# Patient Record
Sex: FEMALE | Race: ASIAN | NOT HISPANIC OR LATINO | Employment: OTHER | URBAN - METROPOLITAN AREA
[De-identification: names, ages, dates, MRNs, and addresses within clinical notes are randomized per-mention and may not be internally consistent; named-entity substitution may affect disease eponyms.]

---

## 2017-01-31 ENCOUNTER — OFFICE VISIT (OUTPATIENT)
Dept: INTERNAL MEDICINE | Facility: CLINIC | Age: 67
End: 2017-01-31
Payer: COMMERCIAL

## 2017-01-31 VITALS
DIASTOLIC BLOOD PRESSURE: 82 MMHG | SYSTOLIC BLOOD PRESSURE: 128 MMHG | OXYGEN SATURATION: 100 % | BODY MASS INDEX: 29.78 KG/M2 | RESPIRATION RATE: 16 BRPM | WEIGHT: 168.7 LBS | HEART RATE: 74 BPM | TEMPERATURE: 97.6 F

## 2017-01-31 DIAGNOSIS — L03.011 PARONYCHIA OF RIGHT MIDDLE FINGER: ICD-10-CM

## 2017-01-31 DIAGNOSIS — E11.9 TYPE 2 DIABETES MELLITUS WITHOUT COMPLICATION, WITHOUT LONG-TERM CURRENT USE OF INSULIN (H): Primary | ICD-10-CM

## 2017-01-31 LAB
ANION GAP SERPL CALCULATED.3IONS-SCNC: 5 MMOL/L (ref 3–14)
BUN SERPL-MCNC: 11 MG/DL (ref 7–30)
CALCIUM SERPL-MCNC: 9.2 MG/DL (ref 8.5–10.1)
CHLORIDE SERPL-SCNC: 106 MMOL/L (ref 94–109)
CHOLEST SERPL-MCNC: 143 MG/DL
CO2 SERPL-SCNC: 28 MMOL/L (ref 20–32)
CREAT SERPL-MCNC: 0.61 MG/DL (ref 0.52–1.04)
GFR SERPL CREATININE-BSD FRML MDRD: ABNORMAL ML/MIN/1.7M2
GLUCOSE SERPL-MCNC: 160 MG/DL (ref 70–99)
HBA1C MFR BLD: 8.6 % (ref 4.3–6)
HDLC SERPL-MCNC: 35 MG/DL
LDLC SERPL CALC-MCNC: 81 MG/DL
NONHDLC SERPL-MCNC: 108 MG/DL
POTASSIUM SERPL-SCNC: 4.2 MMOL/L (ref 3.4–5.3)
SODIUM SERPL-SCNC: 139 MMOL/L (ref 133–144)
TRIGL SERPL-MCNC: 137 MG/DL

## 2017-01-31 PROCEDURE — 36415 COLL VENOUS BLD VENIPUNCTURE: CPT | Performed by: INTERNAL MEDICINE

## 2017-01-31 PROCEDURE — 80048 BASIC METABOLIC PNL TOTAL CA: CPT | Performed by: INTERNAL MEDICINE

## 2017-01-31 PROCEDURE — 99214 OFFICE O/P EST MOD 30 MIN: CPT | Performed by: INTERNAL MEDICINE

## 2017-01-31 PROCEDURE — 80061 LIPID PANEL: CPT | Performed by: INTERNAL MEDICINE

## 2017-01-31 PROCEDURE — 83036 HEMOGLOBIN GLYCOSYLATED A1C: CPT | Performed by: INTERNAL MEDICINE

## 2017-01-31 NOTE — MR AVS SNAPSHOT
After Visit Summary   1/31/2017    Randall Boland    MRN: [id number]           Patient Information     Date Of Birth          1950        Visit Information        Provider Department      1/31/2017 8:30 AM Adrianna Singh MD [facility]        Today's Diagnoses     Type 2 diabetes mellitus without complication, without long-term current use of insulin (H)    -  1       Care Instructions    Labs - please proceed to our first floor laboratory to have these drawn (show them your orange ticket).     Results:    If normal: we will release results in MyChart or send them in the mail. You will not be called for these results.    If abnormal, but non-urgent: we will release results in MyChart or send them in the mail. You will not be called for these results.    If abnormal and urgent: we will call you.    ---    If diabetes is not well enough controlled, we should probably start medication and start checking sugars.    ---    For third right finger, please wrap in warm damp washcloth and gently massage 3x/day for 10-15 minutes when inflamed.     If symptoms worsen (like an abscess) please let me know.             Follow-ups after your visit        Who to contact     If you have questions or need follow up information about today's clinic visit or your schedule please contact [facility] directly at [phone].  Normal or non-critical lab and imaging results will be communicated to you by MyChart, letter or phone within 4 business days after the clinic has received the results. If you do not hear from us within 7 days, please contact the clinic through MyChart or phone. If you have a critical or abnormal lab result, we will notify you by phone as soon as possible.  Submit refill requests through [employer] or call your pharmacy and they will forward the refill request to us. Please allow 3 business days for your refill to be completed.          Additional  Information About Your Visit        [employer] Information     [employer] gives you secure access to your electronic health record. If you see a primary care provider, you can also send messages to your care team and make appointments. If you have questions, please call your primary care clinic.  If you do not have a primary care provider, please call [phone] and they will assist you.        Care EveryWhere ID     This is your Care EveryWhere ID. This could be used by other organizations to access your [facility] medical records  [id number]        Your Vitals Were     Pulse Temperature Respirations Pulse Oximetry          74 97.6  F (36.4  C) (Oral) 16 100%         Blood Pressure from Last 3 Encounters:   01/31/17 128/82   10/14/16 120/70   10/10/16 120/70    Weight from Last 3 Encounters:   01/31/17 168 lb 11.2 oz (76.522 kg)   10/14/16 172 lb 6.4 oz (78.2 kg)   10/10/16 171 lb (77.565 kg)              We Performed the Following     Basic metabolic panel     Hemoglobin A1c     Lipid Profile        Primary Care Provider    None Specified       No primary provider on file.        Thank you!     Thank you for choosing [facility]  for your care. Our goal is always to provide you with excellent care. Hearing back from our patients is one way we can continue to improve our services. Please take a few minutes to complete the written survey that you may receive in the mail after your visit with us. Thank you!             Your Updated Medication List - Protect others around you: Learn how to safely use, store and throw away your medicines at www.disposemymeds.org.          This list is accurate as of: 1/31/17  9:02 AM.  Always use your most recent med list.                   Brand Name Dispense Instructions for use    atorvastatin 20 MG tablet    LIPITOR    90 tablet    Take 1 tablet (20 mg) by mouth daily       blood glucose monitoring lancets     1 Box    Use to test blood sugar 1-2 times daily or  as directed.       blood glucose monitoring test strip    ALEX CONTOUR NEXT    50 each    Use to test blood sugar 1-2 times daily or as directed.       FISH OIL + D3 PO          Multi-vitamin Tabs tablet      Take 1 tablet by mouth daily       nystatin 385741 UNIT/GM Powd    MYCOSTATIN    30 g    Apply topically 3 times daily as needed

## 2017-01-31 NOTE — PATIENT INSTRUCTIONS
Labs - please proceed to our first floor laboratory to have these drawn (show them your orange ticket).     Results:    If normal: we will release results in MyChart or send them in the mail. You will not be called for these results.    If abnormal, but non-urgent: we will release results in MyChart or send them in the mail. You will not be called for these results.    If abnormal and urgent: we will call you.    ---    If diabetes is not well enough controlled, we should probably start medication and start checking sugars.    ---    For third right finger, please wrap in warm damp washcloth and gently massage 3x/day for 10-15 minutes when inflamed.     If symptoms worsen (like an abscess) please let me know.

## 2017-01-31 NOTE — PROGRESS NOTES
SUBJECTIVE:                                                      HPI: Randall Boland is a pleasant 66 year old female who presents for diabetes follow-up:    Accompanied by daughter who translates.     - was last seen in November   - HgbA1c was 9.2% at that time - patient advised to start oral hypoglycemics  - medication was declined - patient (and daughter) wanted to try lifestyle modifications first    Update since then:  - patient has seen DM educator   - has changed diet significantly:   - cut out all sweets   - switched carbs to whole grains (from white flour and white rice to whole wheat and brown rice)   - has reduced portion size   - stopped soft drinks   - eating more vegetables  - patient has started exercising   - more regularly when weather was better   - still going to gym with daughter a couple times/week    Feel well today - no specific complains.    Daughter asks about patient's right 3rd finger:  - history of injury last year  - since then, gets recurrent redness, swelling, and pain of skin surrounding proximal nail  - recurrences usually coincide with increased chores around house   - wants to know what do if symptoms recur    DIABETES MANAGEMENT                                                      Current DM regimen: diet alone  Adherent to ADA diet: yes  BGs: not medically indicated at this time   Episodes of hypoglycemia: n/a     Opthomology: November, 2016 - no DR  Retinopathy: no  Neuropathy: denies  Nephropathy: no  Checking feet/seeing podiatry: yes   Influenza vaccine: up to date   Pneumococcal vaccine: up to date     Aspirin Rx: not on   ACE-I/ARB: not one  Statin: not on     The medication, allergy, and problem lists have been reviewed and updated as appropriate.     OBJECTIVE:                                                      /82 mmHg  Pulse 74  Temp(Src) 97.6  F (36.4  C) (Oral)  Resp 16  Wt 168 lb 11.2 oz (76.522 kg)  SpO2 100%  Constitutional:  well-appearing  Respiratory: normal respiratory effort; clear to auscultation bilaterally  Cardiovascular: regular rate and rhythm; no edema  Gastrointestinal: soft, non-tender, non-distended, and bowel sounds present; no organomegaly or masses   Musculoskeletal: normal gait and station; feet intact - no lesions and normal sensation  Psych: normal judgment and insight; normal mood and affect; recent and remote memory intact  Right 3rd finger: nail thickened, yellowed, and cracked along lateral edges; no cuticle; proximal zimmerman fold heaped up, but non-erythematous    ASSESSMENT/PLAN:                                                      (E11.9) Type 2 diabetes mellitus without complication, without long-term current use of insulin (H)  (primary encounter diagnosis)  Comment: patient has completed a 3 month trial of lifestyle changes to improve diabetic control.   Plan:    - HgbA1c, BMP, and lipid profile today.   - if HgbA1c >7.0, will initiate Metformin and fasting BG checks.    - follow-up pending results - likely 6 months.     (L03.011) Paronychia of right middle finger  Comment:    - not present currently, recurrent per daughter.    - recent trauma to finger --> mild deformity, likely placing her at increased risk for infection/inflammation.   Plan:    - for recurrent symptoms, recommend warm compresses and gentle massage 3x/day.   - if symptoms moderate-severe or refractory to above, come in for further evaluation.    The instructions on the AVS were discussed and explained to the patient. Patient expressed understanding of instructions.  A total of 25 minutes were spent face-to-face with this patient during this encounter and over half of that time was spent on counseling and coordination of care re: above diagnoses and plans of care.     Adrianna Singh MD   [address] [address]  T: [phone], F: [phone]

## 2017-04-27 ENCOUNTER — TELEPHONE (OUTPATIENT)
Dept: INTERNAL MEDICINE | Facility: CLINIC | Age: 67
End: 2017-04-27

## 2017-04-27 NOTE — TELEPHONE ENCOUNTER
Reason for Call:  Form, our goal is to have forms completed with 72 hours, however, some forms may require a visit or additional information.    Type of letter, form or note:  disability    Who is the form from?: Patient    Where did the form come from: Patient or family brought in       What clinic location was the form placed at?: Internal Medicine    Where the form was placed: Dr. Singh's folder    What number is listed as a contact on the form?: [phone]       Additional comments: None    Call taken on 4/27/2017 at 4:41 PM by Windy Conde

## 2017-04-30 DIAGNOSIS — R41.3 MEMORY CHANGES: Primary | ICD-10-CM

## 2017-05-05 ENCOUNTER — OFFICE VISIT (OUTPATIENT)
Dept: NEUROLOGY | Facility: CLINIC | Age: 67
End: 2017-05-05
Payer: COMMERCIAL

## 2017-05-05 VITALS
SYSTOLIC BLOOD PRESSURE: 134 MMHG | DIASTOLIC BLOOD PRESSURE: 72 MMHG | BODY MASS INDEX: 29.84 KG/M2 | WEIGHT: 168.4 LBS | HEART RATE: 100 BPM | RESPIRATION RATE: 18 BRPM | HEIGHT: 63 IN

## 2017-05-05 DIAGNOSIS — R41.89 COGNITIVE IMPAIRMENT: Primary | ICD-10-CM

## 2017-05-05 DIAGNOSIS — E11.42 DIABETIC POLYNEUROPATHY ASSOCIATED WITH TYPE 2 DIABETES MELLITUS (H): ICD-10-CM

## 2017-05-05 LAB — VIT B12 SERPL-MCNC: 468 PG/ML (ref 193–986)

## 2017-05-05 PROCEDURE — 99000 SPECIMEN HANDLING OFFICE-LAB: CPT | Performed by: PSYCHIATRY & NEUROLOGY

## 2017-05-05 PROCEDURE — 84443 ASSAY THYROID STIM HORMONE: CPT | Performed by: PSYCHIATRY & NEUROLOGY

## 2017-05-05 PROCEDURE — 82607 VITAMIN B-12: CPT | Performed by: PSYCHIATRY & NEUROLOGY

## 2017-05-05 PROCEDURE — 84207 ASSAY OF VITAMIN B-6: CPT | Mod: 90 | Performed by: PSYCHIATRY & NEUROLOGY

## 2017-05-05 PROCEDURE — 99205 OFFICE O/P NEW HI 60 MIN: CPT | Performed by: PSYCHIATRY & NEUROLOGY

## 2017-05-05 PROCEDURE — 84425 ASSAY OF VITAMIN B-1: CPT | Mod: 90 | Performed by: PSYCHIATRY & NEUROLOGY

## 2017-05-05 PROCEDURE — 83921 ORGANIC ACID SINGLE QUANT: CPT | Mod: 90 | Performed by: PSYCHIATRY & NEUROLOGY

## 2017-05-05 PROCEDURE — 36415 COLL VENOUS BLD VENIPUNCTURE: CPT | Performed by: PSYCHIATRY & NEUROLOGY

## 2017-05-05 PROCEDURE — 84446 ASSAY OF VITAMIN E: CPT | Mod: 90 | Performed by: PSYCHIATRY & NEUROLOGY

## 2017-05-05 PROCEDURE — 86780 TREPONEMA PALLIDUM: CPT | Performed by: PSYCHIATRY & NEUROLOGY

## 2017-05-05 NOTE — NURSING NOTE
"COGNITIVE SCREEN  1) Repeat 3 items (Banana, Sunrise, Chair)    2) Clock draw: Unable to understand due to language barrier  3) 3 item recall: Recalls 1 object   Results: ABNORMAL clock, 1-2 items recalled: PROBABLE COGNITIVE IMPAIRMENT, **INFORM PROVIDER**    Mini-CogTM Copyright S [employer]. Licensed by the author for use in [facility]; reprinted with permission (eric@[facility]). All rights reserved.        Chief Complaint   Patient presents with     Consult     memory issues       Initial /72 (BP Location: Right arm, Patient Position: Chair, Cuff Size: Adult Regular)  Pulse 100  Resp 18  Ht 1.603 m (5' 3.1\")  Wt 76.4 kg (168 lb 6.4 oz)  BMI 29.74 kg/m2 Estimated body mass index is 29.74 kg/(m^2) as calculated from the following:    Height as of this encounter: 1.603 m (5' 3.1\").    Weight as of this encounter: 76.4 kg (168 lb 6.4 oz).  Medication Reconciliation: complete     Marya Tracey, LISANDRO      "

## 2017-05-05 NOTE — PATIENT INSTRUCTIONS
AFTER VISIT SUMMARY (AVS):    At today's visit we discussed various diagnostic possibilities for your symptoms and the reasons for work-up, which includes:  Orders Placed This Encounter   Procedures     MR Brain w/o & w Contrast     Methylmalonic acid     TSH with free T4 reflex     Vitamin B12     Vitamin E     Vitamin B1 whole blood     Vitamin B6     Anti Treponema     OCCUPATIONAL THERAPY REFERRAL     NEUROPSYCHOLOGY REFERRAL     No new medications were ordered.    Preventive Neurology: Encouraged to stay physically and mentally active with particular emphasis on daily mentally stimulating activities of your choice (such as crosswords, puzzles, sudoku, etc.), stretching exercises, walking, and healthy eating.    Additional recommendations after the work-up.    Next follow-up appointment is in next 3 months or earlier if needed.    Please do not hesitate to call me with any questions or concerns.    Thanks.

## 2017-05-05 NOTE — PROGRESS NOTES
INITIAL NEUROLOGY CONSULTATION    DATE OF VISIT: 5/5/2017  CLINIC LOCATION: [facility]  MRN: [id number]  PATIENT NAME: Randall Boland  YOB: 1950    PRIMARY CARE PROVIDER: Dr. Singh.     REASON FOR VISIT:   Chief Complaint   Patient presents with     Consult     memory issues     HISTORY OF PRESENT ILLNESS:                                                    Ms. Randall oBland is 66 year old right handed female patient with past medical history of diabetes mellitis type 2, who was seen in consultation today requested by Dr. Singh for memory concerns. The patient is accompanied by her daughter, who serves as  and participates in the interview as a collateral source of information.    Per patient's report, she does not think, that her memory is affected. However, after her family pointed out to her, that she has memory issues, she agrees. She does not think, that her mild forgetfulness causing a problem. She also reports diabetes and chronic back pain along with left ankle strain, causing pain with walking.    Per her daughter's opinion, the patient has gradual onset of cognitive decline for the last 5 years. It became much more noticeable in the last year. She has difficulty remembering new information. The patient repeats the same stories and asks the same questions over again. She is not able to remember important dates, doctor's appointments, and [facility] holidays. The daughter needs to constantly remind her about it. In addition, the patient has difficulty with taking her medications correctly. She forgets to take them on time and, sometimes, double doses.    On several occasions, she forgot to turn the stove burners and water facets off. She also leaves the doors open, when she exits the house. She frequently misplaces her things and is not able to find them. Due to the patient's forgetfulness, her daughter cannot leave grandchildren in patient's care anymore.    In  addition, her daughter always accompanies the patient, when they leave the house due to a concern, that the patient may get lost, if she goes alone. The patient does not drive. She is not getting lost in her daughter's home.    The daughter did not notice, that the patient has any word finding difficulties or problems with language.    Regarding patient's mood, her daughter comments, that the patient is anxious and depressed, at times. She became more irritable. The daughter did not notice any other personality changes. She also denies any focal neurological symptoms.    She denies a history of recent head injury. Prior neurological history: negative for migraine headaches, stroke, brain neoplasms, seizure disorders, multiple sclerosis, major head injuries, and CNS infections.    Neurologic Review of Systems - no amaurosis, diplopia, abnormal speech, unilateral numbness or weakness. She endorses insomnia, heartburn, depression, intermittent left elbow rash (not present currently), arthritis, muscle tenderness, headaches (mild), bilateral feet numbness, and history of tuberculosis. Otherwise, she denies any other complaints on 14-point comprehensive review of systems.    PAST MEDICAL/SURGICAL HISTORY:                                                    I personally reviewed patient's past medical and surgical history with the patient at today's visit.  Past Medical History:   Diagnosis Date     Dermatographia      Tuberculosis of intestines     treated in [place]     Past Surgical History:   Procedure Laterality Date     CHOLECYSTECTOMY, OPEN  1990     MEDICATIONS:                                                    I personally reviewed patient's medications and allergies with the patient at today's visit.  Current Outpatient Prescriptions on File Prior to Visit:  Fish Oil-Cholecalciferol (FISH OIL + D3 PO)    metFORMIN (GLUCOPHAGE) 1000 MG tablet Take 1 tablet (1,000 mg) by mouth 2 times daily (with meals)   blood  glucose monitoring (NO BRAND SPECIFIED) test strip Use to test blood sugars once daily   blood glucose monitoring (ALEX CONTOUR NEXT) test strip Use to test blood sugar 1-2 times daily or as directed.   blood glucose monitoring (ALEX MICROLET) lancets Use to test blood sugar 1-2 times daily or as directed.   multivitamin, therapeutic with minerals (MULTI-VITAMIN) TABS Take 1 tablet by mouth daily   nystatin (MYCOSTATIN) 662591 UNIT/GM POWD Apply topically 3 times daily as needed   atorvastatin (LIPITOR) 40 MG tablet Take 1 tablet (40 mg) by mouth daily (Patient not taking: Reported on 5/5/2017)   atorvastatin (LIPITOR) 20 MG tablet Take 1 tablet (20 mg) by mouth daily (Patient not taking: Reported on 5/5/2017)     ALLERGIES:                                                      Allergies   Allergen Reactions     Atorvastatin Muscle Pain (Myalgia)     FAMILY/SOCIAL HISTORY:                                                    Family and social history was reviewed with the patient at today's visit. Father and brother had history of stroke. Both of the patient's parents had dementia. Her father also had headaches.   Problem (# of Occurrences) Relation (Name,Age of Onset)    CEREBROVASCULAR DISEASE (2) Father, Brother    Dementia (1) Mother    Hypertension (1) Brother       Negative family history of: DIABETES, Myocardial Infarction, Coronary Artery Disease Early Onset, CANCER        , lives with  and daughter's family, denies current tobacco, alcohol, and recreational drug use. Retired.  Social History   Substance Use Topics     Smoking status: Never Smoker     Smokeless tobacco: Never Used     Alcohol use 0.0 oz/week     0 Standard drinks or equivalent per week      Comment: couple drinks/week     REVIEW OF SYSTEMS:                                                    Patient has completed a [facility] Patient Health History, including a 14-system review which was personally reviewed, and  "pertinent positives are listed in HPI. She denies any additional problems on the further questioning.    EXAM:                                                    VITAL SIGNS:   /72 (BP Location: Right arm, Patient Position: Chair, Cuff Size: Adult Regular)  Pulse 100  Resp 18  Ht 1.603 m (5' 3.1\")  Wt 76.4 kg (168 lb 6.4 oz)  BMI 29.74 kg/m2    General: pt is in NAD, cooperative.  Skin: normal turgor, moist mucous membranes, no lesions/rashes noticed.  HEENT: ATNC, EOMI, PERRL, white sclera, normal conjunctiva, no nystagmus or ptosis. No carotid bruits bilaterally.  Respiratory: lung sounds clear to auscultation bilaterally, no crackles, wheezes, rhonchi. Symmetric lung excursion, no accessory respiratory muscle use.  Cardiovascular: normal S1/S2, no murmurs/rubs/gallops.  Abdomen: Not distended.  : deferred.    Neurological:  Mental: alert, follows commands, mini-cog is 1/5 with 1/3 on memory recall, no aphasia or dysarthria. Fund of knowledge is diminished for age.  Cranial Nerves:  CN II: visual acuity - able to accurately count fingers with each eye. Visual fields intact, fundi: discs sharp, no papilledema and normal vessels bilaterally.  CN III, IV, VI: EOM intact, pupils equal and reactive  CN V: facial sensation nl  CN VII: face symmetric, no facial droop  CN VIII: hearing normal  CN IX: palate elevation symmetric, uvula at midline  CN XI SCM normal, shoulder shrug nl  CN XII: tongue midline  Motor: Strength: 5/5 in all major groups of all extremities. Normal tone. No abnormal movements. No pronator drift b/l.  Reflexes: Triceps, biceps, brachioradialis, and patellar reflexes normal and symmetric, achilles reflexes are symmetrically reduced. No clonus noted. Toes are down-going b/l.   Sensory: temperature, light touch, pinprick, and vibration reduced in the distal legs bilaterally. Romberg: positive.  Coordination: FNF and heel-shin tests intact b/l. No dysdiadochokinesia with rapid alternating " movements.  Gait:  Normal stride length and arm swings, able to stand on her tiptoes and heels, has difficulty with tandem gait.    DATA:     LABS: I personally reviewed the following labs:  Office Visit on 01/31/2017   Component Date Value Ref Range Status     Hemoglobin A1C 01/31/2017 8.6* 4.3 - 6.0 % Final    Reviewed: OK with previous     Cholesterol 01/31/2017 143  <200 mg/dL Final     Triglycerides 01/31/2017 137  <150 mg/dL Final     HDL Cholesterol 01/31/2017 35* >49 mg/dL Final     LDL Cholesterol Calculated 01/31/2017 81  <100 mg/dL Final    Desirable:       <100 mg/dl     Non HDL Cholesterol 01/31/2017 108  <130 mg/dL Final     Sodium 01/31/2017 139  133 - 144 mmol/L Final     Potassium 01/31/2017 4.2  3.4 - 5.3 mmol/L Final     Chloride 01/31/2017 106  94 - 109 mmol/L Final     Carbon Dioxide 01/31/2017 28  20 - 32 mmol/L Final     Anion Gap 01/31/2017 5  3 - 14 mmol/L Final     Glucose 01/31/2017 160* 70 - 99 mg/dL Final     Urea Nitrogen 01/31/2017 11  7 - 30 mg/dL Final     Creatinine 01/31/2017 0.61  0.52 - 1.04 mg/dL Final     GFR Estimate 01/31/2017   >60 mL/min/1.7m2 Final                    Value:>90  Non  GFR Calc       GFR Estimate If Black 01/31/2017   >60 mL/min/1.7m2 Final                    Value:>90   GFR Calc       Calcium 01/31/2017 9.2  8.5 - 10.1 mg/dL Final     IMAGING:  No prior brain imaging was performed.    ASSESSMENT and PLAN:      ASSESSMENT: Randall Boland is a 66 year old female patient with past medical history of diabetes mellitis type 2, who presents with gradual onset of cognitive decline in the last 5 years. Both of the patient's parents had history of dementia. Her neurological exam is notable for cognitive dysfunction, symmetrically reduced achilles reflexes and symmetrically diminished sensation in her feet.    Her clinical presentation is consistent with major new neurocognitive disorder. The differential includes vitamin deficiencies,  CNS infections, thyroid dysfunction, structural/vascular brain lesions, and neurodegenerative disorders, with Alzheimer's disease being most common. I ordered screening labs for reversible causes of dementia. I also ordered MRI with and without contrast to evaluate for structural/vascular brain lesions. Additionally, I would like to obtain neuropsychological evaluation and CPT to further evaluate her memory impairment.    Her other neurological findings are likely related to diabetic polyneuropathy. I recommended tightening of her glycemic control with the long-term goal of hemoglobin A1C less than 7.0. I also advised the patient to inspect her feet daily for the presence of cuts and infection. I recommended to test her water with her hands or elbows prior to stepping into the bathtub to prevent burns.    DIAGNOSES:    ICD-10-CM    1. Cognitive impairment R41.89 Methylmalonic acid     TSH with free T4 reflex     Vitamin B12     Vitamin E     Vitamin B1 whole blood     Vitamin B6     MR Brain w/o & w Contrast     Anti Treponema     OCCUPATIONAL THERAPY REFERRAL     NEUROPSYCHOLOGY REFERRAL   2. Diabetic polyneuropathy associated with type 2 diabetes mellitus (H) E11.42        PLAN: At today's visit we discussed various diagnostic possibilities for her symptoms and the reasons for work-up, which includes:  Orders Placed This Encounter   Procedures     MR Brain w/o & w Contrast     Methylmalonic acid     TSH with free T4 reflex     Vitamin B12     Vitamin E     Vitamin B1 whole blood     Vitamin B6     Anti Treponema     OCCUPATIONAL THERAPY REFERRAL     NEUROPSYCHOLOGY REFERRAL     No new medications were ordered.    Preventive Neurology: Encouraged to stay physically and mentally active with particular emphasis on daily mentally stimulating activities of her choice (such as crosswords, puzzles, sudoku, etc.), stretching exercises, walking, and healthy eating.    Additional recommendations after the work-up.    Next  follow-up appointment is in next 3 months or earlier if needed.    I encouraged the patient and/or her daughter to call me with any questions or concerns.    Total Time:  60 minutes with > 50% spent counseling the patient and her daughter on stated above assessment and recommendations, including nature of the diagnosis, needed w/u, proposed plan of treatment, and prognosis.    Jovani Ordaz MD  / Neurology  [place]  (Chart documentation was completed in part with Dragon voice-recognition software. Even though reviewed, some grammatical, spelling, and word errors may remain.)

## 2017-05-06 LAB
T PALLIDUM IGG+IGM SER QL: NEGATIVE
TSH SERPL DL<=0.005 MIU/L-ACNC: 1.02 MU/L (ref 0.4–4)

## 2017-05-07 LAB
A-TOCOPHEROL VIT E SERPL-MCNC: 13.2
BETA+GAMMA TOCOPHEROL SERPL-MCNC: 1.3 MG/DL
METHYLMALONATE SERPL-SCNC: 0.23

## 2017-05-08 LAB
VIT B1 BLD-MCNC: 160 UG/DL
VIT B6 SERPL-MCNC: 101 NG/ML

## 2017-05-09 ENCOUNTER — TELEPHONE (OUTPATIENT)
Dept: NEUROLOGY | Facility: CLINIC | Age: 67
End: 2017-05-09

## 2017-05-15 ENCOUNTER — HOSPITAL ENCOUNTER (OUTPATIENT)
Dept: OCCUPATIONAL THERAPY | Facility: CLINIC | Age: 67
Setting detail: THERAPIES SERIES
End: 2017-05-15
Attending: PSYCHIATRY & NEUROLOGY
Payer: COMMERCIAL

## 2017-05-15 ENCOUNTER — HOSPITAL ENCOUNTER (OUTPATIENT)
Dept: MRI IMAGING | Facility: CLINIC | Age: 67
Discharge: HOME | End: 2017-05-15
Attending: PSYCHIATRY & NEUROLOGY | Admitting: PSYCHIATRY & NEUROLOGY
Payer: COMMERCIAL

## 2017-05-15 DIAGNOSIS — R41.89 COGNITIVE IMPAIRMENT: ICD-10-CM

## 2017-05-15 PROCEDURE — 97535 SELF CARE MNGMENT TRAINING: CPT | Mod: GO

## 2017-05-15 PROCEDURE — 25000128 H RX IP 250 OP 636: Performed by: PSYCHIATRY & NEUROLOGY

## 2017-05-15 PROCEDURE — 97165 OT EVAL LOW COMPLEX 30 MIN: CPT | Mod: GO

## 2017-05-15 PROCEDURE — 40000125 ZZHC STATISTIC OT OUTPT VISIT

## 2017-05-15 PROCEDURE — A9585 GADOBUTROL INJECTION: HCPCS | Performed by: PSYCHIATRY & NEUROLOGY

## 2017-05-15 PROCEDURE — 70553 MRI BRAIN STEM W/O & W/DYE: CPT

## 2017-05-15 RX ORDER — GADOBUTROL 604.72 MG/ML
7 INJECTION INTRAVENOUS ONCE
Status: COMPLETED | OUTPATIENT
Start: 2017-05-15 | End: 2017-05-15

## 2017-05-15 RX ADMIN — GADOBUTROL 7 ML: 604.72 INJECTION INTRAVENOUS at 17:08

## 2017-05-18 ENCOUNTER — OFFICE VISIT (OUTPATIENT)
Dept: NEUROLOGY | Facility: CLINIC | Age: 67
End: 2017-05-18
Payer: COMMERCIAL

## 2017-05-18 ENCOUNTER — TELEPHONE (OUTPATIENT)
Dept: NEUROLOGY | Facility: CLINIC | Age: 67
End: 2017-05-18

## 2017-05-18 VITALS
SYSTOLIC BLOOD PRESSURE: 104 MMHG | DIASTOLIC BLOOD PRESSURE: 64 MMHG | WEIGHT: 168 LBS | BODY MASS INDEX: 29.67 KG/M2 | OXYGEN SATURATION: 97 % | RESPIRATION RATE: 18 BRPM | HEART RATE: 87 BPM

## 2017-05-18 DIAGNOSIS — G30.0 EARLY ONSET ALZHEIMER'S DEMENTIA WITHOUT BEHAVIORAL DISTURBANCE (H): Primary | ICD-10-CM

## 2017-05-18 DIAGNOSIS — F02.80 EARLY ONSET ALZHEIMER'S DEMENTIA WITHOUT BEHAVIORAL DISTURBANCE (H): Primary | ICD-10-CM

## 2017-05-18 PROCEDURE — 99215 OFFICE O/P EST HI 40 MIN: CPT | Performed by: PSYCHIATRY & NEUROLOGY

## 2017-05-18 NOTE — PROGRESS NOTES
ESTABLISHED PATIENT NEUROLOGY NOTE    DATE OF VISIT: 5/18/2017  CLINIC LOCATION: [facility]  MRN: [id number]  PATIENT NAME: Randall Boland  YOB: 1950    PCP: No primary care provider on file.    REASON FOR VISIT:   Chief Complaint   Patient presents with     RECHECK     cognitive issues     SUBJECTIVE:                                                      HISTORY OF PRESENT ILLNESS: Patient is here for follow up regarding gradual progressive cognitive impairment. Please refer to my initial note from 05/05/2017 for further information. Patient is accompanied by her daughter and , who participate in the interview. Visit was conducted with the help of the professional Tibetan phone , Jovanni Sethi, from [employer].    The following issues were reviewed today: test results, current symptoms, and plan.    Since the last visit, the patient and her daughter report no changes in cognitive abilities. The patient denies the interval development of new focal neurological symptoms. They've brought paperwork for medical examination from citizenship/naturalization test.    On review of systems, patient endorses no new active complaints. Medications, allergies, family and social history were also reviewed. There are no changes reported by patient.    CURRENT MEDICATIONS:   Current Outpatient Prescriptions on File Prior to Visit:  Fish Oil-Cholecalciferol (FISH OIL + D3 PO)    atorvastatin (LIPITOR) 40 MG tablet Take 1 tablet (40 mg) by mouth daily (Patient not taking: Reported on 5/5/2017)   metFORMIN (GLUCOPHAGE) 1000 MG tablet Take 1 tablet (1,000 mg) by mouth 2 times daily (with meals)   blood glucose monitoring (NO BRAND SPECIFIED) test strip Use to test blood sugars once daily   blood glucose monitoring (ALEX CONTOUR NEXT) test strip Use to test blood sugar 1-2 times daily or as directed.   blood glucose monitoring (ALEX MICROLET) lancets Use to test blood  sugar 1-2 times daily or as directed.   atorvastatin (LIPITOR) 20 MG tablet Take 1 tablet (20 mg) by mouth daily (Patient not taking: Reported on 5/5/2017)   multivitamin, therapeutic with minerals (MULTI-VITAMIN) TABS Take 1 tablet by mouth daily   nystatin (MYCOSTATIN) 148647 UNIT/GM POWD Apply topically 3 times daily as needed     REVIEW OF SYSTEMS:                                                    10-system review was completed. Pertinent positives are included in HPI. The remainder of ROS is negative.  EXAM:                                                    Physical Exam:   Vitals: /64 (BP Location: Left arm, Patient Position: Chair, Cuff Size: Adult Large)  Pulse 87  Resp 18  Wt 76.2 kg (168 lb)  SpO2 97%  BMI 29.67 kg/m2    General: pt is in NAD, cooperative.  Skin: normal turgor, moist mucous membranes, no lesions/rashes noticed.  HEENT: ATNC, white sclera, normal conjunctiva.  Respiratory:Symmetric lung excursion, no accessory respiratory muscle use.  Abdomen: Non distended.  Neurological: awake, cooperative, follows commands, no aphasia or dysarthria noted, cranial nerves II-XII: no ptosis, extraocular motility is full, face is symmetric, tongue is midline, equally moves all extremities, her achilles reflexes and pinprick sensation were symmetrically reduced last visit (not checked today), no dysmetria bilaterally, gait is normal.    DATA:     Labs: I personally reviewed the following labs:  Office Visit on 05/05/2017   Component Date Value Ref Range Status     Methylmalonic Acid 05/05/2017 0.23   Final    Comment: Reference range: 0.00 to 0.40  Unit: umol/L  (Note)  INTERPRETIVE INFORMATION: MMA Serum/Plasma,                           Vitamin B12 Status  Test developed and characteristics determined by [facility]. See Compliance Statement B: [employer] [facility]  Performed by [facility],  [address] [place] [id number] [phone]  [email or web address], Marty Wright MD, Lab. Director        TSH 05/05/2017 1.02  0.40 - 4.00 mU/L Final     Vitamin B12 05/05/2017 468  193 - 986 pg/mL Final     Vitamin E 05/05/2017 13.2   Final    Comment: Reference range: 5.5 to 18.0  Unit: mg/L  (Note)  Test developed and characteristics determined by [facility]. See Compliance Statement B: [employer]/CS       Vitamin E Gamma 05/05/2017 1.3   Final    Comment: Reference range: 0.0 to 6.0  Unit: mg/L  (Note)  Performed by [facility],  [address] [place] [address] [phone]  [email or web address], Marty Wright MD, Lab. Director       Vitamin B1 Whole Blood Level 05/05/2017 160   Final    Comment: Reference range: 70 to 180  Unit: nmol/L  (Note)  INTERPRETIVE INFORMATION: Vitamin B1, Whole Blood  This assay measures the concentration of thiamine  diphosphate (TDP), the primary active form of vitamin B1.  Approximately 90 percent of vitamin B1 present in whole  blood is TDP. Thiamine and thiamine monophosphate, which  comprise the remaining 10 percent, are not measured.  Test developed and characteristics determined by [facility]. See Compliance Statement B: [facility]  Performed by [facility],  [address] [place] [address] [phone]  [email or web address], Marty Wright MD, Lab. Director       Vitamin B6 05/05/2017 101.0   Final    Comment: Reference range: 20.0 to 125.0  Unit: nmol/L  (Note)  INTERPRETIVE INFORMATION: Vitamin B6 (Pyridoxal 5-Phosphate)  Pyridoxal 5'-phosphate measured in a specimen collected  following an 8-hour or overnight fast accurately indicates  vitamin B6 nutritional status. Non-fasting specimen  concentration reflects recent vitamin intake.  Test developed and characteristics determined by [facility]. See Compliance Statement B: [facility]  Performed by [facility],  [address] [place] [address] [phone]  [email or web address], Marty Wright MD, Lab. Director       Treponema pallidum Antibody 05/05/2017 Negative  NEG Final   Office Visit on  01/31/2017   Component Date Value Ref Range Status     Hemoglobin A1C 01/31/2017 8.6* 4.3 - 6.0 % Final    Reviewed: OK with previous     Cholesterol 01/31/2017 143  <200 mg/dL Final     Triglycerides 01/31/2017 137  <150 mg/dL Final     HDL Cholesterol 01/31/2017 35* >49 mg/dL Final     LDL Cholesterol Calculated 01/31/2017 81  <100 mg/dL Final    Desirable:       <100 mg/dl     Non HDL Cholesterol 01/31/2017 108  <130 mg/dL Final     Sodium 01/31/2017 139  133 - 144 mmol/L Final     Potassium 01/31/2017 4.2  3.4 - 5.3 mmol/L Final     Chloride 01/31/2017 106  94 - 109 mmol/L Final     Carbon Dioxide 01/31/2017 28  20 - 32 mmol/L Final     Anion Gap 01/31/2017 5  3 - 14 mmol/L Final     Glucose 01/31/2017 160* 70 - 99 mg/dL Final     Urea Nitrogen 01/31/2017 11  7 - 30 mg/dL Final     Creatinine 01/31/2017 0.61  0.52 - 1.04 mg/dL Final     GFR Estimate 01/31/2017   >60 mL/min/1.7m2 Final                    Value:>90  Non  GFR Calc       GFR Estimate If Black 01/31/2017   >60 mL/min/1.7m2 Final                    Value:>90   GFR Calc       Calcium 01/31/2017 9.2  8.5 - 10.1 mg/dL Final     Imaging: I also personally reviewed following brain imaging and agree with the formal radiology report as follows:   MR BRAIN W/O & W CONTRAST 5/15/2017: Negative for age. No bleed, mass, or acute infarcts are seen.  CPT 05/15/2017: Average CPT score 4.8/5.6.  Mild to moderate cognitive-functional disability. Significant deficits in working memory and executive thought processes. Judgment, reasoning and planning show obvious impairment. Distractible with inability to shift attention/actions given competing stimuli. Difficulty with problem solving and managing details. Complex daily tasks performed with inconsistency, difficulty, or error.    Safety: Medications should be monitored, stove use may require supervision, and driving ability may be affected. Impaired safety awareness with inability to  anticipate potential problems. May not recognize or respond to emergent situations. Requires frequent check-in support.   ADL: Mild difficulty with simple everyday self-care tasks. Benefits from structured, routine activity. Will likely need reminders to complete tasks outside of the routine. Requires assistance with planning and IADL tasks like shopping and finances. Learns concrete tasks through repetition, but performance may not generalize. Tends to be impulsive with poor insight. Self centered behavior or inability to consider the needs of others is common.  ASSESSMENT and PLAN:                                                    Assessment: 66-year-old female patient with past medical history of diabetes mellitus type 2 and strong family history of dementia presents for follow-up of gradual cognitive decline over the last 5 years to discuss the results of workup (brain MRI, labs, and CPT), which was negative for reversible causes of cognitive impairment. Neuropsychologic assessment was declined due to language/cultural barriers.    Her clinical presentation is consistent with major neurocognitive disorder likely related to Alzheimer's disease or other neurodegenerative disorders.    We thoroughly discussed the suspected diagnosis, available treatment options (including Aricept), and prognosis. The patient wants to try Tibetan folk medicines first. She'll contact my office, if she decides to start Aricept later.    Her citizenship paperwork will be filled in a timely manner. I will notify her daughter, when it is completed.    Diagnoses:    ICD-10-CM    1. Early onset Alzheimer's dementia without behavioral disturbance G30.0     F02.80        Plan: At today's visit we discussed the results of her workup for reversible causes of cognitive impairment, which was negative. We also discussed results of occupational therapy assessment, that indicated mild to moderate functional cognitive impairment. At the current  moment, the most likely explanation of her memory dysfunction is Alzheimer's disease, which is characterized by progressive memory loss. Additional educational materials for home review were provided.    Next follow-up appointment is in the next 6 months or earlier if needed.     I advised the patient and/or her daughter to call me with any questions or concerns.    Total Time: 45 minutes with > 50% spent counseling the patient and family on stated above assessment and recommendations, including nature of the diagnosis, needed w/u, proposed plan of treatment, and prognosis.    Jovani Ordaz MD  HP/[facility] Neurology

## 2017-05-18 NOTE — NURSING NOTE
"Chief Complaint   Patient presents with     RECHECK     cognitive issues       Initial /64 (BP Location: Left arm, Patient Position: Chair, Cuff Size: Adult Large)  Pulse 87  Resp 18  Wt 76.2 kg (168 lb)  SpO2 97%  BMI 29.67 kg/m2 Estimated body mass index is 29.67 kg/(m^2) as calculated from the following:    Height as of 5/5/17: 1.603 m (5' 3.1\").    Weight as of this encounter: 76.2 kg (168 lb).  Medication Reconciliation: jackson Tracey CMA      "

## 2017-05-18 NOTE — PROGRESS NOTES
Cognitive Performance Test    SUMMARY OF TEST:    The Cognitive Performance Test (CPT) is a standardized performance-based assessment to measure working memory/executive function processing capacities that underlie functional performance. Subtasks include common basic and instrumental activities of daily living (ADL/IADL) which are rated based on the manner in which patients respond to task demands of varying complexity. The total CPT score describes a level of functioning that indicates how information is processed, implications for functional activities, potential safety risks and a recommended level of supervision or assist based on cognitive function. The highest total score on this test is in the range of 5.6 to 5.8.    DATE OF TESTIN/15/17    RESULTS OF TESTING:                                                                                         CPT Subtest Results    MEDBOX:  SHOP/GLOVES:  PHONE:    WASH:   TRAVEL: / TOAST:    DRESS:    TOTAL CPT SCORE:       Average CPT Score  4.8/5.6    INTERPRETATION OF TEST RESULTS:    Based on the Cognitive Performance Test, this patient scored at CPT Level 4.5 (rounding down from 4.8 per CPT protocol).  See CPT Levels reference below.    Summary of functional cognitive status:   Ms. Randall Boland is 66 year old female presenting to OP OT for Cognitive Performance Testing. The patient is accompanied by her daughter, who serves as  and participates in evaluation as additional source of information. Medical hx of diabetes mellitis type 2, chronic back pain along with left ankle strain, and family hx of dementia. Pt and daughter repots short term memory concerns state patient forgets to turn off stove burners and close doors, and has forgotten or overdosed on her daily medications. Pt's daughter reports changes have been made in last month to improve pt's safety including; pt is no longer left home alone, pt does not drive and  receives 100% of transportation from daughters, receives daily assistance with medications, daughter assists with shopping, and daughter manages pt's finances.    Factors affecting performance:  Possible cultural/language barrier    Recommendations:    Assist for ADL/IADL:  Finances, Driving, Medication management and Care of family member, shopping  Supervision for ADL/IADL:  ADL, Meal preparation, Cleaning and Laundry  Supervision in living setting:  Daily checks    Completed 6/7 CPT subtests. Unable to complete travel subtest d/t language barrier. Pt unable to follow pictorial map switch to written directions in English. Daughter verbalized directions with noted continued difficulty. Educated Randall Boland and her daughter on recommendation of daily check in's for IADL's, ADL s, medication management, long term financial planning, meal planning and preparation, and automatic shut off appliances for improved safety. Also educated pt and daughter on importance of assistance with medication set up based on her score of a 4.5. In addition, pt educated on strategies to improve memory, balanced diet, quality sleep, exercising mind, and implementation of monthly calendars in addition to lunar calendars for improved ability to keep track of appointment/birthdays/et cetera. Randall Boland is no longer driving and daughter assists with finances, so driving safety and financial management were not specifically addressed. Randall Boland  and her daughter reported agreement and understanding of all recommendations.                                                                                                 TIME ADMINISTERING TEST: 50    TIME FOR INTERPRETATION AND PREPARATION OF REPORT: 10    TOTAL TIME: 60      CPT Levels Reference:    Patient's Average CPT Score:  4.5/5.6                                                                                                                                                  Individual  "scores range along a continuum as outlined below.  In addition to cognitive status, other factors may affect safety in a home environment.  Please refer to specific recommendations for this patient.    ___5.6-5.8  Normal functioning (absence of cognitive-functional disability).  Independent in managing personal affairs, monitors and directs own behavior.  Uses complex information to carry out daily activities with safety and accuracy.    Proficient with instrumental activities of daily living (IADL) and learning new activity.  Problems are anticipated, errors are avoided, and consequences of actions are considered.      ___5.0   Mild cognitive-functional disability; deficits in working memory and executive thought processes. Difficulty using complex information. Problems may be observed with recent memory, judgment, reasoning and planning ahead. May be impulsive or have difficulty anticipating consequences.  Safety:  May require assistance to plan ahead; or to manage complex medication schedules, appointments or finances.  Hazardous activities may need to be monitored or limited.  ADL:  Mild functional decline.  Able to complete basic self-care and routine household tasks.  May have difficulty with complex daily tasks such as reading, writing, meal preparation, shopping or driving.   Learns through hands on teaching. Self-centered behavior or difficulty considering the needs of others may be seen related to trouble seeing the  whole picture\". Can appear disorganized or uninhibited.    __X_4.5  Mild to moderate cognitive-functional disability. Significant deficits in working memory and executive thought processes. Judgment, reasoning and planning show obvious impairment.  Distractible with inability to shift attention/actions given competing stimuli.  Difficulty with problem solving and managing details. Complex daily tasks performed with inconsistency, difficulty, or error.     Safety:  Medications should be " monitored, stove use may require supervision, and driving ability may be affected.  Impaired safety awareness with inability to anticipate potential problems.  May not recognize or respond to emergent situations. Requires frequent check-in support.   ADL:  Mild difficulty with simple everyday self-care tasks. Benefits from structured, routine activity.  Will likely need reminders to complete tasks outside of the routine. Requires assistance with planning and IADL tasks like shopping and finances. Learns concrete tasks through repetition, but performance may not generalize. Tends to be impulsive with poor insight. Self centered behavior or inability to consider the needs of others is common.    ___4.0  Moderate cognitive-functional disability; abstract to concrete thought processes. Working memory and executive function impairments are obvious. Difficulty with planning and problem solving.  Behavior is goal-directed, but unable to follow multi-step directions, is easily distracted, and may not recognize mistakes.  Inability to anticipate hazards or understand precautions.  Safety:  Recommend 24-hour supervision for safety. Supervision needed for medication management and for hazardous activities. May not be able to follow a restricted diet. Can get lost in unfamiliar surroundings. Generally, persons functioning at level 4 should not be driving.   ADL:  Some decline in quality or frequency of ADL.  Schuyler enhanced by use of a routine, simple concrete directions, and caregiver set-up of needed items. Complex tasks such as money or home management typically requires assistance.  Relies heavily on vision to guide behavior; will ignore objects/hazards not in plain sight and can be distracted by irrelevant objects. Often has poor insight.  Able to carry out social conversation and may verbally  cover  for deficits leading caregivers to believe they are capable of functioning independently.       ___3.5  Moderate  cognitive-functional disability; increased cues needed for task completion. Aware of concrete task steps but needs prompting or cues to initiate and complete simple tasks. Attention span is limited, simple directions may need to be repeated, and re-focus to a topic or task may be required.  Safety:  24-hour supervision required for safety and for assistance with daily tasks. Assistance required with medications, and access to medication should be limited. Meals, nutrition and dietary restrictions need to be monitored.  All hazardous activities should be restricted or supervised. Should not drive. Prone to wandering and can become lost.  ADL:  Moderate functional decline. Familiar tasks usually requires set-up of supplies and directions to complete steps. May need objects handed to them for task initiation. Function best with a set schedule in familiar surroundings with familiar people. All complex tasks must be done by others. Vocabulary is diminished and speech often unfocused.     Kym Mena, OTR/L

## 2017-05-18 NOTE — PATIENT INSTRUCTIONS
AFTER VISIT SUMMARY (AVS):    At today's visit we discussed the results of your workup for reversible causes of cognitive impairment, which was negative. We also discussed results of occupational therapy assessment that indicated mild to moderate functional impairment of your memory. At the current moment, the most likely explanation of your memory dysfunction is Alzheimer's disease, which is characterized by progressive memory loss. Additional information is provided below.    We discussed use of Aricept, the medication that could slow down and memory difficulty. You decided to try Tibetan medicines first. Please let me know, if you decide to try Aricept later.     Next follow-up appointment is in next 6 months or earlier if needed.    Please do not hesitate to call me with any questions or concerns.    Thanks.    Alzheimer's Dementia and Caregiver Support   Alzheimer's dementia (AD) is a chronic condition that affects the brain. It causes a gradual loss of memory. A person with AD may have trouble recognizing familiar people and places, or knowing what day it is. The person s memory, judgment, and decision-making may also be affected. In severe cases the person may not respond when someone talks to him or her.  AD is the most common form of dementia. Doctors don t fully understand what causes AD. It has no cure. But medicines can slow down the progress of the disease and treat some of the symptoms.  Home care  Follow these tips when caring for a person with AD at home:    A responsible person must be with the person who has advanced AD at all times.  He or she should not be left alone or unsupervised.    In the case of advanced AD, keep all medicines in a secure place. They should be under the caregiver s control. A person with advanced AD should not be allowed to take his or her own medicines. This needs to be supervised by the caregiver.  Here are ways to help a person with dementia:  Activities  Keep to a daily  routine. Changes in routine can cause stress for someone with dementia. Make a schedule for common daily tasks. These include bathing, dressing, taking medicines, eating meals, going for walks, and going to bed.  Communication  When talking to a person with dementia, talk slowly and clearly. Use a gentle tone of voice. Choose short, simple words and sentences. Ask one question at a time. Don t interrupt, criticize, or argue. Be calm and supportive. Use friendly facial expressions. Use pointing and touching to help communicate. If the person has a loss of long-term memory, don t ask questions about past events. Instead, talk about what is happening now.  Behavioral tips  Use lists, signs, family photos, clocks, and calendars as memory aids. Label cabinets and drawers. Try to distract, not confront, the person. When he or she becomes frustrated or upset, direct the person s attention to eating or some other interesting activity.  Medical-legal tips  Talk with your doctor or  about getting a power of  for health care and for financial decisions. It is best to do this while the person can still sign legal documents and make his or her own legal decisions. Otherwise a court order will be needed.  Support for the caregiver  As the caregiver, you will need a lot of support for yourself. Caring for a person with dementia is a full-time job. It can drain your emotions and lead to frustration and anger toward the one you love. It is common to have feelings of grief over losing the relationship that you once had. As a caregiver to someone with dementia, you are at higher risk for depression, anxiety and stress.  Here are some tips to help you cope with being a caregiver:    Learn about dementia and Alzheimer s disease so you know what to expect.    Find out about the resources in your community, including adult day-care programs. Ask your health care provider for a referral to a , if needed.    Take  care of yourself with a healthy diet, exercise, and plenty of rest.    Ask for help. Share some of the caretaking duties with family and friends.    Make personal time for yourself. This is essential! Consider hiring an in-home sitter.    Seek counseling or join a caregiver s support group. Don't isolate yourself or try to cope with this alone. In a support group, you can learn from others in a similar situation.    Visit the Alzheimer s Association website (www.alz.org) for more information.  Follow-up care  Follow up with the person s health care provider.  When to seek medical care  Get prompt medical care if any of these occur:    Frequent falls    The person refuses to eat or drink    Violent behavior or behavior becomes too difficult to manage at home    Increased drowsiness, or failure to respond normally    Headache or nausea that gets worse, or repeated vomiting    Numbness or weakness of the face, an arm, or a leg    Slurred speech, trouble speaking, walking, or seeing    Fainting spell, dizziness, or seizure    Unexplained fever of 100.4  F (38.0  C) or higher    8611-3713 [employer]. [address] [place] [address]. All rights reserved. This information is not intended as a substitute for professional medical care. Always follow your healthcare professional's instructions.        For Caregivers: Daily Care for Dementia Patients  Over time, people with dementia will need more and more help with daily tasks. These include eating meals, taking medicines, and getting enough exercise. They also include personal care needs, such as bathing and dressing. To reduce stress, make these activities part of a routine. Ask family and friends to lend a hand. And be aware that your loved one s abilities can change from day to day. If you have problems meeting your loved one s needs, it s time to get help. Talk to a  or local support agency--such as a local Alzheimer s Association  chapter.        Gardening can be a pleasant way to keep your loved one active.   Activity and exercise  Regular activity is good for your loved one s body and mind. It may even help slow the progression of the disease. Keep to your loved one s old routines when possible. It also helps to:    Do things together. Go for a walk, garden, or bake a cake. Basic, repetitive activities are good choices.    Be active as often as possible. This releases pent-up energy, which can reduce restlessness and improve sleep.    Include social activities. Take your loved one to see friends and family. But try to keep things simple. Loud noises, crowds, or too many people talking at once can be upsetting.  Taking medicines  Be sure all prescribed medicines are taken as directed. These tips can help:    Provide supervision if your loved one cannot safely take medicines alone.    Set a routine so medicines are taken at the same time each day.    Ensure that ALL medicines are taken. A pillbox can help you keep track.    Plan ahead. Be sure to refill prescriptions before they run out.  Eating meals  At mealtime, serve healthy foods with plenty of fluids. These tips can also help:    Keep meals simple. Too many choices can be overwhelming. Try to maintain a calm, quiet atmosphere while you eat.    Place healthy snacks, such as fresh fruit, out where they can be seen.    Watch eating habits. People with dementia may eat too little or too much. Talk to the healthcare provider if you have concerns.    Try finger foods if regular meals become too difficult for your loved one to eat.  Dressing  People with dementia may have trouble choosing what to wear. It s OK if clothes don t always match. But if help is needed:    Choose clothing that is easy to put on and take off. Use Velcro shoes or slippers.    Lay out a fresh outfit each day. Place clothes in the order they should be put on.    If more help is needed, hand over clothing items one at a  time. Explain how each item should be put on.    Put dirty clothes away so they re not worn again.  Bathing and grooming  Getting your loved one to bathe can be a real challenge. Try these tips:    Treat bathing as a routine activity. But be flexible. A daily bath is probably unrealistic.    Prepare bath items ahead of time, and be sure to test the water temperature.    Avoid leaving your loved one alone in the bath or shower.               Try visiting a barbershop or [employer] salon for help with hair washing, hair styling, and shaving.  Using the toilet  In later stages, dementia patients may develop incontinence (trouble controlling the bladder or bowel). To ease problems:    Set a routine for using the toilet (for example, every 3 hours) and stick to it.    Limit beverages before bedtime to prevent accidents. A bedside commode may also help.    Be understanding if accidents happen. Your loved one may be as upset as you.    At one point it may be necessary to have them wear an adult diaper.      Talk to a healthcare provider if incontinence develops suddenly. It may signal other health issues that can be treated.  When to call the healthcare provider  Call the healthcare provider if you notice a sudden change in your loved one s behavior or emotions. These changes may be due to dementia. But they could also signal other health problems that can be treated.   NOTE:This sheet is a summary. It may not cover all possible information. If you have questions about this medicine, talk to your doctor, pharmacist, or health care provider. Copyright  2016 [employer]

## 2017-05-18 NOTE — TELEPHONE ENCOUNTER
This patient has an appointment today at 1:30 with Dr. Ordaz, Neurologist to review results of CPT.  Dr. Ordaz would like your dictation for this appointment, or if you have the score, that would suffice.  Please advise.  Thank you.    Marya Tracey, [facility]  Neurology Clinic

## 2017-05-18 NOTE — MR AVS SNAPSHOT
After Visit Summary   5/18/2017    Randall Boland    MRN: [id number]           Patient Information     Date Of Birth          1950        Visit Information        Provider Department      5/18/2017 1:30 PM Jovani Ordaz MD [facility] Instructions    AFTER VISIT SUMMARY (AVS):    At today's visit we discussed the results of your workup for reversible causes of cognitive impairment, which was negative. We also discussed results of occupational therapy assessment that indicated mild to moderate functional impairment of your memory. At the current moment, the most likely explanation of your memory dysfunction is Alzheimer's disease, which is characterized by progressive memory loss. Additional information is provided below.    We discussed use of Aricept, the medication that could slow down and memory difficulty. You decided to try Tibetan medicines first. Please let me know, if you decide to try Aricept later.     Next follow-up appointment is in next 6 months or earlier if needed.    Please do not hesitate to call me with any questions or concerns.    Thanks.    Alzheimer's Dementia and Caregiver Support   Alzheimer's dementia (AD) is a chronic condition that affects the brain. It causes a gradual loss of memory. A person with AD may have trouble recognizing familiar people and places, or knowing what day it is. The person s memory, judgment, and decision-making may also be affected. In severe cases the person may not respond when someone talks to him or her.  AD is the most common form of dementia. Doctors don t fully understand what causes AD. It has no cure. But medicines can slow down the progress of the disease and treat some of the symptoms.  Home care  Follow these tips when caring for a person with AD at home:    A responsible person must be with the person who has advanced AD at all times.  He or she should not be left alone or  unsupervised.    In the case of advanced AD, keep all medicines in a secure place. They should be under the caregiver s control. A person with advanced AD should not be allowed to take his or her own medicines. This needs to be supervised by the caregiver.  Here are ways to help a person with dementia:  Activities  Keep to a daily routine. Changes in routine can cause stress for someone with dementia. Make a schedule for common daily tasks. These include bathing, dressing, taking medicines, eating meals, going for walks, and going to bed.  Communication  When talking to a person with dementia, talk slowly and clearly. Use a gentle tone of voice. Choose short, simple words and sentences. Ask one question at a time. Don t interrupt, criticize, or argue. Be calm and supportive. Use friendly facial expressions. Use pointing and touching to help communicate. If the person has a loss of long-term memory, don t ask questions about past events. Instead, talk about what is happening now.  Behavioral tips  Use lists, signs, family photos, clocks, and calendars as memory aids. Label cabinets and drawers. Try to distract, not confront, the person. When he or she becomes frustrated or upset, direct the person s attention to eating or some other interesting activity.  Medical-legal tips  Talk with your doctor or  about getting a power of  for health care and for financial decisions. It is best to do this while the person can still sign legal documents and make his or her own legal decisions. Otherwise a court order will be needed.  Support for the caregiver  As the caregiver, you will need a lot of support for yourself. Caring for a person with dementia is a full-time job. It can drain your emotions and lead to frustration and anger toward the one you love. It is common to have feelings of grief over losing the relationship that you once had. As a caregiver to someone with dementia, you are at higher risk for  depression, anxiety and stress.  Here are some tips to help you cope with being a caregiver:    Learn about dementia and Alzheimer s disease so you know what to expect.    Find out about the resources in your community, including adult day-care programs. Ask your health care provider for a referral to a , if needed.    Take care of yourself with a healthy diet, exercise, and plenty of rest.    Ask for help. Share some of the caretaking duties with family and friends.    Make personal time for yourself. This is essential! Consider hiring an in-home sitter.    Seek counseling or join a caregiver s support group. Don't isolate yourself or try to cope with this alone. In a support group, you can learn from others in a similar situation.    Visit the Alzheimer s Association website (www.alz.org) for more information.  Follow-up care  Follow up with the person s health care provider.  When to seek medical care  Get prompt medical care if any of these occur:    Frequent falls    The person refuses to eat or drink    Violent behavior or behavior becomes too difficult to manage at home    Increased drowsiness, or failure to respond normally    Headache or nausea that gets worse, or repeated vomiting    Numbness or weakness of the face, an arm, or a leg    Slurred speech, trouble speaking, walking, or seeing    Fainting spell, dizziness, or seizure    Unexplained fever of 100.4  F (38.0  C) or higher    4491-7529 [employer]. [address] [place] [id number]. All rights reserved. This information is not intended as a substitute for professional medical care. Always follow your healthcare professional's instructions.        For Caregivers: Daily Care for Dementia Patients  Over time, people with dementia will need more and more help with daily tasks. These include eating meals, taking medicines, and getting enough exercise. They also include personal care needs, such as bathing and dressing.  To reduce stress, make these activities part of a routine. Ask family and friends to lend a hand. And be aware that your loved one s abilities can change from day to day. If you have problems meeting your loved one s needs, it s time to get help. Talk to a  or local support agency--such as a local Alzheimer s Association chapter.        Gardening can be a pleasant way to keep your loved one active.   Activity and exercise  Regular activity is good for your loved one s body and mind. It may even help slow the progression of the disease. Keep to your loved one s old routines when possible. It also helps to:    Do things together. Go for a walk, garden, or bake a cake. Basic, repetitive activities are good choices.    Be active as often as possible. This releases pent-up energy, which can reduce restlessness and improve sleep.    Include social activities. Take your loved one to see friends and family. But try to keep things simple. Loud noises, crowds, or too many people talking at once can be upsetting.  Taking medicines  Be sure all prescribed medicines are taken as directed. These tips can help:    Provide supervision if your loved one cannot safely take medicines alone.    Set a routine so medicines are taken at the same time each day.    Ensure that ALL medicines are taken. A pillbox can help you keep track.    Plan ahead. Be sure to refill prescriptions before they run out.  Eating meals  At mealtime, serve healthy foods with plenty of fluids. These tips can also help:    Keep meals simple. Too many choices can be overwhelming. Try to maintain a calm, quiet atmosphere while you eat.    Place healthy snacks, such as fresh fruit, out where they can be seen.    Watch eating habits. People with dementia may eat too little or too much. Talk to the healthcare provider if you have concerns.    Try finger foods if regular meals become too difficult for your loved one to eat.  Dressing  People with dementia  may have trouble choosing what to wear. It s OK if clothes don t always match. But if help is needed:    Choose clothing that is easy to put on and take off. Use Velcro shoes or slippers.    Lay out a fresh outfit each day. Place clothes in the order they should be put on.    If more help is needed, hand over clothing items one at a time. Explain how each item should be put on.    Put dirty clothes away so they re not worn again.  Bathing and grooming  Getting your loved one to bathe can be a real challenge. Try these tips:    Treat bathing as a routine activity. But be flexible. A daily bath is probably unrealistic.    Prepare bath items ahead of time, and be sure to test the water temperature.    Avoid leaving your loved one alone in the bath or shower.               Try visiting a barbershop or [employer] salon for help with hair washing, hair styling, and shaving.  Using the toilet  In later stages, dementia patients may develop incontinence (trouble controlling the bladder or bowel). To ease problems:    Set a routine for using the toilet (for example, every 3 hours) and stick to it.    Limit beverages before bedtime to prevent accidents. A bedside commode may also help.    Be understanding if accidents happen. Your loved one may be as upset as you.    At one point it may be necessary to have them wear an adult diaper.      Talk to a healthcare provider if incontinence develops suddenly. It may signal other health issues that can be treated.  When to call the healthcare provider  Call the healthcare provider if you notice a sudden change in your loved one s behavior or emotions. These changes may be due to dementia. But they could also signal other health problems that can be treated.   NOTE:This sheet is a summary. It may not cover all possible information. If you have questions about this medicine, talk to your doctor, pharmacist, or health care provider. Copyright  2016 [employer]              Follow-ups  after your visit        Follow-up notes from your care team     Return in about 6 months (around 11/18/2017).      Who to contact     If you have questions or need follow up information about today's clinic visit or your schedule please contact [facility] directly at [phone].  Normal or non-critical lab and imaging results will be communicated to you by MyChart, letter or phone within 4 business days after the clinic has received the results. If you do not hear from us within 7 days, please contact the clinic through [employer] or phone. If you have a critical or abnormal lab result, we will notify you by phone as soon as possible.  Submit refill requests through [employer] or call your pharmacy and they will forward the refill request to us. Please allow 3 business days for your refill to be completed.          Additional Information About Your Visit        MyChart Information     [employer] gives you secure access to your electronic health record. If you see a primary care provider, you can also send messages to your care team and make appointments. If you have questions, please call your primary care clinic.  If you do not have a primary care provider, please call [phone] and they will assist you.        Care EveryWhere ID     This is your Care EveryWhere ID. This could be used by other organizations to access your [facility] medical records  [id number]        Your Vitals Were     Pulse Respirations Pulse Oximetry BMI (Body Mass Index)          87 18 97% 29.67 kg/m2         Blood Pressure from Last 3 Encounters:   05/18/17 104/64   05/05/17 134/72   01/31/17 128/82    Weight from Last 3 Encounters:   05/18/17 76.2 kg (168 lb)   05/05/17 76.4 kg (168 lb 6.4 oz)   01/31/17 76.5 kg (168 lb 11.2 oz)              Today, you had the following     No orders found for display       Primary Care Provider    None Specified       No primary provider on file.        Thank you!     Thank you for choosing  [facility]  for your care. Our goal is always to provide you with excellent care. Hearing back from our patients is one way we can continue to improve our services. Please take a few minutes to complete the written survey that you may receive in the mail after your visit with us. Thank you!             Your Updated Medication List - Protect others around you: Learn how to safely use, store and throw away your medicines at www.disposemymeds.org.          This list is accurate as of: 5/18/17  2:40 PM.  Always use your most recent med list.                   Brand Name Dispense Instructions for use    * atorvastatin 20 MG tablet    LIPITOR    90 tablet    Take 1 tablet (20 mg) by mouth daily       * atorvastatin 40 MG tablet    LIPITOR    90 tablet    Take 1 tablet (40 mg) by mouth daily       blood glucose monitoring lancets     1 Box    Use to test blood sugar 1-2 times daily or as directed.       * blood glucose monitoring test strip    ALEX CONTOUR NEXT    50 each    Use to test blood sugar 1-2 times daily or as directed.       * blood glucose monitoring test strip    no brand specified    100 strip    Use to test blood sugars once daily       FISH OIL + D3 PO          metFORMIN 1000 MG tablet    GLUCOPHAGE    180 tablet    Take 1 tablet (1,000 mg) by mouth 2 times daily (with meals)       Multi-vitamin Tabs tablet      Take 1 tablet by mouth daily       nystatin 640238 UNIT/GM Powd    MYCOSTATIN    30 g    Apply topically 3 times daily as needed       * Notice:  This list has 4 medication(s) that are the same as other medications prescribed for you. Read the directions carefully, and ask your doctor or other care provider to review them with you.

## 2017-05-18 NOTE — PROGRESS NOTES
05/18/17 0900   Quick Adds   Type of Visit Initial Outpatient Occupational Therapy Evaluation   General Information   Start Of Care Date 05/15/17   Referring Physician Jovani Ordaz MD   Orders Evaluate and treat as indicated   Orders Date 05/05/17   Medical Diagnosis Cognitive Impairment   Onset of Illness/Injury or Date of Surgery 05/05/17   Special Instructions CPT and cognitive assessment   Surgical/Medical History Reviewed Yes   Additional Occupational Profile Info/Pertinent History of Current Problem Ms. Randall Boland is 66 year old female presenting to OP OT for Cognitive Performance Testing. The patient is accompanied by her daughter, who serves as  and participates in evaluation as additional source of information. Medical hx of diabetes mellitis type 2 and chronic back pain along with left ankle strain, causing pain with walking, and family hx of dementia. Pt and daughter repots short term memory concerns state patient forgets to turn off stove burners and close doors, and has forgotten or overdosed on her daily medications. Pt's daughter reports changes have been made in last month to improve pt's safety including; pt is no longer left home alone, pt does not drive and receives 100% of transportation from daughters, receives daily assistance with medications, daughter assists with shopping, and daughter manages pt's finances.   Role/Living Environment   Current Community Support Family/friend caregiver   Patient role/Employment history Retired   Community/Avocational Activities Gardening, walking   Current Living Environment House   Number of Stairs Within Home 15   Primary Bathroom Set Up/Equipment Tub;Shower/tub chair   Prior Level - Transfers Independent   Prior Level - Ambulation Independent   Prior Level - ADLS Independent   Prior Responsibilities - IADL Meal Preparation;Housekeeping;Laundry;Yardwork;Medication management   Role/Living Environment Comments Pt lives  with her , daughter, and granddaughter. Her daughter provides 100% transportation and assists with shopping, finances, and recently medication management. Pt is never alone at home.   Patient/family Goals Statement Pt and her daughter would like strategies to improve pt's memory   Pain   Patient currently in pain No   Fall Risk Screen   Fall screen completed by OT   Have you fallen 2 or more times in the last year? No   Have you fallen and had an injury in the past year? No   Is the patient a fall risk? No   Cognitive Status Examination   Orientation Orientation to person, place and time   Level of Consciousness Alert   Personal Safety and Judgment Impaired   Memory Impaired   Cognitive Comment Cognitive Performance Test Completed- See progress noted dated 5/15/17 for results   Visual Perception   Visual Perception Wears glasses  (reading glasses)   Sensation   Sensation Comments Beginning stages of diabetic neuropathy noted in feet.   Range of Motion (ROM)   ROM Quick Adds No deficits identified   Hand Strength   Hand Dominance Right   Activity Tolerance   Activity Tolerance Decreased activity tolerance noted by fatigue with stairs within home.   Planned Therapy Interventions   Planned Therapy Interventions ADL training   Adult OT Eval Goals   OT Eval Goals (Adult) 1;2   OT Goal 1   Goal Identifier CPT   Goal Description Patient and family to verbalize understanding of Cognitive Performance Test results and identify 3 strategies to increase patient's safety and independence in the home setting.   Target Date 05/15/17   Date Met 05/15/17   OT Goal 2   Goal Identifier Memory   Goal Description Patient will verbalize understanding of memory compensation and organizational strategies to increase cognitive function for improved safety and recall with ADL/IADL independence.   Target Date 05/15/17   Date Met 05/15/17   Clinical Impression   Criteria for Skilled Therapeutic Interventions Met Yes, treatment indicated    OT Diagnosis Impaired memory   Influenced by the following impairments Impaired memory and cognition   Assessment of Occupational Performance 5 or more Performance Deficits   Identified Performance Deficits Health and home managment, financial managment, safety, , Driving/community mobility   Clinical Decision Making (Complexity) Low complexity   Therapy Frequency 1x   Predicted Duration of Therapy Intervention (days/wks) 1x   Risks and Benefits of Treatment have been explained. Yes   Patient, Family & other staff in agreement with plan of care Yes   Total Evaluation Time   Total Evaluation Time 60     It was a pleasure meeting Radnall Boland. Please don't hesitate to contact me with questions regarding this evaluation report.    Kym Mena, OTR/L

## 2017-07-31 DIAGNOSIS — E11.9 TYPE 2 DIABETES MELLITUS WITHOUT COMPLICATION, WITHOUT LONG-TERM CURRENT USE OF INSULIN (H): ICD-10-CM

## 2017-07-31 LAB — HBA1C MFR BLD: 6.6 % (ref 4.3–6)

## 2017-07-31 PROCEDURE — 83036 HEMOGLOBIN GLYCOSYLATED A1C: CPT | Performed by: INTERNAL MEDICINE

## 2017-07-31 PROCEDURE — 36415 COLL VENOUS BLD VENIPUNCTURE: CPT | Performed by: INTERNAL MEDICINE

## 2017-08-02 ENCOUNTER — OFFICE VISIT (OUTPATIENT)
Dept: INTERNAL MEDICINE | Facility: CLINIC | Age: 67
End: 2017-08-02
Payer: COMMERCIAL

## 2017-08-02 VITALS
HEIGHT: 63 IN | OXYGEN SATURATION: 99 % | BODY MASS INDEX: 29.07 KG/M2 | HEART RATE: 75 BPM | WEIGHT: 164.1 LBS | SYSTOLIC BLOOD PRESSURE: 102 MMHG | DIASTOLIC BLOOD PRESSURE: 60 MMHG | TEMPERATURE: 98.1 F

## 2017-08-02 DIAGNOSIS — E11.9 TYPE 2 DIABETES MELLITUS WITHOUT COMPLICATION, WITHOUT LONG-TERM CURRENT USE OF INSULIN (H): Primary | ICD-10-CM

## 2017-08-02 DIAGNOSIS — R21 RASH: ICD-10-CM

## 2017-08-02 DIAGNOSIS — L03.011 PARONYCHIA OF FINGER, RIGHT: ICD-10-CM

## 2017-08-02 DIAGNOSIS — L60.8 NAIL DEFORMITY: ICD-10-CM

## 2017-08-02 PROCEDURE — 99207 C FOOT EXAM  NO CHARGE: CPT | Performed by: INTERNAL MEDICINE

## 2017-08-02 PROCEDURE — 99214 OFFICE O/P EST MOD 30 MIN: CPT | Performed by: INTERNAL MEDICINE

## 2017-08-02 RX ORDER — PRAVASTATIN SODIUM 40 MG
40 TABLET ORAL DAILY
Qty: 90 TABLET | Refills: 3 | Status: SHIPPED | OUTPATIENT
Start: 2017-08-02 | End: 2017-12-06

## 2017-08-02 RX ORDER — ASPIRIN 81 MG/1
81 TABLET, CHEWABLE ORAL DAILY
COMMUNITY
Start: 2017-08-02 | End: 2018-04-02

## 2017-08-02 RX ORDER — TRIAMCINOLONE ACETONIDE 1 MG/G
CREAM TOPICAL
Qty: 30 G | Refills: 0 | Status: SHIPPED | OUTPATIENT
Start: 2017-08-02 | End: 2018-04-02

## 2017-08-02 NOTE — PROGRESS NOTES
"  SUBJECTIVE:                                                      HPI: Randall Boland is a pleasant 67 year old female who presents for diabetes follow-up:    Accompanied by daughter who helps translate.     DIABETES MANAGEMENT                                                      Date of last DM check: 1/31/17    Current DM regimen: metformin 1000mg bid  Adherent to regimen: yes  Adherent to ADA diet: yes    Opthalmology: up to date (last check 10/16 - no DR)  Retinopathy: no  Neuropathy: denies  Nephropathy: no  Checking feet/seeing podiatry: yes  Influenza vaccine: n/a   Pneumococcal vaccine: up to date     Aspirin Rx: not on - should be  ACE-I/ARB: not on - not hypertensive and no proteinuria  Statin: not on - should be (was intolerant of atorvastatin)    Last HgbA1c: 6.6% (7/31/17)  Last LDL: 81 (1/31/17)    ---    Also complains of rash:  - involves several areas on back of right hand and fingers  - also involves, posterior, proximal aspects of forearms  - present for several weeks at least  - very itchy  - no improvement with OTC hydrocortisone cream    - no new soaps, detergents, or lotions    No prior history of eczema.    ---    Also complains of swelling around fingernail:  - has thickened and discolored right 2nd fingernail  - thickening and discoloration occurred a couple years ago after patient used finger to apply wax to floor (was supposed to wear gloves)  - since then has had off and on redness and swelling around nailbed  - wants to know if there's any intervention to avoid recurrent redness and swelling    The medication, allergy, and problem lists have been reviewed and updated as appropriate.       OBJECTIVE:                                                      /60 (BP Location: Left arm, Patient Position: Chair, Cuff Size: Adult Regular)  Pulse 75  Temp 98.1  F (36.7  C) (Oral)  Ht 5' 3.1\" (1.603 m)  Wt 164 lb 1.6 oz (74.4 kg)  SpO2 99%  BMI 28.98 kg/m2  Constitutional: " well-appearing  Respiratory: normal respiratory effort; clear to auscultation bilaterally  Cardiovascular: regular rate and rhythm; no edema  Gastrointestinal: soft, non-tender, non-distended, and bowel sounds present  Musculoskeletal: normal gait and station; feet intact - no lesions or calloses and sensation intact to light touch  Psych: normal judgment and insight; normal mood and affect; recent and remote memory intact  Integumentary:  - erythematous patches with thick, overlying scale over posterior forearms (proximally - near elbow) and right hand and fingers  - right 2nd fingernail discolored (yellowish brown) and thickened; mild surrounding erythema      ASSESSMENT/PLAN:                                                      (E11.9) Type 2 diabetes mellitus without complication, without long-term current use of insulin (H)  (primary encounter diagnosis)  Comment: well-controlled on current regimen.   Plan:    - CONTINUE Metformin 1000mg bid.    - START pravastatin 40mg daily.   - START daily baby aspirin.    - flu shot and ophthalmology exam this fall.    - follow-up in 6 months (earlier as needed).    (R21) Rash  Comment: suspect atopic dermatitis, but psoriasis on differential.   Plan:    - trial of triamcinolone 0.1% bid for no more than 2 weeks at a time.    - if rash changes, worsens, or does not improve, patient to contact MD.     (L60.8) Nail deformity  (L03.011) Paronychia of finger, right  Comment: right 2nd finger; mild erythema currently.  Plan:    - warm, damp wash cloth to area tid when erythema present.   - if no improvement with above or worsening, contact MD.   - referred to dermatology to discuss potential nail interventions (?removal).      The instructions on the AVS were discussed and explained to the patient. Patient expressed understanding of instructions.    A total of 25 minutes were spent face-to-face with this patient during this encounter and over half of that time was spent on  counseling and coordination of care re: above diagnoses and plans of care.     Adrianna Singh MD   [place] [address] [address] [address]  T: [phone], F: [phone]

## 2017-08-02 NOTE — PATIENT INSTRUCTIONS
START daily baby aspirin (81mg).    START Pravastatin 40mg daily. If any side effects, please let me know.    ---    For rash recommend triamcinolone cream 0.1% twice a day until rash resolves (but no more than 2 weeks at a time).    ---    For fingernail: please see dermatology - number below.    For redness and swelling (when it occurs) - warm, damp washcloth and massage 3x/day.    If abscess develops or swelling/redness don't improve, please let me know.

## 2017-08-02 NOTE — NURSING NOTE
"Chief Complaint   Patient presents with     Diabetes     Follow up on Hba1c     Derm Problem     x 1 month. Rash and itching on the both hands and elbows.       Initial /60 (BP Location: Left arm, Patient Position: Chair, Cuff Size: Adult Regular)  Pulse 75  Temp 98.1  F (36.7  C) (Oral)  Ht 5' 3.1\" (1.603 m)  Wt 164 lb 1.6 oz (74.4 kg)  SpO2 99%  BMI 28.98 kg/m2 Estimated body mass index is 28.98 kg/(m^2) as calculated from the following:    Height as of this encounter: 5' 3.1\" (1.603 m).    Weight as of this encounter: 164 lb 1.6 oz (74.4 kg).  Medication Reconciliation: complete     Kaminibose MA      "

## 2017-08-02 NOTE — MR AVS SNAPSHOT
After Visit Summary   8/2/2017    Randall Boland    MRN: [id number]           Patient Information     Date Of Birth          1950        Visit Information        Provider Department      8/2/2017 3:30 PM Adrianna Singh MD [facility]        Today's Diagnoses     Type 2 diabetes mellitus without complication, without long-term current use of insulin (H)    -  1    Nail deformity        Rash          Care Instructions    START daily baby aspirin (81mg).    START Pravastatin 40mg daily. If any side effects, please let me know.    ---    For rash recommend triamcinolone cream 0.1% twice a day until rash resolves (but no more than 2 weeks at a time).    ---    For fingernail: please see dermatology - number below.    For redness and swelling (when it occurs) - warm, damp washcloth and massage 3x/day.    If abscess develops or swelling/redness don't improve, please let me know.           Follow-ups after your visit        Additional Services     DERMATOLOGY REFERRAL       Your provider has referred you to: JUAN: [facility] Dermatology [facility] [phone]   [email or web address]    Please be aware that coverage of these services is subject to the terms and limitations of your health insurance plan.  Call member services at your health plan with any benefit or coverage questions.      Please bring the following with you to your appointment:    (1) Any X-Rays, CTs or MRIs which have been performed.  Contact the facility where they were done to arrange for  prior to your scheduled appointment.    (2) List of current medications  (3) This referral request   (4) Any documents/labs given to you for this referral                  Who to contact     If you have questions or need follow up information about today's clinic visit or your schedule please contact [facility] directly at [phone].  Normal or  "non-critical lab and imaging results will be communicated to you by MyChart, letter or phone within 4 business days after the clinic has received the results. If you do not hear from us within 7 days, please contact the clinic through [email or web address] or phone. If you have a critical or abnormal lab result, we will notify you by phone as soon as possible.  Submit refill requests through [email or web address] or call your pharmacy and they will forward the refill request to us. Please allow 3 business days for your refill to be completed.          Additional Information About Your Visit        [email or web address] Information     [email or web address] gives you secure access to your electronic health record. If you see a primary care provider, you can also send messages to your care team and make appointments. If you have questions, please call your primary care clinic.  If you do not have a primary care provider, please call [phone] and they will assist you.        Care EveryWhere ID     This is your Care EveryWhere ID. This could be used by other organizations to access your [facility] medical records  [id number]        Your Vitals Were     Pulse Temperature Height Pulse Oximetry BMI (Body Mass Index)       75 98.1  F (36.7  C) (Oral) 5' 3.1\" (1.603 m) 99% 28.98 kg/m2        Blood Pressure from Last 3 Encounters:   08/02/17 102/60   05/18/17 104/64   05/05/17 134/72    Weight from Last 3 Encounters:   08/02/17 164 lb 1.6 oz (74.4 kg)   05/18/17 168 lb (76.2 kg)   05/05/17 168 lb 6.4 oz (76.4 kg)              We Performed the Following     DERMATOLOGY REFERRAL          Today's Medication Changes          These changes are accurate as of: 8/2/17  3:58 PM.  If you have any questions, ask your nurse or doctor.               Start taking these medicines.        Dose/Directions    pravastatin 40 MG tablet   Commonly known as:  PRAVACHOL   Used for:  Type 2 diabetes mellitus without complication, without long-term current use of insulin (H)   Started by:  Samantha " Adrianna VALLE MD        Dose:  40 mg   Take 1 tablet (40 mg) by mouth daily   Quantity:  90 tablet   Refills:  3       triamcinolone 0.1 % cream   Commonly known as:  KENALOG   Used for:  Rash   Started by:  Adrianna Singh MD        Apply sparingly to affected area three times daily for 14 days.   Quantity:  30 g   Refills:  0         Stop taking these medicines if you haven't already. Please contact your care team if you have questions.     atorvastatin 20 MG tablet   Commonly known as:  LIPITOR   Stopped by:  Adrianna Singh MD           atorvastatin 40 MG tablet   Commonly known as:  LIPITOR   Stopped by:  Adrianna Singh MD                Where to get your medicines      These medications were sent to [facility] [address] [address]     Phone:  [phone]     pravastatin 40 MG tablet    triamcinolone 0.1 % cream                Primary Care Provider    None Specified       No primary provider on file.        Equal Access to Services     [facility]: Hadii isabell liz hadasho Sosachin, waaxda luqadaha, qaybta kaalmada adeegyada, waxay alan fuentes . So [facility] [phone].    ATENCIÓN: Si habla español, tiene a paez disposición servicios gratuitos de asistencia lingüística. Llame al [phone].    We comply with applicable federal civil rights laws and [place] laws. We do not discriminate on the basis of race, color, national origin, age, disability sex, sexual orientation or gender identity.            Thank you!     Thank you for choosing [facility]  for your care. Our goal is always to provide you with excellent care. Hearing back from our patients is one way we can continue to improve our services. Please take a few minutes to complete the written survey that you may receive in the mail after your visit with us. Thank you!             Your Updated Medication List - Protect others  around you: Learn how to safely use, store and throw away your medicines at www.disposemymeds.org.          This list is accurate as of: 8/2/17  3:58 PM.  Always use your most recent med list.                   Brand Name Dispense Instructions for use Diagnosis    aspirin 81 MG chewable tablet      Take 1 tablet (81 mg) by mouth daily    Type 2 diabetes mellitus without complication, without long-term current use of insulin (H)       blood glucose monitoring lancets     1 Box    Use to test blood sugar 1-2 times daily or as directed.    Type 2 diabetes mellitus without complication, without long-term current use of insulin (H)       * blood glucose monitoring test strip    ALEX CONTOUR NEXT    50 each    Use to test blood sugar 1-2 times daily or as directed.    Type 2 diabetes mellitus without complication, without long-term current use of insulin (H)       * blood glucose monitoring test strip    no brand specified    100 strip    Use to test blood sugars once daily    Type 2 diabetes mellitus without complication, without long-term current use of insulin (H)       FISH OIL + D3 PO           metFORMIN 1000 MG tablet    GLUCOPHAGE    180 tablet    Take 1 tablet (1,000 mg) by mouth 2 times daily (with meals)    Type 2 diabetes mellitus without complication, without long-term current use of insulin (H)       Multi-vitamin Tabs tablet      Take 1 tablet by mouth daily        nystatin [id number] UNIT/GM Powd    MYCOSTATIN    30 g    Apply topically 3 times daily as needed    Intertriginous candidiasis       pravastatin 40 MG tablet    PRAVACHOL    90 tablet    Take 1 tablet (40 mg) by mouth daily    Type 2 diabetes mellitus without complication, without long-term current use of insulin (H)       triamcinolone 0.1 % cream    KENALOG    30 g    Apply sparingly to affected area three times daily for 14 days.    Rash       * Notice:  This list has 2 medication(s) that are the same as other medications prescribed for you. Read  the directions carefully, and ask your doctor or other care provider to review them with you.

## 2017-08-21 ENCOUNTER — MYC MEDICAL ADVICE (OUTPATIENT)
Dept: INTERNAL MEDICINE | Facility: CLINIC | Age: 67
End: 2017-08-21

## 2017-08-21 DIAGNOSIS — E11.9 TYPE 2 DIABETES MELLITUS WITHOUT COMPLICATION, WITHOUT LONG-TERM CURRENT USE OF INSULIN (H): ICD-10-CM

## 2017-08-22 NOTE — TELEPHONE ENCOUNTER
Metformin          Last Written Prescription Date: 1/31/17  Last Fill Quantity: 180, # refills: 1  Last Office Visit with [facility], [facility] or [facility] prescribing provider:  8/2/17        BP Readings from Last 3 Encounters:   08/02/17 102/60   05/18/17 104/64   05/05/17 134/72     Lab Results   Component Value Date    MICROL <5 10/14/2016     Lab Results   Component Value Date    UMALCR Unable to calculate due to low value 10/14/2016     Creatinine   Date Value Ref Range Status   01/31/2017 0.61 0.52 - 1.04 mg/dL Final   ]  GFR Estimate   Date Value Ref Range Status   01/31/2017 >90  Non  GFR Calc   >60 mL/min/1.7m2 Final   10/10/2016 >90  Non  GFR Calc   >60 mL/min/1.7m2 Final     GFR Estimate If Black   Date Value Ref Range Status   01/31/2017 >90   GFR Calc   >60 mL/min/1.7m2 Final   10/10/2016 >90   GFR Calc   >60 mL/min/1.7m2 Final     Lab Results   Component Value Date    CHOL 143 01/31/2017     Lab Results   Component Value Date    HDL 35 01/31/2017     Lab Results   Component Value Date    LDL 81 01/31/2017     Lab Results   Component Value Date    TRIG 137 01/31/2017     No results found for: CHOLHDLRATIO  Lab Results   Component Value Date    AST 13 10/10/2016     Lab Results   Component Value Date    ALT 24 10/10/2016     Lab Results   Component Value Date    A1C 6.6 07/31/2017    A1C 8.6 01/31/2017    A1C 9.2 10/10/2016     Potassium   Date Value Ref Range Status   01/31/2017 4.2 3.4 - 5.3 mmol/L Final     Prescription approved per [facility] Refill Protocol.

## 2017-09-21 ENCOUNTER — MYC REFILL (OUTPATIENT)
Dept: INTERNAL MEDICINE | Facility: CLINIC | Age: 67
End: 2017-09-21

## 2017-09-21 DIAGNOSIS — E11.9 TYPE 2 DIABETES MELLITUS WITHOUT COMPLICATION, WITHOUT LONG-TERM CURRENT USE OF INSULIN (H): ICD-10-CM

## 2017-09-21 RX ORDER — PRAVASTATIN SODIUM 40 MG
40 TABLET ORAL DAILY
Qty: 90 TABLET | Refills: 3 | Status: CANCELLED | OUTPATIENT
Start: 2017-09-21

## 2017-09-21 NOTE — TELEPHONE ENCOUNTER
Message from MyChart:  Original authorizing provider: Adrianna Singh MD    Randall Boland would like a refill of the following medications:  pravastatin (PRAVACHOL) 40 MG tablet [Adrianna Singh MD]  metFORMIN (GLUCOPHAGE) 1000 MG tablet [Adrianna Singh MD]    Preferred pharmacy: [address]    Comment:

## 2017-10-20 ENCOUNTER — MYC REFILL (OUTPATIENT)
Dept: INTERNAL MEDICINE | Facility: CLINIC | Age: 67
End: 2017-10-20

## 2017-10-20 DIAGNOSIS — E11.9 TYPE 2 DIABETES MELLITUS WITHOUT COMPLICATION, WITHOUT LONG-TERM CURRENT USE OF INSULIN (H): ICD-10-CM

## 2017-10-20 NOTE — TELEPHONE ENCOUNTER
Message from Ant[employer]hart:  Original authorizing provider: Adrianna Singh MD    [facility] Krystian would like a refill of the following medications:  metFORMIN (GLUCOPHAGE) 1000 MG tablet [Adrianna Singh MD]    Preferred pharmacy: [address]    Comment:

## 2017-11-01 ENCOUNTER — MYC REFILL (OUTPATIENT)
Dept: INTERNAL MEDICINE | Facility: CLINIC | Age: 67
End: 2017-11-01

## 2017-11-01 DIAGNOSIS — E11.9 TYPE 2 DIABETES MELLITUS WITHOUT COMPLICATION, WITHOUT LONG-TERM CURRENT USE OF INSULIN (H): ICD-10-CM

## 2017-11-01 RX ORDER — PRAVASTATIN SODIUM 40 MG
40 TABLET ORAL DAILY
Qty: 90 TABLET | Refills: 3 | Status: CANCELLED | OUTPATIENT
Start: 2017-11-01

## 2017-11-01 NOTE — TELEPHONE ENCOUNTER
Message from Inkivehart:  Original authorizing provider: Adrianna Singh MD    [facility] Krystian would like a refill of the following medications:  pravastatin (PRAVACHOL) 40 MG tablet [Adrianna Singh MD]    Preferred pharmacy: [address]    Comment:

## 2017-11-20 ENCOUNTER — MYC REFILL (OUTPATIENT)
Dept: INTERNAL MEDICINE | Facility: CLINIC | Age: 67
End: 2017-11-20

## 2017-11-20 DIAGNOSIS — E11.9 TYPE 2 DIABETES MELLITUS WITHOUT COMPLICATION, WITHOUT LONG-TERM CURRENT USE OF INSULIN (H): ICD-10-CM

## 2017-11-20 NOTE — TELEPHONE ENCOUNTER
Message from Resonate Industrieshart:  Original authorizing provider: Adrianna Singh MD    [facility] Krystian would like a refill of the following medications:  metFORMIN (GLUCOPHAGE) 1000 MG tablet [Adrianna Singh MD]    Preferred pharmacy: [address]    Comment:

## 2017-11-29 ENCOUNTER — TRANSFERRED RECORDS (OUTPATIENT)
Dept: HEALTH INFORMATION MANAGEMENT | Facility: CLINIC | Age: 67
End: 2017-11-29

## 2017-12-06 ENCOUNTER — MYC REFILL (OUTPATIENT)
Dept: INTERNAL MEDICINE | Facility: CLINIC | Age: 67
End: 2017-12-06

## 2017-12-06 DIAGNOSIS — E11.9 TYPE 2 DIABETES MELLITUS WITHOUT COMPLICATION, WITHOUT LONG-TERM CURRENT USE OF INSULIN (H): ICD-10-CM

## 2017-12-06 RX ORDER — PRAVASTATIN SODIUM 40 MG
40 TABLET ORAL DAILY
Qty: 90 TABLET | Refills: 0 | Status: SHIPPED | OUTPATIENT
Start: 2017-12-06 | End: 2018-03-04

## 2017-12-06 NOTE — TELEPHONE ENCOUNTER
Message from Zenaminshart:  Original authorizing provider: Adrianna Singh MD    [facility] Krystian would like a refill of the following medications:  pravastatin (PRAVACHOL) 40 MG tablet [Adrianna Singh MD]    Preferred pharmacy: [address]    Comment:

## 2018-01-01 ENCOUNTER — MYC MEDICAL ADVICE (OUTPATIENT)
Dept: INTERNAL MEDICINE | Facility: CLINIC | Age: 68
End: 2018-01-01

## 2018-01-19 ENCOUNTER — MYC REFILL (OUTPATIENT)
Dept: INTERNAL MEDICINE | Facility: CLINIC | Age: 68
End: 2018-01-19

## 2018-01-19 DIAGNOSIS — E11.9 TYPE 2 DIABETES MELLITUS WITHOUT COMPLICATION, WITHOUT LONG-TERM CURRENT USE OF INSULIN (H): ICD-10-CM

## 2018-01-19 NOTE — LETTER
[facility]  [address] [id number] [phone]            [place]  [address]  [facility] [address]        January 22, 2018    Dear Randall,    While refilling your prescription today, we noticed that you are due to have labs drawn and see your provider.  We will refill your prescription for 30 days, but a follow-up appointment must be made before any additional refills can be approved.     Taking care of your health is important to us and we look forward to seeing you in the near future.  Please call us at [phone] or [phone] (or use [employer]) to schedule an appointment.     Please disregard this notice if you have already made an appointment.    Sincerely,        [facility]

## 2018-01-19 NOTE — TELEPHONE ENCOUNTER
Message from Pinnacle Biologics[employer]:  Original authorizing provider: Adrianna Singh MD    [facility] Krystian would like a refill of the following medications:  metFORMIN (GLUCOPHAGE) 1000 MG tablet [Adrianna Singh MD]    Preferred pharmacy: [address]    Comment:

## 2018-01-22 NOTE — TELEPHONE ENCOUNTER
Medication is being filled for 1 time refill only due to:  Patient needs labs and office visit, letter sent. .

## 2018-02-22 ENCOUNTER — MYC REFILL (OUTPATIENT)
Dept: INTERNAL MEDICINE | Facility: CLINIC | Age: 68
End: 2018-02-22

## 2018-02-22 DIAGNOSIS — E11.9 TYPE 2 DIABETES MELLITUS WITHOUT COMPLICATION, WITHOUT LONG-TERM CURRENT USE OF INSULIN (H): ICD-10-CM

## 2018-02-22 NOTE — TELEPHONE ENCOUNTER
Message from MyChart:  Original authorizing provider: Adrianna Singh MD    [facility] Krystian would like a refill of the following medications:  metFORMIN (GLUCOPHAGE) 1000 MG tablet [Adrianna Singh MD]    Preferred pharmacy: [address]    Comment:  Can you expedite for a  today evening? Thanks

## 2018-03-04 ENCOUNTER — MYC REFILL (OUTPATIENT)
Dept: INTERNAL MEDICINE | Facility: CLINIC | Age: 68
End: 2018-03-04

## 2018-03-04 DIAGNOSIS — E11.9 TYPE 2 DIABETES MELLITUS WITHOUT COMPLICATION, WITHOUT LONG-TERM CURRENT USE OF INSULIN (H): ICD-10-CM

## 2018-03-04 NOTE — LETTER
[facility]  [address] [id number] [phone]            [place]  [address] [address]  [facility] [address]        March 6, 2018    Dear Randall,    While refilling your prescription today, we noticed that you are due to have labs drawn.  We will refill your prescription for 30 days, but a follow-up appointment must be made before any additional refills can be approved.     Taking care of your health is important to us and we look forward to seeing you in the near future.  Please call us at [phone] or [phone] (or use [employer]r) to schedule an appointment.     Please disregard this notice if you have already made an appointment.    Sincerely,        [facility]

## 2018-03-05 NOTE — TELEPHONE ENCOUNTER
Message from Science[employer]:  Original authorizing provider: Adrianna Singh MD    [facility] Krystian would like a refill of the following medications:  pravastatin (PRAVACHOL) 40 MG tablet [Adrianna Singh MD]    Preferred pharmacy: [address]    Comment:

## 2018-03-06 RX ORDER — PRAVASTATIN SODIUM 40 MG
40 TABLET ORAL DAILY
Qty: 90 TABLET | Refills: 0 | Status: SHIPPED | OUTPATIENT
Start: 2018-03-06 | End: 2018-04-02

## 2018-03-22 ENCOUNTER — MYC MEDICAL ADVICE (OUTPATIENT)
Dept: INTERNAL MEDICINE | Facility: CLINIC | Age: 68
End: 2018-03-22

## 2018-03-22 DIAGNOSIS — E11.9 TYPE 2 DIABETES MELLITUS WITHOUT COMPLICATION, WITHOUT LONG-TERM CURRENT USE OF INSULIN (H): ICD-10-CM

## 2018-03-22 NOTE — TELEPHONE ENCOUNTER
Routing refill request to provider for review/approval because:  Yue given x1 and patient did not follow up, please advise.  Letter previously sent along with MyChart message   Labs not current:  A1C, BMP, Lipids, microalbumin  Patient needs to be seen because:  Due for diabetic follow up

## 2018-03-23 ENCOUNTER — MYC REFILL (OUTPATIENT)
Dept: INTERNAL MEDICINE | Facility: CLINIC | Age: 68
End: 2018-03-23

## 2018-03-23 DIAGNOSIS — E11.9 TYPE 2 DIABETES MELLITUS WITHOUT COMPLICATION, WITHOUT LONG-TERM CURRENT USE OF INSULIN (H): ICD-10-CM

## 2018-03-23 NOTE — TELEPHONE ENCOUNTER
Message from VANCLhart:  Original authorizing provider: Adrianna Singh MD    [facility] Krystian would like a refill of the following medications:  metFORMIN (GLUCOPHAGE) 1000 MG tablet [Adrianna Singh MD]    Preferred pharmacy: [address]    Comment:

## 2018-03-26 ASSESSMENT — ACTIVITIES OF DAILY LIVING (ADL)
CURRENT_FUNCTION: TRANSPORTATION REQUIRES ASSISTANCE
CURRENT_FUNCTION: SHOPPING REQUIRES ASSISTANCE
I_NEED_ASSISTANCE_FOR_THE_FOLLOWING_DAILY_ACTIVITIES:: SHOPPING
I_NEED_ASSISTANCE_FOR_THE_FOLLOWING_DAILY_ACTIVITIES:: TRANSPORTATION

## 2018-03-28 DIAGNOSIS — E11.9 TYPE 2 DIABETES MELLITUS WITHOUT COMPLICATION, WITHOUT LONG-TERM CURRENT USE OF INSULIN (H): ICD-10-CM

## 2018-03-31 DIAGNOSIS — E11.9 TYPE 2 DIABETES MELLITUS WITHOUT COMPLICATION, WITHOUT LONG-TERM CURRENT USE OF INSULIN (H): ICD-10-CM

## 2018-03-31 LAB
ALBUMIN SERPL-MCNC: 3.8 G/DL (ref 3.4–5)
ALP SERPL-CCNC: 78 U/L (ref 40–150)
ALT SERPL W P-5'-P-CCNC: 21 U/L (ref 0–50)
ANION GAP SERPL CALCULATED.3IONS-SCNC: 4 MMOL/L (ref 3–14)
AST SERPL W P-5'-P-CCNC: 21 U/L (ref 0–45)
BILIRUB SERPL-MCNC: 0.5 MG/DL (ref 0.2–1.3)
BUN SERPL-MCNC: 12 MG/DL (ref 7–30)
CALCIUM SERPL-MCNC: 9.6 MG/DL (ref 8.5–10.1)
CHLORIDE SERPL-SCNC: 103 MMOL/L (ref 94–109)
CHOLEST SERPL-MCNC: 133 MG/DL
CO2 SERPL-SCNC: 30 MMOL/L (ref 20–32)
CREAT SERPL-MCNC: 0.64 MG/DL (ref 0.52–1.04)
CREAT UR-MCNC: 38 MG/DL
ERYTHROCYTE [DISTWIDTH] IN BLOOD BY AUTOMATED COUNT: 12.7 % (ref 10–15)
GFR SERPL CREATININE-BSD FRML MDRD: >90 ML/MIN/1.7M2
GLUCOSE SERPL-MCNC: 117 MG/DL (ref 70–99)
HBA1C MFR BLD: 6.7 % (ref 0–6.4)
HCT VFR BLD AUTO: 37.4 % (ref 35–47)
HDLC SERPL-MCNC: 36 MG/DL
HGB BLD-MCNC: 12.3 G/DL (ref 11.7–15.7)
LDLC SERPL CALC-MCNC: 77 MG/DL
MCH RBC QN AUTO: 29.9 PG (ref 26.5–33)
MCHC RBC AUTO-ENTMCNC: 32.9 G/DL (ref 31.5–36.5)
MCV RBC AUTO: 91 FL (ref 78–100)
MICROALBUMIN UR-MCNC: 25 MG/L
MICROALBUMIN/CREAT UR: 65.62 MG/G CR (ref 0–25)
NONHDLC SERPL-MCNC: 97 MG/DL
PLATELET # BLD AUTO: 294 10E9/L (ref 150–450)
POTASSIUM SERPL-SCNC: 4.4 MMOL/L (ref 3.4–5.3)
PROT SERPL-MCNC: 8.4 G/DL (ref 6.8–8.8)
RBC # BLD AUTO: 4.11 10E12/L (ref 3.8–5.2)
SODIUM SERPL-SCNC: 137 MMOL/L (ref 133–144)
TRIGL SERPL-MCNC: 99 MG/DL
WBC # BLD AUTO: 8 10E9/L (ref 4–11)

## 2018-03-31 PROCEDURE — 82043 UR ALBUMIN QUANTITATIVE: CPT | Performed by: INTERNAL MEDICINE

## 2018-03-31 PROCEDURE — 80061 LIPID PANEL: CPT | Performed by: INTERNAL MEDICINE

## 2018-03-31 PROCEDURE — 83036 HEMOGLOBIN GLYCOSYLATED A1C: CPT | Performed by: INTERNAL MEDICINE

## 2018-03-31 PROCEDURE — 36415 COLL VENOUS BLD VENIPUNCTURE: CPT | Performed by: INTERNAL MEDICINE

## 2018-03-31 PROCEDURE — 80053 COMPREHEN METABOLIC PANEL: CPT | Performed by: INTERNAL MEDICINE

## 2018-03-31 PROCEDURE — 85027 COMPLETE CBC AUTOMATED: CPT | Performed by: INTERNAL MEDICINE

## 2018-04-02 ENCOUNTER — OFFICE VISIT (OUTPATIENT)
Dept: INTERNAL MEDICINE | Facility: CLINIC | Age: 68
End: 2018-04-02
Payer: COMMERCIAL

## 2018-04-02 ENCOUNTER — MYC REFILL (OUTPATIENT)
Dept: INTERNAL MEDICINE | Facility: CLINIC | Age: 68
End: 2018-04-02

## 2018-04-02 ENCOUNTER — RADIANT APPOINTMENT (OUTPATIENT)
Dept: GENERAL RADIOLOGY | Facility: CLINIC | Age: 68
End: 2018-04-02
Attending: INTERNAL MEDICINE
Payer: COMMERCIAL

## 2018-04-02 ENCOUNTER — TELEPHONE (OUTPATIENT)
Dept: INTERNAL MEDICINE | Facility: CLINIC | Age: 68
End: 2018-04-02

## 2018-04-02 VITALS
HEART RATE: 97 BPM | DIASTOLIC BLOOD PRESSURE: 60 MMHG | TEMPERATURE: 98.1 F | SYSTOLIC BLOOD PRESSURE: 110 MMHG | WEIGHT: 165 LBS | OXYGEN SATURATION: 97 % | BODY MASS INDEX: 30.36 KG/M2 | RESPIRATION RATE: 17 BRPM | HEIGHT: 62 IN

## 2018-04-02 DIAGNOSIS — K21.9 GASTROESOPHAGEAL REFLUX DISEASE, ESOPHAGITIS PRESENCE NOT SPECIFIED: ICD-10-CM

## 2018-04-02 DIAGNOSIS — E11.9 TYPE 2 DIABETES MELLITUS WITHOUT COMPLICATION, WITHOUT LONG-TERM CURRENT USE OF INSULIN (H): ICD-10-CM

## 2018-04-02 DIAGNOSIS — R05.9 COUGH: ICD-10-CM

## 2018-04-02 DIAGNOSIS — Z12.31 ENCOUNTER FOR SCREENING MAMMOGRAM FOR BREAST CANCER: ICD-10-CM

## 2018-04-02 DIAGNOSIS — R80.9 MICROALBUMINURIA: ICD-10-CM

## 2018-04-02 DIAGNOSIS — R93.89 ABNORMAL CHEST X-RAY: ICD-10-CM

## 2018-04-02 DIAGNOSIS — R21 RASH: ICD-10-CM

## 2018-04-02 DIAGNOSIS — R05.3 CHRONIC COUGH: Primary | ICD-10-CM

## 2018-04-02 DIAGNOSIS — Z00.01 ENCOUNTER FOR ROUTINE ADULT MEDICAL EXAM WITH ABNORMAL FINDINGS: Primary | ICD-10-CM

## 2018-04-02 DIAGNOSIS — R09.89 TICKLE IN THROAT: ICD-10-CM

## 2018-04-02 DIAGNOSIS — Z23 NEED FOR VACCINATION: ICD-10-CM

## 2018-04-02 PROCEDURE — 90471 IMMUNIZATION ADMIN: CPT | Performed by: INTERNAL MEDICINE

## 2018-04-02 PROCEDURE — 71046 X-RAY EXAM CHEST 2 VIEWS: CPT | Mod: FY

## 2018-04-02 PROCEDURE — 99213 OFFICE O/P EST LOW 20 MIN: CPT | Mod: 25 | Performed by: INTERNAL MEDICINE

## 2018-04-02 PROCEDURE — 90670 PCV13 VACCINE IM: CPT | Performed by: INTERNAL MEDICINE

## 2018-04-02 PROCEDURE — 99397 PER PM REEVAL EST PAT 65+ YR: CPT | Mod: 25 | Performed by: INTERNAL MEDICINE

## 2018-04-02 RX ORDER — ASPIRIN 81 MG/1
81 TABLET, CHEWABLE ORAL DAILY
Qty: 90 TABLET | Refills: 3 | Status: SHIPPED | OUTPATIENT
Start: 2018-04-02 | End: 2018-07-24

## 2018-04-02 RX ORDER — PRAVASTATIN SODIUM 40 MG
40 TABLET ORAL DAILY
Qty: 90 TABLET | Refills: 3 | Status: SHIPPED | OUTPATIENT
Start: 2018-04-02 | End: 2019-04-03

## 2018-04-02 RX ORDER — LOSARTAN POTASSIUM 25 MG/1
12.5 TABLET ORAL DAILY
Qty: 45 TABLET | Refills: 3 | Status: SHIPPED | OUTPATIENT
Start: 2018-04-02 | End: 2018-04-23

## 2018-04-02 RX ORDER — FLUTICASONE PROPIONATE 50 MCG
1 SPRAY, SUSPENSION (ML) NASAL DAILY
Qty: 1 BOTTLE | Refills: 11 | Status: SHIPPED | OUTPATIENT
Start: 2018-04-02 | End: 2018-08-17

## 2018-04-02 RX ORDER — TRIAMCINOLONE ACETONIDE 1 MG/G
CREAM TOPICAL
Qty: 30 G | Refills: 3 | Status: SHIPPED | OUTPATIENT
Start: 2018-04-02 | End: 2018-08-17

## 2018-04-02 ASSESSMENT — PAIN SCALES - GENERAL: PAINLEVEL: NO PAIN (0)

## 2018-04-02 NOTE — PROGRESS NOTES
SUBJECTIVE:                                                      HPI: Randall Boland is a pleasant 67 year old female who presents for a physical.    Patient speaks Tibetan. Daughter present and translates.    Patient complains of acid reflux:  - ongoing for several months  - has been using OTC omeprazole with good relief of symptoms  - symptoms recur when she stops the omeprazole    PMH significant for osteopenia.    Has not tried H2 blocker for symptoms.    Patient also complains of cough:  - ongoing for several months as well  - starts with a tickle in her throat  - dry coughing spells  - occurs throughout the day and night - no difference    - no shortness of breath or wheezing  - no chest pain  - no fevers or chills  - no sinus congestion or runny nose; unsure whether postnasal drip is present    ROS:  Constitutional: denies unintentional weight loss or gain; denies fevers, chills, or sweats     Cardiovascular: denies chest pain, palpitations, or edema  Respiratory: denies cough, wheezing, shortness of breath, or dyspnea on exertion  Gastrointestinal: denies nausea, vomiting, constipation, diarrhea, or abdominal pain  Genitourinary: denies urinary frequency, urgency, dysuria, or hematuria  Integumentary: denies rash or pruritus  Musculoskeletal: denies back pain, muscle pain, joint pain, or joint swelling  Neurologic: denies focal weakness, numbness, or tingling  Hematologic/Immunologic: denies history of anemia or blood transfusions  Endocrine: denies heat or cold intolerance; denies polyuria, polydipsia  Psychiatric: denies anxiety; see preventative health below    Past Medical History:   Diagnosis Date     Obesity (BMI 30-39.9)      Osteopenia      Type 2 diabetes mellitus (H)      Past Surgical History:   Procedure Laterality Date     CHOLECYSTECTOMY, OPEN  1990     Family History   Problem Relation Age of Onset     Dementia Mother      CEREBROVASCULAR DISEASE Father      CEREBROVASCULAR DISEASE Brother       Hypertension Brother      DIABETES No family hx of      Myocardial Infarction No family hx of      Coronary Artery Disease Early Onset No family hx of      Breast Cancer No family hx of      Ovarian Cancer No family hx of      Colon Cancer No family hx of      Occupational History     Retired - was  in restaurant      Social History Main Topics     Smoking status: Never Smoker     Smokeless tobacco: Never Used     Alcohol use 0.0 oz/week      Comment: rare     Drug use: No     Sexual activity: No     Social History Narrative    .     8 children.     13 grandchildren (as of 2018).    Originally from [facility]. [place] (all kids born in [facility]). Moved to  in 2012.     Retired - was  in restaurant.    Living with daughter and her family. Taking care of  (~25 years her senior).     Goes for walks occasionally.      Allergies   Allergen Reactions     Atorvastatin Muscle Pain (Myalgia)     Current Outpatient Prescriptions   Medication Sig     NONFORMULARY Flax seed powder - 2 teaspoon.day     losartan (COZAAR) 25 MG tablet Take 0.5 tablets (12.5 mg) by mouth daily     ranitidine (ZANTAC) 300 MG tablet Take 1 tablet (300 mg) by mouth At Bedtime     fluticasone (FLONASE) 50 MCG/ACT spray Spray 1 spray into both nostrils daily     triamcinolone (KENALOG) 0.1 % cream Apply sparingly to affected area three times daily for 14 days.     pravastatin (PRAVACHOL) 40 MG tablet Take 1 tablet (40 mg) by mouth daily     metFORMIN (GLUCOPHAGE) 1000 MG tablet Take 1 tablet (1,000 mg) by mouth 2 times daily (with meals)     nystatin (MYCOSTATIN) 606992 UNIT/GM POWD Apply topically 3 times daily as needed     aspirin 81 MG chewable tablet Take 1 tablet (81 mg) by mouth daily     Immunization History   Administered Date(s) Administered     Influenza Vaccine IM 3yrs+ 4 Valent IIV4 10/01/2016     Pneumococcal 23 valent 10/14/2016     TDAP Vaccine (Adacel) 07/04/2012     Zoster vaccine, live 07/04/2016  "    OBJECTIVE:                                                      /60 (BP Location: Left arm, Patient Position: Chair, Cuff Size: Adult Regular)  Pulse 97  Temp 98.1  F (36.7  C) (Oral)  Resp 17  Ht 5' 1.7\" (1.567 m)  Wt 165 lb (74.8 kg)  SpO2 97%  BMI 30.47 kg/m2  Constitutional: well-appearing  Eyes: normal conjunctivae and lids; pupils equal, round, and reactive to light  Ears, Nose, Mouth, and Throat: normal ears and nose; tympanic membranes visualized and normal; normal lips, teeth, and gums; no oropharyngeal lesions or ulcers  Neck: supple and symmetric; no lymphadenopathy; no thyromegaly or masses  Respiratory: normal respiratory effort; clear to auscultation bilaterally - no wheezes, rhonchi, or crackles  Cardiovascular: regular rate and rhythm; pedal pulses palpable; no edema  Breasts: normal appearance; no masses or skin retraction; no nipple discharge or bleeding; no axillary lymphadenopathy  Gastrointestinal: soft, non-tender, non-distended, and bowel sounds present; no organomegaly or masses  Musculoskeletal: normal gait and station  Psych: normal judgment and insight; normal mood and affect; recent and remote memory intact; oriented to time, place, and person    PREVENTATIVE HEALTH                                                      Blood pressure: within normal limits   Breast CA screening: DUE  Cervical CA screening: up to date   Colon CA screening: last performed 2016; report reviewed; repeat due in 2026  Lung CA screening: n/a   Dexa: up to date   Screening HCV: completed  STD testing: no risk factors present  Depression screening: PHQ-2 assessment completed and reviewed - no intervention indicated at this time  Alcohol misuse screening: alcohol use reviewed - no intervention indicated at this time  Immunizations: reviewed; Shingrix recommended    ASSESSMENT/PLAN:                                                       (Z00.01) Encounter for routine adult medical exam with abnormal " findings  (primary encounter diagnosis)  Comment: PMH, PSH, FH, SH, medications, allergies, immunizations, and preventative health measures reviewed.   Plan: Daughter will look into Shingrix coverage.    (Z12.31) Encounter for screening mammogram for breast cancer  Plan: mammogram ordered - patient to schedule.    (Z23) Need for vaccination  Plan: Prevnar given today.    (R80.9) Microalbuminuria  Plan: START losartan 12.5 mg daily.    (K21.9) Gastroesophageal reflux disease, esophagitis presence not specified  Plan:   - TRIAL of ranitidine 300 mg nightly.   - if inadequate response, patient to contact MD for prescription for PPI.    (R05) Cough  (R09.89) Tickle in throat  Comment:    - etiology unclear.   - differential includes postnasal drip, acid reflux, cough variant asthma, and allergies.  Plan:    - treating acid reflux as above.   - TRIAL of Flonase 2 sprays/nostril daily.    (R21) Rash  Comment:    - involves fingers, hands, and elbows bilaterally.   - suspect eczema.  Plan:    - TRIAL of triamcinolone 0.1% cream twice daily until rash resolves.   - if inadequate response, patient to contact MD.      The instructions on the AVS were discussed and explained to the patient. Patient expressed understanding of instructions.    (Chart documentation was completed, in part, with Vivakor voice-recognition software. Even though reviewed, some grammatical, spelling, and word errors may remain.)    Adrianna Singh MD   [address] [address]  T: [phone], F: [phone]

## 2018-04-02 NOTE — TELEPHONE ENCOUNTER
Called daughter. No answer. Left VM for her to call back.    --    Chest xray with a couple abnormalities. May be contributing to or causing cough.     Needs additional imaging for further evaluate.    CT Chest with contrast recommended.

## 2018-04-02 NOTE — MR AVS SNAPSHOT
"              After Visit Summary   4/2/2018    Randall Boland    MRN: [id number]           Patient Information     Date Of Birth          1950        Visit Information        Provider Department      4/2/2018 10:00 AM Adrianna Singh MD [facility]        Today's Diagnoses     Type 2 diabetes mellitus without complication, without long-term current use of insulin (H)    -  1    Encounter for screening mammogram for breast cancer        Gastroesophageal reflux disease, esophagitis presence not specified        Cough        Tickle in throat        Rash          Care Instructions    Pneumonia vaccine (\"Prevnar\") today.    ---    Chest xray today.    ---    Please RESTART daily baby aspirin.    ---    Please START losartan (1/2 tab) daily for kidney protection.    ---    TRIAL of Zantac/ranitdine 300mg at night.  (can always prescribe omeprazole if this doesn't help)    TRIAL of Flonase for possible post-nasal drip - 2 sprays/nostril daily.     ---    For rash: triamcinolone cream twice a day until rash resolves (no more than 2 weeks at a time).    Stronger creams available if needed.     ---    Look into coverage for Shingles vaccine (\"Shingrix\").    ---    Annual diabetic eye exam recommended.                  Follow-ups after your visit        Future tests that were ordered for you today     Open Future Orders        Priority Expected Expires Ordered    XR Chest 2 Views Routine 4/2/2018 4/2/2019 4/2/2018    MA Screening Digital Bilateral Routine  4/3/2019 4/2/2018            Who to contact     If you have questions or need follow up information about today's clinic visit or your schedule please contact [facility] directly at [phone].  Normal or non-critical lab and imaging results will be communicated to you by MyChart, letter or phone within 4 business days after the clinic has received the results. If you do not hear from us within 7 days, please contact " "the clinic through WillCall or phone. If you have a critical or abnormal lab result, we will notify you by phone as soon as possible.  Submit refill requests through WillCall or call your pharmacy and they will forward the refill request to us. Please allow 3 business days for your refill to be completed.          Additional Information About Your Visit        [employer] Information     WillCall gives you secure access to your electronic health record. If you see a primary care provider, you can also send messages to your care team and make appointments. If you have questions, please call your primary care clinic.  If you do not have a primary care provider, please call [phone] and they will assist you.        Care EveryWhere ID     This is your Care EveryWhere ID. This could be used by other organizations to access your [facility] medical records  [id number]        Your Vitals Were     Pulse Temperature Respirations Height Pulse Oximetry BMI (Body Mass Index)    97 98.1  F (36.7  C) (Oral) 17 5' 1.7\" (1.567 m) 97% 30.47 kg/m2       Blood Pressure from Last 3 Encounters:   04/02/18 110/60   08/02/17 102/60   05/18/17 104/64    Weight from Last 3 Encounters:   04/02/18 165 lb (74.8 kg)   08/02/17 164 lb 1.6 oz (74.4 kg)   05/18/17 168 lb (76.2 kg)                 Today's Medication Changes          These changes are accurate as of 4/2/18 10:49 AM.  If you have any questions, ask your nurse or doctor.               Start taking these medicines.        Dose/Directions    fluticasone 50 MCG/ACT spray   Commonly known as:  FLONASE   Used for:  Cough, Tickle in throat   Started by:  Adrianna Singh MD        Dose:  1 spray   Spray 1 spray into both nostrils daily   Quantity:  1 Bottle   Refills:  11       losartan 25 MG tablet   Commonly known as:  COZAAR   Used for:  Type 2 diabetes mellitus without complication, without long-term current use of insulin (H)   Started by:  Adrianna Singh MD        Dose:  12.5 mg "   Take 0.5 tablets (12.5 mg) by mouth daily   Quantity:  45 tablet   Refills:  3       ranitidine 300 MG tablet   Commonly known as:  ZANTAC   Used for:  Gastroesophageal reflux disease, esophagitis presence not specified   Started by:  Adrianna Singh MD        Dose:  300 mg   Take 1 tablet (300 mg) by mouth At Bedtime   Quantity:  90 tablet   Refills:  3       triamcinolone 0.1 % cream   Commonly known as:  KENALOG   Used for:  Rash   Started by:  Adrianna Singh MD        Apply sparingly to affected area three times daily for 14 days.   Quantity:  30 g   Refills:  3            Where to get your medicines      These medications were sent to [facility] [place] [address]     Phone:  [phone]     fluticasone 50 MCG/ACT spray    losartan 25 MG tablet    ranitidine 300 MG tablet    triamcinolone 0.1 % cream                Primary Care Provider Office Phone # Fax #    Adrianna Singh [phone]       [address] [place] [address]        Equal Access to Services     [facility] AH: Hadii isabell ku hadasho Soomaali, waaxda luqadaha, qaybta kaalmada adeegyada, irina phillips hayamna fuentes . So [facility] [phone].    ATENCIÓN: Si habla español, tiene a paez disposición servicios gratuitos de asistencia lingüística. LlSelect [facility], [place] [phone].    We comply with applicable federal civil rights laws and [place] laws. We do not discriminate on the basis of race, color, national origin, age, disability, sex, sexual orientation, or gender identity.            Thank you!     Thank you for choosing [facility]  for your care. Our goal is always to provide you with excellent care. Hearing back from our patients is one way we can continue to improve our services. Please take a few minutes to complete the written survey that you may receive in the mail after your visit with us. Thank you!              Your Updated Medication List - Protect others around you: Learn how to safely use, store and throw away your medicines at www.disposemymeds.org.          This list is accurate as of 4/2/18 10:49 AM.  Always use your most recent med list.                   Brand Name Dispense Instructions for use Diagnosis    aspirin 81 MG chewable tablet      Take 1 tablet (81 mg) by mouth daily    Type 2 diabetes mellitus without complication, without long-term current use of insulin (H)       blood glucose monitoring lancets     1 Box    Use to test blood sugar 1-2 times daily or as directed.    Type 2 diabetes mellitus without complication, without long-term current use of insulin (H)       * blood glucose monitoring test strip    ALEX CONTOUR NEXT    50 each    Use to test blood sugar 1-2 times daily or as directed.    Type 2 diabetes mellitus without complication, without long-term current use of insulin (H)       * blood glucose monitoring test strip    no brand specified    100 strip    Use to test blood sugars once daily    Type 2 diabetes mellitus without complication, without long-term current use of insulin (H)       FISH OIL + D3 PO           fluticasone 50 MCG/ACT spray    FLONASE    1 Bottle    Spray 1 spray into both nostrils daily    Cough, Tickle in throat       losartan 25 MG tablet    COZAAR    45 tablet    Take 0.5 tablets (12.5 mg) by mouth daily    Type 2 diabetes mellitus without complication, without long-term current use of insulin (H)       metFORMIN 1000 MG tablet    GLUCOPHAGE    60 tablet    Take 1 tablet (1,000 mg) by mouth 2 times daily (with meals)    Type 2 diabetes mellitus without complication, without long-term current use of insulin (H)       Multi-vitamin Tabs tablet      Take 1 tablet by mouth daily        NONFORMULARY      Flax seed powder - 2 teaspoon.day        nystatin 379005 UNIT/GM Powd    MYCOSTATIN    30 g    Apply topically 3 times daily as needed    Intertriginous candidiasis        pravastatin 40 MG tablet    PRAVACHOL    90 tablet    Take 1 tablet (40 mg) by mouth daily    Type 2 diabetes mellitus without complication, without long-term current use of insulin (H)       ranitidine 300 MG tablet    ZANTAC    90 tablet    Take 1 tablet (300 mg) by mouth At Bedtime    Gastroesophageal reflux disease, esophagitis presence not specified       triamcinolone 0.1 % cream    KENALOG    30 g    Apply sparingly to affected area three times daily for 14 days.    Rash       * Notice:  This list has 2 medication(s) that are the same as other medications prescribed for you. Read the directions carefully, and ask your doctor or other care provider to review them with you.

## 2018-04-02 NOTE — TELEPHONE ENCOUNTER
Spoke with patient's daughter and informed her of Dr. Singh's note below.  Daughter understood and will wait for imaging department to contact her.

## 2018-04-02 NOTE — TELEPHONE ENCOUNTER
Message from Yatrahart:  Original authorizing provider: Adrianna Singh MD    [facility] Krystian would like a refill of the following medications:  aspirin 81 MG chewable tablet [Adrianna Singh MD]    Preferred pharmacy: [address]    Comment:

## 2018-04-02 NOTE — NURSING NOTE
Screening Questionnaire for Adult Immunization    Are you sick today?   No   Do you have allergies to medications, food, a vaccine component or latex?   No   Have you ever had a serious reaction after receiving a vaccination?   No   Do you have a long-term health problem with heart disease, lung disease, asthma, kidney disease, metabolic disease (e.g. diabetes), anemia, or other blood disorder?   yes   Do you have cancer, leukemia, HIV/AIDS, or any other immune system problem?   No   In the past 3 months, have you taken medications that affect  your immune system, such as prednisone, other steroids, or anticancer drugs; drugs for the treatment of rheumatoid arthritis, Crohn s disease, or psoriasis; or have you had radiation treatments?   No   Have you had a seizure, or a brain or other nervous system problem?   No   During the past year, have you received a transfusion of blood or blood     products, or been given immune (gamma) globulin or antiviral drug?   No   For women: Are you pregnant or is there a chance you could become        pregnant during the next month?   No   Have you received any vaccinations in the past 4 weeks?   No     Immunization questionnaire was positive for at least one answer.  Notified Dr Singh.        Per orders of Dr. Singh, injection of Prevnar 13 given by Brittani Jimenez. Patient instructed to remain in clinic for 15 minutes afterwards, and to report any adverse reaction to me immediately.       Screening performed by Brittani Jimenez on 4/2/2018 at 11:08 AM.

## 2018-04-02 NOTE — PATIENT INSTRUCTIONS
"Pneumonia vaccine (\"Prevnar\") today.    ---    Chest xray today.    ---    Please RESTART daily baby aspirin.    ---    Please START losartan (1/2 tab) daily for kidney protection.    ---    TRIAL of Zantac/ranitdine 300mg at night.  (can always prescribe omeprazole if this doesn't help)    TRIAL of Flonase for possible post-nasal drip - 2 sprays/nostril daily.     ---    For rash: triamcinolone cream twice a day until rash resolves (no more than 2 weeks at a time).    Stronger creams available if needed.     ---    Look into coverage for Shingles vaccine (\"Shingrix\").    ---    Annual diabetic eye exam recommended.          "

## 2018-04-09 ENCOUNTER — HOSPITAL ENCOUNTER (OUTPATIENT)
Dept: CT IMAGING | Facility: CLINIC | Age: 68
Discharge: HOME | End: 2018-04-09
Attending: INTERNAL MEDICINE | Admitting: INTERNAL MEDICINE
Payer: COMMERCIAL

## 2018-04-09 DIAGNOSIS — R93.89 ABNORMAL CHEST X-RAY: ICD-10-CM

## 2018-04-09 DIAGNOSIS — R05.3 CHRONIC COUGH: ICD-10-CM

## 2018-04-09 PROCEDURE — 25000125 ZZHC RX 250: Performed by: INTERNAL MEDICINE

## 2018-04-09 PROCEDURE — 71260 CT THORAX DX C+: CPT

## 2018-04-09 PROCEDURE — 25000128 H RX IP 250 OP 636: Performed by: INTERNAL MEDICINE

## 2018-04-09 RX ORDER — IOPAMIDOL 755 MG/ML
75 INJECTION, SOLUTION INTRAVASCULAR ONCE
Status: COMPLETED | OUTPATIENT
Start: 2018-04-09 | End: 2018-04-09

## 2018-04-09 RX ADMIN — IOPAMIDOL 75 ML: 755 INJECTION, SOLUTION INTRAVENOUS at 17:02

## 2018-04-09 RX ADMIN — SODIUM CHLORIDE 70 ML: 9 INJECTION, SOLUTION INTRAVENOUS at 17:02

## 2018-04-18 DIAGNOSIS — J84.9 ILD (INTERSTITIAL LUNG DISEASE) (H): Primary | ICD-10-CM

## 2018-04-23 ENCOUNTER — MYC REFILL (OUTPATIENT)
Dept: INTERNAL MEDICINE | Facility: CLINIC | Age: 68
End: 2018-04-23

## 2018-04-23 DIAGNOSIS — E11.9 TYPE 2 DIABETES MELLITUS WITHOUT COMPLICATION, WITHOUT LONG-TERM CURRENT USE OF INSULIN (H): ICD-10-CM

## 2018-04-23 DIAGNOSIS — K21.9 GASTROESOPHAGEAL REFLUX DISEASE, ESOPHAGITIS PRESENCE NOT SPECIFIED: ICD-10-CM

## 2018-04-23 NOTE — TELEPHONE ENCOUNTER
Message from MyChart:  Original authorizing provider: Adrainna Singh MD    Randall Boland would like a refill of the following medications:  metFORMIN (GLUCOPHAGE) 1000 MG tablet [Adrianna Singh MD]  losartan (COZAAR) 25 MG tablet [Adrianna Singh MD]  ranitidine (ZANTAC) 300 MG tablet [Adrianna Singh MD]    Preferred pharmacy: [address]    Comment:

## 2018-04-25 RX ORDER — LOSARTAN POTASSIUM 25 MG/1
12.5 TABLET ORAL DAILY
Qty: 45 TABLET | Refills: 3 | Status: SHIPPED | OUTPATIENT
Start: 2018-04-25 | End: 2019-04-23

## 2018-04-25 NOTE — TELEPHONE ENCOUNTER
Metformin Routing refill request to provider for review/approval because:  Labs out of range:  Microalbumin    Pharmacy has refills on file for losartan and ranitidine.  Contacted pharmacy--they will refill these for her now.

## 2018-05-07 ENCOUNTER — RADIANT APPOINTMENT (OUTPATIENT)
Dept: MAMMOGRAPHY | Facility: CLINIC | Age: 68
End: 2018-05-07
Attending: INTERNAL MEDICINE
Payer: COMMERCIAL

## 2018-05-07 DIAGNOSIS — Z12.31 ENCOUNTER FOR SCREENING MAMMOGRAM FOR BREAST CANCER: ICD-10-CM

## 2018-05-07 DIAGNOSIS — Z12.31 VISIT FOR SCREENING MAMMOGRAM: ICD-10-CM

## 2018-05-07 PROCEDURE — 77067 SCR MAMMO BI INCL CAD: CPT | Mod: TC

## 2018-05-08 ENCOUNTER — MYC REFILL (OUTPATIENT)
Dept: INTERNAL MEDICINE | Facility: CLINIC | Age: 68
End: 2018-05-08

## 2018-05-08 RX ORDER — PRAVASTATIN SODIUM 40 MG
40 TABLET ORAL DAILY
Qty: 90 TABLET | Refills: 3 | Status: CANCELLED | OUTPATIENT
Start: 2018-05-08

## 2018-05-08 NOTE — TELEPHONE ENCOUNTER
Message from Arena Solutionshart:  Original authorizing provider: Adrianna Singh MD    [facility] Krystian would like a refill of the following medications:  pravastatin (PRAVACHOL) 40 MG tablet [Adrianna Singh MD]    Preferred pharmacy: [address]    Comment:

## 2018-05-21 ASSESSMENT — ENCOUNTER SYMPTOMS
SPUTUM PRODUCTION: 0
NAIL CHANGES: 0
SKIN CHANGES: 0
COUGH DISTURBING SLEEP: 0
POSTURAL DYSPNEA: 0
COUGH: 1
DYSPNEA ON EXERTION: 0
POOR WOUND HEALING: 0
WHEEZING: 0
SNORES LOUDLY: 0
HEMOPTYSIS: 0
SHORTNESS OF BREATH: 0

## 2018-05-23 ENCOUNTER — RADIANT APPOINTMENT (OUTPATIENT)
Dept: CT IMAGING | Facility: CLINIC | Age: 68
End: 2018-05-23
Attending: INTERNAL MEDICINE

## 2018-05-23 ENCOUNTER — OFFICE VISIT (OUTPATIENT)
Dept: PULMONOLOGY | Facility: CLINIC | Age: 68
End: 2018-05-23
Attending: INTERNAL MEDICINE
Payer: COMMERCIAL

## 2018-05-23 VITALS
HEIGHT: 62 IN | DIASTOLIC BLOOD PRESSURE: 76 MMHG | WEIGHT: 165 LBS | SYSTOLIC BLOOD PRESSURE: 121 MMHG | OXYGEN SATURATION: 98 % | HEART RATE: 90 BPM | BODY MASS INDEX: 30.36 KG/M2 | RESPIRATION RATE: 18 BRPM

## 2018-05-23 DIAGNOSIS — J84.9 ILD (INTERSTITIAL LUNG DISEASE) (H): ICD-10-CM

## 2018-05-23 DIAGNOSIS — J84.9 ILD (INTERSTITIAL LUNG DISEASE) (H): Primary | ICD-10-CM

## 2018-05-23 LAB
6 MIN WALK (FT): 1350 FT
6 MIN WALK (M): 411 M
ALBUMIN SERPL-MCNC: 3.8 G/DL (ref 3.4–5)
ALP SERPL-CCNC: 76 U/L (ref 40–150)
ALT SERPL W P-5'-P-CCNC: 20 U/L (ref 0–50)
ANION GAP SERPL CALCULATED.3IONS-SCNC: 6 MMOL/L (ref 3–14)
AST SERPL W P-5'-P-CCNC: 18 U/L (ref 0–45)
BASOPHILS # BLD AUTO: 0 10E9/L (ref 0–0.2)
BASOPHILS NFR BLD AUTO: 0.5 %
BILIRUB DIRECT SERPL-MCNC: 0.1 MG/DL (ref 0–0.2)
BILIRUB SERPL-MCNC: 0.4 MG/DL (ref 0.2–1.3)
BUN SERPL-MCNC: 6 MG/DL (ref 7–30)
CALCIUM SERPL-MCNC: 9.4 MG/DL (ref 8.5–10.1)
CHLORIDE SERPL-SCNC: 104 MMOL/L (ref 94–109)
CK SERPL-CCNC: 98 U/L (ref 30–225)
CO2 SERPL-SCNC: 26 MMOL/L (ref 20–32)
CREAT SERPL-MCNC: 0.62 MG/DL (ref 0.52–1.04)
CRP SERPL-MCNC: <2.9 MG/L (ref 0–8)
DIFFERENTIAL METHOD BLD: ABNORMAL
ENA JO1 IGG SER-ACNC: <0.2 AI (ref 0–0.9)
ENA SCL70 IGG SER IA-ACNC: <0.2 AI (ref 0–0.9)
ENA SS-A IGG SER IA-ACNC: <0.2 AI (ref 0–0.9)
ENA SS-B IGG SER IA-ACNC: <0.2 AI (ref 0–0.9)
EOSINOPHIL # BLD AUTO: 1 10E9/L (ref 0–0.7)
EOSINOPHIL NFR BLD AUTO: 10.7 %
ERYTHROCYTE [DISTWIDTH] IN BLOOD BY AUTOMATED COUNT: 12.7 % (ref 10–15)
ERYTHROCYTE [SEDIMENTATION RATE] IN BLOOD BY WESTERGREN METHOD: 54 MM/H (ref 0–30)
GFR SERPL CREATININE-BSD FRML MDRD: >90 ML/MIN/1.7M2
GLUCOSE SERPL-MCNC: 120 MG/DL (ref 70–99)
HCT VFR BLD AUTO: 36.3 % (ref 35–47)
HGB BLD-MCNC: 11.9 G/DL (ref 11.7–15.7)
IMM GRANULOCYTES # BLD: 0 10E9/L (ref 0–0.4)
IMM GRANULOCYTES NFR BLD: 0.3 %
LYMPHOCYTES # BLD AUTO: 2 10E9/L (ref 0.8–5.3)
LYMPHOCYTES NFR BLD AUTO: 22.7 %
MCH RBC QN AUTO: 29.7 PG (ref 26.5–33)
MCHC RBC AUTO-ENTMCNC: 32.8 G/DL (ref 31.5–36.5)
MCV RBC AUTO: 91 FL (ref 78–100)
MONOCYTES # BLD AUTO: 0.5 10E9/L (ref 0–1.3)
MONOCYTES NFR BLD AUTO: 5.9 %
NEUTROPHILS # BLD AUTO: 5.3 10E9/L (ref 1.6–8.3)
NEUTROPHILS NFR BLD AUTO: 59.9 %
NRBC # BLD AUTO: 0 10*3/UL
NRBC BLD AUTO-RTO: 0 /100
PLATELET # BLD AUTO: 281 10E9/L (ref 150–450)
POTASSIUM SERPL-SCNC: 3.9 MMOL/L (ref 3.4–5.3)
PROT SERPL-MCNC: 8.7 G/DL (ref 6.8–8.8)
RBC # BLD AUTO: 4.01 10E12/L (ref 3.8–5.2)
SODIUM SERPL-SCNC: 136 MMOL/L (ref 133–144)
WBC # BLD AUTO: 8.8 10E9/L (ref 4–11)

## 2018-05-23 PROCEDURE — G0463 HOSPITAL OUTPT CLINIC VISIT: HCPCS | Mod: ZF

## 2018-05-23 PROCEDURE — 86200 CCP ANTIBODY: CPT | Performed by: INTERNAL MEDICINE

## 2018-05-23 PROCEDURE — 86235 NUCLEAR ANTIGEN ANTIBODY: CPT | Performed by: INTERNAL MEDICINE

## 2018-05-23 PROCEDURE — 86431 RHEUMATOID FACTOR QUANT: CPT | Performed by: INTERNAL MEDICINE

## 2018-05-23 PROCEDURE — 80076 HEPATIC FUNCTION PANEL: CPT | Performed by: INTERNAL MEDICINE

## 2018-05-23 PROCEDURE — 85652 RBC SED RATE AUTOMATED: CPT | Performed by: INTERNAL MEDICINE

## 2018-05-23 PROCEDURE — 86606 ASPERGILLUS ANTIBODY: CPT | Mod: 91 | Performed by: INTERNAL MEDICINE

## 2018-05-23 PROCEDURE — 80048 BASIC METABOLIC PNL TOTAL CA: CPT | Performed by: INTERNAL MEDICINE

## 2018-05-23 PROCEDURE — 86038 ANTINUCLEAR ANTIBODIES: CPT | Performed by: INTERNAL MEDICINE

## 2018-05-23 PROCEDURE — 86140 C-REACTIVE PROTEIN: CPT | Performed by: INTERNAL MEDICINE

## 2018-05-23 PROCEDURE — 86331 IMMUNODIFFUSION OUCHTERLONY: CPT | Performed by: INTERNAL MEDICINE

## 2018-05-23 PROCEDURE — 82784 ASSAY IGA/IGD/IGG/IGM EACH: CPT | Performed by: INTERNAL MEDICINE

## 2018-05-23 PROCEDURE — 86255 FLUORESCENT ANTIBODY SCREEN: CPT | Performed by: INTERNAL MEDICINE

## 2018-05-23 PROCEDURE — 85025 COMPLETE CBC W/AUTO DIFF WBC: CPT | Performed by: INTERNAL MEDICINE

## 2018-05-23 PROCEDURE — 82085 ASSAY OF ALDOLASE: CPT | Performed by: INTERNAL MEDICINE

## 2018-05-23 PROCEDURE — 82550 ASSAY OF CK (CPK): CPT | Performed by: INTERNAL MEDICINE

## 2018-05-23 PROCEDURE — 36415 COLL VENOUS BLD VENIPUNCTURE: CPT | Performed by: INTERNAL MEDICINE

## 2018-05-23 PROCEDURE — 82787 IGG 1 2 3 OR 4 EACH: CPT | Performed by: INTERNAL MEDICINE

## 2018-05-23 RX ORDER — PREDNISONE 10 MG/1
TABLET ORAL
Qty: 30 TABLET | Refills: 1 | Status: SHIPPED | OUTPATIENT
Start: 2018-05-23 | End: 2018-08-17

## 2018-05-23 ASSESSMENT — PAIN SCALES - GENERAL: PAINLEVEL: NO PAIN (0)

## 2018-05-23 NOTE — MR AVS SNAPSHOT
After Visit Summary   5/23/2018    Randall Boland    MRN: [id number]           Patient Information     Date Of Birth          1950        Visit Information        Provider Department      5/23/2018 9:30 AM Tristan Cross MD [facility]        Today's Diagnoses     ILD (interstitial lung disease) (H)    -  1       Follow-ups after your visit        Follow-up notes from your care team     Return in about 1 month (around 6/23/2018).      Your next 10 appointments already scheduled     May 23, 2018 11:00 AM CDT   Lab with  LAB   [facility] ([facility])    [address]  1st Floor  [place] [address] [phone]            Jun 27, 2018  8:00 AM CDT   (Arrive by 7:45 AM)   Return Interstitial Lung with Tristan Cross MD   [facility] ([facility])    [address]  [address]  [place] [address] [phone]              Future tests that were ordered for you today     Open Future Orders        Priority Expected Expires Ordered    CBC with platelets differential Routine  6/22/2018 5/23/2018    Basic metabolic panel Routine  6/22/2018 5/23/2018    Hepatic panel Routine  6/22/2018 5/23/2018    Anti Nuclear Lori IgG by IFA with Reflex Routine  8/21/2018 5/23/2018    Erythrocyte sedimentation rate auto Routine  8/21/2018 5/23/2018    CRP inflammation Routine  8/21/2018 5/23/2018    CK total Routine  8/21/2018 5/23/2018    Patricia 1 Antibody IgG Routine  8/21/2018 5/23/2018    Scleroderma Antibody Scl70 CLAUDIO IgG Routine  8/21/2018 5/23/2018    Rheumatoid factor Routine  8/21/2018 5/23/2018    SSA Ro CLAUDIO Antibody IgG Routine  8/21/2018 5/23/2018    SSB La CLAUDIO Antibody IgG Routine  8/21/2018 5/23/2018    Aldolase Routine  8/21/2018 5/23/2018    Hypersensitivity pneumonitis Routine  8/21/2018 5/23/2018    Hypersens Pneumonitis - Farmer's Lung II Routine  8/21/2018  "5/23/2018    Antineutrophil cytoplasmic Lori IgG Routine  8/21/2018 5/23/2018    IgG Subclasses Routine  8/21/2018 5/23/2018    Cyclic Citrullinated Peptide Antibody IgG Routine  8/21/2018 5/23/2018            Who to contact     If you have questions or need follow up information about today's clinic visit or your schedule please contact [facility] directly at [phone].  Normal or non-critical lab and imaging results will be communicated to you by sonarDesignhart, letter or phone within 4 business days after the clinic has received the results. If you do not hear from us within 7 days, please contact the clinic through [employer] or phone. If you have a critical or abnormal lab result, we will notify you by phone as soon as possible.  Submit refill requests through [employer] or call your pharmacy and they will forward the refill request to us. Please allow 3 business days for your refill to be completed.          Additional Information About Your Visit        sonarDesignharCredorax Information     [employer] gives you secure access to your electronic health record. If you see a primary care provider, you can also send messages to your care team and make appointments. If you have questions, please call your primary care clinic.  If you do not have a primary care provider, please call [phone] and they will assist you.        Care EveryWhere ID     This is your Care EveryWhere ID. This could be used by other organizations to access your [place] medical records  [id number]        Your Vitals Were     Pulse Respirations Height Pulse Oximetry BMI (Body Mass Index)       90 18 1.567 m (5' 1.7\") 98% 30.47 kg/m2        Blood Pressure from Last 3 Encounters:   05/23/18 121/76   04/02/18 110/60   08/02/17 102/60    Weight from Last 3 Encounters:   05/23/18 74.8 kg (165 lb)   04/02/18 74.8 kg (165 lb)   08/02/17 74.4 kg (164 lb 1.6 oz)                 Today's Medication Changes          These changes are accurate " as of 5/23/18 10:21 AM.  If you have any questions, ask your nurse or doctor.               Start taking these medicines.        Dose/Directions    predniSONE 10 MG tablet   Commonly known as:  DELTASONE   Used for:  ILD (interstitial lung disease) (H)   Started by:  Tristan Cross MD        Take 4 tablets per day for 3 days, then 3 tablets per day for 3 days, then 2 tablets per day for 3 days, then 1 tablet per day for 3 days, then off   Quantity:  30 tablet   Refills:  1            Where to get your medicines      Some of these will need a paper prescription and others can be bought over the counter.  Ask your nurse if you have questions.     Bring a paper prescription for each of these medications     predniSONE 10 MG tablet                Primary Care Provider Office Phone # Fax #    Adrianna Singh [phone]       [address] [facility] [address]        Equal Access to Services     [facility]: Hadii isabell trano Sosachin, waaxda luqadaha, qaybta kaalmada adekatherineyaveronica, irina fuentes . So [facility] [phone].    ATENCIÓN: Si habla español, tiene a paez disposición servicios gratuitos de asistencia lingüística. Llame al [phone].    We comply with applicable federal civil rights laws and [place] laws. We do not discriminate on the basis of race, color, national origin, age, disability, sex, sexual orientation, or gender identity.            Thank you!     Thank you for choosing [facility]  for your care. Our goal is always to provide you with excellent care. Hearing back from our patients is one way we can continue to improve our services. Please take a few minutes to complete the written survey that you may receive in the mail after your visit with us. Thank you!             Your Updated Medication List - Protect others around you: Learn how to safely use, store and throw away your medicines at www.disposemymeds.org.           This list is accurate as of 5/23/18 10:21 AM.  Always use your most recent med list.                   Brand Name Dispense Instructions for use Diagnosis    aspirin 81 MG chewable tablet     90 tablet    Take 1 tablet (81 mg) by mouth daily    Type 2 diabetes mellitus without complication, without long-term current use of insulin (H)       fluticasone 50 MCG/ACT spray    FLONASE    1 Bottle    Spray 1 spray into both nostrils daily    Cough, Tickle in throat       losartan 25 MG tablet    COZAAR    45 tablet    Take 0.5 tablets (12.5 mg) by mouth daily    Type 2 diabetes mellitus without complication, without long-term current use of insulin (H)       metFORMIN 1000 MG tablet    GLUCOPHAGE    60 tablet    Take 1 tablet (1,000 mg) by mouth 2 times daily (with meals)    Type 2 diabetes mellitus without complication, without long-term current use of insulin (H)       NONFORMULARY      Flax seed powder - 2 teaspoon.day        nystatin 660751 UNIT/GM Powd    MYCOSTATIN    30 g    Apply topically 3 times daily as needed    Intertriginous candidiasis       pravastatin 40 MG tablet    PRAVACHOL    90 tablet    Take 1 tablet (40 mg) by mouth daily        predniSONE 10 MG tablet    DELTASONE    30 tablet    Take 4 tablets per day for 3 days, then 3 tablets per day for 3 days, then 2 tablets per day for 3 days, then 1 tablet per day for 3 days, then off    ILD (interstitial lung disease) (H)       ranitidine 300 MG tablet    ZANTAC    90 tablet    Take 1 tablet (300 mg) by mouth At Bedtime    Gastroesophageal reflux disease, esophagitis presence not specified       triamcinolone 0.1 % cream    KENALOG    30 g    Apply sparingly to affected area three times daily for 14 days.    Rash

## 2018-05-23 NOTE — NURSING NOTE
Chief Complaint   Patient presents with     Consult     New consult on [place] and her IPF     Abdias Lara CMA at 9:28 AM on 5/23/2018

## 2018-05-23 NOTE — PROGRESS NOTES
HISTORY OF PRESENT ILLNESS:  Ms. Boland is a 67-year-old female originally from [place] but later moved to [place] and has been in the [place] since .  About 2 months ago she developed a cough that is typically a little bit worse in the morning than in the evening hours.  The cough seems to have improved recently.  She denies shortness of breath.  She denies dyspnea on exertion.  She denies a past history of lung problems.  She is not on oxygen and she is a nonsmoker.  In regards to the cough 2 months ago, she was exposed to grandchildren that had colds and she may have developed a respiratory tract infection at that time.  She also has a history of reflux for which he is on Zantac and the reflux is under okay control.  Of note, there is no past history of asthma and no past history of lung problems.       PAST MEDICAL HISTORY:     1.  She states that there is a history of abdominal tuberculosis back in [place].  She said there was a difficult time making the diagnosis.  She underwent 6 months of treatment for this.  She denies a history of pulmonary tuberculosis.   2.  History of gallbladder surgery, status post cholecystectomy for stones.    3.  Type 2 diabetes.   4.  High cholesterol.      MEDICATIONS:  Medication list includes metformin, baby aspirin, Zantac, pravastatin, multivitamins and flaxseed.      FAMILY HISTORY:  Father  of stroke.  Mother  of old age and dementia.  There is no family history of lung problems and no family history of connective tissue disease that she is aware of.  There is no family history of pulmonary fibrosis.       SOCIAL HISTORY:  Again, the patient lived in [place] originally and then moved to [place].  While she was in [place] and [place], she was exposed to wood and charcoal smoke for cooking up into the mid .  In [place], the environment was particularly wet and there was mold in the home.  However, [place] in , she lives in a home.  There  is no mold that she is aware of now.  She has no pets, no birds.  She is a never smoker.  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       REVIEW OF SYSTEMS:   HEENT:  There is some maybe postnasal drip, but she denies a significant history of seasonal allergies.    CARDIAC:  She denies chest pain, syncope or dizziness.   PULMONARY:  Per the HPI.   GASTROINTESTINAL:  Positive for reflux.  She is on Zantac.  Apparently the reflux is under pretty good control, but she occasionally still has some heartburn symptoms.    GENITOURINARY:  Negative.    MUSCULOSKELETAL:  She denies significant history of arthritis.    DERMATOLOGY:  Negative.    The rest of the comprehensive review of systems is negative.       PHYSICAL EXAMINATION:   VITAL SIGNS:  Blood 121/76, pulse 90, respiratory rate 18, O2 sat 98% on room air, weight 165 pounds.     GENERAL:  She appeared well-developed, in no distress.    EYES:  Nonicteric.     ENT:  Mouth and throat without lesions.   NECK:  Supple without thyromegaly.    LYMPH:  No lymphadenopathy was appreciated.   CHEST:  She has crackles bilaterally about a third to intermediate up bilaterally, clear anterior.   HEART:  Regular rate and rhythm.  Normal S1, S2.  No murmur.   ABDOMEN:  She has a scar related to gallbladder surgery.  Abdomen is nontender.  No palpable hepatosplenomegaly is appreciated.   MUSCULOSKELETAL:  No clubbing, cyanosis or edema.    JOINTS:  There may be some changes of osteoarthritis in her fingertips.  Otherwise, no evidence of ongoing arthritis.    SKIN:  No rash.    NEUROLOGIC:  Nonfocal exam.       PULMONARY FUNCTION TESTS:  A 6-minute walk started at 98%.  The lowest she got was 93% during the walk.  She had a normal walk distance.  The walk was not done on oxygen.  Spirometry showed decreased FVC of 1.81 or 67% of predicted.  The FEV1 was 1.66 or 78% of predicted.  The spirometry would be  consistent with mild restriction; however, her total lung capacity was 85% of predicted which was normal and the diffusion capacity was also normal at 78% predicted.  Although the TLC was normal, the spirometry was suggestive of mild restriction with normal diffusion.      IMAGING:  The patient had a chest CT scan.  I reviewed the CT scan.  She has some mosaic attenuation seen and there appears to be some traction bronchiectasis with scarring along the airways suggestive to me of hypersensitivity pneumonitis, although she does have some peripheral-based scarring and the scarring tends to be worse in the bases than in the lungs.  To me, the CT scan, although it has some features of IPF, is not diagnostic of IPF, and with the mosaicism and some of the traction bronchiectasis along the bronchovascular bundles there is a concern that this could be possibly chronic hypersensitivity pneumonitis.        ASSESSMENT AND PLAN:  In summary then, Ms. Boland is a 67-year-old female with interstitial lung disease.     1.  On CT scan, there are some features of IPF, but also to me there are some features suggestive of NSIP or hypersensitivity pneumonitis which include the mosaic pattern as well as traction bronchiectasis and scarring along the airways.  In addition, her exposure history is suggestive of hypersensitivity pneumonitis.  I will send off an HP panel as well as a connective tissue disease panel on her.  I will review her CT scan in our ILD conference and with the radiologist.  I will see her back in a 1-month period of time to discuss the results of the tests as well as the CT scan with them.   2.  Cough.  The etiology of the cough is not clear.  It could be related to her underlying ILD, but the cough started 2 months ago, during which time she was exposed to children that had a cold. So this could be related to a recent respiratory tract infection.  I will give her a course of a prednisone taper to see whether or  not that helps with the cough.  Other possibilities for the cough included related to reflux or secondary to her ILD. A trial of a PPI might be indicated if the prednisone does not help.  Finally, the cough persists and is felt to be related to the ILD we could consider Tessalon Perles.        One other thing in relation to her interstitial lung disease, I do not have prior PFTs or CT scan and it is unclear whether or not the ILD will be progressive.  If the CT scan is felt not to be diagnostic, one option would be to follow her along to see whether it progresses.  Given that she currently is asymptomatic in terms of dyspnea, I think I would favor a more conservative approach as opposed to initiating treatment since I am not sure whether her ILD will be progressive.       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    Tristan Cross  Pulmonary/Critical Care  May 24, 2018 8:09 AM              Answers for HPI/ROS submitted by the patient on 5/21/2018   General Symptoms: No  Skin Symptoms: Yes  HENT Symptoms: No  EYE SYMPTOMS: No  HEART SYMPTOMS: No  LUNG SYMPTOMS: Yes  INTESTINAL SYMPTOMS: No  URINARY SYMPTOMS: No  GYNECOLOGIC SYMPTOMS: No  BREAST SYMPTOMS: No  SKELETAL SYMPTOMS: No  BLOOD SYMPTOMS: No  NERVOUS SYSTEM SYMPTOMS: No  MENTAL HEALTH SYMPTOMS: No  Changes in hair: Yes  Changes in moles/birth marks: No  Itching: Yes  Rashes: Yes  Changes in nails: No  Acne: No  Hair in places you don't want it: No  Change in facial hair: No  Warts: No  Non-healing sores: No  Scarring: Yes  Flaking of skin: Yes  Color changes of hands/feet in cold : No  Sun sensitivity: No  Skin thickening: Yes  Cough: Yes  Sputum or phlegm: No  Coughing up blood: No  Difficulty breating or shortness of breath: No  Snoring: No  Wheezing: No  Difficulty breathing on exertion: No  Nighttime Cough: No  Difficulty breathing when lying flat: No

## 2018-05-23 NOTE — LETTER
2018       RE: Randall Boland  [id number]  [facility] [address]     Dear Colleague,    Thank you for referring your patient, Randall Boland, to the [facility] at [facility]. Please see a copy of my visit note below.    HISTORY OF PRESENT ILLNESS:  Ms. Boland is a 67-year-old female originally from [place] but later moved to [facility] and has been in the [place] since .  About 2 months ago she developed a cough that is typically a little bit worse in the morning than in the evening hours.  The cough seems to have improved recently.  She denies shortness of breath.  She denies dyspnea on exertion.  She denies a past history of lung problems.  She is not on oxygen and she is a nonsmoker.  In regards to the cough 2 months ago, she was exposed to grandchildren that had colds and she may have developed a respiratory tract infection at that time.  She also has a history of reflux for which he is on Zantac and the reflux is under okay control.  Of note, there is no past history of asthma and no past history of lung problems.       PAST MEDICAL HISTORY:     1.  She states that there is a history of abdominal tuberculosis back in [facility].  She said there was a difficult time making the diagnosis.  She underwent 6 months of treatment for this.  She denies a history of pulmonary tuberculosis.   2.  History of gallbladder surgery, status post cholecystectomy for stones.    3.  Type 2 diabetes.   4.  High cholesterol.      MEDICATIONS:  Medication list includes metformin, baby aspirin, Zantac, pravastatin, multivitamins and flaxseed.      FAMILY HISTORY:  Father  of stroke.  Mother  of old age and dementia.  There is no family history of lung problems and no family history of connective tissue disease that she is aware of.  There is no family history of pulmonary fibrosis.       SOCIAL HISTORY:  Again, the patient lived in [place] [place] [facility] originally  and then moved to [place] [facility].  While she was in [place] and [place], she was exposed to wood and charcoal smoke for cooking up into the mid 1990s.  In [place], the environment was particularly wet and there was mold in the home.  However, since [place] the [place] in [place], she lives in a home.  There is no mold that she is aware of now.  She has no pets, no birds.  She is a never smoker.  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       REVIEW OF SYSTEMS:   HEENT:  There is some maybe postnasal drip, but she denies a significant history of seasonal allergies.    CARDIAC:  She denies chest pain, syncope or dizziness.   PULMONARY:  Per the HPI.   GASTROINTESTINAL:  Positive for reflux.  She is on Zantac.  Apparently the reflux is under pretty good control, but she occasionally still has some heartburn symptoms.    GENITOURINARY:  Negative.    MUSCULOSKELETAL:  She denies significant history of arthritis.    DERMATOLOGY:  Negative.    The rest of the comprehensive review of systems is negative.       PHYSICAL EXAMINATION:   VITAL SIGNS:  Blood 121/76, pulse 90, respiratory rate 18, O2 sat 98% on room air, weight 165 pounds.     GENERAL:  She appeared well-developed, in no distress.    EYES:  Nonicteric.     ENT:  Mouth and throat without lesions.   NECK:  Supple without thyromegaly.    LYMPH:  No lymphadenopathy was appreciated.   CHEST:  She has crackles bilaterally about a third to long-term up bilaterally, clear anterior.   HEART:  Regular rate and rhythm.  Normal S1, S2.  No murmur.   ABDOMEN:  She has a scar related to gallbladder surgery.  Abdomen is nontender.  No palpable hepatosplenomegaly is appreciated.   MUSCULOSKELETAL:  No clubbing, cyanosis or edema.    JOINTS:  There may be some changes of osteoarthritis in her fingertips.  Otherwise, no evidence of ongoing arthritis.    SKIN:  No rash.    NEUROLOGIC:  Nonfocal exam.        PULMONARY FUNCTION TESTS:  A 6-minute walk started at 98%.  The lowest she got was 93% during the walk.  She had a normal walk distance.  The walk was not done on oxygen.  Spirometry showed decreased FVC of 1.81 or 67% of predicted.  The FEV1 was 1.66 or 78% of predicted.  The spirometry would be consistent with mild restriction; however, her total lung capacity was 85% of predicted which was normal and the diffusion capacity was also normal at 78% predicted.  Although the TLC was normal, the spirometry was suggestive of mild restriction with normal diffusion.      IMAGING:  The patient had a chest CT scan.  I reviewed the CT scan.  She has some mosaic attenuation seen and there appears to be some traction bronchiectasis with scarring along the airways suggestive to me of hypersensitivity pneumonitis, although she does have some peripheral-based scarring and the scarring tends to be worse in the bases than in the lungs.  To me, the CT scan, although it has some features of IPF, is not diagnostic of IPF, and with the mosaicism and some of the traction bronchiectasis along the bronchovascular bundles there is a concern that this could be possibly chronic hypersensitivity pneumonitis.        ASSESSMENT AND PLAN:  In summary then, Ms. Boland is a 67-year-old female with interstitial lung disease.     1.  On CT scan, there are some features of IPF, but also to me there are some features suggestive of NSIP or hypersensitivity pneumonitis which include the mosaic pattern as well as traction bronchiectasis and scarring along the airways.  In addition, her exposure history is suggestive of hypersensitivity pneumonitis.  I will send off an HP panel as well as a connective tissue disease panel on her.  I will review her CT scan in our ILD conference and with the radiologist.  I will see her back in a 1-month period of time to discuss the results of the tests as well as the CT scan with them.   2.  Cough.  The etiology of  the cough is not clear.  It could be related to her underlying ILD, but the cough started 2 months ago, during which time she was exposed to children that had a cold. So this could be related to a recent respiratory tract infection.  I will give her a course of a prednisone taper to see whether or not that helps with the cough.  Other possibilities for the cough included related to reflux or secondary to her ILD. A trial of a PPI might be indicated if the prednisone does not help.  Finally, the cough persists and is felt to be related to the ILD we could consider Tessalon Perles.        One other thing in relation to her interstitial lung disease, I do not have prior PFTs or CT scan and it is unclear whether or not the ILD will be progressive.  If the CT scan is felt not to be diagnostic, one option would be to follow her along to see whether it progresses.  Given that she currently is asymptomatic in terms of dyspnea, I think I would favor a more conservative approach as opposed to initiating treatment since I am not sure whether her ILD will be progressive.       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    Tristan Cross  Pulmonary/Critical Care  May 24, 2018 8:09 AM

## 2018-05-24 ENCOUNTER — MYC REFILL (OUTPATIENT)
Dept: INTERNAL MEDICINE | Facility: CLINIC | Age: 68
End: 2018-05-24

## 2018-05-24 ENCOUNTER — MYC MEDICAL ADVICE (OUTPATIENT)
Dept: INTERNAL MEDICINE | Facility: CLINIC | Age: 68
End: 2018-05-24

## 2018-05-24 DIAGNOSIS — E11.9 TYPE 2 DIABETES MELLITUS WITHOUT COMPLICATION, WITHOUT LONG-TERM CURRENT USE OF INSULIN (H): ICD-10-CM

## 2018-05-24 LAB
ALDOLASE SERPL-CCNC: 3.7 U/L (ref 1.5–8.1)
ANA SER QL IF: NEGATIVE
RHEUMATOID FACT SER NEPH-ACNC: 700 IU/ML (ref 0–20)

## 2018-05-24 NOTE — TELEPHONE ENCOUNTER
"Requested Prescriptions   Pending Prescriptions Disp Refills     metFORMIN (GLUCOPHAGE) 1000 MG tablet 60 tablet 0     Sig: Take 1 tablet (1,000 mg) by mouth 2 times daily (with meals)    Biguanide Agents Passed    5/24/2018  8:29 AM       Passed - Blood pressure less than 140/90 in past 6 months    BP Readings from Last 3 Encounters:   05/23/18 121/76   04/02/18 110/60   08/02/17 102/60                Passed - Patient has documented LDL within the past 12 mos.    Recent Labs   Lab Test  03/31/18   0930   LDL  77            Passed - Patient has had a Microalbumin in the past 12 mos.    Recent Labs   Lab Test  03/31/18   0936   MICROL  25   UMALCR  65.62*            Passed - Patient is age 10 or older       Passed - Patient has documented A1c within the specified period of time.    If HgbA1C is 8 or greater, it needs to be on file within the past 3 months.  If less than 8, must be on file within the past 6 months.     Recent Labs   Lab Test  03/31/18   0930   A1C  6.7*            Passed - Patient's CR is NOT>1.4 OR Patient's EGFR is NOT<45 within past 12 mos.    Recent Labs   Lab Test  05/23/18   1048   GFRESTIMATED  >90   GFRESTBLACK  >90       Recent Labs   Lab Test  05/23/18   1048   CR  0.62            Passed - Patient does NOT have a diagnosis of CHF.       Passed - Patient is not pregnant       Passed - Patient has not had a positive pregnancy test within the past 12 mos.        Passed - Recent (6 mo) or future (30 days) visit within the authorizing provider's specialty    Patient had office visit in the last 6 months or has a visit in the next 30 days with authorizing provider or within the authorizing provider's specialty.  See \"Patient Info\" tab in inbasket, or \"Choose Columns\" in Meds & Orders section of the refill encounter.            Prescription approved per [facility] Refill Protocol.    "

## 2018-05-25 ENCOUNTER — CARE COORDINATION (OUTPATIENT)
Dept: PULMONOLOGY | Facility: CLINIC | Age: 68
End: 2018-05-25

## 2018-05-25 DIAGNOSIS — J84.9 ILD (INTERSTITIAL LUNG DISEASE) (H): Primary | ICD-10-CM

## 2018-05-25 LAB
ANCA IGG TITR SER IF: NORMAL {TITER}
IGG SERPL-MCNC: 1980 MG/DL (ref 695–1620)
IGG1 SER-MCNC: 1090 MG/DL (ref 300–856)
IGG2 SER-MCNC: 643 MG/DL (ref 158–761)
IGG3 SER-MCNC: 99 MG/DL (ref 24–192)
IGG4 SER-MCNC: 117 MG/DL (ref 11–86)

## 2018-05-25 NOTE — PROGRESS NOTES
Per Dr. Cross, patient's rheumatoid factor is extremely elevated (700).  Dr. Cross would like patient to be seen by rheumatology as soon as possible.  Rheumatology referral placed and message sent to rheumatology nurse, Henry.  Patient's daughter (per patient request) notified of the result and plan for rheumatology referral.  Patient's daughter verbalizes understanding and agrees with this plan.

## 2018-05-25 NOTE — TELEPHONE ENCOUNTER
[facility]  Original authorizing provider: Adrianna Singh MD    [facility] Krystian would like a refill of the following medications:  metFORMIN (GLUCOPHAGE) 1000 MG tablet [Adrianna Singh MD]    Preferred pharmacy: [address]    Comment:

## 2018-05-27 LAB
A FLAVUS AB SER QL ID: NORMAL
A FUMIGATUS2 AB SER QL: NORMAL
A FUMIGATUS3 AB SER QL ID: NORMAL
DLCOUNC-%PRED-PRE: 78 %
DLCOUNC-PRE: 16.19 ML/MIN/MMHG
DLCOUNC-PRED: 20.61 ML/MIN/MMHG
ERV-%PRED-PRE: 73 %
ERV-PRE: 0.39 L
ERV-PRED: 0.54 L
EXPTIME-PRE: 1.48 SEC
FEF2575-%PRED-PRE: 96 %
FEF2575-PRE: 1.8 L/SEC
FEF2575-PRED: 1.88 L/SEC
FEFMAX-%PRED-PRE: 108 %
FEFMAX-PRE: 6.28 L/SEC
FEFMAX-PRED: 5.8 L/SEC
FEV1-%PRED-PRE: 78 %
FEV1-PRE: 1.66 L
FEV1FEV6-PRE: 92 %
FEV1FEV6-PRED: 80 %
FEV1FVC-PRE: 92 %
FEV1FVC-PRED: 77 %
FEV1SVC-PRE: 93 %
FEV1SVC-PRED: 70 %
FIFMAX-PRE: 2.86 L/SEC
FRCPLETH-%PRED-PRE: 101 %
FRCPLETH-PRE: 2.7 L
FRCPLETH-PRED: 2.66 L
FVC-%PRED-PRE: 67 %
FVC-PRE: 1.81 L
FVC-PRED: 2.66 L
IC-%PRED-PRE: 55 %
IC-PRE: 1.39 L
IC-PRED: 2.49 L
LACEYELLA SACCHARI AB SER QL: NORMAL
RVPLETH-%PRED-PRE: 117 %
RVPLETH-PRE: 2.31 L
RVPLETH-PRED: 1.96 L
S VIRIDIS AB SER QL: NORMAL
T CANDIDUS AB SER QL: NORMAL
TLCPLETH-%PRED-PRE: 85 %
TLCPLETH-PRE: 4.09 L
TLCPLETH-PRED: 4.79 L
VA-%PRED-PRE: 61 %
VA-PRE: 3.02 L
VC-%PRED-PRE: 58 %
VC-PRE: 1.78 L
VC-PRED: 3.03 L

## 2018-05-29 ENCOUNTER — MYC MEDICAL ADVICE (OUTPATIENT)
Dept: INTERNAL MEDICINE | Facility: CLINIC | Age: 68
End: 2018-05-29

## 2018-05-29 LAB — CCP AB SER IA-ACNC: >340 U/ML

## 2018-05-30 ENCOUNTER — TELEPHONE (OUTPATIENT)
Dept: RHEUMATOLOGY | Facility: CLINIC | Age: 68
End: 2018-05-30

## 2018-05-30 NOTE — TELEPHONE ENCOUNTER
[facility] Call Center    Phone Message    May a detailed message be left on voicemail: yes    Reason for Call: Other: The pt's daughter returned Leesa's call. Call [phone] - she will be home all day.     Action Taken: Message routed to:  [facility] ([facility]]

## 2018-05-30 NOTE — TELEPHONE ENCOUNTER
Called both numbers in chart and left a message for either the patient or her daughters to call back and schedule an appointment this Friday. Currently awaiting a call back.   Leesa Gutierrez CMA  5/30/2018 9:41 AM

## 2018-05-30 NOTE — TELEPHONE ENCOUNTER
General Rheumatology Intake Form      What is the reason for referral? High RF      What is the name of the provider that referred you to us? Tristan Cross      Have you seen a Rheumatologist in the past?  no       If yes, is this a second opinion or transfer of care? no Are you wanting to establish care here yes, if appropriate       Have you been diagnosed with Fibromyalgia? no       Who manages your care for this issue now? None, this is a new issue       What is your most urgent concern at this time? High RF        Have you seen any specialist related to the reason you are coming here? no       Where are we expecting records (labs, imaging or pathology) from? In Epic    Offered appointment 6/1/2018 with Dr. Staley, patient accepted.       Leesa Gutierrez [facility]  5/30/2018 10:39 AM

## 2018-05-30 NOTE — TELEPHONE ENCOUNTER
----- Message from Julissa Denny RN sent at 5/25/2018  1:33 PM CDT -----  Regarding: RE: Rheumatology appt. needed asap.  Kiara,    In order to get this pt into be seen as soon as possible, Dr. Cross should call the on-call provider Dr. De Guzman.  We are booking out until September at this time.  Unless this can wait until the end of June, Dr. Staley is opening clinics at that time.    Thank you,  Julissa MELENDEZ  ----- Message -----     From: Kiara Barnes, RN     Sent: 5/25/2018  11:57 AM       To: Mehdi Esposito, Elaine Reynolds, RN, #  Subject: Rheumatology appt. needed asap.                  Benson Costa and Henry,    This is a new patient that Dr. Cross saw in the ILD clinic on Wednesday.  Her rheumatoid factor came back extremely high (700) which most likely is the cause of her ILD. Dr. Cross would like her seen by rheumatology as soon as possible.  You should contact her daughter to set up the appointment time.  The phone number listed in the contacts is actually the daughter's cell phone.    Let me know if you have further questions.  Thanks much!    Kiara Barnes R.N.  Interstitial Lung Disease Care Coordinator

## 2018-05-31 NOTE — PROGRESS NOTES
[facility]  Rheumatology Clinic  Harjit Staley MD  2018     Name: Randall Boland  MRN: [id number]  Age: 67 year old  : 1950  Referring provider: Tristan Cross     The patient's daughter acted as an interpretor.    Assessment and Plan:  Rheumatoid factor positive and cyclic citrullinated peptide (CCP) antibody-positivity in a woman with interstitial lung disease.  The patient describes minimal dyspnea on exertion, but there is no history of joint swelling, stiffness, or altered range of motion.  Examination reveals bibasilar crackles on lung exam, eczematous lesions over the dorsal aspects of both hands, the olecranon prostheses and the legs; and mild changes of osteoarthritis in the hands with painful range of motion in the left greater than right shoulders.   blood work shows normal electrolytes with creatinine of 0.62, normal liver function testing, AST/ALT of 18 and 20, normal aldolase and CK of 98; ANCA was negative, CBC WNL except for eosinophils were slightly elevated at 1000 cells/mL, LYNDA was negative, hypersensitivity panel for farmers lung was negative, antiJO1 was negative, extractible nuclear antigens was negative. Positive findings included slightly elevated glucose of 120, sed rate of 53, but a normal CRP, RF of 700 international units, and CCP antibodies >340 units.   MRI of brain was done in May 2017: impression was negative for age, no bleeding, mass, or infarcts. HRCT chest  showed diffuse bronchial wall thickening, honeycombing compatible with ILD, and likely UIP pattern.    There is a striking contrast between the chest CT findings of moderately advanced interstitial lung disease and the near- absence of classic or typical findings of rheumatoid arthritis in peripheral joints, despite high-titer serologies. Nevertheless, I think the patient likely does have systemic inflammatory disease associated with positive rheumatoid factor and cyclic citrullinated peptide antibodies,  "and given time she would likely manifest joint disease. The radiologist-interpreted pattern of interstitial lung disease is one of usual interstitial pneumonitis; this is the pattern most often associated with rheumatoid arthritis, and is the pattern that most closely approximates that found in idiopathic interstitial pneumonia. Approximately 10-15% of patients with rheumatoid arthritis-associated lung disease exhibit lung findings prior to the development of joint disease.  According to American College of rheumatology \"treat to target\" guidelines for rheumatoid arthritis, the patient is not a candidate for potent immunomodulatory therapy on the basis of her joint status. There is some evidence that abortive therapy with hydroxychloroquine can provide primary prevention of joint disease in patients with positive serologies but who are asymptomatic in the musculoskeletal domain.    With respect to rheumatoid associated lung disease, I agree with moderate dose prednisone (up to 0.5 mg/kg) for 2-3 months.  I recommend that if the patient shows deterioration in symptoms or in diffusing capacity after a several month course of 0.5 mg/kg per day prednisone, secondary therapy would be tried. There is case-series type evidence for mycophenolate, azathioprine and cyclophosphamide for management of steroid refractory RA-associated lung disease.  We discussed the following plan:      A) Request results of previous skin biopsy to evaluate possible psoriasis vs eczema   B) plain xray of hands  C) Start plaquenil 400 mg daily  D) Consider steroid-sparing agent at 3 months for progression of lung symptoms or deteriorating PFTs, under Dr. Cross's supervision. I recommend MMF 1000 mg bid as a first steroid sparing agent.    Orders:    - hydroxychloroquine (PLAQUENIL) 200 MG tablet  Dispense: 60 tablet; Refill: 3  - XR Hand Bilateral 2 Views     Follow-up: Return in about 3 months (around 9/1/2018).     HPI:   Randall Boland is a 67 " year old female with a history of interstitial lung disease who presents for evaluation of a high rheumatoid factor. She is originally from [facility] but moved to [place] and has been in the [place] since [place]. Around 2 months ago, she developed a cough that is typically worse in the mornings. She attributed it to being exposed to grandchildren that had colds and thinks she developed a respiratory tract infection at that time. The cough seemed to have improved recently. She denied shortness of breath, dyspnea on exertion, and having a history of lung problems. She was last seen by her pulmonologist Dr. Cross on 5/23/18 at which time she had been taking Zantac to control her reflux and it has been mildly effective. Dr. Cross diagnosed her with interstitial lung disease based on pulmonary function tests and a CT scan. Ideopathic pulmonary thrombosis and hypersensitivity pneumonitis are on the differential diagnosis. Due to a high RF test result, today's consultation is to evaluate rheumatoid arthritis associated ILD.    Today, the patient states that her primary concerns are of the scarring in her lungs and the positive blood test. Her lung issues first arose as an intermittent dry cough. She denies noticing any triggers and believes that it happens randomly. She reports coughing at night, relieving it by drinking water, but then having another bout in the morning. She sometimes coughs up a small amount of white mucus. She believes that her coughing started around 2 months ago by catching a cold from her grandchildren. She notes that it has been better lately. She mentions that today is the fifth day of using the medication Dr. Cross prescribed and notes that there has been no increase in her coughing, but no significant change otherwise. She is tolerating the prednisone well and notes that it has not increased her blood sugar.     Besides her cough, she feels that she was healthy other than some minor aches. She has  been on Metformin and Losartan to treat her diabetes for around two years and notes HA1c has improved from over 9 to 6-7 in recent months. She inquires about if she can stop taking her losartan and it was recommended that she ask the doctor who prescribed it to her. She takes Nystatin for high blood pressure and it was recommended that she continue taking that. She mentions getting imaging in [place] that showed nerve compression in her lower back that could be causing the pain in her left leg. She describes the pain as a burning sensation in the lower back that runs down the back of her left thigh and leg all the way down to the foot. She mentions a pulling sensation in her lower left leg that she massages to alleviate. She has developed pain in her left arm in the past month and notes massaging and exercising to mitigate it. She has gone to a chiropractor to align her back, neck, and shoulders. She has difficulty putting on clothes because of her left shoulder pain. She notes that it is painful to bring her left arm down from a raised position without support. She denies any swelling in her shoulder or pain and swelling in her fingers or toes. She denies any issues in her hands and feet. She notes some morning stiffness in her left knee.     She reports having a stabbing pain in the back of her head last week that lasted for 3 days, and at the end of the headache, she notes that her left eye became bloodshot but resolved after two days. She mentions having an itchy spot on her hand that spread around the back of both hands and fingers that started around 4-5 months ago. It is itchy but not painful. Another spot on her right foot has recently appeared. Her dermatologist prescribed a strong steroidal cream that had no effect. She notes that the itchiness has decreased recently but otherwise has not improved or worsened. She mentions that the pain in her left foot had previously deterred her from walking around often  but it has gotten better lately, and that stretching at home helps relieve leg pain.     Review of Systems:   Pertinent items are noted in HPI, remainder of complete ROS is negative.    No recent problems with hearing or vision. No swallowing problems.   No breathing difficulty, shortness of breath, or wheezing  No palpitations  No heart burn, indigestion, nausea, vomiting  No urination problems, no bloody, cloudy urine, no dysuria  No numbing, tingling, weakness  No headaches or confusion  No easy bleeding or bruising.   +chest ache secondary to severe coughing fits  +rare diarrhea with abdominal cramping (occured around a month ago)  +finger rash    Active Medications:     Current Outpatient Prescriptions:      aspirin 81 MG chewable tablet, Take 1 tablet (81 mg) by mouth daily, Disp: 90 tablet, Rfl: 3     hydroxychloroquine (PLAQUENIL) 200 MG tablet, Take 1 tablet (200 mg) by mouth 2 times daily, Disp: 60 tablet, Rfl: 3     losartan (COZAAR) 25 MG tablet, Take 0.5 tablets (12.5 mg) by mouth daily, Disp: 45 tablet, Rfl: 3     metFORMIN (GLUCOPHAGE) 1000 MG tablet, Take 1 tablet (1,000 mg) by mouth 2 times daily (with meals), Disp: 60 tablet, Rfl: 2     NONFORMULARY, Flax seed powder - 2 teaspoon.day, Disp: , Rfl:      nystatin (MYCOSTATIN) 399237 UNIT/GM POWD, Apply topically 3 times daily as needed, Disp: 30 g, Rfl: 1     pravastatin (PRAVACHOL) 40 MG tablet, Take 1 tablet (40 mg) by mouth daily, Disp: 90 tablet, Rfl: 3     predniSONE (DELTASONE) 10 MG tablet, Take 4 tablets per day for 3 days, then 3 tablets per day for 3 days, then 2 tablets per day for 3 days, then 1 tablet per day for 3 days, then off, Disp: 30 tablet, Rfl: 1     ranitidine (ZANTAC) 300 MG tablet, Take 1 tablet (300 mg) by mouth At Bedtime, Disp: 90 tablet, Rfl: 3     triamcinolone (KENALOG) 0.1 % cream, Apply sparingly to affected area three times daily for 14 days., Disp: 30 g, Rfl: 3     fluticasone (FLONASE) 50 MCG/ACT spray, Spray 1  "spray into both nostrils daily (Patient not taking: Reported on 5/23/2018), Disp: 1 Bottle, Rfl: 11      Allergies:   Atorvastatin      Past Medical History:  Obesity  Osteopenia  Type 2 diabetes mellitus without complication, without long-term current use of insulin  Interstitial lung disease  Abdominal tuberculosis (intestinal, 20 years ago)     Past Surgical History:  Open cholecystectomy    Family History:   Mother - dementia  Father - cerebrovascular disease  Brother - cerebrovascular disease, hypertension  No family history of - diabetes, myocardial infarction, early onset C.A.D., breast cancer, ovarian cancer, colon cancer, rheumatoid arthritis  Family history of - high blood pressure  (mentions that medical history is unlikely to be comprehensive because of poor/infrequent medical records)     Social History:   No smoking or tobacco use  Rare alcohol use (1-2 times a year)   with 8 children, 13 grandchildren, cooks for the family     Physical Exam:   /80  Pulse 91  Temp 97.7  F (36.5  C) (Oral)  Ht 1.575 m (5' 2\")  Wt 75.6 kg (166 lb 11.2 oz)  SpO2 98%  BMI 30.49 kg/m2   Wt Readings from Last 4 Encounters:   06/01/18 75.6 kg (166 lb 11.2 oz)   05/23/18 74.8 kg (165 lb)   04/02/18 74.8 kg (165 lb)   08/02/17 74.4 kg (164 lb 1.6 oz)   Constitutional: Well-developed, appearing stated age; cooperative  Eyes: Normal EOM, PERRLA, vision, conjunctiva, sclera  ENT: Upper and lower partial dentures are present. Normal external ears, nose, hearing, lips, gums, throat. No mucous membrane lesions, normal saliva pool  Neck: No mass or thyroid enlargement  Resp: Fine crackles in both bases. nl to palpation  CV: RRR, no murmurs, rubs or gallops, no edema  GI: Well healed right upper quadrant scar. No ABD mass or tenderness, no HSM  Lymph: No cervical, supraclavicular, inguinal or epitrochlear nodes  MS: Clubbing at 2nd and 3rd digital fingernails. Painful external rotation of the left shoulder. left " shoulder Passive abduction is painful beyond 60 degrees. Modest pain with resisted abduction of left shoulder. 15cm stretch of lumbar spine is indicated as painful. The TMJ, neck, elbow, wrist, MCP/PIP/DIP, hip, knee, ankle, and foot MTP/IP joints were examined and found normal. No active synovitis. Normal  strength. No dactylitis,  tenosynovitis, enthesopathy.  Skin: Violaceous pink shiny rash over the MCPs and proximal phalanges most prominently in 2nd and 3rd MCPs on the right and 2nd MCP on left. White adherent scale and hyperkeratosis. Lesions do not appear psoriasiform. Both elbows have some hyperkeratosis over olecranon processes with painful range of motion of the with linear excoriations that suggest previous scratching and trauma. Excoriated pink patch with overlying scale over right lateral malleolus. Additional excoriations on right lateral knee   No nail pitting, alopecia, and nodules.  Neuro: Normal cranial nerves, strength, sensation, DTRs.   Psych: Normal judgement, orientation, memory, affect.    Laboratory:   Component      Latest Ref Rng & Units 5/23/2018   WBC      4.0 - 11.0 10e9/L 8.8   RBC Count      3.8 - 5.2 10e12/L 4.01   Hemoglobin      11.7 - 15.7 g/dL 11.9   Hematocrit      35.0 - 47.0 % 36.3   MCV      78 - 100 fl 91   MCH      26.5 - 33.0 pg 29.7   MCHC      31.5 - 36.5 g/dL 32.8   RDW      10.0 - 15.0 % 12.7   Platelet Count      150 - 450 10e9/L 281   Diff Method       Automated Method   % Neutrophils      % 59.9   % Lymphocytes      % 22.7   % Monocytes      % 5.9   % Eosinophils      % 10.7   % Basophils      % 0.5   % Immature Granulocytes      % 0.3   Nucleated RBCs      0 /100 0   Absolute Neutrophil      1.6 - 8.3 10e9/L 5.3   Absolute Lymphocytes      0.8 - 5.3 10e9/L 2.0   Absolute Monocytes      0.0 - 1.3 10e9/L 0.5   Absolute Eosinophils      0.0 - 0.7 10e9/L 1.0 (H)   Absolute Basophils      0.0 - 0.2 10e9/L 0.0   Abs Immature Granulocytes      0 - 0.4 10e9/L 0.0    Absolute Nucleated RBC       0.0   Sodium      133 - 144 mmol/L 136   Potassium      3.4 - 5.3 mmol/L 3.9   Chloride      94 - 109 mmol/L 104   Carbon Dioxide      20 - 32 mmol/L 26   Anion Gap      3 - 14 mmol/L 6   Glucose      70 - 99 mg/dL 120 (H)   Urea Nitrogen      7 - 30 mg/dL 6 (L)   Creatinine      0.52 - 1.04 mg/dL 0.62   GFR Estimate      >60 mL/min/1.7m2 >90   GFR Estimate If Black      >60 mL/min/1.7m2 >90   Calcium      8.5 - 10.1 mg/dL 9.4   Bilirubin Direct      0.0 - 0.2 mg/dL 0.1   Bilirubin Total      0.2 - 1.3 mg/dL 0.4   Albumin      3.4 - 5.0 g/dL 3.8   Protein Total      6.8 - 8.8 g/dL 8.7   Alkaline Phosphatase      40 - 150 U/L 76   ALT      0 - 50 U/L 20   AST      0 - 45 U/L 18   Aspergillus flavus Ab      None Detected not reported None Detected   A fumigatus #2 Ab      None Detected not reported None Detected   A fumigatus #3 Ab      None Detected not reported None Detected   Saccharo viridis Ab      None Detected not reported None Detected   Thermo candidus Ab      None Detected not reported None Detected   Thermo sacchari Ab      None Detected not reported None Detected   IGG      695 - 1620 mg/dL 1980 (H)   IgG1      300 - 856 mg/dL 1090 (H)   IgG2      158 - 761 mg/dL 643   IgG3      24 - 192 mg/dL 99   IgG4      11 - 86 mg/dL 117 (H)   LYNDA interpretation      NEG:Negative Negative   Sed Rate      0 - 30 mm/h 54 (H)   CRP Inflammation      0.0 - 8.0 mg/L <2.9   CK Total      30 - 225 U/L 98   Patricia 1 Antibody IgG      0.0 - 0.9 AI <0.2   Scleroderma Antibody Scl-70 CLAUDIO IgG      0.0 - 0.9 AI <0.2   Rheumatoid Factor      <20 IU/mL 700 (H)   SSA (Ro) (CLAUDIO) Antibody, IgG      0.0 - 0.9 AI <0.2   SSB (La) (CLAUDIO) Antibody, IgG      0.0 - 0.9 AI <0.2   Aldolase      1.5 - 8.1 U/L 3.7   Neutrophil Cytoplasmic IgG Antibody      <1:20 <1:20   Cyclic Citrullinated Peptide Antibody, IgG      <7 U/mL >340 (H)       Scribe Disclosure:   Jose Miguel PEREZ, am serving as a scribe to document services  personally performed by Harjit Staley MD at this visit, based upon the provider's statements to me. All documentation has been reviewed by the aforementioned provider prior to being entered into the official medical record.     Portions of this medical record were completed by a scribe. UPON MY REVIEW AND AUTHENTICATION BY ELECTRONIC SIGNATURE, this confirms (a) I performed the applicable clinical services, and (b) the record is accurate.

## 2018-06-01 ENCOUNTER — RADIANT APPOINTMENT (OUTPATIENT)
Dept: GENERAL RADIOLOGY | Facility: CLINIC | Age: 68
End: 2018-06-01

## 2018-06-01 ENCOUNTER — OFFICE VISIT (OUTPATIENT)
Dept: RHEUMATOLOGY | Facility: CLINIC | Age: 68
End: 2018-06-01
Attending: INTERNAL MEDICINE
Payer: COMMERCIAL

## 2018-06-01 VITALS
OXYGEN SATURATION: 98 % | BODY MASS INDEX: 30.67 KG/M2 | HEART RATE: 91 BPM | DIASTOLIC BLOOD PRESSURE: 80 MMHG | SYSTOLIC BLOOD PRESSURE: 138 MMHG | TEMPERATURE: 97.7 F | WEIGHT: 166.7 LBS | HEIGHT: 62 IN

## 2018-06-01 DIAGNOSIS — R76.8 RHEUMATOID FACTOR POSITIVE WITH CYCLIC CITRULLINATED PEPTIDE (CCP) ANTIBODY NEGATIVE: Primary | ICD-10-CM

## 2018-06-01 DIAGNOSIS — R76.8 RHEUMATOID FACTOR POSITIVE WITH CYCLIC CITRULLINATED PEPTIDE (CCP) ANTIBODY NEGATIVE: ICD-10-CM

## 2018-06-01 PROCEDURE — G0463 HOSPITAL OUTPT CLINIC VISIT: HCPCS | Mod: ZF

## 2018-06-01 RX ORDER — HYDROXYCHLOROQUINE SULFATE 200 MG/1
200 TABLET, FILM COATED ORAL 2 TIMES DAILY
Qty: 60 TABLET | Refills: 3 | Status: SHIPPED | OUTPATIENT
Start: 2018-06-01 | End: 2018-08-17

## 2018-06-01 ASSESSMENT — PAIN SCALES - GENERAL: PAINLEVEL: NO PAIN (0)

## 2018-06-01 NOTE — LETTER
2018      RE: Randall Boland  [id number]  [facility] [id number]       [facility]  Rheumatology Clinic  Harjit Staley MD  2018     Name: Randall Boland  MRN: [id number]  Age: 67 year old  : 1950  Referring provider: Tristan Cross     The patient's daughter acted as an interpretor.    Assessment and Plan:  Rheumatoid factor positive and cyclic citrullinated peptide (CCP) antibody-positivity in a woman with interstitial lung disease.  The patient describes minimal dyspnea on exertion, but there is no history of joint swelling, stiffness, or altered range of motion.  Examination reveals bibasilar crackles on lung exam, eczematous lesions over the dorsal aspects of both hands, the olecranon prostheses and the legs; and mild changes of osteoarthritis in the hands with painful range of motion in the left greater than right shoulders.   blood work shows normal electrolytes with creatinine of 0.62, normal liver function testing, AST/ALT of 18 and 20, normal aldolase and CK of 98; ANCA was negative, CBC WNL except for eosinophils were slightly elevated at 1000 cells/mL, YLNDA was negative, hypersensitivity panel for farmers lung was negative, antiJO1 was negative, extractible nuclear antigens was negative. Positive findings included slightly elevated glucose of 120, sed rate of 53, but a normal CRP, RF of 700 international units, and CCP antibodies >340 units.   MRI of brain was done in May 2017: impression was negative for age, no bleeding, mass, or infarcts. HRCT chest  showed diffuse bronchial wall thickening, honeycombing compatible with ILD, and likely UIP pattern.    There is a striking contrast between the chest CT findings of moderately advanced interstitial lung disease and the near- absence of classic or typical findings of rheumatoid arthritis in peripheral joints, despite high-titer serologies. Nevertheless, I think the patient likely does have systemic inflammatory disease  "associated with positive rheumatoid factor and cyclic citrullinated peptide antibodies, and given time she would likely manifest joint disease. The radiologist-interpreted pattern of interstitial lung disease is one of usual interstitial pneumonitis; this is the pattern most often associated with rheumatoid arthritis, and is the pattern that most closely approximates that found in idiopathic interstitial pneumonia. Approximately 10-15% of patients with rheumatoid arthritis-associated lung disease exhibit lung findings prior to the development of joint disease.  According to American College of rheumatology \"treat to target\" guidelines for rheumatoid arthritis, the patient is not a candidate for potent immunomodulatory therapy on the basis of her joint status. There is some evidence that abortive therapy with hydroxychloroquine can provide primary prevention of joint disease in patients with positive serologies but who are asymptomatic in the musculoskeletal domain.    With respect to rheumatoid associated lung disease, I agree with moderate dose prednisone (up to 0.5 mg/kg) for 2-3 months.  I recommend that if the patient shows deterioration in symptoms or in diffusing capacity after a several month course of 0.5 mg/kg per day prednisone, secondary therapy would be tried. There is case-series type evidence for mycophenolate, azathioprine and cyclophosphamide for management of steroid refractory RA-associated lung disease.  We discussed the following plan:      A) Request results of previous skin biopsy to evaluate possible psoriasis vs eczema   B) plain xray of hands  C) Start plaquenil 400 mg daily  D) Consider steroid-sparing agent at 3 months for progression of lung symptoms or deteriorating PFTs, under Dr. Cross's supervision. I recommend MMF 1000 mg bid as a first steroid sparing agent.    Orders:    - hydroxychloroquine (PLAQUENIL) 200 MG tablet  Dispense: 60 tablet; Refill: 3  - XR Hand Bilateral 2 Views "     Follow-up: Return in about 3 months (around 9/1/2018).     HPI:   Randall Boland is a 67 year old female with a history of interstitial lung disease who presents for evaluation of a high rheumatoid factor. She is originally from [facility] but moved to [place] and has been in the [place] since 2012. Around 2 months ago, she developed a cough that is typically worse in the mornings. She attributed it to being exposed to grandchildren that had colds and thinks she developed a respiratory tract infection at that time. The cough seemed to have improved recently. She denied shortness of breath, dyspnea on exertion, and having a history of lung problems. She was last seen by her pulmonologist Dr. Cross on 5/23/18 at which time she had been taking Zantac to control her reflux and it has been mildly effective. Dr. Cross diagnosed her with interstitial lung disease based on pulmonary function tests and a CT scan. Ideopathic pulmonary thrombosis and hypersensitivity pneumonitis are on the differential diagnosis. Due to a high RF test result, today's consultation is to evaluate rheumatoid arthritis associated ILD.    Today, the patient states that her primary concerns are of the scarring in her lungs and the positive blood test. Her lung issues first arose as an intermittent dry cough. She denies noticing any triggers and believes that it happens randomly. She reports coughing at night, relieving it by drinking water, but then having another bout in the morning. She sometimes coughs up a small amount of white mucus. She believes that her coughing started around 2 months ago by catching a cold from her grandchildren. She notes that it has been better lately. She mentions that today is the fifth day of using the medication Dr. Cross prescribed and notes that there has been no increase in her coughing, but no significant change otherwise. She is tolerating the prednisone well and notes that it has not increased her blood sugar.      Besides her cough, she feels that she was healthy other than some minor aches. She has been on Metformin and Losartan to treat her diabetes for around two years and notes HA1c has improved from over 9 to 6-7 in recent months. She inquires about if she can stop taking her losartan and it was recommended that she ask the doctor who prescribed it to her. She takes Nystatin for high blood pressure and it was recommended that she continue taking that. She mentions getting imaging in [place] that showed nerve compression in her lower back that could be causing the pain in her left leg. She describes the pain as a burning sensation in the lower back that runs down the back of her left thigh and leg all the way down to the foot. She mentions a pulling sensation in her lower left leg that she massages to alleviate. She has developed pain in her left arm in the past month and notes massaging and exercising to mitigate it. She has gone to a chiropractor to align her back, neck, and shoulders. She has difficulty putting on clothes because of her left shoulder pain. She notes that it is painful to bring her left arm down from a raised position without support. She denies any swelling in her shoulder or pain and swelling in her fingers or toes. She denies any issues in her hands and feet. She notes some morning stiffness in her left knee.     She reports having a stabbing pain in the back of her head last week that lasted for 3 days, and at the end of the headache, she notes that her left eye became bloodshot but resolved after two days. She mentions having an itchy spot on her hand that spread around the back of both hands and fingers that started around 4-5 months ago. It is itchy but not painful. Another spot on her right foot has recently appeared. Her dermatologist prescribed a strong steroidal cream that had no effect. She notes that the itchiness has decreased recently but otherwise has not improved or worsened. She  mentions that the pain in her left foot had previously deterred her from walking around often but it has gotten better lately, and that stretching at home helps relieve leg pain.     Review of Systems:   Pertinent items are noted in HPI, remainder of complete ROS is negative.    No recent problems with hearing or vision. No swallowing problems.   No breathing difficulty, shortness of breath, or wheezing  No palpitations  No heart burn, indigestion, nausea, vomiting  No urination problems, no bloody, cloudy urine, no dysuria  No numbing, tingling, weakness  No headaches or confusion  No easy bleeding or bruising.   +chest ache secondary to severe coughing fits  +rare diarrhea with abdominal cramping (occured around a month ago)  +finger rash    Active Medications:     Current Outpatient Prescriptions:      aspirin 81 MG chewable tablet, Take 1 tablet (81 mg) by mouth daily, Disp: 90 tablet, Rfl: 3     hydroxychloroquine (PLAQUENIL) 200 MG tablet, Take 1 tablet (200 mg) by mouth 2 times daily, Disp: 60 tablet, Rfl: 3     losartan (COZAAR) 25 MG tablet, Take 0.5 tablets (12.5 mg) by mouth daily, Disp: 45 tablet, Rfl: 3     metFORMIN (GLUCOPHAGE) 1000 MG tablet, Take 1 tablet (1,000 mg) by mouth 2 times daily (with meals), Disp: 60 tablet, Rfl: 2     NONFORMULARY, Flax seed powder - 2 teaspoon.day, Disp: , Rfl:      nystatin (MYCOSTATIN) 459029 UNIT/GM POWD, Apply topically 3 times daily as needed, Disp: 30 g, Rfl: 1     pravastatin (PRAVACHOL) 40 MG tablet, Take 1 tablet (40 mg) by mouth daily, Disp: 90 tablet, Rfl: 3     predniSONE (DELTASONE) 10 MG tablet, Take 4 tablets per day for 3 days, then 3 tablets per day for 3 days, then 2 tablets per day for 3 days, then 1 tablet per day for 3 days, then off, Disp: 30 tablet, Rfl: 1     ranitidine (ZANTAC) 300 MG tablet, Take 1 tablet (300 mg) by mouth At Bedtime, Disp: 90 tablet, Rfl: 3     triamcinolone (KENALOG) 0.1 % cream, Apply sparingly to affected area three times  "daily for 14 days., Disp: 30 g, Rfl: 3     fluticasone (FLONASE) 50 MCG/ACT spray, Spray 1 spray into both nostrils daily (Patient not taking: Reported on 5/23/2018), Disp: 1 Bottle, Rfl: 11      Allergies:   Atorvastatin      Past Medical History:  Obesity  Osteopenia  Type 2 diabetes mellitus without complication, without long-term current use of insulin  Interstitial lung disease  Abdominal tuberculosis (intestinal, 20 years ago)     Past Surgical History:  Open cholecystectomy    Family History:   Mother - dementia  Father - cerebrovascular disease  Brother - cerebrovascular disease, hypertension  No family history of - diabetes, myocardial infarction, early onset C.A.D., breast cancer, ovarian cancer, colon cancer, rheumatoid arthritis  Family history of - high blood pressure  (mentions that medical history is unlikely to be comprehensive because of poor/infrequent medical records)     Social History:   No smoking or tobacco use  Rare alcohol use (1-2 times a year)   with 8 children, 13 grandchildren, cooks for the family     Physical Exam:   /80  Pulse 91  Temp 97.7  F (36.5  C) (Oral)  Ht 1.575 m (5' 2\")  Wt 75.6 kg (166 lb 11.2 oz)  SpO2 98%  BMI 30.49 kg/m2   Wt Readings from Last 4 Encounters:   06/01/18 75.6 kg (166 lb 11.2 oz)   05/23/18 74.8 kg (165 lb)   04/02/18 74.8 kg (165 lb)   08/02/17 74.4 kg (164 lb 1.6 oz)   Constitutional: Well-developed, appearing stated age; cooperative  Eyes: Normal EOM, PERRLA, vision, conjunctiva, sclera  ENT: Upper and lower partial dentures are present. Normal external ears, nose, hearing, lips, gums, throat. No mucous membrane lesions, normal saliva pool  Neck: No mass or thyroid enlargement  Resp: Fine crackles in both bases. nl to palpation  CV: RRR, no murmurs, rubs or gallops, no edema  GI: Well healed right upper quadrant scar. No ABD mass or tenderness, no HSM  Lymph: No cervical, supraclavicular, inguinal or epitrochlear nodes  MS: Clubbing at " 2nd and 3rd digital fingernails. Painful external rotation of the left shoulder. left shoulder Passive abduction is painful beyond 60 degrees. Modest pain with resisted abduction of left shoulder. 15cm stretch of lumbar spine is indicated as painful. The TMJ, neck, elbow, wrist, MCP/PIP/DIP, hip, knee, ankle, and foot MTP/IP joints were examined and found normal. No active synovitis. Normal  strength. No dactylitis,  tenosynovitis, enthesopathy.  Skin: Violaceous pink shiny rash over the MCPs and proximal phalanges most prominently in 2nd and 3rd MCPs on the right and 2nd MCP on left. White adherent scale and hyperkeratosis. Lesions do not appear psoriasiform. Both elbows have some hyperkeratosis over olecranon processes with painful range of motion of the with linear excoriations that suggest previous scratching and trauma. Excoriated pink patch with overlying scale over right lateral malleolus. Additional excoriations on right lateral knee   No nail pitting, alopecia, and nodules.  Neuro: Normal cranial nerves, strength, sensation, DTRs.   Psych: Normal judgement, orientation, memory, affect.    Laboratory:   Component      Latest Ref Rng & Units 5/23/2018   WBC      4.0 - 11.0 10e9/L 8.8   RBC Count      3.8 - 5.2 10e12/L 4.01   Hemoglobin      11.7 - 15.7 g/dL 11.9   Hematocrit      35.0 - 47.0 % 36.3   MCV      78 - 100 fl 91   MCH      26.5 - 33.0 pg 29.7   MCHC      31.5 - 36.5 g/dL 32.8   RDW      10.0 - 15.0 % 12.7   Platelet Count      150 - 450 10e9/L 281   Diff Method       Automated Method   % Neutrophils      % 59.9   % Lymphocytes      % 22.7   % Monocytes      % 5.9   % Eosinophils      % 10.7   % Basophils      % 0.5   % Immature Granulocytes      % 0.3   Nucleated RBCs      0 /100 0   Absolute Neutrophil      1.6 - 8.3 10e9/L 5.3   Absolute Lymphocytes      0.8 - 5.3 10e9/L 2.0   Absolute Monocytes      0.0 - 1.3 10e9/L 0.5   Absolute Eosinophils      0.0 - 0.7 10e9/L 1.0 (H)   Absolute  Basophils      0.0 - 0.2 10e9/L 0.0   Abs Immature Granulocytes      0 - 0.4 10e9/L 0.0   Absolute Nucleated RBC       0.0   Sodium      133 - 144 mmol/L 136   Potassium      3.4 - 5.3 mmol/L 3.9   Chloride      94 - 109 mmol/L 104   Carbon Dioxide      20 - 32 mmol/L 26   Anion Gap      3 - 14 mmol/L 6   Glucose      70 - 99 mg/dL 120 (H)   Urea Nitrogen      7 - 30 mg/dL 6 (L)   Creatinine      0.52 - 1.04 mg/dL 0.62   GFR Estimate      >60 mL/min/1.7m2 >90   GFR Estimate If Black      >60 mL/min/1.7m2 >90   Calcium      8.5 - 10.1 mg/dL 9.4   Bilirubin Direct      0.0 - 0.2 mg/dL 0.1   Bilirubin Total      0.2 - 1.3 mg/dL 0.4   Albumin      3.4 - 5.0 g/dL 3.8   Protein Total      6.8 - 8.8 g/dL 8.7   Alkaline Phosphatase      40 - 150 U/L 76   ALT      0 - 50 U/L 20   AST      0 - 45 U/L 18   Aspergillus flavus Ab      None Detected not reported None Detected   A fumigatus #2 Ab      None Detected not reported None Detected   A fumigatus #3 Ab      None Detected not reported None Detected   Saccharo viridis Ab      None Detected not reported None Detected   Thermo candidus Ab      None Detected not reported None Detected   Thermo sacchari Ab      None Detected not reported None Detected   IGG      695 - 1620 mg/dL 1980 (H)   IgG1      300 - 856 mg/dL 1090 (H)   IgG2      158 - 761 mg/dL 643   IgG3      24 - 192 mg/dL 99   IgG4      11 - 86 mg/dL 117 (H)   LYNDA interpretation      NEG:Negative Negative   Sed Rate      0 - 30 mm/h 54 (H)   CRP Inflammation      0.0 - 8.0 mg/L <2.9   CK Total      30 - 225 U/L 98   Patricia 1 Antibody IgG      0.0 - 0.9 AI <0.2   Scleroderma Antibody Scl-70 CLAUDIO IgG      0.0 - 0.9 AI <0.2   Rheumatoid Factor      <20 IU/mL 700 (H)   SSA (Ro) (CLAUDIO) Antibody, IgG      0.0 - 0.9 AI <0.2   SSB (La) (CLAUDIO) Antibody, IgG      0.0 - 0.9 AI <0.2   Aldolase      1.5 - 8.1 U/L 3.7   Neutrophil Cytoplasmic IgG Antibody      <1:20 <1:20   Cyclic Citrullinated Peptide Antibody, IgG      <7 U/mL >340 (H)        Scribe Disclosure:   I, Jose Miguel Viky, am serving as a scribe to document services personally performed by Harjit Staley MD at this visit, based upon the provider's statements to me. All documentation has been reviewed by the aforementioned provider prior to being entered into the official medical record.     Portions of this medical record were completed by a scribe. UPON MY REVIEW AND AUTHENTICATION BY ELECTRONIC SIGNATURE, this confirms (a) I performed the applicable clinical services, and (b) the record is accurate.      Harjit Staley MD

## 2018-06-01 NOTE — LETTER
2018       RE: Randall Boland  [id number]  [facility] [id number]     Dear Colleague,    Thank you for referring your patient, Randall Boland, to the [facility]HE[facility] at [facility]. Please see a copy of my visit note below.    [facility]  Rheumatology Clinic  Harjit Staley MD  2018     Name: Randall Boland  MRN: [id number]  Age: 67 year old  : 1950  Referring provider: Tristan Cross     The patient's daughter acted as an interpretor.    Assessment and Plan:  Rheumatoid factor positive and cyclic citrullinated peptide (CCP) antibody-positivity in a woman with interstitial lung disease.  The patient describes minimal dyspnea on exertion, but there is no history of joint swelling, stiffness, or altered range of motion.  Examination reveals bibasilar crackles on lung exam, eczematous lesions over the dorsal aspects of both hands, the olecranon prostheses and the legs; and mild changes of osteoarthritis in the hands with painful range of motion in the left greater than right shoulders.   blood work shows normal electrolytes with creatinine of 0.62, normal liver function testing, AST/ALT of 18 and 20, normal aldolase and CK of 98; ANCA was negative, CBC WNL except for eosinophils were slightly elevated at 1000 cells/mL, LYNDA was negative, hypersensitivity panel for farmers lung was negative, antiJO1 was negative, extractible nuclear antigens was negative. Positive findings included slightly elevated glucose of 120, sed rate of 53, but a normal CRP, RF of 700 international units, and CCP antibodies >340 units.   MRI of brain was done in May 2017: impression was negative for age, no bleeding, mass, or infarcts. HRCT chest  showed diffuse bronchial wall thickening, honeycombing compatible with ILD, and likely UIP pattern.    There is a striking contrast between the chest CT findings of moderately advanced interstitial lung disease and the near- absence  "of classic or typical findings of rheumatoid arthritis in peripheral joints, despite high-titer serologies. Nevertheless, I think the patient likely does have systemic inflammatory disease associated with positive rheumatoid factor and cyclic citrullinated peptide antibodies, and given time she would likely manifest joint disease. The radiologist-interpreted pattern of interstitial lung disease is one of usual interstitial pneumonitis; this is the pattern most often associated with rheumatoid arthritis, and is the pattern that most closely approximates that found in idiopathic interstitial pneumonia. Approximately 10-15% of patients with rheumatoid arthritis-associated lung disease exhibit lung findings prior to the development of joint disease.  According to American College of rheumatology \"treat to target\" guidelines for rheumatoid arthritis, the patient is not a candidate for potent immunomodulatory therapy on the basis of her joint status. There is some evidence that abortive therapy with hydroxychloroquine can provide primary prevention of joint disease in patients with positive serologies but who are asymptomatic in the musculoskeletal domain.    With respect to rheumatoid associated lung disease, I agree with moderate dose prednisone (up to 0.5 mg/kg) for 2-3 months.  I recommend that if the patient shows deterioration in symptoms or in diffusing capacity after a several month course of 0.5 mg/kg per day prednisone, secondary therapy would be tried. There is case-series type evidence for mycophenolate, azathioprine and cyclophosphamide for management of steroid refractory RA-associated lung disease.  We discussed the following plan:      A) Request results of previous skin biopsy to evaluate possible psoriasis vs eczema   B) plain xray of hands  C) Start plaquenil 400 mg daily  D) Consider steroid-sparing agent at 3 months for progression of lung symptoms or deteriorating PFTs, under Dr. Cross's supervision. " I recommend MMF 1000 mg bid as a first steroid sparing agent.    Orders:    - hydroxychloroquine (PLAQUENIL) 200 MG tablet  Dispense: 60 tablet; Refill: 3  - XR Hand Bilateral 2 Views     Follow-up: Return in about 3 months (around 9/1/2018).     HPI:   Randall Boland is a 67 year old female with a history of interstitial lung disease who presents for evaluation of a high rheumatoid factor. She is originally from [facility] but moved to [facility] and has been in the [place] since 2012. Around 2 months ago, she developed a cough that is typically worse in the mornings. She attributed it to being exposed to grandchildren that had colds and thinks she developed a respiratory tract infection at that time. The cough seemed to have improved recently. She denied shortness of breath, dyspnea on exertion, and having a history of lung problems. She was last seen by her pulmonologist Dr. Cross on 5/23/18 at which time she had been taking Zantac to control her reflux and it has been mildly effective. Dr. Cross diagnosed her with interstitial lung disease based on pulmonary function tests and a CT scan. Ideopathic pulmonary thrombosis and hypersensitivity pneumonitis are on the differential diagnosis. Due to a high RF test result, today's consultation is to evaluate rheumatoid arthritis associated ILD.    Today, the patient states that her primary concerns are of the scarring in her lungs and the positive blood test. Her lung issues first arose as an intermittent dry cough. She denies noticing any triggers and believes that it happens randomly. She reports coughing at night, relieving it by drinking water, but then having another bout in the morning. She sometimes coughs up a small amount of white mucus. She believes that her coughing started around 2 months ago by catching a cold from her grandchildren. She notes that it has been better lately. She mentions that today is the fifth day of using the medication Dr. Cross prescribed and  notes that there has been no increase in her coughing, but no significant change otherwise. She is tolerating the prednisone well and notes that it has not increased her blood sugar.     Besides her cough, she feels that she was healthy other than some minor aches. She has been on Metformin and Losartan to treat her diabetes for around two years and notes HA1c has improved from over 9 to 6-7 in recent months. She inquires about if she can stop taking her losartan and it was recommended that she ask the doctor who prescribed it to her. She takes Nystatin for high blood pressure and it was recommended that she continue taking that. She mentions getting imaging in [place] that showed nerve compression in her lower back that could be causing the pain in her left leg. She describes the pain as a burning sensation in the lower back that runs down the back of her left thigh and leg all the way down to the foot. She mentions a pulling sensation in her lower left leg that she massages to alleviate. She has developed pain in her left arm in the past month and notes massaging and exercising to mitigate it. She has gone to a chiropractor to align her back, neck, and shoulders. She has difficulty putting on clothes because of her left shoulder pain. She notes that it is painful to bring her left arm down from a raised position without support. She denies any swelling in her shoulder or pain and swelling in her fingers or toes. She denies any issues in her hands and feet. She notes some morning stiffness in her left knee.     She reports having a stabbing pain in the back of her head last week that lasted for 3 days, and at the end of the headache, she notes that her left eye became bloodshot but resolved after two days. She mentions having an itchy spot on her hand that spread around the back of both hands and fingers that started around 4-5 months ago. It is itchy but not painful. Another spot on her right foot has recently  appeared. Her dermatologist prescribed a strong steroidal cream that had no effect. She notes that the itchiness has decreased recently but otherwise has not improved or worsened. She mentions that the pain in her left foot had previously deterred her from walking around often but it has gotten better lately, and that stretching at home helps relieve leg pain.     Review of Systems:   Pertinent items are noted in HPI, remainder of complete ROS is negative.    No recent problems with hearing or vision. No swallowing problems.   No breathing difficulty, shortness of breath, or wheezing  No palpitations  No heart burn, indigestion, nausea, vomiting  No urination problems, no bloody, cloudy urine, no dysuria  No numbing, tingling, weakness  No headaches or confusion  No easy bleeding or bruising.   +chest ache secondary to severe coughing fits  +rare diarrhea with abdominal cramping (occured around a month ago)  +finger rash    Active Medications:     Current Outpatient Prescriptions:      aspirin 81 MG chewable tablet, Take 1 tablet (81 mg) by mouth daily, Disp: 90 tablet, Rfl: 3     hydroxychloroquine (PLAQUENIL) 200 MG tablet, Take 1 tablet (200 mg) by mouth 2 times daily, Disp: 60 tablet, Rfl: 3     losartan (COZAAR) 25 MG tablet, Take 0.5 tablets (12.5 mg) by mouth daily, Disp: 45 tablet, Rfl: 3     metFORMIN (GLUCOPHAGE) 1000 MG tablet, Take 1 tablet (1,000 mg) by mouth 2 times daily (with meals), Disp: 60 tablet, Rfl: 2     NONFORMULARY, Flax seed powder - 2 teaspoon.day, Disp: , Rfl:      nystatin (MYCOSTATIN) 575657 UNIT/GM POWD, Apply topically 3 times daily as needed, Disp: 30 g, Rfl: 1     pravastatin (PRAVACHOL) 40 MG tablet, Take 1 tablet (40 mg) by mouth daily, Disp: 90 tablet, Rfl: 3     predniSONE (DELTASONE) 10 MG tablet, Take 4 tablets per day for 3 days, then 3 tablets per day for 3 days, then 2 tablets per day for 3 days, then 1 tablet per day for 3 days, then off, Disp: 30 tablet, Rfl: 1      "ranitidine (ZANTAC) 300 MG tablet, Take 1 tablet (300 mg) by mouth At Bedtime, Disp: 90 tablet, Rfl: 3     triamcinolone (KENALOG) 0.1 % cream, Apply sparingly to affected area three times daily for 14 days., Disp: 30 g, Rfl: 3     fluticasone (FLONASE) 50 MCG/ACT spray, Spray 1 spray into both nostrils daily (Patient not taking: Reported on 5/23/2018), Disp: 1 Bottle, Rfl: 11      Allergies:   Atorvastatin      Past Medical History:  Obesity  Osteopenia  Type 2 diabetes mellitus without complication, without long-term current use of insulin  Interstitial lung disease  Abdominal tuberculosis (intestinal, 20 years ago)     Past Surgical History:  Open cholecystectomy    Family History:   Mother - dementia  Father - cerebrovascular disease  Brother - cerebrovascular disease, hypertension  No family history of - diabetes, myocardial infarction, early onset C.A.D., breast cancer, ovarian cancer, colon cancer, rheumatoid arthritis  Family history of - high blood pressure  (mentions that medical history is unlikely to be comprehensive because of poor/infrequent medical records)     Social History:   No smoking or tobacco use  Rare alcohol use (1-2 times a year)   with 8 children, 13 grandchildren, cooks for the family     Physical Exam:   /80  Pulse 91  Temp 97.7  F (36.5  C) (Oral)  Ht 1.575 m (5' 2\")  Wt 75.6 kg (166 lb 11.2 oz)  SpO2 98%  BMI 30.49 kg/m2   Wt Readings from Last 4 Encounters:   06/01/18 75.6 kg (166 lb 11.2 oz)   05/23/18 74.8 kg (165 lb)   04/02/18 74.8 kg (165 lb)   08/02/17 74.4 kg (164 lb 1.6 oz)   Constitutional: Well-developed, appearing stated age; cooperative  Eyes: Normal EOM, PERRLA, vision, conjunctiva, sclera  ENT: Upper and lower partial dentures are present. Normal external ears, nose, hearing, lips, gums, throat. No mucous membrane lesions, normal saliva pool  Neck: No mass or thyroid enlargement  Resp: Fine crackles in both bases. nl to palpation  CV: RRR, no murmurs, " rubs or gallops, no edema  GI: Well healed right upper quadrant scar. No ABD mass or tenderness, no HSM  Lymph: No cervical, supraclavicular, inguinal or epitrochlear nodes  MS: Clubbing at 2nd and 3rd digital fingernails. Painful external rotation of the left shoulder. left shoulder Passive abduction is painful beyond 60 degrees. Modest pain with resisted abduction of left shoulder. 15cm stretch of lumbar spine is indicated as painful. The TMJ, neck, elbow, wrist, MCP/PIP/DIP, hip, knee, ankle, and foot MTP/IP joints were examined and found normal. No active synovitis. Normal  strength. No dactylitis,  tenosynovitis, enthesopathy.  Skin: Violaceous pink shiny rash over the MCPs and proximal phalanges most prominently in 2nd and 3rd MCPs on the right and 2nd MCP on left. White adherent scale and hyperkeratosis. Lesions do not appear psoriasiform. Both elbows have some hyperkeratosis over olecranon processes with painful range of motion of the with linear excoriations that suggest previous scratching and trauma. Excoriated pink patch with overlying scale over right lateral malleolus. Additional excoriations on right lateral knee   No nail pitting, alopecia, and nodules.  Neuro: Normal cranial nerves, strength, sensation, DTRs.   Psych: Normal judgement, orientation, memory, affect.    Laboratory:   Component      Latest Ref Rng & Units 5/23/2018   WBC      4.0 - 11.0 10e9/L 8.8   RBC Count      3.8 - 5.2 10e12/L 4.01   Hemoglobin      11.7 - 15.7 g/dL 11.9   Hematocrit      35.0 - 47.0 % 36.3   MCV      78 - 100 fl 91   MCH      26.5 - 33.0 pg 29.7   MCHC      31.5 - 36.5 g/dL 32.8   RDW      10.0 - 15.0 % 12.7   Platelet Count      150 - 450 10e9/L 281   Diff Method       Automated Method   % Neutrophils      % 59.9   % Lymphocytes      % 22.7   % Monocytes      % 5.9   % Eosinophils      % 10.7   % Basophils      % 0.5   % Immature Granulocytes      % 0.3   Nucleated RBCs      0 /100 0   Absolute Neutrophil       1.6 - 8.3 10e9/L 5.3   Absolute Lymphocytes      0.8 - 5.3 10e9/L 2.0   Absolute Monocytes      0.0 - 1.3 10e9/L 0.5   Absolute Eosinophils      0.0 - 0.7 10e9/L 1.0 (H)   Absolute Basophils      0.0 - 0.2 10e9/L 0.0   Abs Immature Granulocytes      0 - 0.4 10e9/L 0.0   Absolute Nucleated RBC       0.0   Sodium      133 - 144 mmol/L 136   Potassium      3.4 - 5.3 mmol/L 3.9   Chloride      94 - 109 mmol/L 104   Carbon Dioxide      20 - 32 mmol/L 26   Anion Gap      3 - 14 mmol/L 6   Glucose      70 - 99 mg/dL 120 (H)   Urea Nitrogen      7 - 30 mg/dL 6 (L)   Creatinine      0.52 - 1.04 mg/dL 0.62   GFR Estimate      >60 mL/min/1.7m2 >90   GFR Estimate If Black      >60 mL/min/1.7m2 >90   Calcium      8.5 - 10.1 mg/dL 9.4   Bilirubin Direct      0.0 - 0.2 mg/dL 0.1   Bilirubin Total      0.2 - 1.3 mg/dL 0.4   Albumin      3.4 - 5.0 g/dL 3.8   Protein Total      6.8 - 8.8 g/dL 8.7   Alkaline Phosphatase      40 - 150 U/L 76   ALT      0 - 50 U/L 20   AST      0 - 45 U/L 18   Aspergillus flavus Ab      None Detected not reported None Detected   A fumigatus #2 Ab      None Detected not reported None Detected   A fumigatus #3 Ab      None Detected not reported None Detected   Saccharo viridis Ab      None Detected not reported None Detected   Thermo candidus Ab      None Detected not reported None Detected   Thermo sacchari Ab      None Detected not reported None Detected   IGG      695 - 1620 mg/dL 1980 (H)   IgG1      300 - 856 mg/dL 1090 (H)   IgG2      158 - 761 mg/dL 643   IgG3      24 - 192 mg/dL 99   IgG4      11 - 86 mg/dL 117 (H)   LYNDA interpretation      NEG:Negative Negative   Sed Rate      0 - 30 mm/h 54 (H)   CRP Inflammation      0.0 - 8.0 mg/L <2.9   CK Total      30 - 225 U/L 98   Patricia 1 Antibody IgG      0.0 - 0.9 AI <0.2   Scleroderma Antibody Scl-70 CLAUDIO IgG      0.0 - 0.9 AI <0.2   Rheumatoid Factor      <20 IU/mL 700 (H)   SSA (Ro) (CLAUDIO) Antibody, IgG      0.0 - 0.9 AI <0.2   SSB (La) (CLAUDIO) Antibody,  IgG      0.0 - 0.9 AI <0.2   Aldolase      1.5 - 8.1 U/L 3.7   Neutrophil Cytoplasmic IgG Antibody      <1:20 <1:20   Cyclic Citrullinated Peptide Antibody, IgG      <7 U/mL >340 (H)       Scribe Disclosure:   I, Jose Miguel Salmon, am serving as a scribe to document services personally performed by Harjit Staley MD at this visit, based upon the provider's statements to me. All documentation has been reviewed by the aforementioned provider prior to being entered into the official medical record.     Portions of this medical record were completed by a scribe. UPON MY REVIEW AND AUTHENTICATION BY ELECTRONIC SIGNATURE, this confirms (a) I performed the applicable clinical services, and (b) the record is accurate.      Again, thank you for allowing me to participate in the care of your patient.      Sincerely,    Harjit Staley MD

## 2018-06-01 NOTE — NURSING NOTE
Chief Complaint   Patient presents with     Consult     High Rheumatoid Factor   Pt roomed, vitals, meds, and allergies reviewed with pt. Pt ready for provider.  Forrest Brooks, CMA

## 2018-06-01 NOTE — PATIENT INSTRUCTIONS
Dx:  Highly positive rheumatoid factor and cyclic citrullinated peptide antibodies (ACPA).  Concern: low grade or early rheumatoid arthritis could be associated with interstitial lung disease.  The medication I recommend is a drug called Plaquenil (hydroxychloroquine). The side effects of the medication are generally mild or non-about one third of people taking the drug experience mild nausea.  If the drug is taken for a period of a year or greater, the person taking the drug should undergo an ophthalmologic examination to look at the back of the eye because rarely there can be injury to the cells in the back of the eye.  Plan:  1. Hand xray  2. Plaquenil 400 mg daily  3. I will discuss steroid-sparing treatment for lung disease with Dr. Cross.

## 2018-06-01 NOTE — MR AVS SNAPSHOT
After Visit Summary   6/1/2018    Randall Boland    MRN: [id number]           Patient Information     Date Of Birth          1950        Visit Information        Provider Department      6/1/2018 7:30 AM Harjit Staley MD [facility] Rheumatology        Today's Diagnoses     Rheumatoid factor positive with cyclic citrullinated peptide (CCP) antibody negative    -  1      Care Instructions    Dx:  Highly positive rheumatoid factor and cyclic citrullinated peptide antibodies (ACPA).  Concern: low grade or early rheumatoid arthritis could be associated with interstitial lung disease.  The medication I recommend is a drug called Plaquenil (hydroxychloroquine). The side effects of the medication are generally mild or non-about one third of people taking the drug experience mild nausea.  If the drug is taken for a period of a year or greater, the person taking the drug should undergo an ophthalmologic examination to look at the back of the eye because rarely there can be injury to the cells in the back of the eye.  Plan:  1. Hand xray  2. Plaquenil 400 mg daily  3. I will discuss steroid-sparing treatment for lung disease with Dr. Cross.          Follow-ups after your visit        Follow-up notes from your care team     Return in about 3 months (around 9/1/2018).      Your next 10 appointments already scheduled     Jun 01, 2018  9:55 AM CDT   XR HAND BILATERAL 2 VIEWS with UCXR1   [facility] ([facility])    [address] [place] [address] [phone]           Please bring a list of your current medicines to your exam. (Include vitamins, minerals and over-thecounter medicines.) Leave your valuables at home.  Tell your doctor if there is a chance you may be pregnant.  You do not need to do anything special for this exam.            Jun 27, 2018  8:00 AM CDT   (Arrive by 7:45 AM)   Return Interstitial Lung with Tristan Cross MD    [facility] ([facility])    [address]  [address]  [facility] [id number] [phone]            Sep 05, 2018  9:00 AM CDT   (Arrive by 8:45 AM)   Return Visit with Harjit Staley MD   [facility] ([facility])    [address]  [address]  [place] [address] [phone]              Future tests that were ordered for you today     Open Future Orders        Priority Expected Expires Ordered    XR Hand Bilateral 2 Views Routine 6/1/2018 6/1/2019 6/1/2018            Who to contact     If you have questions or need follow up information about today's clinic visit or your schedule please contact [facility] RHEUMATOLOGY directly at [phone].  Normal or non-critical lab and imaging results will be communicated to you by JourneyPurehart, letter or phone within 4 business days after the clinic has received the results. If you do not hear from us within 7 days, please contact the clinic through [email or web address] or phone. If you have a critical or abnormal lab result, we will notify you by phone as soon as possible.  Submit refill requests through [employer] or call your pharmacy and they will forward the refill request to us. Please allow 3 business days for your refill to be completed.          Additional Information About Your Visit        [employer] Information     [employer] gives you secure access to your electronic health record. If you see a primary care provider, you can also send messages to your care team and make appointments. If you have questions, please call your primary care clinic.  If you do not have a primary care provider, please call [phone] and they will assist you.        Care EveryWhere ID     This is your Care EveryWhere ID. This could be used by other organizations to access your [facility] medical records  [id number]        Your Vitals Were     Pulse Temperature Height Pulse Oximetry BMI  "(Body Mass Index)       91 97.7  F (36.5  C) (Oral) 1.575 m (5' 2\") 98% 30.49 kg/m2        Blood Pressure from Last 3 Encounters:   06/01/18 138/80   05/23/18 121/76   04/02/18 110/60    Weight from Last 3 Encounters:   06/01/18 75.6 kg (166 lb 11.2 oz)   05/23/18 74.8 kg (165 lb)   04/02/18 74.8 kg (165 lb)                 Today's Medication Changes          These changes are accurate as of 6/1/18  8:46 AM.  If you have any questions, ask your nurse or doctor.               Start taking these medicines.        Dose/Directions    hydroxychloroquine 200 MG tablet   Commonly known as:  PLAQUENIL   Used for:  Rheumatoid factor positive with cyclic citrullinated peptide (CCP) antibody negative   Started by:  Harjit Staley MD        Dose:  200 mg   Take 1 tablet (200 mg) by mouth 2 times daily   Quantity:  60 tablet   Refills:  3            Where to get your medicines      These medications were sent to [address] [address]     Phone:  [phone]     hydroxychloroquine 200 MG tablet                Primary Care Provider Office Phone # Fax #    Adrianna Singh [phone]       [address] [place] [address]        Equal Access to Services     [facility] AH: Hadii isabell liz hadasho Sosachin, waaxda luqadaha, qaybta kaalmada adeegyada, irina banda. So [facility] [phone].    ATENCIÓN: Si habla español, tiene a paez disposición servicios gratuitos de asistencia lingüística. Llame al [phone].    We comply with applicable federal civil rights laws and [place] laws. We do not discriminate on the basis of race, color, national origin, age, disability, sex, sexual orientation, or gender identity.            Thank you!     Thank you for choosing [facility] RHEUMATOLOGY  for your care. Our goal is always to provide you with excellent care. Hearing back from our patients is one way we can " continue to improve our services. Please take a few minutes to complete the written survey that you may receive in the mail after your visit with us. Thank you!             Your Updated Medication List - Protect others around you: Learn how to safely use, store and throw away your medicines at www.disposemymeds.org.          This list is accurate as of 6/1/18  8:46 AM.  Always use your most recent med list.                   Brand Name Dispense Instructions for use Diagnosis    aspirin 81 MG chewable tablet     90 tablet    Take 1 tablet (81 mg) by mouth daily    Type 2 diabetes mellitus without complication, without long-term current use of insulin (H)       fluticasone 50 MCG/ACT spray    FLONASE    1 Bottle    Spray 1 spray into both nostrils daily    Cough, Tickle in throat       hydroxychloroquine 200 MG tablet    PLAQUENIL    60 tablet    Take 1 tablet (200 mg) by mouth 2 times daily    Rheumatoid factor positive with cyclic citrullinated peptide (CCP) antibody negative       losartan 25 MG tablet    COZAAR    45 tablet    Take 0.5 tablets (12.5 mg) by mouth daily    Type 2 diabetes mellitus without complication, without long-term current use of insulin (H)       metFORMIN 1000 MG tablet    GLUCOPHAGE    60 tablet    Take 1 tablet (1,000 mg) by mouth 2 times daily (with meals)    Type 2 diabetes mellitus without complication, without long-term current use of insulin (H)       NONFORMULARY      Flax seed powder - 2 teaspoon.day        nystatin 405398 UNIT/GM Powd    MYCOSTATIN    30 g    Apply topically 3 times daily as needed    Intertriginous candidiasis       pravastatin 40 MG tablet    PRAVACHOL    90 tablet    Take 1 tablet (40 mg) by mouth daily        predniSONE 10 MG tablet    DELTASONE    30 tablet    Take 4 tablets per day for 3 days, then 3 tablets per day for 3 days, then 2 tablets per day for 3 days, then 1 tablet per day for 3 days, then off    ILD (interstitial lung disease) (H)       ranitidine  300 MG tablet    ZANTAC    90 tablet    Take 1 tablet (300 mg) by mouth At Bedtime    Gastroesophageal reflux disease, esophagitis presence not specified       triamcinolone 0.1 % cream    KENALOG    30 g    Apply sparingly to affected area three times daily for 14 days.    Rash

## 2018-06-25 ENCOUNTER — MYC REFILL (OUTPATIENT)
Dept: INTERNAL MEDICINE | Facility: CLINIC | Age: 68
End: 2018-06-25

## 2018-06-25 DIAGNOSIS — E11.9 TYPE 2 DIABETES MELLITUS WITHOUT COMPLICATION, WITHOUT LONG-TERM CURRENT USE OF INSULIN (H): ICD-10-CM

## 2018-06-25 RX ORDER — LOSARTAN POTASSIUM 25 MG/1
12.5 TABLET ORAL DAILY
Qty: 45 TABLET | Refills: 3 | Status: CANCELLED | OUTPATIENT
Start: 2018-06-25

## 2018-06-25 RX ORDER — PRAVASTATIN SODIUM 40 MG
40 TABLET ORAL DAILY
Qty: 90 TABLET | Refills: 3 | Status: CANCELLED | OUTPATIENT
Start: 2018-06-25

## 2018-06-26 NOTE — TELEPHONE ENCOUNTER
Message from MyChart:  Original authorizing provider: Adrianna Singh MD    Randall Boland would like a refill of the following medications:  pravastatin (PRAVACHOL) 40 MG tablet [Adrianna Singh MD]  losartan (COZAAR) 25 MG tablet [Adrianna Singh MD]  metFORMIN (GLUCOPHAGE) 1000 MG tablet [Adrianna Singh MD]    Preferred pharmacy: [address]    Comment:

## 2018-06-26 NOTE — TELEPHONE ENCOUNTER
"Requested Prescriptions   Pending Prescriptions Disp Refills     pravastatin (PRAVACHOL) 40 MG tablet 90 tablet 3     Sig: Take 1 tablet (40 mg) by mouth daily    Statins Protocol Passed    6/26/2018  8:00 AM       Passed - LDL on file in past 12 months    Recent Labs   Lab Test  03/31/18   0930   LDL  77            Passed - No abnormal creatine kinase in past 12 months    Recent Labs   Lab Test  05/23/18   1048   CKT  98               Passed - Recent (12 mo) or future (30 days) visit within the authorizing provider's specialty    Patient had office visit in the last 12 months or has a visit in the next 30 days with authorizing provider or within the authorizing provider's specialty.  See \"Patient Info\" tab in inbasket, or \"Choose Columns\" in Meds & Orders section of the refill encounter.           Passed - Patient is age 18 or older       Passed - No active pregnancy on record       Passed - No positive pregnancy test in past 12 months        losartan (COZAAR) 25 MG tablet 45 tablet 3     Sig: Take 0.5 tablets (12.5 mg) by mouth daily    Angiotensin-II Receptors Passed    6/26/2018  8:00 AM       Passed - Blood pressure under 140/90 in past 12 months    BP Readings from Last 3 Encounters:   06/01/18 138/80   05/23/18 121/76   04/02/18 110/60                Passed - Recent (12 mo) or future (30 days) visit within the authorizing provider's specialty    Patient had office visit in the last 12 months or has a visit in the next 30 days with authorizing provider or within the authorizing provider's specialty.  See \"Patient Info\" tab in inbasket, or \"Choose Columns\" in Meds & Orders section of the refill encounter.           Passed - Patient is age 18 or older       Passed - No active pregnancy on record       Passed - Normal serum creatinine on file in past 12 months    Recent Labs   Lab Test  05/23/18   1048   CR  0.62            Passed - Normal serum potassium on file in past 12 months    Recent Labs   Lab Test  " "05/23/18   1048   POTASSIUM  3.9                   Passed - No positive pregnancy test in past 12 months        metFORMIN (GLUCOPHAGE) 1000 MG tablet 60 tablet 2     Sig: Take 1 tablet (1,000 mg) by mouth 2 times daily (with meals)    Biguanide Agents Passed    6/26/2018  8:00 AM       Passed - Blood pressure less than 140/90 in past 6 months    BP Readings from Last 3 Encounters:   06/01/18 138/80   05/23/18 121/76   04/02/18 110/60                Passed - Patient has documented LDL within the past 12 mos.    Recent Labs   Lab Test  03/31/18   0930   LDL  77            Passed - Patient has had a Microalbumin in the past 12 mos.    Recent Labs   Lab Test  03/31/18   0936   MICROL  25   UMALCR  65.62*            Passed - Patient is age 10 or older       Passed - Patient has documented A1c within the specified period of time.    If HgbA1C is 8 or greater, it needs to be on file within the past 3 months.  If less than 8, must be on file within the past 6 months.     Recent Labs   Lab Test  03/31/18   0930   A1C  6.7*            Passed - Patient's CR is NOT>1.4 OR Patient's EGFR is NOT<45 within past 12 mos.    Recent Labs   Lab Test  05/23/18   1048   GFRESTIMATED  >90   GFRESTBLACK  >90       Recent Labs   Lab Test  05/23/18   1048   CR  0.62            Passed - Patient does NOT have a diagnosis of CHF.       Passed - Patient is not pregnant       Passed - Patient has not had a positive pregnancy test within the past 12 mos.        Passed - Recent (6 mo) or future (30 days) visit within the authorizing provider's specialty    Patient had office visit in the last 6 months or has a visit in the next 30 days with authorizing provider or within the authorizing provider's specialty.  See \"Patient Info\" tab in inbasket, or \"Choose Columns\" in Meds & Orders section of the refill encounter.            Last office visit: 4/2/2018 with prescribing provider:     Future Office Visit:      "

## 2018-08-16 ENCOUNTER — MYC MEDICAL ADVICE (OUTPATIENT)
Dept: INTERNAL MEDICINE | Facility: CLINIC | Age: 68
End: 2018-08-16

## 2018-08-17 ENCOUNTER — OFFICE VISIT (OUTPATIENT)
Dept: INTERNAL MEDICINE | Facility: CLINIC | Age: 68
End: 2018-08-17
Payer: COMMERCIAL

## 2018-08-17 VITALS
HEART RATE: 82 BPM | TEMPERATURE: 98 F | WEIGHT: 165.4 LBS | DIASTOLIC BLOOD PRESSURE: 80 MMHG | BODY MASS INDEX: 30.25 KG/M2 | RESPIRATION RATE: 18 BRPM | OXYGEN SATURATION: 98 % | SYSTOLIC BLOOD PRESSURE: 122 MMHG

## 2018-08-17 DIAGNOSIS — M05.79 RHEUMATOID ARTHRITIS INVOLVING MULTIPLE SITES WITH POSITIVE RHEUMATOID FACTOR (H): ICD-10-CM

## 2018-08-17 DIAGNOSIS — M25.511 ACUTE PAIN OF RIGHT SHOULDER: Primary | ICD-10-CM

## 2018-08-17 DIAGNOSIS — M25.50 PAIN IN JOINT, MULTIPLE SITES: ICD-10-CM

## 2018-08-17 PROCEDURE — 99214 OFFICE O/P EST MOD 30 MIN: CPT | Performed by: INTERNAL MEDICINE

## 2018-08-17 RX ORDER — PREDNISONE 5 MG/1
5 TABLET ORAL DAILY
Qty: 90 TABLET | Refills: 1 | Status: SHIPPED | OUTPATIENT
Start: 2018-08-17 | End: 2019-03-07

## 2018-08-17 RX ORDER — PREDNISONE 20 MG/1
TABLET ORAL
Qty: 20 TABLET | Refills: 0 | Status: SHIPPED | OUTPATIENT
Start: 2018-08-17 | End: 2018-11-06

## 2018-08-17 NOTE — PROGRESS NOTES
SUBJECTIVE:                                                      HPI: Randall Boland is a pleasant 68 year old female who presents with pain involving multiple joints:    Accompanied by daughter, who helps translate.    PMH significant for rheumatoid arthritis, not on treatment currently.  Plaquenil had been recommended, but patient declined treatment.    Patient's main complaint is pain of her right shoulder. This started 2 weeks ago without precipitating trauma, injury, unusual exertion.  Patient indicates that anterior and lateral aspects of her shoulder joint. Pain occurs with abduction in any direction and reaching behind her back.    Patient also recently (in the last week) developed bilateral knee pain and MCP pain.  Also no precipitating trauma, injury, unusual exertion.    Her pain is worst in the morning and improves after several hours. Notes mild improvement with Tylenol use.    The medication, allergy, and problem lists have been reviewed and updated as appropriate.       OBJECTIVE:                                                      /80  Pulse 82  Temp 98  F (36.7  C) (Oral)  Resp 18  Wt 165 lb 6.4 oz (75 kg)  SpO2 98%  BMI 30.25 kg/m2  Constitutional: well-appearing  Right shoulder:  Inspection: no swelling, bruising, discoloration, or obvious deformity or asymmetry  Tender: AC joint, anterior capsule and proximal bicep tendon  Active ROM: forward flexion 80 degrees, abduction  80 degrees, external rotation  normal, internal rotation  hip pocket  Strength: normal  Hands: bony enlargement and swelling of multiple joints including MCPs and PIPs  Knees:   Inspection: AP/lateral alignment normal, no bony enlargement, swelling, effusions, or redness  Tender: peripatellar  Active ROM: normal   Strength: normal      ASSESSMENT/PLAN:                                                      (M25.511) Acute pain of right shoulder  (primary encounter diagnosis)  Comment: suspect rotator cuff  tendinitis.  Plan: referred to physical therapy for further evaluation and treatment - patient will be contacted to schedule.    (M25.50) Pain in joint, multiple sites  (M05.79) Rheumatoid arthritis involving multiple sites with positive rheumatoid factor (H)  Plan:    - prednisone taper followed by low-dose daily prednisone.   - if continued or recurrent symptoms, consider starting Plaquenil (as recommended by rheumatology).    The instructions on the AVS were discussed and explained to the patient. Patient expressed understanding of instructions.    (Chart documentation was completed, in part, with [employer] voice-recognition software. Even though reviewed, some grammatical, spelling, and word errors may remain.)    Adrianna Singh MD   [address] [address]  T: [phone], F: [phone]

## 2018-08-17 NOTE — PATIENT INSTRUCTIONS
Referral for physical therapy placed. They will contact you to schedule (or you can schedule in person on the 3rd floor) - [facility]    ---    Prednisone taper:    Take 3 tabs together (60 mg) by mouth daily x 3 days, then  Take 2 tabs together (40 mg) daily x 3 days, then  Take 1 tab (20 mg) daily x 3 days, then   Take 1/2 tab (10 mg) x 3 days.    Continue prednisone 5mg daily after completing above (seperate prescription)

## 2018-08-17 NOTE — MR AVS SNAPSHOT
After Visit Summary   8/17/2018    Randall Boland    MRN: [id number]           Patient Information     Date Of Birth          1950        Visit Information        Provider Department      8/17/2018 11:15 AM Adrianna Singh MD [facility]        Today's Diagnoses     Rheumatoid arthritis involving multiple sites with positive rheumatoid factor (H)    -  1    Acute pain of right shoulder          Care Instructions    Referral for physical therapy placed. They will contact you to schedule (or you can schedule in person on the 3rd floor) - [facility]    ---    Prednisone taper:    Take 3 tabs together (60 mg) by mouth daily x 3 days, then  Take 2 tabs together (40 mg) daily x 3 days, then  Take 1 tab (20 mg) daily x 3 days, then   Take 1/2 tab (10 mg) x 3 days.    Continue prednisone 5mg daily after completing above (seperate prescription)            Follow-ups after your visit        Additional Services     DILIP PT, HAND, AND CHIROPRACTIC REFERRAL       **This order will print in the [facility] Scheduling Office**    Physical Therapy, Hand Therapy and Chiropractic Care are available through:    *[facility]  *[facility]  *[facility]    Call one number to schedule at any of the above locations: [phone].    Your provider has referred you to: Physical Therapy at [facility] or [facility]    Indication/Reason for Referral: Shoulder Pain  Onset of Illness: 2 weeks  Therapy Orders: Evaluate and Treat  Special Programs: None  Special Request: None    Maddison Washburn      Additional Comments for the Therapist or Chiropractor: none    Please be aware that coverage of these services is subject to the terms and limitations of your health insurance plan.  Call member services at your health plan with any benefit or coverage questions.      Please bring the following to your appointment:    *Your personal calendar for scheduling  future appointments  *Comfortable clothing                  Your next 10 appointments already scheduled     Sep 05, 2018  9:00 AM CDT   (Arrive by 8:45 AM)   Return Visit with Harjit Staley MD   [facility] ([facility])    [address]  [address]  [facility] [address] [phone]              Who to contact     If you have questions or need follow up information about today's clinic visit or your schedule please contact [facility] directly at [phone].  Normal or non-critical lab and imaging results will be communicated to you by Clearleaphart, letter or phone within 4 business days after the clinic has received the results. If you do not hear from us within 7 days, please contact the clinic through WorkFlex Solutionst or phone. If you have a critical or abnormal lab result, we will notify you by phone as soon as possible.  Submit refill requests through Tizra or call your pharmacy and they will forward the refill request to us. Please allow 3 business days for your refill to be completed.          Additional Information About Your Visit        ClearleapharRacerTimes Information     Tizra gives you secure access to your electronic health record. If you see a primary care provider, you can also send messages to your care team and make appointments. If you have questions, please call your primary care clinic.  If you do not have a primary care provider, please call [phone] and they will assist you.        Care EveryWhere ID     This is your Care EveryWhere ID. This could be used by other organizations to access your [facility] medical records  [id number]        Your Vitals Were     Pulse Temperature Respirations Pulse Oximetry BMI (Body Mass Index)       82 98  F (36.7  C) (Oral) 18 98% 30.25 kg/m2        Blood Pressure from Last 3 Encounters:   08/17/18 122/80   06/01/18 138/80   05/23/18 121/76    Weight from Last 3 Encounters:   08/17/18 165 lb 6.4 oz (75  kg)   06/01/18 166 lb 11.2 oz (75.6 kg)   05/23/18 165 lb (74.8 kg)              We Performed the Following     DILIP PT, HAND, AND CHIROPRACTIC REFERRAL          Today's Medication Changes          These changes are accurate as of 8/17/18 11:29 AM.  If you have any questions, ask your nurse or doctor.               These medicines have changed or have updated prescriptions.        Dose/Directions    * predniSONE 10 MG tablet   Commonly known as:  DELTASONE   This may have changed:  Another medication with the same name was added. Make sure you understand how and when to take each.   Used for:  ILD (interstitial lung disease) (H)   Changed by:  Adrianna Singh MD        Take 4 tablets per day for 3 days, then 3 tablets per day for 3 days, then 2 tablets per day for 3 days, then 1 tablet per day for 3 days, then off   Quantity:  30 tablet   Refills:  1       * predniSONE 20 MG tablet   Commonly known as:  DELTASONE   This may have changed:  You were already taking a medication with the same name, and this prescription was added. Make sure you understand how and when to take each.   Used for:  Rheumatoid arthritis involving multiple sites with positive rheumatoid factor (H)   Changed by:  Adrianna Singh MD        Take 3 tabs (60 mg) by mouth daily x 3 days, 2 tabs (40 mg) daily x 3 days, 1 tab (20 mg) daily x 3 days, then 1/2 tab (10 mg) x 3 days.   Quantity:  20 tablet   Refills:  0       * predniSONE 5 MG tablet   Commonly known as:  DELTASONE   This may have changed:  You were already taking a medication with the same name, and this prescription was added. Make sure you understand how and when to take each.   Used for:  Rheumatoid arthritis involving multiple sites with positive rheumatoid factor (H)   Changed by:  Adrianna Singh MD        Dose:  5 mg   Take 1 tablet (5 mg) by mouth daily   Quantity:  90 tablet   Refills:  1       * Notice:  This list has 3 medication(s) that are the same as other  medications prescribed for you. Read the directions carefully, and ask your doctor or other care provider to review them with you.         Where to get your medicines      These medications were sent to [facility] [address]  [address] [place] [address]     Phone:  [phone]     predniSONE 20 MG tablet    predniSONE 5 MG tablet                Primary Care Provider Office Phone # Fax #    Adrianna Singh [phone]       [address] [place] [address]        Equal Access to Services     [facility]: Hadii aad ku hadasho Soomaali, waaxda luqadaha, qaybta kaalmada adeegyada, waxay nathanielin hayaan adekatherine fuentes . So [facility] [phone].    ATENCIÓN: Si habla español, tiene a paez disposición servicios gratuitos de asistencia lingüística. [facility] [phone].    We comply with applicable federal civil rights laws and [place] laws. We do not discriminate on the basis of race, color, national origin, age, disability, sex, sexual orientation, or gender identity.            Thank you!     Thank you for choosing [facility]  for your care. Our goal is always to provide you with excellent care. Hearing back from our patients is one way we can continue to improve our services. Please take a few minutes to complete the written survey that you may receive in the mail after your visit with us. Thank you!             Your Updated Medication List - Protect others around you: Learn how to safely use, store and throw away your medicines at www.disposemymeds.org.          This list is accurate as of 8/17/18 11:29 AM.  Always use your most recent med list.                   Brand Name Dispense Instructions for use Diagnosis    aspirin 81 MG chewable tablet     90 tablet    Take 1 tablet (81 mg) by mouth daily    Type 2 diabetes mellitus without complication, without long-term current use of insulin (H)       blood glucose  monitoring lancets     100 each    Use to test blood sugar 1 times daily or as directed.    Type 2 diabetes mellitus without complication, without long-term current use of insulin (H)       blood glucose monitoring test strip    no brand specified    100 strip    Use to test blood sugars 1 time daily or as directed    Type 2 diabetes mellitus without complication, without long-term current use of insulin (H)       fluticasone 50 MCG/ACT spray    FLONASE    1 Bottle    Spray 1 spray into both nostrils daily    Cough, Tickle in throat       hydroxychloroquine 200 MG tablet    PLAQUENIL    60 tablet    Take 1 tablet (200 mg) by mouth 2 times daily    Rheumatoid factor positive with cyclic citrullinated peptide (CCP) antibody negative       losartan 25 MG tablet    COZAAR    45 tablet    Take 0.5 tablets (12.5 mg) by mouth daily    Type 2 diabetes mellitus without complication, without long-term current use of insulin (H)       metFORMIN 1000 MG tablet    GLUCOPHAGE    60 tablet    Take 1 tablet (1,000 mg) by mouth 2 times daily (with meals)    Type 2 diabetes mellitus without complication, without long-term current use of insulin (H)       NONFORMULARY      Flax seed powder - 2 teaspoon.day        nystatin 250144 UNIT/GM Powd    MYCOSTATIN    30 g    Apply topically 3 times daily as needed    Intertriginous candidiasis       pravastatin 40 MG tablet    PRAVACHOL    90 tablet    Take 1 tablet (40 mg) by mouth daily        * predniSONE 10 MG tablet    DELTASONE    30 tablet    Take 4 tablets per day for 3 days, then 3 tablets per day for 3 days, then 2 tablets per day for 3 days, then 1 tablet per day for 3 days, then off    ILD (interstitial lung disease) (H)       * predniSONE 20 MG tablet    DELTASONE    20 tablet    Take 3 tabs (60 mg) by mouth daily x 3 days, 2 tabs (40 mg) daily x 3 days, 1 tab (20 mg) daily x 3 days, then 1/2 tab (10 mg) x 3 days.    Rheumatoid arthritis involving multiple sites with positive  rheumatoid factor (H)       * predniSONE 5 MG tablet    DELTASONE    90 tablet    Take 1 tablet (5 mg) by mouth daily    Rheumatoid arthritis involving multiple sites with positive rheumatoid factor (H)       ranitidine 300 MG tablet    ZANTAC    90 tablet    Take 1 tablet (300 mg) by mouth At Bedtime    Gastroesophageal reflux disease, esophagitis presence not specified       triamcinolone 0.1 % cream    KENALOG    30 g    Apply sparingly to affected area three times daily for 14 days.    Rash       * Notice:  This list has 3 medication(s) that are the same as other medications prescribed for you. Read the directions carefully, and ask your doctor or other care provider to review them with you.

## 2018-08-22 ENCOUNTER — MYC MEDICAL ADVICE (OUTPATIENT)
Dept: INTERNAL MEDICINE | Facility: CLINIC | Age: 68
End: 2018-08-22

## 2018-08-27 ENCOUNTER — THERAPY VISIT (OUTPATIENT)
Dept: PHYSICAL THERAPY | Facility: CLINIC | Age: 68
End: 2018-08-27
Payer: COMMERCIAL

## 2018-08-27 DIAGNOSIS — M25.511 RIGHT SHOULDER PAIN: Primary | ICD-10-CM

## 2018-08-27 PROCEDURE — 97110 THERAPEUTIC EXERCISES: CPT | Mod: GP | Performed by: PHYSICAL THERAPIST

## 2018-08-27 PROCEDURE — 97161 PT EVAL LOW COMPLEX 20 MIN: CPT | Mod: GP | Performed by: PHYSICAL THERAPIST

## 2018-08-27 PROCEDURE — 97112 NEUROMUSCULAR REEDUCATION: CPT | Mod: GP | Performed by: PHYSICAL THERAPIST

## 2018-08-27 NOTE — PROGRESS NOTES
"[facility] Initial Evaluation  Subjective:  Patient is a 68 year old female presenting with rehab cervical spine hpi. The history is provided by the patient and a relative. A  was used.           Patient had insidious onset of left shoulder pain about 2-3 months ago, went away and in the last couple weeks moved to the right.  Pain is now constant right upper trapezius and right upper arm, 2/10, describes as \"dull\" (currently on steroid dose pack which has improved her symptoms - was 10/10, today is the last day).  Symptoms increase with turning her head, looking up (prior to steroid), was waking due to pain prior to steroid, reaching overhead.  Symptoms decrease with Tylenol and steroid.  Patient and her  lives with their daughter and her children.  Patient sits most of the day on the sofa, on ipad intermittently through the day (30' at a time). .                  Since onset symptoms are rapidly improving (due to steroid dose pack).        General health as reported by patient is fair.  Pertinent medical history includes:  Diabetes, rheumatoid arthritis and menopausal (recently diagnosed with RA, steroid dose pack and then will be on maintenance steroid).  Medical allergies: no.  Other surgeries include:  Other.  Current medications:  Steroids and pain medication (metformin, cholesterol).  Current occupation is None.        Barriers include:  None as reported by the patient (prior to steroid wasn't able to cook).    Red flags:  None as reported by the patient.                        Objective:  Standing Alignment:    Cervical/Thoracic:  Cervical lordosis decreased and Dowager's hump  Shoulder/UE:  Rounded shoulders                                  Cervical/Thoracic Evaluation    AROM:  AROM Cervical:    Flexion:          WNL  Extension:       75%-guarded  Rotation:         Left: WNL     Right: WNL  Side Bend:      Left:     Right:       Headaches: " "none                         Shoulder Evaluation:  ROM:  AROM:    Flexion:  Left:  WNL    Right:  WNL    Abduction:  Left: WNL   Right:  WNL                  Extension/Internal Rotation:  Left:  T8    Right:  T8                                                     General     ROS  MMT bilateral shoulders and elbows WNL, slight discomfort left shoulder with resisted flexion.    Pretest symptoms sittin/10  Correction of sitting posture with lumbar roll:  No effect  Repeated seated retraction with pt overpressure:  Produce slight \"burn\" posterior cervical, no worse; increase extension AROM to neutral  Seated retraction/extension:  Guarded/difficulty getting proper motion      Assessment/Plan:    Patient is a 68 year old female with left side shoulder complaints.  Pain initially started left upper trap/left shoulder and then moved to right.  History and exam suggestive of cervical derangement vs shoulder issue.  Will start with posture correction and repeated cervical retraction exercise and progress as able.     Patient has the following significant findings with corresponding treatment plan.                Diagnosis 1:  Right shoulder pain    Pain -  self management, education, directional preference exercise and home program  Decreased function - therapeutic activities and home program  Impaired posture - neuro re-education and home program    Therapy Evaluation Codes:   1) History comprised of:   Personal factors that impact the plan of care:      None.    Comorbidity factors that impact the plan of care are:      None.     Medications impacting care: None.  2) Examination of Body Systems comprised of:   Body structures and functions that impact the plan of care:      Cervical spine.   Activity limitations that impact the plan of care are:      Sitting and turning head .  3) Clinical presentation characteristics are:   Stable/Uncomplicated.  4) Decision-Making    Low complexity using standardized patient " assessment instrument and/or measureable assessment of functional outcome.  Cumulative Therapy Evaluation is: Low complexity.    Previous and current functional limitations:  (See Goal Flow Sheet for this information)    Short term and Long term goals: (See Goal Flow Sheet for this information)     Communication ability:  Patient has an  for communication clarity.  Treatment Explanation - The following has been discussed with the patient:   RX ordered/plan of care  Anticipated outcomes  Possible risks and side effects  This patient would benefit from PT intervention to resume normal activities.   Rehab potential is excellent.    Frequency:  1 X week, once daily  Duration:  for 3-4 weeks  Discharge Plan:  Achieve all LTG.  Independent in home treatment program.  Reach maximal therapeutic benefit.    Please refer to the daily flowsheet for treatment today, total treatment time and time spent performing 1:1 timed codes.

## 2018-08-27 NOTE — MR AVS SNAPSHOT
After Visit Summary   8/27/2018    Randall Boland    MRN: [id number]           Patient Information     Date Of Birth          1950        Visit Information        Provider Department      8/27/2018 10:10 AM Marianne Sarah PT [facility] Physical Therapy        Today's Diagnoses     Right shoulder pain    -  1       Follow-ups after your visit        Your next 10 appointments already scheduled     Sep 05, 2018  9:00 AM CDT   (Arrive by 8:45 AM)   Return Visit with Harjit Staley MD   [facility] ([facility])    [address] [place] [address]  [address]  [facility] [address]   [phone]            Sep 06, 2018  6:10 PM CDT   DILIP Extremity with Tsering Herndon PT   [facility] Physical Therapy ([facility]  )    [address]  [facility] [id number] [phone]            Sep 13, 2018  4:30 PM CDT   DILIP Extremity with Tsering Herndon PT   [facility] Physical Therapy ([facility]  )    [address]  [facility] [id number] [phone]              Who to contact     If you have questions or need follow up information about today's clinic visit or your schedule please contact [facility] PH[facility] THERAP[facility] directly at [phone].  Normal or non-critical lab and imaging results will be communicated to you by MyChart, letter or phone within 4 business days after the clinic has received the results. If you do not hear from us within 7 days, please contact the clinic through MyChart or phone. If you have a critical or abnormal lab result, we will notify you by phone as soon as possible.  Submit refill requests through [email or web address] or call your pharmacy and they will forward the refill request to us. Please allow 3 business days for your refill to be completed.          Additional Information About Your  Visit        MyChart Information     Collegebound Bushart gives you secure access to your electronic health record. If you see a primary care provider, you can also send messages to your care team and make appointments. If you have questions, please call your primary care clinic.  If you do not have a primary care provider, please call [phone] and they will assist you.        Care EveryWhere ID     This is your Care EveryWhere ID. This could be used by other organizations to access your [facility] medical records  [id number]         Blood Pressure from Last 3 Encounters:   08/17/18 122/80   06/01/18 138/80   05/23/18 121/76    Weight from Last 3 Encounters:   08/17/18 75 kg (165 lb 6.4 oz)   06/01/18 75.6 kg (166 lb 11.2 oz)   05/23/18 74.8 kg (165 lb)              We Performed the Following     HC PT EVAL, LOW COMPLEXITY     DILIP INITIAL EVAL REPORT     NEUROMUSCULAR RE-EDUCATION     THERAPEUTIC EXERCISES        Primary Care Provider Office Phone # Fax #    Adrianna Singh [phone]       [address] [place] [address]        Equal Access to Services     [facility]AMIL[facility] CATES : Hadii aad ku hadasho Soomaali, waaxda luqadaha, qaybta kaalmada adekatherineyada, irina fuentes . So [facility] [phone].    ATENCIÓN: Si habla español, tiene a paez disposición servicios gratuitos de asistencia lingüística. [facility] [phone].    We comply with applicable federal civil rights laws and [place] laws. We do not discriminate on the basis of race, color, national origin, age, disability, sex, sexual orientation, or gender identity.            Thank you!     Thank you for choosing [facility] PH[facility]ICAL THERAP[facility]  for your care. Our goal is always to provide you with excellent care. Hearing back from our patients is one way we can continue to improve our services. Please take a few minutes to complete the written survey that you may receive in the mail after your visit  with us. Thank you!             Your Updated Medication List - Protect others around you: Learn how to safely use, store and throw away your medicines at www.disposemymeds.org.          This list is accurate as of 8/27/18  8:29 PM.  Always use your most recent med list.                   Brand Name Dispense Instructions for use Diagnosis    aspirin 81 MG chewable tablet     90 tablet    Take 1 tablet (81 mg) by mouth daily    Type 2 diabetes mellitus without complication, without long-term current use of insulin (H)       blood glucose monitoring lancets     100 each    Use to test blood sugar 1 times daily or as directed.    Type 2 diabetes mellitus without complication, without long-term current use of insulin (H)       blood glucose monitoring test strip    no brand specified    100 strip    Use to test blood sugars 1 time daily or as directed    Type 2 diabetes mellitus without complication, without long-term current use of insulin (H)       losartan 25 MG tablet    COZAAR    45 tablet    Take 0.5 tablets (12.5 mg) by mouth daily    Type 2 diabetes mellitus without complication, without long-term current use of insulin (H)       metFORMIN 1000 MG tablet    GLUCOPHAGE    60 tablet    Take 1 tablet (1,000 mg) by mouth 2 times daily (with meals)    Type 2 diabetes mellitus without complication, without long-term current use of insulin (H)       pravastatin 40 MG tablet    PRAVACHOL    90 tablet    Take 1 tablet (40 mg) by mouth daily        * predniSONE 20 MG tablet    DELTASONE    20 tablet    Take 3 tabs (60 mg) by mouth daily x 3 days, 2 tabs (40 mg) daily x 3 days, 1 tab (20 mg) daily x 3 days, then 1/2 tab (10 mg) x 3 days.    Rheumatoid arthritis involving multiple sites with positive rheumatoid factor (H)       * predniSONE 5 MG tablet    DELTASONE    90 tablet    Take 1 tablet (5 mg) by mouth daily    Rheumatoid arthritis involving multiple sites with positive rheumatoid factor (H)       ranitidine 300 MG  tablet    ZANTAC    90 tablet    Take 1 tablet (300 mg) by mouth At Bedtime    Gastroesophageal reflux disease, esophagitis presence not specified       * Notice:  This list has 2 medication(s) that are the same as other medications prescribed for you. Read the directions carefully, and ask your doctor or other care provider to review them with you.

## 2018-11-02 ENCOUNTER — OFFICE VISIT (OUTPATIENT)
Dept: URGENT CARE | Facility: URGENT CARE | Age: 68
End: 2018-11-02
Payer: COMMERCIAL

## 2018-11-02 ENCOUNTER — RADIANT APPOINTMENT (OUTPATIENT)
Dept: GENERAL RADIOLOGY | Facility: CLINIC | Age: 68
End: 2018-11-02
Attending: FAMILY MEDICINE
Payer: COMMERCIAL

## 2018-11-02 ENCOUNTER — TELEPHONE (OUTPATIENT)
Dept: URGENT CARE | Facility: URGENT CARE | Age: 68
End: 2018-11-02

## 2018-11-02 VITALS
RESPIRATION RATE: 16 BRPM | TEMPERATURE: 96 F | HEART RATE: 100 BPM | DIASTOLIC BLOOD PRESSURE: 68 MMHG | SYSTOLIC BLOOD PRESSURE: 108 MMHG

## 2018-11-02 DIAGNOSIS — K56.609 SMALL BOWEL OBSTRUCTION (H): Primary | ICD-10-CM

## 2018-11-02 DIAGNOSIS — G43.A0 CYCLICAL VOMITING WITH NAUSEA, INTRACTABILITY OF VOMITING NOT SPECIFIED: ICD-10-CM

## 2018-11-02 LAB
ANION GAP SERPL CALCULATED.3IONS-SCNC: 9 MMOL/L (ref 3–14)
BUN SERPL-MCNC: 16 MG/DL (ref 7–30)
CALCIUM SERPL-MCNC: 9.6 MG/DL (ref 8.5–10.1)
CHLORIDE SERPL-SCNC: 97 MMOL/L (ref 94–109)
CO2 SERPL-SCNC: 26 MMOL/L (ref 20–32)
CREAT SERPL-MCNC: 0.62 MG/DL (ref 0.52–1.04)
GFR SERPL CREATININE-BSD FRML MDRD: >90 ML/MIN/1.7M2
GLUCOSE SERPL-MCNC: 152 MG/DL (ref 70–99)
POTASSIUM SERPL-SCNC: 3.9 MMOL/L (ref 3.4–5.3)
SODIUM SERPL-SCNC: 132 MMOL/L (ref 133–144)

## 2018-11-02 PROCEDURE — 80048 BASIC METABOLIC PNL TOTAL CA: CPT | Performed by: FAMILY MEDICINE

## 2018-11-02 PROCEDURE — 36415 COLL VENOUS BLD VENIPUNCTURE: CPT | Performed by: FAMILY MEDICINE

## 2018-11-02 PROCEDURE — 99215 OFFICE O/P EST HI 40 MIN: CPT | Performed by: FAMILY MEDICINE

## 2018-11-02 PROCEDURE — 74019 RADEX ABDOMEN 2 VIEWS: CPT | Mod: FY

## 2018-11-02 RX ORDER — ONDANSETRON 4 MG/1
4 TABLET, ORALLY DISINTEGRATING ORAL ONCE
Qty: 1 TABLET | Refills: 0
Start: 2018-11-02 | End: 2019-02-06

## 2018-11-02 NOTE — PROGRESS NOTES
SUBJECTIVE:   Patient's daughter provided translation for the appointment and patient was agreeable with this.     Patient is Randall Boland is a 68 year old female with a history of a cholecystectomy, GERD controlled with Ranitidine, DM Type II controlled with Metformin, ileocecal resection, and intestinal tuberculosis. She is here today with 3 days of non-bilious, non-bloody vomiting and poor PO intake. Patient's daughter explains that the patient started to have an upset stomach starting the night of October 31st 2018. She has been vomiting all day yesterday and that has continued into today. Content has been more yellow in color and daughter also mentions that patient typically has a lot of belching with her GERD, but has been doing so more than usual. Along with the vomiting, she has had diffuse abdominal pain and cramping, she thinks is likely associated with the abdominal pain. She has been unable to take her daily medications (Ranitidine and Metformin) because she has been unable to keep food or liquids down adequately. Patient briefly tolerated 15 mL's of Pepto Bismol but shortly after vomited.     Daughter explains that the patient will have the 24-hour long episodes of vomiting when she tries new foods or is exposed to some new food in her diet. The patient nor the daughter have been able to pinpoint exactly what these foods or food groups are.     Patient has had one small BM today, but has not been passing gas.     Patient has not been on any narcotic medications.   Chief Complaint   Patient presents with     Urgent Care     Pt c/o nausea, stomach pain and vomiting for 3 days      Symptoms are worsening and moderate.    Alleviating factors:belching    Associated symptoms:  Fever: no noted fevers  Diarrhea: None   Stools: decreased stool output   Appetite: decreased  Risk factors: No history of sick contacts, travel, recent antibiotic use, recent hospitalization, recent medication changes and hx of  IBS    Past Medical History:   Diagnosis Date     Obesity (BMI 30-39.9)      Osteopenia      Type 2 diabetes mellitus (H)    .  Current Outpatient Prescriptions   Medication Sig Dispense Refill     aspirin 81 MG chewable tablet Take 1 tablet (81 mg) by mouth daily 90 tablet 2     blood glucose monitoring (ALEX MICROLET) lancets Use to test blood sugar 1 times daily or as directed. 100 each 3     blood glucose monitoring (NO BRAND SPECIFIED) test strip Use to test blood sugars 1 time daily or as directed 100 strip 3     losartan (COZAAR) 25 MG tablet Take 0.5 tablets (12.5 mg) by mouth daily 45 tablet 3     metFORMIN (GLUCOPHAGE) 1000 MG tablet Take 1 tablet (1,000 mg) by mouth 2 times daily (with meals) 60 tablet 2     pravastatin (PRAVACHOL) 40 MG tablet Take 1 tablet (40 mg) by mouth daily 90 tablet 3     predniSONE (DELTASONE) 20 MG tablet Take 3 tabs (60 mg) by mouth daily x 3 days, 2 tabs (40 mg) daily x 3 days, 1 tab (20 mg) daily x 3 days, then 1/2 tab (10 mg) x 3 days. 20 tablet 0     predniSONE (DELTASONE) 5 MG tablet Take 1 tablet (5 mg) by mouth daily 90 tablet 1     ranitidine (ZANTAC) 300 MG tablet Take 1 tablet (300 mg) by mouth At Bedtime 90 tablet 3     Social History   Substance Use Topics     Smoking status: Never Smoker     Smokeless tobacco: Never Used     Alcohol use 0.0 oz/week     0 Standard drinks or equivalent per week      Comment: rare     ROS:  CONSTITUTIONAL:NEGATIVE for fever, chills, change in weight  INTEGUMENTARY/SKIN: NEGATIVE for worrisome rashes, moles or lesions  ENT/MOUTH: NEGATIVE for ear, mouth and throat problems  RESP:NEGATIVE for significant cough or SOB  CV: NEGATIVE for chest pain, palpitations or peripheral edema  GI: NEGATIVE for diarrhea, hematemesis, hematochezia, Hx IBD, Hx IBS and Hx stomach or duodenal ulcer  MUSCULOSKELETAL: NEGATIVE for significant arthralgias or myalgia  NEURO: NEGATIVE for weakness, dizziness or paresthesias    OBJECTIVE:  /68  Pulse  100  Temp 96  F (35.6  C) (Tympanic)  Resp 16  GENERAL APPEARANCE: Patient is ill-appearing and belching in chair. Has emesis bag in hand.   HENT: Pharynx is WNL. Moist mucous membranes.   NECK: supple, nontender, no lymphadenopathy  RESP: lungs clear to auscultation - no rales, rhonchi or wheezes  CV: regular rates and rhythm, normal S1 S2, no murmur noted. Cap refill <2 sec.   ABDOMEN: Diffuse abdominal tenderness with + RLQ with palpation over left side of abdomen, soft, non-distended no HSM or masses and bowel sounds normal. ~8 cm healed, vertical scar from previous abdominal surgery.   Extremities: no peripheral edema or tenderness, peripheral pulses equal and symmetric     Results for orders placed or performed in visit on 11/02/18   Basic metabolic panel  (Ca, Cl, CO2, Creat, Gluc, K, Na, BUN)   Result Value Ref Range    Sodium 132 (L) 133 - 144 mmol/L    Potassium 3.9 3.4 - 5.3 mmol/L    Chloride 97 94 - 109 mmol/L    Carbon Dioxide 26 20 - 32 mmol/L    Anion Gap 9 3 - 14 mmol/L    Glucose 152 (H) 70 - 99 mg/dL    Urea Nitrogen 16 7 - 30 mg/dL    Creatinine 0.62 0.52 - 1.04 mg/dL    GFR Estimate >90 >60 mL/min/1.7m2    GFR Estimate If Black >90 >60 mL/min/1.7m2    Calcium 9.6 8.5 - 10.1 mg/dL     Abdominal x-ray:   FINDINGS: There are multiple dilated air-filled loops of bowel over  the abdomen measuring up to 4.8 cm in width. There are multiple  air-fluid layers present. No definite pneumatosis. No free air under  the diaphragm on the upright view.     Interstitial thickening at the lung bases which could represent  fibrotic changes.         IMPRESSION: Evidence of bowel obstruction with dilated air-filled  loops of bowel and multiple air-fluid layers.     ASSESSMENT/PLAN:  Patient is a 68 year old female with Type II DM, here with 3 days of ongoing vomiting. On my evaluation, the patient does appear to be ill and uncomfortable. Based on history of vomiting after certain unknown food exposures (per  daughter) and this episode consistent with those in the past, I felt this could be due to food allergy. Vomiting related to GERD was also considered but she seems normally well-controlled with Ranitidine 300 mg daily and so thought unlikely. Infectious diarrhea also considered but patient not having diarrhea or bloody diarrhea, and rather, having infrequent bowel movements. Gastritis may be likely with diffuse abdominal tenderness and hx of GERD, although no history of H. Pylori or chronic NSAID use. Small bowel obstruction was very likely as patient was having diffuse abdominal pain, bloating, and not passing gas at all today. BMP ordered to determine GLU level considering diabetic and electrolyte status, and this was 152. AXR ordered because patient not passing gas today and this confirmed small bowel obstruction per consult with radiology. Zofran ODT 4 mg x1 given for nausea.     (K56.609) Small bowel obstruction (H)  (primary encounter diagnosis)  Comment:   Plan: ondansetron (ZOFRAN ODT) 4 MG ODT tab, XR         Abdomen 2 Views, Basic metabolic panel  (Ca,         Cl, CO2, Creat, Gluc, K, Na, BUN)            (G43.A0) Cyclical vomiting with nausea, intractability of vomiting not specified  Comment:   Plan: ondansetron (ZOFRAN ODT) 4 MG ODT tab, XR         Abdomen 2 Views, Basic metabolic panel  (Ca,         Cl, CO2, Creat, Gluc, K, Na, BUN)             - After radiology consult, patient sent to ED by own car. Nursing triage was contacted.     - Patient and daughter left agreeable to the plan and all questions were answered.         Patient seen and examined.  The above note was reviewed.  I agree with the assessment and plan    Addendum I did discuss this at length with the patient and expressed to them that this was a serious problem with a bowel obstruction.    They were agreeable to go to the emergency room ASAP for pain control as well as control of the nausea and vomiting and circulation

## 2018-11-02 NOTE — MR AVS SNAPSHOT
After Visit Summary   11/2/2018    Randall Boland    MRN: [id number]           Patient Information     Date Of Birth          1950        Visit Information        Provider Department      11/2/2018 2:40 PM Jaspal Vann MD [facility]        Today's Diagnoses     Small bowel obstruction (H)    -  1    Cyclical vomiting with nausea, intractability of vomiting not specified           Follow-ups after your visit        Your next 10 appointments already scheduled     Nov 21, 2018  1:00 PM CST   MyChart Long with Adrianna Singh MD   [facility]    [address] [address] [phone]              Who to contact     If you have questions or need follow up information about today's clinic visit or your schedule please contact [facility] directly at [phone].  Normal or non-critical lab and imaging results will be communicated to you by MyChart, letter or phone within 4 business days after the clinic has received the results. If you do not hear from us within 7 days, please contact the clinic through [facility] or phone. If you have a critical or abnormal lab result, we will notify you by phone as soon as possible.  Submit refill requests through [employer] or call your pharmacy and they will forward the refill request to us. Please allow 3 business days for your refill to be completed.          Additional Information About Your Visit        MyChart Information     [employer] gives you secure access to your electronic health record. If you see a primary care provider, you can also send messages to your care team and make appointments. If you have questions, please call your primary care clinic.  If you do not have a primary care provider, please call [phone] and they will assist you.        Care EveryWhere ID     This is your Care EveryWhere ID. This could be  used by other organizations to access your [facility] medical records  [id number]        Your Vitals Were     Pulse Temperature Respirations             100 96  F (35.6  C) (Tympanic) 16          Blood Pressure from Last 3 Encounters:   11/08/18 134/64   11/06/18 124/82   11/02/18 108/68    Weight from Last 3 Encounters:   11/07/18 164 lb 10.9 oz (74.7 kg)   11/06/18 166 lb 1.6 oz (75.3 kg)   08/17/18 165 lb 6.4 oz (75 kg)              We Performed the Following     Basic metabolic panel  (Ca, Cl, CO2, Creat, Gluc, K, Na, BUN)          Today's Medication Changes          These changes are accurate as of 11/2/18 11:59 PM.  If you have any questions, ask your nurse or doctor.               Start taking these medicines.        Dose/Directions    ondansetron 4 MG ODT tab   Commonly known as:  ZOFRAN ODT   Used for:  Cyclical vomiting with nausea, intractability of vomiting not specified, Small bowel obstruction (H)   Started by:  Jaspal Vann MD        Dose:  4 mg   Take 1 tablet (4 mg) by mouth once for 1 dose   Quantity:  1 tablet   Refills:  0            Where to get your medicines      Some of these will need a paper prescription and others can be bought over the counter.  Ask your nurse if you have questions.     You don't need a prescription for these medications     ondansetron 4 MG ODT tab                Primary Care Provider Office Phone # Fax #    Adrianna Singh [phone]       [address] [place] [address]        Equal Access to Services     HEATHER CATES AH: Hadii isabell ku marco Soomaali, waaxda luqadaha, qaybta kaalmada adeegyada, waxay idiin hayaan adekatherine banda. So [facility] [phone].    ATENCIÓN: Si habla español, tiene a paez disposición servicios gratuitos de asistencia lingüística. Llame al [phone].    We comply with applicable federal civil rights laws and [place] laws. We do not discriminate on the basis of race, color, national origin, age, disability, sex,  sexual orientation, or gender identity.            Thank you!     Thank you for choosing [facility]  for your care. Our goal is always to provide you with excellent care. Hearing back from our patients is one way we can continue to improve our services. Please take a few minutes to complete the written survey that you may receive in the mail after your visit with us. Thank you!             Your Updated Medication List - Protect others around you: Learn how to safely use, store and throw away your medicines at www.disposemymeds.org.          This list is accurate as of 11/2/18 11:59 PM.  Always use your most recent med list.                   Brand Name Dispense Instructions for use Diagnosis    aspirin 81 MG chewable tablet     90 tablet    Take 1 tablet (81 mg) by mouth daily    Type 2 diabetes mellitus without complication, without long-term current use of insulin (H)       blood glucose monitoring lancets     100 each    Use to test blood sugar 1 times daily or as directed.    Type 2 diabetes mellitus without complication, without long-term current use of insulin (H)       blood glucose monitoring test strip    no brand specified    100 strip    Use to test blood sugars 1 time daily or as directed    Type 2 diabetes mellitus without complication, without long-term current use of insulin (H)       losartan 25 MG tablet    COZAAR    45 tablet    Take 0.5 tablets (12.5 mg) by mouth daily    Type 2 diabetes mellitus without complication, without long-term current use of insulin (H)       metFORMIN 1000 MG tablet    GLUCOPHAGE    60 tablet    Take 1 tablet (1,000 mg) by mouth 2 times daily (with meals)    Type 2 diabetes mellitus without complication, without long-term current use of insulin (H)       ondansetron 4 MG ODT tab    ZOFRAN ODT    1 tablet    Take 1 tablet (4 mg) by mouth once for 1 dose    Cyclical vomiting with nausea, intractability of vomiting not specified, Small bowel  obstruction (H)       pravastatin 40 MG tablet    PRAVACHOL    90 tablet    Take 1 tablet (40 mg) by mouth daily        predniSONE 5 MG tablet    DELTASONE    90 tablet    Take 1 tablet (5 mg) by mouth daily    Rheumatoid arthritis involving multiple sites with positive rheumatoid factor (H)       ranitidine 300 MG tablet    ZANTAC    90 tablet    Take 1 tablet (300 mg) by mouth At Bedtime    Gastroesophageal reflux disease, esophagitis presence not specified

## 2018-11-02 NOTE — TELEPHONE ENCOUNTER
FNA received call on provider call back line from Dr. Abrams (radiologist) looking to speak with the provider who saw patient this afternoon.     FNA called [facility] and was connected with Dr. Vann. FNA gave direct phone number to call Dr. Abrams back.     Denia Vega RN  [facility]

## 2018-11-06 ENCOUNTER — MYC MEDICAL ADVICE (OUTPATIENT)
Dept: INTERNAL MEDICINE | Facility: CLINIC | Age: 68
End: 2018-11-06

## 2018-11-06 ENCOUNTER — OFFICE VISIT (OUTPATIENT)
Dept: INTERNAL MEDICINE | Facility: CLINIC | Age: 68
End: 2018-11-06
Payer: COMMERCIAL

## 2018-11-06 ENCOUNTER — HOSPITAL ENCOUNTER (INPATIENT)
Facility: CLINIC | Age: 68
LOS: 2 days | Discharge: HOME | End: 2018-11-08
Attending: EMERGENCY MEDICINE | Admitting: INTERNAL MEDICINE
Payer: COMMERCIAL

## 2018-11-06 ENCOUNTER — APPOINTMENT (OUTPATIENT)
Dept: CT IMAGING | Facility: CLINIC | Age: 68
End: 2018-11-06
Attending: EMERGENCY MEDICINE
Payer: COMMERCIAL

## 2018-11-06 VITALS
SYSTOLIC BLOOD PRESSURE: 124 MMHG | WEIGHT: 166.1 LBS | DIASTOLIC BLOOD PRESSURE: 82 MMHG | HEART RATE: 113 BPM | OXYGEN SATURATION: 95 % | BODY MASS INDEX: 30.38 KG/M2 | TEMPERATURE: 97.9 F

## 2018-11-06 DIAGNOSIS — K56.609 SBO (SMALL BOWEL OBSTRUCTION) (H): ICD-10-CM

## 2018-11-06 DIAGNOSIS — R10.84 ABDOMINAL PAIN, GENERALIZED: Primary | ICD-10-CM

## 2018-11-06 DIAGNOSIS — R11.0 NAUSEA: ICD-10-CM

## 2018-11-06 LAB
ALBUMIN SERPL-MCNC: 3.2 G/DL (ref 3.4–5)
ALBUMIN UR-MCNC: 10 MG/DL
ALP SERPL-CCNC: 76 U/L (ref 40–150)
ALT SERPL W P-5'-P-CCNC: 48 U/L (ref 0–50)
ANION GAP SERPL CALCULATED.3IONS-SCNC: 8 MMOL/L (ref 3–14)
APPEARANCE UR: ABNORMAL
AST SERPL W P-5'-P-CCNC: 33 U/L (ref 0–45)
BASOPHILS # BLD AUTO: 0 10E9/L (ref 0–0.2)
BASOPHILS NFR BLD AUTO: 0.3 %
BILIRUB SERPL-MCNC: 0.4 MG/DL (ref 0.2–1.3)
BILIRUB UR QL STRIP: NEGATIVE
BUN SERPL-MCNC: 7 MG/DL (ref 7–30)
CALCIUM SERPL-MCNC: 9.3 MG/DL (ref 8.5–10.1)
CAOX CRY #/AREA URNS HPF: ABNORMAL /HPF
CHLORIDE SERPL-SCNC: 105 MMOL/L (ref 94–109)
CO2 SERPL-SCNC: 25 MMOL/L (ref 20–32)
COLOR UR AUTO: YELLOW
CREAT SERPL-MCNC: 0.52 MG/DL (ref 0.52–1.04)
DIFFERENTIAL METHOD BLD: NORMAL
EOSINOPHIL # BLD AUTO: 0.1 10E9/L (ref 0–0.7)
EOSINOPHIL NFR BLD AUTO: 1.2 %
ERYTHROCYTE [DISTWIDTH] IN BLOOD BY AUTOMATED COUNT: 12.9 % (ref 10–15)
GFR SERPL CREATININE-BSD FRML MDRD: >90 ML/MIN/1.7M2
GLUCOSE BLDC GLUCOMTR-MCNC: 97 MG/DL (ref 70–99)
GLUCOSE SERPL-MCNC: 119 MG/DL (ref 70–99)
GLUCOSE UR STRIP-MCNC: NEGATIVE MG/DL
HCT VFR BLD AUTO: 36 % (ref 35–47)
HGB BLD-MCNC: 12.3 G/DL (ref 11.7–15.7)
HGB UR QL STRIP: NEGATIVE
IMM GRANULOCYTES # BLD: 0 10E9/L (ref 0–0.4)
IMM GRANULOCYTES NFR BLD: 0.3 %
KETONES UR STRIP-MCNC: NEGATIVE MG/DL
LEUKOCYTE ESTERASE UR QL STRIP: ABNORMAL
LIPASE SERPL-CCNC: 99 U/L (ref 73–393)
LYMPHOCYTES # BLD AUTO: 2.1 10E9/L (ref 0.8–5.3)
LYMPHOCYTES NFR BLD AUTO: 26.5 %
MCH RBC QN AUTO: 29.8 PG (ref 26.5–33)
MCHC RBC AUTO-ENTMCNC: 34.2 G/DL (ref 31.5–36.5)
MCV RBC AUTO: 87 FL (ref 78–100)
MONOCYTES # BLD AUTO: 0.7 10E9/L (ref 0–1.3)
MONOCYTES NFR BLD AUTO: 9.1 %
MUCOUS THREADS #/AREA URNS LPF: PRESENT /LPF
NEUTROPHILS # BLD AUTO: 4.9 10E9/L (ref 1.6–8.3)
NEUTROPHILS NFR BLD AUTO: 62.6 %
NITRATE UR QL: NEGATIVE
NRBC # BLD AUTO: 0 10*3/UL
NRBC BLD AUTO-RTO: 0 /100
PH UR STRIP: 5.5 PH (ref 5–7)
PLATELET # BLD AUTO: 305 10E9/L (ref 150–450)
POTASSIUM SERPL-SCNC: 3.8 MMOL/L (ref 3.4–5.3)
PROT SERPL-MCNC: 7.7 G/DL (ref 6.8–8.8)
RBC # BLD AUTO: 4.13 10E12/L (ref 3.8–5.2)
RBC #/AREA URNS AUTO: 2 /HPF (ref 0–2)
SODIUM SERPL-SCNC: 138 MMOL/L (ref 133–144)
SOURCE: ABNORMAL
SP GR UR STRIP: 1.02 (ref 1–1.03)
SQUAMOUS #/AREA URNS AUTO: 4 /HPF (ref 0–1)
UROBILINOGEN UR STRIP-MCNC: 2 MG/DL (ref 0–2)
WBC # BLD AUTO: 7.8 10E9/L (ref 4–11)
WBC #/AREA URNS AUTO: 14 /HPF (ref 0–5)

## 2018-11-06 PROCEDURE — 12000000 ZZH R&B MED SURG/OB

## 2018-11-06 PROCEDURE — 83036 HEMOGLOBIN GLYCOSYLATED A1C: CPT | Performed by: EMERGENCY MEDICINE

## 2018-11-06 PROCEDURE — 74177 CT ABD & PELVIS W/CONTRAST: CPT

## 2018-11-06 PROCEDURE — 87186 SC STD MICRODIL/AGAR DIL: CPT | Performed by: EMERGENCY MEDICINE

## 2018-11-06 PROCEDURE — 25000128 H RX IP 250 OP 636: Performed by: EMERGENCY MEDICINE

## 2018-11-06 PROCEDURE — 99285 EMERGENCY DEPT VISIT HI MDM: CPT | Mod: 25

## 2018-11-06 PROCEDURE — 25000125 ZZHC RX 250: Performed by: EMERGENCY MEDICINE

## 2018-11-06 PROCEDURE — C9113 INJ PANTOPRAZOLE SODIUM, VIA: HCPCS | Performed by: INTERNAL MEDICINE

## 2018-11-06 PROCEDURE — 96375 TX/PRO/DX INJ NEW DRUG ADDON: CPT

## 2018-11-06 PROCEDURE — 99207 ZZC OFFICE-HOSPITAL ADMIT: CPT | Performed by: INTERNAL MEDICINE

## 2018-11-06 PROCEDURE — 25000128 H RX IP 250 OP 636: Performed by: INTERNAL MEDICINE

## 2018-11-06 PROCEDURE — 83690 ASSAY OF LIPASE: CPT | Performed by: EMERGENCY MEDICINE

## 2018-11-06 PROCEDURE — 96374 THER/PROPH/DIAG INJ IV PUSH: CPT | Mod: 59

## 2018-11-06 PROCEDURE — 99223 1ST HOSP IP/OBS HIGH 75: CPT | Mod: AI | Performed by: INTERNAL MEDICINE

## 2018-11-06 PROCEDURE — 87088 URINE BACTERIA CULTURE: CPT | Performed by: EMERGENCY MEDICINE

## 2018-11-06 PROCEDURE — 80053 COMPREHEN METABOLIC PANEL: CPT | Performed by: EMERGENCY MEDICINE

## 2018-11-06 PROCEDURE — 00000146 ZZHCL STATISTIC GLUCOSE BY METER IP

## 2018-11-06 PROCEDURE — 96361 HYDRATE IV INFUSION ADD-ON: CPT

## 2018-11-06 PROCEDURE — 87086 URINE CULTURE/COLONY COUNT: CPT | Performed by: EMERGENCY MEDICINE

## 2018-11-06 PROCEDURE — 85025 COMPLETE CBC W/AUTO DIFF WBC: CPT | Performed by: EMERGENCY MEDICINE

## 2018-11-06 PROCEDURE — 81001 URINALYSIS AUTO W/SCOPE: CPT | Performed by: EMERGENCY MEDICINE

## 2018-11-06 RX ORDER — LIDOCAINE 40 MG/G
CREAM TOPICAL
Status: DISCONTINUED | OUTPATIENT
Start: 2018-11-06 | End: 2018-11-08 | Stop reason: HOSPADM

## 2018-11-06 RX ORDER — IOPAMIDOL 755 MG/ML
83 INJECTION, SOLUTION INTRAVASCULAR ONCE
Status: COMPLETED | OUTPATIENT
Start: 2018-11-06 | End: 2018-11-06

## 2018-11-06 RX ORDER — HYDROMORPHONE HYDROCHLORIDE 1 MG/ML
.3-.5 INJECTION, SOLUTION INTRAMUSCULAR; INTRAVENOUS; SUBCUTANEOUS
Status: DISCONTINUED | OUTPATIENT
Start: 2018-11-06 | End: 2018-11-08 | Stop reason: HOSPADM

## 2018-11-06 RX ORDER — POTASSIUM CHLORIDE 1500 MG/1
20-40 TABLET, EXTENDED RELEASE ORAL
Status: DISCONTINUED | OUTPATIENT
Start: 2018-11-06 | End: 2018-11-08 | Stop reason: HOSPADM

## 2018-11-06 RX ORDER — NICOTINE POLACRILEX 4 MG
15-30 LOZENGE BUCCAL
Status: DISCONTINUED | OUTPATIENT
Start: 2018-11-06 | End: 2018-11-08 | Stop reason: HOSPADM

## 2018-11-06 RX ORDER — ONDANSETRON 2 MG/ML
4 INJECTION INTRAMUSCULAR; INTRAVENOUS EVERY 6 HOURS PRN
Status: DISCONTINUED | OUTPATIENT
Start: 2018-11-06 | End: 2018-11-08 | Stop reason: HOSPADM

## 2018-11-06 RX ORDER — POTASSIUM CHLORIDE 7.45 MG/ML
10 INJECTION INTRAVENOUS
Status: DISCONTINUED | OUTPATIENT
Start: 2018-11-06 | End: 2018-11-08 | Stop reason: HOSPADM

## 2018-11-06 RX ORDER — PROCHLORPERAZINE 25 MG
12.5 SUPPOSITORY, RECTAL RECTAL EVERY 12 HOURS PRN
Status: DISCONTINUED | OUTPATIENT
Start: 2018-11-06 | End: 2018-11-08 | Stop reason: HOSPADM

## 2018-11-06 RX ORDER — ONDANSETRON 2 MG/ML
4 INJECTION INTRAMUSCULAR; INTRAVENOUS EVERY 30 MIN PRN
Status: DISCONTINUED | OUTPATIENT
Start: 2018-11-06 | End: 2018-11-06

## 2018-11-06 RX ORDER — NALOXONE HYDROCHLORIDE 0.4 MG/ML
.1-.4 INJECTION, SOLUTION INTRAMUSCULAR; INTRAVENOUS; SUBCUTANEOUS
Status: DISCONTINUED | OUTPATIENT
Start: 2018-11-06 | End: 2018-11-08 | Stop reason: HOSPADM

## 2018-11-06 RX ORDER — SODIUM CHLORIDE, SODIUM LACTATE, POTASSIUM CHLORIDE, CALCIUM CHLORIDE 600; 310; 30; 20 MG/100ML; MG/100ML; MG/100ML; MG/100ML
INJECTION, SOLUTION INTRAVENOUS CONTINUOUS
Status: DISCONTINUED | OUTPATIENT
Start: 2018-11-06 | End: 2018-11-08

## 2018-11-06 RX ORDER — PROCHLORPERAZINE MALEATE 5 MG
5 TABLET ORAL EVERY 6 HOURS PRN
Status: DISCONTINUED | OUTPATIENT
Start: 2018-11-06 | End: 2018-11-08 | Stop reason: HOSPADM

## 2018-11-06 RX ORDER — MAGNESIUM SULFATE HEPTAHYDRATE 40 MG/ML
4 INJECTION, SOLUTION INTRAVENOUS EVERY 4 HOURS PRN
Status: DISCONTINUED | OUTPATIENT
Start: 2018-11-06 | End: 2018-11-08 | Stop reason: HOSPADM

## 2018-11-06 RX ORDER — POTASSIUM CHLORIDE 1.5 G/1.58G
20-40 POWDER, FOR SOLUTION ORAL
Status: DISCONTINUED | OUTPATIENT
Start: 2018-11-06 | End: 2018-11-08 | Stop reason: HOSPADM

## 2018-11-06 RX ORDER — POTASSIUM CHLORIDE 29.8 MG/ML
20 INJECTION INTRAVENOUS
Status: DISCONTINUED | OUTPATIENT
Start: 2018-11-06 | End: 2018-11-08 | Stop reason: HOSPADM

## 2018-11-06 RX ORDER — ONDANSETRON 4 MG/1
4 TABLET, ORALLY DISINTEGRATING ORAL EVERY 6 HOURS PRN
Status: DISCONTINUED | OUTPATIENT
Start: 2018-11-06 | End: 2018-11-08 | Stop reason: HOSPADM

## 2018-11-06 RX ORDER — POTASSIUM CL/LIDO/0.9 % NACL 10MEQ/0.1L
10 INTRAVENOUS SOLUTION, PIGGYBACK (ML) INTRAVENOUS
Status: DISCONTINUED | OUTPATIENT
Start: 2018-11-06 | End: 2018-11-08 | Stop reason: HOSPADM

## 2018-11-06 RX ORDER — HYDROMORPHONE HYDROCHLORIDE 1 MG/ML
0.5 INJECTION, SOLUTION INTRAMUSCULAR; INTRAVENOUS; SUBCUTANEOUS ONCE
Status: COMPLETED | OUTPATIENT
Start: 2018-11-06 | End: 2018-11-06

## 2018-11-06 RX ORDER — DEXTROSE MONOHYDRATE 25 G/50ML
25-50 INJECTION, SOLUTION INTRAVENOUS
Status: DISCONTINUED | OUTPATIENT
Start: 2018-11-06 | End: 2018-11-08 | Stop reason: HOSPADM

## 2018-11-06 RX ADMIN — SODIUM CHLORIDE 1000 ML: 9 INJECTION, SOLUTION INTRAVENOUS at 18:37

## 2018-11-06 RX ADMIN — Medication 0.5 MG: at 19:17

## 2018-11-06 RX ADMIN — ONDANSETRON 4 MG: 2 INJECTION INTRAMUSCULAR; INTRAVENOUS at 18:38

## 2018-11-06 RX ADMIN — IOPAMIDOL 83 ML: 755 INJECTION, SOLUTION INTRAVENOUS at 20:13

## 2018-11-06 RX ADMIN — PANTOPRAZOLE SODIUM 40 MG: 40 INJECTION, POWDER, FOR SOLUTION INTRAVENOUS at 23:01

## 2018-11-06 RX ADMIN — SODIUM CHLORIDE, PRESERVATIVE FREE 65 ML: 5 INJECTION INTRAVENOUS at 20:13

## 2018-11-06 RX ADMIN — SODIUM CHLORIDE, POTASSIUM CHLORIDE, SODIUM LACTATE AND CALCIUM CHLORIDE: 600; 310; 30; 20 INJECTION, SOLUTION INTRAVENOUS at 23:01

## 2018-11-06 ASSESSMENT — ENCOUNTER SYMPTOMS
ABDOMINAL DISTENTION: 1
ABDOMINAL PAIN: 1
DIFFICULTY URINATING: 0
VOMITING: 0
FEVER: 0
DYSURIA: 0

## 2018-11-06 NOTE — TELEPHONE ENCOUNTER
Patient's daughter is unable to bring patient in by noon but 2:15 pm slot opened up on PCPs schedule for today and they would be able to make it by then. Scheduled.

## 2018-11-06 NOTE — IP AVS SNAPSHOT
MRN:[id number]                      After Visit Summary   11/6/2018    Randall Boland    MRN: [id number]           Thank you!     Thank you for choosing [facility] for your care. Our goal is always to provide you with excellent care. Hearing back from our patients is one way we can continue to improve our services. Please take a few minutes to complete the written survey that you may receive in the mail after you visit with us. Thank you!        Patient Information     Date Of Birth          1950        Designated Caregiver       Most Recent Value    Caregiver    Will someone help with your care after discharge? yes    Name of designated caregiver rebecca    Phone number of caregiver chart    Caregiver address chart      About your hospital stay     You were admitted on:  November 6, 2018 You last received care in the:  [facility] [address] [facility]    You were discharged on:  November 8, 2018        Reason for your hospital stay       SBO                  Who to Call     For medical emergencies, please call 911.  For non-urgent questions about your medical care, please call your primary care provider or clinic, [phone]          Attending Provider     Provider Specialty    Carlota Reveles MD Emergency Medicine    Becerra, Rocky Miller MD Internal Medicine       Primary Care Provider Office Phone # Fax #    Adrianna Signh [phone]      After Care Instructions     Activity       Your activity upon discharge: activity as tolerated            Diet       regular                  Follow-up Appointments     Follow-up and recommended labs and tests        Follow up with primary care provider, Adrianna Singh, within 7 days for hospital follow- up.  No follow up labs or test are needed.  F/u with rheumatology in 2-4 weeks (RA)                  Pending Results     No orders found from 11/4/2018 to 11/7/2018.            Statement of Approval     Ordered           "11/08/18 1435  I have reviewed and agree with all the recommendations and orders detailed in this document.  EFFECTIVE NOW     Approved and electronically signed by:  Leni Roa MD             Admission Information     Date & Time Provider Department Dept. Phone    11/6/2018 Rocky Becerra MD [facility] [address] [facility] [phone]      Your Vitals Were     Blood Pressure Pulse Temperature Respirations Height Weight    134/64 (BP Location: Left arm) 84 98.1  F (36.7  C) (Oral) 16 1.6 m (5' 3\") 74.7 kg (164 lb 10.9 oz)    Pulse Oximetry BMI (Body Mass Index)                97% 29.17 kg/m2          MyChart Information     [employer] gives you secure access to your electronic health record. If you see a primary care provider, you can also send messages to your care team and make appointments. If you have questions, please call your primary care clinic.  If you do not have a primary care provider, please call [phone] and they will assist you.        Care EveryWhere ID     This is your Care EveryWhere ID. This could be used by other organizations to access your [facility] medical records  [id number]        Equal Access to Services     HEATHER CATES AH: Hadii isabell Cool, waaxda luyaima, qaybta kaalmada mikaela, irina banda. So [facility] [phone].    ATENCIÓN: Si habla español, tiene a paez disposición servicios gratuitos de asistencia lingüística. Llame al [phone].    We comply with applicable federal civil rights laws and [place] laws. We do not discriminate on the basis of race, color, national origin, age, disability, sex, sexual orientation, or gender identity.               Review of your medicines      START taking        Dose / Directions    ondansetron 4 MG tablet   Commonly known as:  ZOFRAN        Dose:  4 mg   Take 1 tablet (4 mg) by mouth every 8 hours as needed for nausea   Quantity:  18 tablet   Refills:  0         CONTINUE these " medicines which have NOT CHANGED        Dose / Directions    aspirin 81 MG chewable tablet   Used for:  Type 2 diabetes mellitus without complication, without long-term current use of insulin (H)        Dose:  81 mg   Take 1 tablet (81 mg) by mouth daily   Quantity:  90 tablet   Refills:  2       blood glucose monitoring lancets   Used for:  Type 2 diabetes mellitus without complication, without long-term current use of insulin (H)        Use to test blood sugar 1 times daily or as directed.   Quantity:  100 each   Refills:  3       blood glucose monitoring test strip   Commonly known as:  no brand specified   Used for:  Type 2 diabetes mellitus without complication, without long-term current use of insulin (H)        Use to test blood sugars 1 time daily or as directed   Quantity:  100 strip   Refills:  3       losartan 25 MG tablet   Commonly known as:  COZAAR   Used for:  Type 2 diabetes mellitus without complication, without long-term current use of insulin (H)        Dose:  12.5 mg   Take 0.5 tablets (12.5 mg) by mouth daily   Quantity:  45 tablet   Refills:  3       metFORMIN 1000 MG tablet   Commonly known as:  GLUCOPHAGE   Used for:  Type 2 diabetes mellitus without complication, without long-term current use of insulin (H)        Dose:  1000 mg   Take 1 tablet (1,000 mg) by mouth 2 times daily (with meals)   Quantity:  60 tablet   Refills:  2       pravastatin 40 MG tablet   Commonly known as:  PRAVACHOL        Dose:  40 mg   Take 1 tablet (40 mg) by mouth daily   Quantity:  90 tablet   Refills:  3       predniSONE 5 MG tablet   Commonly known as:  DELTASONE   Used for:  Rheumatoid arthritis involving multiple sites with positive rheumatoid factor (H)        Dose:  5 mg   Take 1 tablet (5 mg) by mouth daily   Quantity:  90 tablet   Refills:  1       ranitidine 300 MG tablet   Commonly known as:  ZANTAC   Used for:  Gastroesophageal reflux disease, esophagitis presence not specified        Dose:  300 mg    Take 1 tablet (300 mg) by mouth At Bedtime   Quantity:  90 tablet   Refills:  3            Where to get your medicines      These medications were sent to [facility] [place], [place] - [address].  [address] [address]     Phone:  [phone]     ondansetron 4 MG tablet                Protect others around you: Learn how to safely use, store and throw away your medicines at www.disposemymeds.org.             Medication List: This is a list of all your medications and when to take them. Check marks below indicate your daily home schedule. Keep this list as a reference.      Medications           Morning Afternoon Evening Bedtime As Needed    aspirin 81 MG chewable tablet   Take 1 tablet (81 mg) by mouth daily                                blood glucose monitoring lancets   Use to test blood sugar 1 times daily or as directed.                                blood glucose monitoring test strip   Commonly known as:  no brand specified   Use to test blood sugars 1 time daily or as directed                                losartan 25 MG tablet   Commonly known as:  COZAAR   Take 0.5 tablets (12.5 mg) by mouth daily                                metFORMIN 1000 MG tablet   Commonly known as:  GLUCOPHAGE   Take 1 tablet (1,000 mg) by mouth 2 times daily (with meals)                                ondansetron 4 MG tablet   Commonly known as:  ZOFRAN   Take 1 tablet (4 mg) by mouth every 8 hours as needed for nausea                                pravastatin 40 MG tablet   Commonly known as:  PRAVACHOL   Take 1 tablet (40 mg) by mouth daily                                predniSONE 5 MG tablet   Commonly known as:  DELTASONE   Take 1 tablet (5 mg) by mouth daily                                ranitidine 300 MG tablet   Commonly known as:  ZANTAC   Take 1 tablet (300 mg) by mouth At Bedtime

## 2018-11-06 NOTE — IP AVS SNAPSHOT
[facility] [address] [facility]    [address] [place] [address]    Phone:  [phone]                                       After Visit Summary   11/6/2018    Randall Boland    MRN: [id number]           After Visit Summary Signature Page     I have received my discharge instructions, and my questions have been answered. I have discussed any challenges I see with this plan with the nurse or doctor.    ..........................................................................................................................................  Patient/Patient Representative Signature      ..........................................................................................................................................  Patient Representative Print Name and Relationship to Patient    ..................................................               ................................................  Date                                   Time    ..........................................................................................................................................  Reviewed by Signature/Title    ...................................................              ..............................................  Date                                               Time          22EPIC Rev 08/18

## 2018-11-06 NOTE — PROGRESS NOTES
SUBJECTIVE:                                                      HPI: Randall Boland is a pleasant 68 year old female who presents for UC follow-up:    Accompanied by daughter who helps translate.    Was seen in UC 11/2/18 for diffuse abdominal pain, nausea, and vomiting. Has had abdominal pain and nausea off and on frequently in the past several months-year but it has always resolved in a day or less. Symptoms at that time has been ongoing for ~2-3 days and her vomiting was different than prior presentations.    Abdominal XR significant for SBO. Patient directed to ER for further care, but declined.  Has been trying to manage her symptoms on her own with rest and clear fluids.  She has been passing a little bit of gas and small stools since her UC visit.  She is drinking a small amount of fluid without vomiting. No significant solid food intake.  Her abdominal pain persists, however, and she feels that she is getting more bloated over time.    PSH significant for exploratory laparotomy with incidental cholecystectomy ~ 25 years ago for abdominal pain.  Patient continued to have abdominal pain and was diagnosed with intestinal tuberculosis for which she completed treatment and abdominal pain resolved until recently.    The medication, allergy, and problem lists have been reviewed and updated as appropriate.       OBJECTIVE:                                                      /82  Pulse 113  Temp 97.9  F (36.6  C) (Oral)  Wt 166 lb 1.6 oz (75.3 kg)  SpO2 95%  BMI 30.38 kg/m2  Constitutional: uncomfortable appearing  Gastrointestinal: soft, mild-moderately distended, and bowel sounds present, but tender to palpation throughout       ASSESSMENT/PLAN:                                                      (R10.84) Abdominal pain, generalized  (primary encounter diagnosis)  (R11.0) Nausea  (K56.609) SBO (small bowel obstruction) (H)  Plan: patient directed to the ER for further evaluation and treatment; do not  think this can be treated appropriately as an outpatient.    The instructions on the AVS were discussed and explained to the patient. Patient expressed understanding of instructions.    (Chart documentation was completed, in part, with [employer] voice-recognition software. Even though reviewed, some grammatical, spelling, and word errors may remain.)    Adrianna Singh MD   [address] [address]  T: [phone], F: [phone]

## 2018-11-06 NOTE — MR AVS SNAPSHOT
After Visit Summary   11/6/2018    Randall Boland    MRN: [id number]           Patient Information     Date Of Birth          1950        Visit Information        Provider Department      11/6/2018 2:15 PM Adrianna Singh MD [facility]        Today's Diagnoses     Abdominal pain, generalized    -  1    Nausea        SBO (small bowel obstruction) (H)           Follow-ups after your visit        Follow-up notes from your care team     Return in about 3 months (around 2/6/2019) for Diabetes follow-up (earlier as needed).      Who to contact     If you have questions or need follow up information about today's clinic visit or your schedule please contact [facility] directly at [phone].  Normal or non-critical lab and imaging results will be communicated to you by MyChart, letter or phone within 4 business days after the clinic has received the results. If you do not hear from us within 7 days, please contact the clinic through [employer] or phone. If you have a critical or abnormal lab result, we will notify you by phone as soon as possible.  Submit refill requests through [employer] or call your pharmacy and they will forward the refill request to us. Please allow 3 business days for your refill to be completed.          Additional Information About Your Visit        MyChart Information     [employer] gives you secure access to your electronic health record. If you see a primary care provider, you can also send messages to your care team and make appointments. If you have questions, please call your primary care clinic.  If you do not have a primary care provider, please call [phone] and they will assist you.        Care EveryWhere ID     This is your Care EveryWhere ID. This could be used by other organizations to access your [facility] medical records  [id number]        Your Vitals Were     Pulse Temperature Pulse Oximetry BMI (Body Mass Index)           113 97.9  F (36.6  C) (Oral) 95% 30.38 kg/m2         Blood Pressure from Last 3 Encounters:   11/06/18 124/82   11/02/18 108/68   08/17/18 122/80    Weight from Last 3 Encounters:   11/06/18 166 lb 1.6 oz (75.3 kg)   08/17/18 165 lb 6.4 oz (75 kg)   06/01/18 166 lb 11.2 oz (75.6 kg)              Today, you had the following     No orders found for display       Primary Care Provider Office Phone # Fax #    Adrianna Singh [phone]       [address] [facility] [address]        Equal Access to Services     HEATHER CATES : Hadii isabell trano Travon, waaxda luqadaha, qaybta kaalmada adekatherineyaveronica, irina fuentes . So [facility] [phone].    ATENCIÓN: Si habla español, tiene a paez disposición servicios gratuitos de asistencia lingüística. Llame al [phone].    We comply with applicable federal civil rights laws and [place] laws. We do not discriminate on the basis of race, color, national origin, age, disability, sex, sexual orientation, or gender identity.            Thank you!     Thank you for choosing [facility]  for your care. Our goal is always to provide you with excellent care. Hearing back from our patients is one way we can continue to improve our services. Please take a few minutes to complete the written survey that you may receive in the mail after your visit with us. Thank you!             Your Updated Medication List - Protect others around you: Learn how to safely use, store and throw away your medicines at www.disposemymeds.org.          This list is accurate as of 11/6/18  2:52 PM.  Always use your most recent med list.                   Brand Name Dispense Instructions for use Diagnosis    aspirin 81 MG chewable tablet     90 tablet    Take 1 tablet (81 mg) by mouth daily    Type 2 diabetes mellitus without complication, without long-term current use of insulin (H)       blood glucose monitoring lancets     100 each     Use to test blood sugar 1 times daily or as directed.    Type 2 diabetes mellitus without complication, without long-term current use of insulin (H)       blood glucose monitoring test strip    no brand specified    100 strip    Use to test blood sugars 1 time daily or as directed    Type 2 diabetes mellitus without complication, without long-term current use of insulin (H)       losartan 25 MG tablet    COZAAR    45 tablet    Take 0.5 tablets (12.5 mg) by mouth daily    Type 2 diabetes mellitus without complication, without long-term current use of insulin (H)       metFORMIN 1000 MG tablet    GLUCOPHAGE    60 tablet    Take 1 tablet (1,000 mg) by mouth 2 times daily (with meals)    Type 2 diabetes mellitus without complication, without long-term current use of insulin (H)       pravastatin 40 MG tablet    PRAVACHOL    90 tablet    Take 1 tablet (40 mg) by mouth daily        * predniSONE 20 MG tablet    DELTASONE    20 tablet    Take 3 tabs (60 mg) by mouth daily x 3 days, 2 tabs (40 mg) daily x 3 days, 1 tab (20 mg) daily x 3 days, then 1/2 tab (10 mg) x 3 days.    Rheumatoid arthritis involving multiple sites with positive rheumatoid factor (H)       * predniSONE 5 MG tablet    DELTASONE    90 tablet    Take 1 tablet (5 mg) by mouth daily    Rheumatoid arthritis involving multiple sites with positive rheumatoid factor (H)       ranitidine 300 MG tablet    ZANTAC    90 tablet    Take 1 tablet (300 mg) by mouth At Bedtime    Gastroesophageal reflux disease, esophagitis presence not specified       * Notice:  This list has 2 medication(s) that are the same as other medications prescribed for you. Read the directions carefully, and ask your doctor or other care provider to review them with you.

## 2018-11-07 LAB
ANION GAP SERPL CALCULATED.3IONS-SCNC: 6 MMOL/L (ref 3–14)
BACTERIA SPEC CULT: ABNORMAL
BUN SERPL-MCNC: 4 MG/DL (ref 7–30)
CALCIUM SERPL-MCNC: 8.6 MG/DL (ref 8.5–10.1)
CHLORIDE SERPL-SCNC: 106 MMOL/L (ref 94–109)
CO2 SERPL-SCNC: 28 MMOL/L (ref 20–32)
CREAT SERPL-MCNC: 0.59 MG/DL (ref 0.52–1.04)
ERYTHROCYTE [DISTWIDTH] IN BLOOD BY AUTOMATED COUNT: 13 % (ref 10–15)
GFR SERPL CREATININE-BSD FRML MDRD: >90 ML/MIN/1.7M2
GLUCOSE BLDC GLUCOMTR-MCNC: 102 MG/DL (ref 70–99)
GLUCOSE BLDC GLUCOMTR-MCNC: 78 MG/DL (ref 70–99)
GLUCOSE BLDC GLUCOMTR-MCNC: 86 MG/DL (ref 70–99)
GLUCOSE BLDC GLUCOMTR-MCNC: 90 MG/DL (ref 70–99)
GLUCOSE BLDC GLUCOMTR-MCNC: 95 MG/DL (ref 70–99)
GLUCOSE BLDC GLUCOMTR-MCNC: 99 MG/DL (ref 70–99)
GLUCOSE SERPL-MCNC: 91 MG/DL (ref 70–99)
HBA1C MFR BLD: 6.7 % (ref 0–5.6)
HCT VFR BLD AUTO: 31.7 % (ref 35–47)
HGB BLD-MCNC: 10.7 G/DL (ref 11.7–15.7)
MAGNESIUM SERPL-MCNC: 1.6 MG/DL (ref 1.6–2.3)
MCH RBC QN AUTO: 29.6 PG (ref 26.5–33)
MCHC RBC AUTO-ENTMCNC: 33.8 G/DL (ref 31.5–36.5)
MCV RBC AUTO: 88 FL (ref 78–100)
PLATELET # BLD AUTO: 262 10E9/L (ref 150–450)
POTASSIUM SERPL-SCNC: 3.3 MMOL/L (ref 3.4–5.3)
POTASSIUM SERPL-SCNC: 3.8 MMOL/L (ref 3.4–5.3)
RBC # BLD AUTO: 3.62 10E12/L (ref 3.8–5.2)
SODIUM SERPL-SCNC: 140 MMOL/L (ref 133–144)
SPECIMEN SOURCE: ABNORMAL
WBC # BLD AUTO: 5.6 10E9/L (ref 4–11)

## 2018-11-07 PROCEDURE — 83735 ASSAY OF MAGNESIUM: CPT | Performed by: INTERNAL MEDICINE

## 2018-11-07 PROCEDURE — 12000000 ZZH R&B MED SURG/OB

## 2018-11-07 PROCEDURE — 00000146 ZZHCL STATISTIC GLUCOSE BY METER IP

## 2018-11-07 PROCEDURE — 84132 ASSAY OF SERUM POTASSIUM: CPT | Performed by: HOSPITALIST

## 2018-11-07 PROCEDURE — 25000128 H RX IP 250 OP 636: Performed by: INTERNAL MEDICINE

## 2018-11-07 PROCEDURE — 85027 COMPLETE CBC AUTOMATED: CPT | Performed by: INTERNAL MEDICINE

## 2018-11-07 PROCEDURE — 36415 COLL VENOUS BLD VENIPUNCTURE: CPT | Performed by: INTERNAL MEDICINE

## 2018-11-07 PROCEDURE — C9113 INJ PANTOPRAZOLE SODIUM, VIA: HCPCS | Performed by: INTERNAL MEDICINE

## 2018-11-07 PROCEDURE — 90662 IIV NO PRSV INCREASED AG IM: CPT | Performed by: INTERNAL MEDICINE

## 2018-11-07 PROCEDURE — 80048 BASIC METABOLIC PNL TOTAL CA: CPT | Performed by: INTERNAL MEDICINE

## 2018-11-07 PROCEDURE — 99221 1ST HOSP IP/OBS SF/LOW 40: CPT | Performed by: SURGERY

## 2018-11-07 PROCEDURE — 99232 SBSQ HOSP IP/OBS MODERATE 35: CPT | Performed by: INTERNAL MEDICINE

## 2018-11-07 PROCEDURE — 36415 COLL VENOUS BLD VENIPUNCTURE: CPT | Performed by: HOSPITALIST

## 2018-11-07 PROCEDURE — 25000132 ZZH RX MED GY IP 250 OP 250 PS 637: Performed by: INTERNAL MEDICINE

## 2018-11-07 RX ADMIN — INFLUENZA A VIRUS A/MICHIGAN/45/2015 X-275 (H1N1) ANTIGEN (FORMALDEHYDE INACTIVATED), INFLUENZA A VIRUS A/SINGAPORE/INFIMH-16-0019/2016 IVR-186 (H3N2) ANTIGEN (FORMALDEHYDE INACTIVATED), AND INFLUENZA B VIRUS B/MARYLAND/15/2016 BX-69A (A B/COLORADO/6/2017-LIKE VIRUS) ANTIGEN (FORMALDEHYDE INACTIVATED) 0.5 ML: 60; 60; 60 INJECTION, SUSPENSION INTRAMUSCULAR at 09:07

## 2018-11-07 RX ADMIN — SODIUM CHLORIDE, POTASSIUM CHLORIDE, SODIUM LACTATE AND CALCIUM CHLORIDE 1000 ML: 600; 310; 30; 20 INJECTION, SOLUTION INTRAVENOUS at 00:29

## 2018-11-07 RX ADMIN — POTASSIUM CHLORIDE 40 MEQ: 1500 TABLET, EXTENDED RELEASE ORAL at 17:10

## 2018-11-07 RX ADMIN — SODIUM CHLORIDE, POTASSIUM CHLORIDE, SODIUM LACTATE AND CALCIUM CHLORIDE: 600; 310; 30; 20 INJECTION, SOLUTION INTRAVENOUS at 22:18

## 2018-11-07 RX ADMIN — PANTOPRAZOLE SODIUM 40 MG: 40 INJECTION, POWDER, FOR SOLUTION INTRAVENOUS at 22:13

## 2018-11-07 RX ADMIN — SODIUM CHLORIDE, POTASSIUM CHLORIDE, SODIUM LACTATE AND CALCIUM CHLORIDE: 600; 310; 30; 20 INJECTION, SOLUTION INTRAVENOUS at 12:44

## 2018-11-07 RX ADMIN — POTASSIUM CHLORIDE 20 MEQ: 1500 TABLET, EXTENDED RELEASE ORAL at 19:03

## 2018-11-07 RX ADMIN — SODIUM CHLORIDE, POTASSIUM CHLORIDE, SODIUM LACTATE AND CALCIUM CHLORIDE: 600; 310; 30; 20 INJECTION, SOLUTION INTRAVENOUS at 00:30

## 2018-11-07 ASSESSMENT — ACTIVITIES OF DAILY LIVING (ADL)
ADLS_ACUITY_SCORE: 9

## 2018-11-07 NOTE — PHARMACY-ADMISSION MEDICATION HISTORY
Admission medication history interview status for the 11/6/2018  admission is complete. See EPIC admission navigator for prior to admission medications     Medication history source reliability:daughter provided med hx    Actions taken by pharmacist (provider contacted, etc):None     Additional medication history information not noted on PTA med list :daughter is translater    Medication reconciliation/reorder completed by provider prior to medication history? No    Time spent in this activity: 10 minutes    Prior to Admission medications    Medication Sig Last Dose Taking? Auth Provider   aspirin 81 MG chewable tablet Take 1 tablet (81 mg) by mouth daily 11/6/2018 at Unknown time Yes Adrianna Singh MD   losartan (COZAAR) 25 MG tablet Take 0.5 tablets (12.5 mg) by mouth daily Past Week at Unknown time Yes Adrianna Singh MD   metFORMIN (GLUCOPHAGE) 1000 MG tablet Take 1 tablet (1,000 mg) by mouth 2 times daily (with meals) 11/6/2018 at x1 Yes Adrianna Singh MD   pravastatin (PRAVACHOL) 40 MG tablet Take 1 tablet (40 mg) by mouth daily 11/5/2018 at hs Yes Adrianna Singh MD   predniSONE (DELTASONE) 5 MG tablet Take 1 tablet (5 mg) by mouth daily 11/6/2018 at am Yes Adrianna Singh MD   ranitidine (ZANTAC) 300 MG tablet Take 1 tablet (300 mg) by mouth At Bedtime 11/5/2018 at hs Yes Adrianna Singh MD   blood glucose monitoring (ALEX MICROLET) lancets Use to test blood sugar 1 times daily or as directed.   Adrianna Singh MD   blood glucose monitoring (NO BRAND SPECIFIED) test strip Use to test blood sugars 1 time daily or as directed   Adrianna Singh MD

## 2018-11-07 NOTE — ED PROVIDER NOTES
History     Chief Complaint:  Abdominal Pain    History limited due to language barrier. History interpreted via daughter.     LAVONNE Boland is a 68 year old female with a history of type 2 diabetes who presents to the emergency department today for evaluation of abdominal pain. Per chart review, patient was seen in the clinic on 11/2 for abdominal pain for which she had an XR, as noted below, and was diagnosed with a small bowel obstruction with recommendation to go to the emergency department, however, she refused. Today, patient says she has been having abdominal bloating and discomfort only relieved when passing gas. She denies vomiting, fever, urinary issues, and has been on a liquid diet since 11/2 with blood sugars in the 150's. Of note, patient had an exploratory laparotomy with subsequent cholecystectomy 20 years ago.     Abdominal x-ray  Evidence of bowel obstruction with dilated air-filled  loops of bowel and multiple air-fluid layers.    Allergies:  Atorvastatin      Medications:    Aspirin   Cozaar  Metformin  Pravastatin     Past Medical History:    Obesity   Osteopenia  Type 2 diabetes mellitus  Rheumatoid arthritis     Past Surgical History:    Open cholecystectomy     Family History:    Mother: Dementia   Father: Cerebrovascular disease  Brother: Cerebrovascular disease, hypertension   No family history of diabetes, myocardial infarction, coronary artery disease early onset, breast cancer, ovarian cancer, or colon cancer.     Social History:  The patient was accompanied to the ED by daughter.  Smoking Status: Never Smoker  Smokeless Tobacco: Never Used  Alcohol Use: Positive   Rare   Marital Status:       Review of Systems   Constitutional: Negative for fever.   Gastrointestinal: Positive for abdominal distention and abdominal pain. Negative for vomiting.   Genitourinary: Negative for difficulty urinating and dysuria.   All other systems reviewed and are negative.    Physical Exam  "    Patient Vitals for the past 24 hrs:   BP Temp Temp src Heart Rate Resp SpO2 Height Weight   11/06/18 1908 - - - 91 - 98 % - -   11/06/18 1858 - - - - - 98 % - -   11/06/18 1845 - - - - - 97 % - -   11/06/18 1843 - - - - - 97 % - -   11/06/18 1544 148/77 97.4  F (36.3  C) Temporal 117 18 96 % 1.17 m (3' 10.06\") 75.3 kg (166 lb)     Physical Exam  Nursing note and vitals reviewed.    Constitutional:  Appears well-developed and well-nourished, comfortable.    HENT:     Nose normal.  No discharge.      Mouth dry.   Eyes:    Conjunctivae are normal without injection. No lid droop.     Right eye exhibits no discharge. Left eye exhibits no discharge.      No scleral icterus.  Lymph:  No enlarged or tender cervical or submandibular lymph nodes.   Cardiovascular:  Normal rate, regular rhythm with normal S1 and S2.      Normal heart sounds and peripheral pulses 2+ and equal.       No murmur or almaz.  Pulmonary:  Effort normal and breath sounds clear to auscultation bilaterally    No respiratory distress.  No stridor.     No wheezes. No rales.     GI:    Mild abdominal distention. Diffuse upper abdominal tenderness.      No rebound and no guarding. No flank pain.  No HSM.  Musculoskeletal:  Normal range of motion. No extremity deformity.     No edema and no tenderness.  No cyanosis.  Neurological:   Alert and oriented. No cranial nerve deficit, no facial droop.     Exhibits good muscle tone. Coordination normal.      GCS eye subscore is 4. GCS verbal subscore is 5.      GCS motor subscore is 6.   Skin:    Skin is warm and dry. No rash noted. No diaphoresis.      No erythema. No pallor.  No lesions.  Psychiatric:   Behavior is normal. Appropriate mood and affect.     Judgment and thought content normal.     Emergency Department Course     Imaging:  Radiology findings were communicated with the patient who voiced understanding of the findings.    CT Abdomen Pelvis w Contrast  1. Small bowel obstruction.  2. Appendix " thicker than usual, as above.   Reading per radiology     Laboratory:  Laboratory findings were communicated with the patient who voiced understanding of the findings.    CBC: WBC 7.8, HGB 12.3,   CMP: Glucose 119(H), Albumin 3.2(L) o/w WNL (Creatinine 0.52)  Lipase: 99  UA reflex to Microscopic and Culture: Protein Albumin 10(A), Leukocyte Esterase Large(A), WBC 14(H), Squamous Epithelial 4(H), Mucous Present(A), Calcium Oxalate Moderate(A) o/w WNL   Urine Culture Aerobic Bacterial In process     Interventions:  1837 NS 1,000 mL IV  1838 Zofran 4 mg IV  1917 Dilaudid 0.5 mg IV    Emergency Department Course:    1808 Nursing notes and vitals reviewed.    1810 The patient provided a urine sample here in the emergency department. This was sent for laboratory testing, findings above.    1819 I performed an exam of the patient as documented above.     1820 IV was inserted and blood was drawn for laboratory testing, results above.    2010 The patient was sent for a CT while in the emergency department, results above.     2120 Recheck and update.      2132 I spoke with Dr. Becerra of the hospitalist service from [facility] regarding patient's presentation, findings, and plan of care.    2215 I personally reviewed the lab and imaging results with the patient and answered all related questions prior to admit.    Impression & Plan      Medical Decision Making:  Randall Boland is a 68 year old female who presents to the emergency department today for evaluation of a bowel obstruction that was diagnosed 4 days ago.  She has not done well at home and comes in because of continued nausea pain and bloating.  Laboratory work came back essentially normal with a white count of 7.8.  She is not vomiting here in the ER and she does not have an acute abdomen.  She is distended and diffusely tender.  CT of the abdomen was obtained and is consistent with a small bowel obstruction.  The appendix was visualized and is generous in  size but no inflammatory change.  Her right lower quadrant is not markedly tender.  She needs to come in the hospital for further management.  I discussed the case with Dr. Becerra who will be admitting her.  She was given Dilaudid and IV fluids here in the ER along with Zofran for nausea.    Diagnosis:    ICD-10-CM    1. SBO (small bowel obstruction) (H) K56.609      Disposition:   The patient is admitted into the care of Dr. Becerra.    IV fluids, n.p.o., nausea and pain management.  Consider general surgery consultation if not improving over the next 24 hours.  Consider NG tube if starts vomiting.    Scribe Disclosure:  Sintia PEREZ, am serving as a scribe at 6:20 PM on 11/6/2018 to document services personally performed by Carlota Reveles MD based on my observations and the provider's statements to me.       EMERGENCY DEPARTMENT       Carlota Reveles MD  11/06/18 1007

## 2018-11-07 NOTE — H&P
[facility]    History and Physical  Hospitalist       Date of Admission:  11/6/2018    Assessment & Plan   Krystian is a 68 year old female who presents with approximately 1 week of waxing and waning abdominal pain associated with nausea and vomiting, relieved with passing gas.  Diagnosed with small bowel obstruction 11/2/18, though attempted to manage as an outpatient.  Has continued to improve, though discomfort persisted resulting in PCP visit and transfer to emergency department for admission for small bowel obstruction.    Small bowel obstruction: Somewhat atypical waxing and waning presentation.  Question possible area of dysfunctional bowel related to historical diagnosis of intestinal tuberculosis.  -N.p.o. status  -Lactated Ringer's at 125/h  -Potassium and magnesium replacement protocols in place  -General surgery consulted in the setting of small bowel obstruction, thickened appendix, history of intestinal tuberculosis.  Exam and laboratory findings are not consistent with appendicitis at this time.  -PRN pain medications and antiemetic therapies  -Daily ranitidine 300 mg daily converted to Protonix 40 IV at bedtime.    Rheumatoid lung disease: Patient with CCP and rheumatoid factor positivity, paucity of arthritic symptoms.  Follows with Dr. Harjit Staley of rheumatology.  Primary pulmonologist is Dr. Cross.  -Daily 5 mg prednisone held.  No need for stress dose steroids  -Recommend follow-up with rheumatology clinic.  Per daughter, patient had declined Plaquenil initiation.  Steroid sparing agent would be beneficial long-term.    Type 2 diabetes:  -Holding prior to admission metformin 1 g twice daily  -Holding prior to admission 81 mg aspirin daily  -Medium dose sliding scale insulin with every 4 hours blood glucose checks while n.p.o.    Hyperlipidemia:  -Holding prior to admission pravastatin 40 mg daily    DVT Prophylaxis: Mechanical prophylaxis    Code Status: Full  code    Disposition: Expected discharge in likely 2-3 days pending improvement in nause and abdominal discomfort, ability to tolerate oral intake.    [facility]    Primary Care Physician   Adrianna Singh    Chief Complaint   abdominal pain    History is obtained from the patient, chart review, discussion with Dr Reveles in the ED, discussion with patient's daughter at bedside.  Patient's daughter acts as bedside  per patient's request.   waiver obtained by ED personnel.    History of Present Illness   Krystian is a 68 year old female who presents with approximately 1 week of waxing and waning abdominal pain associated with nausea and vomiting, relieved with passing gas.  Diagnosed with small bowel obstruction 11/2/18, though attempted to manage as an outpatient.  Has continued to improve, though discomfort persisted resulting in PCP visit and transfer to emergency department for admission for small bowel obstruction.    Patient with initial onset of symptoms 10/31/18.  Discomfort was associated with some nausea.  Patient felt that this was a bout of indigestion, though abdominal bloating and discomfort worsened over the next 24-48 hours.  Patient was brought to urgent care clinic 11/2/18 where she received Zofran with improvement in her nausea.  An abdominal x-ray was obtained demonstrating multiple air-fluid levels consistent with small bowel obstruction.  Patient was recommended for hospital admission, though declined at that time as she had been managing at home and was feeling better.  Patient continued to hydrate with oral fluids, was even able to eat, taking in breakfast even this morning 11/6/18.    Patient has not had significant emesis, though has had waxing and waning nausea.  Describes abdominal bloating associated with increased pain, improvement in pain when she passes flatus.  Last flatus actually earlier this evening in the emergency department.    Patient with a distant  "history of chronic abdominal pain for which he underwent exploratory laparotomy in [facility] approximately 30 years ago.  A cholecystectomy was performed at that time, though no clear etiology for abdominal discomfort, and abdominal symptoms persisted following cholecystectomy as well.  Patient describes being evaluated by a different doctor, and following a positive PPD, negative endoscopy, negative colonoscopy, she was diagnosed with intestinal tuberculosis.  Patient completed 6-9 months of isoniazid therapy, and subsequent we had resolution of her symptoms.    Over the years, patient has intermittently had episodes of what she describes as \"indigestion.\"  Patient describes abdominal bloating, nausea and anorexia to the point where she is unable to tolerate any oral intake.  These episodes last approximately 1 day, then spontaneously resolve.  Patient felt initially like her symptoms were similar to this following recommendation 11/2 for hospitalization based on x-ray results.  This contributed to her decision to manage her symptoms at home.    As patient's symptoms were not resolved, a message was sent to her primary care provider, who recommended evaluation that day in clinic and subsequent presentation to the emergency department for likely hospitalization.  A CT was performed in the emergency department demonstrating findings consistent with small bowel obstruction as well as a thickened appendix without surrounding inflammation.    In discussion with patient and daughter, this is somewhat atypical for small bowel obstruction as symptoms are waxing and waning over the course of days patient still able to tolerate oral intake.  Question partial bowel obstruction or even an area of dysfunctional bowel, potentially related to historical intestinal tuberculosis.    Discussed IV hydration, IV pain medications, nausea medications, surgery consultation.    Past Medical History    I have reviewed this patient's medical " history and updated it with pertinent information if needed.   Past Medical History:   Diagnosis Date     Obesity (BMI 30-39.9)      Osteopenia      Type 2 diabetes mellitus (H)    Intestinal tuberculosis.  Diagnosed in [place], completed 6-9 months of INH therapy  Interstitial lung disease  (follows with Dr Cross of pulmonology)  Rheumatoid lung disease (see above, RF and CCP swetha positive w/o arthritic symptoms; Dr Staley of rheumatology)  Hyperlipidemia    Past Surgical History   I have reviewed this patient's surgical history and updated it with pertinent information if needed.  Past Surgical History:   Procedure Laterality Date     CHOLECYSTECTOMY, OPEN  1990        Prior to Admission Medications   Prior to Admission Medications   Prescriptions Last Dose Informant Patient Reported? Taking?   aspirin 81 MG chewable tablet   No No   Sig: Take 1 tablet (81 mg) by mouth daily   blood glucose monitoring (ALEX MICROLET) lancets   No No   Sig: Use to test blood sugar 1 times daily or as directed.   blood glucose monitoring (NO BRAND SPECIFIED) test strip   No No   Sig: Use to test blood sugars 1 time daily or as directed   losartan (COZAAR) 25 MG tablet   No No   Sig: Take 0.5 tablets (12.5 mg) by mouth daily   metFORMIN (GLUCOPHAGE) 1000 MG tablet   No No   Sig: Take 1 tablet (1,000 mg) by mouth 2 times daily (with meals)   pravastatin (PRAVACHOL) 40 MG tablet   No No   Sig: Take 1 tablet (40 mg) by mouth daily   predniSONE (DELTASONE) 20 MG tablet   No No   Sig: Take 3 tabs (60 mg) by mouth daily x 3 days, 2 tabs (40 mg) daily x 3 days, 1 tab (20 mg) daily x 3 days, then 1/2 tab (10 mg) x 3 days.   predniSONE (DELTASONE) 5 MG tablet   No No   Sig: Take 1 tablet (5 mg) by mouth daily   ranitidine (ZANTAC) 300 MG tablet   No No   Sig: Take 1 tablet (300 mg) by mouth At Bedtime      Facility-Administered Medications: None     Allergies   Allergies   Allergen Reactions     Atorvastatin Muscle Pain (Myalgia)       Social  History   I have reviewed this patient's social history and updated it with pertinent information if needed. Randall Boland  reports that she has never smoked. She has never used smokeless tobacco. She reports that she drinks alcohol. She reports that she does not use illicit drugs.     Family History   I have reviewed this patient's family history and updated it with pertinent information if needed.   Family History   Problem Relation Age of Onset     Dementia Mother      Cerebrovascular Disease Father      Cerebrovascular Disease Brother      Hypertension Brother      Diabetes No family hx of      Myocardial Infarction No family hx of      Coronary Artery Disease Early Onset No family hx of      Breast Cancer No family hx of      Ovarian Cancer No family hx of      Colon Cancer No family hx of        Review of Systems   The 10 point Review of Systems is negative other than noted in the HPI or here.  No fevers  No shortness of breath  Currently no joint symptoms  Patient has occasional inflammation/itchiness of exploratory laparotomy scar, possibly eczematous as patient picks and itches at this.    Physical Exam   Temp: 97.4  F (36.3  C) Temp src: Temporal BP: 148/77   Heart Rate: 91 Resp: 18 SpO2: 98 % O2 Device: None (Room air)    Vital Signs with Ranges  Temp:  [97.4  F (36.3  C)-97.9  F (36.6  C)] 97.4  F (36.3  C)  Pulse:  [113] 113  Heart Rate:  [] 91  Resp:  [18] 18  BP: (124-148)/(77-82) 148/77  SpO2:  [95 %-98 %] 98 %  166 lbs 0 oz    Constitutional: no acute distress, alert, conversant.  Sitting comfortably in bed.  Eyes: no scleral icterus or injection  HEENT: moist mucous membranes  Respiratory: Bibasilar fine crackles, more superior two thirds of lung fields are clear to auscultation bilaterally with good air movement throughout.  Cardiovascular: regular rate and rhythm, no murmur  GI: abdomen soft, mild diffuse tenderness to palpation, possibly more notable in the upper quadrants than the lower.   No guarding.  Patient states that her abdomen is less distended than it was earlier in her ED stay.  No rebound.  No palpable mass.  Bowel sounds are normoactive to mildly hyperactive.  Lymph/Hematologic: no lower extremity swelling  Skin: Laparotomy scar with most superior aspect demonstrating an area of excoriation without surrounding erythema.  Musculoskeletal: muscular tone intact in all extremities  Neurologic: mental status grossly intact, no focal deficits, alert    Data   Data reviewed today:  I personally reviewed the abdominal CT image(s) showing Dilated loops of bowel consistent with small bowel obstruction.    Recent Labs  Lab 11/06/18  1820 11/02/18  1626   WBC 7.8  --    HGB 12.3  --    MCV 87  --      --     132*   POTASSIUM 3.8 3.9   CHLORIDE 105 97   CO2 25 26   BUN 7 16   CR 0.52 0.62   ANIONGAP 8 9   SANDI 9.3 9.6   * 152*   ALBUMIN 3.2*  --    PROTTOTAL 7.7  --    BILITOTAL 0.4  --    ALKPHOS 76  --    ALT 48  --    AST 33  --    LIPASE 99  --        Recent Results (from the past 24 hour(s))   CT Abdomen Pelvis w Contrast    Narrative    CT ABDOMEN AND PELVIS WITH CONTRAST   11/6/2018 8:20 PM     HISTORY: SBO 5 days ago, now worse.     TECHNIQUE:  83 mL Isovue-370. Radiation dose for this scan was reduced  using automated exposure control, adjustment of the mA and/or kV  according to patient size, or iterative reconstruction technique.    COMPARISON: None.    FINDINGS:  No diverticulitis. The appendix is thicker than usual (9  mm), but this can be normal and there is no adjacent inflammatory  change. If there is clinical concern for appendicitis, a short-term  limited follow-up exam could be performed. There are dilated proximal  small bowel loops and decompressed distal loops, consistent with  obstruction. Small amount of free fluid within the lower pelvis.  Bilateral pulmonary fibrosis. Nothing else acute is seen in the upper  abdominal organs.       Impression     IMPRESSION:  1. Small bowel obstruction.  2. Appendix thicker than usual, as above.

## 2018-11-07 NOTE — PROGRESS NOTES
RECEIVING UNIT ED HANDOFF REVIEW    ED Nurse Handoff Report was reviewed by: Emili Saunders on November 6, 2018 at 9:59 PM

## 2018-11-07 NOTE — PLAN OF CARE
Problem: Bowel Obstruction (Adult)  Goal: Signs and Symptoms of Listed Potential Problems Will be Absent, Minimized or Managed (Bowel Obstruction)  Signs and symptoms of listed potential problems will be absent, minimized or managed by discharge/transition of care (reference Bowel Obstruction (Adult) CPG).   Outcome: No Change  A/O, VSS on RA. C/O only mild pain, 2/10, when palpating abdomen but otherwise comfortable. BS+, Gas+,  No BM during  hospital stay thus far. Had abdominal CT with contrast and 2 IVF boluses given. IVF running at 150. NPO status. Pt voiding in BR. Up independent. Family at bedside. Pt is Tibetan speaking, family to do interpreting. BG unremarkable overnight. Surgery to see.

## 2018-11-07 NOTE — PROGRESS NOTES
ED admit around 2220. Tibetan speaking, family at bedside. Alert and oriented. VSS on room air. Denies pain. Denies nausea/vomiting. NPO. Up independently. Surgery consult.

## 2018-11-07 NOTE — ED NOTES
[facility]  ED Nurse Handoff Report    ED Chief complaint: Abdominal Pain (Intermittent abdominal pain since Thursday. Diagnosed with SBO Friday but family thought pt was improving at home. Pain increased today so pt went to PMD and was told to come to ED. Passing gas. Burping. No vomiting.)      ED Diagnosis:   Final diagnoses:   Abdominal pain       Code Status: Full Code    Allergies:   Allergies   Allergen Reactions     Atorvastatin Muscle Pain (Myalgia)       Activity level - Baseline/Home:  Independent    Activity Level - Current:   Independent     Needed?: Yes    Isolation: No  Infection: Not Applicable  Bariatric?: No    Vital Signs:   Vitals:    11/06/18 1843 11/06/18 1845 11/06/18 1858 11/06/18 1908   BP:       Resp:       Temp:       TempSrc:       SpO2: 97% 97% 98% 98%   Weight:       Height:           Cardiac Rhythm: ,        Pain level: 0-10 Pain Scale: 5    Is this patient confused?: No   [place] - Suicide Severity Rating Scale Completed?  Yes  If yes, what color did the patient score?  White    Patient Report: Initial Complaint: Abd pain  Focused Assessment: pt reports abd pain that is sharp feeling. Pt has not had emesis here. Pt's daughter is interpreting for her and has signed the waiver. Pt to be admitted for SBO  Tests Performed: CT, labs  Abnormal Results:   Labs Ordered and Resulted from Time of ED Arrival Up to the Time of Departure from the ED   COMPREHENSIVE METABOLIC PANEL - Abnormal; Notable for the following:        Result Value    Glucose 119 (*)     Albumin 3.2 (*)     All other components within normal limits   UA MACROSCOPIC WITH REFLEX TO MICRO AND CULTURE - Abnormal; Notable for the following:     Protein Albumin Urine 10 (*)     Leukocyte Esterase Urine Large (*)     WBC Urine 14 (*)     Squamous Epithelial /HPF Urine 4 (*)     Mucous Urine Present (*)     Calcium Oxalate Moderate (*)     All other components within normal limits   LIPASE   CBC WITH  PLATELETS DIFFERENTIAL   VITAL SIGNS   FREE WATER   URINE CULTURE AEROBIC BACTERIAL       Treatments provided: pain meds, fluids    Family Comments: daughter at bedside    OBS brochure/video discussed/provided to patient/family: N/A              Name of person given brochure if not patient: NA              Relationship to patient: N A    ED Medications:   Medications   ondansetron (ZOFRAN) injection 4 mg (4 mg Intravenous Given 11/6/18 1838)   0.9% sodium chloride BOLUS (1,000 mLs Intravenous New Bag 11/6/18 1837)   HYDROmorphone (PF) (DILAUDID) injection 0.5 mg (0.5 mg Intravenous Given 11/6/18 1917)   iopamidol (ISOVUE-370) solution 83 mL (83 mLs Intravenous Given 11/6/18 2013)   Saline flush (65 mLs Intravenous Given 11/6/18 2013)       Drips infusing?:  No    For the majority of the shift this patient was Green.   Interventions performed were none.    Severe Sepsis OR Septic Shock Diagnosis Present: No    To be done/followed up on inpatient unit:  NA    ED NURSE PHONE NUMBER: *[phone]

## 2018-11-07 NOTE — CONSULTS
General Surgery Consultation    [facility] Krystian MRN# [id number]   Age: 68 year old YOB: 1950     Date of Admission:  11/6/2018    Reason for consult: Small bowel obstruction       Requesting physician: Becerra                Assessment and Plan:   Assessment:   Sbo, clinically improved      Plan:    we will give trial of clear liquids, if she has returning cramping or pain consideration of small bowel follow-through, if tolerated may advance diet             Chief Complaint:   Abdominal pain     History is obtained from the patient and electronic health record         History of Present Illness:   This patient is a 68 year old  female  who presents with abdominal pain.  She reports that symptoms began this past weekend.  She had cramping discomfort with abdominal bloating and nausea.  She was seen by primary care and had a plain x-ray done on the second which suggested small bowel obstruction.  Was recommended to be admitted to the hospital but deferred and tried to manage at home.  Symptoms initially improved with bowel rest and then was started on soups and such at home.  Had return of cramping abdominal pain and nausea.  This brought her to the emergency department where she was evaluated and ultimately admitted with small bowel obstruction.  Since admission she had a bowel movement last night and is passing gas this morning.  Reports no abdominal pain.  Denies nausea.          Past Medical History:    has a past medical history of Obesity (BMI 30-39.9); Osteopenia; and Type 2 diabetes mellitus (H).          Past Surgical History:     Past Surgical History:   Procedure Laterality Date     CHOLECYSTECTOMY, OPEN  1990           Medications:     No current facility-administered medications on file prior to encounter.   Current Outpatient Prescriptions on File Prior to Encounter:  aspirin 81 MG chewable tablet Take 1 tablet (81 mg) by mouth daily   losartan (COZAAR) 25 MG tablet Take 0.5 tablets (12.5 mg) by  "mouth daily   metFORMIN (GLUCOPHAGE) 1000 MG tablet Take 1 tablet (1,000 mg) by mouth 2 times daily (with meals)   pravastatin (PRAVACHOL) 40 MG tablet Take 1 tablet (40 mg) by mouth daily   predniSONE (DELTASONE) 5 MG tablet Take 1 tablet (5 mg) by mouth daily   ranitidine (ZANTAC) 300 MG tablet Take 1 tablet (300 mg) by mouth At Bedtime   blood glucose monitoring (ALEX MICROLET) lancets Use to test blood sugar 1 times daily or as directed.   blood glucose monitoring (NO BRAND SPECIFIED) test strip Use to test blood sugars 1 time daily or as directed         Allergies:      Allergies   Allergen Reactions     Atorvastatin Muscle Pain (Myalgia)            Social History:   No tobacco, occasional alcohol seen in consultation with daughter at bedside functioning as           Family History:   The patient has no family history of any bleeding, clotting or anesthesia problems.          Review of Systems:   Ten point Review of Systems is negative other than noted in the HPI          Physical Exam:   Gen:  This is a well developed, well nourished woman in no apparent distress.  Blood pressure 108/63, temperature 98.2  F (36.8  C), temperature source Oral, resp. rate 16, height 1.6 m (5' 3\"), weight 74.7 kg (164 lb 10.9 oz), SpO2 96 %.  HEENT - Normocephalic, atraumatic, mucous membranes moist.  no scleral icterus.  Neck - supple without masses  Lungs - clear to ascultation.    Heart - Regular rate & rhythm without murmur  Abdomen:   soft, non-distended, non-tender and no masses palpated, normal bowel sounds   Extremities - warm without edema  Neurologic - nonfocal          Data:   WBC -   WBC   Date Value Ref Range Status   11/06/2018 7.8 4.0 - 11.0 10e9/L Final   ], HgB - Hemoglobin   Date Value Ref Range Status   11/06/2018 12.3 11.7 - 15.7 g/dL Final   ]   Liver Function Studies -   Recent Labs   Lab Test  11/06/18   1820   PROTTOTAL  7.7   ALBUMIN  3.2*   BILITOTAL  0.4   ALKPHOS  76   AST  33   ALT  48 "     CT scan of the abdomen:   Reviewed   Ultrasound of the abdomen:     Colt Garcia MD

## 2018-11-08 VITALS
BODY MASS INDEX: 29.18 KG/M2 | DIASTOLIC BLOOD PRESSURE: 64 MMHG | OXYGEN SATURATION: 97 % | HEIGHT: 63 IN | HEART RATE: 84 BPM | SYSTOLIC BLOOD PRESSURE: 134 MMHG | RESPIRATION RATE: 16 BRPM | WEIGHT: 164.68 LBS | TEMPERATURE: 98.1 F

## 2018-11-08 LAB
ANION GAP SERPL CALCULATED.3IONS-SCNC: 7 MMOL/L (ref 3–14)
BUN SERPL-MCNC: 5 MG/DL (ref 7–30)
CALCIUM SERPL-MCNC: 8.9 MG/DL (ref 8.5–10.1)
CHLORIDE SERPL-SCNC: 102 MMOL/L (ref 94–109)
CO2 SERPL-SCNC: 28 MMOL/L (ref 20–32)
CREAT SERPL-MCNC: 0.47 MG/DL (ref 0.52–1.04)
ERYTHROCYTE [DISTWIDTH] IN BLOOD BY AUTOMATED COUNT: 13 % (ref 10–15)
GFR SERPL CREATININE-BSD FRML MDRD: >90 ML/MIN/1.7M2
GLUCOSE BLDC GLUCOMTR-MCNC: 100 MG/DL (ref 70–99)
GLUCOSE BLDC GLUCOMTR-MCNC: 233 MG/DL (ref 70–99)
GLUCOSE BLDC GLUCOMTR-MCNC: 78 MG/DL (ref 70–99)
GLUCOSE BLDC GLUCOMTR-MCNC: 93 MG/DL (ref 70–99)
GLUCOSE SERPL-MCNC: 146 MG/DL (ref 70–99)
HCT VFR BLD AUTO: 33 % (ref 35–47)
HGB BLD-MCNC: 11.1 G/DL (ref 11.7–15.7)
MCH RBC QN AUTO: 29.4 PG (ref 26.5–33)
MCHC RBC AUTO-ENTMCNC: 33.6 G/DL (ref 31.5–36.5)
MCV RBC AUTO: 87 FL (ref 78–100)
PLATELET # BLD AUTO: 286 10E9/L (ref 150–450)
POTASSIUM SERPL-SCNC: 3.6 MMOL/L (ref 3.4–5.3)
RBC # BLD AUTO: 3.78 10E12/L (ref 3.8–5.2)
SODIUM SERPL-SCNC: 137 MMOL/L (ref 133–144)
WBC # BLD AUTO: 6.1 10E9/L (ref 4–11)

## 2018-11-08 PROCEDURE — 99239 HOSP IP/OBS DSCHRG MGMT >30: CPT | Performed by: HOSPITALIST

## 2018-11-08 PROCEDURE — 80048 BASIC METABOLIC PNL TOTAL CA: CPT | Performed by: INTERNAL MEDICINE

## 2018-11-08 PROCEDURE — 25000128 H RX IP 250 OP 636: Performed by: INTERNAL MEDICINE

## 2018-11-08 PROCEDURE — 99231 SBSQ HOSP IP/OBS SF/LOW 25: CPT | Performed by: SURGERY

## 2018-11-08 PROCEDURE — 85027 COMPLETE CBC AUTOMATED: CPT | Performed by: INTERNAL MEDICINE

## 2018-11-08 PROCEDURE — 36415 COLL VENOUS BLD VENIPUNCTURE: CPT | Performed by: INTERNAL MEDICINE

## 2018-11-08 PROCEDURE — 25000131 ZZH RX MED GY IP 250 OP 636 PS 637: Performed by: INTERNAL MEDICINE

## 2018-11-08 PROCEDURE — 00000146 ZZHCL STATISTIC GLUCOSE BY METER IP

## 2018-11-08 RX ORDER — ONDANSETRON 4 MG/1
4 TABLET, FILM COATED ORAL EVERY 8 HOURS PRN
Qty: 18 TABLET | Refills: 0 | Status: SHIPPED | OUTPATIENT
Start: 2018-11-08 | End: 2019-04-23

## 2018-11-08 RX ADMIN — SODIUM CHLORIDE, POTASSIUM CHLORIDE, SODIUM LACTATE AND CALCIUM CHLORIDE: 600; 310; 30; 20 INJECTION, SOLUTION INTRAVENOUS at 08:31

## 2018-11-08 RX ADMIN — INSULIN ASPART 2 UNITS: 100 INJECTION, SOLUTION INTRAVENOUS; SUBCUTANEOUS at 12:33

## 2018-11-08 ASSESSMENT — ACTIVITIES OF DAILY LIVING (ADL)
ADLS_ACUITY_SCORE: 9

## 2018-11-08 NOTE — PLAN OF CARE
Problem: Patient Care Overview  Goal: Plan of Care/Patient Progress Review  Outcome: Adequate for Discharge Date Met: 11/08/18  Pt tolerated breakfast and soft diet for lunch, no abd discomfort or nausea. VSS. Pt has been up walking in halls. Received coverage at noon for blood sugar. Plans to discharge this afternoon.  Goal: Discharge Needs Assessment  Outcome: Adequate for Discharge Date Met: 11/08/18  Pt discharged to home with belongings, pt ambulatory. Daughter transporting her to home, plan to  Rx at pharmacy on the way home.

## 2018-11-08 NOTE — PLAN OF CARE
Problem: Patient Care Overview  Goal: Plan of Care/Patient Progress Review  Outcome: Improving  A&Ox4. Speaks Tibetan. VSS on RA. Up independently. Daughter slept over in room. Active BS. Passing gas. No BM overnight. Denies pain, cramping, nausea. Voiding well. bgm q4hr, running 80-100s. On clear liquid diet. Plan for advancing diet today. if becomes symptomatic again, possible sm bowel follow through.

## 2018-11-08 NOTE — DISCHARGE SUMMARY
"Discharge Summary    Randall Boland MRN# [id number]   YOB: 1950 Age: 68 year old     Date of Admission:  11/6/2018  Date of Discharge:  11/8/2018  Admitting Physician:  Rocky Becerra MD  Discharge Physician:  Leni Roa MD Pager [phone] Office [phone]  Discharging Service:  [facility] Hospitalist Service     Primary Provider: Adrianna Singh          Discharge Diagnosis:   See problem-oriented hospital course for diagnoses addressed during this hospital stay.             Discharge Disposition:   Discharged to home          Condition on Discharge:   Discharge condition: Stable   Discharge vitals: Blood pressure 134/64, pulse 84, temperature 98.1  F (36.7  C), temperature source Oral, resp. rate 16, height 1.6 m (5' 3\"), weight 74.7 kg (164 lb 10.9 oz), SpO2 97 %.   Code status on discharge: Full Code          Discharge Medications:      Randall Boland   Home Medication Instructions ANA M:[id number]    Printed on:11/08/18 1333   Medication Information                      aspirin 81 MG chewable tablet  Take 1 tablet (81 mg) by mouth daily             blood glucose monitoring (ALEX MICROLET) lancets  Use to test blood sugar 1 times daily or as directed.             blood glucose monitoring (NO BRAND SPECIFIED) test strip  Use to test blood sugars 1 time daily or as directed             losartan (COZAAR) 25 MG tablet  Take 0.5 tablets (12.5 mg) by mouth daily             metFORMIN (GLUCOPHAGE) 1000 MG tablet  Take 1 tablet (1,000 mg) by mouth 2 times daily (with meals)             pravastatin (PRAVACHOL) 40 MG tablet  Take 1 tablet (40 mg) by mouth daily             predniSONE (DELTASONE) 5 MG tablet  Take 1 tablet (5 mg) by mouth daily             ranitidine (ZANTAC) 300 MG tablet  Take 1 tablet (300 mg) by mouth At Bedtime                  Consultations:   General surgery.           Brief Hx and Hospital Course:   Krystian is a 68 year old female who presents with approximately 1 week of waxing " and waning abdominal pain associated with nausea and vomiting, relieved with passing gas.  Diagnosed with small bowel obstruction 11/2/18, though attempted to manage as an outpatient.  Has continued to improve, though discomfort persisted resulting in PCP visit and transfer to emergency department for admission for small bowel obstruction.     Small bowel obstruction: Had open choleycystectomy 30 years ago. Has had 1-2 prior similar bouts of pain/symptoms over the last several years but did not seek medication attention. On admission general surgery consult was placed (recommended conservative management). Pt's abd pain, n/v resolved. Had BM this am. Benign abd exam. Tolerating diet.   - Discharge home today.  - F/U with PCP.    Rheumatoid lung disease:   -Cont PTA prednisone 5 mg daily.  -Recommend follow-up with rheumatology clinic.       Type 2 diabetes:  - Resume PTA metformin.  - F/U with PCP.    Patient is being discharged home in stable conditions.       DISCHARGE EXAM:   Temp:  [98.1  F (36.7  C)-98.4  F (36.9  C)] 98.1  F (36.7  C)  Pulse:  [76-84] 84  Heart Rate:  [82-86] 82  Resp:  [16-18] 16  BP: (116-134)/(64-76) 134/64  SpO2:  [96 %-98 %] 97 %  Wt Readings from Last 5 Encounters:   11/07/18 74.7 kg (164 lb 10.9 oz)   11/06/18 75.3 kg (166 lb 1.6 oz)   08/17/18 75 kg (165 lb 6.4 oz)   06/01/18 75.6 kg (166 lb 11.2 oz)   05/23/18 74.8 kg (165 lb)     Constitutional: Awake, alert, cooperative, no apparent distress    Lungs: Clear to auscultation bilaterally, no crackles or wheezing    Cardiovascular: Regular rate and rhythm, normal S1 and S2, and no murmur noted   Abdomen: Normal bowel sounds, soft, non-distended, non-tender   Skin: No rashes, no cyanosis, no edema   Other:           Pertinent Tests and Procedures:   none             Pending Results:   none          Discharge Instructions and Follow-Up:   Discharge diet:   Active Diet Order      Mechanical/Dental Soft Diet      Diet   Discharge activity:  Activity as tolerated   Discharge follow-up: Follow up with primary care provider in 1 week.     Outpatient therapy: None    Home Care agency: None    Supplies and equipment: None   Lines and drains: None    Wound care: None   Other instructions: None      More than 30 minutes were required for coordination of care for this discharge, from .          Procedures / Labs / Imaging:     Results for orders placed or performed during the hospital encounter of 11/06/18   CT Abdomen Pelvis w Contrast    Narrative    CT ABDOMEN AND PELVIS WITH CONTRAST   11/6/2018 8:20 PM     HISTORY: SBO 5 days ago, now worse.     TECHNIQUE:  83 mL Isovue-370. Radiation dose for this scan was reduced  using automated exposure control, adjustment of the mA and/or kV  according to patient size, or iterative reconstruction technique.    COMPARISON: None.    FINDINGS:  No diverticulitis. The appendix is thicker than usual (9  mm), but this can be normal and there is no adjacent inflammatory  change. If there is clinical concern for appendicitis, a short-term  limited follow-up exam could be performed. There are dilated proximal  small bowel loops and decompressed distal loops, consistent with  obstruction. Small amount of free fluid within the lower pelvis.  Bilateral pulmonary fibrosis. Nothing else acute is seen in the upper  abdominal organs.       Impression    IMPRESSION:  1. Small bowel obstruction.  2. Appendix thicker than usual, as above.     SANDIP SINGH MD   Comprehensive metabolic panel   Result Value Ref Range    Sodium 138 133 - 144 mmol/L    Potassium 3.8 3.4 - 5.3 mmol/L    Chloride 105 94 - 109 mmol/L    Carbon Dioxide 25 20 - 32 mmol/L    Anion Gap 8 3 - 14 mmol/L    Glucose 119 (H) 70 - 99 mg/dL    Urea Nitrogen 7 7 - 30 mg/dL    Creatinine 0.52 0.52 - 1.04 mg/dL    GFR Estimate >90 >60 mL/min/1.7m2    GFR Estimate If Black >90 >60 mL/min/1.7m2    Calcium 9.3 8.5 - 10.1 mg/dL    Bilirubin Total 0.4 0.2 - 1.3 mg/dL    Albumin  3.2 (L) 3.4 - 5.0 g/dL    Protein Total 7.7 6.8 - 8.8 g/dL    Alkaline Phosphatase 76 40 - 150 U/L    ALT 48 0 - 50 U/L    AST 33 0 - 45 U/L   Lipase   Result Value Ref Range    Lipase 99 73 - 393 U/L   CBC with platelets differential   Result Value Ref Range    WBC 7.8 4.0 - 11.0 10e9/L    RBC Count 4.13 3.8 - 5.2 10e12/L    Hemoglobin 12.3 11.7 - 15.7 g/dL    Hematocrit 36.0 35.0 - 47.0 %    MCV 87 78 - 100 fl    MCH 29.8 26.5 - 33.0 pg    MCHC 34.2 31.5 - 36.5 g/dL    RDW 12.9 10.0 - 15.0 %    Platelet Count 305 150 - 450 10e9/L    Diff Method Automated Method     % Neutrophils 62.6 %    % Lymphocytes 26.5 %    % Monocytes 9.1 %    % Eosinophils 1.2 %    % Basophils 0.3 %    % Immature Granulocytes 0.3 %    Nucleated RBCs 0 0 /100    Absolute Neutrophil 4.9 1.6 - 8.3 10e9/L    Absolute Lymphocytes 2.1 0.8 - 5.3 10e9/L    Absolute Monocytes 0.7 0.0 - 1.3 10e9/L    Absolute Eosinophils 0.1 0.0 - 0.7 10e9/L    Absolute Basophils 0.0 0.0 - 0.2 10e9/L    Abs Immature Granulocytes 0.0 0 - 0.4 10e9/L    Absolute Nucleated RBC 0.0    UA reflex to Microscopic and Culture   Result Value Ref Range    Color Urine Yellow     Appearance Urine Slightly Cloudy     Glucose Urine Negative NEG^Negative mg/dL    Bilirubin Urine Negative NEG^Negative    Ketones Urine Negative NEG^Negative mg/dL    Specific Gravity Urine 1.018 1.003 - 1.035    Blood Urine Negative NEG^Negative    pH Urine 5.5 5.0 - 7.0 pH    Protein Albumin Urine 10 (A) NEG^Negative mg/dL    Urobilinogen mg/dL 2.0 0.0 - 2.0 mg/dL    Nitrite Urine Negative NEG^Negative    Leukocyte Esterase Urine Large (A) NEG^Negative    Source Midstream Urine     RBC Urine 2 0 - 2 /HPF    WBC Urine 14 (H) 0 - 5 /HPF    Squamous Epithelial /HPF Urine 4 (H) 0 - 1 /HPF    Mucous Urine Present (A) NEG^Negative /LPF    Calcium Oxalate Moderate (A) NEG^Negative /HPF   Glucose by meter   Result Value Ref Range    Glucose 97 70 - 99 mg/dL   Hemoglobin A1c   Result Value Ref Range     Hemoglobin A1C 6.7 (H) 0 - 5.6 %   Glucose by meter   Result Value Ref Range    Glucose 90 70 - 99 mg/dL   Glucose by meter   Result Value Ref Range    Glucose 95 70 - 99 mg/dL   Basic metabolic panel   Result Value Ref Range    Sodium 140 133 - 144 mmol/L    Potassium 3.3 (L) 3.4 - 5.3 mmol/L    Chloride 106 94 - 109 mmol/L    Carbon Dioxide 28 20 - 32 mmol/L    Anion Gap 6 3 - 14 mmol/L    Glucose 91 70 - 99 mg/dL    Urea Nitrogen 4 (L) 7 - 30 mg/dL    Creatinine 0.59 0.52 - 1.04 mg/dL    GFR Estimate >90 >60 mL/min/1.7m2    GFR Estimate If Black >90 >60 mL/min/1.7m2    Calcium 8.6 8.5 - 10.1 mg/dL   CBC with platelets   Result Value Ref Range    WBC 5.6 4.0 - 11.0 10e9/L    RBC Count 3.62 (L) 3.8 - 5.2 10e12/L    Hemoglobin 10.7 (L) 11.7 - 15.7 g/dL    Hematocrit 31.7 (L) 35.0 - 47.0 %    MCV 88 78 - 100 fl    MCH 29.6 26.5 - 33.0 pg    MCHC 33.8 31.5 - 36.5 g/dL    RDW 13.0 10.0 - 15.0 %    Platelet Count 262 150 - 450 10e9/L   Glucose by meter   Result Value Ref Range    Glucose 102 (H) 70 - 99 mg/dL   Glucose by meter   Result Value Ref Range    Glucose 99 70 - 99 mg/dL   Magnesium   Result Value Ref Range    Magnesium 1.6 1.6 - 2.3 mg/dL   Glucose by meter   Result Value Ref Range    Glucose 86 70 - 99 mg/dL   Potassium   Result Value Ref Range    Potassium 3.8 3.4 - 5.3 mmol/L   Glucose by meter   Result Value Ref Range    Glucose 78 70 - 99 mg/dL   Basic metabolic panel   Result Value Ref Range    Sodium 137 133 - 144 mmol/L    Potassium 3.6 3.4 - 5.3 mmol/L    Chloride 102 94 - 109 mmol/L    Carbon Dioxide 28 20 - 32 mmol/L    Anion Gap 7 3 - 14 mmol/L    Glucose 146 (H) 70 - 99 mg/dL    Urea Nitrogen 5 (L) 7 - 30 mg/dL    Creatinine 0.47 (L) 0.52 - 1.04 mg/dL    GFR Estimate >90 >60 mL/min/1.7m2    GFR Estimate If Black >90 >60 mL/min/1.7m2    Calcium 8.9 8.5 - 10.1 mg/dL   CBC with platelets   Result Value Ref Range    WBC 6.1 4.0 - 11.0 10e9/L    RBC Count 3.78 (L) 3.8 - 5.2 10e12/L    Hemoglobin 11.1  (L) 11.7 - 15.7 g/dL    Hematocrit 33.0 (L) 35.0 - 47.0 %    MCV 87 78 - 100 fl    MCH 29.4 26.5 - 33.0 pg    MCHC 33.6 31.5 - 36.5 g/dL    RDW 13.0 10.0 - 15.0 %    Platelet Count 286 150 - 450 10e9/L   Glucose by meter   Result Value Ref Range    Glucose 78 70 - 99 mg/dL   Glucose by meter   Result Value Ref Range    Glucose 93 70 - 99 mg/dL   Glucose by meter   Result Value Ref Range    Glucose 100 (H) 70 - 99 mg/dL   Surgery General IP Consult: Patient to be seen: Routine - within 24 hours; SBO, thickened appendex, history of intestinal TB; Consultant may enter orders: Yes    Narrative    Colt Garcia MD     11/7/2018  8:53 AM  General Surgery Consultation    Randall Boland MRN# [id number]   Age: 68 year old YOB: 1950     Date of Admission:  11/6/2018    Reason for consult: Small bowel obstruction       Requesting physician: Becerra                Assessment and Plan:   Assessment:   Sbo, clinically improved      Plan:    we will give trial of clear liquids, if she has returning   cramping or pain consideration of small bowel follow-through, if   tolerated may advance diet             Chief Complaint:   Abdominal pain     History is obtained from the patient and electronic health record         History of Present Illness:   This patient is a 68 year old  female  who presents with   abdominal pain.  She reports that symptoms began this past   weekend.  She had cramping discomfort with abdominal bloating and   nausea.  She was seen by primary care and had a plain x-ray done   on the second which suggested small bowel obstruction.  Was   recommended to be admitted to the hospital but deferred and tried   to manage at home.  Symptoms initially improved with bowel rest   and then was started on soups and such at home.  Had return of   cramping abdominal pain and nausea.  This brought her to the   emergency department where she was evaluated and ultimately   admitted with small bowel obstruction.   "Since admission she had a   bowel movement last night and is passing gas this morning.    Reports no abdominal pain.  Denies nausea.          Past Medical History:    has a past medical history of Obesity (BMI 30-39.9); Osteopenia;   and Type 2 diabetes mellitus (H).          Past Surgical History:     Past Surgical History:   Procedure Laterality Date     CHOLECYSTECTOMY, OPEN  1990           Medications:     No current facility-administered medications on file prior to   encounter.   Current Outpatient Prescriptions on File Prior to Encounter:  aspirin 81 MG chewable tablet Take 1 tablet (81 mg) by mouth   daily   losartan (COZAAR) 25 MG tablet Take 0.5 tablets (12.5 mg) by   mouth daily   metFORMIN (GLUCOPHAGE) 1000 MG tablet Take 1 tablet (1,000 mg) by   mouth 2 times daily (with meals)   pravastatin (PRAVACHOL) 40 MG tablet Take 1 tablet (40 mg) by   mouth daily   predniSONE (DELTASONE) 5 MG tablet Take 1 tablet (5 mg) by mouth   daily   ranitidine (ZANTAC) 300 MG tablet Take 1 tablet (300 mg) by mouth   At Bedtime   blood glucose monitoring (ALEX MICROLET) lancets Use to test   blood sugar 1 times daily or as directed.   blood glucose monitoring (NO BRAND SPECIFIED) test strip Use to   test blood sugars 1 time daily or as directed         Allergies:      Allergies   Allergen Reactions     Atorvastatin Muscle Pain (Myalgia)            Social History:   No tobacco, occasional alcohol seen in consultation with daughter   at bedside functioning as           Family History:   The patient has no family history of any bleeding, clotting or   anesthesia problems.          Review of Systems:   Ten point Review of Systems is negative other than noted in the   HPI          Physical Exam:   Gen:  This is a well developed, well nourished woman in no   apparent distress.  Blood pressure 108/63, temperature 98.2  F (36.8  C), temperature   source Oral, resp. rate 16, height 1.6 m (5' 3\"), weight 74.7 kg   (164 lb " 10.9 oz), SpO2 96 %.  HEENT - Normocephalic, atraumatic, mucous membranes moist.  no   scleral icterus.  Neck - supple without masses  Lungs - clear to ascultation.    Heart - Regular rate & rhythm without murmur  Abdomen:   soft, non-distended, non-tender and no masses palpated, normal   bowel sounds   Extremities - warm without edema  Neurologic - nonfocal          Data:   WBC -   WBC   Date Value Ref Range Status   11/06/2018 7.8 4.0 - 11.0 10e9/L Final   ], HgB - Hemoglobin   Date Value Ref Range Status   11/06/2018 12.3 11.7 - 15.7 g/dL Final   ]   Liver Function Studies -   Recent Labs   Lab Test  11/06/18   1820   PROTTOTAL  7.7   ALBUMIN  3.2*   BILITOTAL  0.4   ALKPHOS  76   AST  33   ALT  48     CT scan of the abdomen:   Reviewed   Ultrasound of the abdomen:     Colt Garcia MD        Urine Culture Aerobic Bacterial   Result Value Ref Range    Specimen Description Midstream Urine     Culture Micro 50,000 to 100,000 colonies/mL  Escherichia coli   (A)        Susceptibility    Escherichia coli - SUSANNA     AMPICILLIN <=2 Sensitive ug/mL     CEFAZOLIN* <=4 Sensitive ug/mL      * Cefazolin SUSANNA breakpoints are for the treatment of uncomplicated urinary tract infections.  For the treatment of systemic infections, please contact the laboratory for additional testing.     CEFOXITIN <=4 Sensitive ug/mL     CEFTAZIDIME <=1 Sensitive ug/mL     CEFTRIAXONE <=1 Sensitive ug/mL     CIPROFLOXACIN <=0.25 Sensitive ug/mL     GENTAMICIN <=1 Sensitive ug/mL     LEVOFLOXACIN <=0.12 Sensitive ug/mL     NITROFURANTOIN <=16 Sensitive ug/mL     TOBRAMYCIN <=1 Sensitive ug/mL     Trimethoprim/Sulfa <=1/19 Sensitive ug/mL     AMPICILLIN/SULBACTAM <=2 Sensitive ug/mL     Piperacillin/Tazo <=4 Sensitive ug/mL     CEFEPIME <=1 Sensitive ug/mL

## 2018-11-08 NOTE — PROGRESS NOTES
Seen with daughter at bedside functioning as   Tolerated clear liquids without increase in discomfort or nausea  Passing gas but no bowel movement  Reports no abdominal pain    Afebrile with normal vital signs  Abdomen is soft and nondistended, nontender    Assessment/plan: Small bowel obstruction seems clinically improved  Will advance to soft diet and if tolerates, small bowel movement likely could be discharged today

## 2018-11-08 NOTE — PLAN OF CARE
Problem: Bowel Obstruction (Adult)  Goal: Signs and Symptoms of Listed Potential Problems Will be Absent, Minimized or Managed (Bowel Obstruction)  Signs and symptoms of listed potential problems will be absent, minimized or managed by discharge/transition of care (reference Bowel Obstruction (Adult) CPG).   Outcome: Improving  A/O, VSS  On RA. Denies any abdominal pain or discomfort, abdomen soft and non tender. Gas+, No BM, Voiding in BR. CLD and Pt tolerated well, No Increase in pain or N/V. Up independent. Family at bedside and very supportive. Tibetan speaking. BG unremarkable. Pt hopeful for Discharge tomorrow.

## 2018-11-08 NOTE — PROGRESS NOTES
Discharge    Patient discharged to home via ambulatory with daughter  Care plan note:  Pt tolerated breakfast and soft diet for lunch, no abd discomfort or nausea. VSS. Pt has been up walking in halls. Received coverage at noon for blood sugar. Plans to discharge this afternoon.  Goal: Discharge Needs Assessment  Listed belongings gathered and returned to patient. Yes  Care Plan and Patient education resolved: Yes  Prescriptions if needed, hard copies sent with patient  NA  Home and hospital acquired medications returned to patient: NA  Medication Bin checked and emptied on discharge Yes  Follow up appointment made for patient: Yes

## 2018-11-09 ENCOUNTER — TELEPHONE (OUTPATIENT)
Dept: INTERNAL MEDICINE | Facility: CLINIC | Age: 68
End: 2018-11-09

## 2018-11-09 NOTE — TELEPHONE ENCOUNTER
[facility] on file for dtr. ([place])    ED / Discharge Outreach Protocol    Patient Contact    Attempt # 1    Was call answered?  No.  Left message on voicemail with information to call me back.    Sylvia ALCANTARA RN, BSN, PHN

## 2018-11-12 NOTE — TELEPHONE ENCOUNTER
"Hospital/TCU/ED for chronic condition Discharge Protocol    \"Hi, my name is Melva Martinez, a registered nurse, and I am calling from [facility].  I am calling to follow up and see how things are going for you after your recent emergency visit/hospital/TCU stay.\"    Tell me how you are doing now that you are home?\"  Spoke with patient's daughter per CTC.  \"She is doing much better now, thank you.\"      Discharge Instructions    \"Let's review your discharge instructions.  What is/are the follow-up recommendations?  Pt. Response: She is going to be following up with Dr. Singh on 11/21.    \"Has an appointment with your primary care provider been scheduled?\"   Yes. (confirm)    \"When you see the provider, I would recommend that you bring your medications with you.\"    Medications    \"Tell me what changed about your medicines when you discharged?\"    Changes to chronic meds?    0-1    \"What questions do you have about your medications?\"    None     New diagnoses of heart failure, COPD, diabetes, or MI?    No              Medication reconciliation completed? Yes  Was MTM referral placed (*Make sure to put transitions as reason for referral)?   No    Call Summary    \"What questions or concerns do you have about your recent visit and your follow-up care?\"     none    \"If you have questions or things don't continue to improve, we encourage you contact us through the main clinic number (give number).  Even if the clinic is not open, triage nurses are available 24/7 to help you.     We would like you to know that our clinic has extended hours (provide information).  We also have urgent care (provide details on closest location and hours/contact info)\"      \"Thank you for your time and take care!\"             "

## 2018-11-18 NOTE — MR AVS SNAPSHOT
After Visit Summary   5/5/2017    Randall Boland    MRN: [id number]           Patient Information     Date Of Birth          1950        Visit Information        Provider Department      5/5/2017 2:00 PM Jovani Ordaz MD [facility]        Today's Diagnoses     Cognitive impairment    -  1      Care Instructions    AFTER VISIT SUMMARY (AVS):    At today's visit we discussed various diagnostic possibilities for your symptoms and the reasons for work-up, which includes:  Orders Placed This Encounter   Procedures     MR Brain w/o & w Contrast     Methylmalonic acid     TSH with free T4 reflex     Vitamin B12     Vitamin E     Vitamin B1 whole blood     Vitamin B6     Anti Treponema     OCCUPATIONAL THERAPY REFERRAL     NEUROPSYCHOLOGY REFERRAL     No new medications were ordered.    Preventive Neurology: Encouraged to stay physically and mentally active with particular emphasis on daily mentally stimulating activities of your choice (such as crosswords, puzzles, sudoku, etc.), stretching exercises, walking, and healthy eating.    Additional recommendations after the work-up.    Next follow-up appointment is in next 3 months or earlier if needed.    Please do not hesitate to call me with any questions or concerns.    Thanks.          Follow-ups after your visit        Additional Services     NEUROPSYCHOLOGY REFERRAL       Your provider has referred you to:    [facility]: Adult Neuropsychology Clinic (Mental Health Services) - [place] [phone]   [email or web address]    All scheduling is subject to the client's specific insurance plan & benefits, provider/location availability, and provider clinical specialities.  Please arrive 15 minutes early for your first appointment and bring your completed paperwork.    Please be aware that coverage of these services is subject to the terms and limitations of your health insurance  plan.  Call member services at your health plan with any benefit or coverage questions.    Please bring the following to your appointment:  >>   Any x-rays, CTs or MRIs which have been performed.  Contact the facility where they were done to arrange for  prior to your scheduled appointment.  Any new CT, MRI or other procedures ordered by your specialist must be performed at a [facility] facility or coordinated by your clinic's referral office.    >>   List of current medications   >>   This referral request   >>   Any documents/labs given to you for this referral            OCCUPATIONAL THERAPY REFERRAL       *This order will print in the [facility] Central Scheduling Office*    [facility] provides Occupational Therapy evaluation and treatment and many specialty services across the [place] system.  If requesting a specialty program, please choose from the list below.    Call [phone] to schedule [facility] at all locations, with the exception of [facility], please call [phone].     Treatment: Evaluation & Treatment  Special Instructions/Modalities: CPT and cognitive assessment  Special Programs: Cognition Assessment     Please be aware that coverage of these services is subject to the terms and limitations of your health insurance plan.  Call member services at your health plan with any benefit or coverage questions.      **Note to Provider** To refer patients to therapy outside of the location list, change the order class to External Referral in the order composer.                  Follow-up notes from your care team     Return in about 3 months (around 8/5/2017).      Your next 10 appointments already scheduled     May 15, 2017  5:00 PM CDT   MR BRAIN W/O & W CONTRAST with [id number]   [facility] ([facility])    [address] [place] [id number] [phone]           Take your medicines  as usual, unless your doctor tells you not to. Bring a list of your current medicines to your exam (including vitamins, minerals and over-the-counter drugs).  You will be given intravenous contrast for this exam. To prepare:   The day before your exam, drink extra fluids at least six 8-ounce glasses (unless your doctor tells you to restrict your fluids).   Have a blood test (creatinine test) within 30 days of your exam. Go to your clinic or Diagnostic Imaging Department for this test.  The MRI machine uses a strong magnet. Please wear clothes without metal (snaps, zippers). A sweatsuit works well, or we may give you a hospital gown.  Please remove any body piercings and hair extensions before you arrive. You will also remove watches, jewelry, hairpins, wallets, dentures, partial dental plates and hearing aids. You may wear contact lenses, and you may be able to wear your rings. We have a safe place to keep your personal items, but it is safer to leave them at home.   **IMPORTANT** THE INSTRUCTIONS BELOW ARE ONLY FOR THOSE PATIENTS WHO HAVE BEEN TOLD THEY WILL RECEIVE SEDATION OR GENERAL ANESTHESIA DURING THEIR MRI PROCEDURE:  IF YOU WILL RECEIVE SEDATION (take medicine to help you relax during your exam):   You must get the medicine from your doctor before you arrive. Bring the medicine to the exam. Do not take it at home.   Arrive one hour early. Bring someone who can take you home after the test. Your medicine will make you sleepy. After the exam, you may not drive, take a bus or take a taxi by yourself.   No eating 8 hours before your exam. You may have clear liquids up until 4 hours before your exam. (Clear liquids include water, clear tea, black coffee and fruit juice without pulp.)  IF YOU WILL RECEIVE ANESTHESIA (be asleep for your exam):   Arrive 1 1/2 hours early. Bring someone who can take you home after the test. You may not drive, take a bus or take a taxi by yourself.   No eating 8 hours before your  exam. You may have clear liquids up until 4 hours before your exam. (Clear liquids include water, clear tea, black coffee and fruit juice without pulp.)  Please call the Imaging Department at your exam site with any questions.            Aug 04, 2017  1:00 PM CDT   Return Visit with Jovani Ordaz MD   [facility] ([facility]    [address] [place] [id number] [phone]              Future tests that were ordered for you today     Open Future Orders        Priority Expected Expires Ordered    MR Brain w/o & w Contrast Routine  5/5/2018 5/5/2017            Who to contact     If you have questions or need follow up information about today's clinic visit or your schedule please contact [facility] directly at [phone].  Normal or non-critical lab and imaging results will be communicated to you by [employer], letter or phone within 4 business days after the clinic has received the results. If you do not hear from us within 7 days, please contact the clinic through [employer] or phone. If you have a critical or abnormal lab result, we will notify you by phone as soon as possible.  Submit refill requests through [facility] or call your pharmacy and they will forward the refill request to us. Please allow 3 business days for your refill to be completed.          Additional Information About Your Visit        [employer] Information     [facility] gives you secure access to your electronic health record. If you see a primary care provider, you can also send messages to your care team and make appointments. If you have questions, please call your primary care clinic.  If you do not have a primary care provider, please call [phone] and they will assist you.        Care EveryWhere ID     This is your Care EveryWhere ID. This could be used by other organizations to access your [facility] medical records  [id number]        Your Vitals Were      "Pulse Respirations Height BMI (Body Mass Index)          100 18 1.603 m (5' 3.1\") 29.74 kg/m2         Blood Pressure from Last 3 Encounters:   05/05/17 134/72   01/31/17 128/82   10/14/16 120/70    Weight from Last 3 Encounters:   05/05/17 76.4 kg (168 lb 6.4 oz)   01/31/17 76.5 kg (168 lb 11.2 oz)   10/14/16 78.2 kg (172 lb 6.4 oz)              We Performed the Following     Anti Treponema     Methylmalonic acid     NEUROPSYCHOLOGY REFERRAL     OCCUPATIONAL THERAPY REFERRAL     TSH with free T4 reflex     Vitamin B1 whole blood     Vitamin B12     Vitamin B6     Vitamin E        Primary Care Provider    None Specified       No primary provider on file.        Thank you!     Thank you for choosing [facility]  for your care. Our goal is always to provide you with excellent care. Hearing back from our patients is one way we can continue to improve our services. Please take a few minutes to complete the written survey that you may receive in the mail after your visit with us. Thank you!             Your Updated Medication List - Protect others around you: Learn how to safely use, store and throw away your medicines at www.disposemymeds.org.          This list is accurate as of: 5/5/17  3:10 PM.  Always use your most recent med list.                   Brand Name Dispense Instructions for use    * atorvastatin 20 MG tablet    LIPITOR    90 tablet    Take 1 tablet (20 mg) by mouth daily       * atorvastatin 40 MG tablet    LIPITOR    90 tablet    Take 1 tablet (40 mg) by mouth daily       blood glucose monitoring lancets     1 Box    Use to test blood sugar 1-2 times daily or as directed.       * blood glucose monitoring test strip    ALEX CONTOUR NEXT    50 each    Use to test blood sugar 1-2 times daily or as directed.       * blood glucose monitoring test strip    no brand specified    100 strip    Use to test blood sugars once daily       FISH OIL + D3 PO          metFORMIN 1000 MG tablet    " GLUCOPHAGE    180 tablet    Take 1 tablet (1,000 mg) by mouth 2 times daily (with meals)       Multi-vitamin Tabs tablet      Take 1 tablet by mouth daily       nystatin 085841 UNIT/GM Powd    MYCOSTATIN    30 g    Apply topically 3 times daily as needed       * Notice:  This list has 4 medication(s) that are the same as other medications prescribed for you. Read the directions carefully, and ask your doctor or other care provider to review them with you.       No

## 2018-11-21 ENCOUNTER — OFFICE VISIT (OUTPATIENT)
Dept: INTERNAL MEDICINE | Facility: CLINIC | Age: 68
End: 2018-11-21
Payer: COMMERCIAL

## 2018-11-21 VITALS
BODY MASS INDEX: 28.77 KG/M2 | HEART RATE: 80 BPM | WEIGHT: 162.4 LBS | DIASTOLIC BLOOD PRESSURE: 70 MMHG | SYSTOLIC BLOOD PRESSURE: 116 MMHG | OXYGEN SATURATION: 97 % | TEMPERATURE: 98.4 F | RESPIRATION RATE: 18 BRPM

## 2018-11-21 DIAGNOSIS — K56.609 SBO (SMALL BOWEL OBSTRUCTION) (H): ICD-10-CM

## 2018-11-21 DIAGNOSIS — Z09 HOSPITAL DISCHARGE FOLLOW-UP: Primary | ICD-10-CM

## 2018-11-21 PROCEDURE — 99214 OFFICE O/P EST MOD 30 MIN: CPT | Performed by: INTERNAL MEDICINE

## 2018-11-21 NOTE — MR AVS SNAPSHOT
After Visit Summary   11/21/2018    Randall Boland    MRN: [id number]           Patient Information     Date Of Birth          1950        Visit Information        Provider Department      11/21/2018 1:00 PM Adrianna Singh MD [facility]        Today's Diagnoses     Hospital discharge follow-up    -  1    SBO (small bowel obstruction) (H)           Follow-ups after your visit        Follow-up notes from your care team     Return in about 6 months (around 5/21/2019) for Physical exam (earlier as needed).      Who to contact     If you have questions or need follow up information about today's clinic visit or your schedule please contact [facility] directly at [phone].  Normal or non-critical lab and imaging results will be communicated to you by MyChart, letter or phone within 4 business days after the clinic has received the results. If you do not hear from us within 7 days, please contact the clinic through [facility] or phone. If you have a critical or abnormal lab result, we will notify you by phone as soon as possible.  Submit refill requests through [facility] or call your pharmacy and they will forward the refill request to us. Please allow 3 business days for your refill to be completed.          Additional Information About Your Visit        MyChart Information     [facility] gives you secure access to your electronic health record. If you see a primary care provider, you can also send messages to your care team and make appointments. If you have questions, please call your primary care clinic.  If you do not have a primary care provider, please call [phone] and they will assist you.        Care EveryWhere ID     This is your Care EveryWhere ID. This could be used by other organizations to access your [facility] medical records  [id number]        Your Vitals Were     Pulse Temperature Respirations Pulse Oximetry BMI (Body Mass Index)        80 98.4  F (36.9  C) (Oral) 18 97% 28.77 kg/m2        Blood Pressure from Last 3 Encounters:   11/21/18 116/70   11/08/18 134/64   11/06/18 124/82    Weight from Last 3 Encounters:   11/21/18 162 lb 6.4 oz (73.7 kg)   11/07/18 164 lb 10.9 oz (74.7 kg)   11/06/18 166 lb 1.6 oz (75.3 kg)              Today, you had the following     No orders found for display       Primary Care Provider Office Phone # Fax #    Adrianna Singh [phone]       [address] [facility] [address]        Equal Access to Services     HEATHER CATES : Hadii aad ku hadasho Sosachin, waaxda luqadaha, qaybta kaalmada adeegyada, irina fuentes . So [facility] [phone].    ATENCIÓN: Si habla español, tiene a paez disposición servicios gratuitos de asistencia lingüística. [facility] [phone].    We comply with applicable federal civil rights laws and [place] laws. We do not discriminate on the basis of race, color, national origin, age, disability, sex, sexual orientation, or gender identity.            Thank you!     Thank you for choosing [facility]  for your care. Our goal is always to provide you with excellent care. Hearing back from our patients is one way we can continue to improve our services. Please take a few minutes to complete the written survey that you may receive in the mail after your visit with us. Thank you!             Your Updated Medication List - Protect others around you: Learn how to safely use, store and throw away your medicines at www.disposemymeds.org.          This list is accurate as of 11/21/18  1:19 PM.  Always use your most recent med list.                   Brand Name Dispense Instructions for use Diagnosis    aspirin 81 MG chewable tablet    ASA    90 tablet    Take 1 tablet (81 mg) by mouth daily    Type 2 diabetes mellitus without complication, without long-term current use of insulin (H)       blood glucose monitoring lancets     100 each     Use to test blood sugar 1 times daily or as directed.    Type 2 diabetes mellitus without complication, without long-term current use of insulin (H)       blood glucose monitoring test strip    no brand specified    100 strip    Use to test blood sugars 1 time daily or as directed    Type 2 diabetes mellitus without complication, without long-term current use of insulin (H)       losartan 25 MG tablet    COZAAR    45 tablet    Take 0.5 tablets (12.5 mg) by mouth daily    Type 2 diabetes mellitus without complication, without long-term current use of insulin (H)       metFORMIN 1000 MG tablet    GLUCOPHAGE    60 tablet    Take 1 tablet (1,000 mg) by mouth 2 times daily (with meals)    Type 2 diabetes mellitus without complication, without long-term current use of insulin (H)       ondansetron 4 MG tablet    ZOFRAN    18 tablet    Take 1 tablet (4 mg) by mouth every 8 hours as needed for nausea    SBO (small bowel obstruction) (H)       pravastatin 40 MG tablet    PRAVACHOL    90 tablet    Take 1 tablet (40 mg) by mouth daily        predniSONE 5 MG tablet    DELTASONE    90 tablet    Take 1 tablet (5 mg) by mouth daily    Rheumatoid arthritis involving multiple sites with positive rheumatoid factor (H)       ranitidine 300 MG tablet    ZANTAC    90 tablet    Take 1 tablet (300 mg) by mouth At Bedtime    Gastroesophageal reflux disease, esophagitis presence not specified

## 2018-11-21 NOTE — PROGRESS NOTES
SUBJECTIVE:      Randall Boland is a pleasant 68 year old female who presents for hospital follow-up:    Accompanied by daughter, who helps translate.    Hospital: [facility]  Date of Admission: 11/6/18  Date of Discharge: 11/8/18  Reason(s) for Admission: SBO    Diagnostic tests, treatments/interventions, and discharge summary reviewed.    Summary of hospitalization:  - presented with 1 week of waxing and waning abdominal pain associated with nausea and vomiting  - was diagnosed in UC with SBO 11/2/18, but declined ER evaluation at that time  - presented to PCP 11/6/2018, who directed her to the ER for further evaluation due to continued symptoms  - CT on admission confirmed small bowel obstruction  - patient admitted for further care  - general surgery consulted and conservative management (bowel rest) recommended  - bowel function returned and diet was advanced as tolerated  - SBO thought to be due to adhesions from prior surgery    Medication changes since discharge: none  Adherent to discharge medications: yes   Problems taking discharge medications: no     Follow-up: n/a     Update since discharge:   - doing well - no complaints  - no recurrent abdominal pain, nausea, or vomiting  - bowel movements are normal and regular    OBJECTIVE:       /70  Pulse 80  Temp 98.4  F (36.9  C) (Oral)  Resp 18  Wt 162 lb 6.4 oz (73.7 kg)  SpO2 97%  BMI 28.77 kg/m2  Constitutional: well-appearing  Respiratory: normal respiratory effort; clear to auscultation bilaterally  Cardiovascular: regular rate and rhythm; pedal pulses palpable; no edema  Gastrointestinal: soft, non-tender, non-distended, and bowel sounds present; no organomegaly or masses   Musculoskeletal: normal gait and station; no clubbing or cyanosis  Psych: normal judgment and insight; normal mood and affect; recent and remote memory intact    ASSESSMENT/PLAN:       (Z09) Hospital discharge follow-up  (primary encounter diagnosis)  (K56.609) SBO (small bowel  obstruction) (H)  Comment: SBO resolved.  Plan: if recurrent symptoms develop, patient should initiate a clear liquid diet until symptoms resolve.  If symptoms do not improve or worsen with clear liquid diet, patient to contact MD/go to the ER for further evaluation.    The instructions on the AVS were discussed and explained to the patient. Patient expressed understanding of instructions.    (Chart documentation was completed, in part, with [employer] voice-recognition software. Even though reviewed, some grammatical, spelling, and word errors may remain.)    Adrianna Singh MD   [address] [address]  T: [phone], F: [phone]

## 2018-11-23 ENCOUNTER — MYC REFILL (OUTPATIENT)
Dept: INTERNAL MEDICINE | Facility: CLINIC | Age: 68
End: 2018-11-23

## 2018-11-23 DIAGNOSIS — E11.9 TYPE 2 DIABETES MELLITUS WITHOUT COMPLICATION, WITHOUT LONG-TERM CURRENT USE OF INSULIN (H): ICD-10-CM

## 2018-11-23 NOTE — TELEPHONE ENCOUNTER
Prescription approved per [facility] Refill Protocol.    Sylvia ALCANTARA RN, BSN, PHN

## 2018-11-23 NOTE — TELEPHONE ENCOUNTER
Message from MyChart:  Original authorizing provider: Adrianna Singh MD    Raphael Krystian would like a refill of the following medications:  metFORMIN (GLUCOPHAGE) 1000 MG tablet [Adrianna Singh MD]    Preferred pharmacy: [address]    Comment:  This prescription has  again. The last request for refill was only ordered for a month. Can u pls reorder so it is good for 6-12 months otherwise I will have to keep contacting the clinic every month for refill Thanks

## 2018-11-23 NOTE — TELEPHONE ENCOUNTER
"Requested Prescriptions   Pending Prescriptions Disp Refills     metFORMIN (GLUCOPHAGE) 1000 MG tablet 60 tablet 2     Sig: Take 1 tablet (1,000 mg) by mouth 2 times daily (with meals)    Biguanide Agents Passed    11/23/2018  9:18 AM       Passed - Blood pressure less than 140/90 in past 6 months    BP Readings from Last 3 Encounters:   11/21/18 116/70   11/08/18 134/64   11/06/18 124/82                Passed - Patient has documented LDL within the past 12 mos.    Recent Labs   Lab Test  03/31/18   0930   LDL  77            Passed - Patient has had a Microalbumin in the past 15 mos.    Recent Labs   Lab Test  03/31/18   0936   MICROL  25   UMALCR  65.62*            Passed - Patient is age 10 or older       Passed - Patient has documented A1c within the specified period of time.    If HgbA1C is 8 or greater, it needs to be on file within the past 3 months.  If less than 8, must be on file within the past 6 months.     Recent Labs   Lab Test  11/06/18   1820   A1C  6.7*            Passed - Patient's CR is NOT>1.4 OR Patient's EGFR is NOT<45 within past 12 mos.    Recent Labs   Lab Test  11/08/18   0845   GFRESTIMATED  >90   GFRESTBLACK  >90       Recent Labs   Lab Test  11/08/18   0845   CR  0.47*            Passed - Patient does NOT have a diagnosis of CHF.       Passed - Patient is not pregnant       Passed - Patient has not had a positive pregnancy test within the past 12 mos.        Passed - Recent (6 mo) or future (30 days) visit within the authorizing provider's specialty    Patient had office visit in the last 6 months or has a visit in the next 30 days with authorizing provider or within the authorizing provider's specialty.  See \"Patient Info\" tab in inbasket, or \"Choose Columns\" in Meds & Orders section of the refill encounter.            Last Written Prescription Date:  7/27/18  Last Fill Quantity: 60,  # refills: 2   Last office visit: 11/21/2018 with prescribing provider:  11/21/18   Future Office Visit: "

## 2019-01-29 ENCOUNTER — TELEPHONE (OUTPATIENT)
Dept: INTERNAL MEDICINE | Facility: CLINIC | Age: 69
End: 2019-01-29

## 2019-01-29 DIAGNOSIS — E11.9 TYPE 2 DIABETES MELLITUS WITHOUT COMPLICATION, WITHOUT LONG-TERM CURRENT USE OF INSULIN (H): ICD-10-CM

## 2019-01-29 NOTE — TELEPHONE ENCOUNTER
Prescription approved per [facility] Refill Protocol.    Sylvia ALCANTARA RN, BSN, PHN

## 2019-01-29 NOTE — TELEPHONE ENCOUNTER
Requested Prescriptions   Pending Prescriptions Disp Refills     blood glucose (NO BRAND SPECIFIED) test strip 100 strip 3     Sig: Use to test blood sugars 1 time daily or as directed    There is no refill protocol information for this order        Last Written Prescription Date:  7/27/18  Last Fill Quantity: 100,  # refills: 3   Last office visit: 11/21/2018 with prescribing provider:  11/21/18   Future Office Visit:      Please include ICD 10 code for Medicare. See previous encounter.

## 2019-01-29 NOTE — TELEPHONE ENCOUNTER
Good Morning! Requesting a refill on Test strips because we will now be billing the patients Part B instead of Part D For insurance.    They require there to be a new RX every 6 months when doing so. Please list IDC 10 code on RX for Medicare Purposes     Thanks!  Alfreda Romero,Margaux   [facility]

## 2019-02-06 ENCOUNTER — OFFICE VISIT (OUTPATIENT)
Dept: INTERNAL MEDICINE | Facility: CLINIC | Age: 69
End: 2019-02-06
Payer: MEDICARE

## 2019-02-06 ENCOUNTER — ANCILLARY PROCEDURE (OUTPATIENT)
Dept: GENERAL RADIOLOGY | Facility: CLINIC | Age: 69
End: 2019-02-06
Attending: PHYSICIAN ASSISTANT
Payer: MEDICARE

## 2019-02-06 VITALS
TEMPERATURE: 97.6 F | DIASTOLIC BLOOD PRESSURE: 60 MMHG | OXYGEN SATURATION: 99 % | SYSTOLIC BLOOD PRESSURE: 112 MMHG | WEIGHT: 168 LBS | RESPIRATION RATE: 14 BRPM | BODY MASS INDEX: 29.77 KG/M2 | HEART RATE: 78 BPM | HEIGHT: 63 IN
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
## 2019-02-06 DIAGNOSIS — S69.91XA WRIST INJURY, RIGHT, INITIAL ENCOUNTER: ICD-10-CM

## 2019-02-06 DIAGNOSIS — S69.91XA WRIST INJURY, RIGHT, INITIAL ENCOUNTER: Primary | ICD-10-CM

## 2019-02-06 PROCEDURE — 73110 X-RAY EXAM OF WRIST: CPT | Mod: RT

## 2019-02-06 PROCEDURE — 99213 OFFICE O/P EST LOW 20 MIN: CPT | Performed by: PHYSICIAN ASSISTANT

## 2019-02-06 ASSESSMENT — MIFFLIN-ST. JEOR: SCORE: 1271.17

## 2019-02-06 NOTE — PROGRESS NOTES
"  SUBJECTIVE:   Krystian Boland is a 68 year old female who presents to clinic today for the following health issues:    Joint Pain    Onset: Yesterday    Description:   Location: right wrist  Character: Dull ache and throbbing     Intensity: 7/10    Progression of Symptoms: better    Accompanying Signs & Symptoms:  Other symptoms: none    History:   Previous similar pain: no       Precipitating factors:   Trauma or overuse: YES- fell yesterday     Alleviating factors:  Improved by: Ibuprofen    Therapies Tried and outcome: Ibuprofen and ace wrap     -------------------------------------    Problem list and histories reviewed & adjusted, as indicated.  Additional history: as documented    Labs reviewed in EPIC    Reviewed and updated as needed this visit by clinical staff  Tobacco  Allergies  Meds       Reviewed and updated as needed this visit by Provider  Allergies  Meds         ROS:  Constitutional, HEENT, cardiovascular, pulmonary, gi and gu systems are negative, except as otherwise noted.    OBJECTIVE:     /60   Pulse 78   Temp 97.6  F (36.4  C) (Oral)   Resp 14   Ht 1.6 m (5' 3\")   Wt 76.2 kg (168 lb)   SpO2 99%   Breastfeeding? No   BMI 29.76 kg/m    Body mass index is 29.76 kg/m .  GENERAL: healthy, alert and no distress  RESP: lungs clear to auscultation - no rales, rhonchi or wheezes  CV: regular rates and rhythm and normal S1 S2, no S3 or S4  MS: right wrist slight swelling noted.  No bruising.   Tenderness base of thumb and ventral wrist.   Good ROM of the finger, some pain with moving the thumb at the base.    SKIN: no suspicious lesions or rashes    Diagnostic Test Results:  Prelim  No fracture noted.     ASSESSMENT/PLAN:             1. Wrist injury, right, initial encounter  sprain  - XR Wrist Right G/E 3 Views; Future    2. Fall, initial encounter    - XR Wrist Right G/E 3 Views; Future    Patient Instructions   Icing,  Wrap with ace  Monitor           Tammy Hanna, " LEANDER  [facility]

## 2019-03-04 DIAGNOSIS — M05.79 RHEUMATOID ARTHRITIS INVOLVING MULTIPLE SITES WITH POSITIVE RHEUMATOID FACTOR (H): ICD-10-CM

## 2019-03-05 NOTE — TELEPHONE ENCOUNTER
Requested Prescriptions   Pending Prescriptions Disp Refills     predniSONE (DELTASONE) 5 MG tablet 90 tablet 1     Sig: Take 5 mg by mouth daily.    There is no refill protocol information for this order        Last Written Prescription Date:  08/17/2018  Last Fill Quantity: 90,  # refills: 1   Last office visit: 2/6/2019 with prescribing provider:  Dr Singh   Future Office Visit:

## 2019-03-07 RX ORDER — PREDNISONE 5 MG/1
5 TABLET ORAL DAILY
Qty: 90 TABLET | Refills: 1 | Status: SHIPPED | OUTPATIENT
Start: 2019-03-07 | End: 2019-10-28 | Stop reason: DRUGHIGH

## 2019-03-11 ENCOUNTER — PATIENT OUTREACH (OUTPATIENT)
Dept: GERIATRIC MEDICINE | Facility: CLINIC | Age: 69
End: 2019-03-11

## 2019-03-11 NOTE — PROGRESS NOTES
[facility] Care Coordination Contact    Called adult daughter Zachariah to schedule annual HRA home visit. Left a message requesting a return call to schedule HRA.   JAQUELIN Langley  [facility]  [phone]

## 2019-03-11 NOTE — PROGRESS NOTES
[facility] Care Coordination Contact    Member became effective with  Anu on 3/1/19 with [employer] MSC+.  Previous Health Plan: MA/Fee For Service  Previous Care System: [place] [facility]  Previous care coordinators name and number: NA, no MMIS entry  Waiver Type: N/A  Last MMIS Entry: Date NA and Type NA  MMIS visit date if different from above: NA  Services Listed in MMIS: NA  Member currently receiving the following home care services:     Member currently receiving the following community resources:    UTF received: No: Requested on NA     Kaycee Tinsley  Care Management Specialist  [facility]  [phone]

## 2019-03-11 NOTE — LETTER
March 11, 2019    KRYSTIAN BARNES  [address]  [place] [address]        Dear  Krystian,    Welcome to [facility] Senior Care Plus (MSC+) health program. My name is JAQUELIN Langley. I am your MSC+ care coordinator.     I will call you soon to see how you are doing and determine what needs you may have. My job is to help connect you to services, complete an assessment, and develop a care plan with you. There is no charge to you for the care coordination and assessment services. Our goal is to keep you as healthy and independent as possible.     [facility]+ includes the benefits you may receive from Medical Assistance.    Soon, you will receive a new MSC+ member identification (ID) card from [facility]. When you receive it, please use this card along with your [place] [facility] card and Prescription Drug Coverage Program card. When you receive, it please use this card where you get your health services. If you have Medicare, you will need to show your Medicare card when you get health services.    If you have questions, please call me at [phone]. If you reach my voice mail, leave a message and your phone number. If you are hearing impaired, please call the [place] Relay at 898 or [phone] (twavsy-io-mgbbdt relay service).    Sincerely,      JAQUELIN Langley    E-mail: [email or web address]  Phone: [phone]    Care Manager  [facility] [id number] DHS Approved ([id number])  [id number] (11/18)

## 2019-03-11 NOTE — LETTER
March 15, 2019    KRYSTIAN BARNES  [address]  [place] [address]    Dear Krystian:     Your Care Coordinator has been unable to reach you by telephone. I am writing to ask you or a family member to call me at [phone]. If you reach my voicemail, please leave a message with your daytime telephone number and a date and time that I can call you. If you are hearing impaired, please call the [place] Relay at 682 or [phone] (fevpoo-vb-wstqzt relay service).     The reason I am trying to reach you is:     [] Six (6) month check-in  [x] To schedule your annual assessment  [] Other:      Please call me as soon as you receive this letter. I look forward to speaking with you.    Sincerely,    JAQUELIN Langley    E-mail: [email or web address]  Phone: [phone]    Care Manager  [facility]+ [id number] ([id number])    [id number] (11/18)

## 2019-03-14 NOTE — PROGRESS NOTES
[facility] Care Coordination Contact    Called adult daughter (Zachariah-only number listed) to schedule annual HRA home visit. Left a message requesting a return call to schedule HRA.   JAQUELIN Langley  [facility]  [phone]

## 2019-03-15 NOTE — PROGRESS NOTES
"[facility] Care Coordination Contact    Per CC, mailed client an \"Unable to Contact\" letter.    Kaycee Tinsley  Care Management Specialist  [facility]  [phone]      "

## 2019-03-21 NOTE — PROGRESS NOTES
[facility] Care Coordination Contact  Called [facility] and confirmed that they have the same address and phone number as this Care Coordinator does. The primary PCP clinic has the same contact information. Called member's daughter again and left another message requesting a return call.  Completed 4 attempts to reach client with no response.  Member is officially unable to contact effective today.  Completed MMIS entry.  Completed health plan required [facility] POC.    Follow-up Plan: CC will attempt to reach member in six months.  JAQUELIN Langley  [facility]  [phone]

## 2019-03-22 NOTE — PROGRESS NOTES
[facility] Care Coordination Contact    Per CC, continues to be unable to contact member for assessment, MMIS entered.    Kaycee Tinsley  Care Management Specialist  [facility]  [phone]

## 2019-04-03 DIAGNOSIS — E11.9 TYPE 2 DIABETES MELLITUS WITHOUT COMPLICATION, WITHOUT LONG-TERM CURRENT USE OF INSULIN (H): Primary | ICD-10-CM

## 2019-04-03 DIAGNOSIS — K21.9 GASTROESOPHAGEAL REFLUX DISEASE, ESOPHAGITIS PRESENCE NOT SPECIFIED: ICD-10-CM

## 2019-04-03 RX ORDER — LOSARTAN POTASSIUM 25 MG/1
12.5 TABLET ORAL DAILY
Qty: 45 TABLET | Refills: 0 | Status: SHIPPED | OUTPATIENT
Start: 2019-04-03 | End: 2019-08-15

## 2019-04-03 RX ORDER — PRAVASTATIN SODIUM 40 MG
40 TABLET ORAL DAILY
Qty: 90 TABLET | Refills: 0 | Status: SHIPPED | OUTPATIENT
Start: 2019-04-03 | End: 2019-07-15

## 2019-04-03 NOTE — TELEPHONE ENCOUNTER
3 month refill provided.    Please call or MyChart patient's daughter and remind her to schedule patient for diabetes follow-up.     Thank you.

## 2019-04-03 NOTE — TELEPHONE ENCOUNTER
"  ranitidine (ZANTAC) 300 MG tablet [Pharmacy Med Name: RANITIDINE HCL 300MG TABS] 90 tablet 3     Sig: TAKE ONE TABLET BY MOUTH EVERY NIGHT AT BEDTIME    H2 Blockers Protocol Passed - 4/3/2019  4:20 PM       Passed - Patient is age 12 or older       Passed - Recent (12 mo) or future (30 days) visit within the authorizing provider's specialty    Patient had office visit in the last 12 months or has a visit in the next 30 days with authorizing provider or within the authorizing provider's specialty.  See \"Patient Info\" tab in inbasket, or \"Choose Columns\" in Meds & Orders section of the refill encounter.             Passed - Medication is active on med list       Prescription approved per [facility] Refill Protocol.    Sylvia ALCANTARA RN, BSN, PHN      "

## 2019-04-03 NOTE — TELEPHONE ENCOUNTER
"Requested Prescriptions   Pending Prescriptions Disp Refills     losartan (COZAAR) 25 MG tablet [Pharmacy Med Name: LOSARTAN POTASSIUM 25MG TABS] 45 tablet 3     Sig: TAKE ONE-HALF TABLET BY MOUTH EVERY DAY    Angiotensin-II Receptors Failed - 4/3/2019  4:20 PM       Failed - Normal serum creatinine on file in past 12 months    Recent Labs   Lab Test 11/08/18  0845   CR 0.47*   Last Written Prescription Date:  4/25/2018  Last Fill Quantity: 45,  # refills: 3   Last office visit: 11/21/2018 with prescribing provider:  Adrianna Singh     Future Office Visit:      Routing refill request to provider for review/approval because:  Labs out of range:  CR    Patient due for annual physical May           Passed - Blood pressure under 140/90 in past 12 months    BP Readings from Last 3 Encounters:   02/06/19 112/60   11/21/18 116/70   11/08/18 134/64                Passed - Recent (12 mo) or future (30 days) visit within the authorizing provider's specialty    Patient had office visit in the last 12 months or has a visit in the next 30 days with authorizing provider or within the authorizing provider's specialty.  See \"Patient Info\" tab in inbasket, or \"Choose Columns\" in Meds & Orders section of the refill encounter.             Passed - Medication is active on med list       Passed - Patient is age 18 or older       Passed - No active pregnancy on record       Passed - Normal serum potassium on file in past 12 months    Recent Labs   Lab Test 11/08/18  0845   POTASSIUM 3.6                   Passed - No positive pregnancy test in past 12 months        pravastatin (PRAVACHOL) 40 MG tablet [Pharmacy Med Name: PRAVASTATIN SODIUM 40MG TABS] 90 tablet 3     Sig: TAKE ONE TABLET BY MOUTH EVERY DAY    Statins Protocol Failed - 4/3/2019  4:20 PM       Failed - LDL on file in past 12 months    Recent Labs   Lab Test 03/31/18  0930   LDL 77   Last Written Prescription Date:  4/2/2018  Last Fill Quantity: 90,  # refills: 3   Last " "office visit: 11/21/2018 with prescribing provider:  Adrianna Singh     Future Office Visit:           Passed - No abnormal creatine kinase in past 12 months    Recent Labs   Lab Test 05/23/18  1048   CKT 98               Passed - Recent (12 mo) or future (30 days) visit within the authorizing provider's specialty    Patient had office visit in the last 12 months or has a visit in the next 30 days with authorizing provider or within the authorizing provider's specialty.  See \"Patient Info\" tab in inbasket, or \"Choose Columns\" in Meds & Orders section of the refill encounter.             Passed - Medication is active on med list       Passed - Patient is age 18 or older       Passed - No active pregnancy on record       Passed - No positive pregnancy test in past 12 months        Routing refill request to provider for review/approval because:  Labs not current:  LINDA ALCANTARA RN, BSN, PHN          "

## 2019-04-12 ENCOUNTER — PATIENT OUTREACH (OUTPATIENT)
Dept: GERIATRIC MEDICINE | Facility: CLINIC | Age: 69
End: 2019-04-12

## 2019-04-14 NOTE — PROGRESS NOTES
[facility] Care Coordination Contact  Late entry from 3/12/19: Received a call back from member's daughter, Zachariah. She was reaching out regarding the HRA. Scheduled member's initial HRA for 4/24/19 at 10am.  Zachariah shared that she does the interpreting for member.   JAQUELIN Langley  [facility]  [phone]

## 2019-04-21 ASSESSMENT — ACTIVITIES OF DAILY LIVING (ADL)
CURRENT_FUNCTION: HOUSEWORK REQUIRES ASSISTANCE
CURRENT_FUNCTION: MONEY MANAGEMENT REQUIRES ASSISTANCE
CURRENT_FUNCTION: LAUNDRY REQUIRES ASSISTANCE
CURRENT_FUNCTION: SHOPPING REQUIRES ASSISTANCE

## 2019-04-23 ENCOUNTER — OFFICE VISIT (OUTPATIENT)
Dept: INTERNAL MEDICINE | Facility: CLINIC | Age: 69
End: 2019-04-23
Payer: MEDICARE

## 2019-04-23 VITALS
BODY MASS INDEX: 31.42 KG/M2 | HEART RATE: 81 BPM | DIASTOLIC BLOOD PRESSURE: 64 MMHG | OXYGEN SATURATION: 98 % | HEIGHT: 61 IN | RESPIRATION RATE: 18 BRPM | WEIGHT: 166.4 LBS | SYSTOLIC BLOOD PRESSURE: 112 MMHG

## 2019-04-23 DIAGNOSIS — Z00.00 MEDICARE ANNUAL WELLNESS VISIT, SUBSEQUENT: Primary | ICD-10-CM

## 2019-04-23 DIAGNOSIS — R21 RASH: ICD-10-CM

## 2019-04-23 DIAGNOSIS — E11.9 TYPE 2 DIABETES MELLITUS WITHOUT COMPLICATION, WITHOUT LONG-TERM CURRENT USE OF INSULIN (H): ICD-10-CM

## 2019-04-23 PROBLEM — Z76.89 HEALTH CARE HOME: Status: RESOLVED | Noted: 2019-03-11 | Resolved: 2019-04-23

## 2019-04-23 PROBLEM — M25.511 RIGHT SHOULDER PAIN: Status: RESOLVED | Noted: 2018-08-27 | Resolved: 2019-04-23

## 2019-04-23 PROCEDURE — 99207 C FOOT EXAM  NO CHARGE: CPT | Mod: 25 | Performed by: INTERNAL MEDICINE

## 2019-04-23 PROCEDURE — 99213 OFFICE O/P EST LOW 20 MIN: CPT | Mod: 25 | Performed by: INTERNAL MEDICINE

## 2019-04-23 PROCEDURE — G0439 PPPS, SUBSEQ VISIT: HCPCS | Performed by: INTERNAL MEDICINE

## 2019-04-23 RX ORDER — FLUOCINONIDE 0.5 MG/G
CREAM TOPICAL 2 TIMES DAILY
Qty: 60 G | Refills: 3 | Status: SHIPPED | OUTPATIENT
Start: 2019-04-23 | End: 2020-08-25

## 2019-04-23 RX ORDER — GLUCOSAMINE HCL/CHONDROITIN SU 500-400 MG
CAPSULE ORAL
Qty: 100 EACH | Refills: 3 | Status: SHIPPED | OUTPATIENT
Start: 2019-04-23 | End: 2021-03-17

## 2019-04-23 RX ORDER — LANCETS
EACH MISCELLANEOUS
Qty: 100 EACH | Refills: 6 | Status: SHIPPED | OUTPATIENT
Start: 2019-04-23 | End: 2019-11-15

## 2019-04-23 SDOH — HEALTH STABILITY: MENTAL HEALTH: HOW OFTEN DO YOU HAVE A DRINK CONTAINING ALCOHOL?: MONTHLY OR LESS

## 2019-04-23 SDOH — HEALTH STABILITY: MENTAL HEALTH: HOW OFTEN DO YOU HAVE 6 OR MORE DRINKS ON ONE OCCASION?: NEVER

## 2019-04-23 SDOH — HEALTH STABILITY: MENTAL HEALTH: HOW MANY STANDARD DRINKS CONTAINING ALCOHOL DO YOU HAVE ON A TYPICAL DAY?: 1 OR 2

## 2019-04-23 ASSESSMENT — MIFFLIN-ST. JEOR: SCORE: 1232.17

## 2019-04-23 NOTE — PROGRESS NOTES
SUBJECTIVE                                                      HPI: Krystian Boland is a pleasant 68 year old female who presents for an AWV:    Daughter translates.    Patient needs new diabetes testing supplies. She is also DUE for fasting diabetic labs.    She complains of an itchy rash involving the back of both hands, back of left elbows, and lateral right ankle. Rash is itchy. No improvement with low-dose topical steroids.    PMH, PSH, FH, SH, medications, allergies, immunizations, preventative health, and HRA reviewed.     Past Medical History:   Diagnosis Date     Early onset Alzheimer's dementia without behavioral disturbance      Interstitial lung disease (H)      Obesity (BMI 30-39.9)      Osteopenia      Rheumatoid factor positive      Type 2 diabetes mellitus (H)      Past Surgical History:   Procedure Laterality Date     CHOLECYSTECTOMY, OPEN  1990     Family History   Problem Relation Age of Onset     Dementia Mother      Cerebrovascular Disease Father      Cerebrovascular Disease Brother      Diabetes No family hx of      Myocardial Infarction No family hx of      Coronary Artery Disease Early Onset No family hx of      Breast Cancer No family hx of      Ovarian Cancer No family hx of      Colon Cancer No family hx of      Social History     Occupational History     Occupation: Retired - was  in restaurant   Tobacco Use     Smoking status: Never Smoker     Smokeless tobacco: Never Used   Substance and Sexual Activity     Alcohol use: Yes     Frequency: Monthly or less     Drinks per session: 1 or 2     Binge frequency: Never     Drug use: No     Sexual activity: Never   Social History Narrative    .     8 children.     13 grandchildren (as of 2019).    Originally from [place]. [place] (all kids born in [place] [facility] Moved to  in 2012.     Retired - was  in restaurant.    Living with daughter and her family. Taking care of  (~25 years her senior).     Goes for walks  occasionally.      Allergies   Allergen Reactions     Atorvastatin Muscle Pain (Myalgia)     Current Outpatient Medications   Medication Sig     alcohol swab prep pads Use to swab area of injection/ty as directed.     aspirin 81 MG chewable tablet Take 1 tablet (81 mg) by mouth daily     losartan (COZAAR) 25 MG tablet Take 0.5 tablets (12.5 mg) by mouth daily     metFORMIN (GLUCOPHAGE) 1000 MG tablet Take 1 tablet (1,000 mg) by mouth 2 times daily (with meals)     pravastatin (PRAVACHOL) 40 MG tablet Take 1 tablet (40 mg) by mouth daily     predniSONE (DELTASONE) 5 MG tablet Take 5 mg by mouth daily.     ranitidine (ZANTAC) 300 MG tablet Take 1 tablet (300 mg) by mouth At Bedtime     Immunization History   Administered Date(s) Administered     Influenza (High Dose) 3 valent vaccine 11/07/2018     Influenza Vaccine IM 3yrs+ 4 Valent IIV4 10/01/2016     Pneumo Conj 13-V (2010&after) 04/02/2018     Pneumococcal 23 valent 10/14/2016     TDAP Vaccine (Adacel) 07/04/2012     Zoster vaccine, live 07/04/2016     PREVENTATIVE HEALTH                                                      Blood pressure: within normal limits   Breast CA screening: up to date   Cervical CA screening: screening no longer indicated  Colon CA screening: up to date   Lung CA screening: n/a   Dexa: up to date   Screening HCV: completed  Screening HIV: n/a   STD testing: no risk factors present  Depression screening: PHQ-2 assessment completed and reviewed - no intervention indicated at this time  Alcohol misuse screening: alcohol use reviewed - no intervention indicated at this time  Immunizations: up to date     HEALTH RISK ASSESSMENT                                                      In general, how would you rate your overall physical health? fair  Outside of work, how many days during the week do you exercise? no  Outside of work, approximately how many minutes a day do you exercise? n/a    If you drink alcohol do you typically have >3 drinks  "per day or >7 drinks per week?   Do you usually eat at least 4 servings of fruit and vegetables a day, include whole grains & fiber and avoid regularly eating high fat or \"junk\" foods? yes     Do you have any problems taking medications regularly? no  Do you have any side effects from medications? none    Needs assistance with daily activities: shopping requires assistance, housework requires assistance, laundry requires assistance, money management requires assistance    Which of the following safety concerns are present in your home? lack of grab bars in bathroom    Hearing impairment: no hearing concerns    In the past 6 months, have you been bothered by leaking of urine? yes    In general, how would you rate your overall mental or emotional health? good    Additional concerns today: no    OBJECTIVE                                                      /64   Pulse 81   Resp 18   Ht 1.549 m (5' 1\")   Wt 75.5 kg (166 lb 6.4 oz)   SpO2 98%   BMI 31.44 kg/m      Constitutional: well-appearing  Respiratory: normal respiratory effort; clear to auscultation bilaterally  Cardiovascular: regular rate and rhythm; no edema  Gastrointestinal: soft, non-tender, non-distended, and bowel sounds present; no organomegaly or masses   Musculoskeletal: normal gait and station  Foot exam: feet intact - no lesions or ulcers; sensation intact to monofilament  Integumentary: partially desquamated, erythematous patches, with overlying scale back of both hands, back of left elbow, and lateral right ankle    Cognitive impairment noted: yes - poor short term memory    ASSESSMENT/PLAN                                                       (Z00.00) Medicare annual wellness visit, subsequent  (primary encounter diagnosis)  Comment: PMH, PSH, FH, SH, medications, allergies, immunizations, and preventative health measures reviewed.   Plan: see below for plans.     (E11.9) Type 2 diabetes mellitus without complication, without long-term " current use of insulin (H)  Plan:   - patient will return for fasting diabetes labs when able.   - recommendations to follow.   - referred to ophthalmology for annual diabetic eye exam - patient to schedule.   - diabetes testing supplies prescribed.    (R21) Rash  Comment: eczema vs. psoriasis.  Plan: TRIAL of Lidex cream; if rash worsens, changes, or does not improve, patient to contact MD.    The instructions on the AVS were discussed and explained to the patient. Patient expressed understanding of instructions.    (Chart documentation was completed, in part, with YE[employer] VMall voice-recognition software. Even though reviewed, some grammatical, spelling, and word errors may remain.)    Adrianna Singh MD   [address] [address]  T: [phone], F: [phone]

## 2019-04-23 NOTE — PATIENT INSTRUCTIONS
Please follow-up with both rheumatology (Harjit Staley) and pulmonology (Tristan Cross) in next 1-2 months.     ---    Please schedule ophthalmology visit - annual diabetic eye exam. Number below.    ---    Please schedule fasting labs when able.    ---    Diabetes testing supplies sent to pharmacy.

## 2019-04-24 ENCOUNTER — PATIENT OUTREACH (OUTPATIENT)
Dept: GERIATRIC MEDICINE | Facility: CLINIC | Age: 69
End: 2019-04-24

## 2019-04-25 ENCOUNTER — DOCUMENTATION ONLY (OUTPATIENT)
Dept: OTHER | Facility: CLINIC | Age: 69
End: 2019-04-25

## 2019-04-30 ENCOUNTER — DOCUMENTATION ONLY (OUTPATIENT)
Dept: LAB | Facility: CLINIC | Age: 69
End: 2019-04-30

## 2019-05-03 ENCOUNTER — MYC REFILL (OUTPATIENT)
Dept: INTERNAL MEDICINE | Facility: CLINIC | Age: 69
End: 2019-05-03

## 2019-05-03 DIAGNOSIS — J84.9 ILD (INTERSTITIAL LUNG DISEASE) (H): Primary | ICD-10-CM

## 2019-05-03 DIAGNOSIS — E11.9 TYPE 2 DIABETES MELLITUS WITHOUT COMPLICATION, WITHOUT LONG-TERM CURRENT USE OF INSULIN (H): ICD-10-CM

## 2019-05-03 DIAGNOSIS — K21.9 GASTROESOPHAGEAL REFLUX DISEASE, ESOPHAGITIS PRESENCE NOT SPECIFIED: ICD-10-CM

## 2019-05-03 RX ORDER — PRAVASTATIN SODIUM 40 MG
40 TABLET ORAL DAILY
Qty: 90 TABLET | Refills: 0 | Status: CANCELLED | OUTPATIENT
Start: 2019-05-03

## 2019-05-03 RX ORDER — LOSARTAN POTASSIUM 25 MG/1
12.5 TABLET ORAL DAILY
Qty: 45 TABLET | Refills: 0 | Status: CANCELLED | OUTPATIENT
Start: 2019-05-03

## 2019-05-03 NOTE — TELEPHONE ENCOUNTER
"Requested Prescriptions   Pending Prescriptions Disp Refills     pravastatin (PRAVACHOL) 40 MG tablet [Pharmacy Med Name: PRAVASTATIN SODIUM 40MG TABS] 90 tablet 0     Sig: TAKE ONE TABLET BY MOUTH ONCE DAILY   Last Written Prescription Date:  4/3/2019  Last Fill Quantity: 90,  # refills: 0   Last office visit: 4/23/2019 with prescribing provider:  4/23/2019   Future Office Visit:   Next 5 appointments (look out 90 days)    May 08, 2019  7:30 AM CDT  Lab visit with [facility]  [facility] [address] [id number] [phone]             Statins Protocol Failed - 5/3/2019 12:11 PM        Failed - LDL on file in past 12 months     Recent Labs   Lab Test 03/31/18  0930   LDL 77             Passed - No abnormal creatine kinase in past 12 months     Recent Labs   Lab Test 05/23/18  1048   CKT 98                Passed - Recent (12 mo) or future (30 days) visit within the authorizing provider's specialty     Patient had office visit in the last 12 months or has a visit in the next 30 days with authorizing provider or within the authorizing provider's specialty.  See \"Patient Info\" tab in inbasket, or \"Choose Columns\" in Meds & Orders section of the refill encounter.              Passed - Medication is active on med list        Passed - Patient is age 18 or older        Passed - No active pregnancy on record        Passed - No positive pregnancy test in past 12 months        ranitidine (ZANTAC) 300 MG tablet [Pharmacy Med Name: RANITIDINE HCL 300MG TABS] 90 tablet 0     Sig: TAKE ONE TABLET BY MOUTH AT BEDTIME   Last Written Prescription Date:  4/25/2018  Last Fill Quantity: 90,  # refills: 3   Last office visit: 4/23/2019 with prescribing provider:  4/23/2019   Future Office Visit:   Next 5 appointments (look out 90 days)    May 08, 2019  7:30 AM CDT  Lab visit with [facility]  [facility] ([facility]" "[facility]) [address] [address]  [phone]             H2 Blockers Protocol Passed - 5/3/2019 12:11 PM        Passed - Patient is age 12 or older        Passed - Recent (12 mo) or future (30 days) visit within the authorizing provider's specialty     Patient had office visit in the last 12 months or has a visit in the next 30 days with authorizing provider or within the authorizing provider's specialty.  See \"Patient Info\" tab in inbasket, or \"Choose Columns\" in Meds & Orders section of the refill encounter.              Passed - Medication is active on med list        losartan (COZAAR) 25 MG tablet [Pharmacy Med Name: LOSARTAN POTASSIUM 25MG TABS] 45 tablet 0     Sig: TAKE ONE-HALF TABLET BY MOUTH EVERY DAY   Last Written Prescription Date:  4/3/2019  Last Fill Quantity: 45,  # refills: 0   Last office visit: 4/23/2019 with prescribing provider:  4/23/2019   Future Office Visit:   Next 5 appointments (look out 90 days)    May 08, 2019  7:30 AM CDT  [facility]  [facility] ([facility]) [address] [address]  [phone]             Angiotensin-II Receptors Failed - 5/3/2019 12:11 PM        Failed - Normal serum creatinine on file in past 12 months     Recent Labs   Lab Test 11/08/18  0845   CR 0.47*             Passed - Blood pressure under 140/90 in past 12 months     BP Readings from Last 3 Encounters:   04/23/19 112/64   02/06/19 112/60   11/21/18 116/70                 Passed - Recent (12 mo) or future (30 days) visit within the authorizing provider's specialty     Patient had office visit in the last 12 months or has a visit in the next 30 days with authorizing provider or within the authorizing provider's specialty.  See \"Patient Info\" tab in inbasket, or \"Choose Columns\" in Meds & Orders section of the refill encounter.              Passed - Medication is active on med list        " Passed - Patient is age 18 or older        Passed - No active pregnancy on record        Passed - Normal serum potassium on file in past 12 months     Recent Labs   Lab Test 11/08/18  0845   POTASSIUM 3.6                    Passed - No positive pregnancy test in past 12 months

## 2019-05-06 RX ORDER — PRAVASTATIN SODIUM 40 MG
TABLET ORAL
Qty: 90 TABLET | Refills: 0 | OUTPATIENT
Start: 2019-05-06

## 2019-05-06 RX ORDER — LOSARTAN POTASSIUM 25 MG/1
TABLET ORAL
Qty: 45 TABLET | Refills: 0 | OUTPATIENT
Start: 2019-05-06

## 2019-05-06 ASSESSMENT — PATIENT HEALTH QUESTIONNAIRE - PHQ9: SUM OF ALL RESPONSES TO PHQ QUESTIONS 1-9: 0

## 2019-05-06 NOTE — PROGRESS NOTES
[facility] Care Coordination Contact    [facility] Initial Assessment     Home visit for Initial Health Risk Assessment with Krystian Boland completed on April 24, 2019    Type of residence:: Private home - stairs(3 steps into the home. Full flight of steps into basement. Bedroom and bathroom are on main level.)  Current living arrangement:: I live in a private home with family(Lives with spouse in daughter's home with daughter's spouse and children.)     Assessment completed with:: Patient, Care Team Member, Spouse or significant other, Family(Daughter Zachariah)    Current Care Plan  Member currently receiving the following home care services: None  Member currently receiving the following community resources: None    Social History: Came to the [place], and have become [place] citizens in Nov of 2018. Is  and has 8 children (2 boys and 6 girls). 3 are in [place], 3 back in [place] and 2 in [place]. 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 Was born in [place] and lost the country in 1959 and sought refuge in [place].     Health Issues: Shared that she has diabetes and manages with medications and diet. She shared that she leaks urine daily and has been using incontinent products. Reviewed that this can be covered by her insurance. Reviewed that this Care Coordinator would place referral. Member talked about her sciatic pain and that this causes her difficulties. Encouraged member to exercise daily and to follow up with her doctor on stretching exercises. Has had 1 hospitalization in the past year for a small bowel obstruction.  No ED visits.    Medication Review  Medication reconciliation completed in Epic: Yes  Medication set-up & administration: Family/informal caregiver sets up weekly.  Self-administers medications.  Medication Risk Assessment Medication (1 or more, place referral to MTM): N/A: No risk factors identified  MTM Referral Placed: No: No risk factors idenified    Mental/Behavioral  Health   Depression Screening: See PHQ assessment flowsheet.   Mental health DX:: No      No current MH services    Falls Assessment:   Fallen 2 or more times in the past year?: No   Any fall with injury in the past year?: No    ADL/IADL Dependencies:   Dependent ADLs:: Independent  Dependent IADLs:: Shopping, Cleaning, Medication Management, Money Management, Transportation    [facility] sponsored benefits: Shared information re: Silver Sneakers/gym memberships, ASA, Calcium +D.    PCA Assessment completed at visit: No     Elderly Waiver Eligibility: No-does not meet criteria    Care Plan & Recommendations: Member wants to continue to live with her spouse at her daughter's home. Member enjoys assisting her daughter as her pain allows. Member assists her  with ADL tasks and does all of the cooking for herself and her spouse. Encouraged family to allow member to do more tasks to encourage independence and feelings of self worth. Daughter shared that she would. Reviewed that his Care Coordinator will place a referral for incontinent products.    See [facility]CC for detailed assessment information.    Follow-Up Plan: Member informed of future contact, plan to f/u with member with a 6 month telephone assessment.  Contact information shared with member and family, encouraged member to call with any questions or concerns at any time.    [place] care continuum providers: Please refer to Health Care Home on the Epic Problem List to view this patient's [place] [facility] Care Plan Summary.  JAQUELIN Langley  [facility]  [phone]

## 2019-05-06 NOTE — TELEPHONE ENCOUNTER
Ranitidine Prescription approved per [facility] Refill Protocol.

## 2019-05-07 ENCOUNTER — PATIENT OUTREACH (OUTPATIENT)
Dept: GERIATRIC MEDICINE | Facility: CLINIC | Age: 69
End: 2019-05-07

## 2019-05-07 NOTE — PROGRESS NOTES
[facility] Care Coordination Contact    Order placed with [facility] (p: [phone]; f: [phone]) for 3 thin pany liners/day.  Order placed on 5/7/19. Access updated.  As required, authorization submitted to health plan.    Kaycee Tinsley  Care Management Specialist  [facility]  [phone]

## 2019-05-08 DIAGNOSIS — E11.9 TYPE 2 DIABETES MELLITUS WITHOUT COMPLICATION, WITHOUT LONG-TERM CURRENT USE OF INSULIN (H): ICD-10-CM

## 2019-05-08 LAB
ALBUMIN SERPL-MCNC: 3.5 G/DL (ref 3.4–5)
ALP SERPL-CCNC: 72 U/L (ref 40–150)
ALT SERPL W P-5'-P-CCNC: 17 U/L (ref 0–50)
ANION GAP SERPL CALCULATED.3IONS-SCNC: 4 MMOL/L (ref 3–14)
AST SERPL W P-5'-P-CCNC: 13 U/L (ref 0–45)
BILIRUB SERPL-MCNC: 0.4 MG/DL (ref 0.2–1.3)
BUN SERPL-MCNC: 10 MG/DL (ref 7–30)
CALCIUM SERPL-MCNC: 9.2 MG/DL (ref 8.5–10.1)
CHLORIDE SERPL-SCNC: 108 MMOL/L (ref 94–109)
CHOLEST SERPL-MCNC: 114 MG/DL
CO2 SERPL-SCNC: 29 MMOL/L (ref 20–32)
CREAT SERPL-MCNC: 0.64 MG/DL (ref 0.52–1.04)
ERYTHROCYTE [DISTWIDTH] IN BLOOD BY AUTOMATED COUNT: 12.7 % (ref 10–15)
GFR SERPL CREATININE-BSD FRML MDRD: >90 ML/MIN/{1.73_M2}
GLUCOSE SERPL-MCNC: 117 MG/DL (ref 70–99)
HBA1C MFR BLD: 6.8 % (ref 0–5.6)
HCT VFR BLD AUTO: 36.9 % (ref 35–47)
HDLC SERPL-MCNC: 37 MG/DL
HGB BLD-MCNC: 12.2 G/DL (ref 11.7–15.7)
LDLC SERPL CALC-MCNC: 57 MG/DL
MCH RBC QN AUTO: 30 PG (ref 26.5–33)
MCHC RBC AUTO-ENTMCNC: 33.1 G/DL (ref 31.5–36.5)
MCV RBC AUTO: 91 FL (ref 78–100)
NONHDLC SERPL-MCNC: 77 MG/DL
PLATELET # BLD AUTO: 327 10E9/L (ref 150–450)
POTASSIUM SERPL-SCNC: 4.1 MMOL/L (ref 3.4–5.3)
PROT SERPL-MCNC: 8.1 G/DL (ref 6.8–8.8)
RBC # BLD AUTO: 4.07 10E12/L (ref 3.8–5.2)
SODIUM SERPL-SCNC: 141 MMOL/L (ref 133–144)
TRIGL SERPL-MCNC: 99 MG/DL
WBC # BLD AUTO: 6.1 10E9/L (ref 4–11)

## 2019-05-08 PROCEDURE — 36415 COLL VENOUS BLD VENIPUNCTURE: CPT | Performed by: INTERNAL MEDICINE

## 2019-05-08 PROCEDURE — 85027 COMPLETE CBC AUTOMATED: CPT | Performed by: INTERNAL MEDICINE

## 2019-05-08 PROCEDURE — 83036 HEMOGLOBIN GLYCOSYLATED A1C: CPT | Performed by: INTERNAL MEDICINE

## 2019-05-08 PROCEDURE — 80053 COMPREHEN METABOLIC PANEL: CPT | Performed by: INTERNAL MEDICINE

## 2019-05-08 PROCEDURE — 80061 LIPID PANEL: CPT | Performed by: INTERNAL MEDICINE

## 2019-05-09 ENCOUNTER — PATIENT OUTREACH (OUTPATIENT)
Dept: GERIATRIC MEDICINE | Facility: CLINIC | Age: 69
End: 2019-05-09

## 2019-05-09 NOTE — PROGRESS NOTES
[facility] Care Coordination Contact    Received after visit chart from care coordinator.  Completed following tasks: Mailed copy of care plan to client  Chart was returned to CC.    and Provider Signature - No POC Shared:  Member indicates that they do not want their POC shared with any [facility] providers.     Kaycee Tinsley  Care Management Specialist  [facility]  [phone]

## 2019-05-22 NOTE — PROGRESS NOTES
Per Rafael at [facility], panty liners were delivered 5/16/2019. CC notified.    Krystian Boland 1950 3 thin panty lines/day 5/7/2019 Delivered 5/16/19     Kaycee Tinsley  Care Management Specialist  [facility]  [phone]

## 2019-05-31 ENCOUNTER — OFFICE VISIT (OUTPATIENT)
Dept: RHEUMATOLOGY | Facility: CLINIC | Age: 69
End: 2019-05-31
Attending: INTERNAL MEDICINE
Payer: MEDICARE

## 2019-05-31 VITALS
BODY MASS INDEX: 29.96 KG/M2 | DIASTOLIC BLOOD PRESSURE: 77 MMHG | WEIGHT: 162.8 LBS | HEART RATE: 93 BPM | HEIGHT: 62 IN | TEMPERATURE: 98.2 F | SYSTOLIC BLOOD PRESSURE: 136 MMHG

## 2019-05-31 DIAGNOSIS — R76.8 RHEUMATOID FACTOR POSITIVE WITH CYCLIC CITRULLINATED PEPTIDE (CCP) ANTIBODY NEGATIVE: ICD-10-CM

## 2019-05-31 DIAGNOSIS — Z79.899 LONG-TERM USE OF PLAQUENIL: Primary | ICD-10-CM

## 2019-05-31 PROCEDURE — G0463 HOSPITAL OUTPT CLINIC VISIT: HCPCS | Mod: ZF

## 2019-05-31 RX ORDER — HYDROXYCHLOROQUINE SULFATE 200 MG/1
200 TABLET, FILM COATED ORAL DAILY
Qty: 180 TABLET | Refills: 1 | Status: SHIPPED | OUTPATIENT
Start: 2019-05-31 | End: 2019-05-31

## 2019-05-31 RX ORDER — PREDNISONE 1 MG/1
TABLET ORAL
Qty: 120 TABLET | Refills: 3 | Status: SHIPPED | OUTPATIENT
Start: 2019-05-31 | End: 2019-10-28

## 2019-05-31 RX ORDER — HYDROXYCHLOROQUINE SULFATE 200 MG/1
200 TABLET, FILM COATED ORAL 2 TIMES DAILY
Qty: 180 TABLET | Refills: 3 | Status: SHIPPED | OUTPATIENT
Start: 2019-05-31 | End: 2020-11-10

## 2019-05-31 ASSESSMENT — MIFFLIN-ST. JEOR: SCORE: 1231.71

## 2019-05-31 ASSESSMENT — PAIN SCALES - GENERAL: PAINLEVEL: NO PAIN (0)

## 2019-05-31 NOTE — LETTER
2019      RE: Krystian Boland  [id number]  [facility] [place] [facility] [address]       [facility]  Rheumatology Clinic  Harjit Staley MD  2019     Name: Krystian Boland  MRN: [id number]  Age: 68 year old  : 1950  Referring provider: Referred Self     Assessment and Plan:  #High positive rheumatoid factor and anti-CCP noted in 2018:  Patient relates onset of symmetrical small joint predominant pain in late summer 2018. Symptoms resolved promptly with medrol dose pack followed by low dose prednisone prescribed by Dr. Singh. Patient has continued daily prednisone 5mg since that time. Today, she denies morning stiffness, joint pain, or musculoskeletal weakness. Exam shows no inflammatory joint changes. On  electrolytes, creatinine, transaminases, and CBC were all negative or normal. Glucose was 117. Last HbA1C was 6.7 in 2018.     Rheumatoid arthritis, if present, is clinically at low disease activity level. From a systemic inflammation and joint-focussed perspective, patient does not require combination medical therapy. However, my recommendations dating from 2018 are not changed. I suggest instituting hydroxychloroquine 400mg daily in view of the patient's interstitial lung disease and rheumatoid arthritis-associated serologies. We discussed the need for regular ophthalmologic monitoring while on plaquenil. If patient tolerates plaquenil well, after 2 months I will recommend tapering prednisone by 1mg every 2 weeks.     #Interstitial lung disease:   Patient relates stable pulmonary symptoms, and her cough has disappeared since last year.  Patient describes excellent functional status with minor limitation in ambulating without oxygen. Lung exam shows crackles at the bilateral bases. It is certainly possible that the lung disease noted by Dr. Cross in 2018 has stabilized or improved spontaneously. If lung disease is associated with seropositive RA, I might expect very slow progression over  years to decades. I agree with patient's plan to follow-up with Dr. Cross and renew PFTs in  7-2019. If lung disease is unexpectedly progressive since 2018, I will recommend more aggressive immunomodulatory regimen.    Orders:  - INTERNAL OPHTHALMOLOGY ADULT REFERRAL  - hydroxychloroquine (PLAQUENIL) 200 MG tablet  Dispense: 180 tablet; Refill: 3  - predniSONE (DELTASONE) 1 MG tablet  Dispense: 120 tablet; Refill: 3        Follow-up: Return in about 4 months (around 9/30/2019).     HPI:   Krystian Boland is a 68 year old female with a history of ILD who presents for follow-up. I last saw the patient on 6/1/18 at which time she reported minimal dyspnea on exertion, but no history of joint swelling, stiffness, or altered range of motion. Plan at that time was to start plaquenil 400mg. We also discussed a consideration for MMF 1000mg for progression of lung symptoms.     Patient saw Dr. Singh for annual visit in 4/2019. Diagnosis of diabetes and rheumatoid arthritis were noted. Low dose prednisone was continued.     Today, she reports that she is doing about the same since our last visit. Transient joint pain in summer 2018 was most prominent in her hands.  But her joint pain and finger swelling has disappeared since starting prednisone 5mg, which she has taken for the past year. Denies morning stiffness. She has not taken plaquenil since being prescribed last year, noting that she become anxious to add another pill to her current regimen. Is instead using prednisone to alleviate joint pain. Uses ibuprofen to treat sciatica.     She also reports that her breathing is stable and she is able to walk 100 meters without experiencing shortness of breath. Her coughing has also improved. Has tried pulmonary therapy in the past, but did not feel that this helped. She plans on following up with Dr. Cross in the near future.    Review of Systems:   Pertinent items are noted in HPI or as below, remainder of complete ROS is  negative.      No recent problems with hearing or vision. No swallowing problems.   No breathing difficulty, shortness of breath, coughing, or wheezing  No chest pain or palpitations  No heart burn, indigestion, abdominal pain, nausea, vomiting, diarrhea  No urination problems, no bloody, cloudy urine, no dysuria  No numbing, tingling, weakness  No headaches or confusion  No rashes. No easy bleeding or bruising.     Active Medications:   Current Outpatient Medications:      alcohol swab prep pads, Use to swab area of injection/ty as directed., Disp: 100 each, Rfl: 3     aspirin 81 MG chewable tablet, Take 1 tablet (81 mg) by mouth daily, Disp: 90 tablet, Rfl: 2     blood glucose (NO BRAND SPECIFIED) test strip, Use to test blood sugar once times daily or as directed. To accompany: Blood Glucose Monitor Brands: per insurance., Disp: 100 strip, Rfl: 6     blood glucose (NO BRAND SPECIFIED) test strip, Use to test blood sugars 1 time daily or as directed. [E11.9], Disp: 100 strip, Rfl: 3     blood glucose calibration (NO BRAND SPECIFIED) solution, To accompany: Blood Glucose Monitor Brands: per insurance., Disp: 1 Bottle, Rfl: 3     blood glucose monitoring (ALEX MICROLET) lancets, Use to test blood sugar 1 time daily., Disp: 100 each, Rfl: 3     blood glucose monitoring (NO BRAND SPECIFIED) meter device kit, Use to test blood sugar once times daily or as directed. Preferred blood glucose meter OR supplies to accompany: Blood Glucose Monitor Brands: per insurance., Disp: 1 kit, Rfl: 0     fluocinonide (LIDEX) 0.05 % external cream, Apply topically 2 times daily, Disp: 60 g, Rfl: 3     losartan (COZAAR) 25 MG tablet, Take 0.5 tablets (12.5 mg) by mouth daily, Disp: 45 tablet, Rfl: 0     metFORMIN (GLUCOPHAGE) 1000 MG tablet, Take 1 tablet (1,000 mg) by mouth 2 times daily (with meals), Disp: 60 tablet, Rfl: 5     pravastatin (PRAVACHOL) 40 MG tablet, Take 1 tablet (40 mg) by mouth daily, Disp: 90 tablet, Rfl: 0      "predniSONE (DELTASONE) 5 MG tablet, Take 5 mg by mouth daily., Disp: 90 tablet, Rfl: 1     ranitidine (ZANTAC) 300 MG tablet, TAKE ONE TABLET BY MOUTH AT BEDTIME, Disp: 90 tablet, Rfl: 3     ranitidine (ZANTAC) 300 MG tablet, Take 1 tablet (300 mg) by mouth At Bedtime, Disp: 90 tablet, Rfl: 3     thin (NO BRAND SPECIFIED) lancets, Use with lanceting device. To accompany: Blood Glucose Monitor Brands: per insurance., Disp: 100 each, Rfl: 6      Allergies:   Atorvastatin      Past Medical History:  Obesity  Osteopenia  Type 2 diabetes mellitus without complication, without long-term current use of insulin  Interstitial lung disease  Abdominal tuberculosis (intestinal, 20 years ago)     Past Surgical History:  Open cholecystectomy    Family History:   Mother - dementia  Father - cerebrovascular disease  Brother - cerebrovascular disease, hypertension  No family history of - diabetes, myocardial infarction, early onset C.A.D., breast cancer, ovarian cancer, colon cancer, rheumatoid arthritis  Family history of - high blood pressure  (mentions that medical history is unlikely to be comprehensive because of poor/infrequent medical records)      Social History:   No smoking or tobacco use  Rare alcohol use (1-2 times a year)   with 8 children, 13 grandchildren, cooks for the family     Physical Exam:   /77   Pulse 93   Temp 98.2  F (36.8  C) (Oral)   Ht 1.575 m (5' 2\")   Wt 73.8 kg (162 lb 12.8 oz)   BMI 29.78 kg/m      Wt Readings from Last 4 Encounters:   05/31/19 73.8 kg (162 lb 12.8 oz)   04/23/19 75.5 kg (166 lb 6.4 oz)   02/06/19 76.2 kg (168 lb)   11/21/18 73.7 kg (162 lb 6.4 oz)     Constitutional: Well-developed, appearing stated age; cooperative  Eyes: Normal EOM, PERRLA, vision, conjunctiva, sclera  ENT: Normal external ears, nose, hearing, lips, teeth, gums, throat. No mucous membrane lesions, normal saliva pool  Neck: No mass or thyroid enlargement  Resp: Lungs clear to auscultation, nl to " palpation. Velcro crackles at bilateral lower bases.  CV: RRR, no murmurs, rubs or gallops, no edema  GI: No ABD mass or tenderness, no HSM  : Not tested  Lymph: No cervical, supraclavicular, inguinal or epitrochlear nodes  MS: The TMJ, neck, shoulder, elbow, wrist, MCP/PIP/DIP, spine, hip, knee, ankle, and foot MTP/IP joints were examined and found normal. No active synovitis or altered joint anatomy. Full joint ROM. Normal  strength. No dactylitis,  tenosynovitis, enthesopathy. Clubbing in finger nails, but good alignment of MCPs and PIPs. Excellent  strength. Knees are cool without effusion.  Skin: No nail pitting, alopecia, rash, nodules or lesions. Pink discoloration and reduced skin wrinkling over the knuckles of both hands. Single 2mm nodule in the palm of the left hand.   Neuro: Normal cranial nerves, strength, sensation, DTRs.   Psych: Normal judgement, orientation, memory, affect.       Laboratory:   RHEUM RESULTS Latest Ref Rng & Units 11/7/2018 11/8/2018 5/8/2019   SED RATE 0 - 30 mm/h - - -   CRP, INFLAMMATION 0.0 - 8.0 mg/L - - -   CK TOTAL 30 - 225 U/L - - -   RHEUMATOID FACTOR <20 IU/mL - - -   AST 0 - 45 U/L - - 13   ALT 0 - 50 U/L - - 17   ALBUMIN 3.4 - 5.0 g/dL - - 3.5   WBC 4.0 - 11.0 10e9/L 5.6 6.1 6.1   RBC 3.8 - 5.2 10e12/L 3.62(L) 3.78(L) 4.07   HGB 11.7 - 15.7 g/dL 10.7(L) 11.1(L) 12.2   HCT 35.0 - 47.0 % 31.7(L) 33.0(L) 36.9   MCV 78 - 100 fl 88 87 91   MCHC 31.5 - 36.5 g/dL 33.8 33.6 33.1   RDW 10.0 - 15.0 % 13.0 13.0 12.7    - 450 10e9/L 262 286 327   CREATININE 0.52 - 1.04 mg/dL 0.59 0.47(L) 0.64   GFR ESTIMATE, IF BLACK >60 mL/min/[1.73:m2] >90 >90 >90   GFR ESTIMATE >60 mL/min/[1.73:m2] >90 >90 >90    - 1,620 mg/dL - - -   HEPATITIS C ANTIBODY NR - - -       Rheumatoid Factor   Date Value Ref Range Status   05/23/2018 700 (H) <20 IU/mL Final     Cyclic Citrullinated Peptide Antibody, IgG   Date Value Ref Range Status   05/23/2018 >340 (H) <7 U/mL Final      Comment:     Positive     Scleroderma Antibody Scl-70 CLAUDIO IgG   Date Value Ref Range Status   05/23/2018 <0.2 0.0 - 0.9 AI Final     Comment:     Negative  Antibody index (AI) values reflect qualitative changes in antibody   concentration that cannot be directly associated with clinical condition or   disease state.       SSA (Ro) (CLAUDIO) Antibody, IgG   Date Value Ref Range Status   05/23/2018 <0.2 0.0 - 0.9 AI Final     Comment:     Negative  Antibody index (AI) values reflect qualitative changes in antibody   concentration that cannot be directly associated with clinical condition or   disease state.       SSB (La) (CLAUDIO) Antibody, IgG   Date Value Ref Range Status   05/23/2018 <0.2 0.0 - 0.9 AI Final     Comment:     Negative  Antibody index (AI) values reflect qualitative changes in antibody   concentration that cannot be directly associated with clinical condition or   disease state.       LYNDA interpretation   Date Value Ref Range Status   05/23/2018 Negative NEG^Negative Final     Comment:                                        Reference range:  <1:40  NEGATIVE  1:40 - 1:80  BORDERLINE POSITIVE  >1:80 POSITIVE         Neutrophil Cytoplasmic IgG Antibody   Date Value Ref Range Status   05/23/2018 <1:20 <1:20 Final     Comment:     (Note)  The ANCA IFA is <1:20; therefore, no further testing will   be performed.  INTERPRETIVE INFORMATION: Anti-Neutrophil Cyto Ab, IgG  Neutrophil Cytoplasmic Antibodies (C-ANCA = granular   cytoplasmic staining, P-ANCA = perinuclear staining) are   found in the serum of over 90 percent of patients with   certain necrotizing systemic vasculitides, and usually in   less than 5 percent of patients with collagen vascular   disease or arthritis.  Performed by [facility],  [address] [place] [id number] [phone]  [email or web address] Marty Wright MD, Lab. Director         IGG   Date Value Ref Range Status   05/23/2018 1,980 (H) 695 - 1,620 mg/dL Final     Scleroderma Antibody  Scl-70 CLAUDIO IgG   Date Value Ref Range Status   05/23/2018 <0.2 0.0 - 0.9 AI Final     Comment:     Negative  Antibody index (AI) values reflect qualitative changes in antibody   concentration that cannot be directly associated with clinical condition or   disease state.                  Scribe Disclosure:  Zac PEREZ, am serving as a scribe to document services personally performed by Harjit Staley MD at this visit, based upon the provider's statements to me. All documentation has been reviewed by the aforementioned provider prior to being entered into the official medical record.     Zac PEREZ, a scribe, prepared the chart for today's encounter.       Harjit Staley MD

## 2019-05-31 NOTE — PROGRESS NOTES
[facility]  Rheumatology Clinic  Harjit Staley MD  2019     Name: Krystian Boland  MRN: [id number]  Age: 68 year old  : 1950  Referring provider: Referred Self     Assessment and Plan:  #High positive rheumatoid factor and anti-CCP noted in 2018:  Patient relates onset of symmetrical small joint predominant pain in late summer 2018. Symptoms resolved promptly with medrol dose pack followed by low dose prednisone prescribed by Dr. Singh. Patient has continued daily prednisone 5mg since that time. Today, she denies morning stiffness, joint pain, or musculoskeletal weakness. Exam shows no inflammatory joint changes. On  electrolytes, creatinine, transaminases, and CBC were all negative or normal. Glucose was 117. Last HbA1C was 6.7 in 2018.     Rheumatoid arthritis, if present, is clinically at low disease activity level. From a systemic inflammation and joint-focussed perspective, patient does not require combination medical therapy. However, my recommendations dating from 2018 are not changed. I suggest instituting hydroxychloroquine 400mg daily in view of the patient's interstitial lung disease and rheumatoid arthritis-associated serologies. We discussed the need for regular ophthalmologic monitoring while on plaquenil. If patient tolerates plaquenil well, after 2 months I will recommend tapering prednisone by 1mg every 2 weeks.     #Interstitial lung disease:   Patient relates stable pulmonary symptoms, and her cough has disappeared since last year.  Patient describes excellent functional status with minor limitation in ambulating without oxygen. Lung exam shows crackles at the bilateral bases. It is certainly possible that the lung disease noted by Dr. Cross in 2018 has stabilized or improved spontaneously. If lung disease is associated with seropositive RA, I might expect very slow progression over years to decades. I agree with patient's plan to follow-up with Dr. Cross and renew PFTs  in  7-2019. If lung disease is unexpectedly progressive since 2018, I will recommend more aggressive immunomodulatory regimen.    Orders:  - INTERNAL OPHTHALMOLOGY ADULT REFERRAL  - hydroxychloroquine (PLAQUENIL) 200 MG tablet  Dispense: 180 tablet; Refill: 3  - predniSONE (DELTASONE) 1 MG tablet  Dispense: 120 tablet; Refill: 3        Follow-up: Return in about 4 months (around 9/30/2019).     HPI:   Krystian Boland is a 68 year old female with a history of ILD who presents for follow-up. I last saw the patient on 6/1/18 at which time she reported minimal dyspnea on exertion, but no history of joint swelling, stiffness, or altered range of motion. Plan at that time was to start plaquenil 400mg. We also discussed a consideration for MMF 1000mg for progression of lung symptoms.     Patient saw Dr. Singh for annual visit in 4/2019. Diagnosis of diabetes and rheumatoid arthritis were noted. Low dose prednisone was continued.     Today, she reports that she is doing about the same since our last visit. Transient joint pain in summer 2018 was most prominent in her hands.  But her joint pain and finger swelling has disappeared since starting prednisone 5mg, which she has taken for the past year. Denies morning stiffness. She has not taken plaquenil since being prescribed last year, noting that she become anxious to add another pill to her current regimen. Is instead using prednisone to alleviate joint pain. Uses ibuprofen to treat sciatica.     She also reports that her breathing is stable and she is able to walk 100 meters without experiencing shortness of breath. Her coughing has also improved. Has tried pulmonary therapy in the past, but did not feel that this helped. She plans on following up with Dr. Cross in the near future.    Review of Systems:   Pertinent items are noted in HPI or as below, remainder of complete ROS is negative.      No recent problems with hearing or vision. No swallowing problems.   No  breathing difficulty, shortness of breath, coughing, or wheezing  No chest pain or palpitations  No heart burn, indigestion, abdominal pain, nausea, vomiting, diarrhea  No urination problems, no bloody, cloudy urine, no dysuria  No numbing, tingling, weakness  No headaches or confusion  No rashes. No easy bleeding or bruising.     Active Medications:   Current Outpatient Medications:      alcohol swab prep pads, Use to swab area of injection/ty as directed., Disp: 100 each, Rfl: 3     aspirin 81 MG chewable tablet, Take 1 tablet (81 mg) by mouth daily, Disp: 90 tablet, Rfl: 2     blood glucose (NO BRAND SPECIFIED) test strip, Use to test blood sugar once times daily or as directed. To accompany: Blood Glucose Monitor Brands: per insurance., Disp: 100 strip, Rfl: 6     blood glucose (NO BRAND SPECIFIED) test strip, Use to test blood sugars 1 time daily or as directed. [E11.9], Disp: 100 strip, Rfl: 3     blood glucose calibration (NO BRAND SPECIFIED) solution, To accompany: Blood Glucose Monitor Brands: per insurance., Disp: 1 Bottle, Rfl: 3     blood glucose monitoring (ALEX MICROLET) lancets, Use to test blood sugar 1 time daily., Disp: 100 each, Rfl: 3     blood glucose monitoring (NO BRAND SPECIFIED) meter device kit, Use to test blood sugar once times daily or as directed. Preferred blood glucose meter OR supplies to accompany: Blood Glucose Monitor Brands: per insurance., Disp: 1 kit, Rfl: 0     fluocinonide (LIDEX) 0.05 % external cream, Apply topically 2 times daily, Disp: 60 g, Rfl: 3     losartan (COZAAR) 25 MG tablet, Take 0.5 tablets (12.5 mg) by mouth daily, Disp: 45 tablet, Rfl: 0     metFORMIN (GLUCOPHAGE) 1000 MG tablet, Take 1 tablet (1,000 mg) by mouth 2 times daily (with meals), Disp: 60 tablet, Rfl: 5     pravastatin (PRAVACHOL) 40 MG tablet, Take 1 tablet (40 mg) by mouth daily, Disp: 90 tablet, Rfl: 0     predniSONE (DELTASONE) 5 MG tablet, Take 5 mg by mouth daily., Disp: 90 tablet, Rfl:  "1     ranitidine (ZANTAC) 300 MG tablet, TAKE ONE TABLET BY MOUTH AT BEDTIME, Disp: 90 tablet, Rfl: 3     ranitidine (ZANTAC) 300 MG tablet, Take 1 tablet (300 mg) by mouth At Bedtime, Disp: 90 tablet, Rfl: 3     thin (NO BRAND SPECIFIED) lancets, Use with lanceting device. To accompany: Blood Glucose Monitor Brands: per insurance., Disp: 100 each, Rfl: 6      Allergies:   Atorvastatin      Past Medical History:  Obesity  Osteopenia  Type 2 diabetes mellitus without complication, without long-term current use of insulin  Interstitial lung disease  Abdominal tuberculosis (intestinal, 20 years ago)     Past Surgical History:  Open cholecystectomy    Family History:   Mother - dementia  Father - cerebrovascular disease  Brother - cerebrovascular disease, hypertension  No family history of - diabetes, myocardial infarction, early onset C.A.D., breast cancer, ovarian cancer, colon cancer, rheumatoid arthritis  Family history of - high blood pressure  (mentions that medical history is unlikely to be comprehensive because of poor/infrequent medical records)      Social History:   No smoking or tobacco use  Rare alcohol use (1-2 times a year)   with 8 children, 13 grandchildren, cooks for the family     Physical Exam:   /77   Pulse 93   Temp 98.2  F (36.8  C) (Oral)   Ht 1.575 m (5' 2\")   Wt 73.8 kg (162 lb 12.8 oz)   BMI 29.78 kg/m     Wt Readings from Last 4 Encounters:   05/31/19 73.8 kg (162 lb 12.8 oz)   04/23/19 75.5 kg (166 lb 6.4 oz)   02/06/19 76.2 kg (168 lb)   11/21/18 73.7 kg (162 lb 6.4 oz)     Constitutional: Well-developed, appearing stated age; cooperative  Eyes: Normal EOM, PERRLA, vision, conjunctiva, sclera  ENT: Normal external ears, nose, hearing, lips, teeth, gums, throat. No mucous membrane lesions, normal saliva pool  Neck: No mass or thyroid enlargement  Resp: Lungs clear to auscultation, nl to palpation. Velcro crackles at bilateral lower bases.  CV: RRR, no murmurs, rubs or " gallops, no edema  GI: No ABD mass or tenderness, no HSM  : Not tested  Lymph: No cervical, supraclavicular, inguinal or epitrochlear nodes  MS: The TMJ, neck, shoulder, elbow, wrist, MCP/PIP/DIP, spine, hip, knee, ankle, and foot MTP/IP joints were examined and found normal. No active synovitis or altered joint anatomy. Full joint ROM. Normal  strength. No dactylitis,  tenosynovitis, enthesopathy. Clubbing in finger nails, but good alignment of MCPs and PIPs. Excellent  strength. Knees are cool without effusion.  Skin: No nail pitting, alopecia, rash, nodules or lesions. Pink discoloration and reduced skin wrinkling over the knuckles of both hands. Single 2mm nodule in the palm of the left hand.   Neuro: Normal cranial nerves, strength, sensation, DTRs.   Psych: Normal judgement, orientation, memory, affect.       Laboratory:   RHEUM RESULTS Latest Ref Rng & Units 11/7/2018 11/8/2018 5/8/2019   SED RATE 0 - 30 mm/h - - -   CRP, INFLAMMATION 0.0 - 8.0 mg/L - - -   CK TOTAL 30 - 225 U/L - - -   RHEUMATOID FACTOR <20 IU/mL - - -   AST 0 - 45 U/L - - 13   ALT 0 - 50 U/L - - 17   ALBUMIN 3.4 - 5.0 g/dL - - 3.5   WBC 4.0 - 11.0 10e9/L 5.6 6.1 6.1   RBC 3.8 - 5.2 10e12/L 3.62(L) 3.78(L) 4.07   HGB 11.7 - 15.7 g/dL 10.7(L) 11.1(L) 12.2   HCT 35.0 - 47.0 % 31.7(L) 33.0(L) 36.9   MCV 78 - 100 fl 88 87 91   MCHC 31.5 - 36.5 g/dL 33.8 33.6 33.1   RDW 10.0 - 15.0 % 13.0 13.0 12.7    - 450 10e9/L 262 286 327   CREATININE 0.52 - 1.04 mg/dL 0.59 0.47(L) 0.64   GFR ESTIMATE, IF BLACK >60 mL/min/[1.73:m2] >90 >90 >90   GFR ESTIMATE >60 mL/min/[1.73:m2] >90 >90 >90    - 1,620 mg/dL - - -   HEPATITIS C ANTIBODY NR - - -       Rheumatoid Factor   Date Value Ref Range Status   05/23/2018 700 (H) <20 IU/mL Final     Cyclic Citrullinated Peptide Antibody, IgG   Date Value Ref Range Status   05/23/2018 >340 (H) <7 U/mL Final     Comment:     Positive     Scleroderma Antibody Scl-70 CLAUDIO IgG   Date Value Ref Range  Status   05/23/2018 <0.2 0.0 - 0.9 AI Final     Comment:     Negative  Antibody index (AI) values reflect qualitative changes in antibody   concentration that cannot be directly associated with clinical condition or   disease state.       SSA (Ro) (CLAUDIO) Antibody, IgG   Date Value Ref Range Status   05/23/2018 <0.2 0.0 - 0.9 AI Final     Comment:     Negative  Antibody index (AI) values reflect qualitative changes in antibody   concentration that cannot be directly associated with clinical condition or   disease state.       SSB (La) (CLAUDIO) Antibody, IgG   Date Value Ref Range Status   05/23/2018 <0.2 0.0 - 0.9 AI Final     Comment:     Negative  Antibody index (AI) values reflect qualitative changes in antibody   concentration that cannot be directly associated with clinical condition or   disease state.       LYNDA interpretation   Date Value Ref Range Status   05/23/2018 Negative NEG^Negative Final     Comment:                                        Reference range:  <1:40  NEGATIVE  1:40 - 1:80  BORDERLINE POSITIVE  >1:80 POSITIVE         Neutrophil Cytoplasmic IgG Antibody   Date Value Ref Range Status   05/23/2018 <1:20 <1:20 Final     Comment:     (Note)  The ANCA IFA is <1:20; therefore, no further testing will   be performed.  INTERPRETIVE INFORMATION: Anti-Neutrophil Cyto Ab, IgG  Neutrophil Cytoplasmic Antibodies (C-ANCA = granular   cytoplasmic staining, P-ANCA = perinuclear staining) are   found in the serum of over 90 percent of patients with   certain necrotizing systemic vasculitides, and usually in   less than 5 percent of patients with collagen vascular   disease or arthritis.  Performed by [facility],  [address] [place] [id number] [phone]  [email or web address] Marty Wright MD, Lab. Director         IGG   Date Value Ref Range Status   05/23/2018 1,980 (H) 695 - 1,620 mg/dL Final     Scleroderma Antibody Scl-70 CLAUDIO IgG   Date Value Ref Range Status   05/23/2018 <0.2 0.0 - 0.9 AI Final      Comment:     Negative  Antibody index (AI) values reflect qualitative changes in antibody   concentration that cannot be directly associated with clinical condition or   disease state.                  Scribe Disclosure:  IZac, am serving as a scribe to document services personally performed by Harjit Staley MD at this visit, based upon the provider's statements to me. All documentation has been reviewed by the aforementioned provider prior to being entered into the official medical record.     Zac PEREZ, a scribe, prepared the chart for today's encounter.

## 2019-05-31 NOTE — PATIENT INSTRUCTIONS
Diagnosis:  1. Rheumatoid factor positive and cyclic citrullinated peptide (CCP) antibody-positivity; rheumatoid arthritis is quiet  2. Interstitial lung disease    Plan:  - I recommend beginning hydroxychloroquine 400 mg daily.  -- After 2 months (August 1), reduce prednisone by 1 mg every 2 weeks until off. Continu3 5 mg per day for now.  -- followup with Dr. Cross for lung issue    Dear Krystian Boland,    Regular eye exams are required while taking hydroxychloroquine (Plaquenil). Eye exams should be completed by an eye specialist who is experienced in monitoring for hydroxychloroquine toxicity (a rare effect of the drug that can damage your eyes and vision). These may be yearly or as determined by your eye specialist. You will also need a baseline eye exam within the first year of starting hydroxychloroquine.        Although vision problems and loss of sight while taking hydroxychloroquine are very rare, notify your doctor immediately if you notice changes in your vision. The goal of screening is to detect toxicity before your vision is significantly or noticeably impacted. Failing to get proper screening exams puts you at risk of vision changes which may or may not be reversible.      Per the American Academy of Ophthalmology recommendations (2016), screening tests performed may include a 10-2 visual field test, spectral-domain optical coherence tomography (SD OCT), or other screening tests as determined by the eye specialist, including a multifocal electroretinogram (mfERG) or fundus auto-fluorescence (FAF).  We encourage you to bring this letter to your eye exam to discuss the exams that will be performed during your visit. Please request your eye clinic fax or mail a copy of your eye exam reports to our clinic indicating that testing was completed for hydroxychloroquine toxicity screening. The exam notes must specifically comment on the potential for hydroxychloroquine toxicity and outline recommended  follow-up.  If you have questions about hydroxychloroquine or the information in this letter, please call the clinic at [phone] or talk to your provider at your next office visit.               2  Eye Specialist Letter  [facility]  [address]  [address]  [facility] [address]  Phone: [phone]  Fax: [phone]    Dear Eye Specialist,  To ensure safe use of Plaquenil (hydroxychloroquine), we are requesting your assistance in providing the following information. Please return this form to our clinic via mail or fax, or incorporate this information into your visit summary. Your note must indicate whether or not there is evidence of toxicity from Plaquenil use. For questions regarding this form, please call [phone].  Patient Name:   :        Date of Exam:    The following exams were completed during this visit in accordance with the American Academy of Ophthalmology recommendations (2016):  ? 10-2 automated visual field  ? Spectral-domain optical coherence tomography (SD OCT)  ? Multifocal electroretinogram (mfERG)  ? Fundus autofluorescence (FAF)  ? Other (please specify)  Please select from the following:  ? The findings from the above exams are not suggestive of toxicity from Plaquenil (hydroxychloroquine).  ? The findings from the above exams may suggest toxicity from Plaquenil (hydroxychloroquine).  Directly contact the clinic and fax this form.  Please provide additional guidance on whether or not the medication may be continued from your perspective:  Date of next recommended Plaquenil (hydroxychloroquine) screening eye exam:   ? 5 years  ? 1 year  ? 6 months  Other (please specify):   Eye Specialist Name (print):                                                                      Date:  Eye Specialist Signature:

## 2019-05-31 NOTE — NURSING NOTE
"/77   Pulse 93   Temp 98.2  F (36.8  C) (Oral)   Ht 1.575 m (5' 2\")   Wt 73.8 kg (162 lb 12.8 oz)   BMI 29.78 kg/m    Chief Complaint   Patient presents with     RECHECK     follow up with osteopenia, tzimmer cma       "

## 2019-06-14 ENCOUNTER — PATIENT OUTREACH (OUTPATIENT)
Dept: GERIATRIC MEDICINE | Facility: CLINIC | Age: 69
End: 2019-06-14

## 2019-06-14 NOTE — PROGRESS NOTES
Product change from [employer] [facility] to [facility] effective 6/1/19. Welcome letter sent.    Kaycee Tinsley  Care Management Specialist  [facility]  [phone]

## 2019-06-14 NOTE — LETTER
June 14, 2019    KRYSTIAN BARNES  [address]  [place] [address]      Dear Krystian,    Welcome to [facility] ([facility]) health program. My name is JAQUELIN Langley. I am your [facility] care coordinator.     I will call you soon to see how you are doing and determine what needs you may have. My job is to help connect you to services, complete an assessment, and develop a care plan with you. There is no charge to you for the care coordination and assessment services. Our goal is to keep you as healthy and independent as possible.     [facility] combines the benefits you may already receive from Medical Assistance, Medicare and the Prescription Drug Coverage Program.    Soon you will receive a new [facility] member identification (ID) card from [facility]. When you receive it, please use this card where you get your health services.]    If you have questions, please call me at [phone]. If you reach my voice mail, leave a message and your phone number. If you are hearing impaired, please call the [place] Relay at 544 or [phone] (emrqru-fu-[id number] relay service).    Sincerely,    JAQUELIN Langley    E-mail: [email or web address]  Phone: [phone]    Care Manager  [facility]) is a health plan that contracts with both Medicare and the [place] Medical Assistance (Medicaid) program to provide benefits of both programs to enrollees. Enrollment in [facility] depends on contract renewal.    MSC+ [id number] DHS Approved ([id number])  [id number] (11/18)

## 2019-06-18 ENCOUNTER — PATIENT OUTREACH (OUTPATIENT)
Dept: GERIATRIC MEDICINE | Facility: CLINIC | Age: 69
End: 2019-06-18

## 2019-06-18 NOTE — PROGRESS NOTES
[facility] Care Coordination Contact    [facility] Health Plan or Product Change    CC received notification that member's health plan or health plan product changed from [employer] [facility] to [employer] [facility] effective 06/01/2019.  CC contacted member's daughterZachariah and discussed change and face-to-face visit was offered. Member's daughter declined need for home visit. CC reviewed previous Health Risk Assessment/LTCC and POC with member and no changes noted.    Called member's daughterDavid, and introduced self as member s new CC. Confirmed with member that the welcome letter with writer's name and contact information has been received.  Reviewed LTCC/Health Risk Assessment (HRA) and POC with member. No changes noted.  Transitional HRA completed. Care Plan Summary updated and reflects current services.  Required referral authorization information communicated to CMS: No  MMIS entry completed by: Care Coordinator  Writer reviewed the following with member:  ER visits: No  Hospitalizations: No  TCU stays: No  Significant health status changes: Daughter reported that member is doing well, and that she will make her a dermatology appointment for her skin.  Falls/Injuries: No  ADL/IADL changes: No  Changes in services: No    Follow-Up Plan: Member informed of future contact, plan to f/u with member with at next regularly scheduled contact.  Contact information shared with member and family, encouraged member to call with any questions or concerns. Reviewed that this Care Coordinator would keep member on her current assessment schedule, and daughter was in agreement.  JAQUELIN Langley  [facility]  [phone]

## 2019-07-15 DIAGNOSIS — E11.9 TYPE 2 DIABETES MELLITUS WITHOUT COMPLICATION, WITHOUT LONG-TERM CURRENT USE OF INSULIN (H): ICD-10-CM

## 2019-07-15 NOTE — TELEPHONE ENCOUNTER
"Requested Prescriptions   Pending Prescriptions Disp Refills     pravastatin (PRAVACHOL) 40 MG tablet [Pharmacy Med Name: PRAVASTATIN SODIUM 40MG TABS] 90 tablet 0     Sig: TAKE ONE TABLET BY MOUTH ONCE DAILY       Statins Protocol Passed - 7/15/2019  4:42 PM        Passed - LDL on file in past 12 months     Recent Labs   Lab Test 05/08/19  0738   LDL 57             Passed - No abnormal creatine kinase in past 12 months     Recent Labs   Lab Test 05/23/18  1048   CKT 98                Passed - Recent (12 mo) or future (30 days) visit within the authorizing provider's specialty     Patient had office visit in the last 12 months or has a visit in the next 30 days with authorizing provider or within the authorizing provider's specialty.  See \"Patient Info\" tab in inbasket, or \"Choose Columns\" in Meds & Orders section of the refill encounter.              Passed - Medication is active on med list        Passed - Patient is age 18 or older        Passed - No active pregnancy on record        Passed - No positive pregnancy test in past 12 months        metFORMIN (GLUCOPHAGE) 1000 MG tablet [Pharmacy Med Name: METFORMIN HCL 1000MG TABS] 60 tablet 5     Sig: TAKE ONE TABLET BY MOUTH TWICE A DAY WITH MEALS       Biguanide Agents Failed - 7/15/2019  4:42 PM        Failed - Patient has had a Microalbumin in the past 15 mos.     Recent Labs   Lab Test 03/31/18  0936   MICROL 25   UMALCR 65.62*             Passed - Blood pressure less than 140/90 in past 6 months     BP Readings from Last 3 Encounters:   05/31/19 136/77   04/23/19 112/64   02/06/19 112/60                 Passed - Patient has documented LDL within the past 12 mos.     Recent Labs   Lab Test 05/08/19  0738   LDL 57             Passed - Patient is age 10 or older        Passed - Patient has documented A1c within the specified period of time.     If HgbA1C is 8 or greater, it needs to be on file within the past 3 months.  If less than 8, must be on file within the " "past 6 months.     Recent Labs   Lab Test 05/08/19  0738   A1C 6.8*             Passed - Patient's CR is NOT>1.4 OR Patient's EGFR is NOT<45 within past 12 mos.     Recent Labs   Lab Test 05/08/19  0738   GFRESTIMATED >90   GFRESTBLACK >90       Recent Labs   Lab Test 05/08/19  0738   CR 0.64     Requested Prescriptions   Pending Prescriptions Disp Refills     pravastatin (PRAVACHOL) 40 MG tablet [Pharmacy Med Name: PRAVASTATIN SODIUM 40MG TABS] 90 tablet 0     Sig: TAKE ONE TABLET BY MOUTH ONCE DAILY       Statins Protocol Passed - 7/15/2019  4:42 PM        Passed - LDL on file in past 12 months     Recent Labs   Lab Test 05/08/19  0738   LDL 57             Passed - No abnormal creatine kinase in past 12 months     Recent Labs   Lab Test 05/23/18  1048   CKT 98                Passed - Recent (12 mo) or future (30 days) visit within the authorizing provider's specialty     Patient had office visit in the last 12 months or has a visit in the next 30 days with authorizing provider or within the authorizing provider's specialty.  See \"Patient Info\" tab in inbasket, or \"Choose Columns\" in Meds & Orders section of the refill encounter.              Passed - Medication is active on med list        Passed - Patient is age 18 or older        Passed - No active pregnancy on record        Passed - No positive pregnancy test in past 12 months        metFORMIN (GLUCOPHAGE) 1000 MG tablet [Pharmacy Med Name: METFORMIN HCL 1000MG TABS] 60 tablet 5     Sig: TAKE ONE TABLET BY MOUTH TWICE A DAY WITH MEALS       Biguanide Agents Failed - 7/15/2019  4:42 PM        Failed - Patient has had a Microalbumin in the past 15 mos.     Recent Labs   Lab Test 03/31/18  0936   MICROL 25   UMALCR 65.62*             Passed - Blood pressure less than 140/90 in past 6 months     BP Readings from Last 3 Encounters:   05/31/19 136/77   04/23/19 112/64   02/06/19 112/60                 Passed - Patient has documented LDL within the past 12 mos.     " "Recent Labs   Lab Test 05/08/19  0738   LDL 57             Passed - Patient is age 10 or older        Passed - Patient has documented A1c within the specified period of time.     If HgbA1C is 8 or greater, it needs to be on file within the past 3 months.  If less than 8, must be on file within the past 6 months.     Recent Labs   Lab Test 05/08/19  0738   A1C 6.8*             Passed - Patient's CR is NOT>1.4 OR Patient's EGFR is NOT<45 within past 12 mos.     Recent Labs   Lab Test 05/08/19  0738   GFRESTIMATED >90   GFRESTBLACK >90       Recent Labs   Lab Test 05/08/19  0738   CR 0.64             Passed - Patient does NOT have a diagnosis of CHF.        Passed - Medication is active on med list        Passed - Patient is not pregnant        Passed - Patient has not had a positive pregnancy test within the past 12 mos.         Passed - Recent (6 mo) or future (30 days) visit within the authorizing provider's specialty     Patient had office visit in the last 6 months or has a visit in the next 30 days with authorizing provider or within the authorizing provider's specialty.  See \"Patient Info\" tab in inDimereset, or \"Choose Columns\" in Meds & Orders section of the refill encounter.            Last Written Prescription Date:  4/3/19  Last Fill Quantity: 90,  # refills: 0   Last office visit: 4/23/2019 with prescribing provider:  4/23/19   Future Office Visit:            Passed - Patient does NOT have diagnosis of CHF.        Passed - Medication is active on med list        Passed - Patient is not pregnant        Passed - Patient has not had a poitive pregnancy test within the past 12 mos.         Passed - Recent (6 mo) or future (30 days) visit within the authorizing provider's specialty     Patient had office visit in the last 6 months or has a visit in the next 30 days with authorizing provider or within the authorizing provider's s/pecialty.  See \"Patient Info\" tab in inbasket, or \"Choose Columns\" in Meds & Orders " section of the refill encounter.            Last Written Prescription Date:  1/23/18  Last Fill Quantity: 60,  # refills: 5   Last office visit: 4/23/2019 with prescribing provider:  4/23/19   Future Office Visit:

## 2019-07-16 RX ORDER — PRAVASTATIN SODIUM 40 MG
TABLET ORAL
Qty: 90 TABLET | Refills: 2 | Status: SHIPPED | OUTPATIENT
Start: 2019-07-16 | End: 2020-08-25

## 2019-07-17 ENCOUNTER — MYC REFILL (OUTPATIENT)
Dept: INTERNAL MEDICINE | Facility: CLINIC | Age: 69
End: 2019-07-17

## 2019-07-17 ENCOUNTER — MYC MEDICAL ADVICE (OUTPATIENT)
Dept: INTERNAL MEDICINE | Facility: CLINIC | Age: 69
End: 2019-07-17

## 2019-07-17 DIAGNOSIS — E11.9 TYPE 2 DIABETES MELLITUS WITHOUT COMPLICATION, WITHOUT LONG-TERM CURRENT USE OF INSULIN (H): ICD-10-CM

## 2019-07-17 RX ORDER — PRAVASTATIN SODIUM 40 MG
40 TABLET ORAL DAILY
Qty: 90 TABLET | Refills: 2 | Status: CANCELLED | OUTPATIENT
Start: 2019-07-17

## 2019-07-24 ENCOUNTER — OFFICE VISIT (OUTPATIENT)
Dept: PULMONOLOGY | Facility: CLINIC | Age: 69
End: 2019-07-24
Attending: INTERNAL MEDICINE
Payer: COMMERCIAL

## 2019-07-24 VITALS
RESPIRATION RATE: 18 BRPM | HEART RATE: 102 BPM | OXYGEN SATURATION: 96 % | DIASTOLIC BLOOD PRESSURE: 66 MMHG | BODY MASS INDEX: 29.81 KG/M2 | HEIGHT: 62 IN | WEIGHT: 162 LBS | SYSTOLIC BLOOD PRESSURE: 119 MMHG

## 2019-07-24 DIAGNOSIS — J84.9 ILD (INTERSTITIAL LUNG DISEASE) (H): Primary | ICD-10-CM

## 2019-07-24 DIAGNOSIS — J84.9 ILD (INTERSTITIAL LUNG DISEASE) (H): ICD-10-CM

## 2019-07-24 LAB
DLCOUNC-%PRED-PRE: 85 %
DLCOUNC-PRE: 16.26 ML/MIN/MMHG
DLCOUNC-PRED: 19.09 ML/MIN/MMHG
ERV-%PRED-PRE: 146 %
ERV-PRE: 0.83 L
ERV-PRED: 0.57 L
EXPTIME-PRE: 6.65 SEC
FEF2575-%PRED-PRE: 78 %
FEF2575-PRE: 1.45 L/SEC
FEF2575-PRED: 1.84 L/SEC
FEFMAX-%PRED-PRE: 79 %
FEFMAX-PRE: 4.52 L/SEC
FEFMAX-PRED: 5.72 L/SEC
FEV1-%PRED-PRE: 77 %
FEV1-PRE: 1.6 L
FEV1FEV6-PRE: 78 %
FEV1FEV6-PRED: 80 %
FEV1FVC-PRE: 78 %
FEV1FVC-PRED: 79 %
FEV1SVC-PRE: 77 %
FEV1SVC-PRED: 69 %
FIFMAX-PRE: 4.18 L/SEC
FVC-%PRED-PRE: 77 %
FVC-PRE: 2.05 L
FVC-PRED: 2.63 L
IC-%PRED-PRE: 50 %
IC-PRE: 1.24 L
IC-PRED: 2.44 L
VA-%PRED-PRE: 70 %
VA-PRE: 3.21 L
VC-%PRED-PRE: 68 %
VC-PRE: 2.07 L
VC-PRED: 3.01 L

## 2019-07-24 PROCEDURE — G0463 HOSPITAL OUTPT CLINIC VISIT: HCPCS | Mod: ZF

## 2019-07-24 ASSESSMENT — PAIN SCALES - GENERAL: PAINLEVEL: NO PAIN (0)

## 2019-07-24 ASSESSMENT — MIFFLIN-ST. JEOR: SCORE: 1223.21

## 2019-07-24 NOTE — PROGRESS NOTES
Reason for Visit  Krystian Boland is a 69 year old year old female who is being seen for Interstitial Lung Disease (ILD) (follow up )  She is accompanied by daughter who is interpreting  Pulmonary HPI    Ms. Boland is a 69 year old female with a past medical history of rheumatoid arthritis who presents for follow up. She was last seen by Dr. Cross on 5/23/2018. Ms. Boland reports an improvement in her pulmonary symptoms since last year; however, she continues to complain of a cough productive of white sputum in the morning although this does not occur every day. Her cough also is present with deep breathing exercises. She is able to go up 1-2 flights of stairs before experiencing any dyspnea. She exercises a few times a week where she walks outside for 10-15 minutes. She lives with her daughter, , and grandchildren and is active around the house.     Ms. Boland follows Dr. Staley for her connective tissue disease. She was recently started on hydroxychloroquine 200 mg BID about 1.5 months ago. She is currently on a prednisone taper and is taking 3 mg daily. Ms. oBland experiences heartburn but states it is controlled with ranitidine. She denies any recent fever, chills, hemoptysis, swelling, or unintentional weight loss. She is not on supplemental O2 and has not required this in the past.       Uzma Joshi MD PGY1    Current Outpatient Medications   Medication     alcohol swab prep pads     aspirin 81 MG chewable tablet     blood glucose (NO BRAND SPECIFIED) test strip     blood glucose (NO BRAND SPECIFIED) test strip     blood glucose calibration (NO BRAND SPECIFIED) solution     blood glucose monitoring (ALEX MICROLET) lancets     blood glucose monitoring (NO BRAND SPECIFIED) meter device kit     fluocinonide (LIDEX) 0.05 % external cream     hydroxychloroquine (PLAQUENIL) 200 MG tablet     losartan (COZAAR) 25 MG tablet     metFORMIN (GLUCOPHAGE) 1000 MG tablet     pravastatin (PRAVACHOL) 40 MG  tablet     predniSONE (DELTASONE) 1 MG tablet     predniSONE (DELTASONE) 5 MG tablet     ranitidine (ZANTAC) 300 MG tablet     ranitidine (ZANTAC) 300 MG tablet     thin (NO BRAND SPECIFIED) lancets     No current facility-administered medications for this visit.      Allergies   Allergen Reactions     Atorvastatin Muscle Pain (Myalgia)     Past Medical History:   Diagnosis Date     Early onset Alzheimer's dementia without behavioral disturbance      Interstitial lung disease (H)      Obesity (BMI 30-39.9)      Osteopenia      Rheumatoid factor positive      Type 2 diabetes mellitus (H)        Past Surgical History:   Procedure Laterality Date     CHOLECYSTECTOMY, OPEN  1990       Social History     Socioeconomic History     Marital status:      Spouse name: Not on file     Number of children: Not on file     Years of education: Not on file     Highest education level: Not on file   Occupational History     Occupation: Retired - was  in restaurant   Social Needs     Financial resource strain: Not on file     Food insecurity:     Worry: Not on file     Inability: Not on file     Transportation needs:     Medical: Not on file     Non-medical: Not on file   Tobacco Use     Smoking status: Never Smoker     Smokeless tobacco: Never Used   Substance and Sexual Activity     Alcohol use: Yes     Frequency: Monthly or less     Drinks per session: 1 or 2     Binge frequency: Never     Drug use: No     Sexual activity: Never   Lifestyle     Physical activity:     Days per week: Not on file     Minutes per session: Not on file     Stress: Not on file   Relationships     Social connections:     Talks on phone: Not on file     Gets together: Not on file     Attends [employer] service: Not on file     Active member of club or organization: Not on file     Attends meetings of clubs or organizations: Not on file     Relationship status: Not on file     Intimate partner violence:     Fear of current or ex partner: Not on  "file     Emotionally abused: Not on file     Physically abused: Not on file     Forced sexual activity: Not on file   Other Topics Concern     Parent/sibling w/ CABG, MI or angioplasty before 65F 55M? Not Asked   Social History Narrative    .     8 children.     13 grandchildren (as of 2018).    Originally from [facility]. [place] (all kids born in [facility]). [place]oved to [place] in [place].     Living with daughter and her family. Taking care of  (~25 years her senior).     Goes for walks occasionally.        ROS Pulmonary  Constitutional- Negative  Cardiac- Negative  Pulm- See HPI  GI- reflux controlled with ranitidine  Musculoskeletal- Negative  Endocrine- Negative; measures glucose at home, states levels have been normal  Lymphatic- Negative  Psychiatry- Negative    A complete ROS was otherwise negative except as noted in the HPI.  /66 (BP Location: Right arm, Patient Position: Chair, Cuff Size: Adult Regular)   Pulse 102   Resp 18   Ht 1.575 m (5' 2.01\")   Wt 73.5 kg (162 lb)   SpO2 96%   BMI 29.62 kg/m    Exam:   GENERAL APPEARANCE: Well developed, well nourished, alert, and in no apparent distress.  RESP: crackles bilaterally about a third to skilled nursing up, clear anterior; No wheezes.  CV: Normal S1, S2, regular rhythm, normal rate. No murmur.  No LE edema.   ABDOMEN:  Bowel sounds normal, soft, nontender  MS: Mild clubbing. No cyanosis.  SKIN: no rash on limited exam  NEURO: Mentation intact, speech normal, normal strength and tone, normal gait and stance  PSYCH: mentation appears normal. and affect normal/bright  Results:  Recent Results (from the past 168 hour(s))   General PFT Lab (Please always keep checked)    Collection Time: 07/24/19 10:08 AM   Result Value Ref Range    FVC-Pred 2.63 L    FVC-Pre 2.05 L    FVC-%Pred-Pre 77 %    FEV1-Pre 1.60 L    FEV1-%Pred-Pre 77 %    FEV1FVC-Pred 79 %    FEV1FVC-Pre 78 %    FEFMax-Pred 5.72 L/sec    FEFMax-Pre 4.52 L/sec    FEFMax-%Pred-Pre 79 %    " FEF2575-Pred 1.84 L/sec    FEF2575-Pre 1.45 L/sec    PFB5731-%Pred-Pre 78 %    ExpTime-Pre 6.65 sec    FIFMax-Pre 4.18 L/sec    VC-Pred 3.01 L    VC-Pre 2.07 L    VC-%Pred-Pre 68 %    IC-Pred 2.44 L    IC-Pre 1.24 L    IC-%Pred-Pre 50 %    ERV-Pred 0.57 L    ERV-Pre 0.83 L    ERV-%Pred-Pre 146 %    FEV1FEV6-Pred 80 %    FEV1FEV6-Pre 78 %    DLCOunc-Pred 19.09 ml/min/mmHg    DLCOunc-Pre 16.26 ml/min/mmHg    DLCOunc-%Pred-Pre 85 %    VA-Pre 3.21 L    VA-%Pred-Pre 70 %    FEV1SVC-Pred 69 %    FEV1SVC-Pre 77 %       Assessment and plan: Ms. Boland is a 69 year old female with a history of connective tissue disease-related ILD  Connective Tissue Disease-related ILD  - 5/23/18 RF: 700, CCP ab: 340  - Follows Dr. Staley; continue hydroxychloroquine  - PFTs remain stable since last visit 5/23/18; FVC & DLCO appear valid although ATS standards not met  - Follow-up in 6 months to repeat PFTs and 6-min walk test    GERD  - Continue ranitidine     Health Maintenance  - Encourage seasonal influenza vaccination in fall      Faculty Addendum:  This patient has been seen and evaluated by me.  I have discussed care with Dr. Joshi and agree with the findings and plan in this note. RA-ILD. Symptomatically improved with decreased cough. Crackles third to half way up bilaterally. On Plaquenil and Pred taper. PFTs stable. RTC in 6 months with repeat PFTs and 6 minute walk.    Tristan Cross  Pulmonary/Critical Care  July 24, 2019 11:48 AM

## 2019-07-24 NOTE — NURSING NOTE
Chief Complaint   Patient presents with     Interstitial Lung Disease (ILD)     follow up      Ashley Urbina CMA

## 2019-08-15 ENCOUNTER — MYC MEDICAL ADVICE (OUTPATIENT)
Dept: INTERNAL MEDICINE | Facility: CLINIC | Age: 69
End: 2019-08-15

## 2019-08-15 DIAGNOSIS — E11.9 TYPE 2 DIABETES MELLITUS WITHOUT COMPLICATION, WITHOUT LONG-TERM CURRENT USE OF INSULIN (H): ICD-10-CM

## 2019-08-15 DIAGNOSIS — K21.9 GASTROESOPHAGEAL REFLUX DISEASE, ESOPHAGITIS PRESENCE NOT SPECIFIED: ICD-10-CM

## 2019-08-15 RX ORDER — LOSARTAN POTASSIUM 25 MG/1
TABLET ORAL
Qty: 45 TABLET | Refills: 2 | Status: SHIPPED | OUTPATIENT
Start: 2019-08-15 | End: 2020-05-01

## 2019-08-15 NOTE — TELEPHONE ENCOUNTER
"Requested Prescriptions   Pending Prescriptions Disp Refills     losartan (COZAAR) 25 MG tablet [Pharmacy Med Name: LOSARTAN POTASSIUM 25MG TABS] 45 tablet 0     Sig: TAKE ONE-HALF TABLET BY MOUTH EVERY DAY       Angiotensin-II Receptors Passed - 8/15/2019  8:14 AM        Passed - Last blood pressure under 140/90 in past 12 months     BP Readings from Last 3 Encounters:   07/24/19 119/66   05/31/19 136/77   04/23/19 112/64                 Passed - Recent (12 mo) or future (30 days) visit within the authorizing provider's specialty     Patient had office visit in the last 12 months or has a visit in the next 30 days with authorizing provider or within the authorizing provider's specialty.  See \"Patient Info\" tab in inbasket, or \"Choose Columns\" in Meds & Orders section of the refill encounter.              Passed - Medication is active on med list        Passed - Patient is age 18 or older        Passed - No active pregnancy on record        Passed - Normal serum creatinine on file in past 12 months     Recent Labs   Lab Test 05/08/19  0738   CR 0.64             Passed - Normal serum potassium on file in past 12 months     Recent Labs   Lab Test 05/08/19  0738   POTASSIUM 4.1                    Passed - No positive pregnancy test in past 12 months        metFORMIN (GLUCOPHAGE) 1000 MG tablet [Pharmacy Med Name: METFORMIN HCL 1000MG TABS] 60 tablet 5     Sig: TAKE ONE TABLET BY MOUTH TWICE A DAY WITH MEALS       Biguanide Agents Failed - 8/15/2019  8:14 AM        Failed - Patient has had a Microalbumin in the past 15 mos.     Recent Labs   Lab Test 03/31/18  0936   MICROL 25   UMALCR 65.62*             Passed - Blood pressure less than 140/90 in past 6 months     BP Readings from Last 3 Encounters:   07/24/19 119/66   05/31/19 136/77   04/23/19 112/64                 Passed - Patient has documented LDL within the past 12 mos.     Recent Labs   Lab Test 05/08/19  0738   LDL 57             Passed - Patient is age 10 or " "older        Passed - Patient has documented A1c within the specified period of time.     If HgbA1C is 8 or greater, it needs to be on file within the past 3 months.  If less than 8, must be on file within the past 6 months.     Recent Labs   Lab Test 05/08/19  0738   A1C 6.8*             Passed - Patient's CR is NOT>1.4 OR Patient's EGFR is NOT<45 within past 12 mos.     Recent Labs   Lab Test 05/08/19  0738   GFRESTIMATED >90   GFRESTBLACK >90       Recent Labs   Lab Test 05/08/19  0738   CR 0.64             Passed - Patient does NOT have a diagnosis of CHF.        Passed - Medication is active on med list        Passed - Patient is not pregnant        Passed - Patient has not had a positive pregnancy test within the past 12 mos.         Passed - Recent (6 mo) or future (30 days) visit within the authorizing provider's specialty     Patient had office visit in the last 6 months or has a visit in the next 30 days with authorizing provider or within the authorizing provider's specialty.  See \"Patient Info\" tab in inbasket, or \"Choose Columns\" in Meds & Orders section of the refill encounter.            ranitidine (ZANTAC) 300 MG tablet [Pharmacy Med Name: RANITIDINE HCL 300MG TABS] 90 tablet 0     Sig: TAKE ONE TABLET BY MOUTH AT BEDTIME       H2 Blockers Protocol Passed - 8/15/2019  8:14 AM        Passed - Patient is age 12 or older        Passed - Recent (12 mo) or future (30 days) visit within the authorizing provider's specialty     Patient had office visit in the last 12 months or has a visit in the next 30 days with authorizing provider or within the authorizing provider's specialty.  See \"Patient Info\" tab in inbasket, or \"Choose Columns\" in Meds & Orders section of the refill encounter.      Requested Prescriptions   Pending Prescriptions Disp Refills     losartan (COZAAR) 25 MG tablet [Pharmacy Med Name: LOSARTAN POTASSIUM 25MG TABS] 45 tablet 0     Sig: TAKE ONE-HALF TABLET BY MOUTH EVERY DAY       " "Angiotensin-II Receptors Passed - 8/15/2019  8:14 AM        Passed - Last blood pressure under 140/90 in past 12 months     BP Readings from Last 3 Encounters:   07/24/19 119/66   05/31/19 136/77   04/23/19 112/64                 Passed - Recent (12 mo) or future (30 days) visit within the authorizing provider's specialty     Patient had office visit in the last 12 months or has a visit in the next 30 days with authorizing provider or within the authorizing provider's specialty.  See \"Patient Info\" tab in inbasket, or \"Choose Columns\" in Meds & Orders section of the refill encounter.              Passed - Medication is active on med list        Passed - Patient is age 18 or older        Passed - No active pregnancy on record        Passed - Normal serum creatinine on file in past 12 months     Recent Labs   Lab Test 05/08/19  0738   CR 0.64             Passed - Normal serum potassium on file in past 12 months     Recent Labs   Lab Test 05/08/19  0738   POTASSIUM 4.1                    Passed - No positive pregnancy test in past 12 months        metFORMIN (GLUCOPHAGE) 1000 MG tablet [Pharmacy Med Name: METFORMIN HCL 1000MG TABS] 60 tablet 5     Sig: TAKE ONE TABLET BY MOUTH TWICE A DAY WITH MEALS       Biguanide Agents Failed - 8/15/2019  8:14 AM        Failed - Patient has had a Microalbumin in the past 15 mos.     Recent Labs   Lab Test 03/31/18  0936   MICROL 25   UMALCR 65.62*             Passed - Blood pressure less than 140/90 in past 6 months     BP Readings from Last 3 Encounters:   07/24/19 119/66   05/31/19 136/77   04/23/19 112/64                 Passed - Patient has documented LDL within the past 12 mos.     Recent Labs   Lab Test 05/08/19  0738   LDL 57             Passed - Patient is age 10 or older        Passed - Patient has documented A1c within the specified period of time.     If HgbA1C is 8 or greater, it needs to be on file within the past 3 months.  If less than 8, must be on file within the " "past 6 months.     Recent Labs   Lab Test 05/08/19  0738   A1C 6.8*             Passed - Patient's CR is NOT>1.4 OR Patient's EGFR is NOT<45 within past 12 mos.     Recent Labs   Lab Test 05/08/19  0738   GFRESTIMATED >90   GFRESTBLACK >90       Recent Labs   Lab Test 05/08/19  0738   CR 0.64             Passed - Patient does NOT have a diagnosis of CHF.        Passed - Medication is active on med list        Passed - Patient is not pregnant        Passed - Patient has not had a positive pregnancy test within the past 12 mos.         Passed - Recent (6 mo) or future (30 days) visit within the authorizing provider's specialty     Patient had office visit in the last 6 months or has a visit in the next 30 days with authorizing provider or within the authorizing provider's specialty.  See \"Patient Info\" tab in inbasket, or \"Choose Columns\" in Meds & Orders section of the refill encounter.            ranitidine (ZANTAC) 300 MG tablet [Pharmacy Med Name: RANITIDINE HCL 300MG TABS] 90 tablet 0     Sig: TAKE ONE TABLET BY MOUTH AT BEDTIME       H2 Blockers Protocol Passed - 8/15/2019  8:14 AM        Passed - Patient is age 12 or older        Passed - Recent (12 mo) or future (30 days) visit within the authorizing provider's specialty     Patient had office visit in the last 12 months or has a visit in the next 30 days with authorizing provider or within the authorizing provider's specialty.  See \"Patient Info\" tab in inbasket, or \"Choose Columns\" in Meds & Orders section of the refill encounter.      Requested Prescriptions   Pending Prescriptions Disp Refills     losartan (COZAAR) 25 MG tablet [Pharmacy Med Name: LOSARTAN POTASSIUM 25MG TABS] 45 tablet 0     Sig: TAKE ONE-HALF TABLET BY MOUTH EVERY DAY       Angiotensin-II Receptors Passed - 8/15/2019  8:14 AM        Passed - Last blood pressure under 140/90 in past 12 months     BP Readings from Last 3 Encounters:   07/24/19 119/66   05/31/19 136/77   04/23/19 112/64 " "                Passed - Recent (12 mo) or future (30 days) visit within the authorizing provider's specialty     Patient had office visit in the last 12 months or has a visit in the next 30 days with authorizing provider or within the authorizing provider's specialty.  See \"Patient Info\" tab in inbasket, or \"Choose Columns\" in Meds & Orders section of the refill encounter.              Passed - Medication is active on med list        Passed - Patient is age 18 or older        Passed - No active pregnancy on record        Passed - Normal serum creatinine on file in past 12 months     Recent Labs   Lab Test 05/08/19  0738   CR 0.64             Passed - Normal serum potassium on file in past 12 months     Recent Labs   Lab Test 05/08/19  0738   POTASSIUM 4.1                    Passed - No positive pregnancy test in past 12 months        metFORMIN (GLUCOPHAGE) 1000 MG tablet [Pharmacy Med Name: METFORMIN HCL 1000MG TABS] 60 tablet 5     Sig: TAKE ONE TABLET BY MOUTH TWICE A DAY WITH MEALS       Biguanide Agents Failed - 8/15/2019  8:14 AM        Failed - Patient has had a Microalbumin in the past 15 mos.     Recent Labs   Lab Test 03/31/18  0936   MICROL 25   UMALCR 65.62*             Passed - Blood pressure less than 140/90 in past 6 months     BP Readings from Last 3 Encounters:   07/24/19 119/66   05/31/19 136/77   04/23/19 112/64                 Passed - Patient has documented LDL within the past 12 mos.     Recent Labs   Lab Test 05/08/19  0738   LDL 57             Passed - Patient is age 10 or older        Passed - Patient has documented A1c within the specified period of time.     If HgbA1C is 8 or greater, it needs to be on file within the past 3 months.  If less than 8, must be on file within the past 6 months.     Recent Labs   Lab Test 05/08/19  0738   A1C 6.8*             Passed - Patient's CR is NOT>1.4 OR Patient's EGFR is NOT<45 within past 12 mos.     Recent Labs   Lab Test 05/08/19  0738   GFRESTIMATED " ">90   GFRESTBLACK >90       Recent Labs   Lab Test 05/08/19  0738   CR 0.64             Passed - Patient does NOT have a diagnosis of CHF.        Passed - Medication is active on med list        Passed - Patient is not pregnant        Passed - Patient has not had a positive pregnancy test within the past 12 mos.         Passed - Recent (6 mo) or future (30 days) visit within the authorizing provider's specialty     Patient had office visit in the last 6 months or has a visit in the next 30 days with authorizing provider or within the authorizing provider's specialty.  See \"Patient Info\" tab in inbasket, or \"Choose Columns\" in Meds & Orders section of the refill encounter.            ranitidine (ZANTAC) 300 MG tablet [Pharmacy Med Name: RANITIDINE HCL 300MG TABS] 90 tablet 0     Sig: TAKE ONE TABLET BY MOUTH AT BEDTIME       H2 Blockers Protocol Passed - 8/15/2019  8:14 AM        Passed - Patient is age 12 or older        Passed - Recent (12 mo) or future (30 days) visit within the authorizing provider's specialty     Patient had office visit in the last 12 months or has a visit in the next 30 days with authorizing provider or within the authorizing provider's specialty.  See \"Patient Info\" tab in inbasket, or \"Choose Columns\" in Meds & Orders section of the refill encounter.              Passed - Medication is active on med list        Last Written Prescription Date:  4/3/19  Last Fill Quantity: 45,  # refills: 0   Last office visit: 4/23/2019 with prescribing provider:  4/23/19   Future Office Visit:            Passed - Medication is active on med list        Last Written Prescription Date:  7/17/19  Last Fill Quantity: 180,  # refills: 0   Last office visit: 4/23/2019 with prescribing provider:  4/23/19   Future Office Visit:            Passed - Medication is active on med list        Last Written Prescription Date:  5/6/19  Last Fill Quantity: 90,  # refills: 3   Last office visit: 4/23/2019 with prescribing " provider:  4/23/19   Future Office Visit:

## 2019-09-07 NOTE — PLAN OF CARE
Problem: Patient Care Overview  Goal: Plan of Care/Patient Progress Review  Outcome: Improving  Pt denies any abd discomfort today, tolerating clear liquids so far. Bowel sounds audible, passing gas, no BM this shift. Blood sugars WNL. VSS.       No

## 2019-09-19 DIAGNOSIS — J84.9 ILD (INTERSTITIAL LUNG DISEASE) (H): Primary | ICD-10-CM

## 2019-09-19 RX ORDER — PREDNISONE 10 MG/1
TABLET ORAL
Qty: 100 TABLET | Refills: 0 | Status: SHIPPED | OUTPATIENT
Start: 2019-09-19 | End: 2019-10-22

## 2019-09-20 DIAGNOSIS — J84.9 ILD (INTERSTITIAL LUNG DISEASE) (H): Primary | ICD-10-CM

## 2019-10-15 ENCOUNTER — MYC MEDICAL ADVICE (OUTPATIENT)
Dept: INTERNAL MEDICINE | Facility: CLINIC | Age: 69
End: 2019-10-15

## 2019-10-15 DIAGNOSIS — E11.9 TYPE 2 DIABETES MELLITUS WITHOUT COMPLICATION, WITHOUT LONG-TERM CURRENT USE OF INSULIN (H): ICD-10-CM

## 2019-10-15 NOTE — TELEPHONE ENCOUNTER
"Requested Prescriptions   Pending Prescriptions Disp Refills     metFORMIN (GLUCOPHAGE) 1000 MG tablet 180 tablet 0     Sig: Take 1 tablet (1,000 mg) by mouth 2 times daily (with meals)       Biguanide Agents Failed - 10/15/2019 10:22 AM        Failed - Patient has had a Microalbumin in the past 15 mos.     Recent Labs   Lab Test 03/31/18  0936   MICROL 25   UMALCR 65.62*             Passed - Blood pressure less than 140/90 in past 6 months     BP Readings from Last 3 Encounters:   07/24/19 119/66   05/31/19 136/77   04/23/19 112/64                 Passed - Patient has documented LDL within the past 12 mos.     Recent Labs   Lab Test 05/08/19  0738   LDL 57             Passed - Patient is age 10 or older        Passed - Patient has documented A1c within the specified period of time.     If HgbA1C is 8 or greater, it needs to be on file within the past 3 months.  If less than 8, must be on file within the past 6 months.     Recent Labs   Lab Test 05/08/19  0738   A1C 6.8*             Passed - Patient's CR is NOT>1.4 OR Patient's EGFR is NOT<45 within past 12 mos.     Recent Labs   Lab Test 05/08/19  0738   GFRESTIMATED >90   GFRESTBLACK >90       Recent Labs   Lab Test 05/08/19  0738   CR 0.64             Passed - Patient does NOT have a diagnosis of CHF.        Passed - Medication is active on med list        Passed - Patient is not pregnant        Passed - Patient has not had a positive pregnancy test within the past 12 mos.         Passed - Recent (6 mo) or future (30 days) visit within the authorizing provider's specialty     Patient had office visit in the last 6 months or has a visit in the next 30 days with authorizing provider or within the authorizing provider's specialty.  See \"Patient Info\" tab in inbasket, or \"Choose Columns\" in Meds & Orders section of the refill encounter.            See note below.   Ok for 90 day supply?   Last office visit 4/23/19  "

## 2019-10-24 ENCOUNTER — PATIENT OUTREACH (OUTPATIENT)
Dept: GERIATRIC MEDICINE | Facility: CLINIC | Age: 69
End: 2019-10-24

## 2019-10-24 NOTE — PROGRESS NOTES
[facility] Care Coordination Contact    Called adult daughter David, to complete six month assessment and left a message requesting a return call.  JAQUELIN Langley  [facility]  [phone]

## 2019-10-31 NOTE — PROGRESS NOTES
[facility] Care Coordination Contact      [facility] Six-Month Telephone Assessment    6 month telephone assessment completed on 10/31/2019.  Completed with daughter, David, due to language.    ER visits: No  Hospitalizations: No  TCU stays: No  Significant health status changes: On going cough that she has been working with the MDs on for proper medication management.  Falls/Injuries: No  ADL/IADL changes: No  Changes in services: No    Caregiver Assessment follow up:  Daughter shared that she is doing well.    Goals: See POC in chart for goal progress documentation.  Member did have her vision exam and received new glasses. Also saw the dentist and received new deutnres.    Will see member in 6 months for an annual health risk assessment.   Encouraged member to call CC with any questions or concerns in the meantime.     JAQUELIN Langley  [facility]  [phone]

## 2019-11-14 ENCOUNTER — MYC REFILL (OUTPATIENT)
Dept: INTERNAL MEDICINE | Facility: CLINIC | Age: 69
End: 2019-11-14

## 2019-11-14 DIAGNOSIS — E11.9 TYPE 2 DIABETES MELLITUS WITHOUT COMPLICATION, WITHOUT LONG-TERM CURRENT USE OF INSULIN (H): ICD-10-CM

## 2019-11-15 ENCOUNTER — TELEPHONE (OUTPATIENT)
Dept: INTERNAL MEDICINE | Facility: CLINIC | Age: 69
End: 2019-11-15

## 2019-11-15 DIAGNOSIS — K21.9 GASTROESOPHAGEAL REFLUX DISEASE, ESOPHAGITIS PRESENCE NOT SPECIFIED: Primary | ICD-10-CM

## 2019-11-15 DIAGNOSIS — E11.9 TYPE 2 DIABETES MELLITUS WITHOUT COMPLICATION, WITHOUT LONG-TERM CURRENT USE OF INSULIN (H): Primary | ICD-10-CM

## 2019-11-15 RX ORDER — LANCETS
EACH MISCELLANEOUS
Qty: 100 EACH | Refills: 6 | Status: SHIPPED | OUTPATIENT
Start: 2019-11-15 | End: 2020-08-25

## 2019-11-15 RX ORDER — FAMOTIDINE 40 MG/1
40 TABLET, FILM COATED ORAL
Qty: 90 TABLET | Refills: 3 | Status: SHIPPED | OUTPATIENT
Start: 2019-11-15 | End: 2020-07-07

## 2019-11-15 RX ORDER — GLUCOSAMINE HCL/CHONDROITIN SU 500-400 MG
CAPSULE ORAL
Qty: 100 EACH | Refills: 3 | Status: SHIPPED | OUTPATIENT
Start: 2019-11-15 | End: 2020-02-12

## 2019-11-15 NOTE — TELEPHONE ENCOUNTER
"Requested Prescriptions   Signed Prescriptions Disp Refills    blood glucose (NO BRAND SPECIFIED) test strip 100 strip 6     Sig: Use to test blood sugar once times daily or as directed. To accompany: Blood Glucose Monitor Brands: per insurance.       Diabetic Supplies Protocol Failed - 11/14/2019  8:57 PM        Failed - Recent (6 mo) or future (30 days) visit within the authorizing provider's specialty     Patient had office visit in the last 6 months or has a visit in the next 30 days with authorizing provider.  See \"Patient Info\" tab in inbasket, or \"Choose Columns\" in Meds & Orders section of the refill encounter.            Passed - Medication is active on med list        Passed - Patient is 18 years of age or older          Prescription approved per [facility] Refill Protocol.    Sylvia ALCANTARA RN, BSN, PHN      "

## 2019-11-15 NOTE — TELEPHONE ENCOUNTER
Dr. Singh-    Please see message below regarding patient's insurance no longer covering current testing supplies and recommendations. Please advise. Thank you!    Elaina Delgado RN on 11/15/2019 at 4:21 PM

## 2019-11-15 NOTE — TELEPHONE ENCOUNTER
Dr. Singh,    Due to many recalls surrounding ranitidine we are proactively asking to change patients to famotidine.    Can you send a new order for famotidine 40 mg daily to [facility]?    Thank you,    Fabio Mc, PharmD  [facility]  [phone]

## 2019-12-30 ENCOUNTER — DOCUMENTATION ONLY (OUTPATIENT)
Dept: CARE COORDINATION | Facility: CLINIC | Age: 69
End: 2019-12-30

## 2020-01-15 ENCOUNTER — MYC REFILL (OUTPATIENT)
Dept: INTERNAL MEDICINE | Facility: CLINIC | Age: 70
End: 2020-01-15

## 2020-01-15 DIAGNOSIS — E11.9 TYPE 2 DIABETES MELLITUS WITHOUT COMPLICATION, WITHOUT LONG-TERM CURRENT USE OF INSULIN (H): ICD-10-CM

## 2020-01-15 NOTE — LETTER
[facility]  [address] [address]  [phone]            Krystian Boland  [address]  [facility] [address]        January 16, 2020    Dear Krystian,    While refilling your prescription today, we noticed that you are due for an appointment with your provider.  We will refill your prescription for 30 days, but a follow-up appointment must be made before any additional refills can be approved.     Taking care of your health is important to us and we look forward to seeing you in the near future.  Please call us at [phone] or [phone] (or use Satmex) to schedule an appointment.     Please disregard this notice if you have already made an appointment.    Sincerely,        [facility]

## 2020-01-15 NOTE — LETTER
[facility]  [address] [address]  [phone]            Krystian Boland  [address]  [facility] [address]        January 16, 2020    Dear Krystian,    While refilling your prescription today, we noticed that you are due for an appointment with your provider.  We will refill your prescription for 30 days, but a follow-up appointment must be made before any additional refills can be approved.     Taking care of your health is important to us and we look forward to seeing you in the near future.  Please call us at [phone] or [phone] (or use Lovli) to schedule an appointment.     Please disregard this notice if you have already made an appointment.    Sincerely,        [facility]

## 2020-01-16 NOTE — TELEPHONE ENCOUNTER
"Requested Prescriptions   Pending Prescriptions Disp Refills     metFORMIN (GLUCOPHAGE) 1000 MG tablet 180 tablet 0     Sig: Take 1 tablet (1,000 mg) by mouth 2 times daily (with meals)       Biguanide Agents Failed - 1/15/2020  8:09 PM        Failed - Patient has had a Microalbumin in the past 15 mos.     Recent Labs   Lab Test 03/31/18  0936   MICROL 25   UMALCR 65.62*             Failed - Patient has documented A1c within the specified period of time.     If HgbA1C is 8 or greater, it needs to be on file within the past 3 months.  If less than 8, must be on file within the past 6 months.     Recent Labs   Lab Test 05/08/19  0738   A1C 6.8*             Failed - Recent (6 mo) or future (30 days) visit within the authorizing provider's specialty     Patient had office visit in the last 6 months or has a visit in the next 30 days with authorizing provider or within the authorizing provider's specialty.  See \"Patient Info\" tab in inbasket, or \"Choose Columns\" in Meds & Orders section of the refill encounter.            Passed - Blood pressure less than 140/90 in past 6 months     BP Readings from Last 3 Encounters:   07/24/19 119/66   05/31/19 136/77   04/23/19 112/64                 Passed - Patient has documented LDL within the past 12 mos.     Recent Labs   Lab Test 05/08/19  0738   LDL 57             Passed - Patient is age 10 or older        Passed - Patient's CR is NOT>1.4 OR Patient's EGFR is NOT<45 within past 12 mos.     Recent Labs   Lab Test 05/08/19  0738   GFRESTIMATED >90   GFRESTBLACK >90       Recent Labs   Lab Test 05/08/19  0738   CR 0.64             Passed - Patient does NOT have a diagnosis of CHF.        Passed - Medication is active on med list        Passed - Patient is not pregnant        Passed - Patient has not had a positive pregnancy test within the past 12 mos.         Medication is being filled for 1 time refill only due to:  Patient needs office visit.  "

## 2020-01-16 NOTE — TELEPHONE ENCOUNTER
"Requested Prescriptions   Pending Prescriptions Disp Refills     metFORMIN (GLUCOPHAGE) 1000 MG tablet [Pharmacy Med Name: METFORMIN HCL 1000MG TABS]  Last Written Prescription Date:  10/15/2019  Last Fill Quantity: 180,  # refills: 0   Last Office Visit: 4/23/2019   Future Office Visit:      180 tablet 0     Sig: TAKE ONE TABLET BY MOUTH TWICE A DAY WITH MEALS       Biguanide Agents Failed - 1/15/2020  8:05 PM        Failed - Patient has had a Microalbumin in the past 15 mos.     Recent Labs   Lab Test 03/31/18  0936   MICROL 25   UMALCR 65.62*             Failed - Patient has documented A1c within the specified period of time.     If HgbA1C is 8 or greater, it needs to be on file within the past 3 months.  If less than 8, must be on file within the past 6 months.     Recent Labs   Lab Test 05/08/19  0738   A1C 6.8*             Failed - Recent (6 mo) or future (30 days) visit within the authorizing provider's specialty     Patient had office visit in the last 6 months or has a visit in the next 30 days with authorizing provider or within the authorizing provider's specialty.  See \"Patient Info\" tab in inbasket, or \"Choose Columns\" in Meds & Orders section of the refill encounter.            Passed - Blood pressure less than 140/90 in past 6 months     BP Readings from Last 3 Encounters:   07/24/19 119/66   05/31/19 136/77   04/23/19 112/64                 Passed - Patient has documented LDL within the past 12 mos.     Recent Labs   Lab Test 05/08/19  0738   LDL 57             Passed - Patient is age 10 or older        Passed - Patient's CR is NOT>1.4 OR Patient's EGFR is NOT<45 within past 12 mos.     Recent Labs   Lab Test 05/08/19  0738   GFRESTIMATED >90   GFRESTBLACK >90       Recent Labs   Lab Test 05/08/19  0738   CR 0.64             Passed - Patient does NOT have a diagnosis of CHF.        Passed - Medication is active on med list        Passed - Patient is not pregnant        Passed - Patient has not had a " positive pregnancy test within the past 12 mos.

## 2020-02-12 ENCOUNTER — OFFICE VISIT (OUTPATIENT)
Dept: INTERNAL MEDICINE | Facility: CLINIC | Age: 70
End: 2020-02-12
Payer: COMMERCIAL

## 2020-02-12 VITALS
DIASTOLIC BLOOD PRESSURE: 64 MMHG | OXYGEN SATURATION: 97 % | BODY MASS INDEX: 30.04 KG/M2 | SYSTOLIC BLOOD PRESSURE: 118 MMHG | HEART RATE: 95 BPM | WEIGHT: 164.3 LBS

## 2020-02-12 DIAGNOSIS — J84.9 ILD (INTERSTITIAL LUNG DISEASE) (H): ICD-10-CM

## 2020-02-12 DIAGNOSIS — Z23 NEED FOR PROPHYLACTIC VACCINATION AND INOCULATION AGAINST INFLUENZA: ICD-10-CM

## 2020-02-12 DIAGNOSIS — E11.9 TYPE 2 DIABETES MELLITUS WITHOUT COMPLICATION, WITHOUT LONG-TERM CURRENT USE OF INSULIN (H): Primary | ICD-10-CM

## 2020-02-12 DIAGNOSIS — R76.8 RHEUMATOID FACTOR POSITIVE WITH CYCLIC CITRULLINATED PEPTIDE (CCP) ANTIBODY NEGATIVE: ICD-10-CM

## 2020-02-12 LAB — HBA1C MFR BLD: 6.4 % (ref 0–5.6)

## 2020-02-12 PROCEDURE — 36415 COLL VENOUS BLD VENIPUNCTURE: CPT | Performed by: INTERNAL MEDICINE

## 2020-02-12 PROCEDURE — G0008 ADMIN INFLUENZA VIRUS VAC: HCPCS | Performed by: INTERNAL MEDICINE

## 2020-02-12 PROCEDURE — 83036 HEMOGLOBIN GLYCOSYLATED A1C: CPT | Performed by: INTERNAL MEDICINE

## 2020-02-12 PROCEDURE — 90662 IIV NO PRSV INCREASED AG IM: CPT | Performed by: INTERNAL MEDICINE

## 2020-02-12 PROCEDURE — 99214 OFFICE O/P EST MOD 30 MIN: CPT | Mod: 25 | Performed by: INTERNAL MEDICINE

## 2020-02-12 NOTE — PROGRESS NOTES
SUBJECTIVE:                                                      HPI: Krystian Boland is a pleasant 69 year old female who presents for diabetes follow-up    Accompanied by her  and daughter, who helps translate.    DIABETES MANAGEMENT                                                      Current DM regimen: Metformin 1000 mg twice daily  Adherent to regimen: yes  Adherent to ADA diet: yes    Ophthalmology: up to date   Retinopathy: no  Neuropathy: denies  Nephropathy: no  Checking feet/seeing podiatry: yes  Influenza vaccine: DUE  Pneumococcal vaccine: up to date     Aspirin Rx: yes - on daily baby aspirin  ACE-I/ARB: yes - on losartan 12.5 mg daily  Statin: yes - on Pravachol 40 mg daily    Last HgbA1c: 6.8% (5/8/2019)  Last LDL: 57 (5/8/2019)    Blood pressure <130/90: yes    ---    Unrelated to above, patient is being followed by pulmonology for interstitial lung disease and rheumatology for positive rheumatoid factor. She is on Plaquenil for both.    ---    The medication, allergy, and problem lists have been reviewed and updated as appropriate.     OBJECTIVE:                                                      /64   Pulse 95   Wt 74.5 kg (164 lb 4.8 oz)   SpO2 97%   BMI 30.04 kg/m    Constitutional: well-appearing  Respiratory: normal respiratory effort; inspiratory crackles right base, otherwise clear to auscultation   Cardiovascular: regular rate and rhythm; no edema  Gastrointestinal: soft, non-tender, non-distended, and bowel sounds present; no organomegaly or masses   Musculoskeletal: walks with a cane  Psych: normal judgment and insight; normal mood and affect; recent and remote memory intact    ASSESSMENT/PLAN:                                                      (E11.9) Type 2 diabetes mellitus without complication, without long-term current use of insulin (H)  (primary encounter diagnosis)  Plan: HgbA1c today; diabetes follow-up likely in 6 months.    (J84.9) ILD (interstitial  lung disease) (H)  Comment: stable on Plaquenil; followed by pulmonology.    (R76.8) Rheumatoid factor positive with cyclic citrullinated peptide (CCP) antibody negative  Comment: well-controlled on Plaquenil; not needing prednisone; followed by rheumatology.    (Z23) Need for prophylactic vaccination and inoculation against influenza  Plan: flu shot given today.    The instructions on the AVS were discussed and explained to the patient. Patient expressed understanding of instructions.    (Chart documentation was completed, in part, with [employer] voice-recognition software. Even though reviewed, some grammatical, spelling, and word errors may remain.)    Adrianna Singh MD   [address] [address]  T: [phone], F: [phone]

## 2020-02-26 ENCOUNTER — OFFICE VISIT (OUTPATIENT)
Dept: PULMONOLOGY | Facility: CLINIC | Age: 70
End: 2020-02-26
Attending: INTERNAL MEDICINE
Payer: COMMERCIAL

## 2020-02-26 ENCOUNTER — OFFICE VISIT (OUTPATIENT)
Dept: OPHTHALMOLOGY | Facility: CLINIC | Age: 70
End: 2020-02-26
Attending: INTERNAL MEDICINE
Payer: COMMERCIAL

## 2020-02-26 VITALS
HEART RATE: 95 BPM | BODY MASS INDEX: 30 KG/M2 | HEIGHT: 62 IN | RESPIRATION RATE: 17 BRPM | WEIGHT: 163 LBS | OXYGEN SATURATION: 97 % | DIASTOLIC BLOOD PRESSURE: 77 MMHG | SYSTOLIC BLOOD PRESSURE: 123 MMHG

## 2020-02-26 DIAGNOSIS — J84.9 ILD (INTERSTITIAL LUNG DISEASE) (H): ICD-10-CM

## 2020-02-26 DIAGNOSIS — Z79.899 LONG-TERM USE OF PLAQUENIL: Primary | ICD-10-CM

## 2020-02-26 DIAGNOSIS — Z79.899 LONG-TERM USE OF PLAQUENIL: ICD-10-CM

## 2020-02-26 DIAGNOSIS — H26.9 NUCLEAR CATARACT, NONSENILE: ICD-10-CM

## 2020-02-26 DIAGNOSIS — E11.9 TYPE 2 DIABETES MELLITUS WITHOUT COMPLICATION, WITHOUT LONG-TERM CURRENT USE OF INSULIN (H): ICD-10-CM

## 2020-02-26 DIAGNOSIS — J84.9 ILD (INTERSTITIAL LUNG DISEASE) (H): Primary | ICD-10-CM

## 2020-02-26 LAB
6 MIN WALK (FT): 1380 FT
6 MIN WALK (M): 421 M
CRP SERPL-MCNC: <2.9 MG/L (ref 0–8)
ERYTHROCYTE [DISTWIDTH] IN BLOOD BY AUTOMATED COUNT: 12.7 % (ref 10–15)
HCT VFR BLD AUTO: 34.1 % (ref 35–47)
HGB BLD-MCNC: 11.3 G/DL (ref 11.7–15.7)
MCH RBC QN AUTO: 29.7 PG (ref 26.5–33)
MCHC RBC AUTO-ENTMCNC: 33.1 G/DL (ref 31.5–36.5)
MCV RBC AUTO: 90 FL (ref 78–100)
PLATELET # BLD AUTO: 310 10E9/L (ref 150–450)
RBC # BLD AUTO: 3.8 10E12/L (ref 3.8–5.2)
WBC # BLD AUTO: 9.6 10E9/L (ref 4–11)

## 2020-02-26 PROCEDURE — 36415 COLL VENOUS BLD VENIPUNCTURE: CPT | Performed by: INTERNAL MEDICINE

## 2020-02-26 PROCEDURE — 92250 FUNDUS PHOTOGRAPHY W/I&R: CPT | Mod: ZF | Performed by: STUDENT IN AN ORGANIZED HEALTH CARE EDUCATION/TRAINING PROGRAM

## 2020-02-26 PROCEDURE — G0463 HOSPITAL OUTPT CLINIC VISIT: HCPCS | Mod: ZF,27

## 2020-02-26 PROCEDURE — 92134 CPTRZ OPH DX IMG PST SGM RTA: CPT | Mod: ZF | Performed by: STUDENT IN AN ORGANIZED HEALTH CARE EDUCATION/TRAINING PROGRAM

## 2020-02-26 PROCEDURE — 92083 EXTENDED VISUAL FIELD XM: CPT | Mod: ZF | Performed by: STUDENT IN AN ORGANIZED HEALTH CARE EDUCATION/TRAINING PROGRAM

## 2020-02-26 PROCEDURE — 85027 COMPLETE CBC AUTOMATED: CPT | Performed by: INTERNAL MEDICINE

## 2020-02-26 PROCEDURE — 86140 C-REACTIVE PROTEIN: CPT | Performed by: INTERNAL MEDICINE

## 2020-02-26 PROCEDURE — G0463 HOSPITAL OUTPT CLINIC VISIT: HCPCS | Mod: ZF

## 2020-02-26 RX ORDER — HYDROXYCHLOROQUINE SULFATE 200 MG/1
200 TABLET, FILM COATED ORAL DAILY
Status: CANCELLED | OUTPATIENT
Start: 2020-02-26

## 2020-02-26 RX ORDER — BENZONATATE 200 MG/1
200 CAPSULE ORAL 3 TIMES DAILY PRN
Qty: 90 CAPSULE | Refills: 3 | Status: SHIPPED | OUTPATIENT
Start: 2020-02-26 | End: 2020-11-10

## 2020-02-26 ASSESSMENT — TONOMETRY
IOP_METHOD: TONOPEN
OD_IOP_MMHG: 22
OS_IOP_MMHG: 23

## 2020-02-26 ASSESSMENT — REFRACTION_MANIFEST
OD_CYLINDER: SPHERE
OD_SPHERE: -3.50
OS_SPHERE: -0.50
OS_AXIS: 034
OD_ADD: +2.75
OS_CYLINDER: +0.75
OS_ADD: +2.75

## 2020-02-26 ASSESSMENT — SLIT LAMP EXAM - LIDS
COMMENTS: DERMATOCHALASIS
COMMENTS: DERMATOCHALASIS

## 2020-02-26 ASSESSMENT — CONF VISUAL FIELD
OD_NORMAL: 1
METHOD: COUNTING FINGERS
OS_NORMAL: 1

## 2020-02-26 ASSESSMENT — MIFFLIN-ST. JEOR: SCORE: 1227.61

## 2020-02-26 ASSESSMENT — VISUAL ACUITY
OS_SC: 20/40
OD_SC: 20/200
METHOD: SNELLEN - LINEAR
OD_PH_SC: 20/70
OS_PH_SC: 20/25
OS_SC+: -1

## 2020-02-26 ASSESSMENT — EXTERNAL EXAM - LEFT EYE: OS_EXAM: NORMAL

## 2020-02-26 ASSESSMENT — PAIN SCALES - GENERAL: PAINLEVEL: NO PAIN (0)

## 2020-02-26 ASSESSMENT — EXTERNAL EXAM - RIGHT EYE: OD_EXAM: NORMAL

## 2020-02-26 ASSESSMENT — CUP TO DISC RATIO
OS_RATIO: 0.4
OD_RATIO: 0.4

## 2020-02-26 NOTE — PROGRESS NOTES
HISTORY OF PRESENT ILLNESS:  The patient is a 69-year-old female with interstitial lung disease secondary to rheumatoid arthritis.  She has been treated with Plaquenil for it and previously prednisone but has been off prednisone for some time.  She reports a cough that in the morning is productive of sputum but during the rest of the day is basically a dry cough.  In terms of shortness of breath, the symptoms of shortness of breath appear to be stable.  She is not on oxygen therapy.  Unfortunately, she has not had followup with Rheumatology recently.  The last time was in May of last year.  She denies any active joint pain or joint swelling at this time.      PHYSICAL EXAMINATION:   VITAL SIGNS:  Today her blood pressure was 123/77, pulse 95, respiratory rate 17.  O2 sats are 97% on room air.   HEENT:  She is anicteric.  Mouth and throat without lesions.   NECK:  Supple without adenopathy or thyromegaly.   CHEST:  She has crackles half way up bilaterally.   HEART:  Regular rate and rhythm, normal S1 and S2.   ABDOMEN:  Nontender.   EXTREMITIES:  Without clubbing, cyanosis or edema.   JOINTS:  Maybe a little bit of bogginess, but otherwise, I do not see a lot of active inflammation within her joints.   SKIN:  No rash.      PULMONARY FUNCTION TESTS:  She had a 6-minute walk.  Walk distance was within normal limits.  She started out at 96%.  The lowest she got during the walk was 90%.  She also had spirometry done but had difficulty performing the maneuver due to coughing, and so the values are likely not accurate.  The forced vital capacity was lower than what it previously had been, but again, with difficulty with the maneuvers, I do not think that the numbers are accurate.      ASSESSMENT AND PLAN:  In summary then, Krystian is a 69-year-old with interstitial lung disease secondary to RA.  She remains on Plaquenil.  I will check a CRP of inflammation on her today as well as a CBC.  For the cough, I will give her a  prescription for Tessalon Perles 200 mg t.i.d. to see whether or not this will help with her cough.  I will follow up with her in 3 months to see how she is doing with the Tessalon Perles, recheck a CRP at that point in time and recheck pulmonary function tests and get a chest CT scan noncontrast on her to assess for progression of her ILD.  In addition, I have encouraged her to schedule a followup with Dr. Staley in Rheumatology Clinic.         Tristan Cross MD  Pulmonary/Critical Care  February 26, 2020 11:32 AM      Addendum: CRP inflammation suppressed at <2.9. Will not place back on Prednisone at this time.        Answers for HPI/ROS submitted by the patient on 2/20/2020   General Symptoms: No  Skin Symptoms: No  HENT Symptoms: No  EYE SYMPTOMS: No  HEART SYMPTOMS: No  LUNG SYMPTOMS: No  INTESTINAL SYMPTOMS: No  URINARY SYMPTOMS: No  GYNECOLOGIC SYMPTOMS: No  BREAST SYMPTOMS: No  SKELETAL SYMPTOMS: No  BLOOD SYMPTOMS: No  NERVOUS SYSTEM SYMPTOMS: No  MENTAL HEALTH SYMPTOMS: No

## 2020-02-26 NOTE — PROGRESS NOTES
HPI  Krystian Boland is a 69 year old female with h/o abdominal TB(intestinal s/p appendectomy, cholecystectomy and TB tx, 30 years ago), ILD and RA (plaquenil 400mgdaily , 5.4mg/kg, since 5/2019) and DM2 who presents for plaquenil screening.     Recently started taking Plaquenil on 5/2019 for ILD and RA. Was on prednisone but taken off 6 months ago. Seen by pulmonology today (Dr. Cross)     Last A1C 6.4. Well controlled.     [facility] ophthalmology 10 months ago- no diabetic retinopathy. Rx'd bifocals, doesn't like using.     Today with intermittent burning and blurriness right eye for past 2 days. Not using artificial tears. No new flashes or floaters. No glare or halos with nights at night.    HVF 24-2: Rim artifact OU, less like nasal step  OCT Mac: Ratty RPE with EZ loss  peripherally, normal foveal contour, no IRF/SRF      POH:  See AP    PMH:    Abdominal TB, ILD, RA, DM2. See A/P for more information    FMHx:   No known AMD or Glaucoma    Social:    Meds:  ASA, Losartan, Plaquenil, Pravastatin, Pepcid      Assessment & Plan      #Plaquenil use  - Started on 5/2019 for ILD and RA  - On 400mg daily, 5.4mg/kg  - OVF 24-2 normal  - FAF normal  - OCT Mac: Ratty RPE with EZ loss peripherally, normal foveal contour, no IRF/SRF  - Outer retinal changes on OCT, unclear etiology  Plan:  - Outer retinal changes on OCT. Likely too early for plaquenil toxicity, but may interfere with future monitoring. Given outer retinal changes, will refer to Retina for plaquenil screening to ensure proper evaluation for retinal toxicity in setting of prior changes  - Refer to Retina at next available   - Plaquenil dosage at borderline, guidelines recommend <5.0mg/kg to avoid retinal toxicity      #Type 2 Diabetes  - Last A1C 6.4, on Metformin 1000mg BID   - No diabetic retinopathy on exam  - RTC in 1 year for annual DFE        #Ocular HTN each eye (mild)  - ON 0.4 each eye, healthy rim  - Tmax: IOP 22/23 tonopen   - HVF with rim  artifact  - OCT RNFL: normal  - FHx: None mercedes  - Pachy 543, 526  - On prednisone 6 months ago  - Observe, will obtain rpt IOP w/applanation at future visit    #Cataracts right eye>left  - Not visually significant  - Observe    #Dry eyes each eye   - AT QID, WC BID     #Myopic astigmatism  - MRx dispensed today       -----------------------------------------------------------------------------------    Patient disposition:   Return Retina at next available . or sooner as needed.        Kimber Bowen MD  Ophthalmology PGY-2  [facility]  Pager: [phone]    Teaching statement:  Complete documentation of historical and exam elements from today's encounter can be found in the full encounter summary report (not reduplicated in this progress note). I personally obtained the chief complaint(s) and history of present illness.  I confirmed and edited as necessary the review of systems, past medical/surgical history, family history, social history, and examination findings as documented by others; and I examined the patient myself. I personally reviewed the relevant tests, images, and reports as documented above.     I formulated and edited as necessary the assessment and plan and discussed the findings and management plan with the patient and family.    Tanya Aragon MD  Comprehensive Ophthalmology & Ocular Pathology  Department of Ophthalmology and Visual Neurosciences  [email or web address]  Pager [phone]

## 2020-02-26 NOTE — LETTER
2/26/2020       RE: Krystian Boland  [address]  [place][1] [id number]     Dear Colleague,    Thank you for referring your patient, Krystian Boland, to the [facility] at [facility]. Please see a copy of my visit note below.    HISTORY OF PRESENT ILLNESS:  The patient is a 69-year-old female with interstitial lung disease secondary to rheumatoid arthritis.  She has been treated with Plaquenil for it and previously prednisone but has been off prednisone for some time.  She reports a cough that in the morning is productive of sputum but during the rest of the day is basically a dry cough.  In terms of shortness of breath, the symptoms of shortness of breath appear to be stable.  She is not on oxygen therapy.  Unfortunately, she has not had followup with Rheumatology recently.  The last time was in May of last year.  She denies any active joint pain or joint swelling at this time.      PHYSICAL EXAMINATION:   VITAL SIGNS:  Today her blood pressure was 123/77, pulse 95, respiratory rate 17.  O2 sats are 97% on room air.   HEENT:  She is anicteric.  Mouth and throat without lesions.   NECK:  Supple without adenopathy or thyromegaly.   CHEST:  She has crackles half way up bilaterally.   HEART:  Regular rate and rhythm, normal S1 and S2.   ABDOMEN:  Nontender.   EXTREMITIES:  Without clubbing, cyanosis or edema.   JOINTS:  Maybe a little bit of bogginess, but otherwise, I do not see a lot of active inflammation within her joints.   SKIN:  No rash.      PULMONARY FUNCTION TESTS:  She had a 6-minute walk.  Walk distance was within normal limits.  She started out at 96%.  The lowest she got during the walk was 90%.  She also had spirometry done but had difficulty performing the maneuver due to coughing, and so the values are likely not accurate.  The forced vital capacity was lower than what it previously had been, but again, with difficulty with  the maneuvers, I do not think that the numbers are accurate.      ASSESSMENT AND PLAN:  In summary then, Krystian is a 69-year-old with interstitial lung disease secondary to RA.  She remains on Plaquenil.  I will check a CRP of inflammation on her today as well as a CBC.  For the cough, I will give her a prescription for Tessalon Perles 200 mg t.i.d. to see whether or not this will help with her cough.  I will follow up with her in 3 months to see how she is doing with the Tessalon Perles, recheck a CRP at that point in time and recheck pulmonary function tests and get a chest CT scan noncontrast on her to assess for progression of her ILD.  In addition, I have encouraged her to schedule a followup with Dr. Staley in Rheumatology Clinic.         Tristan Cross MD  Pulmonary/Critical Care  February 26, 2020 11:32 AM      Addendum: CRP inflammation suppressed at <2.9. Will not place back on Prednisone at this time.        Answers for HPI/ROS submitted by the patient on 2/20/2020   General Symptoms: No  Skin Symptoms: No  HENT Symptoms: No  EYE SYMPTOMS: No  HEART SYMPTOMS: No  LUNG SYMPTOMS: No  INTESTINAL SYMPTOMS: No  URINARY SYMPTOMS: No  GYNECOLOGIC SYMPTOMS: No  BREAST SYMPTOMS: No  SKELETAL SYMPTOMS: No  BLOOD SYMPTOMS: No  NERVOUS SYSTEM SYMPTOMS: No  MENTAL HEALTH SYMPTOMS: No      Again, thank you for allowing me to participate in the care of your patient.      Sincerely,    Tristan Cross MD

## 2020-02-26 NOTE — NURSING NOTE
Chief Complaint   Patient presents with     RECHECK     RA/ILD     Medications reviewed and vital signs taken.   Yamilka Otero, CMA

## 2020-02-26 NOTE — NURSING NOTE
Chief Complaints and History of Present Illnesses   Patient presents with     New Patient     Chief Complaint(s) and History of Present Illness(es)     New Patient     Laterality: both eyes    Associated symptoms: burning (RE about a week ago, then went away.).  Negative for floaters, flashes and eye pain    Response to treatment: no improvement              Comments     Luz is here a a new patient who uses plaquenil long tern . She has been using plaquenil for about a year. She is here for baseline testing.  She has been seen in the past by [facility], but they don't do testing. Recently Krystian has been having bouts of blurry vision preceded by a burning sensation RE . She says washing her face in cold water seems to help the blurriness go away.   By the afternoon, vision starts to blur again     Ivan Rivera COT 12:48 PM February 26, 2020

## 2020-02-27 LAB
EXPTIME-PRE: 6.18 SEC
FEF2575-%PRED-PRE: 137 %
FEF2575-PRE: 2.47 L/SEC
FEF2575-PRED: 1.8 L/SEC
FEFMAX-%PRED-PRE: 83 %
FEFMAX-PRE: 4.66 L/SEC
FEFMAX-PRED: 5.56 L/SEC
FEV1-%PRED-PRE: 68 %
FEV1-PRE: 1.37 L
FEV1FEV6-PRE: 92 %
FEV1FEV6-PRED: 80 %
FEV1FVC-PRE: 92 %
FEV1FVC-PRED: 80 %
FIFMAX-PRE: 2.77 L/SEC
FVC-%PRED-PRE: 59 %
FVC-PRE: 1.49 L
FVC-PRED: 2.52 L

## 2020-02-28 ENCOUNTER — PATIENT OUTREACH (OUTPATIENT)
Dept: GERIATRIC MEDICINE | Facility: CLINIC | Age: 70
End: 2020-02-28

## 2020-02-28 NOTE — PROGRESS NOTES
[facility] Care Coordination Contact    Called adult daughter David, to schedule annual HRA home visit. HRA has been scheduled for 3/3/20 at 1pm.  Daughter will be .  JAQUELIN Langley  [facility]  [phone]

## 2020-03-03 ENCOUNTER — PATIENT OUTREACH (OUTPATIENT)
Dept: GERIATRIC MEDICINE | Facility: CLINIC | Age: 70
End: 2020-03-03

## 2020-03-03 RX ORDER — CHLORAL HYDRATE 500 MG
2 CAPSULE ORAL DAILY
COMMUNITY
End: 2020-08-25

## 2020-03-03 ASSESSMENT — PATIENT HEALTH QUESTIONNAIRE - PHQ9: SUM OF ALL RESPONSES TO PHQ QUESTIONS 1-9: 4

## 2020-03-05 NOTE — PROGRESS NOTES
[facility] Care Coordination Contact    [facility] Home Visit Assessment     Home visit for Health Risk Assessment with Krystian Boland completed on March 3, 2020    Type of residence:: Private home - stairs (3 steps into the home. Full flight of steps into basement. Bedroom and bathroom are on main level.)  Current living arrangement:: I live in a private home with family (Lives with spouse in daughter's home with daughter's spouse and small children.)     Assessment completed with:: Patient, Care Team Member, Spouse or significant other, Family(Daughter Zachariah. Zachariah also did interpreting)    Current Care Plan  Member currently receiving the following home care services: None    Member currently receiving the following community resources: None    Health Issues: Reports her health as good. Report no pain. Shared that she has been cough a lot lately due to interstitial lung disease and did follow up with the pulmonologist, who had prescribed new medications and recommended member follow up with rheumatology. Does need to make a rheumatology appointment for follow up. Daughter shared that she will make the appointment. Shared that her memory continues to be poor, however is able to recall this , previous medical appointments and her medical conditions. She does assist with caring for her grandchildren in order to keep her active. Encouraged family to consider [place] as well for both member and her spouse to attend. Member has had no hospitalizations and ED visits.     Medication Review  Medication reconciliation completed in Epic: Yes  Medication set-up & administration: Family/informal caregiver sets up weekly.  Self-administers medications.  Medication Risk Assessment Medication (1 or more, place referral to MTM): N/A: No risk factors identified  MTM Referral Placed: No: No risk factors idenified    Mental/Behavioral Health   Depression Screening: See PHQ assessment flowsheet.   Mental  health DX:: No      Mental Health Services: None: No further intervention needed at this time.    Falls Assessment:   Fallen 2 or more times in the past year?: No   Any fall with injury in the past year?: No    ADL/IADL Dependencies:   Dependent ADLs:: Independent. Does have urine dribbling and wears an incontinent pad, she is able to manage independently.  Dependent IADLs:: Shopping, Cleaning, Medication Management, Money Management, Transportation    [facility] sponsored benefits: Shared information re: Silver Sneakers/gym memberships, ASA, Calcium +D.    PCA Assessment completed at visit: No     Elderly Waiver Eligibility: No-does not meet criteria    Care Plan & Recommendations: Member wants to continue to live with her  at her daughter's home and with her daughter's family. Shared that family is available to assist with IADLs as needed. Member assists her  with some of his ADL and IADL needs and enjoys being able to assist. No recommended service at this time. Member wants to continue to receive her incontinent products.     See [facility] for detailed assessment information.    Follow-Up Plan: Member informed of future contact, plan to f/u with member with a 6 month telephone assessment.  Contact information shared with member and family, encouraged member to call with any questions or concerns at any time.    [place] care continuum providers: Please refer to Health Care Home on the Epic Problem List to view this patient's [facility] Care Plan Summary.    JAQUELIN Langley  [facility]  [phone]

## 2020-03-09 ENCOUNTER — PATIENT OUTREACH (OUTPATIENT)
Dept: GERIATRIC MEDICINE | Facility: CLINIC | Age: 70
End: 2020-03-09

## 2020-03-09 NOTE — PROGRESS NOTES
[facility] Care Coordination Contact    Received after visit chart from care coordinator.  Completed following tasks: Mailed copy of care plan to client and Updated services in access  Chart was returned to CC.    and Provider Signature - No POC Shared:  Member indicates that they do not want their POC shared with any [facility] providers.     Elaina Younger  Care Management Specialist   [facility]   [phone]

## 2020-03-09 NOTE — LETTER
March 9, 2020      KRYSTIAN BARNES  [address]  [place] [address]      Dear Krystian:    At [facility], we are dedicated to improving your health and well-being. Enclosed is the Comprehensive Care Plan that we developed with you on 3/2/20. Please review the Care Plan carefully.    As a reminder, some of the things we discussed at your visit include:    Your physical and mental health    Ways to reduce falls    Health care needs you may have    Don t forget to contact your care coordinator if you:    Have been hospitalized or plan to be hospitalized     Have had a fall     Have experienced a change in physical health    Are experiencing emotional problems     If you do not agree with your Care Plan, have questions about it, or have experienced a change in your needs, please call me at [phone]. If you are hearing impaired, please call the [place] [employer] at 255 or [phone] (czwzvw-[email or web address] relay service).    Sincerely,    JAQUELIN Langley    E-mail: [email or web address]  Phone: [phone]    Care Manager  [facility] ([facility]) is a health plan that contracts with both Medicare and the [place] Medical Assistance (Medicaid) program to provide benefits of both programs to enrollees. Enrollment in [facility] depends on contract renewal.    [facility] [place] [id number])     [id number] (11/18)

## 2020-04-14 ENCOUNTER — DOCUMENTATION ONLY (OUTPATIENT)
Dept: CARE COORDINATION | Facility: CLINIC | Age: 70
End: 2020-04-14

## 2020-04-17 ENCOUNTER — VIRTUAL VISIT (OUTPATIENT)
Dept: RHEUMATOLOGY | Facility: CLINIC | Age: 70
End: 2020-04-17
Attending: INTERNAL MEDICINE
Payer: COMMERCIAL

## 2020-04-17 DIAGNOSIS — R76.8 RHEUMATOID FACTOR POSITIVE WITH CYCLIC CITRULLINATED PEPTIDE (CCP) ANTIBODY NEGATIVE: Primary | ICD-10-CM

## 2020-04-17 NOTE — PROGRESS NOTES
"Patient was called to set up video visit. No answer. Carlota Solorzano on 4/17/2020 at 6:58 AM    Krystian Boland is a 69 year old female who is being evaluated via a billable telephone visit.      The patient has been notified of following:     \"This telephone visit will be conducted via a call between you and your physician/provider. We have found that certain health care needs can be provided without the need for a physical exam.  This service lets us provide the care you need with a short phone conversation.  If a prescription is necessary we can send it directly to your pharmacy.  If lab work is needed we can place an order for that and you can then stop by our lab to have the test done at a later time.    Telephone visits are billed at different rates depending on your insurance coverage. During this emergency period, for some insurers they may be billed the same as an in-person visit.  Please reach out to your insurance provider with any questions.    If during the course of the call the physician/provider feels a telephone visit is not appropriate, you will not be charged for this service.\"    Patient has given verbal consent for Telephone visit?  Yes    How would you like to obtain your AVS? MyChart    Phone call duration: 15 minutes    Harjit Staley MD      "

## 2020-04-17 NOTE — PATIENT INSTRUCTIONS
Diagnosis: Rheumatoid arthritis, in remission, and interstitial lung disease, associated with arthritis.    Plan:  1.  Continue hydroxychloroquine 400 mg once daily.  2.  Follow-up with ophthalmology, retinal service, for back of the eye changes noted on examination in February 2020.  3.  Follow-up with Dr. Cross in pulmonology for abnormal 6-minute walk test and hard to interpret pulmonary function testing from 2020.

## 2020-04-17 NOTE — PROGRESS NOTES
[facility]  Rheumatology Clinic  Harjit Staley MD  2020     Name: Krystian Boland  MRN: [id number]  Age: 68 year old  : 1950  Referring provider: Referred Self     Assessment and Plan:  #High positive rheumatoid factor and anti-CCP noted in 2018:  Today, she denies morning stiffness, joint pain, or musculoskeletal weakness.  She has not experienced any change in pulmonary symptoms and exercise tolerance is stable to improved.  Blood work on 2020 revealed a CRP less than 2.9 and a CBC with very mild anemia, stable.  6-minute walk test performed in February revealed normal walking distance with significant desaturation during the walk.  Spirometry was difficult to interpret at that time.    Rheumatoid arthritis, if present, is clinically at low disease activity level.  Patient has successfully tapered off of low-dose prednisone.  FRom a systemic inflammation and joint-focussed perspective, patient does not require combination medical therapy. I suggest continuing monotherapy with hydroxychloroquine 400mg daily in view of the patient's interstitial lung disease and rheumatoid arthritis-associated serologies that pose elevated risk of progressive joint disease. We discussed the need for regular ophthalmologic monitoring while on plaquenil. Patient's daughter states that she will follow-up with retina service to conduct recommended advanced screening for Plaquenil toxicity, based on a recommendation from ophthalmology in 2020 that abnormal OCT be followed up by the retinal service.    #Interstitial lung disease:   Patient relates stable pulmonary symptoms, and her cough has disappeared since last year.  Patient describes excellent functional status with minor limitation in ambulating without oxygen. If lung disease is associated with seropositive RA, I expect very slow progression over years to decades. I agree with patient's plan to follow-up with Dr. Cross and renew PFTs in mid .. If  lung disease is unexpectedly progressive since 2018, I will recommend more aggressive immunomodulatory regimen, potentially including mycophenolate.     Follow-up: No follow-ups on file.     HPI:   Krystian Boland is a 69 year old female with a history of ILD and RA who presents for follow-up. I last saw the patient in 5-19 at which time she reported minimal dyspnea on exertion, and no history of joint swelling, stiffness, or altered range of motion. Plan at that time was to continue plaquenil 400mg. We also discussed a consideration for MMF 1000mg for progression of lung symptoms.     Interval history 4-17-20: This telephone visit was conducted with the aid of an  with Tibetan language skills, and with the aid of the patient's daughter.    Patient was seen by Dr. Cross in pulmonology on February 26, 2020 in follow-up of interstitial lung disease.  Ongoing stable shortness of breath and low intensity cough were noted.  Assessment was of interstitial lung disease associated with rheumatoid arthritis functionally stable.  Tessalon Perles were prescribed for the cough, and CT of the chest and pulmonary function tests were planned for follow-up assessment.    Her cough is now better. She is able to walk more than 100 meters without stopping.  She is not having any morning stiffness, and there is minimal joint pain.  No restriction in activities of daily living.  No recent use of pain medication or anti-inflammatories.  She continues with Plaquenil 200 mg 2 tablets once daily.    Patient was seen by ophthalmology on February 26, 2020  Plaquenil retinal toxicity monitoring.  Outer retinal changes on OCT were noted.  Concern over interference of these changes with later monitoring for Plaquenil toxicity was raised, and patient was referred to the retina service for further screening.  No contraindication to continuing Plaquenil was identified.    History 5-19:  she reports that she is doing about the same  since our last visit. Transient joint pain in summer 2018 was most prominent in her hands.  But her joint pain and finger swelling has disappeared since starting prednisone 5mg, which she has taken for the past year. Denies morning stiffness. She has not taken plaquenil since being prescribed last year, noting that she become anxious to add another pill to her current regimen. Is instead using prednisone to alleviate joint pain. Uses ibuprofen to treat sciatica.     She also reports that her breathing is stable and she is able to walk 100 meters without experiencing shortness of breath. Her coughing has also improved. Has tried pulmonary therapy in the past, but did not feel that this helped. She plans on following up with Dr. Cross in the near future.    Review of Systems:   Pertinent items are noted in HPI or as below, remainder of complete ROS is negative.  Patient was seen by Dr. Cross in pulmonology on February 26, 2020 in follow-up of interstitial lung disease.  Ongoing stable shortness of breath and low intensity cough were noted.  Assessment was of interstitial lung disease associated with rheumatoid arthritis functionally stable.  Tessalon Perles were prescribed for the cough, and CT of the chest and pulmonary function tests were planned for follow-up assessment.    No recent problems with hearing or vision. No swallowing problems.   No breathing difficulty, shortness of breath, coughing, or wheezing  No chest pain or palpitations  No heart burn, indigestion, abdominal pain, nausea, vomiting, diarrhea  No urination problems, no bloody, cloudy urine, no dysuria  No numbing, tingling, weakness  No headaches or confusion  No rashes. No easy bleeding or bruising.     Active Medications:   Current Outpatient Medications   Medication     alcohol swab prep pads     aspirin 81 MG chewable tablet     benzonatate (TESSALON) 200 MG capsule     blood glucose (NO BRAND SPECIFIED) test strip     blood glucose calibration  (NO BRAND SPECIFIED) solution     blood glucose monitoring (NO BRAND SPECIFIED) meter device kit     famotidine (PEPCID) 40 MG tablet     fish oil-omega-3 fatty acids 1000 MG capsule     fluocinonide (LIDEX) 0.05 % external cream     hydroxychloroquine (PLAQUENIL) 200 MG tablet     losartan (COZAAR) 25 MG tablet     metFORMIN (GLUCOPHAGE) 1000 MG tablet     Multiple Vitamins-Minerals (MULTIVITAMINS/MINERALS ADULT PO)     pravastatin (PRAVACHOL) 40 MG tablet     thin (NO BRAND SPECIFIED) lancets     No current facility-administered medications for this visit.         Allergies:   Atorvastatin      Past Medical History:  Obesity  Osteopenia  Type 2 diabetes mellitus without complication, without long-term current use of insulin  Interstitial lung disease  Abdominal tuberculosis (intestinal, 20 years ago)     Past Surgical History:  Open cholecystectomy    Family History:   Mother - dementia  Father - cerebrovascular disease  Brother - cerebrovascular disease, hypertension  No family history of - diabetes, myocardial infarction, early onset C.A.D., breast cancer, ovarian cancer, colon cancer, rheumatoid arthritis  Family history of - high blood pressure  (mentions that medical history is unlikely to be comprehensive because of poor/infrequent medical records)      Social History:   No smoking or tobacco use  Rare alcohol use (1-2 times a year)   with 8 children, 13 grandchildren, cooks for the family     Physical Exam:   There were no vitals taken for this visit.   Wt Readings from Last 4 Encounters:   02/26/20 73.9 kg (163 lb)   02/12/20 74.5 kg (164 lb 4.8 oz)   07/24/19 73.5 kg (162 lb)   05/31/19 73.8 kg (162 lb 12.8 oz)     Psych: Normal judgement, orientation, memory, affect.       Laboratory:   RHEUM RESULTS Latest Ref Rng & Units 11/8/2018 5/8/2019 2/26/2020   SED RATE 0 - 30 mm/h - - -   CRP, INFLAMMATION 0.0 - 8.0 mg/L - - <2.9   CK TOTAL 30 - 225 U/L - - -   RHEUMATOID FACTOR <20 IU/mL - - -   AST 0 -  45 U/L - 13 -   ALT 0 - 50 U/L - 17 -   ALBUMIN 3.4 - 5.0 g/dL - 3.5 -   WBC 4.0 - 11.0 10e9/L 6.1 6.1 9.6   RBC 3.8 - 5.2 10e12/L 3.78(L) 4.07 3.80   HGB 11.7 - 15.7 g/dL 11.1(L) 12.2 11.3(L)   HCT 35.0 - 47.0 % 33.0(L) 36.9 34.1(L)   MCV 78 - 100 fl 87 91 90   MCHC 31.5 - 36.5 g/dL 33.6 33.1 33.1   RDW 10.0 - 15.0 % 13.0 12.7 12.7    - 450 10e9/L 286 327 310   CREATININE 0.52 - 1.04 mg/dL 0.47(L) 0.64 -   GFR ESTIMATE, IF BLACK >60 mL/min/[1.73:m2] >90 >90 -   GFR ESTIMATE >60 mL/min/[1.73:m2] >90 >90 -    - 1,620 mg/dL - - -   HEPATITIS C ANTIBODY NR - - -       Rheumatoid Factor   Date Value Ref Range Status   05/23/2018 700 (H) <20 IU/mL Final     Cyclic Citrullinated Peptide Antibody, IgG   Date Value Ref Range Status   05/23/2018 >340 (H) <7 U/mL Final     Comment:     Positive     Scleroderma Antibody Scl-70 CLAUDIO IgG   Date Value Ref Range Status   05/23/2018 <0.2 0.0 - 0.9 AI Final     Comment:     Negative  Antibody index (AI) values reflect qualitative changes in antibody   concentration that cannot be directly associated with clinical condition or   disease state.       SSA (Ro) (CLAUDIO) Antibody, IgG   Date Value Ref Range Status   05/23/2018 <0.2 0.0 - 0.9 AI Final     Comment:     Negative  Antibody index (AI) values reflect qualitative changes in antibody   concentration that cannot be directly associated with clinical condition or   disease state.       SSB (La) (CLAUDIO) Antibody, IgG   Date Value Ref Range Status   05/23/2018 <0.2 0.0 - 0.9 AI Final     Comment:     Negative  Antibody index (AI) values reflect qualitative changes in antibody   concentration that cannot be directly associated with clinical condition or   disease state.       LYNDA interpretation   Date Value Ref Range Status   05/23/2018 Negative NEG^Negative Final     Comment:                                        Reference range:  <1:40  NEGATIVE  1:40 - 1:80  BORDERLINE POSITIVE  >1:80 POSITIVE         Neutrophil  Cytoplasmic IgG Antibody   Date Value Ref Range Status   05/23/2018 <1:20 <1:20 Final     Comment:     (Note)  The ANCA IFA is <1:20; therefore, no further testing will   be performed.  INTERPRETIVE INFORMATION: Anti-Neutrophil Cyto Ab, IgG  Neutrophil Cytoplasmic Antibodies (C-ANCA = granular   cytoplasmic staining, P-ANCA = perinuclear staining) are   found in the serum of over 90 percent of patients with   certain necrotizing systemic vasculitides, and usually in   less than 5 percent of patients with collagen vascular   disease or arthritis.  Performed by [facility],  [address] [place] [id number] [phone]  [email or web address], Marty Wright MD, Lab. Director         IGG   Date Value Ref Range Status   05/23/2018 1,980 (H) 695 - 1,620 mg/dL Final     Scleroderma Antibody Scl-70 CLAUDIO IgG   Date Value Ref Range Status   05/23/2018 <0.2 0.0 - 0.9 AI Final     Comment:     Negative  Antibody index (AI) values reflect qualitative changes in antibody   concentration that cannot be directly associated with clinical condition or   disease state.

## 2020-04-26 ENCOUNTER — MYC MEDICAL ADVICE (OUTPATIENT)
Dept: INTERNAL MEDICINE | Facility: CLINIC | Age: 70
End: 2020-04-26

## 2020-05-01 ENCOUNTER — VIRTUAL VISIT (OUTPATIENT)
Dept: INTERNAL MEDICINE | Facility: CLINIC | Age: 70
End: 2020-05-01
Payer: COMMERCIAL

## 2020-05-01 DIAGNOSIS — L29.9 ITCHING: Primary | ICD-10-CM

## 2020-05-01 DIAGNOSIS — R60.9 SWELLING: ICD-10-CM

## 2020-05-01 PROCEDURE — 99214 OFFICE O/P EST MOD 30 MIN: CPT | Mod: 95 | Performed by: INTERNAL MEDICINE

## 2020-05-01 RX ORDER — PREDNISONE 20 MG/1
40 TABLET ORAL DAILY
Qty: 10 TABLET | Refills: 0 | Status: SHIPPED | OUTPATIENT
Start: 2020-05-01 | End: 2020-08-25

## 2020-05-01 ASSESSMENT — PATIENT HEALTH QUESTIONNAIRE - PHQ9: SUM OF ALL RESPONSES TO PHQ QUESTIONS 1-9: 0

## 2020-05-01 NOTE — PATIENT INSTRUCTIONS
STOP Losartan.    START Prednisone: two tabs taken together (40mg) once daily x 5 days.    CONTINUE Zyrtec once daily, in the morning, and Benadryl in the evening.    If symptoms worsen, change, don't improve, or recur, please let me know.

## 2020-05-01 NOTE — PROGRESS NOTES
"Krystian Boland is a 69 year old female who is being evaluated via a billable video visit.      The patient has been notified of following:     \"This video visit will be conducted via a call between you and your physician/provider. We have found that certain health care needs can be provided without the need for an in-person physical exam.  This service lets us provide the care you need with a video conversation.  If a prescription is necessary we can send it directly to your pharmacy.  If lab work is needed we can place an order for that and you can then stop by our lab to have the test done at a later time.    Video visits are billed at different rates depending on your insurance coverage.  Please reach out to your insurance provider with any questions.    If during the course of the call the physician/provider feels a video visit is not appropriate, you will not be charged for this service.\"    Patient has given verbal consent for Video visit? Yes    How would you like to obtain your AVS? [facility]    Patient would like the video invitation sent by: Text to cell phone: [phone]    Will anyone else be joining your video visit? No     VIDEO VISIT                                                      SUBJECTIVE:                                                      HPI: Krystian Boland is a pleasant 69 year old female who requested a video visit to discuss a rash and itching:    Accompanied by daughter, who assists with translation and history.    Symptoms started several days ago. Symptoms started as itching and redness of her nose. Her itching and redness have progressed to involve her entire face, neck, and several areas on her upper arms. Patient does have lip swelling, but no tongue or airway swelling. No difficulty breathing. No difficulty swallowing or managing secretions. No fevers or chills.    Patient has been taking Zyrtec with minimal relief of symptoms. She is also been taking Benadryl at night " which helps.    No new environmental exposures (plants or pets).  No new medications, OTC or prescription. Current medications include losartan (for renal protection in the setting of diabetes), Plaquenil (for seropositive RA), pravastatin, metformin, aspirin, Pepcid, fish oil, and Tessalon Perles as needed.  No new soaps or detergents.    OBJECTIVE:                                                      General: appears well  Integumentary: diffuse redness and swelling noted involving entire face and ears; several raised, pink patches on arms    ASSESSMENT/PLAN:                                                      (L29.9) Itching  (primary encounter diagnosis)  (R60.9) Swelling  Comment: suspect patient is having an allergic reaction but to what is unclear; of note, patient is on Plaquenil (immunosuppressive) - may actually be suppressing patient's reaction.  Plan:    - STOP losartan in case this is angioedema (which is rare but possible still with ARBs).    - START prednisone 40 mg daily x5 days.   - CONTINUE Zyrtec every morning and Benadryl at night until symptoms resolve.   - if symptoms worsen, change, do not improve, or recur, patient to contact MD.    Total time of video call between patient and provider was 6 minutes.    (Chart documentation was completed, in part, with [employer] voice-recognition software. Even though reviewed, some grammatical, spelling, and word errors may remain.)    Adrianna Singh MD   [address] [address]  T: [phone], F: [phone]

## 2020-05-14 ENCOUNTER — OFFICE VISIT (OUTPATIENT)
Dept: INTERNAL MEDICINE | Facility: CLINIC | Age: 70
End: 2020-05-14
Payer: COMMERCIAL

## 2020-05-14 VITALS
BODY MASS INDEX: 29.58 KG/M2 | SYSTOLIC BLOOD PRESSURE: 124 MMHG | DIASTOLIC BLOOD PRESSURE: 72 MMHG | OXYGEN SATURATION: 98 % | TEMPERATURE: 98.8 F | RESPIRATION RATE: 18 BRPM | WEIGHT: 161.7 LBS | HEART RATE: 94 BPM

## 2020-05-14 DIAGNOSIS — R21 RASH: Primary | ICD-10-CM

## 2020-05-14 PROCEDURE — 99213 OFFICE O/P EST LOW 20 MIN: CPT | Performed by: INTERNAL MEDICINE

## 2020-05-14 RX ORDER — TRIAMCINOLONE ACETONIDE 1 MG/G
CREAM TOPICAL 2 TIMES DAILY
Qty: 80 G | Refills: 1 | Status: SHIPPED | OUTPATIENT
Start: 2020-05-14 | End: 2021-03-17

## 2020-05-14 RX ORDER — CLOBETASOL PROPIONATE 0.05 G/100ML
SHAMPOO TOPICAL
Qty: 118 ML | Refills: 1 | Status: SHIPPED | OUTPATIENT
Start: 2020-05-14 | End: 2021-03-17

## 2020-05-14 NOTE — PROGRESS NOTES
SUBJECTIVE:                                                      HPI: Krystian Boland is a pleasant 69 year old female who presents for follow-up for a rash:    Accompanied by daughter who assists with translation.     Seen for virtual visit for rash and itching a couple weeks ago (see note for details). Allergic reaction suspected and patient started on a course of prednisone and antihistamines. Patient reports improvement with prednisone but symptoms recurred after completing course. Redness and itching involve face near hairline, back of neck near hairline, anterior neck, top of chest, and arms.    No fevers or chills. No torso or leg involvement.    The medication, allergy, and problem lists have been reviewed and updated as appropriate.     OBJECTIVE:                                                      /72   Pulse 94   Temp 98.8  F (37.1  C) (Tympanic)   Resp 18   Wt 73.3 kg (161 lb 11.2 oz)   SpO2 98%   BMI 29.58 kg/m    Constitutional: well-appearing  Integumentary: erythematous patches with overlying white scale around face and neck near hairline; diffuse erythema involving forehead and nose; diffuse erythema with overlying scale anterior neck; erythematous patches with central scabs along both arms                      ASSESSMENT/PLAN:                                                      (R21) Rash  (primary encounter diagnosis)  Comment: suspect psoriasis, possibly exacerbated by recent oral steroid use.  Plan:    - clobetasol shampoo prescribed for head and neck areas.   - triamcinolone cream for arms.   - if symptoms worsen, change, or do not improve in the next week or two, patient to contact MD.    The instructions on the AVS were discussed and explained to the patient. Patient expressed understanding of instructions.    (Chart documentation was completed, in part, with [employer] voice-recognition software. Even though reviewed, some grammatical, spelling, and word errors may  remain.)    Adrianna Singh MD   [address] [address]  T: [phone], F: [phone]

## 2020-06-03 ENCOUNTER — VIRTUAL VISIT (OUTPATIENT)
Dept: PULMONOLOGY | Facility: CLINIC | Age: 70
End: 2020-06-03
Attending: INTERNAL MEDICINE
Payer: COMMERCIAL

## 2020-06-03 DIAGNOSIS — J84.9 ILD (INTERSTITIAL LUNG DISEASE) (H): Primary | ICD-10-CM

## 2020-06-03 NOTE — PROGRESS NOTES
"Krystian Boland is a 69 year old female who is being evaluated via a billable video visit.      The patient has been notified of following:     \"This video visit will be conducted via a call between you and your physician/provider. We have found that certain health care needs can be provided without the need for an in-person physical exam.  This service lets us provide the care you need with a video conversation.  If a prescription is necessary we can send it directly to your pharmacy.  If lab work is needed we can place an order for that and you can then stop by our lab to have the test done at a later time.    Video visits are billed at different rates depending on your insurance coverage.  Please reach out to your insurance provider with any questions.    If during the course of the call the physician/provider feels a video visit is not appropriate, you will not be charged for this service.\"    Patient has given verbal consent for Video visit? Yes    How would you like to obtain your AVS? [facility]    Patient would like the video invitation sent by: Text to cell phone: [phone]        Video-Visit Details    Type of service:  Video Visit    Video Start Time: 11:20  Video End Time: 11:40      HISTORY OF PRESENT ILLNESS:  The patient is a 69-year-old female with interstitial lung disease secondary to rheumatoid arthritis.  She has been treated with Plaquenil for it and previously prednisone but has been off prednisone for some time.  He cough is improved with the Tessalon Pearles.  In terms of shortness of breath, the symptoms of shortness of breath appear to be stable.  She is not on oxygen therapy.   Rheumatology F/U - RA is felt to be stable.   She denies any active joint pain or joint swelling at this time.         PULMONARY FUNCTION TESTS from prior visit in Feb 2020:  She had a 6-minute walk.  Walk distance was within normal limits.  She started out at 96%.  The lowest she got during the walk was 90%.  She " also had spirometry done but had difficulty performing the maneuver due to coughing, and so the values are likely not accurate.  The forced vital capacity was lower than what it previously had been, but again, with difficulty with the maneuvers, I do not think that the numbers are accurate.      ASSESSMENT AND PLAN:  In summary then, Krystian is a 69-year-old with interstitial lung disease secondary to RA. She remains on Plaquenil. She has been off Prednisone.  For the cough, we will continue with Tessalon Perles 200 mg t.i.d.   I will follow up with her in 3 months to see how she is doing with the Tessalon Perles, recheck a CRP at that point in time and recheck pulmonary function tests and get a chest CT scan noncontrast on her to assess for progression of her ILD.             Tristan Cross MD  Pulmonary/Critical Care  Cassandra 3, 2020

## 2020-06-03 NOTE — LETTER
"    6/3/2020         RE: Krystian Boland  [id number]  [facility] [address]        Dear Colleague,    Thank you for referring your patient, Krystian Boland, to the [facility]. Please see a copy of my visit note below.    Krystian Boland is a 69 year old female who is being evaluated via a billable video visit.      The patient has been notified of following:     \"This video visit will be conducted via a call between you and your physician/provider. We have found that certain health care needs can be provided without the need for an in-person physical exam.  This service lets us provide the care you need with a video conversation.  If a prescription is necessary we can send it directly to your pharmacy.  If lab work is needed we can place an order for that and you can then stop by our lab to have the test done at a later time.    Video visits are billed at different rates depending on your insurance coverage.  Please reach out to your insurance provider with any questions.    If during the course of the call the physician/provider feels a video visit is not appropriate, you will not be charged for this service.\"    Patient has given verbal consent for Video visit? Yes    How would you like to obtain your AVS? [facility]    Patient would like the video invitation sent by: Text to cell phone: [phone]        Video-Visit Details    Type of service:  Video Visit    Video Start Time: 11:20  Video End Time: 11:40      HISTORY OF PRESENT ILLNESS:  The patient is a 69-year-old female with interstitial lung disease secondary to rheumatoid arthritis.  She has been treated with Plaquenil for it and previously prednisone but has been off prednisone for some time.  He cough is improved with the Tessalon Pearles.  In terms of shortness of breath, the symptoms of shortness of breath appear to be stable.  She is not on oxygen therapy.   Rheumatology F/U - RA is felt to be stable.   She " denies any active joint pain or joint swelling at this time.         PULMONARY FUNCTION TESTS from prior visit in Feb 2020:  She had a 6-minute walk.  Walk distance was within normal limits.  She started out at 96%.  The lowest she got during the walk was 90%.  She also had spirometry done but had difficulty performing the maneuver due to coughing, and so the values are likely not accurate.  The forced vital capacity was lower than what it previously had been, but again, with difficulty with the maneuvers, I do not think that the numbers are accurate.      ASSESSMENT AND PLAN:  In summary then, Krystian is a 69-year-old with interstitial lung disease secondary to RA. She remains on Plaquenil. She has been off Prednisone.  For the cough, we will continue with Tessalon Perles 200 mg t.i.d.   I will follow up with her in 3 months to see how she is doing with the Tessalon Perles, recheck a CRP at that point in time and recheck pulmonary function tests and get a chest CT scan noncontrast on her to assess for progression of her ILD.             Tristan Cross MD  Pulmonary/Critical Care  Cassandra 3, 2020

## 2020-07-07 ENCOUNTER — MYC REFILL (OUTPATIENT)
Dept: INTERNAL MEDICINE | Facility: CLINIC | Age: 70
End: 2020-07-07

## 2020-07-07 DIAGNOSIS — K21.9 GASTROESOPHAGEAL REFLUX DISEASE, ESOPHAGITIS PRESENCE NOT SPECIFIED: ICD-10-CM

## 2020-07-07 RX ORDER — FAMOTIDINE 40 MG/1
40 TABLET, FILM COATED ORAL
Qty: 90 TABLET | Refills: 2 | Status: SHIPPED | OUTPATIENT
Start: 2020-07-07 | End: 2020-08-31

## 2020-07-13 DIAGNOSIS — Z79.899 LONG-TERM USE OF PLAQUENIL: Primary | ICD-10-CM

## 2020-07-15 ENCOUNTER — OFFICE VISIT (OUTPATIENT)
Dept: OPHTHALMOLOGY | Facility: CLINIC | Age: 70
End: 2020-07-15
Attending: OPHTHALMOLOGY
Payer: COMMERCIAL

## 2020-07-15 DIAGNOSIS — Z79.899 LONG-TERM USE OF PLAQUENIL: ICD-10-CM

## 2020-07-15 PROCEDURE — 92134 CPTRZ OPH DX IMG PST SGM RTA: CPT | Mod: ZF | Performed by: OPHTHALMOLOGY

## 2020-07-15 ASSESSMENT — VISUAL ACUITY
OS_CC: 20/30
METHOD: SNELLEN - LINEAR
OD_CC: 20/150

## 2020-07-15 ASSESSMENT — CONF VISUAL FIELD
OD_NORMAL: 1
OS_NORMAL: 1

## 2020-07-15 ASSESSMENT — EXTERNAL EXAM - RIGHT EYE: OD_EXAM: NORMAL

## 2020-07-15 ASSESSMENT — SLIT LAMP EXAM - LIDS
COMMENTS: DERMATOCHALASIS
COMMENTS: DERMATOCHALASIS

## 2020-07-15 ASSESSMENT — EXTERNAL EXAM - LEFT EYE: OS_EXAM: NORMAL

## 2020-07-15 ASSESSMENT — CUP TO DISC RATIO
OD_RATIO: 0.4
OS_RATIO: 0.35

## 2020-07-15 ASSESSMENT — TONOMETRY
OS_IOP_MMHG: 21
IOP_METHOD: TONOPEN
OD_IOP_MMHG: 23

## 2020-07-15 NOTE — PROGRESS NOTES
CC:   HPI: Krystian Bolnad is a  70 year old year-old patient with history of plaquenil use for ILD and RA.Referred by Dr. Bowen for evaluation of possible Retinal pigment epithelium changes on baseline Optical Coherence Tomography.  She has been on 400 mg daily since 5-2019. Outer retinal changes were seen on her last eye exam. She is also DMII.   A1C 6.4 2-12-20    Past ocular history:   Cataract both eyes, right eye > left eye   Ocular hypertension both eyes   Dry eyes  Myopic astigmatism  Type II diabetes mellitus without retinopathy     Retinal Imaging:  OCT 7-15-20  RE: normal foveal contour; questionable disruption of the Retinal pigment epithelium peripherally; normal choroid and vitreous  LE: normal foveal contour; questionable disruption of the Retinal pigment epithelium peripherally; normal choroid and vitreous    Autofluorescence 2/25/2020  right eye: normal  left eye: normal    visual field 24-2 from 2/26/2020  right eye: 14% FP; scattered non-specific paracentral defects; MD -3.1  Left eye: reliable; scattered nasal periphery defects; MD -2.7    DEMOND 7-15-20  Right eye 20/60    Assessment & Plan:    1. Long-term use of Plaquenil   H/o RF-positive RA and ILD   Started Plaquenil 5/2019; 200mg twice a day   ILD stable - f/u 9/16/2020 with Dr. Cross for CT scan and PFTs   RA stable - dx'd 5/2018, Dr. Staley 10/13/2020   Optical Coherence Tomography macula appears unremarkable   Could follow up in one yr with retina or continuity clinic    2. Type II diabetes mellitus without diabetic retinopathy   Dx'd ~2016   HbA1c 6.4%    Metformin only   Discussed blood pressure and blood sugar control    Annual diabetic eye exams     3. Cataracts both eyes   Becoming visually significant   Plan to observe for now   Follow up with continuity clinic    Jose De Jesus Horan MD   Ophthalmology - PGY3    ~~~~~~~~~~~~~~~~~~~~~~~~~~~~~~~~~~   Complete documentation of historical and exam elements from today's encounter can be  found in the full encounter summary report (not reduplicated in this progress note).  I personally obtained the chief complaint(s) and history of present illness.  I confirmed and edited as necessary the review of systems, past medical/surgical history, family history, social history, and examination findings as documented by others; and I examined the patient myself.  I personally reviewed the relevant tests, images, and reports as documented above.  I personally reviewed the ophthalmic test(s) associated with this encounter, agree with the interpretation(s) as documented by the resident/fellow, and have edited the corresponding report(s) as necessary.   I formulated and edited as necessary the assessment and plan and discussed the findings and management plan with the patient and family    Dalia Sawyer MD   of Ophthalmology.  Retina Service   Department of Ophthalmology and Visual Neurosciences   [facility]  Phone: [phone]   Fax: [phone]

## 2020-07-15 NOTE — NURSING NOTE
Vision in right eye is always more blurry, thinks due to cataracts. No pain or discomfort in either eye. Sometimes has a little tearing in either eye. Still taking plaquinel - 200mg BID. Has been taking for approx 12 months per patient.     Shaina Rivera  10:31 AM

## 2020-08-12 ENCOUNTER — MYC MEDICAL ADVICE (OUTPATIENT)
Dept: INTERNAL MEDICINE | Facility: CLINIC | Age: 70
End: 2020-08-12

## 2020-08-13 NOTE — TELEPHONE ENCOUNTER
The phone number on file rang and then hung up, no answer. I tried again and it just sat there, no ringing. My Chart message sent with available times on 8/24 and 8/25.

## 2020-08-14 ENCOUNTER — TELEPHONE (OUTPATIENT)
Dept: INTERNAL MEDICINE | Facility: CLINIC | Age: 70
End: 2020-08-14

## 2020-08-14 DIAGNOSIS — E55.9 VITAMIN D DEFICIENCY: ICD-10-CM

## 2020-08-14 DIAGNOSIS — E11.9 TYPE 2 DIABETES MELLITUS WITHOUT COMPLICATION, WITHOUT LONG-TERM CURRENT USE OF INSULIN (H): Primary | ICD-10-CM

## 2020-08-14 NOTE — TELEPHONE ENCOUNTER
LM informing lab orders placed and to call and schedule lab appointment  Elaina Patel CMA on 8/14/2020 at 3:04 PM

## 2020-08-14 NOTE — TELEPHONE ENCOUNTER
Reason for Call: Request for an order or referral:    Order or referral being requested: Lab orders for wellness exam    Date needed: as soon as possible    Has the patient been seen by the PCP for this problem? YES    Additional comments: The patients daughter called and stated that she would like Dr. Singh to put in any orders that she would like to have drawn before her mothers wellness exam on 8/24. She would like a call back once the orders have been placed so she knows she can schedule a lab only appointment.     Phone number Patient can be reached at:  Cell number on file:    Telephone Information:   Mobile [phone]       Best Time:  Any    Can we leave a detailed message on this number?  YES    Call taken on 8/14/2020 at 1:59 PM by Nuvia Ratliff

## 2020-08-24 DIAGNOSIS — E11.9 TYPE 2 DIABETES MELLITUS WITHOUT COMPLICATION, WITHOUT LONG-TERM CURRENT USE OF INSULIN (H): ICD-10-CM

## 2020-08-24 DIAGNOSIS — J84.9 ILD (INTERSTITIAL LUNG DISEASE) (H): ICD-10-CM

## 2020-08-24 DIAGNOSIS — E55.9 VITAMIN D DEFICIENCY: ICD-10-CM

## 2020-08-24 LAB
ALBUMIN SERPL-MCNC: 3.6 G/DL (ref 3.4–5)
ALP SERPL-CCNC: 81 U/L (ref 40–150)
ALT SERPL W P-5'-P-CCNC: 22 U/L (ref 0–50)
ANION GAP SERPL CALCULATED.3IONS-SCNC: 6 MMOL/L (ref 3–14)
AST SERPL W P-5'-P-CCNC: 14 U/L (ref 0–45)
BILIRUB SERPL-MCNC: 0.7 MG/DL (ref 0.2–1.3)
BUN SERPL-MCNC: 10 MG/DL (ref 7–30)
CALCIUM SERPL-MCNC: 9.4 MG/DL (ref 8.5–10.1)
CHLORIDE SERPL-SCNC: 103 MMOL/L (ref 94–109)
CHOLEST SERPL-MCNC: 146 MG/DL
CO2 SERPL-SCNC: 25 MMOL/L (ref 20–32)
CREAT SERPL-MCNC: 0.62 MG/DL (ref 0.52–1.04)
ERYTHROCYTE [DISTWIDTH] IN BLOOD BY AUTOMATED COUNT: 13.5 % (ref 10–15)
GFR SERPL CREATININE-BSD FRML MDRD: >90 ML/MIN/{1.73_M2}
GLUCOSE SERPL-MCNC: 100 MG/DL (ref 70–99)
HBA1C MFR BLD: 6.4 % (ref 0–5.6)
HCT VFR BLD AUTO: 37.5 % (ref 35–47)
HDLC SERPL-MCNC: 49 MG/DL
HGB BLD-MCNC: 12.1 G/DL (ref 11.7–15.7)
LDLC SERPL CALC-MCNC: 74 MG/DL
MCH RBC QN AUTO: 29.1 PG (ref 26.5–33)
MCHC RBC AUTO-ENTMCNC: 32.3 G/DL (ref 31.5–36.5)
MCV RBC AUTO: 90 FL (ref 78–100)
NONHDLC SERPL-MCNC: 97 MG/DL
PLATELET # BLD AUTO: 345 10E9/L (ref 150–450)
POTASSIUM SERPL-SCNC: 4.3 MMOL/L (ref 3.4–5.3)
PROT SERPL-MCNC: 8.4 G/DL (ref 6.8–8.8)
RBC # BLD AUTO: 4.16 10E12/L (ref 3.8–5.2)
SODIUM SERPL-SCNC: 134 MMOL/L (ref 133–144)
TRIGL SERPL-MCNC: 115 MG/DL
WBC # BLD AUTO: 10.8 10E9/L (ref 4–11)

## 2020-08-24 PROCEDURE — 83036 HEMOGLOBIN GLYCOSYLATED A1C: CPT | Performed by: INTERNAL MEDICINE

## 2020-08-24 PROCEDURE — 82306 VITAMIN D 25 HYDROXY: CPT | Performed by: INTERNAL MEDICINE

## 2020-08-24 PROCEDURE — 80053 COMPREHEN METABOLIC PANEL: CPT | Performed by: INTERNAL MEDICINE

## 2020-08-24 PROCEDURE — 36415 COLL VENOUS BLD VENIPUNCTURE: CPT | Performed by: INTERNAL MEDICINE

## 2020-08-24 PROCEDURE — 85027 COMPLETE CBC AUTOMATED: CPT | Performed by: INTERNAL MEDICINE

## 2020-08-24 PROCEDURE — 80061 LIPID PANEL: CPT | Performed by: INTERNAL MEDICINE

## 2020-08-25 ENCOUNTER — OFFICE VISIT (OUTPATIENT)
Dept: INTERNAL MEDICINE | Facility: CLINIC | Age: 70
End: 2020-08-25
Payer: COMMERCIAL

## 2020-08-25 VITALS
SYSTOLIC BLOOD PRESSURE: 120 MMHG | RESPIRATION RATE: 16 BRPM | HEIGHT: 62 IN | WEIGHT: 159.9 LBS | HEART RATE: 88 BPM | BODY MASS INDEX: 29.43 KG/M2 | DIASTOLIC BLOOD PRESSURE: 70 MMHG | OXYGEN SATURATION: 98 %

## 2020-08-25 DIAGNOSIS — F02.80 EARLY ONSET ALZHEIMER'S DEMENTIA WITHOUT BEHAVIORAL DISTURBANCE (H): ICD-10-CM

## 2020-08-25 DIAGNOSIS — Z12.31 ENCOUNTER FOR SCREENING MAMMOGRAM FOR BREAST CANCER: ICD-10-CM

## 2020-08-25 DIAGNOSIS — E11.9 TYPE 2 DIABETES MELLITUS WITHOUT COMPLICATION, WITHOUT LONG-TERM CURRENT USE OF INSULIN (H): ICD-10-CM

## 2020-08-25 DIAGNOSIS — Z00.00 MEDICARE ANNUAL WELLNESS VISIT, SUBSEQUENT: Primary | ICD-10-CM

## 2020-08-25 DIAGNOSIS — E55.9 VITAMIN D DEFICIENCY: Primary | ICD-10-CM

## 2020-08-25 DIAGNOSIS — G30.0 EARLY ONSET ALZHEIMER'S DEMENTIA WITHOUT BEHAVIORAL DISTURBANCE (H): ICD-10-CM

## 2020-08-25 LAB — DEPRECATED CALCIDIOL+CALCIFEROL SERPL-MC: 19 UG/L (ref 20–75)

## 2020-08-25 PROCEDURE — 99207 C FOOT EXAM  NO CHARGE: CPT | Mod: 25 | Performed by: INTERNAL MEDICINE

## 2020-08-25 PROCEDURE — 82043 UR ALBUMIN QUANTITATIVE: CPT | Performed by: INTERNAL MEDICINE

## 2020-08-25 PROCEDURE — 99397 PER PM REEVAL EST PAT 65+ YR: CPT | Performed by: INTERNAL MEDICINE

## 2020-08-25 PROCEDURE — 99214 OFFICE O/P EST MOD 30 MIN: CPT | Mod: 25 | Performed by: INTERNAL MEDICINE

## 2020-08-25 RX ORDER — ERGOCALCIFEROL 1.25 MG/1
1 CAPSULE, LIQUID FILLED ORAL
Qty: 12 CAPSULE | Refills: 1 | Status: SHIPPED | OUTPATIENT
Start: 2020-08-25 | End: 2020-12-16

## 2020-08-25 RX ORDER — CHOLECALCIFEROL (VITAMIN D3) 50 MCG
1 TABLET ORAL DAILY
Qty: 90 TABLET | Refills: 3 | Status: SHIPPED | OUTPATIENT
Start: 2020-08-25 | End: 2021-02-22

## 2020-08-25 ASSESSMENT — MIFFLIN-ST. JEOR: SCORE: 1208.55

## 2020-08-25 NOTE — PROGRESS NOTES
SUBJECTIVE                                                      HPI: Krystian Boland is a pleasant 70 year old female who presents for an AWV:    Accompanied by daughter, who translates.     PMH, PSH, FH, SH, medications, allergies, immunizations, preventative health, and HRA reviewed.     Past Medical History:   Diagnosis Date     Early onset Alzheimer's dementia without behavioral disturbance (H)      Interstitial lung disease (H)      Obesity (BMI 30-39.9)      Osteopenia      Rheumatoid factor positive      Type 2 diabetes mellitus (H)      Past Surgical History:   Procedure Laterality Date     CHOLECYSTECTOMY, OPEN  1990     Family History   Problem Relation Age of Onset     Dementia Mother      Cerebrovascular Disease Father      Cerebrovascular Disease Brother      Diabetes No family hx of      Myocardial Infarction No family hx of      Coronary Artery Disease Early Onset No family hx of      Breast Cancer No family hx of      Ovarian Cancer No family hx of      Colon Cancer No family hx of      Social History     Occupational History     Occupation: Retired - was  in restaurant   Tobacco Use     Smoking status: Never Smoker     Smokeless tobacco: Never Used   Substance and Sexual Activity     Alcohol use: Not Currently     Frequency: Monthly or less     Drinks per session: 1 or 2     Binge frequency: Never     Drug use: No     Sexual activity: Never   Social History Narrative    .     8 children.     13 grandchildren (as of 2018).    Originally from [place] [facility]. [place] (all kids born in [facility]). Moved to  in 2012.     Living with daughter and her family. Taking care of  (~25 years her senior).     Goes for walks occasionally.      Allergies   Allergen Reactions     Atorvastatin Muscle Pain (Myalgia)     Current Outpatient Medications   Medication Sig     alcohol swab prep pads Use to swab area of injection/ty as directed.     aspirin 81 MG chewable tablet Take 1 tablet (81  mg) by mouth daily     benzonatate (TESSALON) 200 MG capsule Take 1 capsule (200 mg) by mouth 3 times daily as needed for cough     blood glucose (NO BRAND SPECIFIED) test strip Use to test blood sugar 1 time daily or as directed. Blood Glucose Monitor Brands: per insurance.     clobetasol propionate (CLOBEX) 0.05 % external shampoo Apply to affected areas on scalp, neck, hairline, and forehead once daily. Rinse after 5 minutes.     famotidine (PEPCID) 40 MG tablet Take 1 tablet (40 mg) by mouth nightly as needed for heartburn     hydroxychloroquine (PLAQUENIL) 200 MG tablet Take 1 tablet (200 mg) by mouth 2 times daily Annual Plaquenil toxicity eye screening required.     metFORMIN (GLUCOPHAGE) 1000 MG tablet Take 1 tablet (1,000 mg) by mouth 2 times daily (with meals)     triamcinolone (KENALOG) 0.1 % external cream Apply topically 2 times daily     vitamin D2 (ERGOCALCIFEROL) 1.25 MG (58185 UT) capsule Take 1 capsule (50,000 Units) by mouth every 7 days     vitamin D3 (CHOLECALCIFEROL) 50 mcg (2000 units) tablet Take 1 tablet (50 mcg) by mouth daily     Immunization History   Administered Date(s) Administered     Influenza (High Dose) 3 valent vaccine 11/07/2018, 02/12/2020     Influenza Vaccine IM > 6 months Valent IIV4 10/01/2016     Pneumo Conj 13-V (2010&after) 04/02/2018     Pneumococcal 23 valent 10/14/2016     TDAP Vaccine (Adacel) 07/04/2012     Zoster vaccine, live 07/04/2016     PREVENTATIVE HEALTH                                                      BMI: overweight  Blood pressure: within normal limits   Breast CA screening: DUE  Cervical CA screening: screening no longer indicated  Colon CA screening: up to date   Lung CA screening: n/a   Dexa: DUE - did not discuss  Screening HCV: completed  Screening HIV: n/a   STD testing: no risk factors present  Depression screening: PHQ-2 assessment completed and reviewed - no intervention indicated at this time  Alcohol misuse screening: alcohol use reviewed -  "no intervention indicated at this time  Immunizations: Shingrix series DUE - declines    HEALTH RISK ASSESSMENT                                                      In general, how would you rate your overall physical health? fair  Outside of work, how many days during the week do you exercise? 6-7 days/week  Outside of work, approximately how many minutes a day do you exercise? 30-45 minutes/day    If you drink alcohol do you typically have >3 drinks per day or >7 drinks per week? no  Do you usually eat at least 4 servings of fruit and vegetables a day, include whole grains & fiber and avoid regularly eating high fat or \"junk\" foods? yes     Do you have any problems taking medications regularly? no  Do you have any side effects from medications? no    Needs assistance with daily activities: needs assistance with transportation    Which of the following safety concerns are present in your home? no concerns identified    Hearing impairment: no hearing concerns    In the past 6 months, have you been bothered by leaking of urine? yes    In general, how would you rate your overall mental or emotional health? good    Additional concerns today: no    OBJECTIVE                                                      /70   Pulse 88   Resp 16   Ht 1.575 m (5' 2\")   Wt 72.5 kg (159 lb 14.4 oz)   SpO2 98%   BMI 29.25 kg/m    Constitutional: well-appearing  Respiratory: normal respiratory effort; clear to auscultation bilaterally  Cardiovascular: regular rate and rhythm; no edema  Gastrointestinal: soft, non-tender, non-distended, and bowel sounds present; no organomegaly or masses   Musculoskeletal: normal gait and station  Foot exam: feet intact - no lesions or ulcers; sensation intact to monofilament  Psych: normal mood and affect    Cognitive impairment noted: no    ASSESSMENT/PLAN                                                       (Z00.00) Medicare annual wellness visit, subsequent  (primary encounter " diagnosis)  Comment: PMH, PSH, FH, SH, medications, allergies, immunizations, and preventative health measures reviewed.   Plan: see below for plans.     (E11.9) Type 2 diabetes mellitus without complication, without long-term current use of insulin (H)  Comment: well-controlled on current regimen.  Plan: CPM; urine microalbumin today; diabetes follow-up in 6 months.     (Z12.31) Encounter for screening mammogram for breast cancer  Plan: mammogram ordered - patient to schedule.    (G30.0,  F02.80) Early onset Alzheimer's dementia without behavioral disturbance (H)  Comment: stable; not being treated currently.     The instructions on the AVS were discussed and explained to the patient. Patient expressed understanding of instructions.    (Chart documentation was completed, in part, with [employer] voice-recognition software. Even though reviewed, some grammatical, spelling, and word errors may remain.)    Adrianna Singh MD   [address] [address]  T: [phone], F: [phone]

## 2020-08-26 ENCOUNTER — TELEPHONE (OUTPATIENT)
Dept: INTERNAL MEDICINE | Facility: CLINIC | Age: 70
End: 2020-08-26

## 2020-08-26 LAB
CREAT UR-MCNC: 28 MG/DL
MICROALBUMIN UR-MCNC: <5 MG/L
MICROALBUMIN/CREAT UR: NORMAL MG/G CR (ref 0–25)

## 2020-08-26 NOTE — TELEPHONE ENCOUNTER
Please call patient's daughter, Tenzen,    Please let her know that her mother's test strips are not covered by insurance. Her mother's insurance covers [employer] brand only.     That being said, I do not think her mother needs to check her blood sugars. Her diabetes is very well-controlled on her current regimen and she is not on any medications that cause low blood sugars. Checking her blood sugars will not  between visits.

## 2020-08-26 NOTE — TELEPHONE ENCOUNTER
PRIOR AUTHORIZATION DENIED    Medication: blood glucose (NO BRAND SPECIFIED) test strip--Accu-chek guide    Denial Date: 8/26/2020    Denial Rational:      Appeal Information:     If you would like to appeal, please supply P/A team with a letter of medical necessity with clinical reason.

## 2020-08-26 NOTE — TELEPHONE ENCOUNTER
Prior Authorization Retail Medication Request    Medication/Dose: blood glucose (NO BRAND SPECIFIED) test strip--Accu-chek guide  ICD code (if different than what is on RX):  E11.9  Previously Tried and Failed:    Rationale:      Insurance Name:  [facility]   Insurance ID:  [id number]        Pharmacy Information (if different than what is on RX)  Name:  [facility]  Phone:  [phone]

## 2020-08-26 NOTE — TELEPHONE ENCOUNTER
Central Prior Authorization Team   Phone: [phone]      PA Initiation    Medication: blood glucose (NO BRAND SPECIFIED) test strip--Accu-chek guide  Insurance Company: [employer] - Phone [phone] Fax [phone]  Pharmacy Filling the Rx: [id number] - [place], [place] - [address] [facility] [place] [facility] [address]  Filling Pharmacy Phone: [phone]  Filling Pharmacy Fax:    Start Date: 8/26/2020

## 2020-08-31 ENCOUNTER — MYC REFILL (OUTPATIENT)
Dept: INTERNAL MEDICINE | Facility: CLINIC | Age: 70
End: 2020-08-31

## 2020-08-31 DIAGNOSIS — K21.9 GASTROESOPHAGEAL REFLUX DISEASE, ESOPHAGITIS PRESENCE NOT SPECIFIED: ICD-10-CM

## 2020-09-03 ENCOUNTER — PATIENT OUTREACH (OUTPATIENT)
Dept: GERIATRIC MEDICINE | Facility: CLINIC | Age: 70
End: 2020-09-03

## 2020-09-03 RX ORDER — FAMOTIDINE 40 MG/1
40 TABLET, FILM COATED ORAL
Qty: 90 TABLET | Refills: 2 | Status: SHIPPED | OUTPATIENT
Start: 2020-09-03 | End: 2020-10-24

## 2020-09-03 NOTE — PROGRESS NOTES
[facility] Care Coordination Contact    Called adult daughter David to complete six month assessment and left a message requesting a return call.  JAQUELIN Langley  [facility]  [phone]

## 2020-09-03 NOTE — PROGRESS NOTES
[facility] Care Coordination Contact      [facility] Six-Month Telephone Assessment    6 month telephone assessment completed on 9/3/20. Received a return call from member's daughterDavid.    ER visits: No  Hospitalizations: No  TCU stays: No  Significant health status changes: Daughter shared that member is doing well, and has had no changes. Daughter shared that member did follow up with an RA MD and has been following up as recommended.  Falls/Injuries: No  ADL/IADL changes: No  Changes in services: No    Caregiver Assessment follow up:  NA    Goals: See POC in chart for goal progress documentation.  Daughter shared that member has been doing well with all of the community changes due to COVID19.  No current concerns.    Will see member in 6 months for an annual health risk assessment.   Encouraged member to call CC with any questions or concerns in the meantime.   JAQUELIN Langley  [facility]  [phone]

## 2020-09-03 NOTE — TELEPHONE ENCOUNTER
"Patient should already have refills on file but please advise on message:     \"Patient Comment: Please send precribtio. To joann also. Should she continue tl take the famotidine or take a break with this pill \"??  "

## 2020-09-16 ENCOUNTER — PATIENT OUTREACH (OUTPATIENT)
Dept: PULMONOLOGY | Facility: CLINIC | Age: 70
End: 2020-09-16

## 2020-09-16 ENCOUNTER — OFFICE VISIT (OUTPATIENT)
Dept: PULMONOLOGY | Facility: CLINIC | Age: 70
End: 2020-09-16
Attending: INTERNAL MEDICINE
Payer: COMMERCIAL

## 2020-09-16 ENCOUNTER — ANCILLARY PROCEDURE (OUTPATIENT)
Dept: CT IMAGING | Facility: CLINIC | Age: 70
End: 2020-09-16
Attending: INTERNAL MEDICINE
Payer: COMMERCIAL

## 2020-09-16 ENCOUNTER — TELEPHONE (OUTPATIENT)
Dept: PULMONOLOGY | Facility: CLINIC | Age: 70
End: 2020-09-16

## 2020-09-16 VITALS
WEIGHT: 160 LBS | OXYGEN SATURATION: 94 % | HEIGHT: 62 IN | RESPIRATION RATE: 17 BRPM | BODY MASS INDEX: 29.44 KG/M2 | HEART RATE: 110 BPM | SYSTOLIC BLOOD PRESSURE: 133 MMHG | DIASTOLIC BLOOD PRESSURE: 77 MMHG

## 2020-09-16 DIAGNOSIS — J84.9 ILD (INTERSTITIAL LUNG DISEASE) (H): Primary | ICD-10-CM

## 2020-09-16 DIAGNOSIS — J84.9 ILD (INTERSTITIAL LUNG DISEASE) (H): ICD-10-CM

## 2020-09-16 DIAGNOSIS — M06.9 RHEUMATOID ARTHRITIS (H): ICD-10-CM

## 2020-09-16 DIAGNOSIS — J84.10 FIBROTIC LUNG DISEASES (H): ICD-10-CM

## 2020-09-16 LAB
CRP SERPL-MCNC: <2.9 MG/L (ref 0–8)
DLCOUNC-%PRED-PRE: 56 %
DLCOUNC-PRE: 10.34 ML/MIN/MMHG
DLCOUNC-PRED: 18.3 ML/MIN/MMHG
ERV-%PRED-PRE: 91 %
ERV-PRE: 0.46 L
ERV-PRED: 0.5 L
EXPTIME-PRE: 2.01 SEC
FEF2575-%PRED-PRE: 143 %
FEF2575-PRE: 2.52 L/SEC
FEF2575-PRED: 1.76 L/SEC
FEFMAX-%PRED-PRE: 112 %
FEFMAX-PRE: 6.2 L/SEC
FEFMAX-PRED: 5.49 L/SEC
FEV1-%PRED-PRE: 81 %
FEV1-PRE: 1.6 L
FEV1FEV6-PRE: 95 %
FEV1FEV6-PRED: 79 %
FEV1FVC-PRE: 95 %
FEV1FVC-PRED: 79 %
FEV1SVC-PRE: 108 %
FEV1SVC-PRED: 69 %
FIFMAX-PRE: 3.28 L/SEC
FVC-%PRED-PRE: 67 %
FVC-PRE: 1.69 L
FVC-PRED: 2.5 L
IC-%PRED-PRE: 43 %
IC-PRE: 1.03 L
IC-PRED: 2.35 L
VA-%PRED-PRE: 58 %
VA-PRE: 2.52 L
VC-%PRED-PRE: 52 %
VC-PRE: 1.49 L
VC-PRED: 2.85 L

## 2020-09-16 PROCEDURE — 86140 C-REACTIVE PROTEIN: CPT | Performed by: INTERNAL MEDICINE

## 2020-09-16 PROCEDURE — 36415 COLL VENOUS BLD VENIPUNCTURE: CPT | Performed by: INTERNAL MEDICINE

## 2020-09-16 PROCEDURE — G0463 HOSPITAL OUTPT CLINIC VISIT: HCPCS

## 2020-09-16 RX ORDER — PREDNISONE 10 MG/1
TABLET ORAL
Qty: 30 TABLET | Refills: 1 | Status: SHIPPED | OUTPATIENT
Start: 2020-09-16 | End: 2020-11-10

## 2020-09-16 RX ORDER — BENZONATATE 200 MG/1
200 CAPSULE ORAL 3 TIMES DAILY PRN
Qty: 120 CAPSULE | Refills: 3 | Status: SHIPPED | OUTPATIENT
Start: 2020-09-16 | End: 2021-07-22

## 2020-09-16 ASSESSMENT — PAIN SCALES - GENERAL: PAINLEVEL: NO PAIN (0)

## 2020-09-16 ASSESSMENT — MIFFLIN-ST. JEOR: SCORE: 1209.01

## 2020-09-16 NOTE — LETTER
9/16/2020         RE: Krystian Boland  [id number]  [place] [place] [address]        Dear Colleague,    Thank you for referring your patient, Krystian Boland, to the [facility]. Please see a copy of my visit note below.    HISTORY OF PRESENT ILLNESS:  The patient is a 69-year-old female with interstitial lung disease secondary to rheumatoid arthritis.  She has been treated with Plaquenil for it and previously prednisone but has been off prednisone for some time.  She reports a cough that in the morning is productive of sputum but during the rest of the day is basically a dry cough.  In terms of shortness of breath, the symptoms of shortness of breath are slightly worse.  She is not on oxygen therapy.  Unfortunately, she has not had followup with Rheumatology recently.    She denies significant active joint pain or joint swelling at this time.      PHYSICAL EXAMINATION:   VITAL SIGNS:    O2 sats are 94% on room air (previously was 97%)  HEENT:  She is anicteric.  Mouth and throat without lesions.   NECK:  Supple without adenopathy or thyromegaly.   CHEST:  She has crackles half way up bilaterally.   HEART:  Regular rate and rhythm, normal S1 and S2.   ABDOMEN:  Nontender.   EXTREMITIES:  Without clubbing, cyanosis or edema.   JOINTS:  Maybe a little bit of bogginess, but otherwise, I do not see a lot of active inflammation within her joints.   SKIN:  No rash.      PULMONARY FUNCTION TESTS:  pfts worse today. FVC was 1.69. In 2019 was ~2.0 and in 2018 was 1.8.     Imaging: shows progression of fibrotic changes without signifcant GGOs    Labs: CRP <2.9      ASSESSMENT AND PLAN:  In summary then, Krystian is a 69-year-old with interstitial lung disease secondary to RA.  She remains on Plaquenil.   PFTs and chest CT show evidence that the fibrosis is progressing. There do not appear to be much GGOs and CRP is suppressed suggesting not much ongoing inflammation. Will initiate  Nintedinib. Check LFTS and follow LFTs. Continue with Tessalon Perles 200 mg t.i.d. for her cough.  I will follow up with her in 3 months with repeat labs, pfts, and 6 MWT.  In addition, I have encouraged her to  followup with Dr. Staley in Rheumatology Clinic.  Recommend flu shot.           Tristan Cross MD  Pulmonary/Critical Care  Sept 16, 2020

## 2020-09-16 NOTE — NURSING NOTE
Chief Complaint   Patient presents with     RECHECK     ILD/RA    Medications reviewed and vital signs taken.   Yamilka Otero, CMA

## 2020-09-16 NOTE — PROGRESS NOTES
HISTORY OF PRESENT ILLNESS:  The patient is a 69-year-old female with interstitial lung disease secondary to rheumatoid arthritis.  She has been treated with Plaquenil for it and previously prednisone but has been off prednisone for some time.  She reports a cough that in the morning is productive of sputum but during the rest of the day is basically a dry cough.  In terms of shortness of breath, the symptoms of shortness of breath are slightly worse.  She is not on oxygen therapy.  Unfortunately, she has not had followup with Rheumatology recently.    She denies significant active joint pain or joint swelling at this time.      PHYSICAL EXAMINATION:   VITAL SIGNS:    O2 sats are 94% on room air (previously was 97%)  HEENT:  She is anicteric.  Mouth and throat without lesions.   NECK:  Supple without adenopathy or thyromegaly.   CHEST:  She has crackles half way up bilaterally.   HEART:  Regular rate and rhythm, normal S1 and S2.   ABDOMEN:  Nontender.   EXTREMITIES:  Without clubbing, cyanosis or edema.   JOINTS:  Maybe a little bit of bogginess, but otherwise, I do not see a lot of active inflammation within her joints.   SKIN:  No rash.      PULMONARY FUNCTION TESTS:  pfts worse today. FVC was 1.69. In 2019 was ~2.0 and in 2018 was 1.8.     Imaging: shows progression of fibrotic changes without signifcant GGOs    Labs: CRP <2.9      ASSESSMENT AND PLAN:  In summary then, Krystian is a 69-year-old with interstitial lung disease secondary to RA.  She remains on Plaquenil.   PFTs and chest CT show evidence that the fibrosis is progressing. There do not appear to be much GGOs and CRP is suppressed suggesting not much ongoing inflammation. Will initiate Nintedinib. Check LFTS and follow LFTs. Continue with Tessalon Perles 200 mg t.i.d. for her cough.  I will follow up with her in 3 months with repeat labs, pfts, and 6 MWT.  In addition, I have encouraged her to  followup with Dr. Staley in Rheumatology Clinic.   Recommend flu shot.           Tristan Cross MD  Pulmonary/Critical Care  Sept 16, 2020

## 2020-09-16 NOTE — TELEPHONE ENCOUNTER
Prior Authorization Approval    Authorization Effective Date: 8/17/2020  Authorization Expiration Date: 9/16/2023  Medication: Ofev 150MG Capsules (APPROVED)  Approved Dose/Quantity: 60/30 days  Reference #:     Insurance Company: [employer] - Phone [phone] Fax [phone]  Expected CoPay: $3.90     CoPay Card Available: No    Foundation Assistance Needed:    Which Pharmacy is filling the prescription (Not needed for infusion/clinic administered): [facility] - [place], [place] - [address]  Pharmacy Notified: Yes  Patient Notified: Yes

## 2020-10-09 ENCOUNTER — ANCILLARY PROCEDURE (OUTPATIENT)
Dept: MAMMOGRAPHY | Facility: CLINIC | Age: 70
End: 2020-10-09
Attending: INTERNAL MEDICINE
Payer: COMMERCIAL

## 2020-10-09 DIAGNOSIS — Z12.31 ENCOUNTER FOR SCREENING MAMMOGRAM FOR BREAST CANCER: ICD-10-CM

## 2020-10-09 DIAGNOSIS — Z12.31 VISIT FOR SCREENING MAMMOGRAM: ICD-10-CM

## 2020-10-09 PROCEDURE — 77067 SCR MAMMO BI INCL CAD: CPT | Performed by: RADIOLOGY

## 2020-10-13 ENCOUNTER — PATIENT OUTREACH (OUTPATIENT)
Dept: PULMONOLOGY | Facility: CLINIC | Age: 70
End: 2020-10-13

## 2020-10-13 NOTE — PROGRESS NOTES
Contacted patient to remind them their labs are due this week for OFEV monitoring. Left message on daughter phone. We will await results.

## 2020-10-22 DIAGNOSIS — J84.10 FIBROTIC LUNG DISEASES (H): ICD-10-CM

## 2020-10-22 DIAGNOSIS — J84.9 ILD (INTERSTITIAL LUNG DISEASE) (H): ICD-10-CM

## 2020-10-22 DIAGNOSIS — M06.9 RHEUMATOID ARTHRITIS (H): ICD-10-CM

## 2020-10-22 LAB
CRP SERPL-MCNC: <2.9 MG/L (ref 0–8)
ERYTHROCYTE [DISTWIDTH] IN BLOOD BY AUTOMATED COUNT: 13.2 % (ref 10–15)
HCT VFR BLD AUTO: 35.5 % (ref 35–47)
HGB BLD-MCNC: 11.7 G/DL (ref 11.7–15.7)
MCH RBC QN AUTO: 29.5 PG (ref 26.5–33)
MCHC RBC AUTO-ENTMCNC: 33 G/DL (ref 31.5–36.5)
MCV RBC AUTO: 89 FL (ref 78–100)
PLATELET # BLD AUTO: 325 10E9/L (ref 150–450)
RBC # BLD AUTO: 3.97 10E12/L (ref 3.8–5.2)
WBC # BLD AUTO: 6.5 10E9/L (ref 4–11)

## 2020-10-22 PROCEDURE — 86140 C-REACTIVE PROTEIN: CPT | Performed by: INTERNAL MEDICINE

## 2020-10-22 PROCEDURE — 85027 COMPLETE CBC AUTOMATED: CPT | Performed by: INTERNAL MEDICINE

## 2020-10-22 PROCEDURE — 80076 HEPATIC FUNCTION PANEL: CPT | Performed by: INTERNAL MEDICINE

## 2020-10-22 PROCEDURE — 80048 BASIC METABOLIC PNL TOTAL CA: CPT | Performed by: INTERNAL MEDICINE

## 2020-10-23 LAB
ALBUMIN SERPL-MCNC: 3.4 G/DL (ref 3.4–5)
ALP SERPL-CCNC: 108 U/L (ref 40–150)
ALT SERPL W P-5'-P-CCNC: 53 U/L (ref 0–50)
ANION GAP SERPL CALCULATED.3IONS-SCNC: 3 MMOL/L (ref 3–14)
AST SERPL W P-5'-P-CCNC: 29 U/L (ref 0–45)
BILIRUB DIRECT SERPL-MCNC: 0.1 MG/DL (ref 0–0.2)
BILIRUB SERPL-MCNC: 0.3 MG/DL (ref 0.2–1.3)
BUN SERPL-MCNC: 10 MG/DL (ref 7–30)
CALCIUM SERPL-MCNC: 9.5 MG/DL (ref 8.5–10.1)
CHLORIDE SERPL-SCNC: 107 MMOL/L (ref 94–109)
CO2 SERPL-SCNC: 27 MMOL/L (ref 20–32)
CREAT SERPL-MCNC: 0.54 MG/DL (ref 0.52–1.04)
GFR SERPL CREATININE-BSD FRML MDRD: >90 ML/MIN/{1.73_M2}
GLUCOSE SERPL-MCNC: 131 MG/DL (ref 70–99)
POTASSIUM SERPL-SCNC: 4 MMOL/L (ref 3.4–5.3)
PROT SERPL-MCNC: 8.1 G/DL (ref 6.8–8.8)
SODIUM SERPL-SCNC: 137 MMOL/L (ref 133–144)

## 2020-10-24 ENCOUNTER — MYC REFILL (OUTPATIENT)
Dept: INTERNAL MEDICINE | Facility: CLINIC | Age: 70
End: 2020-10-24

## 2020-10-24 DIAGNOSIS — K21.9 GASTROESOPHAGEAL REFLUX DISEASE: ICD-10-CM

## 2020-10-26 RX ORDER — FAMOTIDINE 40 MG/1
40 TABLET, FILM COATED ORAL
Qty: 90 TABLET | Refills: 2 | Status: SHIPPED | OUTPATIENT
Start: 2020-10-26 | End: 2021-07-16

## 2020-11-09 NOTE — PROGRESS NOTES
[facility]  Rheumatology Clinic  Harjit Staley MD  11/10/2020     Name: Krystian Boland  MRN: [id number]  Age: 68 year old  : 1950  Referring provider: Referred Self     Assessment and Plan:  #High positive rheumatoid factor and anti-CCP noted in 2018:  Today, she denies morning stiffness, joint pain, or musculoskeletal weakness.  She has not experienced any change in pulmonary symptoms and exercise tolerance is stable to improved. Laboratory evaluation on 2020 showed normal comprehensive metabolic panel, including transaminases CRP was less than 2.9, and CBC was normal. 6-minute walk test performed in February revealed normal walking distance with significant desaturation during the walk.  PFTs performed on 2020 showed an FEV1 of 81% of predicted, stable/improved since 2020.    Rheumatoid arthritis, if present, is clinically at low disease activity level.  Patient has successfully tapered off of low-dose prednisone.  FRom a systemic inflammation and joint-focussed perspective, patient does not require combination medical therapy. I suggest continuing monotherapy with hydroxychloroquine 400mg daily in view of the patient's interstitial lung disease and rheumatoid arthritis-associated serologies that pose elevated risk of progressive joint disease. We discussed the need for regular ophthalmologic monitoring while on plaquenil. Patient's daughter states that she will follow-up with retina service to conduct recommended advanced screening for Plaquenil toxicity, based on a recommendation from ophthalmology in  that abnormal OCT be followed up by the retinal service.    #Interstitial lung disease:   Patient relates stable pulmonary symptoms, and her cough has disappeared since last year.  Patient describes excellent functional status with minor limitation in ambulating without oxygen. If lung disease is associated with seropositive RA, I expect very slow progression  over years to decades. I agree with patient's plan to follow-up with Dr. Cross and renew PFTs in mid 2020. If lung disease is unexpectedly progressive since 2018, I will recommend more aggressive immunomodulatory regimen, potentially including mycophenolate.     Follow-up: No follow-ups on file.     HPI:   Krystian Boland has a history of ILD and RA and presents for follow-up. I last saw the patient in 4-2020 at which time she reported minimal dyspnea on exertion, and no history of joint swelling, stiffness, or altered range of motion. Plan at that time was to continue plaquenil 400mg. We also discussed a consideration for MMF 1000mg for progression of lung symptoms.     Interval history 11-:        Interval history 4-17-20: This telephone visit was conducted with the aid of an  with Tibetan language skills, and with the aid of the patient's daughter.    Patient was seen by Dr. Cross in pulmonology on February 26, 2020 in follow-up of interstitial lung disease.  Ongoing stable shortness of breath and low intensity cough were noted.  Assessment was of interstitial lung disease associated with rheumatoid arthritis functionally stable.  Tessalon Perles were prescribed for the cough, and CT of the chest and pulmonary function tests were planned for follow-up assessment.    Her cough is now better. She is able to walk more than 100 meters without stopping.  She is not having any morning stiffness, and there is minimal joint pain.  No restriction in activities of daily living.  No recent use of pain medication or anti-inflammatories.  She continues with Plaquenil 200 mg 2 tablets once daily.    Patient was seen by ophthalmology on February 26, 2020  Plaquenil retinal toxicity monitoring.  Outer retinal changes on OCT were noted.  Concern over interference of these changes with later monitoring for Plaquenil toxicity was raised, and patient was referred to the retina service for further screening.  No  contraindication to continuing Plaquenil was identified.    History 5-19:  she reports that she is doing about the same since our last visit. Transient joint pain in summer 2018 was most prominent in her hands.  But her joint pain and finger swelling has disappeared since starting prednisone 5mg, which she has taken for the past year. Denies morning stiffness. She has not taken plaquenil since being prescribed last year, noting that she become anxious to add another pill to her current regimen. Is instead using prednisone to alleviate joint pain. Uses ibuprofen to treat sciatica.     She also reports that her breathing is stable and she is able to walk 100 meters without experiencing shortness of breath. Her coughing has also improved. Has tried pulmonary therapy in the past, but did not feel that this helped. She plans on following up with Dr. Cross in the near future.    Review of Systems:   Pertinent items are noted in HPI or as below, remainder of complete ROS is negative.  Patient was seen by Dr. Cross in pulmonology on February 26, 2020 in follow-up of interstitial lung disease.  Ongoing stable shortness of breath and low intensity cough were noted.  Assessment was of interstitial lung disease associated with rheumatoid arthritis functionally stable.  Tessalon Perles were prescribed for the cough, and CT of the chest and pulmonary function tests were planned for follow-up assessment.    No recent problems with hearing or vision. No swallowing problems.   No breathing difficulty, shortness of breath, coughing, or wheezing  No chest pain or palpitations  No heart burn, indigestion, abdominal pain, nausea, vomiting, diarrhea  No urination problems, no bloody, cloudy urine, no dysuria  No numbing, tingling, weakness  No headaches or confusion  No rashes. No easy bleeding or bruising.     Active Medications:   Current Outpatient Medications   Medication     alcohol swab prep pads     aspirin 81 MG chewable tablet      benzonatate (TESSALON) 200 MG capsule     blood glucose (NO BRAND SPECIFIED) test strip     clobetasol propionate (CLOBEX) 0.05 % external shampoo     famotidine (PEPCID) 40 MG tablet     hydroxychloroquine (PLAQUENIL) 200 MG tablet     metFORMIN (GLUCOPHAGE) 1000 MG tablet     nintedanib (OFEV) 150 MG capsule     STATIN NOT PRESCRIBED (INTENTIONAL)     triamcinolone (KENALOG) 0.1 % external cream     vitamin D3 (CHOLECALCIFEROL) 50 mcg (2000 units) tablet     benzonatate (TESSALON) 200 MG capsule     predniSONE (DELTASONE) 10 MG tablet     vitamin D2 (ERGOCALCIFEROL) 1.25 MG (40353 UT) capsule     No current facility-administered medications for this visit.         Allergies:   Atorvastatin      Past Medical History:  Obesity  Osteopenia  Type 2 diabetes mellitus without complication, without long-term current use of insulin  Interstitial lung disease  Abdominal tuberculosis (intestinal, 20 years ago)     Past Surgical History:  Open cholecystectomy    Family History:   Mother - dementia  Father - cerebrovascular disease  Brother - cerebrovascular disease, hypertension  No family history of - diabetes, myocardial infarction, early onset C.A.D., breast cancer, ovarian cancer, colon cancer, rheumatoid arthritis  Family history of - high blood pressure  (mentions that medical history is unlikely to be comprehensive because of poor/infrequent medical records)      Social History:   No smoking or tobacco use  Rare alcohol use (1-2 times a year)   with 8 children, 13 grandchildren, cooks for the family     Physical Exam:   There were no vitals taken for this visit.   Wt Readings from Last 4 Encounters:   09/16/20 72.6 kg (160 lb)   08/25/20 72.5 kg (159 lb 14.4 oz)   05/14/20 73.3 kg (161 lb 11.2 oz)   02/26/20 73.9 kg (163 lb)     Psych: Normal judgement, orientation, memory, affect.       Laboratory:   RHEUM RESULTS Latest Ref Rng & Units 8/24/2020 9/16/2020 10/22/2020   SED RATE 0 - 30 mm/h - - -   CRP,  INFLAMMATION 0.0 - 8.0 mg/L - <2.9 <2.9   CK TOTAL 30 - 225 U/L - - -   RHEUMATOID FACTOR <20 IU/mL - - -   AST 0 - 45 U/L 14 - 29   ALT 0 - 50 U/L 22 - 53(H)   ALBUMIN 3.4 - 5.0 g/dL 3.6 - 3.4   WBC 4.0 - 11.0 10e9/L 10.8 - 6.5   RBC 3.8 - 5.2 10e12/L 4.16 - 3.97   HGB 11.7 - 15.7 g/dL 12.1 - 11.7   HCT 35.0 - 47.0 % 37.5 - 35.5   MCV 78 - 100 fl 90 - 89   MCHC 31.5 - 36.5 g/dL 32.3 - 33.0   RDW 10.0 - 15.0 % 13.5 - 13.2    - 450 10e9/L 345 - 325   CREATININE 0.52 - 1.04 mg/dL 0.62 - 0.54   GFR ESTIMATE, IF BLACK >60 mL/min/[1.73:m2] >90 - >90   GFR ESTIMATE >60 mL/min/[1.73:m2] >90 - >90    - 1,620 mg/dL - - -   HEPATITIS C ANTIBODY NR - - -       Rheumatoid Factor   Date Value Ref Range Status   05/23/2018 700 (H) <20 IU/mL Final     Cyclic Citrullinated Peptide Antibody, IgG   Date Value Ref Range Status   05/23/2018 >340 (H) <7 U/mL Final     Comment:     Positive     Scleroderma Antibody Scl-70 CLAUDIO IgG   Date Value Ref Range Status   05/23/2018 <0.2 0.0 - 0.9 AI Final     Comment:     Negative  Antibody index (AI) values reflect qualitative changes in antibody   concentration that cannot be directly associated with clinical condition or   disease state.       SSA (Ro) (CLAUDIO) Antibody, IgG   Date Value Ref Range Status   05/23/2018 <0.2 0.0 - 0.9 AI Final     Comment:     Negative  Antibody index (AI) values reflect qualitative changes in antibody   concentration that cannot be directly associated with clinical condition or   disease state.       SSB (La) (CLAUDIO) Antibody, IgG   Date Value Ref Range Status   05/23/2018 <0.2 0.0 - 0.9 AI Final     Comment:     Negative  Antibody index (AI) values reflect qualitative changes in antibody   concentration that cannot be directly associated with clinical condition or   disease state.       LYNDA interpretation   Date Value Ref Range Status   05/23/2018 Negative NEG^Negative Final     Comment:                                        Reference range:  <1:40   "NEGATIVE  1:40 - 1:80  BORDERLINE POSITIVE  >1:80 POSITIVE         Neutrophil Cytoplasmic IgG Antibody   Date Value Ref Range Status   05/23/2018 <1:20 <1:20 Final     Comment:     (Note)  The ANCA IFA is <1:20; therefore, no further testing will   be performed.  INTERPRETIVE INFORMATION: Anti-Neutrophil Cyto Ab, IgG  Neutrophil Cytoplasmic Antibodies (C-ANCA = granular   cytoplasmic staining, P-ANCA = perinuclear staining) are   found in the serum of over 90 percent of patients with   certain necrotizing systemic vasculitides, and usually in   less than 5 percent of patients with collagen vascular   disease or arthritis.  Performed by [facility],  [address] [place] [id number] [phone]  [email or web address], Marty Wright MD, Lab. Director         IGG   Date Value Ref Range Status   05/23/2018 1,980 (H) 695 - 1,620 mg/dL Final     Scleroderma Antibody Scl-70 CLAUDIO IgG   Date Value Ref Range Status   05/23/2018 <0.2 0.0 - 0.9 AI Final     Comment:     Negative  Antibody index (AI) values reflect qualitative changes in antibody   concentration that cannot be directly associated with clinical condition or   disease state.         Krystian Boland is a 70 year old female who is being evaluated via a billable video visit.      The patient has been notified of following:     \"This video visit will be conducted via a call between you and your physician/provider. We have found that certain health care needs can be provided without the need for an in-person physical exam.  This service lets us provide the care you need with a video conversation.  If a prescription is necessary we can send it directly to your pharmacy.  If lab work is needed we can place an order for that and you can then stop by our lab to have the test done at a later time.    Video visits are billed at different rates depending on your insurance coverage.  Please reach out to your insurance provider with any questions.    If during the course of the " "call the physician/provider feels a video visit is not appropriate, you will not be charged for this service.\"    Patient has given verbal consent for Video visit? Yes  How would you like to obtain your AVS? MyChart    Will anyone else be joining your video visit? No  {If patient encounters technical issues they should call [phone] :[id number]}      Video-Visit Details    Type of service:  Video Visit    Video Start Time: {video visit start/end time:152948}  Video End Time: {video visit start/end time:152948}    Originating Location (pt. Location): {video visit patient location:709730::\"Home\"}    Distant Location (provider location):  [facility] [place]     Platform used for Video Visit: {Virtual Visit Platforms:272226::\"Quincee\"}    {signature options:721944}        "

## 2020-11-10 ENCOUNTER — VIRTUAL VISIT (OUTPATIENT)
Dept: RHEUMATOLOGY | Facility: CLINIC | Age: 70
End: 2020-11-10
Attending: INTERNAL MEDICINE
Payer: COMMERCIAL

## 2020-11-10 DIAGNOSIS — R76.8 RHEUMATOID FACTOR POSITIVE WITH CYCLIC CITRULLINATED PEPTIDE (CCP) ANTIBODY NEGATIVE: Primary | ICD-10-CM

## 2020-11-10 PROCEDURE — 99214 OFFICE O/P EST MOD 30 MIN: CPT | Mod: 95 | Performed by: INTERNAL MEDICINE

## 2020-11-10 RX ORDER — PREDNISONE 5 MG/1
5 TABLET ORAL DAILY
Qty: 65 TABLET | Refills: 1 | Status: SHIPPED | OUTPATIENT
Start: 2020-11-10 | End: 2020-12-28

## 2020-11-10 RX ORDER — HYDROXYCHLOROQUINE SULFATE 200 MG/1
200 TABLET, FILM COATED ORAL 2 TIMES DAILY
Qty: 180 TABLET | Refills: 3 | Status: SHIPPED | OUTPATIENT
Start: 2020-11-10 | End: 2021-05-07

## 2020-11-10 RX ORDER — PREDNISONE 10 MG/1
TABLET ORAL
Qty: 32 TABLET | Refills: 0 | Status: CANCELLED | OUTPATIENT
Start: 2020-11-10 | End: 2020-12-01

## 2020-11-10 ASSESSMENT — PAIN SCALES - GENERAL: PAINLEVEL: MODERATE PAIN (4)

## 2020-11-10 NOTE — LETTER
11/10/2020       RE: Krystian Boland  [id number]  [facility] [address]     Dear Colleague,    Thank you for referring your patient, Krystian Boland, to the [facility] RHEUMATOLOGY CLINIC [place] at [facility]. Please see a copy of my visit note below.    [facility]  Rheumatology Clinic  Harjit Staley MD  11/10/2020     Name: Krystian Boland  MRN: [id number]  Age: 68 year old  : 1950  Referring provider: Referred Self     Assessment and Plan:  #High positive rheumatoid factor and anti-CCP noted in 2018:  Patient reporting active inflammatory arthritis symptoms with polyarthralgia, stiffness and swelling. She has associated fatigue and based on her symptoms seems to be in an active flare. Laboratory evaluation on 2020 showed normal comprehensive metabolic panel, including transaminases CRP was less than 2.9, and CBC was normal. 6-minute walk test performed in February revealed normal walking distance with significant desaturation during the walk.  PFTs performed on 2020 showed an FEV1 of 81% of predicted, stable/improved since 2020. However, FVC had decreased with mild diffusion defect. CT chest with evidence of progressive fibrosis.     Given that Ms. Boland was recently started on Ofev, unlikely to benefit from change to MMF at this time. She continues on her Hydroxychloroquine 400mg daily monotherapy, but will require a three week prednisone taper for her active inflammatory arthritis. If her symptoms do not improve with the steroid taper alone, will confer with Dr. Cross to discuss possible transition to strong immunomodulatory agent that will benefit her arthritis and interstitial lung disease such as rituximab vs MMF.    We discussed the need for annual ophthalmologic monitoring while on plaquenil. She in  had follow up with a retinal specialist with exam showing no evidence of retinal  toxicity.    #Interstitial lung disease:   Patient relates stable pulmonary symptoms, but with persistent dry cough. Patient describes excellent functional status with minor limitation in ambulating without oxygen. PFTs and CT Chest performed September 16, 2020 showing some progression of restrictive lung disease associated with radiographic evidence of worsening bibasilar pulmonary fibrosis. She was started on Ofev. I agree with patient's plan to follow-up with Dr. Cross,  in several months to determine if there has been benefit from the new treatment and if Ofev is well tolerated.  If progressive disease despite Ofev, or if has continues inflammatory arthritis flares that are not controlled with monotherapy alone, will discuss possible transition to stronger immunomodulatory agents as above.      Follow-up: No follow-ups on file.     Papito Mtz MD  Internal Medicine, PGY3    I saw this patient with the medical resident. I agree with the stated findings and recommendations which reflect our joint impressions and plan.  If planned Ofev treatment is not well-tolerated, or if progressive interstitial lung disease is not stabilized over the course of the coming months, I recommend consideration of either rituximab or mycophenolate for addressing both inflammatory arthritis and interstitial lung disease.  For the short-term, I recommend a 3-week course of low-dose prednisone for inflammatory arthritis that is active and incompletely responsive to Plaquenil monotherapy.    Harjit Staley M.D.  Staff Rheumatologist, [facility]  Pager [phone]        HPI:   Krystian Boland has a history of ILD and RA and presents for follow-up. I last saw the patient in 4-2020 at which time she reported minimal dyspnea on exertion, and no history of joint swelling, stiffness, or altered range of motion. Plan at that time was to continue plaquenil 400mg. She was recently started on Ofev for progressive pulmonary fibrosis.    Interval  history 11-:  Ms. Boland was interview via telephone with the assistance of her [facility]     Since her last visit, Ms. Boland has had repeat PFTs demonstrating decreased FVC and limited diffusion capacity. Follow up CT shows progression of fibrosis since last imaging in 2018. On follow up with Dr. Cross in pulmonary clinic she was started on Ofev. She seems to be tolerating the medication well, but does endorse some nausea with its use. She continues to have a dry cough, but denies any new shortness of breath. She does not use home oxygen.     Ms. Boland has also had a follow up with retinal specialist 7/2020, as her prior ophthalmology appointment had some questionable retinal changes in the setting of prolonged Plauenil use. OCT was performed, which was unremarkable.    Today, Ms. Boland reports that she is having significant joint stiffness and pain. She stated that approximately 1 week ago she began having bilateral knee pain and stiffness. She also endorsed some mild swelling to the joints. This progressed and now includes bilateral knees, feet (L>R), left ankle, bilateral hands (R>L) and left elbow. She does endorse some warmth and swelling to multiple joints, most notably in her left ankle and right hands. These symptoms are most notable in the morning, and she has to stay in bed for 20-30 minutes after waking to massage and stretch her joints in order to feel as if she can move. It has caused significant difficulty with walking. Associated symptoms include fatigue. She has been taking some tylenol, which has been modestly helpful with the pain. She continues on Plaquenil single therapy for her inflammatory arthritis at this time.      Interval history 4-17-20: This telephone visit was conducted with the aid of an  with Tibetan language skills, and with the aid of the patient's daughter.  Patient was seen by Dr. Cross in pulmonology on February 26, 2020  in follow-up of interstitial lung disease.  Ongoing stable shortness of breath and low intensity cough were noted.  Assessment was of interstitial lung disease associated with rheumatoid arthritis functionally stable.  Tessalon Perles were prescribed for the cough, and CT of the chest and pulmonary function tests were planned for follow-up assessment.    Her cough is now better. She is able to walk more than 100 meters without stopping.  She is not having any morning stiffness, and there is minimal joint pain.  No restriction in activities of daily living.  No recent use of pain medication or anti-inflammatories.  She continues with Plaquenil 200 mg 2 tablets once daily.    Patient was seen by ophthalmology on February 26, 2020  Plaquenil retinal toxicity monitoring.  Outer retinal changes on OCT were noted.  Concern over interference of these changes with later monitoring for Plaquenil toxicity was raised, and patient was referred to the retina service for further screening.  No contraindication to continuing Plaquenil was identified.    History 5-19:  she reports that she is doing about the same since our last visit. Transient joint pain in summer 2018 was most prominent in her hands.  But her joint pain and finger swelling has disappeared since starting prednisone 5mg, which she has taken for the past year. Denies morning stiffness. She has not taken plaquenil since being prescribed last year, noting that she become anxious to add another pill to her current regimen. Is instead using prednisone to alleviate joint pain. Uses ibuprofen to treat sciatica.     She also reports that her breathing is stable and she is able to walk 100 meters without experiencing shortness of breath. Her coughing has also improved. Has tried pulmonary therapy in the past, but did not feel that this helped. She plans on following up with Dr. Cross in the near future.    Review of Systems:   Pertinent items are noted in HPI or as below,  remainder of complete ROS is negative.     No recent problems with hearing or vision. No swallowing problems.   No breathing difficulty, shortness of breath, or wheezing. + Continues to have dry cough.  No chest pain or palpitations.  No heart burn, indigestion, abdominal pain, vomiting, diarrhea. + Nausea with Ofev.  No urination problems, no bloody, cloudy urine, no dysuria.  No numbing, tingling, weakness.  No headaches or confusion.  No rashes. No easy bleeding or bruising.   + Arthralgias, stiffness, swelling of multiple joints.  + Fatigue.    Active Medications:   Current Outpatient Medications   Medication     alcohol swab prep pads     aspirin 81 MG chewable tablet     benzonatate (TESSALON) 200 MG capsule     blood glucose (NO BRAND SPECIFIED) test strip     clobetasol propionate (CLOBEX) 0.05 % external shampoo     famotidine (PEPCID) 40 MG tablet     hydroxychloroquine (PLAQUENIL) 200 MG tablet     metFORMIN (GLUCOPHAGE) 1000 MG tablet     nintedanib (OFEV) 150 MG capsule     STATIN NOT PRESCRIBED (INTENTIONAL)     triamcinolone (KENALOG) 0.1 % external cream     vitamin D3 (CHOLECALCIFEROL) 50 mcg (2000 units) tablet     benzonatate (TESSALON) 200 MG capsule     predniSONE (DELTASONE) 10 MG tablet     vitamin D2 (ERGOCALCIFEROL) 1.25 MG (17667 UT) capsule     No current facility-administered medications for this visit.         Allergies:   Atorvastatin      Past Medical History:  Obesity  Osteopenia  Type 2 diabetes mellitus without complication, without long-term current use of insulin  Interstitial lung disease  Abdominal tuberculosis (intestinal, 20 years ago)     Past Surgical History:  Open cholecystectomy    Family History:   Mother - dementia  Father - cerebrovascular disease  Brother - cerebrovascular disease, hypertension  No family history of - diabetes, myocardial infarction, early onset C.A.D., breast cancer, ovarian cancer, colon cancer, rheumatoid arthritis  Family history of - high blood  pressure  (mentions that medical history is unlikely to be comprehensive because of poor/infrequent medical records)      Social History:   No smoking or tobacco use  Rare alcohol use (1-2 times a year)   with 8 children, 13 grandchildren, cooks for the family     Physical Exam:   There were no vitals taken for this visit.   Wt Readings from Last 4 Encounters:   09/16/20 72.6 kg (160 lb)   08/25/20 72.5 kg (159 lb 14.4 oz)   05/14/20 73.3 kg (161 lb 11.2 oz)   02/26/20 73.9 kg (163 lb)     Physical exam limited by telephone encounter.  Psych: Normal judgement, orientation, memory, affect.       Laboratory:   RHEUM RESULTS Latest Ref Rng & Units 8/24/2020 9/16/2020 10/22/2020   SED RATE 0 - 30 mm/h - - -   CRP, INFLAMMATION 0.0 - 8.0 mg/L - <2.9 <2.9   CK TOTAL 30 - 225 U/L - - -   RHEUMATOID FACTOR <20 IU/mL - - -   AST 0 - 45 U/L 14 - 29   ALT 0 - 50 U/L 22 - 53(H)   ALBUMIN 3.4 - 5.0 g/dL 3.6 - 3.4   WBC 4.0 - 11.0 10e9/L 10.8 - 6.5   RBC 3.8 - 5.2 10e12/L 4.16 - 3.97   HGB 11.7 - 15.7 g/dL 12.1 - 11.7   HCT 35.0 - 47.0 % 37.5 - 35.5   MCV 78 - 100 fl 90 - 89   MCHC 31.5 - 36.5 g/dL 32.3 - 33.0   RDW 10.0 - 15.0 % 13.5 - 13.2    - 450 10e9/L 345 - 325   CREATININE 0.52 - 1.04 mg/dL 0.62 - 0.54   GFR ESTIMATE, IF BLACK >60 mL/min/[1.73:m2] >90 - >90   GFR ESTIMATE >60 mL/min/[1.73:m2] >90 - >90    - 1,620 mg/dL - - -   HEPATITIS C ANTIBODY NR - - -       Rheumatoid Factor   Date Value Ref Range Status   05/23/2018 700 (H) <20 IU/mL Final     Cyclic Citrullinated Peptide Antibody, IgG   Date Value Ref Range Status   05/23/2018 >340 (H) <7 U/mL Final     Comment:     Positive     Scleroderma Antibody Scl-70 CLAUDIO IgG   Date Value Ref Range Status   05/23/2018 <0.2 0.0 - 0.9 AI Final     Comment:     Negative  Antibody index (AI) values reflect qualitative changes in antibody   concentration that cannot be directly associated with clinical condition or   disease state.       SSA (Ro) (CLAUDIO) Antibody,  IgG   Date Value Ref Range Status   05/23/2018 <0.2 0.0 - 0.9 AI Final     Comment:     Negative  Antibody index (AI) values reflect qualitative changes in antibody   concentration that cannot be directly associated with clinical condition or   disease state.       SSB (La) (CLAUDIO) Antibody, IgG   Date Value Ref Range Status   05/23/2018 <0.2 0.0 - 0.9 AI Final     Comment:     Negative  Antibody index (AI) values reflect qualitative changes in antibody   concentration that cannot be directly associated with clinical condition or   disease state.       LYNDA interpretation   Date Value Ref Range Status   05/23/2018 Negative NEG^Negative Final     Comment:                                        Reference range:  <1:40  NEGATIVE  1:40 - 1:80  BORDERLINE POSITIVE  >1:80 POSITIVE         Neutrophil Cytoplasmic IgG Antibody   Date Value Ref Range Status   05/23/2018 <1:20 <1:20 Final     Comment:     (Note)  The ANCA IFA is <1:20; therefore, no further testing will   be performed.  INTERPRETIVE INFORMATION: Anti-Neutrophil Cyto Ab, IgG  Neutrophil Cytoplasmic Antibodies (C-ANCA = granular   cytoplasmic staining, P-ANCA = perinuclear staining) are   found in the serum of over 90 percent of patients with   certain necrotizing systemic vasculitides, and usually in   less than 5 percent of patients with collagen vascular   disease or arthritis.  Performed by [facility],  [address] [place] [id number] [phone]  [email or web address], Marty Wright MD, Lab. Director         IGG   Date Value Ref Range Status   05/23/2018 1,980 (H) 695 - 1,620 mg/dL Final     Scleroderma Antibody Scl-70 CLAUDIO IgG   Date Value Ref Range Status   05/23/2018 <0.2 0.0 - 0.9 AI Final     Comment:     Negative  Antibody index (AI) values reflect qualitative changes in antibody   concentration that cannot be directly associated with clinical condition or   disease state.       CT Chest 9/16/20:  Basilar predominant interstitial fibrosis in a UIP  "pattern in this patient with rheumatoid arthritis associated interstitial lung disease, minimally progressed since 5/23/2018, as described.    PFT Interpretation 9/16/20:  The decrease in the FVC  may represent restrictive lung physiology v. air trapping.     Lung volumes would be necessary to confirm.   Moderate diffusion defect.   The diffusing capacity was not corrected for the patient's hemoglobin.     Krystian Boland is a 70 year old female who is being evaluated via a billable video visit.      The patient has been notified of following:     \"This video visit will be conducted via a call between you and your physician/provider. We have found that certain health care needs can be provided without the need for an in-person physical exam.  This service lets us provide the care you need with a video conversation.  If a prescription is necessary we can send it directly to your pharmacy.  If lab work is needed we can place an order for that and you can then stop by our lab to have the test done at a later time.    Video visits are billed at different rates depending on your insurance coverage.  Please reach out to your insurance provider with any questions.    If during the course of the call the physician/provider feels a video visit is not appropriate, you will not be charged for this service.\"    Patient has given verbal consent for Video visit? Yes  How would you like to obtain your AVS? MyChart    Will anyone else be joining your video visit? No        Video-Visit Details    Type of service:  Video Visit    Video Start Time: 12:08 PM  Video End Time: 12:34 PM    Originating Location (pt. Location): Home    Distant Location (provider location):  [facility] RHEUMATOLOGY CLINIC [place]     Platform used for Video Visit: Joaquin Staley MD          "

## 2020-11-10 NOTE — PATIENT INSTRUCTIONS
Diagnosis:  1.  Rheumatoid arthritis: Several weeks of increasing pain, swelling, and stiffness in many joints argues that inflammatory arthritis is active.  I do not recommend a change in long-term therapy at present, as recent symptoms may represent a temporary flare.  I recommend instead a course of corticosteroids.  If symptoms recur after completion of the steroid course, consideration of additional steroid sparing medication will be made.  2.  Interstitial lung disease: I agree with Dr. Cross's assessment that lung disease may be progressing.  I agree with recommended trial of Ofev medication to slow interstitial lung disease.    Plan:  1.  Prednisone 15 mg daily for 1 week, 10 mg daily for 1 week, then 5 mg daily for 1 week, then off.  2.  Continue hydroxychloroquine 400 mg daily.  3.  If inflammatory arthritis recurs after discontinuation of prednisone, or if Ofev is not well-tolerated or is judged in adequately effective, would consider addition of mycophenolate or rituximab to address inflammatory arthritis as well as interstitial lung disease.

## 2020-11-10 NOTE — PROGRESS NOTES
[facility]  Rheumatology Clinic  Harjit Staley MD  11/10/2020     Name: Krystian Boland  MRN: [id number]  Age: 68 year old  : 1950  Referring provider: Referred Self     Assessment and Plan:  #High positive rheumatoid factor and anti-CCP noted in 2018:  Patient reporting active inflammatory arthritis symptoms with polyarthralgia, stiffness and swelling. She has associated fatigue and based on her symptoms seems to be in an active flare. Laboratory evaluation on 2020 showed normal comprehensive metabolic panel, including transaminases CRP was less than 2.9, and CBC was normal. 6-minute walk test performed in February revealed normal walking distance with significant desaturation during the walk.  PFTs performed on 2020 showed an FEV1 of 81% of predicted, stable/improved since 2020. However, FVC had decreased with mild diffusion defect. CT chest with evidence of progressive fibrosis.     Given that Ms. Boland was recently started on Ofev, unlikely to benefit from change to MMF at this time. She continues on her Hydroxychloroquine 400mg daily monotherapy, but will require a three week prednisone taper for her active inflammatory arthritis. If her symptoms do not improve with the steroid taper alone, will confer with Dr. Cross to discuss possible transition to strong immunomodulatory agent that will benefit her arthritis and interstitial lung disease such as rituximab vs MMF.    We discussed the need for annual ophthalmologic monitoring while on plaquenil. She in  had follow up with a retinal specialist with exam showing no evidence of retinal toxicity.    #Interstitial lung disease:   Patient relates stable pulmonary symptoms, but with persistent dry cough. Patient describes excellent functional status with minor limitation in ambulating without oxygen. PFTs and CT Chest performed 2020 showing some progression of restrictive lung disease associated  with radiographic evidence of worsening bibasilar pulmonary fibrosis. She was started on Ofev. I agree with patient's plan to follow-up with Dr. Cross,  in several months to determine if there has been benefit from the new treatment and if Ofev is well tolerated.  If progressive disease despite Ofev, or if has continues inflammatory arthritis flares that are not controlled with monotherapy alone, will discuss possible transition to stronger immunomodulatory agents as above.      Follow-up: No follow-ups on file.     Papito Mtz MD  Internal Medicine, PGY3    I saw this patient with the medical resident. I agree with the stated findings and recommendations which reflect our joint impressions and plan.  If planned Ofev treatment is not well-tolerated, or if progressive interstitial lung disease is not stabilized over the course of the coming months, I recommend consideration of either rituximab or mycophenolate for addressing both inflammatory arthritis and interstitial lung disease.  For the short-term, I recommend a 3-week course of low-dose prednisone for inflammatory arthritis that is active and incompletely responsive to Plaquenil monotherapy.    Harjit Staley M.D.  Staff Rheumatologist, [facility]  Pager [phone]        HPI:   Krystian Boland has a history of ILD and RA and presents for follow-up. I last saw the patient in 4-2020 at which time she reported minimal dyspnea on exertion, and no history of joint swelling, stiffness, or altered range of motion. Plan at that time was to continue plaquenil 400mg. She was recently started on Ofev for progressive pulmonary fibrosis.    Interval history 11-:  Ms. Boland was interview via telephone with the assistance of her daughter acting as Tibetan .     Since her last visit, Ms. Boland has had repeat PFTs demonstrating decreased FVC and limited diffusion capacity. Follow up CT shows progression of fibrosis since last imaging in 2018. On follow  up with Dr. Cross in pulmonary clinic she was started on Ofev. She seems to be tolerating the medication well, but does endorse some nausea with its use. She continues to have a dry cough, but denies any new shortness of breath. She does not use home oxygen.     Ms. Boland has also had a follow up with retinal specialist 7/2020, as her prior ophthalmology appointment had some questionable retinal changes in the setting of prolonged Plauenil use. OCT was performed, which was unremarkable.    Today, Ms. Boland reports that she is having significant joint stiffness and pain. She stated that approximately 1 week ago she began having bilateral knee pain and stiffness. She also endorsed some mild swelling to the joints. This progressed and now includes bilateral knees, feet (L>R), left ankle, bilateral hands (R>L) and left elbow. She does endorse some warmth and swelling to multiple joints, most notably in her left ankle and right hands. These symptoms are most notable in the morning, and she has to stay in bed for 20-30 minutes after waking to massage and stretch her joints in order to feel as if she can move. It has caused significant difficulty with walking. Associated symptoms include fatigue. She has been taking some tylenol, which has been modestly helpful with the pain. She continues on Plaquenil single therapy for her inflammatory arthritis at this time.      Interval history 4-17-20: This telephone visit was conducted with the aid of an  with Tibetan language skills, and with the aid of the patient's daughter.  Patient was seen by Dr. Cross in pulmonology on February 26, 2020 in follow-up of interstitial lung disease.  Ongoing stable shortness of breath and low intensity cough were noted.  Assessment was of interstitial lung disease associated with rheumatoid arthritis functionally stable.  Tessalon Perles were prescribed for the cough, and CT of the chest and pulmonary function tests were planned  for follow-up assessment.    Her cough is now better. She is able to walk more than 100 meters without stopping.  She is not having any morning stiffness, and there is minimal joint pain.  No restriction in activities of daily living.  No recent use of pain medication or anti-inflammatories.  She continues with Plaquenil 200 mg 2 tablets once daily.    Patient was seen by ophthalmology on February 26, 2020  Plaquenil retinal toxicity monitoring.  Outer retinal changes on OCT were noted.  Concern over interference of these changes with later monitoring for Plaquenil toxicity was raised, and patient was referred to the retina service for further screening.  No contraindication to continuing Plaquenil was identified.    History 5-19:  she reports that she is doing about the same since our last visit. Transient joint pain in summer 2018 was most prominent in her hands.  But her joint pain and finger swelling has disappeared since starting prednisone 5mg, which she has taken for the past year. Denies morning stiffness. She has not taken plaquenil since being prescribed last year, noting that she become anxious to add another pill to her current regimen. Is instead using prednisone to alleviate joint pain. Uses ibuprofen to treat sciatica.     She also reports that her breathing is stable and she is able to walk 100 meters without experiencing shortness of breath. Her coughing has also improved. Has tried pulmonary therapy in the past, but did not feel that this helped. She plans on following up with Dr. Cross in the near future.    Review of Systems:   Pertinent items are noted in HPI or as below, remainder of complete ROS is negative.     No recent problems with hearing or vision. No swallowing problems.   No breathing difficulty, shortness of breath, or wheezing. + Continues to have dry cough.  No chest pain or palpitations.  No heart burn, indigestion, abdominal pain, vomiting, diarrhea. + Nausea with Ofev.  No  urination problems, no bloody, cloudy urine, no dysuria.  No numbing, tingling, weakness.  No headaches or confusion.  No rashes. No easy bleeding or bruising.   + Arthralgias, stiffness, swelling of multiple joints.  + Fatigue.    Active Medications:   Current Outpatient Medications   Medication     alcohol swab prep pads     aspirin 81 MG chewable tablet     benzonatate (TESSALON) 200 MG capsule     blood glucose (NO BRAND SPECIFIED) test strip     clobetasol propionate (CLOBEX) 0.05 % external shampoo     famotidine (PEPCID) 40 MG tablet     hydroxychloroquine (PLAQUENIL) 200 MG tablet     metFORMIN (GLUCOPHAGE) 1000 MG tablet     nintedanib (OFEV) 150 MG capsule     STATIN NOT PRESCRIBED (INTENTIONAL)     triamcinolone (KENALOG) 0.1 % external cream     vitamin D3 (CHOLECALCIFEROL) 50 mcg (2000 units) tablet     benzonatate (TESSALON) 200 MG capsule     predniSONE (DELTASONE) 10 MG tablet     vitamin D2 (ERGOCALCIFEROL) 1.25 MG (39651 UT) capsule     No current facility-administered medications for this visit.         Allergies:   Atorvastatin      Past Medical History:  Obesity  Osteopenia  Type 2 diabetes mellitus without complication, without long-term current use of insulin  Interstitial lung disease  Abdominal tuberculosis (intestinal, 20 years ago)     Past Surgical History:  Open cholecystectomy    Family History:   Mother - dementia  Father - cerebrovascular disease  Brother - cerebrovascular disease, hypertension  No family history of - diabetes, myocardial infarction, early onset C.A.D., breast cancer, ovarian cancer, colon cancer, rheumatoid arthritis  Family history of - high blood pressure  (mentions that medical history is unlikely to be comprehensive because of poor/infrequent medical records)      Social History:   No smoking or tobacco use  Rare alcohol use (1-2 times a year)   with 8 children, 13 grandchildren, cooks for the family     Physical Exam:   There were no vitals taken for this  visit.   Wt Readings from Last 4 Encounters:   09/16/20 72.6 kg (160 lb)   08/25/20 72.5 kg (159 lb 14.4 oz)   05/14/20 73.3 kg (161 lb 11.2 oz)   02/26/20 73.9 kg (163 lb)     Physical exam limited by telephone encounter.  Psych: Normal judgement, orientation, memory, affect.       Laboratory:   RHEUM RESULTS Latest Ref Rng & Units 8/24/2020 9/16/2020 10/22/2020   SED RATE 0 - 30 mm/h - - -   CRP, INFLAMMATION 0.0 - 8.0 mg/L - <2.9 <2.9   CK TOTAL 30 - 225 U/L - - -   RHEUMATOID FACTOR <20 IU/mL - - -   AST 0 - 45 U/L 14 - 29   ALT 0 - 50 U/L 22 - 53(H)   ALBUMIN 3.4 - 5.0 g/dL 3.6 - 3.4   WBC 4.0 - 11.0 10e9/L 10.8 - 6.5   RBC 3.8 - 5.2 10e12/L 4.16 - 3.97   HGB 11.7 - 15.7 g/dL 12.1 - 11.7   HCT 35.0 - 47.0 % 37.5 - 35.5   MCV 78 - 100 fl 90 - 89   MCHC 31.5 - 36.5 g/dL 32.3 - 33.0   RDW 10.0 - 15.0 % 13.5 - 13.2    - 450 10e9/L 345 - 325   CREATININE 0.52 - 1.04 mg/dL 0.62 - 0.54   GFR ESTIMATE, IF BLACK >60 mL/min/[1.73:m2] >90 - >90   GFR ESTIMATE >60 mL/min/[1.73:m2] >90 - >90    - 1,620 mg/dL - - -   HEPATITIS C ANTIBODY NR - - -       Rheumatoid Factor   Date Value Ref Range Status   05/23/2018 700 (H) <20 IU/mL Final     Cyclic Citrullinated Peptide Antibody, IgG   Date Value Ref Range Status   05/23/2018 >340 (H) <7 U/mL Final     Comment:     Positive     Scleroderma Antibody Scl-70 CLAUDIO IgG   Date Value Ref Range Status   05/23/2018 <0.2 0.0 - 0.9 AI Final     Comment:     Negative  Antibody index (AI) values reflect qualitative changes in antibody   concentration that cannot be directly associated with clinical condition or   disease state.       SSA (Ro) (CLAUDIO) Antibody, IgG   Date Value Ref Range Status   05/23/2018 <0.2 0.0 - 0.9 AI Final     Comment:     Negative  Antibody index (AI) values reflect qualitative changes in antibody   concentration that cannot be directly associated with clinical condition or   disease state.       SSB (La) (CLAUDIO) Antibody, IgG   Date Value Ref Range Status    05/23/2018 <0.2 0.0 - 0.9 AI Final     Comment:     Negative  Antibody index (AI) values reflect qualitative changes in antibody   concentration that cannot be directly associated with clinical condition or   disease state.       LYNDA interpretation   Date Value Ref Range Status   05/23/2018 Negative NEG^Negative Final     Comment:                                        Reference range:  <1:40  NEGATIVE  1:40 - 1:80  BORDERLINE POSITIVE  >1:80 POSITIVE         Neutrophil Cytoplasmic IgG Antibody   Date Value Ref Range Status   05/23/2018 <1:20 <1:20 Final     Comment:     (Note)  The ANCA IFA is <1:20; therefore, no further testing will   be performed.  INTERPRETIVE INFORMATION: Anti-Neutrophil Cyto Ab, IgG  Neutrophil Cytoplasmic Antibodies (C-ANCA = granular   cytoplasmic staining, P-ANCA = perinuclear staining) are   found in the serum of over 90 percent of patients with   certain necrotizing systemic vasculitides, and usually in   less than 5 percent of patients with collagen vascular   disease or arthritis.  Performed by [facility],  [address] [place] [id number] [phone]  [email or web address], Marty Wright MD, Lab. Director         IGG   Date Value Ref Range Status   05/23/2018 1,980 (H) 695 - 1,620 mg/dL Final     Scleroderma Antibody Scl-70 CLAUDIO IgG   Date Value Ref Range Status   05/23/2018 <0.2 0.0 - 0.9 AI Final     Comment:     Negative  Antibody index (AI) values reflect qualitative changes in antibody   concentration that cannot be directly associated with clinical condition or   disease state.       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    PFT Interpretation 9/16/20:  The decrease in the FVC  may represent restrictive lung physiology v. air trapping.     Lung volumes would be necessary to confirm.   Moderate diffusion  "defect.   The diffusing capacity was not corrected for the patient's hemoglobin.     Krystian Boland is a 70 year old female who is being evaluated via a billable video visit.      The patient has been notified of following:     \"This video visit will be conducted via a call between you and your physician/provider. We have found that certain health care needs can be provided without the need for an in-person physical exam.  This service lets us provide the care you need with a video conversation.  If a prescription is necessary we can send it directly to your pharmacy.  If lab work is needed we can place an order for that and you can then stop by our lab to have the test done at a later time.    Video visits are billed at different rates depending on your insurance coverage.  Please reach out to your insurance provider with any questions.    If during the course of the call the physician/provider feels a video visit is not appropriate, you will not be charged for this service.\"    Patient has given verbal consent for Video visit? Yes  How would you like to obtain your AVS? MyChart    Will anyone else be joining your video visit? No        Video-Visit Details    Type of service:  Video Visit    Video Start Time: 12:08 PM  Video End Time: 12:34 PM    Originating Location (pt. Location): Home    Distant Location (provider location):  [facility]UM[facility] [place]     Platform used for Video Visit: Joaquin Staley MD        "

## 2020-12-16 ENCOUNTER — VIRTUAL VISIT (OUTPATIENT)
Dept: PULMONOLOGY | Facility: CLINIC | Age: 70
End: 2020-12-16
Attending: INTERNAL MEDICINE
Payer: COMMERCIAL

## 2020-12-16 DIAGNOSIS — J84.9 ILD (INTERSTITIAL LUNG DISEASE) (H): ICD-10-CM

## 2020-12-16 DIAGNOSIS — Z23 ENCOUNTER FOR IMMUNIZATION: ICD-10-CM

## 2020-12-16 DIAGNOSIS — J84.10 FIBROTIC LUNG DISEASES (H): ICD-10-CM

## 2020-12-16 DIAGNOSIS — M06.9 RHEUMATOID ARTHRITIS (H): ICD-10-CM

## 2020-12-16 DIAGNOSIS — J84.9 ILD (INTERSTITIAL LUNG DISEASE) (H): Primary | ICD-10-CM

## 2020-12-16 LAB
ALBUMIN SERPL-MCNC: 3.7 G/DL (ref 3.4–5)
ALP SERPL-CCNC: 92 U/L (ref 40–150)
ALT SERPL W P-5'-P-CCNC: 43 U/L (ref 0–50)
AST SERPL W P-5'-P-CCNC: 26 U/L (ref 0–45)
BILIRUB DIRECT SERPL-MCNC: 0.1 MG/DL (ref 0–0.2)
BILIRUB SERPL-MCNC: 0.5 MG/DL (ref 0.2–1.3)
DLCOUNC-%PRED-PRE: 75 %
DLCOUNC-PRE: 13.89 ML/MIN/MMHG
DLCOUNC-PRED: 18.3 ML/MIN/MMHG
ERV-%PRED-PRE: 147 %
ERV-PRE: 0.73 L
ERV-PRED: 0.5 L
EXPTIME-PRE: 4.07 SEC
FEF2575-%PRED-PRE: 196 %
FEF2575-PRE: 3.47 L/SEC
FEF2575-PRED: 1.76 L/SEC
FEFMAX-%PRED-PRE: 113 %
FEFMAX-PRE: 6.21 L/SEC
FEFMAX-PRED: 5.49 L/SEC
FEV1-%PRED-PRE: 79 %
FEV1-PRE: 1.57 L
FEV1FEV6-PRE: 98 %
FEV1FEV6-PRED: 79 %
FEV1FVC-PRE: 98 %
FEV1FVC-PRED: 79 %
FEV1SVC-PRE: 85 %
FEV1SVC-PRED: 69 %
FIFMAX-PRE: 3.72 L/SEC
FVC-%PRED-PRE: 64 %
FVC-PRE: 1.6 L
FVC-PRED: 2.5 L
IC-%PRED-PRE: 47 %
IC-PRE: 1.12 L
IC-PRED: 2.35 L
PROT SERPL-MCNC: 8.3 G/DL (ref 6.8–8.8)
VA-%PRED-PRE: 65 %
VA-PRE: 2.82 L
VC-%PRED-PRE: 65 %
VC-PRE: 1.86 L
VC-PRED: 2.85 L

## 2020-12-16 PROCEDURE — G0008 ADMIN INFLUENZA VIRUS VAC: HCPCS | Mod: GT | Performed by: INTERNAL MEDICINE

## 2020-12-16 PROCEDURE — 36415 COLL VENOUS BLD VENIPUNCTURE: CPT | Performed by: PATHOLOGY

## 2020-12-16 PROCEDURE — 250N000011 HC RX IP 250 OP 636: Mod: GT | Performed by: INTERNAL MEDICINE

## 2020-12-16 PROCEDURE — 80076 HEPATIC FUNCTION PANEL: CPT | Performed by: PATHOLOGY

## 2020-12-16 PROCEDURE — 90662 IIV NO PRSV INCREASED AG IM: CPT | Mod: GT | Performed by: INTERNAL MEDICINE

## 2020-12-16 PROCEDURE — 99214 OFFICE O/P EST MOD 30 MIN: CPT | Mod: 95 | Performed by: INTERNAL MEDICINE

## 2020-12-16 PROCEDURE — 94729 DIFFUSING CAPACITY: CPT | Performed by: INTERNAL MEDICINE

## 2020-12-16 PROCEDURE — 94375 RESPIRATORY FLOW VOLUME LOOP: CPT | Performed by: INTERNAL MEDICINE

## 2020-12-16 RX ADMIN — INFLUENZA A VIRUS A/MICHIGAN/45/2015 X-275 (H1N1) ANTIGEN (FORMALDEHYDE INACTIVATED), INFLUENZA A VIRUS A/SINGAPORE/INFIMH-16-0019/2016 IVR-186 (H3N2) ANTIGEN (FORMALDEHYDE INACTIVATED), INFLUENZA B VIRUS B/PHUKET/3073/2013 ANTIGEN (FORMALDEHYDE INACTIVATED), AND INFLUENZA B VIRUS B/MARYLAND/15/2016 BX-69A ANTIGEN (FORMALDEHYDE INACTIVATED) 0.7 ML: 60; 60; 60; 60 INJECTION, SUSPENSION INTRAMUSCULAR at 09:35

## 2020-12-16 NOTE — PROGRESS NOTES
"Krystian Boland is a 70 year old female who is being evaluated via a billable video visit.      The patient has been notified of following:     \"This video visit will be conducted via a call between you and your physician/provider. We have found that certain health care needs can be provided without the need for an in-person physical exam.  This service lets us provide the care you need with a video conversation.  If a prescription is necessary we can send it directly to your pharmacy.  If lab work is needed we can place an order for that and you can then stop by our lab to have the test done at a later time.    Video visits are billed at different rates depending on your insurance coverage.  Please reach out to your insurance provider with any questions.    If during the course of the call the physician/provider feels a video visit is not appropriate, you will not be charged for this service.\"    Patient has given verbal consent for Video visit? Yes  How would you like to obtain your AVS? [email or web address]  If you are dropped from the video visit, the video invite should be resent to: Other e-mail: [email or web address]  Will anyone else be joining your video visit? No        Video-Visit Details    Type of service:  Video Visit    Video Start Time: 9:05  Video End Time: 9:35    Distant Location (provider location):  [facility] FOR LUNG SCIENCE AND [facility]     Platform used for Video Visit: Marianne Otero      HISTORY OF PRESENT ILLNESS:  The patient is a 69-year-old female with interstitial lung disease secondary to rheumatoid arthritis.  She has been treated with Plaquenil for it and previously prednisone but has been off prednisone for some time.  She reports a cough that in the morning is productive of sputum but during the rest of the day is basically a dry cough.  The cough is a little better today.  She is not on oxygen therapy.  She was seen in followup with Rheumatology recently.   "  She was felt to have some active joint involvement at that time. Was given a course of low dose Prednisone with improvement in symptoms. Tooday she is noticing some pain/swelling in her ring finger but is otherwise OK. Her breathing symptoms are unchanged since prior visit. At her last visit with me, she was initiated on Ofev for progressive ILD. She is having some nausea associated with the Ofev. LFTs were normal today.         PULMONARY FUNCTION TESTS:  pfts unchanged since last visit in September. FVC was 1.60. Previously was 1.69.       Imaging: September 2020 chest CT showed progression of fibrotic changes without signifcant GGOs     Labs: CRP <2.9 ; LFTs WNL today     ASSESSMENT AND PLAN:  In summary then, Krystian is a 69-year-old with interstitial lung disease secondary to RA.  She remains on Plaquenil.   PFTs and chest CT showed evidence that the fibrosis is progressing and she was started on Ofev this fall.  PFTs unchanged today. Continue to follow LFTs. Continue with Tessalon Perles 200 mg t.i.d. for her cough.  I will follow up with her in ~ 3-4 months with repeat labs, pfts, and 6 MWT.  In addition, I have encouraged her to  followup with Dr. Staley in Rheumatology Clinic if her symptoms worsen significantly now that she has completed the 3 week course of Prednisone that he prescribed.   Recommend flu shot today.           Tristan Cross MD  Pulmonary/Critical Care  December 16, 2020

## 2020-12-16 NOTE — LETTER
"    12/16/2020         RE: Krystian Boland  [id number]  [facility] [address]        Dear Colleague,    Thank you for referring your patient, Krystian Boland, to the [facility] FOR LUNG SCIENCE AND [facility]. Please see a copy of my visit note below.    Krystian Boland is a 70 year old female who is being evaluated via a billable video visit.      The patient has been notified of following:     \"This video visit will be conducted via a call between you and your physician/provider. We have found that certain health care needs can be provided without the need for an in-person physical exam.  This service lets us provide the care you need with a video conversation.  If a prescription is necessary we can send it directly to your pharmacy.  If lab work is needed we can place an order for that and you can then stop by our lab to have the test done at a later time.    Video visits are billed at different rates depending on your insurance coverage.  Please reach out to your insurance provider with any questions.    If during the course of the call the physician/provider feels a video visit is not appropriate, you will not be charged for this service.\"    Patient has given verbal consent for Video visit? Yes  How would you like to obtain your AVS? MyChart  If you are dropped from the video visit, the video invite should be resent to: Other e-mail: [employer]  Will anyone else be joining your video visit? No        Video-Visit Details    Type of service:  Video Visit    Video Start Time: 9:05  Video End Time: 9:35    Distant Location (provider location):  [facility]UNG [facility]     Platform used for Video Visit: Marianne Otero      HISTORY OF PRESENT ILLNESS:  The patient is a 69-year-old female with interstitial lung disease secondary to rheumatoid arthritis.  She has been treated with Plaquenil for it and previously " prednisone but has been off prednisone for some time.  She reports a cough that in the morning is productive of sputum but during the rest of the day is basically a dry cough.  The cough is a little better today.  She is not on oxygen therapy.  She was seen in followup with Rheumatology recently.    She was felt to have some active joint involvement at that time. Was given a course of low dose Prednisone with improvement in symptoms. Tooday she is noticing some pain/swelling in her ring finger but is otherwise OK. Her breathing symptoms are unchanged since prior visit. At her last visit with me, she was initiated on Ofev for progressive ILD. She is having some nausea associated with the Ofev. LFTs were normal today.         PULMONARY FUNCTION TESTS:  pfts unchanged since last visit in September. FVC was 1.60. Previously was 1.69.       Imaging: September 2020 chest CT showed progression of fibrotic changes without signifcant GGOs     Labs: CRP <2.9 ; LFTs WNL today     ASSESSMENT AND PLAN:  In summary then, Krystian is a 69-year-old with interstitial lung disease secondary to RA.  She remains on Plaquenil.   PFTs and chest CT showed evidence that the fibrosis is progressing and she was started on Ofev this fall.  PFTs unchanged today. Continue to follow LFTs. Continue with Tessalon Perles 200 mg t.i.d. for her cough.  I will follow up with her in ~ 3-4 months with repeat labs, pfts, and 6 MWT.  In addition, I have encouraged her to  followup with Dr. Staley in Rheumatology Clinic if her symptoms worsen significantly now that she has completed the 3 week course of Prednisone that he prescribed.   Recommend flu shot today.           Tristan Cross MD  Pulmonary/Critical Care  December 16, 2020

## 2020-12-27 ENCOUNTER — MYC MEDICAL ADVICE (OUTPATIENT)
Dept: RHEUMATOLOGY | Facility: CLINIC | Age: 70
End: 2020-12-27

## 2020-12-27 DIAGNOSIS — R76.8 RHEUMATOID FACTOR POSITIVE WITH CYCLIC CITRULLINATED PEPTIDE (CCP) ANTIBODY NEGATIVE: ICD-10-CM

## 2020-12-28 RX ORDER — PREDNISONE 5 MG/1
5 TABLET ORAL DAILY
Qty: 65 TABLET | Refills: 1 | Status: SHIPPED | OUTPATIENT
Start: 2020-12-28 | End: 2021-03-17

## 2021-01-28 PROBLEM — Z76.89 HEALTH CARE HOME: Status: ACTIVE | Noted: 2019-03-11

## 2021-02-04 ENCOUNTER — PATIENT OUTREACH (OUTPATIENT)
Dept: GERIATRIC MEDICINE | Facility: CLINIC | Age: 71
End: 2021-02-04

## 2021-02-04 NOTE — PROGRESS NOTES
[facility] Care Coordination Contact    Called adult daughter David to schedule annual HRA home visit. Left a message requesting a return call to schedule HRA.   JAQUELIN Langley  [facility]  [phone]

## 2021-02-05 ENCOUNTER — MYC MEDICAL ADVICE (OUTPATIENT)
Dept: INTERNAL MEDICINE | Facility: CLINIC | Age: 71
End: 2021-02-05

## 2021-02-05 DIAGNOSIS — E11.9 TYPE 2 DIABETES MELLITUS WITHOUT COMPLICATION, WITHOUT LONG-TERM CURRENT USE OF INSULIN (H): ICD-10-CM

## 2021-02-05 NOTE — TELEPHONE ENCOUNTER
"Requested Prescriptions   Pending Prescriptions Disp Refills     metFORMIN (GLUCOPHAGE) 1000 MG tablet 180 tablet 3     Sig: Take 1 tablet (1,000 mg) by mouth 2 times daily (with meals)       Biguanide Agents Passed - 2/5/2021  9:41 AM        Passed - Patient is age 10 or older        Passed - Patient has documented A1c within the specified period of time.     If HgbA1C is 8 or greater, it needs to be on file within the past 3 months.  If less than 8, must be on file within the past 6 months.     Recent Labs   Lab Test 08/24/20  1541   A1C 6.4*             Passed - Patient's CR is NOT>1.4 OR Patient's EGFR is NOT<45 within past 12 mos.     Recent Labs   Lab Test 10/22/20  1547   GFRESTIMATED >90   GFRESTBLACK >90       Recent Labs   Lab Test 10/22/20  1547   CR 0.54             Passed - Patient does NOT have a diagnosis of CHF.        Passed - Medication is active on med list        Passed - Patient is not pregnant        Passed - Patient has not had a positive pregnancy test within the past 12 mos.         Passed - Recent (6 mo) or future (30 days) visit within the authorizing provider's specialty     Patient had office visit in the last 6 months or has a visit in the next 30 days with authorizing provider or within the authorizing provider's specialty.  See \"Patient Info\" tab in inbasket, or \"Choose Columns\" in Meds & Orders section of the refill encounter.                 "

## 2021-02-05 NOTE — TELEPHONE ENCOUNTER
Routing refill request to provider for review/approval because:  Patient needs to be seen because:  Due for appt and labs

## 2021-02-10 ENCOUNTER — PATIENT OUTREACH (OUTPATIENT)
Dept: GERIATRIC MEDICINE | Facility: CLINIC | Age: 71
End: 2021-02-10

## 2021-02-10 SDOH — SOCIAL STABILITY: SOCIAL NETWORK: ARE YOU MARRIED, WIDOWED, DIVORCED, SEPARATED, NEVER MARRIED, OR LIVING WITH A PARTNER?: MARRIED

## 2021-02-10 ASSESSMENT — PATIENT HEALTH QUESTIONNAIRE - PHQ9: SUM OF ALL RESPONSES TO PHQ QUESTIONS 1-9: 0

## 2021-02-10 NOTE — PROGRESS NOTES
[facility] Care Coordination Contact    [facility] Home Visit Assessment     Home visit for Health Risk Assessment with Krystian Boland completed on February 10, 2021  [id number] Assessment completed    Type of residence:: Private home - stairs(3 steps into the home. Full flight of steps into basement. Bedroom and bathroom are on main level.)  Current living arrangement:: I live in a private home with family(Lives with spouse in daughter's home with daughter's spouse and children.)     Assessment completed with:: Patient, Care Team Member, Spouse or significant other, Family(Daughter Zachariah)    Current Care Plan  Member currently receiving the following home care services: None  Member currently receiving the following community resources: None    Health Issues: Reports that her health is poor. She shared that she is no longer testing her blood sugars as her HP declined the test strips after review of the PCP notes. Encouraged daughter to be sure to mention this if the future the MD wants her to start testing again. Continues to have a daily cough but reports that it is better with medication. Has low back and leg pain, which she did not have at last assessment. Reports using Tylenol PRN for pain. No hospitalizations or ED visits in the past year.     Medication Review  Medication reconciliation completed in Epic: Yes  Medication set-up & administration: Independent and sets up on own weekly.  Self-administers medications.  Medication Risk Assessment Medication (1 or more, place referral to MTM): N/A: No risk factors identified  MTM Referral Placed: No: No risk factors idenified    Mental/Behavioral Health   Depression Screening:   PHQ-9 Total Score: 0  Mental health DX:: No        Falls Assessment:   Fallen 2 or more times in the past year?: No   Any fall with injury in the past year?: No    ADL/IADL Dependencies:   Dependent ADLs:: Independent  Dependent IADLs:: Shopping, Cleaning, Medication  Management, Money Management, Transportation    [facility] sponsored benefits: Shared information re: Silver Sneakers/gym memberships, ASA, Calcium +D.    PCA Assessment completed at visit: No     Elderly Waiver Eligibility: No-does not meet criteria    Care Plan & Recommendations: Member wants to continue to live with her spouse at her daughter's home. She shared that she has a large supply of her incontinent products and requested that her products be delivered every other month, vs monthly. Reviewed that she is able to increase at any time. Emailed [facility] medical requesting that the order be only every other month. No services requested and no services recommended at this time.    See [facility] for detailed assessment information.    Follow-Up Plan: Member informed of future contact, plan to f/u with member with a 6 month telephone assessment.  Contact information shared with member and family, encouraged member to call with any questions or concerns at any time.    [facility] care continuum providers: Please refer to Health Care Home on the Epic Problem List to view this patient's [facility] Care Plan Summary.    JAQUELIN Langley  [facility]  [phone]

## 2021-02-11 ENCOUNTER — PATIENT OUTREACH (OUTPATIENT)
Dept: GERIATRIC MEDICINE | Facility: CLINIC | Age: 71
End: 2021-02-11

## 2021-02-11 NOTE — LETTER
February 11, 2021      KRYSTIAN BARNES  [address]  [place] [address]      Dear Krystian:    At [facility], we are dedicated to improving your health and well-being. Enclosed is the Comprehensive Care Plan that we developed with you on 2/10/2021. Please review the Care Plan carefully.    As a reminder, some of the things we discussed at your visit include:    Your physical and mental health    Ways to reduce falls    Health care needs you may have    Don t forget to contact your care coordinator if you:    Have been hospitalized or plan to be hospitalized     Have had a fall     Have experienced a change in physical health    Are experiencing emotional problems     If you do not agree with your Care Plan, have questions about it, or have experienced a change in your needs, please call me at [phone]. If you are hearing impaired, please call the [place] [employer] at 853 or [phone] (cxxoxx-vn-[id number] relay service).    Sincerely,    JAQUELIN Langley    E-mail: [email or web address]  Phone: [phone]    Care Manager  [facility] ([facility]) is a health plan that contracts with both Medicare and the [place] Medical Assistance (Medicaid) program to provide benefits of both programs to enrollees. Enrollment in [facility] depends on contract renewal.    [id number])     [id number] (11/18)

## 2021-02-11 NOTE — PROGRESS NOTES
[facility] Care Coordination Contact    Received after visit chart from care coordinator.  Completed following tasks: Mailed copy of care plan to client and Updated services in access   and Provider Signature - No POC Shared:  Member indicates that they do not want their POC shared with any [facility] providers.     **THIS ASSESSMENT WAS DONE OVER THE PHONE. SENT POC SIG PAGE TO MEMBER ASKING THEM TO SIGN AND RETURN IN SASE**    Kaycee Tinsley  Care Management Specialist  [facility]  [phone]

## 2021-02-22 DIAGNOSIS — E55.9 VITAMIN D DEFICIENCY: ICD-10-CM

## 2021-02-22 RX ORDER — CHOLECALCIFEROL (VITAMIN D3) 50 MCG
1 TABLET ORAL DAILY
Qty: 90 TABLET | Refills: 1 | Status: SHIPPED | OUTPATIENT
Start: 2021-02-22 | End: 2021-08-26

## 2021-02-26 NOTE — TELEPHONE ENCOUNTER
Hello! The patients insurance formulary has changed and is no longer covering the current testing supplies we have on hand for the patient.    Please send new RXs with No Brand Specified so we may change the information as needed.    A new RX is needed for meter, test strips, lancets, and optional control solution.    Thank you!     Sski Pregnancy And Lactation Text: This medication is Pregnancy Category D and isn't considered safe during pregnancy. It is excreted in breast milk.

## 2021-03-06 ENCOUNTER — HEALTH MAINTENANCE LETTER (OUTPATIENT)
Age: 71
End: 2021-03-06

## 2021-03-17 ENCOUNTER — OFFICE VISIT (OUTPATIENT)
Dept: PULMONOLOGY | Facility: CLINIC | Age: 71
End: 2021-03-17
Attending: INTERNAL MEDICINE
Payer: COMMERCIAL

## 2021-03-17 VITALS
OXYGEN SATURATION: 100 % | HEART RATE: 99 BPM | HEIGHT: 62 IN | SYSTOLIC BLOOD PRESSURE: 148 MMHG | WEIGHT: 162.4 LBS | RESPIRATION RATE: 17 BRPM | BODY MASS INDEX: 29.88 KG/M2 | DIASTOLIC BLOOD PRESSURE: 89 MMHG

## 2021-03-17 DIAGNOSIS — E11.9 TYPE 2 DIABETES MELLITUS WITHOUT COMPLICATION, WITHOUT LONG-TERM CURRENT USE OF INSULIN (H): ICD-10-CM

## 2021-03-17 DIAGNOSIS — Z23 ENCOUNTER FOR IMMUNIZATION: ICD-10-CM

## 2021-03-17 DIAGNOSIS — J84.9 ILD (INTERSTITIAL LUNG DISEASE) (H): Primary | ICD-10-CM

## 2021-03-17 DIAGNOSIS — J84.9 ILD (INTERSTITIAL LUNG DISEASE) (H): ICD-10-CM

## 2021-03-17 LAB
6 MIN WALK (FT): 1150 FT
6 MIN WALK (M): 351 M
ALBUMIN SERPL-MCNC: 3.7 G/DL (ref 3.4–5)
ALP SERPL-CCNC: 74 U/L (ref 40–150)
ALT SERPL W P-5'-P-CCNC: 24 U/L (ref 0–50)
ANION GAP SERPL CALCULATED.3IONS-SCNC: 3 MMOL/L (ref 3–14)
AST SERPL W P-5'-P-CCNC: 17 U/L (ref 0–45)
BILIRUB DIRECT SERPL-MCNC: 0.1 MG/DL (ref 0–0.2)
BILIRUB SERPL-MCNC: 0.4 MG/DL (ref 0.2–1.3)
BUN SERPL-MCNC: 12 MG/DL (ref 7–30)
CALCIUM SERPL-MCNC: 9.6 MG/DL (ref 8.5–10.1)
CHLORIDE SERPL-SCNC: 105 MMOL/L (ref 94–109)
CO2 SERPL-SCNC: 31 MMOL/L (ref 20–32)
CREAT SERPL-MCNC: 0.69 MG/DL (ref 0.52–1.04)
CRP SERPL-MCNC: <2.9 MG/L (ref 0–8)
DLCOCOR-%PRED-PRE: 75 %
DLCOCOR-PRE: 13.88 ML/MIN/MMHG
DLCOUNC-%PRED-PRE: 72 %
DLCOUNC-PRE: 13.34 ML/MIN/MMHG
DLCOUNC-PRED: 18.3 ML/MIN/MMHG
ERV-%PRED-PRE: 41 %
ERV-PRE: 0.2 L
ERV-PRED: 0.48 L
ERYTHROCYTE [DISTWIDTH] IN BLOOD BY AUTOMATED COUNT: 12.3 % (ref 10–15)
EXPTIME-PRE: 3.09 SEC
FEF2575-%PRED-PRE: 192 %
FEF2575-PRE: 3.4 L/SEC
FEF2575-PRED: 1.76 L/SEC
FEFMAX-%PRED-PRE: 103 %
FEFMAX-PRE: 5.7 L/SEC
FEFMAX-PRED: 5.49 L/SEC
FEV1-%PRED-PRE: 80 %
FEV1-PRE: 1.59 L
FEV1FEV6-PRE: 94 %
FEV1FEV6-PRED: 79 %
FEV1FVC-PRE: 94 %
FEV1FVC-PRED: 79 %
FEV1SVC-PRE: 97 %
FEV1SVC-PRED: 69 %
FIFMAX-PRE: 3.7 L/SEC
FVC-%PRED-PRE: 67 %
FVC-PRE: 1.7 L
FVC-PRED: 2.5 L
GFR SERPL CREATININE-BSD FRML MDRD: 89 ML/MIN/{1.73_M2}
GLUCOSE SERPL-MCNC: 154 MG/DL (ref 70–99)
HBA1C MFR BLD: 6.1 % (ref 0–5.6)
HCT VFR BLD AUTO: 36.7 % (ref 35–47)
HGB BLD-MCNC: 12.2 G/DL (ref 11.7–15.7)
IC-%PRED-PRE: 60 %
IC-PRE: 1.43 L
IC-PRED: 2.37 L
MCH RBC QN AUTO: 30.5 PG (ref 26.5–33)
MCHC RBC AUTO-ENTMCNC: 33.2 G/DL (ref 31.5–36.5)
MCV RBC AUTO: 92 FL (ref 78–100)
PLATELET # BLD AUTO: 289 10E9/L (ref 150–450)
POTASSIUM SERPL-SCNC: 4.3 MMOL/L (ref 3.4–5.3)
PROT SERPL-MCNC: 8.3 G/DL (ref 6.8–8.8)
RBC # BLD AUTO: 4 10E12/L (ref 3.8–5.2)
SODIUM SERPL-SCNC: 139 MMOL/L (ref 133–144)
VA-%PRED-PRE: 70 %
VA-PRE: 3.05 L
VC-%PRED-PRE: 57 %
VC-PRE: 1.63 L
VC-PRED: 2.85 L
WBC # BLD AUTO: 8.1 10E9/L (ref 4–11)

## 2021-03-17 PROCEDURE — G0463 HOSPITAL OUTPT CLINIC VISIT: HCPCS | Mod: 25

## 2021-03-17 PROCEDURE — 99214 OFFICE O/P EST MOD 30 MIN: CPT | Mod: 25 | Performed by: INTERNAL MEDICINE

## 2021-03-17 PROCEDURE — 83036 HEMOGLOBIN GLYCOSYLATED A1C: CPT | Performed by: PATHOLOGY

## 2021-03-17 PROCEDURE — 86140 C-REACTIVE PROTEIN: CPT | Performed by: PATHOLOGY

## 2021-03-17 PROCEDURE — 80076 HEPATIC FUNCTION PANEL: CPT | Performed by: PATHOLOGY

## 2021-03-17 PROCEDURE — 94729 DIFFUSING CAPACITY: CPT | Performed by: INTERNAL MEDICINE

## 2021-03-17 PROCEDURE — 80048 BASIC METABOLIC PNL TOTAL CA: CPT | Performed by: PATHOLOGY

## 2021-03-17 PROCEDURE — 94375 RESPIRATORY FLOW VOLUME LOOP: CPT | Performed by: INTERNAL MEDICINE

## 2021-03-17 PROCEDURE — 94618 PULMONARY STRESS TESTING: CPT | Performed by: INTERNAL MEDICINE

## 2021-03-17 PROCEDURE — 99207 PR NO CHARGE LOS: CPT

## 2021-03-17 PROCEDURE — 36415 COLL VENOUS BLD VENIPUNCTURE: CPT | Performed by: PATHOLOGY

## 2021-03-17 PROCEDURE — 85027 COMPLETE CBC AUTOMATED: CPT | Performed by: PATHOLOGY

## 2021-03-17 ASSESSMENT — MIFFLIN-ST. JEOR: SCORE: 1219.89

## 2021-03-17 ASSESSMENT — PAIN SCALES - GENERAL: PAINLEVEL: NO PAIN (0)

## 2021-03-17 NOTE — NURSING NOTE
Chief Complaint   Patient presents with     RECHECK     Return Interstitial Lung     Medications reviewed and vital signs taken.   Yamilka Otero, CMA

## 2021-03-17 NOTE — LETTER
3/17/2021          RE: Krystian Boland  [address]  [place] [address]        Dear Colleague,    Thank you for referring your patient, Krystian Boland, to the [facility] LUNG [facility] AND [facility]. Please see a copy of my visit note below.    HISTORY OF PRESENT ILLNESS:  The patient is a 69-year-old female with interstitial lung disease secondary to rheumatoid arthritis.  She has been treated with Plaquenil for it and previously prednisone but has been off prednisone for some time.  She reports a cough that in the morning is productive of sputum but during the rest of the day is basically a dry cough.  The cough is a little better today.  She is not on oxygen therapy.  She was seen in followup with Rheumatology in November 2020 with joint symptoms.  She was given a 3 week course of Prednisone.     Today she is noticing some pain/swelling/stiffness in her hands. Her breathing symptoms are unchanged since prior visit. She was initiated on Ofev for progressive ILD in September 2020. She is having nausea associated with the Ofev and has been off of it for 15 days. LFTs were normal today.      PHYSICAL EXAMINATION:   VITAL SIGNS:    O2 sats are 100% on room air  HEENT:  She is anicteric.  Mouth and throat without lesions.   NECK:  Supple without adenopathy or thyromegaly.   CHEST:  She has crackles half way up bilaterally - no change.   HEART:  Regular rate and rhythm, normal S1 and S2.   ABDOMEN:  Nontender.   EXTREMITIES:  Without clubbing, cyanosis or edema.   JOINTS:  Maybe a little bit of bogginess, but otherwise, I do not see a lot of active inflammation within her joints.   SKIN:  No rash.      PULMONARY FUNCTION TESTS:   FEV1; 1.59 (80%)  FVC 1.7 (67%)  DLCO 72%     Imaging: September 2020 chest CT showed progression of fibrotic changes without signifcant GGOs     Labs: CRP <2.9 ; LFTs WNL today     ASSESSMENT AND PLAN:  In summary then, Krystian is a  69-year-old with interstitial lung disease secondary to RA.  She remains on Plaquenil.   PFTs and chest CT showed evidence that the fibrosis is progressing and she was started on Ofev September 2020.  She stopped it for nausea. Will have her restart it at 1 tablet per day for 1 month. If tolerated then instructed to increase to 1 tablet BID. PFTs unchanged today. Continue to follow LFTs. Continue with Tessalon Perles 200 mg t.i.d. for her cough.  I will follow up with her in ~ 3 months with repeat labs & pfts. If not tolerating OFEV then may need to consider Cellcept.  In addition, I have encouraged her to  followup with Dr. Staley in [facility]ology Clinic.    Patient encouraged to get covid vaccine. She is worried that it will make her RA worse.        Tristan Cross MD  Pulmonary/Critical Care  March 17, 2021

## 2021-03-17 NOTE — PROGRESS NOTES
HISTORY OF PRESENT ILLNESS:  The patient is a 69-year-old female with interstitial lung disease secondary to rheumatoid arthritis.  She has been treated with Plaquenil for it and previously prednisone but has been off prednisone for some time.  She reports a cough that in the morning is productive of sputum but during the rest of the day is basically a dry cough.  The cough is a little better today.  She is not on oxygen therapy.  She was seen in followup with Rheumatology in November 2020 with joint symptoms.  She was given a 3 week course of Prednisone.     Today she is noticing some pain/swelling/stiffness in her hands. Her breathing symptoms are unchanged since prior visit. She was initiated on Ofev for progressive ILD in September 2020. She is having nausea associated with the Ofev and has been off of it for 15 days. LFTs were normal today.      PHYSICAL EXAMINATION:   VITAL SIGNS:    O2 sats are 100% on room air  HEENT:  She is anicteric.  Mouth and throat without lesions.   NECK:  Supple without adenopathy or thyromegaly.   CHEST:  She has crackles half way up bilaterally - no change.   HEART:  Regular rate and rhythm, normal S1 and S2.   ABDOMEN:  Nontender.   EXTREMITIES:  Without clubbing, cyanosis or edema.   JOINTS:  Maybe a little bit of bogginess, but otherwise, I do not see a lot of active inflammation within her joints.   SKIN:  No rash.      PULMONARY FUNCTION TESTS:   FEV1; 1.59 (80%)  FVC 1.7 (67%)  DLCO 72%     Imaging: September 2020 chest CT showed progression of fibrotic changes without signifcant GGOs     Labs: CRP <2.9 ; LFTs WNL today     ASSESSMENT AND PLAN:  In summary then, Krystian is a 69-year-old with interstitial lung disease secondary to RA.  She remains on Plaquenil.   PFTs and chest CT showed evidence that the fibrosis is progressing and she was started on Ofev September 2020.  She stopped it for nausea. Will have her restart it at 1 tablet per day for 1 month. If tolerated then  instructed to increase to 1 tablet BID. PFTs unchanged today. Continue to follow LFTs. Continue with Tessalon Perles 200 mg t.i.d. for her cough.  I will follow up with her in ~ 3 months with repeat labs & pfts. If not tolerating OFEV then may need to consider Cellcept.  In addition, I have encouraged her to  followup with Dr. Staley in Rheumatology Clinic.    Patient encouraged to get covid vaccine. She is worried that it will make her RA worse.        Tristan Cross MD  Pulmonary/Critical Care  March 17, 2021

## 2021-04-05 ENCOUNTER — PATIENT OUTREACH (OUTPATIENT)
Dept: GERIATRIC MEDICINE | Facility: CLINIC | Age: 71
End: 2021-04-05

## 2021-04-05 NOTE — PROGRESS NOTES
[facility] Care Coordination Contact    Received a call from member's daughter, David, requesting assistance with getting member set up with a COVID19 vaccine. Added member to the COVID19 list at the [facility] location. Reviewed with daughter that the vaccine will be given at the [facility] location and that someone from our office will notify her of the date and time. She has requested any weekdays after 3:30pm.  JAQUELIN Langley  [facility]  [phone]

## 2021-04-21 ENCOUNTER — IMMUNIZATION (OUTPATIENT)
Dept: NURSING | Facility: CLINIC | Age: 71
End: 2021-04-21
Payer: COMMERCIAL

## 2021-04-21 PROCEDURE — 91300 PR COVID VAC PFIZER DIL RECON 30 MCG/0.3 ML IM: CPT

## 2021-04-21 PROCEDURE — 0001A PR COVID VAC PFIZER DIL RECON 30 MCG/0.3 ML IM: CPT

## 2021-05-03 DIAGNOSIS — E11.9 TYPE 2 DIABETES MELLITUS WITHOUT COMPLICATION, WITHOUT LONG-TERM CURRENT USE OF INSULIN (H): ICD-10-CM

## 2021-05-05 NOTE — TELEPHONE ENCOUNTER
"Failed protocol.  please route to  team if patient needs an appointment     Charmaine AVENDAÑORN BSN  [facility]  [phone]    Requested Prescriptions   Pending Prescriptions Disp Refills     metFORMIN (GLUCOPHAGE) 1000 MG tablet [Pharmacy Med Name: METFORMIN 1000MG TABLETS] 180 tablet 0     Sig: TAKE 1 TABLET(1000 MG) BY MOUTH TWICE DAILY WITH MEALS       Biguanide Agents Failed - 5/3/2021  3:39 AM        Failed - Recent (6 mo) or future (30 days) visit within the authorizing provider's specialty     Patient had office visit in the last 6 months or has a visit in the next 30 days with authorizing provider or within the authorizing provider's specialty.  See \"Patient Info\" tab in inbasket, or \"Choose Columns\" in Meds & Orders section of the refill encounter.            Passed - Patient is age 10 or older        Passed - Patient has documented A1c within the specified period of time.     If HgbA1C is 8 or greater, it needs to be on file within the past 3 months.  If less than 8, must be on file within the past 6 months.     Recent Labs   Lab Test 03/17/21  0823   A1C 6.1*             Passed - Patient's CR is NOT>1.4 OR Patient's EGFR is NOT<45 within past 12 mos.     Recent Labs   Lab Test 03/17/21  0823   GFRESTIMATED 89   GFRESTBLACK >90       Recent Labs   Lab Test 03/17/21  0823   CR 0.69             Passed - Patient does NOT have a diagnosis of CHF.        Passed - Medication is active on med list        Passed - Patient is not pregnant        Passed - Patient has not had a positive pregnancy test within the past 12 mos.              "

## 2021-05-06 NOTE — PROGRESS NOTES
[facility]  Rheumatology Clinic  Harjit Staley MD  2021     Name: Krystian Boland  MRN: [id number]  Age: 70 year old  : 1950  Referring provider: Referred Self     Assessment and Plan:  #High positive rheumatoid factor and anti-CCP noted in 2018:  Patient reports mild polyarthralgia, stiffness and swelling.  Video exam shows no gross synovitis.  Laboratory evaluation on 2021 showed normal comprehensive metabolic panel, normal CBC.      I think that rheumatoid arthritis is mildly active.  It is possible that osteoarthritis could be contributing to knee and hand pain as well.  I recommend checking hand and knee x-rays to assess possible erosive disease.  Since interstitial lung disease directed Ofev therapy is now better tolerated, and is up associated with stabilization of pulmonary function parameters, I recommend deferring on switch to mycophenolate or rituximab.  However, I do recommend augmentation of immunomodulatory therapy with sulfasalazine.  I recommend combining sulfasalazine and hydroxychloroquine for better antiarthritis effect.    We discussed the need for annual ophthalmologic monitoring while on plaquenil. She in  had follow up with a retinal specialist with exam showing no evidence of retinal toxicity.    #Interstitial lung disease:   Patient relates stable pulmonary symptoms, and former dry cough has resolved.  Patient describes excellent functional status with minor limitation in ambulating without oxygen. PFTs in 2021 were unchanged compared with 2020.  I agree with patient's plan to follow-up with Dr. Cross, and to continue the lower dose of Ofev for the moment, which is better tolerated.    Plan:  1.  Plain x-rays of hands and knees to rule out erosions.  2.  Continue hydroxychloroquine 200 mg 2 tabs daily.  While taking hydroxychloroquine, undergo annual ophthalmology evaluation to monitor for rare retinal Plaquenil toxicity.  3.  Start  sulfasalazine.  Take 1 tablet (500 mg) twice daily for 1 week.  Then increase to 2 tablets twice daily for ongoing treatment.  4.  Check blood work in late June to assess kidney and liver function.      F/u 4 mos  Harjit Staley M.D.  Staff Rheumatologist, [facility]  Pager [phone]        HPI:   Krystian Boland has a history of ILD and RA and presents for follow-up. I last saw the patient in  at which time she reported increasing joint swelling, stiffness, or altered range of motion.  If planned Ofev treatment were not well-tolerated, or if progressive interstitial lung disease were not stabilized over the course of the coming months, I recommended consideration of either rituximab or mycophenolate for addressing both inflammatory arthritis and interstitial lung disease.  For the short-term, I recommended a 3-week course of low-dose prednisone for inflammatory arthritis that is active and incompletely responsive to Plaquenil monotherapy.    Daughter acted as  throughout this examination.    Interval history 05-:    Patient saw Dr. Cross in follow-up of interstitial lung disease in March 17, 2021.  Impression was of interstitial lung disease secondary to rheumatoid arthritis with progression of fibrosis.  History of Ofev treatment since September 2020 was reviewed.  PFTs were unchanged.  Recommendation was to continue Ofev, and if the medication were not well-tolerated, to consider CellCept.    She notes intermittent joint swelling; hips and hands are painful. Her R hip > L hurts with   There is pain around the L > R hip. Daughter notes that she limps with dragging the left leg. There is minimal knee swelling. Pain is notable most in the morning, it improves with massage and with walking. She walks 1/2 block outside before knee pain and breathing limit her. The R 3rd finger is painful with bending. L 5th finger hurts too. Morning stiffness in the hands is not marked. Exercise and moist  heat helps the hands.    She restarted Ofev after last visit with Dr. Cross. Prior nausea is less prominent now that she is taking just one dose daily. Breathing is stable; dry cough has improved.    Interval history 11-:  Ms. Boland was interview via telephone with the assistance of her daughter acting as Marcie .     Since her last visit, Ms. Boland has had repeat PFTs demonstrating decreased FVC and limited diffusion capacity. Follow up CT shows progression of fibrosis since last imaging in 2018. On follow up with Dr. Cross in pulmonary clinic she was started on Ofev. She seems to be tolerating the medication well, but does endorse some nausea with its use. She continues to have a dry cough, but denies any new shortness of breath. She does not use home oxygen.     Ms. Boland has also had a follow up with retinal specialist 7/2020, as her prior ophthalmology appointment had some questionable retinal changes in the setting of prolonged Plauenil use. OCT was performed, which was unremarkable.    Today, Ms. Boland reports that she is having significant joint stiffness and pain. She stated that approximately 1 week ago she began having bilateral knee pain and stiffness. She also endorsed some mild swelling to the joints. This progressed and now includes bilateral knees, feet (L>R), left ankle, bilateral hands (R>L) and left elbow. She does endorse some warmth and swelling to multiple joints, most notably in her left ankle and right hands. These symptoms are most notable in the morning, and she has to stay in bed for 20-30 minutes after waking to massage and stretch her joints in order to feel as if she can move. It has caused significant difficulty with walking. Associated symptoms include fatigue. She has been taking some tylenol, which has been modestly helpful with the pain. She continues on Plaquenil single therapy for her inflammatory arthritis at this time.      Interval history  4-17-20: This telephone visit was conducted with the aid of an  with Tibetan language skills, and with the aid of the patient's daughter.  Patient was seen by Dr. Cross in pulmonology on February 26, 2020 in follow-up of interstitial lung disease.  Ongoing stable shortness of breath and low intensity cough were noted.  Assessment was of interstitial lung disease associated with rheumatoid arthritis functionally stable.  Tessalon Perles were prescribed for the cough, and CT of the chest and pulmonary function tests were planned for follow-up assessment.    Her cough is now better. She is able to walk more than 100 meters without stopping.  She is not having any morning stiffness, and there is minimal joint pain.  No restriction in activities of daily living.  No recent use of pain medication or anti-inflammatories.  She continues with Plaquenil 200 mg 2 tablets once daily.    Patient was seen by ophthalmology on February 26, 2020  Plaquenil retinal toxicity monitoring.  Outer retinal changes on OCT were noted.  Concern over interference of these changes with later monitoring for Plaquenil toxicity was raised, and patient was referred to the retina service for further screening.  No contraindication to continuing Plaquenil was identified.    History 5-19:  she reports that she is doing about the same since our last visit. Transient joint pain in summer 2018 was most prominent in her hands.  But her joint pain and finger swelling has disappeared since starting prednisone 5mg, which she has taken for the past year. Denies morning stiffness. She has not taken plaquenil since being prescribed last year, noting that she become anxious to add another pill to her current regimen. Is instead using prednisone to alleviate joint pain. Uses ibuprofen to treat sciatica.     She also reports that her breathing is stable and she is able to walk 100 meters without experiencing shortness of breath. Her coughing has also  improved. Has tried pulmonary therapy in the past, but did not feel that this helped. She plans on following up with Dr. Cross in the near future.    Review of Systems:   Pertinent items are noted in HPI or as below, remainder of complete ROS is negative.     No recent problems with hearing or vision. No swallowing problems.   No breathing difficulty, shortness of breath, or wheezing. + Continues to have dry cough.  No chest pain or palpitations.  No heart burn, indigestion, abdominal pain, vomiting, diarrhea. + Nausea with Ofev.  No urination problems, no bloody, cloudy urine, no dysuria.  No numbing, tingling, weakness.  No headaches or confusion.  No rashes. No easy bleeding or bruising.   + Arthralgias, stiffness, swelling of multiple joints.  + Fatigue.    Active Medications:   Current Outpatient Medications   Medication     aspirin 81 MG chewable tablet     benzonatate (TESSALON) 200 MG capsule     blood glucose (NO BRAND SPECIFIED) test strip     famotidine (PEPCID) 40 MG tablet     hydroxychloroquine (PLAQUENIL) 200 MG tablet     metFORMIN (GLUCOPHAGE) 1000 MG tablet     nintedanib (OFEV) 150 MG capsule     STATIN NOT PRESCRIBED (INTENTIONAL)     vitamin D3 (CHOLECALCIFEROL) 50 mcg (2000 units) tablet     No current facility-administered medications for this visit.         Allergies:   Atorvastatin      Past Medical History:  Obesity  Osteopenia  Type 2 diabetes mellitus without complication, without long-term current use of insulin  Interstitial lung disease  Abdominal tuberculosis (intestinal, 20 years ago)     Past Surgical History:  Open cholecystectomy    Family History:   Mother - dementia  Father - cerebrovascular disease  Brother - cerebrovascular disease, hypertension  No family history of - diabetes, myocardial infarction, early onset C.A.D., breast cancer, ovarian cancer, colon cancer, rheumatoid arthritis  Family history of - high blood pressure  (mentions that medical history is unlikely to be  comprehensive because of poor/infrequent medical records)      Social History:   No smoking or tobacco use  Rare alcohol use (1-2 times a year)   with 8 children, 13 grandchildren, cooks for the family     Physical Exam:   Physical Exam:        Wt Readings from Last 4 Encounters:   05/31/19 73.8 kg (162 lb 12.8 oz)   04/23/19 75.5 kg (166 lb 6.4 oz)   02/06/19 76.2 kg (168 lb)   11/21/18 73.7 kg (162 lb 6.4 oz)      Constitutional: Well-developed, appearing stated age; cooperative  Eyes: Normal EOM, PERRLA, vision, conjunctiva, sclera  ENT: Normal external ears, nose, hearing, lips, teeth, gums, throat.  Neck: No mass or thyroid enlargement  Resp: Breathing unlabored on video exam  MS: Walking was narrow based and smooth.  No visible redness or gross effusion on the left knee at the indicated site of maximum pain on the medial joint line.  Clubbing in finger nails, but good alignment of MCPs and PIPs. Normal fist formation.  Skin: No nail pitting, alopecia, rash, nodules or lesions.  Neuro: Normal cranial nerves      Laboratory:   RHEUM RESULTS Latest Ref Rng & Units 10/22/2020 12/16/2020 3/17/2021   SED RATE 0 - 30 mm/h - - -   CRP, INFLAMMATION 0.0 - 8.0 mg/L <2.9 - <2.9   CK TOTAL 30 - 225 U/L - - -   RHEUMATOID FACTOR <20 IU/mL - - -   AST 0 - 45 U/L 29 26 17   ALT 0 - 50 U/L 53(H) 43 24   ALBUMIN 3.4 - 5.0 g/dL 3.4 3.7 3.7   WBC 4.0 - 11.0 10e9/L 6.5 - 8.1   RBC 3.8 - 5.2 10e12/L 3.97 - 4.00   HGB 11.7 - 15.7 g/dL 11.7 - 12.2   HCT 35.0 - 47.0 % 35.5 - 36.7   MCV 78 - 100 fl 89 - 92   MCHC 31.5 - 36.5 g/dL 33.0 - 33.2   RDW 10.0 - 15.0 % 13.2 - 12.3    - 450 10e9/L 325 - 289   CREATININE 0.52 - 1.04 mg/dL 0.54 - 0.69   GFR ESTIMATE, IF BLACK >60 mL/min/[1.73:m2] >90 - >90   GFR ESTIMATE >60 mL/min/[1.73:m2] >90 - 89    - 1,620 mg/dL - - -   HEPATITIS C ANTIBODY NR - - -       Rheumatoid Factor   Date Value Ref Range Status   05/23/2018 700 (H) <20 IU/mL Final     Cyclic Citrullinated  Peptide Antibody, IgG   Date Value Ref Range Status   05/23/2018 >340 (H) <7 U/mL Final     Comment:     Positive     Scleroderma Antibody Scl-70 CLAUDIO IgG   Date Value Ref Range Status   05/23/2018 <0.2 0.0 - 0.9 AI Final     Comment:     Negative  Antibody index (AI) values reflect qualitative changes in antibody   concentration that cannot be directly associated with clinical condition or   disease state.       SSA (Ro) (CLAUDIO) Antibody, IgG   Date Value Ref Range Status   05/23/2018 <0.2 0.0 - 0.9 AI Final     Comment:     Negative  Antibody index (AI) values reflect qualitative changes in antibody   concentration that cannot be directly associated with clinical condition or   disease state.       SSB (La) (CLAUDIO) Antibody, IgG   Date Value Ref Range Status   05/23/2018 <0.2 0.0 - 0.9 AI Final     Comment:     Negative  Antibody index (AI) values reflect qualitative changes in antibody   concentration that cannot be directly associated with clinical condition or   disease state.       LYNDA interpretation   Date Value Ref Range Status   05/23/2018 Negative NEG^Negative Final     Comment:                                        Reference range:  <1:40  NEGATIVE  1:40 - 1:80  BORDERLINE POSITIVE  >1:80 POSITIVE         Neutrophil Cytoplasmic IgG Antibody   Date Value Ref Range Status   05/23/2018 <1:20 <1:20 Final     Comment:     (Note)  The ANCA IFA is <1:20; therefore, no further testing will   be performed.  INTERPRETIVE INFORMATION: Anti-Neutrophil Cyto Ab, IgG  Neutrophil Cytoplasmic Antibodies (C-ANCA = granular   cytoplasmic staining, P-ANCA = perinuclear staining) are   found in the serum of over 90 percent of patients with   certain necrotizing systemic vasculitides, and usually in   less than 5 percent of patients with collagen vascular   disease or arthritis.  Performed by [facility],  [address] [place] [id number] [phone]  [email or web address] Marty Wright MD, Lab. Director         IGG   Date Value  Ref Range Status   05/23/2018 1,980 (H) 695 - 1,620 mg/dL Final     Scleroderma Antibody Scl-70 CLAUDIO IgG   Date Value Ref Range Status   05/23/2018 <0.2 0.0 - 0.9 AI Final     Comment:     Negative  Antibody index (AI) values reflect qualitative changes in antibody   concentration that cannot be directly associated with clinical condition or   disease state.       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    PFT Interpretation 9/16/20:  The decrease in the FVC  may represent restrictive lung physiology v. air trapping.     Lung volumes would be necessary to confirm.   Moderate diffusion defect.   The diffusing capacity was not corrected for the patient's hemoglobin.         Patient's daughter will be present to interpret and help with video visit.      Krystian is a 70 year old who is being evaluated via a billable video visit.      How would you like to obtain your AVS? MyChart    Will anyone else be joining your video visit? No      Video Start Time: 7:37 AM  Video-Visit Details    Type of service:  Video Visit    Video End Time:8:00 AM    Originating Location (pt. Location): Home    Distant Location (provider location):  [facility] [place]     Platform used for Video Visit: [email or web address]

## 2021-05-07 ENCOUNTER — VIRTUAL VISIT (OUTPATIENT)
Dept: RHEUMATOLOGY | Facility: CLINIC | Age: 71
End: 2021-05-07
Attending: INTERNAL MEDICINE
Payer: COMMERCIAL

## 2021-05-07 DIAGNOSIS — M25.561 CHRONIC PAIN OF BOTH KNEES: Primary | ICD-10-CM

## 2021-05-07 DIAGNOSIS — R76.8 RHEUMATOID FACTOR POSITIVE WITH CYCLIC CITRULLINATED PEPTIDE (CCP) ANTIBODY NEGATIVE: ICD-10-CM

## 2021-05-07 DIAGNOSIS — G89.29 CHRONIC PAIN OF BOTH KNEES: Primary | ICD-10-CM

## 2021-05-07 DIAGNOSIS — M25.562 CHRONIC PAIN OF BOTH KNEES: Primary | ICD-10-CM

## 2021-05-07 PROCEDURE — 99214 OFFICE O/P EST MOD 30 MIN: CPT | Mod: 95 | Performed by: INTERNAL MEDICINE

## 2021-05-07 RX ORDER — HYDROXYCHLOROQUINE SULFATE 200 MG/1
200 TABLET, FILM COATED ORAL 2 TIMES DAILY
Qty: 180 TABLET | Refills: 3 | Status: SHIPPED | OUTPATIENT
Start: 2021-05-07 | End: 2021-09-23

## 2021-05-07 RX ORDER — SULFASALAZINE 500 MG/1
TABLET, DELAYED RELEASE ORAL
Qty: 120 TABLET | Refills: 3 | Status: SHIPPED | OUTPATIENT
Start: 2021-05-07 | End: 2021-06-17

## 2021-05-07 NOTE — LETTER
2021       RE: Krystian Boland  [id number]  [facility] [address]     Dear Colleague,    Thank you for referring your patient, Krystian Boland, to the [facility] RHEUMATOLOGY CLINIC [place] at [facility]. Please see a copy of my visit note below.    [facility]  Rheumatology Clinic  Harjit Staley MD  2021     Name: Krystian Boland  MRN: [id number]  Age: 70 year old  : 1950  Referring provider: Referred Self     Assessment and Plan:  #High positive rheumatoid factor and anti-CCP noted in 2018:  Patient reports mild polyarthralgia, stiffness and swelling.  Video exam shows no gross synovitis.  Laboratory evaluation on 2021 showed normal comprehensive metabolic panel, normal CBC.      I think that rheumatoid arthritis is mildly active.  It is possible that osteoarthritis could be contributing to knee and hand pain as well.  I recommend checking hand and knee x-rays to assess possible erosive disease.  Since interstitial lung disease directed Ofev therapy is now better tolerated, and is up associated with stabilization of pulmonary function parameters, I recommend deferring on switch to mycophenolate or rituximab.  However, I do recommend augmentation of immunomodulatory therapy with sulfasalazine.  I recommend combining sulfasalazine and hydroxychloroquine for better antiarthritis effect.    We discussed the need for annual ophthalmologic monitoring while on plaquenil. She in  had follow up with a retinal specialist with exam showing no evidence of retinal toxicity.    #Interstitial lung disease:   Patient relates stable pulmonary symptoms, and former dry cough has resolved.  Patient describes excellent functional status with minor limitation in ambulating without oxygen. PFTs in 2021 were unchanged compared with 2020.  I agree with patient's plan to follow-up with Dr. Cross, and to continue the  lower dose of Ofev for the moment, which is better tolerated.    Plan:  1.  Plain x-rays of hands and knees to rule out erosions.  2.  Continue hydroxychloroquine 200 mg 2 tabs daily.  While taking hydroxychloroquine, undergo annual ophthalmology evaluation to monitor for rare retinal Plaquenil toxicity.  3.  Start sulfasalazine.  Take 1 tablet (500 mg) twice daily for 1 week.  Then increase to 2 tablets twice daily for ongoing treatment.  4.  Check blood work in late June to assess kidney and liver function.      F/u 4 mos  Harjit Staley M.D.  Staff Rheumatologist, [facility]  Pager [phone]        HPI:   Krystian Boland has a history of ILD and RA and presents for follow-up. I last saw the patient in  at which time she reported increasing joint swelling, stiffness, or altered range of motion.  If planned Ofev treatment were not well-tolerated, or if progressive interstitial lung disease were not stabilized over the course of the coming months, I recommended consideration of either rituximab or mycophenolate for addressing both inflammatory arthritis and interstitial lung disease.  For the short-term, I recommended a 3-week course of low-dose prednisone for inflammatory arthritis that is active and incompletely responsive to Plaquenil monotherapy.    Daughter acted as  throughout this examination.    Interval history 05-:    Patient saw Dr. Cross in follow-up of interstitial lung disease in March 17, 2021.  Impression was of interstitial lung disease secondary to rheumatoid arthritis with progression of fibrosis.  History of Ofev treatment since September 2020 was reviewed.  PFTs were unchanged.  Recommendation was to continue Ofev, and if the medication were not well-tolerated, to consider CellCept.    She notes intermittent joint swelling; hips and hands are painful. Her R hip > L hurts with   There is pain around the L > R hip. Daughter notes that she limps with dragging the left  leg. There is minimal knee swelling. Pain is notable most in the morning, it improves with massage and with walking. She walks 1/2 block outside before knee pain and breathing limit her. The R 3rd finger is painful with bending. L 5th finger hurts too. Morning stiffness in the hands is not marked. Exercise and moist heat helps the hands.    She restarted Ofev after last visit with Dr. Cross. Prior nausea is less prominent now that she is taking just one dose daily. Breathing is stable; dry cough has improved.    Interval history 11-:  Ms. Boland was interview via telephone with the assistance of her daughter acting as Tibetan .     Since her last visit, Ms. Boland has had repeat PFTs demonstrating decreased FVC and limited diffusion capacity. Follow up CT shows progression of fibrosis since last imaging in 2018. On follow up with Dr. Cross in pulmonary clinic she was started on Ofev. She seems to be tolerating the medication well, but does endorse some nausea with its use. She continues to have a dry cough, but denies any new shortness of breath. She does not use home oxygen.     Ms. Boland has also had a follow up with retinal specialist 7/2020, as her prior ophthalmology appointment had some questionable retinal changes in the setting of prolonged Plauenil use. OCT was performed, which was unremarkable.    Today, Ms. Boland reports that she is having significant joint stiffness and pain. She stated that approximately 1 week ago she began having bilateral knee pain and stiffness. She also endorsed some mild swelling to the joints. This progressed and now includes bilateral knees, feet (L>R), left ankle, bilateral hands (R>L) and left elbow. She does endorse some warmth and swelling to multiple joints, most notably in her left ankle and right hands. These symptoms are most notable in the morning, and she has to stay in bed for 20-30 minutes after waking to massage and stretch her joints in  order to feel as if she can move. It has caused significant difficulty with walking. Associated symptoms include fatigue. She has been taking some tylenol, which has been modestly helpful with the pain. She continues on Plaquenil single therapy for her inflammatory arthritis at this time.      Interval history 4-17-20: This telephone visit was conducted with the aid of an  with Tibetan language skills, and with the aid of the patient's daughter.  Patient was seen by Dr. Cross in pulmonology on February 26, 2020 in follow-up of interstitial lung disease.  Ongoing stable shortness of breath and low intensity cough were noted.  Assessment was of interstitial lung disease associated with rheumatoid arthritis functionally stable.  Tessalon Perles were prescribed for the cough, and CT of the chest and pulmonary function tests were planned for follow-up assessment.    Her cough is now better. She is able to walk more than 100 meters without stopping.  She is not having any morning stiffness, and there is minimal joint pain.  No restriction in activities of daily living.  No recent use of pain medication or anti-inflammatories.  She continues with Plaquenil 200 mg 2 tablets once daily.    Patient was seen by ophthalmology on February 26, 2020  Plaquenil retinal toxicity monitoring.  Outer retinal changes on OCT were noted.  Concern over interference of these changes with later monitoring for Plaquenil toxicity was raised, and patient was referred to the retina service for further screening.  No contraindication to continuing Plaquenil was identified.    History 5-19:  she reports that she is doing about the same since our last visit. Transient joint pain in summer 2018 was most prominent in her hands.  But her joint pain and finger swelling has disappeared since starting prednisone 5mg, which she has taken for the past year. Denies morning stiffness. She has not taken plaquenil since being prescribed last year,  noting that she become anxious to add another pill to her current regimen. Is instead using prednisone to alleviate joint pain. Uses ibuprofen to treat sciatica.     She also reports that her breathing is stable and she is able to walk 100 meters without experiencing shortness of breath. Her coughing has also improved. Has tried pulmonary therapy in the past, but did not feel that this helped. She plans on following up with Dr. Cross in the near future.    Review of Systems:   Pertinent items are noted in HPI or as below, remainder of complete ROS is negative.     No recent problems with hearing or vision. No swallowing problems.   No breathing difficulty, shortness of breath, or wheezing. + Continues to have dry cough.  No chest pain or palpitations.  No heart burn, indigestion, abdominal pain, vomiting, diarrhea. + Nausea with Ofev.  No urination problems, no bloody, cloudy urine, no dysuria.  No numbing, tingling, weakness.  No headaches or confusion.  No rashes. No easy bleeding or bruising.   + Arthralgias, stiffness, swelling of multiple joints.  + Fatigue.    Active Medications:   Current Outpatient Medications   Medication     aspirin 81 MG chewable tablet     benzonatate (TESSALON) 200 MG capsule     blood glucose (NO BRAND SPECIFIED) test strip     famotidine (PEPCID) 40 MG tablet     hydroxychloroquine (PLAQUENIL) 200 MG tablet     metFORMIN (GLUCOPHAGE) 1000 MG tablet     nintedanib (OFEV) 150 MG capsule     STATIN NOT PRESCRIBED (INTENTIONAL)     vitamin D3 (CHOLECALCIFEROL) 50 mcg (2000 units) tablet     No current facility-administered medications for this visit.         Allergies:   Atorvastatin      Past Medical History:  Obesity  Osteopenia  Type 2 diabetes mellitus without complication, without long-term current use of insulin  Interstitial lung disease  Abdominal tuberculosis (intestinal, 20 years ago)     Past Surgical History:  Open cholecystectomy    Family History:   Mother - dementia  Father  - cerebrovascular disease  Brother - cerebrovascular disease, hypertension  No family history of - diabetes, myocardial infarction, early onset C.A.D., breast cancer, ovarian cancer, colon cancer, rheumatoid arthritis  Family history of - high blood pressure  (mentions that medical history is unlikely to be comprehensive because of poor/infrequent medical records)      Social History:   No smoking or tobacco use  Rare alcohol use (1-2 times a year)   with 8 children, 13 grandchildren, cooks for the family     Physical Exam:   Physical Exam:        Wt Readings from Last 4 Encounters:   05/31/19 73.8 kg (162 lb 12.8 oz)   04/23/19 75.5 kg (166 lb 6.4 oz)   02/06/19 76.2 kg (168 lb)   11/21/18 73.7 kg (162 lb 6.4 oz)      Constitutional: Well-developed, appearing stated age; cooperative  Eyes: Normal EOM, PERRLA, vision, conjunctiva, sclera  ENT: Normal external ears, nose, hearing, lips, teeth, gums, throat.  Neck: No mass or thyroid enlargement  Resp: Breathing unlabored on video exam  MS: Walking was narrow based and smooth.  No visible redness or gross effusion on the left knee at the indicated site of maximum pain on the medial joint line.  Clubbing in finger nails, but good alignment of MCPs and PIPs. Normal fist formation.  Skin: No nail pitting, alopecia, rash, nodules or lesions.  Neuro: Normal cranial nerves      Laboratory:   RHEUM RESULTS Latest Ref Rng & Units 10/22/2020 12/16/2020 3/17/2021   SED RATE 0 - 30 mm/h - - -   CRP, INFLAMMATION 0.0 - 8.0 mg/L <2.9 - <2.9   CK TOTAL 30 - 225 U/L - - -   RHEUMATOID FACTOR <20 IU/mL - - -   AST 0 - 45 U/L 29 26 17   ALT 0 - 50 U/L 53(H) 43 24   ALBUMIN 3.4 - 5.0 g/dL 3.4 3.7 3.7   WBC 4.0 - 11.0 10e9/L 6.5 - 8.1   RBC 3.8 - 5.2 10e12/L 3.97 - 4.00   HGB 11.7 - 15.7 g/dL 11.7 - 12.2   HCT 35.0 - 47.0 % 35.5 - 36.7   MCV 78 - 100 fl 89 - 92   MCHC 31.5 - 36.5 g/dL 33.0 - 33.2   RDW 10.0 - 15.0 % 13.2 - 12.3    - 450 10e9/L 325 - 289   CREATININE 0.52 -  1.04 mg/dL 0.54 - 0.69   GFR ESTIMATE, IF BLACK >60 mL/min/[1.73:m2] >90 - >90   GFR ESTIMATE >60 mL/min/[1.73:m2] >90 - 89    - 1,620 mg/dL - - -   HEPATITIS C ANTIBODY NR - - -       Rheumatoid Factor   Date Value Ref Range Status   05/23/2018 700 (H) <20 IU/mL Final     Cyclic Citrullinated Peptide Antibody, IgG   Date Value Ref Range Status   05/23/2018 >340 (H) <7 U/mL Final     Comment:     Positive     Scleroderma Antibody Scl-70 CLAUDIO IgG   Date Value Ref Range Status   05/23/2018 <0.2 0.0 - 0.9 AI Final     Comment:     Negative  Antibody index (AI) values reflect qualitative changes in antibody   concentration that cannot be directly associated with clinical condition or   disease state.       SSA (Ro) (CLAUDIO) Antibody, IgG   Date Value Ref Range Status   05/23/2018 <0.2 0.0 - 0.9 AI Final     Comment:     Negative  Antibody index (AI) values reflect qualitative changes in antibody   concentration that cannot be directly associated with clinical condition or   disease state.       SSB (La) (CLAUDIO) Antibody, IgG   Date Value Ref Range Status   05/23/2018 <0.2 0.0 - 0.9 AI Final     Comment:     Negative  Antibody index (AI) values reflect qualitative changes in antibody   concentration that cannot be directly associated with clinical condition or   disease state.       LYNDA interpretation   Date Value Ref Range Status   05/23/2018 Negative NEG^Negative Final     Comment:                                        Reference range:  <1:40  NEGATIVE  1:40 - 1:80  BORDERLINE POSITIVE  >1:80 POSITIVE         Neutrophil Cytoplasmic IgG Antibody   Date Value Ref Range Status   05/23/2018 <1:20 <1:20 Final     Comment:     (Note)  The ANCA IFA is <1:20; therefore, no further testing will   be performed.  INTERPRETIVE INFORMATION: Anti-Neutrophil Cyto Ab, IgG  Neutrophil Cytoplasmic Antibodies (C-ANCA = granular   cytoplasmic staining, P-ANCA = perinuclear staining) are   found in the serum of over 90 percent of  patients with   certain necrotizing systemic vasculitides, and usually in   less than 5 percent of patients with collagen vascular   disease or arthritis.  Performed by [facility],  [address] [facility] [place] [address] [phone]  [email or web address], Marty Wright MD, Lab. Director         IGG   Date Value Ref Range Status   05/23/2018 1,980 (H) 695 - 1,620 mg/dL Final     Scleroderma Antibody Scl-70 CLAUDIO IgG   Date Value Ref Range Status   05/23/2018 <0.2 0.0 - 0.9 AI Final     Comment:     Negative  Antibody index (AI) values reflect qualitative changes in antibody   concentration that cannot be directly associated with clinical condition or   disease state.       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    PFT Interpretation 9/16/20:  The decrease in the FVC  may represent restrictive lung physiology v. air trapping.     Lung volumes would be necessary to confirm.   Moderate diffusion defect.   The diffusing capacity was not corrected for the patient's hemoglobin.         Patient's daughter will be present to interpret and help with video visit.      Krystian is a 70 year old who is being evaluated via a billable video visit.      How would you like to obtain your AVS? MyChart    Will anyone else be joining your video visit? No      Video Start Time: 7:37 AM  Video-Visit Details    Type of service:  Video Visit    Video End Time:8:00 AM    Originating Location (pt. Location): Home    Distant Location (provider location):  [facility] RHEUMATOLOGY CLINIC [place]     Platform used for Video Visit: Marianne        Again, thank you for allowing me to participate in the care of your patient.      Sincerely,    Harjit Staley MD

## 2021-05-07 NOTE — PATIENT INSTRUCTIONS
Impression:  1.  Gradually increasing multiple joint pain, especially morning predominant pain in the small joints of the fingers and knuckles, as well as walking associated pain in the knees.  Differential diagnosis includes flare of rheumatoid arthritis, but also includes progressive noninflammatory (osteoarthritis) disease.  I recommend checking knee and hand x-rays for evidence of inflammatory joint damage.  I recommend continuing hydroxychloroquine, but adding a low-dose of a second long-acting antiarthritis medication, sulfasalazine as well.  2.  Interstitial lung disease: Symptoms are stable, and recent pulmonary function tests suggested stable lung function.  I agree with continuing low-dose Ofev per Dr. Cross's office.    Plan:  1.  Plain x-rays of hands and knees to rule out erosions.  2.  Continue hydroxychloroquine 200 mg 2 tabs daily.  While taking hydroxychloroquine, undergo annual ophthalmology evaluation to monitor for rare retinal Plaquenil toxicity.  3.  Start sulfasalazine.  Take 1 tablet (500 mg) twice daily for 1 week.  Then increase to 2 tablets twice daily for ongoing treatment.  4.  Check blood work in late June to assess kidney and liver function.

## 2021-05-12 ENCOUNTER — IMMUNIZATION (OUTPATIENT)
Dept: NURSING | Facility: CLINIC | Age: 71
End: 2021-05-12
Attending: INTERNAL MEDICINE
Payer: COMMERCIAL

## 2021-05-12 PROCEDURE — 91300 PR COVID VAC PFIZER DIL RECON 30 MCG/0.3 ML IM: CPT

## 2021-05-12 PROCEDURE — 0002A PR COVID VAC PFIZER DIL RECON 30 MCG/0.3 ML IM: CPT

## 2021-05-24 ENCOUNTER — ANCILLARY PROCEDURE (OUTPATIENT)
Dept: GENERAL RADIOLOGY | Facility: CLINIC | Age: 71
End: 2021-05-24
Attending: INTERNAL MEDICINE
Payer: COMMERCIAL

## 2021-05-24 DIAGNOSIS — R76.8 RHEUMATOID FACTOR POSITIVE WITH CYCLIC CITRULLINATED PEPTIDE (CCP) ANTIBODY NEGATIVE: ICD-10-CM

## 2021-05-24 DIAGNOSIS — M25.562 CHRONIC PAIN OF BOTH KNEES: ICD-10-CM

## 2021-05-24 DIAGNOSIS — M25.561 CHRONIC PAIN OF BOTH KNEES: ICD-10-CM

## 2021-05-24 DIAGNOSIS — G89.29 CHRONIC PAIN OF BOTH KNEES: ICD-10-CM

## 2021-05-24 PROCEDURE — 73560 X-RAY EXAM OF KNEE 1 OR 2: CPT | Mod: 59 | Performed by: RADIOLOGY

## 2021-05-24 PROCEDURE — 73120 X-RAY EXAM OF HAND: CPT | Mod: 59 | Performed by: RADIOLOGY

## 2021-06-09 ENCOUNTER — OFFICE VISIT (OUTPATIENT)
Dept: PULMONOLOGY | Facility: CLINIC | Age: 71
End: 2021-06-09
Attending: INTERNAL MEDICINE
Payer: COMMERCIAL

## 2021-06-09 VITALS
WEIGHT: 160.6 LBS | OXYGEN SATURATION: 98 % | HEART RATE: 81 BPM | HEIGHT: 62 IN | BODY MASS INDEX: 29.55 KG/M2 | DIASTOLIC BLOOD PRESSURE: 85 MMHG | SYSTOLIC BLOOD PRESSURE: 174 MMHG

## 2021-06-09 DIAGNOSIS — J84.9 ILD (INTERSTITIAL LUNG DISEASE) (H): Primary | ICD-10-CM

## 2021-06-09 DIAGNOSIS — R76.8 RHEUMATOID FACTOR POSITIVE WITH CYCLIC CITRULLINATED PEPTIDE (CCP) ANTIBODY NEGATIVE: ICD-10-CM

## 2021-06-09 DIAGNOSIS — M06.9 RHEUMATOID ARTHRITIS (H): ICD-10-CM

## 2021-06-09 DIAGNOSIS — R06.09 DYSPNEA ON EXERTION: ICD-10-CM

## 2021-06-09 DIAGNOSIS — J84.9 ILD (INTERSTITIAL LUNG DISEASE) (H): ICD-10-CM

## 2021-06-09 DIAGNOSIS — J84.89 PROGRESSIVE FIBROSING INTERSTITIAL LUNG DISEASE (H): ICD-10-CM

## 2021-06-09 DIAGNOSIS — J84.10 FIBROTIC LUNG DISEASES (H): ICD-10-CM

## 2021-06-09 LAB
ALBUMIN SERPL-MCNC: 3.8 G/DL (ref 3.4–5)
ALP SERPL-CCNC: 67 U/L (ref 40–150)
ALT SERPL W P-5'-P-CCNC: 62 U/L (ref 0–50)
AST SERPL W P-5'-P-CCNC: 52 U/L (ref 0–45)
BILIRUB DIRECT SERPL-MCNC: 0.1 MG/DL (ref 0–0.2)
BILIRUB SERPL-MCNC: 0.4 MG/DL (ref 0.2–1.3)
PROT SERPL-MCNC: 8.1 G/DL (ref 6.8–8.8)

## 2021-06-09 PROCEDURE — 99214 OFFICE O/P EST MOD 30 MIN: CPT | Mod: 25 | Performed by: INTERNAL MEDICINE

## 2021-06-09 PROCEDURE — 94375 RESPIRATORY FLOW VOLUME LOOP: CPT | Performed by: INTERNAL MEDICINE

## 2021-06-09 PROCEDURE — 94729 DIFFUSING CAPACITY: CPT | Performed by: INTERNAL MEDICINE

## 2021-06-09 PROCEDURE — 36415 COLL VENOUS BLD VENIPUNCTURE: CPT | Performed by: PATHOLOGY

## 2021-06-09 PROCEDURE — 80076 HEPATIC FUNCTION PANEL: CPT | Performed by: PATHOLOGY

## 2021-06-09 PROCEDURE — G0463 HOSPITAL OUTPT CLINIC VISIT: HCPCS | Mod: 25

## 2021-06-09 RX ORDER — BENZONATATE 200 MG/1
200 CAPSULE ORAL 3 TIMES DAILY PRN
Qty: 90 CAPSULE | Refills: 3 | Status: SHIPPED | OUTPATIENT
Start: 2021-06-09 | End: 2021-11-01

## 2021-06-09 ASSESSMENT — MIFFLIN-ST. JEOR: SCORE: 1211.73

## 2021-06-09 NOTE — PROGRESS NOTES
HISTORY OF PRESENT ILLNESS:  The patient is a 70-year-old female with interstitial lung disease secondary to rheumatoid arthritis.  She has been receiving Ofev, which was started in 09/2020, for evidence that her interstitial lung disease was progressing.  Her last visit with me was in 03/2021.  At that time she stopped the Ofev because of nausea and had restarted it at 1 tablet per day for a month and then was instructed to increase to 1 tablet twice a day. However, she did not tolerate taking it twice a day and has been only taking the Ofev once a day.  She reports that she still has a dry cough, and her shortness of breath, she feels, is a little bit worse.  She has a prescription for Tessalon Perles, but has not used that.  In terms of her rheumatoid arthritis, she was seen by Dr. Staley, who started her on sulfasalazine because of complaints of pain, swelling and stiffness in her hands and wrists.  She thinks the sulfasalazine may have helped a little bit.    PHYSICAL EXAMINATION:  VITAL SIGNS:  Her O2 sats are 98% on room air.  HEENT:  Anicteric.  Mouth and throat without lesions.  NECK:  No thyromegaly.  LYMPH:  No adenopathy appreciated.  CHEST:  She has crackles at least care home up bilaterally, maybe a little bit higher, which would be worse than the previous exam.    HEART:  Regular rate and rhythm.  Normal S1, S2.    ABDOMEN:  Nontender.  No hepatosplenomegaly appreciated.  EXTREMITIES:  Without clubbing, cyanosis or edema.  The joints do not appear to be red or warm to touch.    SKIN:  No rash.    PULMONARY FUNCTION TESTS:  FEV1 was 1.63 or 82% of predicted, FVC was 1.84 or 73% of predicted, and diffusion capacity was 45% of predicted.  The spirometry is stable, but the diffusion capacity is decreased.  The patient, however, was unable to inhale greater than 90% for the DLCO test due to cough, so it is unclear how accurate the DLCO was today.  The spirometry is stable.    LABORATORY:  On labs today,  her LFTs, AST and ALT were mildly above the lower limits of normal.  The ALT was 62, and AST was 52.    ASSESSMENT AND PLAN:  Krystian is a 70-year-old female with interstitial lung disease secondary to RA. She now is on sulfasalazine for her RA.  The patient today reports increased symptoms of shortness of breath and continuing cough.  For her cough, I have given her another prescription for Tessalon Perles, take it 200 mg t.i.d.  She continues to not tolerate the high dose of Ofev well and has only been taking it once a day.  My plan will be to have her stop the Ofev at the 150 mg tablet, let her symptoms of nausea resolve and then try to restart at the lower dose of Ofev 100 mg and have her take 100 mg tablet 1 tablet a day for a couple of weeks and then try to increase to 1 tablet twice a day thereafter with the hopes that that will help with the progression of her fibrosis.  I plan to see her back in 3 months with repeat labs, echocardiogram and pulmonary function tests.      Tristan Cross MD  Pulmonary/Critical Care  June 9, 2021 10:16 AM          cc:  Harjit Staley MD   [facility] Rheumatology   [address] [place] [id number]

## 2021-06-09 NOTE — LETTER
6/9/2021         RE: Krystian Boland  [address]  [facility] [address]        Dear Colleague,    Thank you for referring your patient, Krystian Boland, to the [facility] FOR LUNG SCIENCE AND [facility]. Please see a copy of my visit note below.    HISTORY OF PRESENT ILLNESS:  The patient is a 70-year-old female with interstitial lung disease secondary to rheumatoid arthritis.  She has been receiving Ofev, which was started in 09/2020, for evidence that her interstitial lung disease was progressing.  Her last visit with me was in 03/2021.  At that time she stopped the Ofev because of nausea and had restarted it at 1 tablet per day for a month and then was instructed to increase to 1 tablet twice a day. However, she did not tolerate taking it twice a day and has been only taking the Ofev once a day.  She reports that she still has a dry cough, and her shortness of breath, she feels, is a little bit worse.  She has a prescription for Tessalon Perles, but has not used that.  In terms of her rheumatoid arthritis, she was seen by Dr. Staley, who started her on sulfasalazine because of complaints of pain, swelling and stiffness in her hands and wrists.  She thinks the sulfasalazine may have helped a little bit.    PHYSICAL EXAMINATION:  VITAL SIGNS:  Her O2 sats are 98% on room air.  HEENT:  Anicteric.  Mouth and throat without lesions.  NECK:  No thyromegaly.  LYMPH:  No adenopathy appreciated.  CHEST:  She has crackles at least longterm up bilaterally, maybe a little bit higher, which would be worse than the previous exam.    HEART:  Regular rate and rhythm.  Normal S1, S2.    ABDOMEN:  Nontender.  No hepatosplenomegaly appreciated.  EXTREMITIES:  Without clubbing, cyanosis or edema.  The joints do not appear to be red or warm to touch.    SKIN:  No rash.    PULMONARY FUNCTION TESTS:  FEV1 was 1.63 or 82% of predicted, FVC was 1.84 or 73% of predicted, and diffusion capacity  was 45% of predicted.  The spirometry is stable, but the diffusion capacity is decreased.  The patient, however, was unable to inhale greater than 90% for the DLCO test due to cough, so it is unclear how accurate the DLCO was today.  The spirometry is stable.    LABORATORY:  On labs today, her LFTs, AST and ALT were mildly above the lower limits of normal.  The ALT was 62, and AST was 52.    ASSESSMENT AND PLAN:  Krystian is a 70-year-old female with interstitial lung disease secondary to RA. She now is on sulfasalazine for her RA.  The patient today reports increased symptoms of shortness of breath and continuing cough.  For her cough, I have given her another prescription for Tessalon Perles, take it 200 mg t.i.d.  She continues to not tolerate the high dose of Ofev well and has only been taking it once a day.  My plan will be to have her stop the Ofev at the 150 mg tablet, let her symptoms of nausea resolve and then try to restart at the lower dose of Ofev 100 mg and have her take 100 mg tablet 1 tablet a day for a couple of weeks and then try to increase to 1 tablet twice a day thereafter with the hopes that that will help with the progression of her fibrosis.  I plan to see her back in 3 months with repeat labs, echocardiogram and pulmonary function tests.      Tristan Cross MD  Pulmonary/Critical Care  June 9, 2021 10:16 AM          cc:  Harjit Staley MD   [facility] Rheumatology   [address] [place] [id number]

## 2021-06-09 NOTE — NURSING NOTE
Chief Complaint   Patient presents with     Interstitial Lung Disease (ILD)     3-4mo f/u     Vitals were taken and medications were reconciled.     DEANGELO Emery

## 2021-06-10 LAB
DLCOUNC-%PRED-PRE: 45 %
DLCOUNC-PRE: 8.31 ML/MIN/MMHG
DLCOUNC-PRED: 18.3 ML/MIN/MMHG
EXPTIME-PRE: 6.31 SEC
FEF2575-%PRED-PRE: 165 %
FEF2575-PRE: 2.93 L/SEC
FEF2575-PRED: 1.76 L/SEC
FEFMAX-%PRED-PRE: 116 %
FEFMAX-PRE: 6.37 L/SEC
FEFMAX-PRED: 5.49 L/SEC
FEV1-%PRED-PRE: 82 %
FEV1-PRE: 1.63 L
FEV1FEV6-PRE: 88 %
FEV1FEV6-PRED: 79 %
FEV1FVC-PRE: 88 %
FEV1FVC-PRED: 79 %
FIFMAX-PRE: 4.11 L/SEC
FVC-%PRED-PRE: 73 %
FVC-PRE: 1.84 L
FVC-PRED: 2.5 L
VA-%PRED-PRE: 39 %
VA-PRE: 1.73 L

## 2021-06-16 DIAGNOSIS — J84.9 ILD (INTERSTITIAL LUNG DISEASE) (H): ICD-10-CM

## 2021-06-16 DIAGNOSIS — R76.8 RHEUMATOID FACTOR POSITIVE WITH CYCLIC CITRULLINATED PEPTIDE (CCP) ANTIBODY NEGATIVE: ICD-10-CM

## 2021-06-16 LAB
CRP SERPL-MCNC: <2.9 MG/L (ref 0–8)
ERYTHROCYTE [DISTWIDTH] IN BLOOD BY AUTOMATED COUNT: 12.8 % (ref 10–15)
HCT VFR BLD AUTO: 33.7 % (ref 35–47)
HGB BLD-MCNC: 11.5 G/DL (ref 11.7–15.7)
MCH RBC QN AUTO: 31.2 PG (ref 26.5–33)
MCHC RBC AUTO-ENTMCNC: 34.1 G/DL (ref 31.5–36.5)
MCV RBC AUTO: 91 FL (ref 78–100)
PLATELET # BLD AUTO: 324 10E9/L (ref 150–450)
RBC # BLD AUTO: 3.69 10E12/L (ref 3.8–5.2)
WBC # BLD AUTO: 7.7 10E9/L (ref 4–11)

## 2021-06-16 PROCEDURE — 86140 C-REACTIVE PROTEIN: CPT | Performed by: INTERNAL MEDICINE

## 2021-06-16 PROCEDURE — 82565 ASSAY OF CREATININE: CPT | Performed by: INTERNAL MEDICINE

## 2021-06-16 PROCEDURE — 80076 HEPATIC FUNCTION PANEL: CPT | Performed by: INTERNAL MEDICINE

## 2021-06-16 PROCEDURE — 85027 COMPLETE CBC AUTOMATED: CPT | Performed by: INTERNAL MEDICINE

## 2021-06-16 PROCEDURE — 36415 COLL VENOUS BLD VENIPUNCTURE: CPT | Performed by: INTERNAL MEDICINE

## 2021-06-17 ENCOUNTER — VIRTUAL VISIT (OUTPATIENT)
Dept: INTERNAL MEDICINE | Facility: CLINIC | Age: 71
End: 2021-06-17
Payer: COMMERCIAL

## 2021-06-17 DIAGNOSIS — R05.3 CHRONIC COUGH: ICD-10-CM

## 2021-06-17 DIAGNOSIS — I10 BENIGN ESSENTIAL HYPERTENSION: Primary | ICD-10-CM

## 2021-06-17 LAB
ALBUMIN SERPL-MCNC: 3.7 G/DL (ref 3.4–5)
ALP SERPL-CCNC: 61 U/L (ref 40–150)
ALT SERPL W P-5'-P-CCNC: 28 U/L (ref 0–50)
AST SERPL W P-5'-P-CCNC: 20 U/L (ref 0–45)
BILIRUB DIRECT SERPL-MCNC: <0.1 MG/DL (ref 0–0.2)
BILIRUB SERPL-MCNC: 0.2 MG/DL (ref 0.2–1.3)
CREAT SERPL-MCNC: 0.69 MG/DL (ref 0.52–1.04)
GFR SERPL CREATININE-BSD FRML MDRD: 89 ML/MIN/{1.73_M2}
PROT SERPL-MCNC: 8 G/DL (ref 6.8–8.8)

## 2021-06-17 PROCEDURE — 99213 OFFICE O/P EST LOW 20 MIN: CPT | Mod: 95 | Performed by: INTERNAL MEDICINE

## 2021-06-17 RX ORDER — AMLODIPINE BESYLATE 5 MG/1
5 TABLET ORAL DAILY
Qty: 90 TABLET | Refills: 3 | Status: SHIPPED | OUTPATIENT
Start: 2021-06-17 | End: 2022-03-17

## 2021-06-17 NOTE — PROGRESS NOTES
VIDEO VISIT                                                       ASSESSMENT/PLAN                                                      (I10) Benign essential hypertension  (primary encounter diagnosis)  (R05) Chronic cough  Comment: new diagnosis of high blood pressure; ideally we would start her on an ACE-I or ARB in light of her diabetes diagnosis, but I do not think an ARB will adequately control her blood pressure and do not want to risk exacerbating her current chronic cough with an ACE-I.  Plan: TRIAL of amlodipine 5 mg daily; patient and family will continue to monitor blood pressures closely and contact MD if blood pressures remain elevated or patient has side effects from medication.    Total time of video call between patient and provider was 4 minutes (10:19-10:23am). Provider location: office. Patient location: home. Platform: Achie[employer].     Adrianna Singh MD   [email or web address]  [address] [address]  T: [phone], F: [phone]    VINCENZO Boland is a very pleasant 70 year old female who requested a video visit to discuss elevated blood pressures:    Patient was made aware that this visit will be billed the same as an in-person visit and has given verbal consent to proceed with this video visit.     Accompanied by daughter, who translates.    Patient has been having elevated blood pressures as of late (SBPs 140s-170s). No prior history of high blood pressure. She is asymptomatic from a blood pressure perspective: no chest pain or palpitations, no shortness of breath, no light-headedness or dizziness, no headaches or vision changes.     PMH significant for type 2 diabetes.    PMH also significant for chronic cough related to interstitial lung disease..    OBJECTIVE                                                       Vitals: No vitals were obtained today due to virtual visit.    General: appears healthy,  alert and no distress  Psychiatric: mentation normal, affect normal/bright, judgement and insight intact, normal speech and appearance well-groomed    ---    (Note was completed, in part, with e-channel voice-recognition software. Documentation reviewed, but some grammatical, spelling, and word errors may remain.)

## 2021-07-22 ENCOUNTER — OFFICE VISIT (OUTPATIENT)
Dept: OPHTHALMOLOGY | Facility: CLINIC | Age: 71
End: 2021-07-22
Attending: OPHTHALMOLOGY
Payer: COMMERCIAL

## 2021-07-22 DIAGNOSIS — Z79.899 LONG-TERM USE OF PLAQUENIL: ICD-10-CM

## 2021-07-22 DIAGNOSIS — Z79.899 LONG-TERM USE OF PLAQUENIL: Primary | ICD-10-CM

## 2021-07-22 DIAGNOSIS — E11.9 DM TYPE 2 WITHOUT RETINOPATHY (H): ICD-10-CM

## 2021-07-22 DIAGNOSIS — H25.813 MIXED TYPE AGE-RELATED CATARACT, BOTH EYES: Primary | ICD-10-CM

## 2021-07-22 PROCEDURE — 92250 FUNDUS PHOTOGRAPHY W/I&R: CPT | Performed by: OPHTHALMOLOGY

## 2021-07-22 PROCEDURE — G0463 HOSPITAL OUTPT CLINIC VISIT: HCPCS

## 2021-07-22 PROCEDURE — 92082 INTERMEDIATE VISUAL FIELD XM: CPT | Performed by: OPHTHALMOLOGY

## 2021-07-22 PROCEDURE — 92134 CPTRZ OPH DX IMG PST SGM RTA: CPT | Performed by: OPHTHALMOLOGY

## 2021-07-22 PROCEDURE — 92014 COMPRE OPH EXAM EST PT 1/>: CPT | Mod: GC | Performed by: OPHTHALMOLOGY

## 2021-07-22 PROCEDURE — 99207 FUNDUS AUTOFLUORESCENCE IMAGE (FAF) OU (BOTH EYES): CPT | Performed by: OPHTHALMOLOGY

## 2021-07-22 ASSESSMENT — TONOMETRY
OD_IOP_MMHG: 18
OS_IOP_MMHG: 18
IOP_METHOD: TONOPEN

## 2021-07-22 ASSESSMENT — REFRACTION_WEARINGRX
OS_CYLINDER: +0.75
OS_AXIS: 034
OD_ADD: +2.75
OS_SPHERE: -0.50
OS_ADD: +2.75
OD_CYLINDER: SPHERE
OD_SPHERE: -3.50

## 2021-07-22 ASSESSMENT — EXTERNAL EXAM - RIGHT EYE: OD_EXAM: NORMAL

## 2021-07-22 ASSESSMENT — VISUAL ACUITY
OD_PH_CC: 20/40
OD_CC: 20/125
METHOD: SNELLEN - LINEAR
OS_CC: 20/125
OS_PH_CC: 20/30

## 2021-07-22 ASSESSMENT — CUP TO DISC RATIO
OD_RATIO: 0.4
OS_RATIO: 0.35

## 2021-07-22 ASSESSMENT — CONF VISUAL FIELD
OS_NORMAL: 1
METHOD: COUNTING FINGERS
OD_NORMAL: 1

## 2021-07-22 ASSESSMENT — SLIT LAMP EXAM - LIDS
COMMENTS: DERMATOCHALASIS
COMMENTS: DERMATOCHALASIS

## 2021-07-22 ASSESSMENT — EXTERNAL EXAM - LEFT EYE: OS_EXAM: NORMAL

## 2021-07-22 NOTE — NURSING NOTE
Chief Complaints and History of Present Illnesses   Patient presents with     Plaquenil     Chief Complaint(s) and History of Present Illness(es)     Plaquenil               Comments     Krystian is here to continue care for Long-term use of Plaquenil, and  retinal exam. She feels vision overall is about the same but the cataract RE may be a little worse. She denies eye pain, flashes or floaters.     Ivan Rivera COT 2:13 PM July 22, 2021

## 2021-07-22 NOTE — PROGRESS NOTES
CC: here for Plaquenil annual exam  HPI: Krystian Boland is a  71 year old year-old patient with history of plaquenil use for ILD and RA.  She has been on 400 mg daily since 5-2019. Outer retinal changes were seen on her last eye exam. She is also DMII.   A1C 6.1 3-17-21    Past ocular history:   Cataract both eyes, right eye > left eye   Ocular hypertension both eyes   Dry eyes  Myopic astigmatism  Type II diabetes mellitus without retinopathy     Retinal Imaging:  OCT 7-22-21  RE: normal foveal contour; questionable disruption of the Retinal pigment epithelium peripherally; normal choroid and vitreous  LE: normal foveal contour; questionable disruption of the Retinal pigment epithelium peripherally; normal choroid and vitreous    Autofluorescence 7-22-21  right eye: normal  left eye: normal    visual field 10-2 from 7-22-21  right eye: normal  Left eye: reliable; scattered nasal periphery defects; MD -2.6    DEMOND 7-15-20  Right eye 20/60    Assessment & Plan:    1. Long-term use of Plaquenil   H/o RF-positive RA and ILD   Started Plaquenil 5/2019; 200mg twice a day   ILD stable - f/u 9/16/2020 with Dr. Cross for CT scan and PFTs   RA stable - dx'd 5/2018, Dr. Staley 10/13/2020   Optical Coherence Tomography macula appears unremarkable   Could follow up in one yr with retina or continuity clinic    2. Type II diabetes mellitus without diabetic retinopathy   Dx'd ~2016   HbA1c 6.4%    Metformin only   Discussed blood pressure and blood sugar control    Annual diabetic eye exams     3. Cataracts both eyes   Becoming visually significant   Plan to observe for now, MRx, IOL calcs and cataract evaluation     RTC in 6 mths for MRx    Sandra Rashid MD  PGY-3 Resident Physician  Department of Ophthalmology    ~~~~~~~~~~~~~~~~~~~~~~~~~~~~~~~~~~   Complete documentation of historical and exam elements from today's encounter can be found in the full encounter summary report (not reduplicated in this progress note).  I  personally obtained the chief complaint(s) and history of present illness.  I confirmed and edited as necessary the review of systems, past medical/surgical history, family history, social history, and examination findings as documented by others; and I examined the patient myself.  I personally reviewed the relevant tests, images, and reports as documented above.  I personally reviewed the ophthalmic test(s) associated with this encounter, agree with the interpretation(s) as documented by the resident/fellow, and have edited the corresponding report(s) as necessary.   I formulated and edited as necessary the assessment and plan and discussed the findings and management plan with the patient and family    Dalia Sawyer MD   of Ophthalmology.  Retina Service   Department of Ophthalmology and Visual Neurosciences   [facility]  Phone: [phone]   Fax: [phone]

## 2021-08-13 ENCOUNTER — PATIENT OUTREACH (OUTPATIENT)
Dept: GERIATRIC MEDICINE | Facility: CLINIC | Age: 71
End: 2021-08-13

## 2021-08-13 NOTE — PROGRESS NOTES
[facility] Care Coordination Contact      [facility] Six-Month Telephone Assessment    6 month telephone assessment completed on 8/13/21. Completed with daughter, David. Member has requested that daughter be called due to limited  options and language barrier.    ER visits: No  Hospitalizations: No  TCU stays: No  Significant health status changes: Daughter reports that member is doing ok. She continue to have pain. Shared that the MD have made medication adjustments to treat the pain. Shared that member's memory is getting worse, and that member is never left alone.  Falls/Injuries: No  ADL/IADL changes: No  Changes in services: No    Caregiver Assessment follow up:  NA. Daughter shared that many family members come to assist member. And that member is never left alone due to her memory loss. Daughter shared that there are several family members who are helping out.    Goals: See POC in chart for goal progress documentation.  Daughter reports that member's memory is getting worse.    Will see member in 6 months for an annual health risk assessment.   Encouraged member to call CC with any questions or concerns in the meantime.     JAQUELIN Langley  [facility]  [phone]

## 2021-08-23 ENCOUNTER — TELEPHONE (OUTPATIENT)
Dept: RHEUMATOLOGY | Facility: CLINIC | Age: 71
End: 2021-08-23

## 2021-08-25 DIAGNOSIS — E55.9 VITAMIN D DEFICIENCY: ICD-10-CM

## 2021-08-26 RX ORDER — CHOLECALCIFEROL (VITAMIN D3) 50 MCG
TABLET ORAL
Qty: 90 TABLET | Refills: 3 | Status: SHIPPED | OUTPATIENT
Start: 2021-08-26 | End: 2022-07-28

## 2021-08-28 ENCOUNTER — MYC MEDICAL ADVICE (OUTPATIENT)
Dept: RHEUMATOLOGY | Facility: CLINIC | Age: 71
End: 2021-08-28

## 2021-08-28 DIAGNOSIS — R76.8 RHEUMATOID FACTOR POSITIVE WITH CYCLIC CITRULLINATED PEPTIDE (CCP) ANTIBODY NEGATIVE: ICD-10-CM

## 2021-08-30 RX ORDER — SULFASALAZINE 500 MG/1
TABLET, DELAYED RELEASE ORAL
Qty: 120 TABLET | Refills: 1 | Status: SHIPPED | OUTPATIENT
Start: 2021-08-30 | End: 2021-09-23

## 2021-08-30 NOTE — TELEPHONE ENCOUNTER
sulfaSALAzine ER (AZULFIDINE EN) 500 MG EC tablet      Last Written Prescription Date:  6-18-21  Last Fill Quantity: 120,   # refills: 0  Last Office Visit: 5-7-21  Future Office visit:  9-23-21    CBC RESULTS: Recent Labs   Lab Test 06/16/21  1603   WBC 7.7   RBC 3.69*   HGB 11.5*   HCT 33.7*   MCV 91   MCH 31.2   MCHC 34.1   RDW 12.8          Creatinine   Date Value Ref Range Status   06/16/2021 0.69 0.52 - 1.04 mg/dL Final   ]    Liver Function Studies -   Recent Labs   Lab Test 06/16/21  1603   PROTTOTAL 8.0   ALBUMIN 3.7   BILITOTAL 0.2   ALKPHOS 61   AST 20   ALT 28       Routing refill request to provider for review/approval because:  Per protocol

## 2021-09-21 NOTE — PROGRESS NOTES
[facility]  Rheumatology Clinic  Harjit Staley MD  2021     Name: Krystian Boland  MRN: [id number]  Age: 71 year old  : 1950  Referring provider: Referred Self     Assessment and Plan:  #High positive rheumatoid factor and anti-CCP noted in 2018:  Patient reports mild polyarthralgia, stiffness and swelling, improved after a flare assocaited with sulfasalazine discontinuation in 2021.  Exam shows no gross synovitis.  Laboratory evaluation on 2021 showed creatinine, transaminases, albumin, and CRP all normal.  CBC showed hemoglobin slightly low 11.5 platelets were 324,000.  Pulmonary function tests showed FVC 73% of predicted and FEV1 82% of predicted. Knee x-rays in May 2021 showed mild tricompartmental disease in the right knee with osteophytes; left knee showed mild tricompartmental disease.  Hand x-rays showed scattered DIP degenerative changes without erosions.    I think that rheumatoid arthritis is well-controlled. I recommend continuing combined sulfasalazine and hydroxychloroquine for disease-modifying effect.    We discussed the need for annual ophthalmologic monitoring while on plaquenil. She in  had follow up with a retinal specialist with exam showing no evidence of retinal toxicity.    # Interstitial lung disease:     Patient describes stable functional status with minor limitation in ambulating without oxygen. Pulmonary function tests in 2021 showed FVC 73% of predicted and FEV1 82% of predicted, unchanged or improved since 2020.  I agree with patient's plan to follow-up with Dr. Cross, and to continue the lower dose of Ofev (100 mg) for the moment, which is better tolerated.    Plan:  1.  Continue hydroxychloroquine 200 mg 2 tabs daily.  While taking hydroxychloroquine, undergo annual ophthalmology evaluation to monitor for rare retinal Plaquenil toxicity (last visit ) .  2.  Continue sulfasalazine 1000 mg twice daily.  3.  Check blood work in  November 2021 along with pulmonary testing to assess kidney and liver function.  4.  Given rheumatoid arthritis, pre-existing lung disease, and immunosuppressive therapy, Covid booster may ultimately be recommended.  I recommend watching and waiting for now, since I expect boost of natural immunity to be associated with the documented COVID-19 infection in August 2021.     F/u 4 mos    Harjit Staley M.D.  Staff Rheumatologist, [facility]  Pager [phone]        HPI:   Krystian Boland has a history of ILD and RA and presents for follow-up. I last saw the patient in 5-2021 at which time rheumatoid arthritis was judged to be mildly active.  I recommended adding sulfasalazine to hydroxychloroquine for augmented antirheumatic effect.    Daughter acted as  throughout this examination.    Interval history 09-:  Daughter Zachariah interprets today.  She had a SBO ~ 1 mo ago; symptom resolved at home, but she stopped sulfasalazine. Within 2 weeks, she had more pain in her fingers, R 3rd finger; wrists began to swell. Mild pain in L medial knee, but not other pain. Stiffness and pain eased with restarting sulfasalazine, except for the R 3rd finger. Early morning stiffness is now ~ 30 minutes. Former hip and knee pain are less intense since starting sulfasalazine.    She tested + for COVID on 8-27; she had just fatigue. She was vaccinated with Pfizer in 3-2021.    Patient saw Dr. Sawyer in ophthalmology in July 2021.  Impression was of long-term use of Plaquenil, no evidence of toxicity.    Patient saw Dr. Cross in pulmonology on June 9, 2021.  Impression was of rheumatoid arthritis associated interstitial lung disease.  Cough and nausea were present.  Recommendation was to use Tessalon Perles and to temporarily stop Ofev; plan was to restart the medication at lower dose in order to avoid gastrointestinal side effects. She is now tolerating ofev 100 mg once daily.    Interval history  05-:    Patient saw Dr. Cross in follow-up of interstitial lung disease in March 17, 2021.  Impression was of interstitial lung disease secondary to rheumatoid arthritis with progression of fibrosis.  History of Ofev treatment since September 2020 was reviewed.  PFTs were unchanged.  Recommendation was to continue Ofev, and if the medication were not well-tolerated, to consider CellCept.    She notes intermittent joint swelling; hips and hands are painful. Her R hip > L hurts with   There is pain around the L > R hip. Daughter notes that she limps with dragging the left leg. There is minimal knee swelling. Pain is notable most in the morning, it improves with massage and with walking. She walks 1/2 block outside before knee pain and breathing limit her. The R 3rd finger is painful with bending. L 5th finger hurts too. Morning stiffness in the hands is not marked. Exercise and moist heat helps the hands.    She restarted Ofev after last visit with Dr. Cross. Prior nausea is less prominent now that she is taking just one dose daily. Breathing is stable; dry cough has improved.    Interval history 11-:  Ms. Boland was interview via telephone with the assistance of her daughter acting as Tibetan .     Since her last visit, Ms. Boland has had repeat PFTs demonstrating decreased FVC and limited diffusion capacity. Follow up CT shows progression of fibrosis since last imaging in 2018. On follow up with Dr. Cross in pulmonary clinic she was started on Ofev. She seems to be tolerating the medication well, but does endorse some nausea with its use. She continues to have a dry cough, but denies any new shortness of breath. She does not use home oxygen.     Ms. Boland has also had a follow up with retinal specialist 7/2020, as her prior ophthalmology appointment had some questionable retinal changes in the setting of prolonged Plauenil use. OCT was performed, which was unremarkable.    Today, Ms.  Krystian reports that she is having significant joint stiffness and pain. She stated that approximately 1 week ago she began having bilateral knee pain and stiffness. She also endorsed some mild swelling to the joints. This progressed and now includes bilateral knees, feet (L>R), left ankle, bilateral hands (R>L) and left elbow. She does endorse some warmth and swelling to multiple joints, most notably in her left ankle and right hands. These symptoms are most notable in the morning, and she has to stay in bed for 20-30 minutes after waking to massage and stretch her joints in order to feel as if she can move. It has caused significant difficulty with walking. Associated symptoms include fatigue. She has been taking some tylenol, which has been modestly helpful with the pain. She continues on Plaquenil single therapy for her inflammatory arthritis at this time.        Review of Systems:   Pertinent items are noted in HPI or as below, remainder of complete ROS is negative.     No recent problems with hearing or vision. No swallowing problems.   No breathing difficulty, shortness of breath, or wheezing. + Continues to have dry cough.  No chest pain or palpitations.  No heart burn, indigestion, abdominal pain, vomiting, diarrhea. + Nausea with Ofev.  No urination problems, no bloody, cloudy urine, no dysuria.  No numbing, tingling, weakness.  No headaches or confusion.  No rashes. No easy bleeding or bruising.   + Arthralgias, stiffness, swelling of multiple joints.  + Fatigue.    Active Medications:   Current Outpatient Medications   Medication     amLODIPine (NORVASC) 5 MG tablet     aspirin 81 MG chewable tablet     benzonatate (TESSALON) 200 MG capsule     blood glucose (NO BRAND SPECIFIED) test strip     famotidine (PEPCID) 40 MG tablet     hydroxychloroquine (PLAQUENIL) 200 MG tablet     metFORMIN (GLUCOPHAGE) 1000 MG tablet     nintedanib (OFEV) 100 MG capsule     STATIN NOT PRESCRIBED (INTENTIONAL)      "sulfaSALAzine ER (AZULFIDINE EN) 500 MG EC tablet     VITAMIN D3 50 MCG (2000 UT) tablet     No current facility-administered medications for this visit.        Allergies:   Atorvastatin      Past Medical History:  Obesity  Osteopenia  Type 2 diabetes mellitus without complication, without long-term current use of insulin  Interstitial lung disease  Abdominal tuberculosis (intestinal, 20 years ago)     Past Surgical History:  Open cholecystectomy    Family History:   Mother - dementia  Father - cerebrovascular disease  Brother - cerebrovascular disease, hypertension  No family history of - diabetes, myocardial infarction, early onset C.A.D., breast cancer, ovarian cancer, colon cancer, rheumatoid arthritis  Family history of - high blood pressure  (mentions that medical history is unlikely to be comprehensive because of poor/infrequent medical records)      Social History:   No smoking or tobacco use  Rare alcohol use (1-2 times a year)   with 8 children, 13 grandchildren, cooks for the family     Physical Exam:   BP (!) 155/82   Pulse 90   Temp 99.3  F (37.4  C) (Oral)   Ht 1.575 m (5' 2\")   Wt 70.6 kg (155 lb 9.6 oz)   SpO2 99%   BMI 28.46 kg/m    Constitutional: Well-developed, appearing stated age; cooperative  Eyes: Normal EOM, PERRLA, vision, conjunctiva, sclera  ENT: Normal external ears, nose, hearing, lips, teeth, gums, throat.  Neck: No mass or thyroid enlargement  Resp: Breathing unlabored on video exam  MS: Walking was narrow based and smooth.  No visible redness or gross effusion on the left knee at the indicated site of maximum pain on the medial joint line.  Clubbing in finger nails, but normal alignment of MCPs and PIPs. Normal fist formation.  Skin: No nail pitting, alopecia, rash, nodules or lesions.  Neuro: Normal cranial nerves    Laboratory:   RHEUM RESULTS Latest Ref Rng & Units 10/10/2016 1/31/2017 3/31/2018   ALBUMIN 3.4 - 5.0 g/dL 3.9 - 3.8   ALT 0 - 50 U/L 24 - 21   AST 0 - 45 " U/L 13 - 21   ANCA <1:20 - - -   LYNDA INTERPRETATION NEG:Negative - - -   CK TOTAL 30 - 225 U/L - - -   CREATININE 0.52 - 1.04 mg/dL 0.56 0.61 0.64   CRP 0.0 - 8.0 mg/L - - -   GFR ESTIMATE, IF BLACK >60 mL/min/[1.73:m2] >90  African American GFR Calc   >90   GFR Calc   >90   GFR ESTIMATE >60 mL/min/[1.73:m2] >90  Non African American GFR Calc   >90  Non  GFR Calc   >90   HEMATOCRIT 35.0 - 47.0 % 38.0 - 37.4   HEMOGLOBIN 11.7 - 15.7 g/dL 12.7 - 12.3   HCVAB NR Nonreactive   Assay performance characteristics have not been established for newborns,   infants, and children   - -    - 1,620 mg/dL - - -   WBC 4.0 - 11.0 10e9/L 6.7 - 8.0   RBC 3.8 - 5.2 10e12/L 4.25 - 4.11   RDW 10.0 - 15.0 % 12.4 - 12.7   MCHC 31.5 - 36.5 g/dL 33.4 - 32.9   MCV 78 - 100 fl 89 - 91   PLATELET COUNT 150 - 450 10e9/L 288 - 294   RHEUMATOID FACTOR <20 IU/mL - - -   ESR 0 - 30 mm/h - - -       Rheumatoid Factor   Date Value Ref Range Status   05/23/2018 700 (H) <20 IU/mL Final     Cyclic Citrullinated Peptide Antibody, IgG   Date Value Ref Range Status   05/23/2018 >340 (H) <7 U/mL Final     Comment:     Positive     Scleroderma Antibody Scl-70 CLAUDIO IgG   Date Value Ref Range Status   05/23/2018 <0.2 0.0 - 0.9 AI Final     Comment:     Negative  Antibody index (AI) values reflect qualitative changes in antibody   concentration that cannot be directly associated with clinical condition or   disease state.       SSA (Ro) (CLAUDIO) Antibody, IgG   Date Value Ref Range Status   05/23/2018 <0.2 0.0 - 0.9 AI Final     Comment:     Negative  Antibody index (AI) values reflect qualitative changes in antibody   concentration that cannot be directly associated with clinical condition or   disease state.       SSB (La) (CLAUDIO) Antibody, IgG   Date Value Ref Range Status   05/23/2018 <0.2 0.0 - 0.9 AI Final     Comment:     Negative  Antibody index (AI) values reflect qualitative changes in antibody   concentration that  cannot be directly associated with clinical condition or   disease state.       LYNDA interpretation   Date Value Ref Range Status   05/23/2018 Negative NEG^Negative Final     Comment:                                        Reference range:  <1:40  NEGATIVE  1:40 - 1:80  BORDERLINE POSITIVE  >1:80 POSITIVE         Neutrophil Cytoplasmic IgG Antibody   Date Value Ref Range Status   05/23/2018 <1:20 <1:20 Final     Comment:     (Note)  The ANCA IFA is <1:20; therefore, no further testing will   be performed.  INTERPRETIVE INFORMATION: Anti-Neutrophil Cyto Ab, IgG  Neutrophil Cytoplasmic Antibodies (C-ANCA = granular   cytoplasmic staining, P-ANCA = perinuclear staining) are   found in the serum of over 90 percent of patients with   certain necrotizing systemic vasculitides, and usually in   less than 5 percent of patients with collagen vascular   disease or arthritis.  Performed by [facility],  [address] [place] [id number] [phone]  [email or web address], Marty Wright MD, Lab. Director         IGG   Date Value Ref Range Status   05/23/2018 1,980 (H) 695 - 1,620 mg/dL Final     Scleroderma Antibody Scl-70 CLAUDIO IgG   Date Value Ref Range Status   05/23/2018 <0.2 0.0 - 0.9 AI Final     Comment:     Negative  Antibody index (AI) values reflect qualitative changes in antibody   concentration that cannot be directly associated with clinical condition or   disease state.       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

## 2021-09-23 ENCOUNTER — OFFICE VISIT (OUTPATIENT)
Dept: RHEUMATOLOGY | Facility: CLINIC | Age: 71
End: 2021-09-23
Payer: COMMERCIAL

## 2021-09-23 VITALS
HEART RATE: 90 BPM | HEIGHT: 62 IN | TEMPERATURE: 99.3 F | DIASTOLIC BLOOD PRESSURE: 82 MMHG | WEIGHT: 155.6 LBS | SYSTOLIC BLOOD PRESSURE: 155 MMHG | OXYGEN SATURATION: 99 % | BODY MASS INDEX: 28.63 KG/M2

## 2021-09-23 DIAGNOSIS — R76.8 RHEUMATOID FACTOR POSITIVE WITH CYCLIC CITRULLINATED PEPTIDE (CCP) ANTIBODY NEGATIVE: ICD-10-CM

## 2021-09-23 PROCEDURE — 99214 OFFICE O/P EST MOD 30 MIN: CPT | Performed by: INTERNAL MEDICINE

## 2021-09-23 RX ORDER — SULFASALAZINE 500 MG/1
TABLET, DELAYED RELEASE ORAL
Qty: 120 TABLET | Refills: 4 | Status: SHIPPED | OUTPATIENT
Start: 2021-09-23 | End: 2022-01-20

## 2021-09-23 RX ORDER — HYDROXYCHLOROQUINE SULFATE 200 MG/1
200 TABLET, FILM COATED ORAL 2 TIMES DAILY
Qty: 180 TABLET | Refills: 3 | Status: SHIPPED | OUTPATIENT
Start: 2021-09-23 | End: 2022-01-20

## 2021-09-23 RX ORDER — SULFASALAZINE 500 MG/1
TABLET, DELAYED RELEASE ORAL
Qty: 120 TABLET | Refills: 1 | Status: SHIPPED | OUTPATIENT
Start: 2021-09-23 | End: 2021-09-23

## 2021-09-23 ASSESSMENT — PAIN SCALES - GENERAL: PAINLEVEL: NO PAIN (0)

## 2021-09-23 ASSESSMENT — MIFFLIN-ST. JEOR: SCORE: 1184.05

## 2021-09-23 NOTE — PATIENT INSTRUCTIONS
Impression:  1.  Inflammatory/rheumatoid arthritis: Daily pain in fingers wrists and other joints has eased since adding sulfasalazine to hydroxychloroquine.  I think that inflammatory arthritis is controlled, and I recommend no change in current regimen.  2.  Interstitial lung disease: Symptoms are stable. I agree with continuing low-dose Ofev per Dr. Cross's office, and adjusting therapy based upon upcoming echocardiogram, pulmonary function test, and 6-minute walk test.    Plan:  1.  Continue hydroxychloroquine 200 mg 2 tabs daily.  While taking hydroxychloroquine, undergo annual ophthalmology evaluation to monitor for rare retinal Plaquenil toxicity (last visit 7-212) .  2.  Continue sulfasalazine 1000 mg twice daily.  3.  Check blood work in November 2021 along with pulmonary testing to assess kidney and liver function.  4.  Given rheumatoid arthritis, pre-existing lung disease, and immunosuppressive therapy, Covid booster may ultimately be recommended.  I recommend watching and waiting for now, since I expect boost of immunity to be associated with the documented COVID-19 infection in August 2021.

## 2021-09-23 NOTE — NURSING NOTE
"Chief Complaint   Patient presents with     Follow Up     Rheumotoid Arthritis/  mild polyarthralgia       Vitals:    09/23/21 1426   BP: (!) 155/82   Pulse: 90   Temp: 99.3  F (37.4  C)   TempSrc: Oral   SpO2: 99%   Weight: 70.6 kg (155 lb 9.6 oz)   Height: 1.575 m (5' 2\")       Body mass index is 28.46 kg/m .    Mary Polk MA    "

## 2021-10-04 ENCOUNTER — MYC MEDICAL ADVICE (OUTPATIENT)
Dept: PULMONOLOGY | Facility: CLINIC | Age: 71
End: 2021-10-04

## 2021-10-04 ENCOUNTER — OFFICE VISIT (OUTPATIENT)
Dept: INTERNAL MEDICINE | Facility: CLINIC | Age: 71
End: 2021-10-04
Payer: COMMERCIAL

## 2021-10-04 VITALS
TEMPERATURE: 97.3 F | DIASTOLIC BLOOD PRESSURE: 74 MMHG | HEART RATE: 88 BPM | SYSTOLIC BLOOD PRESSURE: 148 MMHG | BODY MASS INDEX: 27.98 KG/M2 | OXYGEN SATURATION: 97 % | WEIGHT: 153 LBS | RESPIRATION RATE: 22 BRPM

## 2021-10-04 DIAGNOSIS — Z01.818 PREOPERATIVE EXAMINATION: Primary | ICD-10-CM

## 2021-10-04 DIAGNOSIS — J84.9 ILD (INTERSTITIAL LUNG DISEASE) (H): Primary | ICD-10-CM

## 2021-10-04 DIAGNOSIS — E11.9 TYPE 2 DIABETES MELLITUS WITHOUT COMPLICATION, WITHOUT LONG-TERM CURRENT USE OF INSULIN (H): ICD-10-CM

## 2021-10-04 DIAGNOSIS — Z23 NEED FOR PROPHYLACTIC VACCINATION AND INOCULATION AGAINST INFLUENZA: ICD-10-CM

## 2021-10-04 DIAGNOSIS — H25.9 AGE-RELATED CATARACT OF BOTH EYES, UNSPECIFIED AGE-RELATED CATARACT TYPE: ICD-10-CM

## 2021-10-04 DIAGNOSIS — R11.0 NAUSEA: ICD-10-CM

## 2021-10-04 DIAGNOSIS — F02.80 EARLY ONSET ALZHEIMER'S DEMENTIA WITHOUT BEHAVIORAL DISTURBANCE (H): ICD-10-CM

## 2021-10-04 DIAGNOSIS — G30.0 EARLY ONSET ALZHEIMER'S DEMENTIA WITHOUT BEHAVIORAL DISTURBANCE (H): ICD-10-CM

## 2021-10-04 DIAGNOSIS — J84.9 ILD (INTERSTITIAL LUNG DISEASE) (H): ICD-10-CM

## 2021-10-04 DIAGNOSIS — R05.3 CHRONIC COUGH: ICD-10-CM

## 2021-10-04 PROCEDURE — 90662 IIV NO PRSV INCREASED AG IM: CPT | Performed by: INTERNAL MEDICINE

## 2021-10-04 PROCEDURE — G0008 ADMIN INFLUENZA VIRUS VAC: HCPCS | Performed by: INTERNAL MEDICINE

## 2021-10-04 PROCEDURE — 99214 OFFICE O/P EST MOD 30 MIN: CPT | Mod: 25 | Performed by: INTERNAL MEDICINE

## 2021-10-04 RX ORDER — ONDANSETRON 4 MG/1
4 TABLET, FILM COATED ORAL EVERY 8 HOURS PRN
Qty: 30 TABLET | Refills: 1 | Status: SHIPPED | OUTPATIENT
Start: 2021-10-04 | End: 2023-04-18

## 2021-10-04 NOTE — PROGRESS NOTES
ASSESSMENT/PLAN:                                                      Krystian Boland is a pleasant 71 year old female who presents for a preoperative evaluation. She is undergoing low risk procedures. Her risk of major cardiac event is 0 points: 0.4%.    (Z01.818) Preoperative examination  (primary encounter diagnosis)  (H25.9) Age-related cataract of both eyes, unspecified age-related cataract type  Plan: no additional evaluation, cardiac or otherwise, indicated prior to surgeries; no medication adjustments indicated prior to surgeries.    Unrelated to upcoming surgery     (Z23) Need for prophylactic vaccination and inoculation against influenza  Plan: flu shot given today.    (E11.9) Type 2 diabetes mellitus without complication, without long-term current use of insulin (H)  Plan: fasting diabetic labs ordered - patient to schedule.     (R11.0) Nausea  Comment: related to Ofev.  Plan: TRIAL of Zofran as needed    (R05.3) Chronic cough  (J84.9) ILD (interstitial lung disease) (H)  Comment: patient reports worsening chronic cough; suspect progression of ILD; recent COVID-19 infection may also be contributing.  Plan: CT Chest w/o Contrast ordered - patient will be contacted to schedule.      (G30.0,  F02.80) Early onset Alzheimer's dementia without behavioral disturbance (H)  Comment: known issue that I take into account for their medical decisions, no current exacerbations or new concerns.    RECOMMEND PROCEEDING WITH SURGERY: YES    Adrianna Singh MD   [place] [address] [address] [place] [id number]  T: [phone], F: [phone]    SUBJECTIVE:                                                      Krystian Boland is a very pleasant 71 year old female who presents for preoperative evaluation.    Accompanied by daughter who translates.    Surgeon: Higinio  Date: 10/11/21 (RIGHT), 10/25/21 (LEFT)  Location: [facility], Fax#[phone]  Surgery: RIGHT then LEFT cataract  extraction (low risk)  Indication: bilateral cataracts    Past Medical History:   Diagnosis Date     Early onset Alzheimer's dementia without behavioral disturbance (H)      Interstitial lung disease (H)      Obesity (BMI 30-39.9)      Osteopenia      Rheumatoid factor positive      Type 2 diabetes mellitus (H)      Personal or family history of excessive or prolonged bleeding? No  Personal or family history of blood clots? No  History of snoring/Sleep Apnea? No  History of blood transfusions? No    Cardiovascular risk: None (0 points)    Risk of major cardiac event: 0 points: 0.4%    Past Surgical History:   Procedure Laterality Date     CHOLECYSTECTOMY, OPEN  1990     Artificial assistive devices, such as pacemakers, artificial cardiac valves, or artificial joints? No    Allergies   Allergen Reactions     Atorvastatin Muscle Pain (Myalgia)     Personal or family history of allergy to anesthesia? No  Allergy to local or topical analgesics? No  Allergy to latex? No  Allergy to adhesives/skin preparations (chlorhexadine)? No    Current Outpatient Medications   Medication Sig     amLODIPine (NORVASC) 5 MG tablet Take 1 tablet (5 mg) by mouth daily     aspirin 81 MG chewable tablet Take 1 tablet (81 mg) by mouth daily     benzonatate (TESSALON) 200 MG capsule Take 1 capsule (200 mg) by mouth 3 times daily as needed for cough     blood glucose (NO BRAND SPECIFIED) test strip Use to test blood sugar 1 time daily or as directed. Blood Glucose Monitor Brands: per insurance.     famotidine (PEPCID) 40 MG tablet TAKE 1 TABLET(40 MG) BY MOUTH EVERY NIGHT AS NEEDED FOR HEARTBURN     hydroxychloroquine (PLAQUENIL) 200 MG tablet Take 1 tablet (200 mg) by mouth 2 times daily Annual Plaquenil toxicity eye screening required.     metFORMIN (GLUCOPHAGE) 1000 MG tablet TAKE 1 TABLET(1000 MG) BY MOUTH TWICE DAILY WITH MEALS     nintedanib (OFEV) 100 MG capsule Take 1 capsule (100 mg) by mouth 2 times daily     ondansetron (ZOFRAN) 4  MG tablet Take 1 tablet (4 mg) by mouth every 8 hours as needed for nausea     STATIN NOT PRESCRIBED (INTENTIONAL) Please choose reason not prescribed, below     sulfaSALAzine ER (AZULFIDINE EN) 500 MG EC tablet take 2 tabs twice a day.     VITAMIN D3 50 MCG (2000 UT) tablet TAKE 1 TABLET BY MOUTH DAILY     Over the counter medications? Other than above, no  Vitamin Supplements? Other than above, no  Herbal Supplements? No    Family History   Problem Relation Age of Onset     Dementia Mother      Cerebrovascular Disease Father      Cerebrovascular Disease Brother      Diabetes No family hx of      Myocardial Infarction No family hx of      Coronary Artery Disease Early Onset No family hx of      Breast Cancer No family hx of      Ovarian Cancer No family hx of      Colon Cancer No family hx of      Social History     Occupational History     Occupation: Retired - was  in restaurant   Tobacco Use     Smoking status: Never Smoker     Smokeless tobacco: Never Used   Substance and Sexual Activity     Alcohol use: Not Currently     Drug use: No     Sexual activity: Not Currently   Other Topics Concern     Parent/sibling w/ CABG, MI or angioplasty before 65F 55M? Not Asked   Social History Narrative    .     8 children.     13 grandchildren (as of 2021).    Originally from [facility]. [place] (all kids born in [facility]). Moved to  in 2012.     Living with daughter and her family. Taking care of  (~25 years her senior).     Goes for walks occasionally.      Functional capacity: Can walk indoors around the house (2 METs)    OBJECTIVE:                                                      BP (!) 148/74 (BP Location: Left arm, Patient Position: Chair, Cuff Size: Adult Regular)   Pulse 88   Temp 97.3  F (36.3  C) (Temporal)   Resp 22   Wt 69.4 kg (153 lb)   SpO2 97%   BMI 27.98 kg/m    Constitutional: well-appearing  Respiratory: normal respiratory effort; bibasilar inspiratory crackles, R>L;  frequent dry cough  Cardiovascular: regular rate and rhythm; no edema  Musculoskeletal: normal gait and station  Psych: normal judgment and insight; normal mood and affect    ---    (Chart documentation was completed, in part, with [employer] voice-recognition software. Even though reviewed, some grammatical, spelling, and word errors may remain.)

## 2021-10-05 ENCOUNTER — HOSPITAL ENCOUNTER (OUTPATIENT)
Dept: CT IMAGING | Facility: CLINIC | Age: 71
Discharge: HOME | End: 2021-10-05
Attending: INTERNAL MEDICINE | Admitting: INTERNAL MEDICINE
Payer: COMMERCIAL

## 2021-10-05 DIAGNOSIS — J84.9 ILD (INTERSTITIAL LUNG DISEASE) (H): ICD-10-CM

## 2021-10-05 PROCEDURE — 71250 CT THORAX DX C-: CPT

## 2021-10-05 RX ORDER — PREDNISONE 10 MG/1
TABLET ORAL
Qty: 85 TABLET | Refills: 0 | Status: SHIPPED | OUTPATIENT
Start: 2021-10-05 | End: 2021-11-29

## 2021-10-05 NOTE — TELEPHONE ENCOUNTER
----- Message from Tristan Ramirez MD sent at 10/4/2021  6:22 PM CDT -----  Regarding: RE: chronic cough  Prednisone 40 mg for 5 days, 30 mg for 5 days, 20 mg for 5 days, then 10 mg/day until I see her back in clinic.    Tristan  ----- Message -----  From: Haley Yancey RN  Sent: 10/4/2021   2:46 PM CDT  To: Tristan Ramirez MD  Subject: chronic cough                                    Tristan,     A message from daughter of your patient; wondering if you suggest a prednisone burst for (chronic) cough. Patient is on  mg tessalon.    Thoughts for next steps with chronic cough management?     Haley  -------------------------------------------    Sreekanth ramirez     My mothers chronic cough has gotten worse. We have tried the tessalon shavonne and otc mucinex.  We went to see her pcp dr roberto and she recommended contacting you to see if my mother needs a dose of steroid to help.      She has been able to only tolerate 100 mg ofev once a day. The nausea was too much and she has lost some weight because of that.      We do have appointment next month with you.      Please advice      Thanks   Krystian daughter Zachariah

## 2021-10-05 NOTE — TELEPHONE ENCOUNTER
[facility] Subsequently spoke with patient's daughter on phone to confirm pharmacy location; Rx sent.     Haley Yancey, RN, BSN  ILD Nurse Care Coordinator  (P) [phone]

## 2021-10-09 ENCOUNTER — LAB (OUTPATIENT)
Dept: LAB | Facility: CLINIC | Age: 71
End: 2021-10-09
Payer: COMMERCIAL

## 2021-10-09 ENCOUNTER — HEALTH MAINTENANCE LETTER (OUTPATIENT)
Age: 71
End: 2021-10-09

## 2021-10-09 DIAGNOSIS — E11.9 TYPE 2 DIABETES MELLITUS WITHOUT COMPLICATION, WITHOUT LONG-TERM CURRENT USE OF INSULIN (H): ICD-10-CM

## 2021-10-09 LAB
ERYTHROCYTE [DISTWIDTH] IN BLOOD BY AUTOMATED COUNT: 12.4 % (ref 10–15)
HBA1C MFR BLD: 5.3 % (ref 0–5.6)
HCT VFR BLD AUTO: 34.3 % (ref 35–47)
HGB BLD-MCNC: 12.1 G/DL (ref 11.7–15.7)
MCH RBC QN AUTO: 31.8 PG (ref 26.5–33)
MCHC RBC AUTO-ENTMCNC: 35.3 G/DL (ref 31.5–36.5)
MCV RBC AUTO: 90 FL (ref 78–100)
PLATELET # BLD AUTO: 387 10E3/UL (ref 150–450)
RBC # BLD AUTO: 3.8 10E6/UL (ref 3.8–5.2)
WBC # BLD AUTO: 8.6 10E3/UL (ref 4–11)

## 2021-10-09 PROCEDURE — 83036 HEMOGLOBIN GLYCOSYLATED A1C: CPT

## 2021-10-09 PROCEDURE — 36415 COLL VENOUS BLD VENIPUNCTURE: CPT

## 2021-10-09 PROCEDURE — 82043 UR ALBUMIN QUANTITATIVE: CPT

## 2021-10-09 PROCEDURE — 85027 COMPLETE CBC AUTOMATED: CPT

## 2021-10-09 PROCEDURE — 80053 COMPREHEN METABOLIC PANEL: CPT

## 2021-10-09 PROCEDURE — 80061 LIPID PANEL: CPT

## 2021-10-10 ENCOUNTER — MYC MEDICAL ADVICE (OUTPATIENT)
Dept: INTERNAL MEDICINE | Facility: CLINIC | Age: 71
End: 2021-10-10

## 2021-10-10 DIAGNOSIS — E11.9 TYPE 2 DIABETES MELLITUS WITHOUT COMPLICATION, WITHOUT LONG-TERM CURRENT USE OF INSULIN (H): Primary | ICD-10-CM

## 2021-10-10 DIAGNOSIS — R80.9 MICROALBUMINURIA: Primary | ICD-10-CM

## 2021-10-10 LAB
ALBUMIN SERPL-MCNC: 3.8 G/DL (ref 3.4–5)
ALP SERPL-CCNC: 52 U/L (ref 40–150)
ALT SERPL W P-5'-P-CCNC: 24 U/L (ref 0–50)
ANION GAP SERPL CALCULATED.3IONS-SCNC: 5 MMOL/L (ref 3–14)
AST SERPL W P-5'-P-CCNC: 18 U/L (ref 0–45)
BILIRUB SERPL-MCNC: 0.4 MG/DL (ref 0.2–1.3)
BUN SERPL-MCNC: 10 MG/DL (ref 7–30)
CALCIUM SERPL-MCNC: 9.4 MG/DL (ref 8.5–10.1)
CHLORIDE BLD-SCNC: 96 MMOL/L (ref 94–109)
CHOLEST SERPL-MCNC: 142 MG/DL
CO2 SERPL-SCNC: 29 MMOL/L (ref 20–32)
CREAT SERPL-MCNC: 0.6 MG/DL (ref 0.52–1.04)
CREAT UR-MCNC: 94 MG/DL
FASTING STATUS PATIENT QL REPORTED: YES
GFR SERPL CREATININE-BSD FRML MDRD: >90 ML/MIN/1.73M2
GLUCOSE BLD-MCNC: 80 MG/DL (ref 70–99)
HDLC SERPL-MCNC: 49 MG/DL
LDLC SERPL CALC-MCNC: 82 MG/DL
MICROALBUMIN UR-MCNC: 52 MG/L
MICROALBUMIN/CREAT UR: 55.32 MG/G CR (ref 0–25)
NONHDLC SERPL-MCNC: 93 MG/DL
POTASSIUM BLD-SCNC: 4 MMOL/L (ref 3.4–5.3)
PROT SERPL-MCNC: 8.7 G/DL (ref 6.8–8.8)
SODIUM SERPL-SCNC: 130 MMOL/L (ref 133–144)
TRIGL SERPL-MCNC: 57 MG/DL

## 2021-10-12 RX ORDER — LOSARTAN POTASSIUM 25 MG/1
25 TABLET ORAL DAILY
Qty: 90 TABLET | Refills: 3 | Status: SHIPPED | OUTPATIENT
Start: 2021-10-12 | End: 2022-09-16

## 2021-10-12 NOTE — TELEPHONE ENCOUNTER
Dr. Singh,     Please see my chart note, Patient is willing to take the Losartan.     Soha Vick RN

## 2021-10-29 DIAGNOSIS — R76.8 RHEUMATOID FACTOR POSITIVE WITH CYCLIC CITRULLINATED PEPTIDE (CCP) ANTIBODY NEGATIVE: ICD-10-CM

## 2021-11-02 RX ORDER — HYDROXYCHLOROQUINE SULFATE 200 MG/1
TABLET, FILM COATED ORAL
Qty: 180 TABLET | Refills: 3 | OUTPATIENT
Start: 2021-11-02

## 2021-11-16 ENCOUNTER — OFFICE VISIT (OUTPATIENT)
Dept: INTERNAL MEDICINE | Facility: CLINIC | Age: 71
End: 2021-11-16
Payer: COMMERCIAL

## 2021-11-16 VITALS
OXYGEN SATURATION: 95 % | WEIGHT: 155 LBS | SYSTOLIC BLOOD PRESSURE: 122 MMHG | BODY MASS INDEX: 28.52 KG/M2 | TEMPERATURE: 97.6 F | HEIGHT: 62 IN | HEART RATE: 93 BPM | DIASTOLIC BLOOD PRESSURE: 70 MMHG | RESPIRATION RATE: 18 BRPM

## 2021-11-16 DIAGNOSIS — E11.9 TYPE 2 DIABETES MELLITUS WITHOUT COMPLICATION, WITHOUT LONG-TERM CURRENT USE OF INSULIN (H): ICD-10-CM

## 2021-11-16 DIAGNOSIS — Z78.0 ASYMPTOMATIC MENOPAUSE: ICD-10-CM

## 2021-11-16 DIAGNOSIS — Z12.31 ENCOUNTER FOR SCREENING MAMMOGRAM FOR BREAST CANCER: ICD-10-CM

## 2021-11-16 DIAGNOSIS — Z00.00 ROUTINE HISTORY AND PHYSICAL EXAMINATION OF ADULT: Primary | ICD-10-CM

## 2021-11-16 DIAGNOSIS — Z23 HIGH PRIORITY FOR 2019-NCOV VACCINE: ICD-10-CM

## 2021-11-16 PROCEDURE — 91300 COVID-19,PF,PFIZER (12+ YRS): CPT | Performed by: INTERNAL MEDICINE

## 2021-11-16 PROCEDURE — 99397 PER PM REEVAL EST PAT 65+ YR: CPT | Mod: 25 | Performed by: INTERNAL MEDICINE

## 2021-11-16 PROCEDURE — 0004A COVID-19,PF,PFIZER (12+ YRS): CPT | Performed by: INTERNAL MEDICINE

## 2021-11-16 ASSESSMENT — ENCOUNTER SYMPTOMS
HEMATOCHEZIA: 0
ARTHRALGIAS: 0
EYE PAIN: 0
MYALGIAS: 0
PARESTHESIAS: 0
SORE THROAT: 0
FREQUENCY: 0
DIZZINESS: 0
COUGH: 0
BREAST MASS: 0
CONSTIPATION: 0
FEVER: 0
HEARTBURN: 0
HEADACHES: 0
WEAKNESS: 0
CHILLS: 0
ABDOMINAL PAIN: 0
JOINT SWELLING: 0
HEMATURIA: 0
NERVOUS/ANXIOUS: 0
NAUSEA: 0
DYSURIA: 0
PALPITATIONS: 0
SHORTNESS OF BREATH: 0
DIARRHEA: 0

## 2021-11-16 ASSESSMENT — ACTIVITIES OF DAILY LIVING (ADL): CURRENT_FUNCTION: NO ASSISTANCE NEEDED

## 2021-11-16 ASSESSMENT — PATIENT HEALTH QUESTIONNAIRE - PHQ9
SUM OF ALL RESPONSES TO PHQ QUESTIONS 1-9: 2
SUM OF ALL RESPONSES TO PHQ QUESTIONS 1-9: 2
10. IF YOU CHECKED OFF ANY PROBLEMS, HOW DIFFICULT HAVE THESE PROBLEMS MADE IT FOR YOU TO DO YOUR WORK, TAKE CARE OF THINGS AT HOME, OR GET ALONG WITH OTHER PEOPLE: NOT DIFFICULT AT ALL

## 2021-11-16 ASSESSMENT — MIFFLIN-ST. JEOR: SCORE: 1181.33

## 2021-11-16 NOTE — PROGRESS NOTES
ASSESSMENT/PLAN                                                       (Z00.00) Routine history and physical examination of adult  (primary encounter diagnosis)  Comment: PMH, PSH, FH, SH, medications, allergies, immunizations, and preventative health measures reviewed and updated as appropriate.  Plan: see below for plans.      (E11.9) Type 2 diabetes mellitus without complication, without long-term current use of insulin (H)  Comment: well-controlled on current regimen.    Plan: TRIAL of stopping Metformin; repeat HgbA1c in 3-6 months.     (Z23) High priority for 2019-nCoV vaccine  Plan: COVID-19 booster given today.    (Z12.31) Encounter for screening mammogram for breast cancer  Plan: screening mammogram ordered - patient to schedule.     (Z78.0) Asymptomatic menopause  Plan: DEXA ordered - patient to schedule.     Adrianna Singh MD   [address] [address]  T: [phone], F: [phone]    VINCENZO Boland is a very pleasant 71 year old female who presents for a physical.    ROS:  Constitutional: no unintentional weight loss or gain reported; no fevers, chills, or sweats reported  Cardiovascular: no chest pain, palpitations, or edema reported  Respiratory: no cough, wheezing, shortness of breath, or dyspnea on exertion reported  Gastrointestinal: no nausea, vomiting, constipation, diarrhea, or abdominal pain reported  Genitourinary: no urinary frequency, urgency, dysuria, or hematuria reported  Integumentary: no rash or pruritus reported  Musculoskeletal: no back pain, muscle pain, joint pain, or joint swelling reported  Neurologic: no focal weakness, numbness, or tingling reported  Hematologic: no easy bruising or bleeding reported  Endocrine: no heat or cold intolerance reported; no polyuria or polydipsia reported  Psychiatric: no anxiety or depression reported    Past Medical History:   Diagnosis  Date     Early onset Alzheimer's dementia without behavioral disturbance (H)      Interstitial lung disease (H)      Obesity (BMI 30-39.9)      Osteopenia      Rheumatoid factor positive      Type 2 diabetes mellitus (H)      Past Surgical History:   Procedure Laterality Date     CHOLECYSTECTOMY, OPEN  1990     Family History   Problem Relation Age of Onset     Dementia Mother      Cerebrovascular Disease Father      Cerebrovascular Disease Brother      Diabetes No family hx of      Myocardial Infarction No family hx of      Coronary Artery Disease Early Onset No family hx of      Breast Cancer No family hx of      Ovarian Cancer No family hx of      Colon Cancer No family hx of      Social History     Occupational History     Occupation: Retired - was  in restaurant   Tobacco Use     Smoking status: Never Smoker     Smokeless tobacco: Never Used   Substance and Sexual Activity     Alcohol use: Not Currently     Drug use: No     Sexual activity: Not Currently   Other Topics Concern     Parent/sibling w/ CABG, MI or angioplasty before 65F 55M? Not Asked   Social History Narrative    .     8 children.     14 grandchildren (as of 2021).    Originally from [facility]. [place] (all kids born in [facility]). Moved to  in 2012.     Living with daughter and her family. Taking care of  (~25 years her senior).     Goes for walks occasionally.      Allergies   Allergen Reactions     Atorvastatin Muscle Pain (Myalgia)     Current Outpatient Medications   Medication Sig     amLODIPine (NORVASC) 5 MG tablet Take 1 tablet (5 mg) by mouth daily     aspirin 81 MG chewable tablet Take 1 tablet (81 mg) by mouth daily     benzonatate (TESSALON) 200 MG capsule Take 1 capsule (200 mg) by mouth 3 times daily as needed for cough     blood glucose (NO BRAND SPECIFIED) test strip Use to test blood sugar 1 time daily or as directed. Blood Glucose Monitor Brands: per insurance.     famotidine (PEPCID) 40 MG tablet TAKE 1  TABLET(40 MG) BY MOUTH EVERY NIGHT AS NEEDED FOR HEARTBURN     hydroxychloroquine (PLAQUENIL) 200 MG tablet Take 1 tablet (200 mg) by mouth 2 times daily Annual Plaquenil toxicity eye screening required.     losartan (COZAAR) 25 MG tablet Take 1 tablet (25 mg) by mouth daily     nintedanib (OFEV) 100 MG capsule Take 1 capsule (100 mg) by mouth 2 times daily     ondansetron (ZOFRAN) 4 MG tablet Take 1 tablet (4 mg) by mouth every 8 hours as needed for nausea     predniSONE (DELTASONE) 10 MG tablet Take 4 tablets (40 mg) by mouth daily for 5 days, THEN 3 tablets (30 mg) daily for 5 days, THEN 2 tablets (20 mg) daily for 5 days, THEN 1 tablet (10 mg) daily.     STATIN NOT PRESCRIBED (INTENTIONAL) Please choose reason not prescribed, below     sulfaSALAzine ER (AZULFIDINE EN) 500 MG EC tablet take 2 tabs twice a day.     VITAMIN D3 50 MCG (2000 UT) tablet TAKE 1 TABLET BY MOUTH DAILY     Immunization History   Administered Date(s) Administered     COVID-19,PF,Pfizer (12+ Yrs) 04/21/2021, 05/12/2021, 11/16/2021     Influenza (High Dose) 3 valent vaccine 11/07/2018, 02/12/2020     Influenza Vaccine IM > 6 months Valent IIV4 (Alfuria,Fluzone) 10/01/2016     Influenza, Quad, High Dose, Pf, 65yr+ (Fluzone HD) 12/16/2020, 10/04/2021     Pneumo Conj 13-V (2010&after) 04/02/2018     Pneumococcal 23 valent 10/14/2016     TDAP Vaccine (Adacel) 07/04/2012     Zoster vaccine, live 07/04/2016     PREVENTATIVE HEALTH                                                      BMI: overweight  Blood pressure: well-controlled on current regimen   Breast CA screening: DUE  Cervical CA screening: screening no longer indicated  Colon CA screening: up to date   Lung CA screening: n/a   Dexa: DUE  Screening cholesterol: n/a - already being treated for this condition  Screening diabetes: n/a - already being treated for this condition  STD testing: no risk factors present  Alcohol misuse screening: alcohol use reviewed - no intervention indicated at  "this time  Immunizations: reviewed; COVID-19 booster DUE and Shingrix series DUE - not covered by Medicare (may obtain in pharmacy if desired)     OBJECTIVE                                                      /70 (BP Location: Left arm, Patient Position: Chair, Cuff Size: Adult Regular)   Pulse 93   Temp 97.6  F (36.4  C) (Temporal)   Resp 18   Ht 1.575 m (5' 2\")   Wt 70.3 kg (155 lb)   SpO2 95%   BMI 28.35 kg/m    Constitutional: well-appearing  Head, Ears, and Eyes: normocephalic; normal external auditory canal and pinna; tympanic membranes visualized and normal; normal lids and conjunctivae  Neck: supple, symmetric, no thyromegaly or lymphadenopathy  Respiratory: normal respiratory effort; clear to auscultation bilaterally  Cardiovascular: regular rate and rhythm; no edema  Gastrointestinal: soft, non-tender, and non-distended; no organomegaly or masses  Musculoskeletal: normal gait and station  Psych: normal judgment and insight; normal mood and affect; recent and remote memory intact    ---  (Note was completed, in part, with [employer] voice-recognition software. Documentation was reviewed, but some grammatical, spelling, and word errors may remain.)    "

## 2021-11-17 ASSESSMENT — PATIENT HEALTH QUESTIONNAIRE - PHQ9: SUM OF ALL RESPONSES TO PHQ QUESTIONS 1-9: 2

## 2021-11-24 ENCOUNTER — ANCILLARY PROCEDURE (OUTPATIENT)
Dept: CARDIOLOGY | Facility: CLINIC | Age: 71
End: 2021-11-24
Payer: COMMERCIAL

## 2021-11-24 ENCOUNTER — LAB (OUTPATIENT)
Dept: LAB | Facility: CLINIC | Age: 71
End: 2021-11-24
Attending: INTERNAL MEDICINE
Payer: COMMERCIAL

## 2021-11-24 ENCOUNTER — OFFICE VISIT (OUTPATIENT)
Dept: PULMONOLOGY | Facility: CLINIC | Age: 71
End: 2021-11-24
Attending: INTERNAL MEDICINE
Payer: COMMERCIAL

## 2021-11-24 VITALS
WEIGHT: 154 LBS | HEART RATE: 97 BPM | RESPIRATION RATE: 17 BRPM | HEIGHT: 62 IN | SYSTOLIC BLOOD PRESSURE: 144 MMHG | BODY MASS INDEX: 28.34 KG/M2 | OXYGEN SATURATION: 96 % | DIASTOLIC BLOOD PRESSURE: 87 MMHG

## 2021-11-24 DIAGNOSIS — R06.09 DYSPNEA ON EXERTION: ICD-10-CM

## 2021-11-24 DIAGNOSIS — J84.9 ILD (INTERSTITIAL LUNG DISEASE) (H): Primary | ICD-10-CM

## 2021-11-24 DIAGNOSIS — J84.9 ILD (INTERSTITIAL LUNG DISEASE) (H): ICD-10-CM

## 2021-11-24 DIAGNOSIS — J84.89 PROGRESSIVE FIBROSING INTERSTITIAL LUNG DISEASE (H): ICD-10-CM

## 2021-11-24 LAB
ALBUMIN SERPL-MCNC: 3.4 G/DL (ref 3.4–5)
ALP SERPL-CCNC: 68 U/L (ref 40–150)
ALT SERPL W P-5'-P-CCNC: 23 U/L (ref 0–50)
ANION GAP SERPL CALCULATED.3IONS-SCNC: 4 MMOL/L (ref 3–14)
AST SERPL W P-5'-P-CCNC: 20 U/L (ref 0–45)
BILIRUB DIRECT SERPL-MCNC: 0.1 MG/DL (ref 0–0.2)
BILIRUB SERPL-MCNC: 0.4 MG/DL (ref 0.2–1.3)
BUN SERPL-MCNC: 9 MG/DL (ref 7–30)
CALCIUM SERPL-MCNC: 9.3 MG/DL (ref 8.5–10.1)
CHLORIDE BLD-SCNC: 104 MMOL/L (ref 94–109)
CO2 SERPL-SCNC: 29 MMOL/L (ref 20–32)
CREAT SERPL-MCNC: 0.61 MG/DL (ref 0.52–1.04)
CRP SERPL-MCNC: <2.9 MG/L (ref 0–8)
DLCOCOR-%PRED-PRE: 83 %
DLCOCOR-PRE: 15.2 ML/MIN/MMHG
DLCOUNC-%PRED-PRE: 80 %
DLCOUNC-PRE: 14.61 ML/MIN/MMHG
DLCOUNC-PRED: 18.23 ML/MIN/MMHG
ERV-%PRED-PRE: 277 %
ERV-PRE: 0.45 L
ERV-PRED: 0.16 L
ERYTHROCYTE [DISTWIDTH] IN BLOOD BY AUTOMATED COUNT: 12.4 % (ref 10–15)
EXPTIME-PRE: 5.03 SEC
FEF2575-%PRED-PRE: 163 %
FEF2575-PRE: 2.84 L/SEC
FEF2575-PRED: 1.73 L/SEC
FEFMAX-%PRED-PRE: 105 %
FEFMAX-PRE: 5.73 L/SEC
FEFMAX-PRED: 5.42 L/SEC
FEV1-%PRED-PRE: 84 %
FEV1-PRE: 1.64 L
FEV1FEV6-PRE: 91 %
FEV1FEV6-PRED: 79 %
FEV1FVC-PRE: 91 %
FEV1FVC-PRED: 79 %
FEV1SVC-PRE: 107 %
FEV1SVC-PRED: 79 %
FIFMAX-PRE: 4.23 L/SEC
FVC-%PRED-PRE: 73 %
FVC-PRE: 1.82 L
FVC-PRED: 2.47 L
GFR SERPL CREATININE-BSD FRML MDRD: >90 ML/MIN/1.73M2
GLUCOSE BLD-MCNC: 153 MG/DL (ref 70–99)
HCT VFR BLD AUTO: 36.7 % (ref 35–47)
HGB BLD-MCNC: 12.2 G/DL (ref 11.7–15.7)
IC-%PRED-PRE: 46 %
IC-PRE: 1.08 L
IC-PRED: 2.3 L
LVEF ECHO: NORMAL
MCH RBC QN AUTO: 30.6 PG (ref 26.5–33)
MCHC RBC AUTO-ENTMCNC: 33.2 G/DL (ref 31.5–36.5)
MCV RBC AUTO: 92 FL (ref 78–100)
NT-PROBNP SERPL-MCNC: 20 PG/ML (ref 0–125)
PLATELET # BLD AUTO: 267 10E3/UL (ref 150–450)
POTASSIUM BLD-SCNC: 4.4 MMOL/L (ref 3.4–5.3)
PROT SERPL-MCNC: 7.8 G/DL (ref 6.8–8.8)
RBC # BLD AUTO: 3.99 10E6/UL (ref 3.8–5.2)
SODIUM SERPL-SCNC: 137 MMOL/L (ref 133–144)
VA-%PRED-PRE: 68 %
VA-PRE: 2.96 L
VC-%PRED-PRE: 62 %
VC-PRE: 1.53 L
VC-PRED: 2.47 L
WBC # BLD AUTO: 8.3 10E3/UL (ref 4–11)

## 2021-11-24 PROCEDURE — 93306 TTE W/DOPPLER COMPLETE: CPT | Performed by: INTERNAL MEDICINE

## 2021-11-24 PROCEDURE — 86140 C-REACTIVE PROTEIN: CPT | Performed by: PATHOLOGY

## 2021-11-24 PROCEDURE — 94375 RESPIRATORY FLOW VOLUME LOOP: CPT | Performed by: INTERNAL MEDICINE

## 2021-11-24 PROCEDURE — 83880 ASSAY OF NATRIURETIC PEPTIDE: CPT | Performed by: PATHOLOGY

## 2021-11-24 PROCEDURE — 94729 DIFFUSING CAPACITY: CPT | Performed by: INTERNAL MEDICINE

## 2021-11-24 PROCEDURE — G0463 HOSPITAL OUTPT CLINIC VISIT: HCPCS | Mod: 25

## 2021-11-24 PROCEDURE — 85027 COMPLETE CBC AUTOMATED: CPT | Performed by: PATHOLOGY

## 2021-11-24 PROCEDURE — 36415 COLL VENOUS BLD VENIPUNCTURE: CPT | Performed by: PATHOLOGY

## 2021-11-24 PROCEDURE — 99214 OFFICE O/P EST MOD 30 MIN: CPT | Mod: 25 | Performed by: INTERNAL MEDICINE

## 2021-11-24 PROCEDURE — 80053 COMPREHEN METABOLIC PANEL: CPT | Performed by: PATHOLOGY

## 2021-11-24 PROCEDURE — 82248 BILIRUBIN DIRECT: CPT | Performed by: PATHOLOGY

## 2021-11-24 ASSESSMENT — MIFFLIN-ST. JEOR: SCORE: 1176.79

## 2021-11-24 ASSESSMENT — PAIN SCALES - GENERAL: PAINLEVEL: NO PAIN (0)

## 2021-11-24 NOTE — PROGRESS NOTES
HISTORY OF PRESENT ILLNESS:  Krystian is a 71-year-old female with interstitial lung disease secondary to rheumatoid arthritis.  She has been receiving Ofev, which was started in 09/2020 for a suggestion that her ILD was progressing.  Her last visit with me was in 06/2021.  At that time, Krystian had stopped the Ofev, she was on 150 mg b.i.d., because of nausea and weight loss, and in June of this year, we decided to try to take her off Ofev and then restart at the 100 mg dose of the Ofev, starting at once a day and then escalating up to b.i.d. with the 100 mg of Ofev.  She has been tolerating that okay without significant nausea or diarrhea.  She remains on sulfasalazine and hydroxychloroquine for her rheumatoid arthritis.  She recently saw Dr. Staley in Rheumatology who felt that her arthritis was under control on those medications.  In terms of her breathing, she has a dry cough and shortness of breath, which seems to be stable at this time.    Since her last visit with me, unfortunately, in August, she developed breakthrough COVID and her symptoms were predominantly with an increased cough.  This was treated with a steroid burst and taper, which improved the cough.  Since that time, she has gotten the COVID booster shot and also the flu shot.    REVIEW OF SYSTEMS:    CARDIAC:  She denies exertional chest pain, leg swelling, or palpitations or syncope.  GASTROINTESTINAL:  She denies significant heartburn or reflux.  MUSCULOSKELETAL:  She has not noted joint swelling, and her joints in terms of pain, swelling and stiffness have been under good control.    PHYSICAL EXAMINATION:    VITAL SIGNS:  Her blood pressure was a little bit high at 144/87, pulse 97, respiratory rate 17, oxygen sats are 96% on room air.  HEENT:  Eyes are anicteric.  Mouth and throat without lesions.  NECK:  No thyromegaly.  LYMPH:  No adenopathy appreciated.  CHEST:  Crackles at least senior care up bilaterally, maybe a little bit higher, which  is similar to what it has previously been.  HEART:  Shows regular rate and rhythm.  Normal S1, S2.  ABDOMEN:  Nontender.  No hepatosplenomegaly.  EXTREMITIES:  Without clubbing, cyanosis or edema.  MUSCULOSKELETAL JOINTS:  I do not appreciate redness or swelling of the joints.  SKIN:  No rash.    PFTs:  FEV1 1.64; 84% of predicted.  FVC 1.82 or 73% predicted.  Diffusion capacity 80% of predicted.  PFTs are stable compared to what they were back in June.  The patient also had a chest CT scan in 10/2021 which showed stable fibrotic changes.    LABORATORY: Liver function tests are normal.  CBC:  White blood count, hemoglobin and platelets are normal.  Basic metabolic panel:  Electrolytes are normal as well as BUN and creatinine.    ASSESSMENT AND PLAN:  Krystian is a 71-year-old female with interstitial lung disease secondary to RA.  Her RA is being treated with sulfasalazine and hydroxychloroquine and it has been stable.  In terms of her breathing, her breathing symptoms are stable.  She is on 100 mg Ofev twice a day.  Her PFTs remain stable.  Her chest CT scan is stable.  A CRP of inflammation today was less than 2.9.  Right now, I plan to continue the Ofev at 100 mg twice a day.  If there would be evidence of progression of her ILD, we could try going back up to 150 mg b.i.d., but right now, I do not see a reason for that.  Also at this time, her CRP is suppressed, there are not significant ground-glass opacities and her PFTs are stable, so addition of another medication like MMF I do not think is indicated at this time.  I plan to see the patient back in 6 months with repeat labs and PFTs and 6-minute walk.  Of note, on prior 6-minute walk, she does desat with exercise.  However, she is not on O2 and has declined it in the past.

## 2021-12-13 ENCOUNTER — ANCILLARY PROCEDURE (OUTPATIENT)
Dept: MAMMOGRAPHY | Facility: CLINIC | Age: 71
End: 2021-12-13
Attending: INTERNAL MEDICINE
Payer: COMMERCIAL

## 2021-12-13 ENCOUNTER — ANCILLARY PROCEDURE (OUTPATIENT)
Dept: BONE DENSITY | Facility: CLINIC | Age: 71
End: 2021-12-13
Attending: INTERNAL MEDICINE
Payer: COMMERCIAL

## 2021-12-13 DIAGNOSIS — Z12.31 ENCOUNTER FOR SCREENING MAMMOGRAM FOR BREAST CANCER: ICD-10-CM

## 2021-12-13 DIAGNOSIS — Z78.0 ASYMPTOMATIC MENOPAUSE: ICD-10-CM

## 2021-12-13 DIAGNOSIS — Z12.31 VISIT FOR SCREENING MAMMOGRAM: ICD-10-CM

## 2021-12-13 PROCEDURE — 77067 SCR MAMMO BI INCL CAD: CPT | Mod: TC | Performed by: RADIOLOGY

## 2021-12-13 PROCEDURE — 77080 DXA BONE DENSITY AXIAL: CPT | Performed by: INTERNAL MEDICINE

## 2022-01-03 ENCOUNTER — PATIENT OUTREACH (OUTPATIENT)
Dept: GERIATRIC MEDICINE | Facility: CLINIC | Age: 72
End: 2022-01-03
Payer: COMMERCIAL

## 2022-01-03 NOTE — PROGRESS NOTES
[facility] Care Coordination Contact    Called adult daughter David to schedule annual HRA home visit. Left a message requesting a return call to schedule HRA.   JAQUELIN Langley  [facility]  [phone]

## 2022-01-04 NOTE — PROGRESS NOTES
[facility] Care Coordination Contact    Called adult daughter Tenzen again to schedule annual HRA home visit. Left a message requesting a return call to schedule HRA.   JAQUELIN Langley  [facility]  [phone]

## 2022-01-05 ENCOUNTER — LAB (OUTPATIENT)
Dept: LAB | Facility: CLINIC | Age: 72
End: 2022-01-05
Payer: COMMERCIAL

## 2022-01-05 DIAGNOSIS — E11.9 TYPE 2 DIABETES MELLITUS WITHOUT COMPLICATION, WITHOUT LONG-TERM CURRENT USE OF INSULIN (H): ICD-10-CM

## 2022-01-05 LAB — HBA1C MFR BLD: 6.4 % (ref 0–5.6)

## 2022-01-05 PROCEDURE — 36415 COLL VENOUS BLD VENIPUNCTURE: CPT

## 2022-01-05 PROCEDURE — 83036 HEMOGLOBIN GLYCOSYLATED A1C: CPT

## 2022-01-07 NOTE — PROGRESS NOTES
[facility] Care Coordination Contact    Received a return call from member's daughter, David. Scheduled member's annual assessment for 1/11/22 at 1pm.   JAQUELIN Langley  [facility]  [phone]

## 2022-01-07 NOTE — PROGRESS NOTES
[facility] Care Coordination Contact    Called adult daughter Tenzen, again, to schedule annual HRA home visit. Left a message requesting a return call to schedule HRA.   Requested of CMS to mail the [facility] letter.  JAQUELIN Langley  [facility]  [phone]

## 2022-01-11 ENCOUNTER — PATIENT OUTREACH (OUTPATIENT)
Dept: GERIATRIC MEDICINE | Facility: CLINIC | Age: 72
End: 2022-01-11
Payer: COMMERCIAL

## 2022-01-11 ASSESSMENT — PATIENT HEALTH QUESTIONNAIRE - PHQ9: SUM OF ALL RESPONSES TO PHQ QUESTIONS 1-9: 1

## 2022-01-12 NOTE — PROGRESS NOTES
[facility] Care Coordination Contact    [facility] Home Visit Assessment     Home visit for Health Risk Assessment with Krystian Boland completed on January 11, 2022.  [id number] Assessment Completed    Type of residence:: Private home - stairs (3 steps into the home. Full flight of steps into basement. Bedroom and bathroom are on main level.)  Current living arrangement:: I live in a private home with family (Lives with spouse in daughter's home with daughter's spouse and children.)     Assessment completed with:: Patient,Care Team Member,Spouse or significant other, Family (Daughter Zachariah)    Current Care Plan  Member currently receiving the following home care services:  None   Member currently receiving the following community resources: None    Health Issues: Reports that her health is good. Reports that she follows up with her Rheumatologist on a regular basis. Reports that her pain is at about a 6. Reports that her hand hurt the most. No ED or hospitalizations in the past year. Takes her medications on a daily basis.     Medication Review  Medication reconciliation completed in Epic: Yes  Medication set-up & administration: Family/informal caregiver sets up every two weeks.  Self-administers medications.  Medication Risk Assessment Medication (1 or more, place referral to MTM): N/A: No risk factors identified  MTM Referral Placed: No: No risk factors idenified    Mental/Behavioral Health   Depression Screening:   PHQ-9 Total Score: 1  Mental health DX:: No        Falls Assessment:   Fallen 2 or more times in the past year?: No   Any fall with injury in the past year?: No    ADL/IADL Dependencies:   Dependent ADLs:: Independent  Dependent IADLs:: Shopping,Cleaning,Medication Management,Money Management,Transportation    [facility] Plan sponsored benefits: Shared information re: Silver Sneakers/gym memberships, ASA, Calcium +D.    PCA Assessment completed at visit: No     Elderly Waiver  Eligibility: No-does not meet criteria    Care Plan & Recommendations: Member wants to continue to live with her daughter and her family. Continue with incontinent pads. Wants to continue to be independent with her own cares. No services recommended and no services requested. Daughter shared that she and her family choose not to leave member at home alone stating that member does better when there are people around. Reviewed that there was no indication that member isn't able to be home alone. Family shared that they just feel better not to leave her alone. Reviewed a lifeline and family declined stating that this is not necessary, but will call in the event there is a change.    See [facility] for detailed assessment information.    Follow-Up Plan: Member informed of future contact, plan to f/u with member with a 6 month telephone assessment.  Contact information shared with member and family, encouraged member to call with any questions or concerns at any time.    [place] care continuum providers: Please see Snapshot and Care Management Flowsheets for specific details of the care plan.     JAQUELIN Langley  [facility]  [phone]

## 2022-01-13 ENCOUNTER — PATIENT OUTREACH (OUTPATIENT)
Dept: GERIATRIC MEDICINE | Facility: CLINIC | Age: 72
End: 2022-01-13
Payer: COMMERCIAL

## 2022-01-13 NOTE — LETTER
January 13, 2022      KRYSTIAN BARNES  [id number]  [facility] [address]      Dear Krystian:    At [facility], we are dedicated to improving your health and well-being. Enclosed is the Comprehensive Care Plan that we developed with you on 1/11/2022. Please review the Care Plan carefully.    As a reminder, some of the things we discussed at your visit include:    Your physical and mental health    Ways to reduce falls    Health care needs you may have    Don t forget to contact your care coordinator if you:    Have been hospitalized or plan to be hospitalized     Have had a fall     Have experienced a change in physical health    Are experiencing emotional problems     If you do not agree with your Care Plan, have questions about it, or have experienced a change in your needs, please call me at [phone]. If you are hearing impaired, please call the [place] [employer] at 553 or [phone] (ncepzu-qg-ezilwh relay service).    Sincerely,    JAQUELIN Langley    E-mail: [email or web address]  Phone: [phone]    Care Manager  [facility] ([facility]) is a health plan that contracts with both Medicare and the [place] Medical Assistance (Medicaid) program to provide benefits of both programs to enrollees. Enrollment in [facility] depends on contract renewal.    [id number]     [id number] (11/18)

## 2022-01-13 NOTE — PROGRESS NOTES
[facility] Care Coordination Contact    Received after visit chart from care coordinator.  Completed following tasks: Mailed copy of care plan to client and Updated services in access   and Provider Signature - No POC Shared:  Member indicates that they do not want their POC shared with any [facility] providers.     **THIS ASSESSMENT WAS DONE OVER THE PHONE, SENT POC SIG PAGE TO MEMBER ASKING THEM TO SIGN AND RETURN IN SASE. ALSO MAILED MED. DISPOSAL FORM**    Kaycee Tinlsey  Care Management Specialist  [facility]  [phone]

## 2022-01-18 ENCOUNTER — TELEPHONE (OUTPATIENT)
Dept: RHEUMATOLOGY | Facility: CLINIC | Age: 72
End: 2022-01-18
Payer: COMMERCIAL

## 2022-01-18 DIAGNOSIS — R76.8 RHEUMATOID FACTOR POSITIVE WITH CYCLIC CITRULLINATED PEPTIDE (CCP) ANTIBODY NEGATIVE: Primary | ICD-10-CM

## 2022-01-18 NOTE — PROGRESS NOTES
[facility]  Rheumatology Clinic  Harjit Staley MD  2022     Name: Krystian Boland  MRN: [id number]  Age: 71 year old  : 1950  Referring provider: Referred Self     Assessment and Plan:  #High positive rheumatoid factor and anti-CCP noted in 2018:  Patient reports mild polyarthralgia, stiffness and swelling, improved after a flare assocaited with sulfasalazine discontinuation in 2021.  Exam shows no gross synovitis.  Blood work on 2021 showed CRP, transaminases, creatinine, and CBC  all negative or normal. Knee x-rays in May 2021 showed mild tricompartmental disease in the right knee with osteophytes; left knee showed mild tricompartmental disease.  Hand x-rays showed scattered DIP degenerative changes without erosions.    I think that arthropathy of rheumatoid arthritis is well-controlled. I recommend continuing combined sulfasalazine and hydroxychloroquine for disease-modifying effect.    We discussed the need for annual ophthalmologic monitoring while on plaquenil. She in  had Ophtho exam showing no evidence of retinal toxicity.    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  Symptomatic treatment will be continued with heat, acetaminophen, and regular exercise for the hands.    # Interstitial lung disease:    Patient describes stable functional status with minor limitation in ambulating without oxygen. Pulmonary function tests in 2021 showed FEV1 and FVC at 84% and 73% of predicted respectively, stable compared with 2021. I agree with patient's plan to follow-up with Dr. Cross, and to continue the better tolerated dose of Ofev (100 mg), started in .    Plan:    1.  Continue hydroxychloroquine 200 mg 2 tabs  daily.  While taking hydroxychloroquine, undergo annual ophthalmology evaluation to monitor for rare retinal Plaquenil toxicity.  2.  Continue sulfasalazine 2 tablets twice daily for ongoing treatment.  3.  Check blood work in March to assess kidney and liver function.     F/u 4 mos    Harjit Staley M.D.  Staff Rheumatologist,  Health  Pager [phone]      HPI:   Krystian Boland has a history of ILD and RA and presents for follow-up. I last saw the patient in 9-2021 at which time rheumatoid arthritis was well controlled.  I recommended continuing combination sulfasalazine and hydroxychloroquine.    Daughter acted as  throughout this session    Interval history 1-    She has intermittent R > L thumb and finger pain, worse in the mornings. Knee pain resolved, and other joints beside R hand, doing well.  Massage and use of the joints, as well as thylenol, help.   Morning pain in the joints lasts about 2 hours, non-progressive.   ADLs are sometimes affected early in the morning.     Energy level is overall improved, and shortness of breath is stable.    Taking ofev, sulfasalazine and hydroxychloroquine as directed. No particular adverse effects noted    Interval history 09-:  Daughter Zachariah interprets today.  She had a SBO ~ 1 mo ago; symptom resolved at home, but she stopped sulfasalazine. Within 2 weeks, she had more pain in her fingers, R 3rd finger; wrists began to swell. Mild pain in L medial knee, but not other pain. Stiffness and pain eased with restarting sulfasalazine, except for the R 3rd finger. Early morning stiffness is now ~ 30 minutes. Former hip and knee pain are less intense since starting sulfasalazine.    She tested + for COVID on 8-27; she had just fatigue. She was vaccinated with Pfizer in 3-2021.    Patient saw Dr. Sawyer in ophthalmology in July 2021.  Impression was of long-term use of Plaquenil, no evidence of toxicity.    Patient saw Dr. Cross in pulmonology  on June 9, 2021.  Impression was of rheumatoid arthritis associated interstitial lung disease.  Cough and nausea were present.  Recommendation was to use Tessalon Perles and to temporarily stop Ofev; plan was to restart the medication at lower dose in order to avoid gastrointestinal side effects. She is now tolerating ofev 100 mg once daily.      Review of Systems:   Pertinent items are noted in HPI or as below, remainder of complete ROS is negative.     No recent problems with hearing or vision. No swallowing problems.   No breathing difficulty, shortness of breath, or wheezing. + Continues to have dry cough.  No chest pain or palpitations.  No heart burn, indigestion, abdominal pain, vomiting, diarrhea. + Nausea with Ofev.  No urination problems, no bloody, cloudy urine, no dysuria.  No numbing, tingling, weakness.  No headaches or confusion.  No rashes. No easy bleeding or bruising.   + Arthralgias, stiffness, swelling of multiple joints.  + Fatigue.    Active Medications:   Current Outpatient Medications   Medication Sig     amLODIPine (NORVASC) 5 MG tablet Take 1 tablet (5 mg) by mouth daily     aspirin 81 MG chewable tablet Take 1 tablet (81 mg) by mouth daily     benzonatate (TESSALON) 200 MG capsule Take 1 capsule (200 mg) by mouth 3 times daily as needed for cough     blood glucose (NO BRAND SPECIFIED) test strip Use to test blood sugar 1 time daily or as directed. Blood Glucose Monitor Brands: per insurance.     famotidine (PEPCID) 40 MG tablet TAKE 1 TABLET(40 MG) BY MOUTH EVERY NIGHT AS NEEDED FOR HEARTBURN     hydroxychloroquine (PLAQUENIL) 200 MG tablet Take 1 tablet (200 mg) by mouth 2 times daily Annual Plaquenil toxicity eye screening required.     losartan (COZAAR) 25 MG tablet Take 1 tablet (25 mg) by mouth daily     nintedanib (OFEV) 100 MG capsule Take 1 capsule (100 mg) by mouth 2 times daily     ondansetron (ZOFRAN) 4 MG tablet Take 1 tablet (4 mg) by mouth every 8 hours as needed for nausea      STATIN NOT PRESCRIBED (INTENTIONAL) Please choose reason not prescribed, below     sulfaSALAzine ER (AZULFIDINE EN) 500 MG EC tablet take 2 tabs twice a day.     VITAMIN D3 50 MCG (2000 UT) tablet TAKE 1 TABLET BY MOUTH DAILY     No current facility-administered medications for this visit.          Allergies:   Atorvastatin      Past Medical History:  Obesity  Osteopenia  Type 2 diabetes mellitus without complication, without long-term current use of insulin  Interstitial lung disease  Abdominal tuberculosis (intestinal, 20 years ago)     Past Surgical History:  Open cholecystectomy    Family History:   Mother - dementia  Father - cerebrovascular disease  Brother - cerebrovascular disease, hypertension  No family history of - diabetes, myocardial infarction, early onset C.A.D., breast cancer, ovarian cancer, colon cancer, rheumatoid arthritis  Family history of - high blood pressure  (mentions that medical history is unlikely to be comprehensive because of poor/infrequent medical records)      Social History:   No smoking or tobacco use  Rare alcohol use (1-2 times a year)   with 8 children, 13 grandchildren, cooks for the family     Physical Exam:   not currently breastfeeding.  Wt Readings from Last 4 Encounters:   11/24/21 69.9 kg (154 lb)   11/16/21 70.3 kg (155 lb)   10/04/21 69.4 kg (153 lb)   09/23/21 70.6 kg (155 lb 9.6 oz)     Constitutional: Well-developed, appearing stated age; cooperative  Eyes: Normal EOM, PERRLA, vision, conjunctiva, sclera  ENT: Normal external ears, nose, hearing, lips, teeth, gums, throat.  Neck: No mass or thyroid enlargement  Resp: Breathing unlabored on video exam  MS: Walking was narrow based and smooth.  No visible redness or gross effusion on the left knee at the indicated site of maximum pain on the medial joint line.  Clubbing in finger nails, but normal alignment of MCPs and PIPs. Normal fist formation.  Skin: No nail pitting, alopecia, rash, nodules or  lesions.  Neuro: Normal cranial nerves    Laboratory:   RHEUM RESULTS Latest Ref Rng & Units 10/10/2016 1/31/2017 3/31/2018   ALBUMIN 3.4 - 5.0 g/dL 3.9 - 3.8   ALT 0 - 50 U/L 24 - 21   AST 0 - 45 U/L 13 - 21   ANCA <1:20 - - -   LYNDA INTERPRETATION NEG:Negative - - -   CK TOTAL 30 - 225 U/L - - -   CREATININE 0.52 - 1.04 mg/dL 0.56 0.61 0.64   CRP 0.0 - 8.0 mg/L - - -   GFR ESTIMATE, IF BLACK >60 mL/min/[1.73:m2] >90  African American GFR Calc   >90   GFR Calc   >90   GFR ESTIMATE >60 mL/min/1.73m2 >90  Non African American GFR Calc   >90  Non  GFR Calc   >90   HEMATOCRIT 35.0 - 47.0 % 38.0 - 37.4   HEMOGLOBIN 11.7 - 15.7 g/dL 12.7 - 12.3   HCVAB NR Nonreactive   Assay performance characteristics have not been established for newborns,   infants, and children   - -    - 1,620 mg/dL - - -   WBC 4.0 - 11.0 10e3/uL 6.7 - 8.0   RBC 3.80 - 5.20 10e6/uL 4.25 - 4.11   RDW 10.0 - 15.0 % 12.4 - 12.7   MCHC 31.5 - 36.5 g/dL 33.4 - 32.9   MCV 78 - 100 fL 89 - 91   PLATELET COUNT 150 - 450 10e3/uL 288 - 294   RHEUMATOID FACTOR <20 IU/mL - - -   ESR 0 - 30 mm/h - - -       Rheumatoid Factor   Date Value Ref Range Status   05/23/2018 700 (H) <20 IU/mL Final     Cyclic Citrullinated Peptide Antibody, IgG   Date Value Ref Range Status   05/23/2018 >340 (H) <7 U/mL Final     Comment:     Positive     Scleroderma Antibody Scl-70 CLAUDIO IgG   Date Value Ref Range Status   05/23/2018 <0.2 0.0 - 0.9 AI Final     Comment:     Negative  Antibody index (AI) values reflect qualitative changes in antibody   concentration that cannot be directly associated with clinical condition or   disease state.       SSA (Ro) (CLAUDIO) Antibody, IgG   Date Value Ref Range Status   05/23/2018 <0.2 0.0 - 0.9 AI Final     Comment:     Negative  Antibody index (AI) values reflect qualitative changes in antibody   concentration that cannot be directly associated with clinical condition or   disease state.       SSB (La) (CLAUDIO)  Antibody, IgG   Date Value Ref Range Status   05/23/2018 <0.2 0.0 - 0.9 AI Final     Comment:     Negative  Antibody index (AI) values reflect qualitative changes in antibody   concentration that cannot be directly associated with clinical condition or   disease state.       LYNDA interpretation   Date Value Ref Range Status   05/23/2018 Negative NEG^Negative Final     Comment:                                        Reference range:  <1:40  NEGATIVE  1:40 - 1:80  BORDERLINE POSITIVE  >1:80 POSITIVE         Neutrophil Cytoplasmic IgG Antibody   Date Value Ref Range Status   05/23/2018 <1:20 <1:20 Final     Comment:     (Note)  The ANCA IFA is <1:20; therefore, no further testing will   be performed.  INTERPRETIVE INFORMATION: Anti-Neutrophil Cyto Ab, IgG  Neutrophil Cytoplasmic Antibodies (C-ANCA = granular   cytoplasmic staining, P-ANCA = perinuclear staining) are   found in the serum of over 90 percent of patients with   certain necrotizing systemic vasculitides, and usually in   less than 5 percent of patients with collagen vascular   disease or arthritis.  Performed by [facility],  [address] [place] [id number] [phone]  [email or web address], Marty Wright MD, Lab. Director         IGG   Date Value Ref Range Status   05/23/2018 1,980 (H) 695 - 1,620 mg/dL Final     Scleroderma Antibody Scl-70 CLAUDIO IgG   Date Value Ref Range Status   05/23/2018 <0.2 0.0 - 0.9 AI Final     Comment:     Negative  Antibody index (AI) values reflect qualitative changes in antibody   concentration that cannot be directly associated with clinical condition or   disease state.       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

## 2022-01-19 RX ORDER — SULFASALAZINE 500 MG/1
500 TABLET ORAL 2 TIMES DAILY
Qty: 120 TABLET | Refills: 1 | Status: SHIPPED | OUTPATIENT
Start: 2022-01-19 | End: 2022-05-26

## 2022-01-19 NOTE — TELEPHONE ENCOUNTER
I prescribed sulfasalazine (non-enteric coated) 500 mg tabs bid X 30 days, 2 RF    Dalia, With intermittent sulfasalazine shortages expected in near future, we are likely to receive many more requests for temporary substittution of the generic non-enteric coated version for the EC sulfasalazine tabs. We always prescribe equal doses of the non-coated and EC versions. I welcome your assistance with setting up sulfasalazine prescriptions on requests like this for my editing and signature to help speed these along to the pharmacy. Thanks

## 2022-01-20 ENCOUNTER — OFFICE VISIT (OUTPATIENT)
Dept: RHEUMATOLOGY | Facility: CLINIC | Age: 72
End: 2022-01-20
Payer: COMMERCIAL

## 2022-01-20 ENCOUNTER — MYC REFILL (OUTPATIENT)
Dept: INTERNAL MEDICINE | Facility: CLINIC | Age: 72
End: 2022-01-20

## 2022-01-20 VITALS
OXYGEN SATURATION: 96 % | HEART RATE: 90 BPM | DIASTOLIC BLOOD PRESSURE: 74 MMHG | BODY MASS INDEX: 29.04 KG/M2 | SYSTOLIC BLOOD PRESSURE: 152 MMHG | TEMPERATURE: 98.3 F | WEIGHT: 157.8 LBS | HEIGHT: 62 IN

## 2022-01-20 DIAGNOSIS — R76.8 RHEUMATOID FACTOR POSITIVE WITH CYCLIC CITRULLINATED PEPTIDE (CCP) ANTIBODY NEGATIVE: ICD-10-CM

## 2022-01-20 DIAGNOSIS — K21.9 GASTROESOPHAGEAL REFLUX DISEASE: ICD-10-CM

## 2022-01-20 PROCEDURE — 99214 OFFICE O/P EST MOD 30 MIN: CPT | Performed by: INTERNAL MEDICINE

## 2022-01-20 RX ORDER — SULFASALAZINE 500 MG/1
TABLET, DELAYED RELEASE ORAL
Qty: 120 TABLET | Refills: 4 | Status: SHIPPED | OUTPATIENT
Start: 2022-01-20 | End: 2022-05-26

## 2022-01-20 RX ORDER — HYDROXYCHLOROQUINE SULFATE 200 MG/1
200 TABLET, FILM COATED ORAL 2 TIMES DAILY
Qty: 180 TABLET | Refills: 3 | Status: SHIPPED | OUTPATIENT
Start: 2022-01-20 | End: 2022-05-26

## 2022-01-20 ASSESSMENT — MIFFLIN-ST. JEOR: SCORE: 1194.03

## 2022-01-20 ASSESSMENT — PAIN SCALES - GENERAL: PAINLEVEL: NO PAIN (0)

## 2022-01-20 NOTE — PATIENT INSTRUCTIONS
Impression:  1.  Small joint predominant thumb and finger pain in the right hand is morning specific.  Changes on physical exam suggest osteoarthritis, not rheumatoid arthritis, is the cause.  I agree with massage, exercise, acetaminophen 1000 mg as needed.  Rheumatoid arthritis inflammatory disease is well controlled, and I recommend no change in hydroxychloroquine and sulfasalazine.  2.  Interstitial lung disease: Symptoms are stable, and recent pulmonary function tests suggested stable lung function.  I agree with continuing low-dose Ofev per Dr. Cross's office.    Plan:    1.  Continue hydroxychloroquine 200 mg 2 tabs daily.  While taking hydroxychloroquine, undergo annual ophthalmology evaluation to monitor for rare retinal Plaquenil toxicity.  2.  Continue sulfasalazine 2 tablets twice daily for ongoing treatment.  4.  Check blood work in March to assess kidney and liver function.

## 2022-01-20 NOTE — NURSING NOTE
"Chief Complaint   Patient presents with     RECHECK     Rheumatoid factor positive with cyclic citrullinated peptide (CCP) antibody negative       Vitals:    01/20/22 1200   BP: (!) 152/74   BP Location: Left arm   Patient Position: Sitting   Cuff Size: Adult Regular   Pulse: 90   Temp: 98.3  F (36.8  C)   TempSrc: Oral   SpO2: 96%   Weight: 71.6 kg (157 lb 12.8 oz)   Height: 1.575 m (5' 2\")       Body mass index is 28.86 kg/m .    Vidya Johnston MA    "

## 2022-01-21 RX ORDER — FAMOTIDINE 40 MG/1
40 TABLET, FILM COATED ORAL
Qty: 90 TABLET | Refills: 2 | Status: SHIPPED | OUTPATIENT
Start: 2022-01-21 | End: 2022-09-21

## 2022-01-21 NOTE — TELEPHONE ENCOUNTER
Prescription approved per [facility] Refill Protocol.  Jerrod Winslow RN  [facility] Triage Nurse

## 2022-02-13 ENCOUNTER — TELEPHONE (OUTPATIENT)
Dept: NURSING | Facility: CLINIC | Age: 72
End: 2022-02-13
Payer: COMMERCIAL

## 2022-02-13 NOTE — TELEPHONE ENCOUNTER
Armando pharmacist from [facility] pharmacy calling to get clarification on dosing of the sulfasalazine 500 mg tablets. Pharmacy has two sets of instructions. Take one 500 mg tablet twice a day and take two 500 mg tablets twice a day.     sulfaSALAzine (AZULFIDINE) 500 MG tablet 120 tablet 1 1/19/2022  --   Sig - Route: Take 1 tablet (500 mg) by mouth 2 times daily Take 2 tabs twice daily - Oral         Please confirm which dosing is correct.   Routing to provider.   Kinga Little RN   02/13/22 10:25 AM  [facility] Nurse Advisor

## 2022-02-16 NOTE — PROGRESS NOTES
Starting OFEV Medication    Patient Instruction:   1. OFEV should be taken twice a day after meals (to decrease risk of side effects).  2. Lab monitoring is required. You will need to get monthly labs done for the first 6 months and then every 3 months thereafter. Labs will be done at [facility].  3. Notify nurse if you are experiencing side effects or issues, [phone]     The medication will come from a [facility] pharmacy, it will be mailed to your home, they will contact you to set up delivery.   OFEV requires a Prior Authorization, an  will assist, they will contact with information if a foundation is needed for co-pay assistance.                 
.

## 2022-02-17 PROBLEM — Z76.89 HEALTH CARE HOME: Status: RESOLVED | Noted: 2019-03-11 | Resolved: 2021-02-24

## 2022-03-17 DIAGNOSIS — I10 BENIGN ESSENTIAL HYPERTENSION: ICD-10-CM

## 2022-03-17 RX ORDER — AMLODIPINE BESYLATE 5 MG/1
TABLET ORAL
Qty: 90 TABLET | Refills: 1 | Status: SHIPPED | OUTPATIENT
Start: 2022-03-17 | End: 2022-09-22

## 2022-03-17 NOTE — TELEPHONE ENCOUNTER
Routing refill request to provider for review/approval because:  BP Readings from Last 3 Encounters:   01/20/22 (!) 152/74   11/24/21 (!) 144/87   11/16/21 122/70         Jerrod Winslow RN  [facility] Triage Nurse

## 2022-05-18 ENCOUNTER — HOSPITAL ENCOUNTER (OUTPATIENT)
Dept: CT IMAGING | Facility: CLINIC | Age: 72
Discharge: HOME | End: 2022-05-18
Attending: INTERNAL MEDICINE | Admitting: INTERNAL MEDICINE
Payer: COMMERCIAL

## 2022-05-18 ENCOUNTER — OFFICE VISIT (OUTPATIENT)
Dept: PULMONOLOGY | Facility: CLINIC | Age: 72
End: 2022-05-18
Attending: INTERNAL MEDICINE
Payer: COMMERCIAL

## 2022-05-18 ENCOUNTER — PATIENT OUTREACH (OUTPATIENT)
Dept: PULMONOLOGY | Facility: CLINIC | Age: 72
End: 2022-05-18

## 2022-05-18 ENCOUNTER — LAB (OUTPATIENT)
Dept: LAB | Facility: CLINIC | Age: 72
End: 2022-05-18
Attending: INTERNAL MEDICINE
Payer: COMMERCIAL

## 2022-05-18 VITALS
DIASTOLIC BLOOD PRESSURE: 91 MMHG | WEIGHT: 161 LBS | RESPIRATION RATE: 17 BRPM | OXYGEN SATURATION: 94 % | SYSTOLIC BLOOD PRESSURE: 137 MMHG | HEART RATE: 87 BPM | BODY MASS INDEX: 29.63 KG/M2 | HEIGHT: 62 IN

## 2022-05-18 DIAGNOSIS — J84.9 ILD (INTERSTITIAL LUNG DISEASE) (H): Primary | ICD-10-CM

## 2022-05-18 DIAGNOSIS — J84.9 ILD (INTERSTITIAL LUNG DISEASE) (H): ICD-10-CM

## 2022-05-18 LAB
6 MIN WALK (FT): 1060 FT
6 MIN WALK (M): 323 M
ALBUMIN SERPL-MCNC: 3.6 G/DL (ref 3.4–5)
ALP SERPL-CCNC: 61 U/L (ref 40–150)
ALT SERPL W P-5'-P-CCNC: 25 U/L (ref 0–50)
ANION GAP SERPL CALCULATED.3IONS-SCNC: 5 MMOL/L (ref 3–14)
AST SERPL W P-5'-P-CCNC: 31 U/L (ref 0–45)
BASOPHILS # BLD AUTO: 0 10E3/UL (ref 0–0.2)
BASOPHILS NFR BLD AUTO: 1 %
BILIRUB DIRECT SERPL-MCNC: 0.1 MG/DL (ref 0–0.2)
BILIRUB SERPL-MCNC: 0.4 MG/DL (ref 0.2–1.3)
BUN SERPL-MCNC: 10 MG/DL (ref 7–30)
CALCIUM SERPL-MCNC: 9.4 MG/DL (ref 8.5–10.1)
CHLORIDE BLD-SCNC: 104 MMOL/L (ref 94–109)
CO2 SERPL-SCNC: 28 MMOL/L (ref 20–32)
CREAT SERPL-MCNC: 0.64 MG/DL (ref 0.52–1.04)
CRP SERPL-MCNC: <2.9 MG/L (ref 0–8)
EOSINOPHIL # BLD AUTO: 0.4 10E3/UL (ref 0–0.7)
EOSINOPHIL NFR BLD AUTO: 7 %
ERYTHROCYTE [DISTWIDTH] IN BLOOD BY AUTOMATED COUNT: 11.9 % (ref 10–15)
GFR SERPL CREATININE-BSD FRML MDRD: >90 ML/MIN/1.73M2
GLUCOSE BLD-MCNC: 125 MG/DL (ref 70–99)
HCT VFR BLD AUTO: 34.5 % (ref 35–47)
HGB BLD-MCNC: 11.4 G/DL (ref 11.7–15.7)
IMM GRANULOCYTES # BLD: 0 10E3/UL
IMM GRANULOCYTES NFR BLD: 0 %
LYMPHOCYTES # BLD AUTO: 1.4 10E3/UL (ref 0.8–5.3)
LYMPHOCYTES NFR BLD AUTO: 23 %
MCH RBC QN AUTO: 30.6 PG (ref 26.5–33)
MCHC RBC AUTO-ENTMCNC: 33 G/DL (ref 31.5–36.5)
MCV RBC AUTO: 93 FL (ref 78–100)
MONOCYTES # BLD AUTO: 0.5 10E3/UL (ref 0–1.3)
MONOCYTES NFR BLD AUTO: 8 %
NEUTROPHILS # BLD AUTO: 3.8 10E3/UL (ref 1.6–8.3)
NEUTROPHILS NFR BLD AUTO: 61 %
NRBC # BLD AUTO: 0 10E3/UL
NRBC BLD AUTO-RTO: 0 /100
PLATELET # BLD AUTO: 282 10E3/UL (ref 150–450)
POTASSIUM BLD-SCNC: 4.2 MMOL/L (ref 3.4–5.3)
PROT SERPL-MCNC: 8.3 G/DL (ref 6.8–8.8)
RBC # BLD AUTO: 3.72 10E6/UL (ref 3.8–5.2)
SODIUM SERPL-SCNC: 137 MMOL/L (ref 133–144)
WBC # BLD AUTO: 6.2 10E3/UL (ref 4–11)

## 2022-05-18 PROCEDURE — 80053 COMPREHEN METABOLIC PANEL: CPT | Mod: 90 | Performed by: PATHOLOGY

## 2022-05-18 PROCEDURE — 86140 C-REACTIVE PROTEIN: CPT | Mod: 90 | Performed by: PATHOLOGY

## 2022-05-18 PROCEDURE — 85025 COMPLETE CBC W/AUTO DIFF WBC: CPT | Performed by: PATHOLOGY

## 2022-05-18 PROCEDURE — 82248 BILIRUBIN DIRECT: CPT | Mod: 90 | Performed by: PATHOLOGY

## 2022-05-18 PROCEDURE — 99000 SPECIMEN HANDLING OFFICE-LAB: CPT | Performed by: PATHOLOGY

## 2022-05-18 PROCEDURE — 94729 DIFFUSING CAPACITY: CPT | Performed by: INTERNAL MEDICINE

## 2022-05-18 PROCEDURE — 94375 RESPIRATORY FLOW VOLUME LOOP: CPT | Performed by: INTERNAL MEDICINE

## 2022-05-18 PROCEDURE — 99214 OFFICE O/P EST MOD 30 MIN: CPT | Mod: 25 | Performed by: INTERNAL MEDICINE

## 2022-05-18 PROCEDURE — 36415 COLL VENOUS BLD VENIPUNCTURE: CPT | Performed by: PATHOLOGY

## 2022-05-18 PROCEDURE — 71250 CT THORAX DX C-: CPT

## 2022-05-18 PROCEDURE — G0463 HOSPITAL OUTPT CLINIC VISIT: HCPCS | Mod: 25

## 2022-05-18 PROCEDURE — 94618 PULMONARY STRESS TESTING: CPT | Performed by: INTERNAL MEDICINE

## 2022-05-18 ASSESSMENT — PAIN SCALES - GENERAL: PAINLEVEL: MILD PAIN (2)

## 2022-05-18 NOTE — NURSING NOTE
Chief Complaint   Patient presents with     RECHECK     Return Interstitial Lung - 6 month follow up     Medications reviewed and vital signs taken.   Yamilka Otero, CMA

## 2022-05-18 NOTE — PROGRESS NOTES
HISTORY OF PRESENT ILLNESS:  Krystian is a 71-year-old female with interstitial lung disease secondary to rheumatoid arthritis.  Her last visit with me was in 11/2021.  Since that time, just within the past week or two, she has noted an increased cough.   It has been worse, maybe for the past 1-2 weeks.  Her  also has had an increased cough, so it is unclear whether or not this could be some sort of respiratory tract infection.  She says, overall, other than the cough, her breathing has been about the same.  In terms of her rheumatoid arthritis, she has not noticed a significant flare in her symptoms.  She was seen by Dr. Luís cardona in January who felt that she was stable in terms of her rheumatoid arthritis and she remains on sulfasalazine and hydroxychloroquine for her RA.  For her RA-ILD, she is on Ofev 100 mg b.i.d. and has been tolerating the Ofev reasonably well.    REVIEW OF SYSTEMS:    CARDIAC:  She denies exertional chest pain, leg swelling, palpitations or syncope.  GASTROINTESTINAL:  She has occasional heartburn but has not been particularly problematic.  MUSCULOSKELETAL:  Has not noted significant flare in her RA symptoms in terms of her joints.    PHYSICAL EXAMINATION:    VITAL SIGNS:  Her O2 sats are 94% on room air.  HEENT:  Eyes are anicteric.  Mouth and throat without lesions.  NECK:  No thyromegaly.  LYMPHATIC:  No adenopathy appreciated.  CHEST:  Crackles at least CHCF up bilaterally, maybe a bit higher, which is similar to what it has been in the past.  I do not hear a significant difference there.  HEART:  Shows regular rate and rhythm.  Normal S1, S2.  ABDOMEN:  Nontender.  No hepatosplenomegaly.    EXTREMITIES:  Without clubbing, cyanosis or edema.  MUSCULOSKELETAL:  I do not appreciate significant joint swelling or arthritis.  SKIN:  No rash.    PULMONARY FUNCTION TESTS:  She had a 6-minute walk test today.  She desaturated during the 6-minute walk, requiring up to 4 liters of  oxygen with exercise.  She has not been on oxygen therapy.  She has declined it in the past and she has desatted previously on a 6-minute walk test about a year ago, required 2 liters per minute at that time.  On spirometry, her FEV1 is 1.45, 74% of predicted.  FVC 1.55, 63% of predicted and diffusion capacity 62% of predicted.  The FVC is down.  Previously, she had been running about 1.7 to 1.8 and today, it is 1.55, so the FVC is down.    ASSESSMENT AND PLAN:  Krystian is a 71-year-old female with interstitial lung disease secondary to RA.  Her RA is being treated with sulfasalazine and hydroxychloroquine.  She is scheduled to see Dr. Staley later this month.  She is not complaining necessarily of a big change in her RA.  In terms of her breathing, she has noted an increased cough the past week but otherwise, no change in shortness of breath.  She is on Ofev at 100 mg twice a day.  Her PFTs, which had previously been stable, the FVC is now a little bit worse than what it was previously.  I will obtain a repeat chest CT scan on her today to assess for any changes.  A CRP of inflammation today is less than 2.9, so her markers of inflammation remain suppressed.  If there is evidence of increasing fibrotic change on the CT scan, we could consider trying to escalate the Ofev from 100 mg b.i.d. 150 mg b.i.d.; however, she has not tolerated the high-dose Ofev in the past.  Whether she would tolerate it now would be unclear but that could be something that could be considered.    On the other hand, if we would see increased ground-glass opacities, then further evaluation may need to be done and consideration for possibly increasing her anti-inflammatory therapy might be necessary but the CRP of inflammation being suppressed suggests that there is not a significant inflammation going on at this time.    Addendum: Chest Ct shows stable fibrotic changes. We will continue with current therapy and add O2 with  activity.    Tristan Cross MD  Pulmonary/Critical Care  May 18, 2022 7:19 PM          Answers for HPI/ROS submitted by the patient on 5/17/2022  General Symptoms: No  Skin Symptoms: No  HENT Symptoms: No  EYE SYMPTOMS: No  HEART SYMPTOMS: No  LUNG SYMPTOMS: No  INTESTINAL SYMPTOMS: No  URINARY SYMPTOMS: No  GYNECOLOGIC SYMPTOMS: No  BREAST SYMPTOMS: No  SKELETAL SYMPTOMS: No  BLOOD SYMPTOMS: No  NERVOUS SYSTEM SYMPTOMS: No  MENTAL HEALTH SYMPTOMS: No

## 2022-05-18 NOTE — LETTER
5/18/2022         RE: Krystian Boland  [address]  [facility] [id number]        Dear Colleague,    Thank you for referring your patient, Krystian Boland, to the [facility] FOR LUNG SCIENCE AND [facility] [place]. Please see a copy of my visit note below.    HISTORY OF PRESENT ILLNESS:  Krystian is a 71-year-old female with interstitial lung disease secondary to rheumatoid arthritis.  Her last visit with me was in 11/2021.  Since that time, just within the past week or two, she has noted an increased cough.   It has been worse, maybe for the past 1-2 weeks.  Her  also has had an increased cough, so it is unclear whether or not this could be some sort of respiratory tract infection.  She says, overall, other than the cough, her breathing has been about the same.  In terms of her rheumatoid arthritis, she has not noticed a significant flare in her symptoms.  She was seen by Dr. Staley back in January who felt that she was stable in terms of her rheumatoid arthritis and she remains on sulfasalazine and hydroxychloroquine for her RA.  For her RA-ILD, she is on Ofev 100 mg b.i.d. and has been tolerating the Ofev reasonably well.    REVIEW OF SYSTEMS:    CARDIAC:  She denies exertional chest pain, leg swelling, palpitations or syncope.  GASTROINTESTINAL:  She has occasional heartburn but has not been particularly problematic.  MUSCULOSKELETAL:  Has not noted significant flare in her RA symptoms in terms of her joints.    PHYSICAL EXAMINATION:    VITAL SIGNS:  Her O2 sats are 94% on room air.  HEENT:  Eyes are anicteric.  Mouth and throat without lesions.  NECK:  No thyromegaly.  LYMPHATIC:  No adenopathy appreciated.  CHEST:  Crackles at least care home up bilaterally, maybe a bit higher, which is similar to what it has been in the past.  I do not hear a significant difference there.  HEART:  Shows regular rate and rhythm.  Normal S1, S2.  ABDOMEN:  Nontender.  No  hepatosplenomegaly.    EXTREMITIES:  Without clubbing, cyanosis or edema.  MUSCULOSKELETAL:  I do not appreciate significant joint swelling or arthritis.  SKIN:  No rash.    PULMONARY FUNCTION TESTS:  She had a 6-minute walk test today.  She desaturated during the 6-minute walk, requiring up to 4 liters of oxygen with exercise.  She has not been on oxygen therapy.  She has declined it in the past and she has desatted previously on a 6-minute walk test about a year ago, required 2 liters per minute at that time.  On spirometry, her FEV1 is 1.45, 74% of predicted.  FVC 1.55, 63% of predicted and diffusion capacity 62% of predicted.  The FVC is down.  Previously, she had been running about 1.7 to 1.8 and today, it is 1.55, so the FVC is down.    ASSESSMENT AND PLAN:  Krystian is a 71-year-old female with interstitial lung disease secondary to RA.  Her RA is being treated with sulfasalazine and hydroxychloroquine.  She is scheduled to see Dr. Staley later this month.  She is not complaining necessarily of a big change in her RA.  In terms of her breathing, she has noted an increased cough the past week but otherwise, no change in shortness of breath.  She is on Ofev at 100 mg twice a day.  Her PFTs, which had previously been stable, the FVC is now a little bit worse than what it was previously.  I will obtain a repeat chest CT scan on her today to assess for any changes.  A CRP of inflammation today is less than 2.9, so her markers of inflammation remain suppressed.  If there is evidence of increasing fibrotic change on the CT scan, we could consider trying to escalate the Ofev from 100 mg b.i.d. 150 mg b.i.d.; however, she has not tolerated the high-dose Ofev in the past.  Whether she would tolerate it now would be unclear but that could be something that could be considered.    On the other hand, if we would see increased ground-glass opacities, then further evaluation may need to be done and consideration for  possibly increasing her anti-inflammatory therapy might be necessary but the CRP of inflammation being suppressed suggests that there is not a significant inflammation going on at this time.    Addendum: Chest Ct shows stable fibrotic changes. We will continue with current therapy and add O2 with activity.    Tristan Cross MD  Pulmonary/Critical Care  May 18, 2022 7:19 PM

## 2022-05-19 LAB
DLCOCOR-%PRED-PRE: 67 %
DLCOCOR-PRE: 12.31 ML/MIN/MMHG
DLCOUNC-%PRED-PRE: 62 %
DLCOUNC-PRE: 11.48 ML/MIN/MMHG
DLCOUNC-PRED: 18.23 ML/MIN/MMHG
ERV-%PRED-PRE: 109 %
ERV-PRE: 0.53 L
ERV-PRED: 0.48 L
EXPTIME-PRE: 2.45 SEC
FEF2575-%PRED-PRE: 166 %
FEF2575-PRE: 2.88 L/SEC
FEF2575-PRED: 1.73 L/SEC
FEFMAX-%PRED-PRE: 98 %
FEFMAX-PRE: 5.35 L/SEC
FEFMAX-PRED: 5.42 L/SEC
FEV1-%PRED-PRE: 74 %
FEV1-PRE: 1.45 L
FEV1FEV6-PRE: 93 %
FEV1FEV6-PRED: 79 %
FEV1FVC-PRE: 93 %
FEV1FVC-PRED: 79 %
FEV1SVC-PRE: 90 %
FEV1SVC-PRED: 69 %
FIFMAX-PRE: 3.47 L/SEC
FVC-%PRED-PRE: 63 %
FVC-PRE: 1.55 L
FVC-PRED: 2.47 L
IC-%PRED-PRE: 46 %
IC-PRE: 1.09 L
IC-PRED: 2.35 L
VA-%PRED-PRE: 59 %
VA-PRE: 2.6 L
VC-%PRED-PRE: 57 %
VC-PRE: 1.61 L
VC-PRED: 2.83 L

## 2022-05-24 NOTE — PROGRESS NOTES
[facility]  Rheumatology Clinic  Harjit Staley MD  2022     Name: Krystian Boland  MRN: [id number]  Age: 71 year old  : 1950  Referring provider: Referred Self     Assessment and Plan:  # High positive rheumatoid factor and anti-CCP noted in 2018:  Patient reports mild polyarthralgia, stiffness and swelling, improved after a flare assocaited with sulfasalazine discontinuation in 2021.  Exam shows no gross synovitis. Laboratory evaluation on May 18, 2022 showed normal comprehensive metabolic panel; CBC showed hemoglobin of 11.4, stable.    Imaging:  Knee x-rays in May 2021 showed mild tricompartmental disease in the right knee with osteophytes; left knee showed mild tricompartmental disease.  Hand x-rays showed scattered DIP degenerative changes without erosions.    Overall, I think that inflammatory arthropathy is well controlled on steroid sparing combination therapy.  Recent symptoms of pain in the right shoulder, right thumb, and right knee represent mild flare of rheumatoid arthritis.  I recommend a course of tapering prednisone, but continuing hydroxychloroquine and sulfasalazine combination.  We discussed a course of physical therapy for suspected rotator cuff tendinitis on the right, and I will offer subacromial injection in 3 weeks time if systemic steroids and physical therapy do not result in significant improvement.  I recommend continuing combined sulfasalazine and hydroxychloroquine for disease-modifying effect.    We discussed the need for annual ophthalmologic monitoring while on plaquenil. She in  had Ophtho exam showing no evidence of retinal toxicity.    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  Symptomatic treatment will  be continued with heat, acetaminophen, and regular exercise for the hands.    # Interstitial lung disease:   Pulmonary function tests on May 18, 2022 showed decreased 6-minute walk test with significant desaturation despite use of supplemental oxygen and 4 L/min.  FEV1 FEC ratio may represent restrictive lung physiology; decreased FVC compared with November 2021.I agree with patient's plan to follow-up with Dr. Cross, and to continue the better tolerated dose of Ofev (100 mg), started in 2021.    Plan:  1.  Continue hydroxychloroquine 400 mg once daily; while using hydroxychloroquine, undergo annual screening examination for rare retinal Plaquenil toxicity, appointment due in July 2022.  2.  Continue sulfasalazine 1000 mg (2 tablets) twice daily.  3.  Recheck blood work in 3 to 4 months; R shoulder xray today  4.  Physical therapy referral for home exercise program to maintain range of motion of the right shoulder  5.  Prednisone 20 mg (4 tablets) once a day for 7 days, then 3 tablets (15 mg) once a day for 7 days.  6.  Continue Ofev and other pulmonary recommended measures for interstitial lung disease.  7.  If physical therapy and prednisone course do not resulted significant improvement in right shoulder pain, corticosteroid injection under the shoulder blade may be offered at follow-up visit in 3 weeks.  Otherwise follow-up in 4 months.     F/u 4 mos    Harjit Staley M.D.  Staff Rheumatologist,  Health  Pager [phone]      HPI:   Krystian Boland has a history of ILD and RA and presents for follow-up. I last saw the patient in 1-2022 at which time rheumatoid arthritis was well controlled.  I recommended continuing combination sulfasalazine and hydroxychloroquine.    Daughter acted as  throughout this session.    Interval history May 26, 2022    Through daughter , patient relates increasing pain in several joint areas over the past several weeks.  First, she notes right shoulder pain,  worse with arm raising, and at night.  Pain is so severe that she is kept awake at night, and is unable to dress fully independently.  No antecedent injury.  No visible swelling redness warmth or fever.  Patient also notes worsening right base of thumb pain, made worse with grasping and lifting activities.  She reports swelling, warmth, and stiffness at the right knee, alleviated with movement.  Early morning stiffness overall has increased in recent weeks to 25 to 30 minutes.    She is taking Ofev, sulfasalazine (2 g daily), and hydroxychloroquine as recommended.  No specific side effects or adverse effects noted.    Patient saw Dr. Sawyer in ophthalmology in July 2021.  Impression was of long-term use of Plaquenil, no evidence of toxicity.    Interval history 1-    She has intermittent R > L thumb and finger pain, worse in the mornings. Knee pain resolved, and other joints beside R hand, doing well.  Massage and use of the joints, as well as thylenol, help.   Morning pain in the joints lasts about 2 hours, non-progressive.   ADLs are sometimes affected early in the morning.     Energy level is overall improved, and shortness of breath is stable.    Taking ofev, sulfasalazine and hydroxychloroquine as directed. No particular adverse effects noted      Review of Systems:   Pertinent items are noted in HPI or as below, remainder of complete ROS is negative.     No recent problems with hearing or vision. No swallowing problems.   No breathing difficulty, shortness of breath, or wheezing. + Continues to have dry cough.  No chest pain or palpitations.  No heart burn, indigestion, abdominal pain, vomiting, diarrhea. + Nausea with Ofev.  No urination problems, no bloody, cloudy urine, no dysuria.  No numbing, tingling, weakness.  No headaches or confusion.  No rashes. No easy bleeding or bruising.   + Arthralgias, stiffness, swelling of multiple joints.  + Fatigue.    Active Medications:   Current Outpatient  Medications   Medication Sig     amLODIPine (NORVASC) 5 MG tablet TAKE 1 TABLET(5 MG) BY MOUTH DAILY     aspirin 81 MG chewable tablet Take 1 tablet (81 mg) by mouth daily     benzonatate (TESSALON) 200 MG capsule Take 1 capsule (200 mg) by mouth 3 times daily as needed for cough     blood glucose (NO BRAND SPECIFIED) test strip Use to test blood sugar 1 time daily or as directed. Blood Glucose Monitor Brands: per insurance.     famotidine (PEPCID) 40 MG tablet Take 1 tablet (40 mg) by mouth nightly as needed for heartburn     hydroxychloroquine (PLAQUENIL) 200 MG tablet Take 1 tablet (200 mg) by mouth 2 times daily Annual Plaquenil toxicity eye screening required.     losartan (COZAAR) 25 MG tablet Take 1 tablet (25 mg) by mouth daily     nintedanib (OFEV) 100 MG capsule Take 1 capsule (100 mg) by mouth 2 times daily     ondansetron (ZOFRAN) 4 MG tablet Take 1 tablet (4 mg) by mouth every 8 hours as needed for nausea     predniSONE (DELTASONE) 5 MG tablet Take 4 tabs daily for 7 d, then 3 tabs daily for 7 days, then off     STATIN NOT PRESCRIBED (INTENTIONAL) Please choose reason not prescribed, below     sulfaSALAzine (AZULFIDINE) 500 MG tablet Take 2 tablets (1,000 mg) by mouth 2 times daily     sulfaSALAzine ER (AZULFIDINE EN) 500 MG EC tablet take 2 tabs twice a day.     VITAMIN D3 50 MCG (2000 UT) tablet TAKE 1 TABLET BY MOUTH DAILY     No current facility-administered medications for this visit.          Allergies:   Atorvastatin      Past Medical History:  Obesity  Osteopenia  Type 2 diabetes mellitus without complication, without long-term current use of insulin  Interstitial lung disease  Abdominal tuberculosis (intestinal, 20 years ago)     Past Surgical History:  Open cholecystectomy    Family History:   Mother - dementia  Father - cerebrovascular disease  Brother - cerebrovascular disease, hypertension  No family history of - diabetes, myocardial infarction, early onset C.A.D., breast cancer, ovarian  "cancer, colon cancer, rheumatoid arthritis  Family history of - high blood pressure  (mentions that medical history is unlikely to be comprehensive because of poor/infrequent medical records)      Social History:   No smoking or tobacco use  Rare alcohol use (1-2 times a year)   with 8 children, 13 grandchildren, cooks for the family     Physical Exam:   Blood pressure 135/76, pulse 95, height 1.575 m (5' 2\"), weight 74.4 kg (164 lb), SpO2 96 %, not currently breastfeeding.  Wt Readings from Last 4 Encounters:   05/26/22 74.4 kg (164 lb)   05/18/22 73 kg (161 lb)   01/20/22 71.6 kg (157 lb 12.8 oz)   11/24/21 69.9 kg (154 lb)     Constitutional: Well-developed, appearing stated age; cooperative  Eyes: Normal EOM, PERRLA, vision, conjunctiva, sclera  ENT: Normal external ears, nose, hearing, lips, teeth, gums, throat.  Neck: No mass or thyroid enlargement  Resp: Breathing unlabored  MS: Right shoulder pain with forward flexion.  No tenderness at right shoulder, but marked increase in pain with resisted abduction and positive Loera sign.  External rotation not possible either passively or actively due to guarding.  Left shoulder shows full flexion with pain at extreme of flexion.  Left thumb exhibits crepitus and tenderness at the first CMC.  Plus tenderness at several MCPs and PIPs on the right.  Fist formation slightly reduced.  Right knee is warm with small effusion and joint line tenderness.  Clubbing in finger nails, but normal alignment of MCPs and PIPs.  Skin: No nail pitting, alopecia, rash, nodules or lesions.  Neuro: Normal cranial nerves    Laboratory:   RHEUM RESULTS Latest Ref Rng & Units 10/10/2016 1/31/2017 3/31/2018   ALBUMIN 3.4 - 5.0 g/dL 3.9 - 3.8   ALT 0 - 50 U/L 24 - 21   AST 0 - 45 U/L 13 - 21   ANCA <1:20 - - -   LYNDA INTERPRETATION NEG:Negative - - -   CK TOTAL 30 - 225 U/L - - -   CREATININE 0.52 - 1.04 mg/dL 0.56 0.61 0.64   CRP 0.0 - 8.0 mg/L - - -   GFR ESTIMATE, IF BLACK >60 " mL/min/[1.73:m2] >90   GFR Calc   >90   GFR Calc   >90   GFR ESTIMATE >60 mL/min/1.73m2 >90  Non African American GFR Calc   >90  Non  GFR Calc   >90   HEMATOCRIT 35.0 - 47.0 % 38.0 - 37.4   HEMOGLOBIN 11.7 - 15.7 g/dL 12.7 - 12.3   HCVAB NR Nonreactive   Assay performance characteristics have not been established for newborns,   infants, and children   - -    - 1,620 mg/dL - - -   WBC 4.0 - 11.0 10e3/uL 6.7 - 8.0   RBC 3.80 - 5.20 10e6/uL 4.25 - 4.11   RDW 10.0 - 15.0 % 12.4 - 12.7   MCHC 31.5 - 36.5 g/dL 33.4 - 32.9   MCV 78 - 100 fL 89 - 91   PLATELET COUNT 150 - 450 10e3/uL 288 - 294   RHEUMATOID FACTOR <20 IU/mL - - -   ESR 0 - 30 mm/h - - -       Rheumatoid Factor   Date Value Ref Range Status   05/23/2018 700 (H) <20 IU/mL Final     Cyclic Citrullinated Peptide Antibody, IgG   Date Value Ref Range Status   05/23/2018 >340 (H) <7 U/mL Final     Comment:     Positive     Scleroderma Antibody Scl-70 CLAUDIO IgG   Date Value Ref Range Status   05/23/2018 <0.2 0.0 - 0.9 AI Final     Comment:     Negative  Antibody index (AI) values reflect qualitative changes in antibody   concentration that cannot be directly associated with clinical condition or   disease state.       SSA (Ro) (CLAUDIO) Antibody, IgG   Date Value Ref Range Status   05/23/2018 <0.2 0.0 - 0.9 AI Final     Comment:     Negative  Antibody index (AI) values reflect qualitative changes in antibody   concentration that cannot be directly associated with clinical condition or   disease state.       SSB (La) (CLAUDIO) Antibody, IgG   Date Value Ref Range Status   05/23/2018 <0.2 0.0 - 0.9 AI Final     Comment:     Negative  Antibody index (AI) values reflect qualitative changes in antibody   concentration that cannot be directly associated with clinical condition or   disease state.       LYNDA interpretation   Date Value Ref Range Status   05/23/2018 Negative NEG^Negative Final     Comment:                                         Reference range:  <1:40  NEGATIVE  1:40 - 1:80  BORDERLINE POSITIVE  >1:80 POSITIVE         Neutrophil Cytoplasmic IgG Antibody   Date Value Ref Range Status   05/23/2018 <1:20 <1:20 Final     Comment:     (Note)  The ANCA IFA is <1:20; therefore, no further testing will   be performed.  INTERPRETIVE INFORMATION: Anti-Neutrophil Cyto Ab, IgG  Neutrophil Cytoplasmic Antibodies (C-ANCA = granular   cytoplasmic staining, P-ANCA = perinuclear staining) are   found in the serum of over 90 percent of patients with   certain necrotizing systemic vasculitides, and usually in   less than 5 percent of patients with collagen vascular   disease or arthritis.  Performed by [facility],  [address] [place] [id number] [phone]  [email or web address], Marty Wright MD, Lab. Director         IGG   Date Value Ref Range Status   05/23/2018 1,980 (H) 695 - 1,620 mg/dL Final     Scleroderma Antibody Scl-70 CLAUDIO IgG   Date Value Ref Range Status   05/23/2018 <0.2 0.0 - 0.9 AI Final     Comment:     Negative  Antibody index (AI) values reflect qualitative changes in antibody   concentration that cannot be directly associated with clinical condition or   disease state.

## 2022-05-26 ENCOUNTER — ANCILLARY PROCEDURE (OUTPATIENT)
Dept: GENERAL RADIOLOGY | Facility: CLINIC | Age: 72
End: 2022-05-26
Attending: INTERNAL MEDICINE
Payer: COMMERCIAL

## 2022-05-26 ENCOUNTER — OFFICE VISIT (OUTPATIENT)
Dept: RHEUMATOLOGY | Facility: CLINIC | Age: 72
End: 2022-05-26
Payer: COMMERCIAL

## 2022-05-26 VITALS
DIASTOLIC BLOOD PRESSURE: 76 MMHG | WEIGHT: 164 LBS | SYSTOLIC BLOOD PRESSURE: 135 MMHG | OXYGEN SATURATION: 96 % | BODY MASS INDEX: 30.18 KG/M2 | HEART RATE: 95 BPM | HEIGHT: 62 IN

## 2022-05-26 DIAGNOSIS — G89.29 CHRONIC RIGHT SHOULDER PAIN: Primary | ICD-10-CM

## 2022-05-26 DIAGNOSIS — G89.29 CHRONIC RIGHT SHOULDER PAIN: ICD-10-CM

## 2022-05-26 DIAGNOSIS — R76.8 RHEUMATOID FACTOR POSITIVE WITH CYCLIC CITRULLINATED PEPTIDE (CCP) ANTIBODY NEGATIVE: ICD-10-CM

## 2022-05-26 DIAGNOSIS — M25.511 CHRONIC RIGHT SHOULDER PAIN: Primary | ICD-10-CM

## 2022-05-26 DIAGNOSIS — M25.511 CHRONIC RIGHT SHOULDER PAIN: ICD-10-CM

## 2022-05-26 PROCEDURE — 73030 X-RAY EXAM OF SHOULDER: CPT | Mod: RT

## 2022-05-26 PROCEDURE — 99214 OFFICE O/P EST MOD 30 MIN: CPT | Performed by: INTERNAL MEDICINE

## 2022-05-26 RX ORDER — SULFASALAZINE 500 MG/1
TABLET, DELAYED RELEASE ORAL
Qty: 120 TABLET | Refills: 4 | Status: SHIPPED | OUTPATIENT
Start: 2022-05-26 | End: 2023-01-19

## 2022-05-26 RX ORDER — PREDNISONE 5 MG/1
TABLET ORAL
Qty: 42 TABLET | Refills: 1 | Status: SHIPPED | OUTPATIENT
Start: 2022-05-26 | End: 2022-10-26

## 2022-05-26 RX ORDER — HYDROXYCHLOROQUINE SULFATE 200 MG/1
200 TABLET, FILM COATED ORAL 2 TIMES DAILY
Qty: 180 TABLET | Refills: 3 | Status: SHIPPED | OUTPATIENT
Start: 2022-05-26 | End: 2023-01-19

## 2022-05-26 RX ORDER — SULFASALAZINE 500 MG/1
1000 TABLET ORAL 2 TIMES DAILY
Qty: 120 TABLET | Refills: 4 | Status: SHIPPED | OUTPATIENT
Start: 2022-05-26 | End: 2022-10-26

## 2022-05-26 ASSESSMENT — PAIN SCALES - GENERAL: PAINLEVEL: MODERATE PAIN (5)

## 2022-05-26 NOTE — NURSING NOTE
"Chief Complaint   Patient presents with     Follow Up       Vitals:    05/26/22 1030   BP: 135/76   BP Location: Left arm   Patient Position: Sitting   Cuff Size: Adult Regular   Pulse: 95   SpO2: 96%   Weight: 74.4 kg (164 lb)   Height: 1.575 m (5' 2\")       Body mass index is 30 kg/m .      CIRA Chanel    "

## 2022-05-26 NOTE — PATIENT INSTRUCTIONS
Diagnosis:  1.  Seropositive rheumatoid arthritis: Recent onset shoulder, thumb, and knee pain are likely due to mild rheumatoid arthritis flare.  Overall control of arthritis is adequate with current medications hydroxychloroquine and sulfasalazine.  I recommend a short course of prednisone.  2.  Rotator cuff tendinitis, right: I recommend shoulder x-ray to rule out cartilage problems.  3. Interstitial lung disease: stable    Plan:  1.  Continue hydroxychloroquine 400 mg once daily; while using hydroxychloroquine, undergo annual screening examination for rare retinal Plaquenil toxicity, appointment due in July 2022.  2.  Continue sulfasalazine 1000 mg (2 tablets) twice daily.  3.  Recheck blood work in 3 to 4 months; R shoulder xray today  4.  Physical therapy referral for home exercise program to maintain range of motion of the right shoulder  5.  Prednisone 20 mg (4 tablets) once a day for 7 days, then 3 tablets (15 mg) once a day for 7 days.  6.  Continue Ofev and other pulmonary recommended measures for interstitial lung disease.  7.  If physical therapy and prednisone course do not resulted significant improvement in right shoulder pain, corticosteroid injection under the shoulder blade may be offered at follow-up visit in 3 weeks.  Otherwise follow-up in 4 months.

## 2022-06-06 ENCOUNTER — THERAPY VISIT (OUTPATIENT)
Dept: PHYSICAL THERAPY | Facility: CLINIC | Age: 72
End: 2022-06-06
Attending: INTERNAL MEDICINE
Payer: COMMERCIAL

## 2022-06-06 DIAGNOSIS — G89.29 CHRONIC RIGHT SHOULDER PAIN: ICD-10-CM

## 2022-06-06 DIAGNOSIS — M25.511 CHRONIC RIGHT SHOULDER PAIN: ICD-10-CM

## 2022-06-06 DIAGNOSIS — M25.511 ACUTE PAIN OF RIGHT SHOULDER: ICD-10-CM

## 2022-06-06 PROCEDURE — 97110 THERAPEUTIC EXERCISES: CPT | Mod: GP | Performed by: PHYSICAL THERAPIST

## 2022-06-06 PROCEDURE — 97112 NEUROMUSCULAR REEDUCATION: CPT | Mod: GP | Performed by: PHYSICAL THERAPIST

## 2022-06-06 PROCEDURE — 97161 PT EVAL LOW COMPLEX 20 MIN: CPT | Mod: GP | Performed by: PHYSICAL THERAPIST

## 2022-06-06 NOTE — PROGRESS NOTES
[facility]    OUTPATIENT Physical Therapy ORTHOPEDIC EVALUATION  PLAN OF TREATMENT FOR OUTPATIENT REHABILITATION  (COMPLETE FOR INITIAL CLAIMS ONLY)  Patient's Last Name, First Name, M.I.  YOB: 1950  Krystian Boland       Provider s Name:  [facility]   Medical Record No.  [id number]   Start of Care Date:  06/06/22   Onset Date:   05/06/22   Type:     _X__PT   ___OT Medical Diagnosis:    Encounter Diagnoses   Name Primary?    Chronic right shoulder pain     Acute pain of right shoulder         Treatment Diagnosis:  R shoulder/arm pain        Goals:     06/06/22 0500   Body Part   Goals listed below are for R shoulder/arm   Goal #1   Goal #1 reaching   Previous Functional Level No restrictions   Current Functional Level Can reach ;overhead   Performance level with up to 5/10 pain   STG Target Performance Reach ;overhead   Performance level with up to 3/10 pain   Rationale for dressing;for bathing;for meal preparation   Due date 06/27/22   LTG Target Performance Reach;overhead   Performance Level with 0/10 pain   Rationale for dressing;for bathing;for meal preparation   Due date 08/08/22       Therapy Frequency:  1x/week  Predicted Duration of Therapy Intervention:  for 3 weeks tapering to 1x/month x 6 weeks    Lakeisha Deal PT                 I CERTIFY THE NEED FOR THESE SERVICES FURNISHED UNDER        THIS PLAN OF TREATMENT AND WHILE UNDER MY CARE     (Physician attestation of this document indicates review and certification of the therapy plan).                     Certification Date From:  06/06/22   Certification Date To:  08/08/22    Referring Provider:  Harjit Staley    Initial Assessment        See Epic Evaluation SOC Date: 06/06/22

## 2022-06-06 NOTE — PROGRESS NOTES
Physical Therapy Initial Evaluation  Subjective:  The history is provided by the patient and a relative. A  was used.   Patient Health History  Krystian Boland being seen for right shoulder pain.     Problem began: 5/1/2022.   Problem occurred: three weeks ago started, rheumatologist suggested Xray and PT   Pain is reported as 5/10 on pain scale.  General health as reported by patient is fair.  Pertinent medical history includes: diabetes, rheumatoid arthritis and other. Other medical history details: interstitial lung disease with chronic cough.   Red flags:  None as reported by patient.  Medical allergies: none.   Surgeries include:  Other. Other surgery history details: exploratory laparotomy 30 years ago.    Current medications:  Anti-inflammatory and high blood pressure medication.    Current occupation is retired, house wife.   Primary job tasks include:  Other.   Other job/home tasks details: cooking, walking.                Therapist Generated HPI Evaluation  Problem details: Onset of right shoulder and UT pain radiating down right arm to thumb about a month ago (5-6-22) for unknown reasons. Denies N/T. Occasionally has left UT pain as well. Initially could not lift right arm on her own d/t weakness and pain. And neck motion was limited/painful. Much better since prednisone/steroid pack, today is last day of dose. She is concerned the pain may worsen again when steroid meds are completed. Still some difficulty with reaching overhead, gets pain from shoulder to elbow. Patient states she has lung issues/shortness of breath so she is sedentary and avoids lifting, sits a lot and uses iPad in lap.         Type of problem:  Right shoulder.    This is a new condition.  Condition occurred with:  Unknown cause.  Where condition occurred: for unknown reasons.  Patient reports pain:  Posterior, upper arm and upper trap.  Pain is described as aching and is intermittent.  Pain radiates to:  Upper  arm, elbow and lower arm. Pain is worse during the day.  Since onset symptoms are rapidly improving (since prednisone).  Symptoms are exacerbated by using arm overhead  and relieved by rest.      Barriers include:  None as reported by patient.                        Objective:  System              Cervical/Thoracic Evaluation    AROM:  AROM Cervical:    Flexion:            100%  Extension:       75%  Rotation:         Left: 90%     Right: 100%  Side Bend:      Left: 100%     Right:  100%    Strength: cervical retraction motion=25%    Cervical Myotomes:  normal                                       Shoulder Evaluation:  ROM:  AROM:  : Full ROM with reports of L UT pain with flexion, R upper arm pain with abduction.                                  Strength:    Flexion: Left:5/5   Pain:    Right: 4+/5     Pain:     Abduction:  Left: 5/5  Pain:    Right: 4-/5      Pain:+    Internal Rotation:  Left:5/5     Pain:    Right: 5/5     Pain:  External Rotation:   Left:5/5     Pain:   Right:5/5     Pain:                                                     Byron Cervical Evaluation    Posture:  Sitting: poor  Standing: fair  Protruding Head: yes  Wry Neck: no  Correction of Posture: no effect      Test Movements:      RET: During: no effect    Repeat RET: During: no effect  After: no effect  Mechanical Response: IncROM                                                                     ROS    Repeated cervical retraction decreased right shoulder and upper arm pain with shoulder abduction motion and decreased left UT pain with shoulder flexion motion.    Assessment/Plan:    Patient is a 71 year old female with right side shoulder complaints. Sx's much improved since prednisone. History and exam suggestive of cervical derangement vs shoulder issue.  Started with posture correction and repeated cervical retraction exercise and will assess symptom response.     Patient has the following significant findings with corresponding  treatment plan.                Diagnosis 1:  R shoulder/arm pain  Pain -  manual therapy, self management, education, directional preference exercise and home program  Decreased ROM/flexibility - manual therapy and therapeutic exercise  Decreased strength - therapeutic exercise and therapeutic activities  Decreased function - therapeutic activities  Impaired posture - neuro re-education    Therapy Evaluation Codes:   Cumulative Therapy Evaluation is: Low complexity.    Previous and current functional limitations:  (See Goal Flow Sheet for this information)    Short term and Long term goals: (See Goal Flow Sheet for this information)     Communication ability:  Patient has an  for communication clarity.  Treatment Explanation - The following has been discussed with the patient:   RX ordered/plan of care  Anticipated outcomes  Possible risks and side effects  This patient would benefit from PT intervention to resume normal activities.   Rehab potential is good.    Frequency:  1 X week, once daily  Duration:  for 3 weeks tapering to 1 X a month over 6 weeks  Discharge Plan:  Achieve all LTG.  Independent in home treatment program.  Reach maximal therapeutic benefit.    Please refer to the daily flowsheet for treatment today, total treatment time and time spent performing 1:1 timed codes.

## 2022-06-13 ENCOUNTER — MEDICAL CORRESPONDENCE (OUTPATIENT)
Dept: HEALTH INFORMATION MANAGEMENT | Facility: CLINIC | Age: 72
End: 2022-06-13

## 2022-06-20 ENCOUNTER — THERAPY VISIT (OUTPATIENT)
Dept: PHYSICAL THERAPY | Facility: CLINIC | Age: 72
End: 2022-06-20
Payer: COMMERCIAL

## 2022-06-20 DIAGNOSIS — M25.511 ACUTE PAIN OF RIGHT SHOULDER: Primary | ICD-10-CM

## 2022-06-20 PROCEDURE — 97112 NEUROMUSCULAR REEDUCATION: CPT | Mod: GP | Performed by: PHYSICAL THERAPIST

## 2022-06-20 PROCEDURE — 97110 THERAPEUTIC EXERCISES: CPT | Mod: GP | Performed by: PHYSICAL THERAPIST

## 2022-06-27 ENCOUNTER — THERAPY VISIT (OUTPATIENT)
Dept: PHYSICAL THERAPY | Facility: CLINIC | Age: 72
End: 2022-06-27
Payer: COMMERCIAL

## 2022-06-27 DIAGNOSIS — M25.511 ACUTE PAIN OF RIGHT SHOULDER: Primary | ICD-10-CM

## 2022-06-27 PROCEDURE — 97110 THERAPEUTIC EXERCISES: CPT | Mod: GP | Performed by: PHYSICAL THERAPIST

## 2022-06-27 PROCEDURE — 97112 NEUROMUSCULAR REEDUCATION: CPT | Mod: GP | Performed by: PHYSICAL THERAPIST

## 2022-07-08 ENCOUNTER — PATIENT OUTREACH (OUTPATIENT)
Dept: GERIATRIC MEDICINE | Facility: CLINIC | Age: 72
End: 2022-07-08

## 2022-07-08 NOTE — PROGRESS NOTES
[facility] Care Coordination Contact    Called adult daughter Zachariah to complete six month assessment and left a message requesting a return call.  JAQUELIN Langley  [facility]  [phone]

## 2022-07-10 ENCOUNTER — HEALTH MAINTENANCE LETTER (OUTPATIENT)
Age: 72
End: 2022-07-10

## 2022-07-11 DIAGNOSIS — J84.89 PROGRESSIVE FIBROSING INTERSTITIAL LUNG DISEASE (H): ICD-10-CM

## 2022-07-11 DIAGNOSIS — J84.9 ILD (INTERSTITIAL LUNG DISEASE) (H): ICD-10-CM

## 2022-07-11 RX ORDER — NINTEDANIB 100 MG/1
CAPSULE ORAL
Qty: 60 CAPSULE | Refills: 11 | Status: SHIPPED | OUTPATIENT
Start: 2022-07-11 | End: 2023-05-18

## 2022-07-19 DIAGNOSIS — Z79.899 LONG-TERM USE OF PLAQUENIL: Primary | ICD-10-CM

## 2022-07-27 ENCOUNTER — OFFICE VISIT (OUTPATIENT)
Dept: OPHTHALMOLOGY | Facility: CLINIC | Age: 72
End: 2022-07-27
Attending: OPHTHALMOLOGY
Payer: COMMERCIAL

## 2022-07-27 DIAGNOSIS — Z79.899 LONG-TERM USE OF PLAQUENIL: Primary | ICD-10-CM

## 2022-07-27 DIAGNOSIS — Z96.1 PSEUDOPHAKIA OF BOTH EYES: ICD-10-CM

## 2022-07-27 PROCEDURE — 92082 INTERMEDIATE VISUAL FIELD XM: CPT | Mod: 26 | Performed by: STUDENT IN AN ORGANIZED HEALTH CARE EDUCATION/TRAINING PROGRAM

## 2022-07-27 PROCEDURE — 92134 CPTRZ OPH DX IMG PST SGM RTA: CPT | Performed by: OPHTHALMOLOGY

## 2022-07-27 PROCEDURE — 92134 CPTRZ OPH DX IMG PST SGM RTA: CPT | Mod: 26 | Performed by: STUDENT IN AN ORGANIZED HEALTH CARE EDUCATION/TRAINING PROGRAM

## 2022-07-27 PROCEDURE — G0463 HOSPITAL OUTPT CLINIC VISIT: HCPCS | Mod: 25

## 2022-07-27 PROCEDURE — 92250 FUNDUS PHOTOGRAPHY W/I&R: CPT | Performed by: OPHTHALMOLOGY

## 2022-07-27 PROCEDURE — 99214 OFFICE O/P EST MOD 30 MIN: CPT | Mod: GC | Performed by: STUDENT IN AN ORGANIZED HEALTH CARE EDUCATION/TRAINING PROGRAM

## 2022-07-27 PROCEDURE — 99207 FUNDUS AUTOFLUORESCENCE IMAGE (FAF) OU (BOTH EYES): CPT | Mod: 26 | Performed by: STUDENT IN AN ORGANIZED HEALTH CARE EDUCATION/TRAINING PROGRAM

## 2022-07-27 PROCEDURE — 92082 INTERMEDIATE VISUAL FIELD XM: CPT | Performed by: OPHTHALMOLOGY

## 2022-07-27 ASSESSMENT — REFRACTION_WEARINGRX
OD_ADD: +2.75
OD_CYLINDER: SPHERE
OD_SPHERE: -3.50
OS_CYLINDER: +0.75
OS_ADD: +2.75
OS_SPHERE: -0.50
OS_AXIS: 034

## 2022-07-27 ASSESSMENT — VISUAL ACUITY
OD_SC+: -2
OD_SC: 20/25
METHOD: SNELLEN - LINEAR
OS_SC: 20/40

## 2022-07-27 ASSESSMENT — TONOMETRY
OD_IOP_MMHG: 15
OS_IOP_MMHG: 15
IOP_METHOD: TONOPEN

## 2022-07-27 ASSESSMENT — CUP TO DISC RATIO
OS_RATIO: 0.35
OD_RATIO: 0.4

## 2022-07-27 ASSESSMENT — REFRACTION_MANIFEST
OS_ADD: +2.75
OS_CYLINDER: SPHERE
OD_ADD: +2.75
OD_SPHERE: -0.25
OS_SPHERE: -0.50
OD_CYLINDER: SPHERE

## 2022-07-27 ASSESSMENT — CONF VISUAL FIELD
OD_NORMAL: 1
METHOD: COUNTING FINGERS
OS_NORMAL: 1

## 2022-07-27 ASSESSMENT — EXTERNAL EXAM - LEFT EYE: OS_EXAM: NORMAL

## 2022-07-27 ASSESSMENT — SLIT LAMP EXAM - LIDS
COMMENTS: DCL, MILD PTOSIS
COMMENTS: DCL, MILD PTOSIS

## 2022-07-27 ASSESSMENT — EXTERNAL EXAM - RIGHT EYE: OD_EXAM: NORMAL

## 2022-07-27 NOTE — NURSING NOTE
Chief Complaints and History of Present Illnesses   Patient presents with     Follow Up     1 year follow up Plaquenil use     Chief Complaint(s) and History of Present Illness(es)     Follow Up     Comments: 1 year follow up Plaquenil use              Comments     Pt here with her daughter to interpret today.  Pt here for yearly follow up Plaquenil Use. Pt currently taking 200mg BID for RA. Pt taking medication since 05/2019.  Vision much improved since having cataract surgery last year. No flashes or floaters. No new redness. Occasional burning in both eyes, pt does not use Artificial tears.  No eye pain today.    DM2 BS:Pt does not check blood sugars daily.  Lab Results       Component                Value               Date                       A1C                      6.4                 01/05/2022                 A1C                      5.3                 10/09/2021                 A1C                      6.1                 03/17/2021                 A1C                      6.4                 08/24/2020                 A1C                      6.4                 02/12/2020                 A1C                      6.8                 05/08/2019                 A1C                      6.7                 11/06/2018              JOANIE Hayes July 27, 2022 10:51 AM

## 2022-07-27 NOTE — PROGRESS NOTES
CC: here for Plaquenil annual exam  History of Diabetes mellitus and cataract surgery     Interval: Pt denies vision changes in either eye, no color change, no distortions.   Of note, has been coughing more violently significantly as of the last 2 weeks due to progression of ILD.  History of chronic cough     HPI: Krystian Boland is a 72 year old with history of plaquenil use for ILD and RA.  She has been on 400 mg daily since 5-2019. Outer retinal changes were seen on her last eye exam. She is also DMII.   A1C 6.1 3-17-21    Past ocular history:   S/p CE/IOL both eyes  Ocular hypertension both eyes   Dry eyes  Myopic astigmatism  Type II diabetes mellitus without retinopathy     Retinal Imaging:  OCT 7/27/2022 vs 7-22-21  RE: large area of SRF and cysts, serous fluid inferior macula; normal foveal contour; questionable disruption of the Retinal pigment epithelium peripherally; normal choroid and vitreous  LE: normal foveal contour; questionable disruption of the Retinal pigment epithelium peripherally; normal choroid and vitreous    Autofluorescence 7/27/2022 vs 7-22-21  right eye: abnormal hypoAF along inferior arcade; normal  left eye: normal    visual field 10-2 from 7/27/2022  right eye: Fixation losses, MD -7.6. not completely reliable   Left eye: reliable; normal, MD -0.9    DEMOND 7-15-20  Right eye 20/60    Assessment & Plan:    # Long-term use of Plaquenil   H/o RF-positive RA and ILD   Started Plaquenil 5/2019; 200mg twice a day   ILD stable - f/u 9/16/2020 with Dr. Cross for CT scan and PFTs   RA stable - dx'd 5/2018, Dr. Staley 10/13/2020   OCT mac grossly normal; new IRF right eye compared to prior- see below   Autofluorescence: normal   Optical Coherence Tomography: right eye not completely reliable ; left eye: within normal limits    Repeat in a yr  Ok to continue taking plaquenil    # Type II diabetes mellitus with mild nonproliferative diabetic retinopathy right eye    Dx'd ~2016   HbA1c 6.4%  01/05/2022   Metformin only   Blood pressure (<120/80) and blood glucose (HbA1c <7.0) control discussed with patient. Patient advised that failure to adequately control each may lead to vision loss. The patient expressed understanding.    # Pseudophakia each eye  With mild posterior capsular opacity (PCO)   Observe for  Now  If worsening consider yag laser     # YOUNG lesion? Right eye   New on exam 07/27/2022   Plan to observe and follow up in 2 months with fluorescein angiography     # drusen both eyes  Mostly extrafoveal   Few macula drusen  Observe    # History of chronic cough   Patient will follow up with primary care physician     RTC: 2 months repeat OCT mac and fluorescein angiography transits right eye  DFE both eyes     My privilege to be part of your care,  Christopher Gordon MD, MSc  Ophthalmology PGY-3 resident physician  Pager: [phone]    ~~~~~~~~~~~~~~~~~~~~~~~~~~~~~~~~~~   Complete documentation of historical and exam elements from today's encounter can be found in the full encounter summary report (not reduplicated in this progress note).  I personally obtained the chief complaint(s) and history of present illness.  I confirmed and edited as necessary the review of systems, past medical/surgical history, family history, social history, and examination findings as documented by others; and I examined the patient myself.  I personally reviewed the relevant tests, images, and reports as documented above.  I formulated and edited as necessary the assessment and plan and discussed the findings and management plan with the patient and family    Dalia Sawyer MD  Professor of Ophthalmology  Vitreo-Retinal surgeon   Department of Ophthalmology and Visual Neurosciences   [facility]  Phone: [phone]   Fax: [phone]         ~~~~~~~~~~~~~~~~~~~~~~~~~~~~~~~~~~   Complete documentation of historical and exam elements from today's encounter can be found in the full encounter summary  report (not reduplicated in this progress note).  I personally obtained the chief complaint(s) and history of present illness.  I confirmed and edited as necessary the review of systems, past medical/surgical history, family history, social history, and examination findings as documented by others; and I examined the patient myself.  I personally reviewed the relevant tests, images, and reports as documented above.  I personally reviewed the ophthalmic test(s) associated with this encounter, agree with the interpretation(s) as documented by the resident/fellow, and have edited the corresponding report(s) as necessary.   I formulated and edited as necessary the assessment and plan and discussed the findings and management plan with the patient and family    Dalia Sawyer MD   of Ophthalmology.  Retina Service   Department of Ophthalmology and Visual Neurosciences   [facility]  Phone: [phone]   Fax: [phone]

## 2022-07-28 DIAGNOSIS — E55.9 VITAMIN D DEFICIENCY: ICD-10-CM

## 2022-07-29 RX ORDER — CHOLECALCIFEROL (VITAMIN D3) 50 MCG
1 TABLET ORAL DAILY
Qty: 90 TABLET | Refills: 0 | Status: SHIPPED | OUTPATIENT
Start: 2022-07-29 | End: 2022-11-10

## 2022-07-29 NOTE — TELEPHONE ENCOUNTER
Prescription approved per [facility] Refill Protocol.  Katharine MONTES, Triage RN  [facility]

## 2022-08-03 ENCOUNTER — LAB (OUTPATIENT)
Dept: LAB | Facility: CLINIC | Age: 72
End: 2022-08-03
Payer: COMMERCIAL

## 2022-08-03 DIAGNOSIS — E11.9 TYPE 2 DIABETES MELLITUS WITHOUT COMPLICATION, WITHOUT LONG-TERM CURRENT USE OF INSULIN (H): ICD-10-CM

## 2022-08-03 LAB — HBA1C MFR BLD: 6.7 % (ref 0–5.6)

## 2022-08-03 PROCEDURE — 83036 HEMOGLOBIN GLYCOSYLATED A1C: CPT

## 2022-08-03 PROCEDURE — 36415 COLL VENOUS BLD VENIPUNCTURE: CPT

## 2022-08-03 NOTE — PROGRESS NOTES
[facility] Care Coordination Contact      [facility] Six-Month Telephone Assessment    6 month telephone assessment completed on 8/3/2022. Completed assessment with daughter due to language barrier.    ER visits: No  Hospitalizations: No  TCU stays: No  Significant health status changes: Was placed on O2 for activities due to SOB, but has declined to use. She only has a pulse generator for her O2 as there is a backlog on steady flow due to COVID. Was seeing PT for right shoulder pain, and daughter reports that this helped, and she was given exercises and prednisone. Encouraged the use of the O2 as instructed by the pulmonologist, and encouraged speaking with the pulmonologist about therapy to monitor activity and O2 levels, as family is wanting member to be more active. Daughter shared that member has a follow up pulmonologist appointment in Nov, and they will discuss this with the MD at this time as well.   Falls/Injuries: No  ADL/IADL changes: Yes: Not able to cook as much, not walking as far as she used to due to SOB.   Changes in services: No    Caregiver Assessment follow up: Daughter reports she is more busy with her family and other family members have not been able to help out as much. Reviewed assisted living and the thought of putting member on a waiting list. She shared that the family goal is that member would never go to an AL or NH.    Goals: See POC in chart for goal progress documentation.      Will see member in 6 months for an annual health risk assessment.   Encouraged member to call CC with any questions or concerns in the meantime.     JAQUELIN Langley  [facility]  [phone]

## 2022-08-17 ENCOUNTER — PATIENT OUTREACH (OUTPATIENT)
Dept: GERIATRIC MEDICINE | Facility: CLINIC | Age: 72
End: 2022-08-17

## 2022-08-17 NOTE — PROGRESS NOTES
No call to member. [facility] Regulatory Update.  JAQUELIN Langley  [facility]  [phone]

## 2022-09-11 ENCOUNTER — HEALTH MAINTENANCE LETTER (OUTPATIENT)
Age: 72
End: 2022-09-11

## 2022-09-19 PROBLEM — M25.511 ACUTE PAIN OF RIGHT SHOULDER: Status: RESOLVED | Noted: 2022-06-06 | Resolved: 2022-09-19

## 2022-09-19 NOTE — PROGRESS NOTES
Subjective:  HPI  Physical Exam                    Objective:  System    Physical Exam    General     ROS    Assessment/Plan:    DISCHARGE REPORT    Progress reporting period is from 6-6-22 to 6-27-22.  Patient seen for 3 visits..       SUBJECTIVE  Subjective changes noted by patient:  Right arm pain is much better. She attributes to prednisone and chin tucks. She gets L UT stiffness in the morning and also with sit to stand after prolonged sitting.    Current Pain level: 0/10.      Initial Pain level: 5/10.   Changes in function:  Yes (See Goal flowsheet attached for changes in current functional level)  Adverse reaction to treatment or activity: None    OBJECTIVE  Changes noted in objective findings:  Yes,   CAROM: flex=100%, ext=90%, PS=323%, IB=252%, MLL=984%, RES=641%.   No longer having pain in R upper arm with active abduction. Still has L UT pain with left active flexion.     ASSESSMENT/PLAN  Updated problem list and treatment plan: Diagnosis 1:  R shoulder/arm pain  No updated problem list or treatment plan as patient did not return to PT and they are discharged at this time.    STG/LTGs have been met or progress has been made towards goals:  Yes (See Goal flow sheet completed today.)  Assessment of Progress: The patient's condition is improving.  Patient is meeting short term goals and is progressing towards long term goals.  Self Management Plans:  Patient has been instructed in a home treatment program.  Patient  has been instructed in self management of symptoms.  I have re-evaluated this patient and find that the nature, scope, duration and intensity of the therapy is appropriate for the medical condition of the patient.  Krystian continues to require the following intervention to meet STG and LTG's:  PT intervention is no longer required to meet STG/LTG.    Recommendations:  This patient is ready to be discharged from therapy and continue their home treatment program.    Please refer to the daily  flowsheet for treatment today, total treatment time and time spent performing 1:1 timed codes.

## 2022-09-20 DIAGNOSIS — K21.9 GASTROESOPHAGEAL REFLUX DISEASE: ICD-10-CM

## 2022-09-21 DIAGNOSIS — I10 BENIGN ESSENTIAL HYPERTENSION: ICD-10-CM

## 2022-09-21 RX ORDER — FAMOTIDINE 40 MG/1
40 TABLET, FILM COATED ORAL
Qty: 90 TABLET | Refills: 0 | Status: ON HOLD | OUTPATIENT
Start: 2022-09-21 | End: 2022-12-02

## 2022-09-21 NOTE — TELEPHONE ENCOUNTER
Prescription approved per [facility] Refill Protocol.  Jerrod Winslow RN  [facility] Triage Nurse

## 2022-09-22 RX ORDER — AMLODIPINE BESYLATE 5 MG/1
TABLET ORAL
Qty: 90 TABLET | Refills: 0 | Status: SHIPPED | OUTPATIENT
Start: 2022-09-22 | End: 2023-04-08

## 2022-09-27 DIAGNOSIS — Z79.899 LONG-TERM USE OF PLAQUENIL: Primary | ICD-10-CM

## 2022-09-27 DIAGNOSIS — E11.9 DM TYPE 2 WITHOUT RETINOPATHY (H): ICD-10-CM

## 2022-10-05 ENCOUNTER — OFFICE VISIT (OUTPATIENT)
Dept: OPHTHALMOLOGY | Facility: CLINIC | Age: 72
End: 2022-10-05
Attending: OPHTHALMOLOGY
Payer: COMMERCIAL

## 2022-10-05 DIAGNOSIS — Z79.899 LONG-TERM USE OF PLAQUENIL: ICD-10-CM

## 2022-10-05 DIAGNOSIS — E11.9 DM TYPE 2 WITHOUT RETINOPATHY (H): ICD-10-CM

## 2022-10-05 PROCEDURE — 92014 COMPRE OPH EXAM EST PT 1/>: CPT | Performed by: OPHTHALMOLOGY

## 2022-10-05 PROCEDURE — 92134 CPTRZ OPH DX IMG PST SGM RTA: CPT | Performed by: OPHTHALMOLOGY

## 2022-10-05 PROCEDURE — 92235 FLUORESCEIN ANGRPH MLTIFRAME: CPT | Performed by: OPHTHALMOLOGY

## 2022-10-05 PROCEDURE — G0463 HOSPITAL OUTPT CLINIC VISIT: HCPCS | Mod: 25

## 2022-10-05 ASSESSMENT — VISUAL ACUITY
METHOD: SNELLEN - LINEAR
OS_SC: 20/40
OD_SC: 20/25
OD_SC+: +2
OS_SC+: -2

## 2022-10-05 ASSESSMENT — TONOMETRY
OD_IOP_MMHG: 16
IOP_METHOD: TONOPEN
OS_IOP_MMHG: 16

## 2022-10-05 ASSESSMENT — CONF VISUAL FIELD
OS_NORMAL: 1
OD_NORMAL: 1
METHOD: COUNTING FINGERS

## 2022-10-05 ASSESSMENT — SLIT LAMP EXAM - LIDS
COMMENTS: DCL, MILD PTOSIS
COMMENTS: DCL, MILD PTOSIS

## 2022-10-05 ASSESSMENT — CUP TO DISC RATIO
OS_RATIO: 0.35
OD_RATIO: 0.4

## 2022-10-05 ASSESSMENT — EXTERNAL EXAM - LEFT EYE: OS_EXAM: NORMAL

## 2022-10-05 ASSESSMENT — EXTERNAL EXAM - RIGHT EYE: OD_EXAM: NORMAL

## 2022-10-05 NOTE — NURSING NOTE
Chief Complaints and History of Present Illnesses   Patient presents with     Follow Up     Chief Complaint(s) and History of Present Illness(es)     Follow Up               Comments     Patient states that her vision is the same since she was here last.no pain and irritation. No flashes of light. No floaters.     Ocular Meds:none     Regis SIMS, October 5, 2022 10:29 AM

## 2022-10-05 NOTE — PROGRESS NOTES
CC: here for Plaquenil annual exam  History of Diabetes mellitus and cataract surgery     Interval: Pt denies vision changes in either eye, no color change, no distortions.   Of note, has been coughing more violently significantly as of the last 2 weeks due to progression of ILD.  History of chronic cough     HPI: Krystian Boland is a 72 year old with history of plaquenil use for ILD and RA.  She has been on 400 mg daily since 5-2019. Outer retinal changes were seen on her last eye exam. She is also DMII.   A1C 6.1 3-17-21    Past ocular history:   S/p CE/IOL both eyes  Ocular hypertension both eyes   Dry eyes  Myopic astigmatism  Type II diabetes mellitus without retinopathy     Retinal Imaging:    fluorescein angiography 10/05/22   Good AV and choroidal filling; microaneurysms; mild midperipheral leakage right eye> left eye   No neovascularization of the disc; no neovascularization elsewhere     Optical Coherence Tomography 10/05/22   RE:  inferior macula intraretinal fluid improving; normal foveal contour- observe  LE: normal foveal contour; questionable disruption of the Retinal pigment epithelium peripherally; normal choroid and vitreous    Autofluorescence 7/27/2022 vs 7-22-21  right eye: abnormal hypoAF along inferior arcade; normal  left eye: normal    visual field 10-2 from 7/27/2022  right eye: Fixation losses, MD -7.6. not completely reliable   Left eye: reliable; normal, MD -0.9    DEMOND 7-15-20  Right eye 20/60    Assessment & Plan:    # Long-term use of Plaquenil   H/o RF-positive RA and ILD   Started Plaquenil 5/2019; 200mg twice a day   ILD stable - f/u 9/16/2020 with Dr. Cross for CT scan and PFTs   RA stable - dx'd 5/2018, Dr. Staley 10/13/2020   OCT mac grossly normal; new IRF right eye compared to prior- see below   Autofluorescence: normal   Optical Coherence Tomography: right eye not completely reliable ; left eye: within normal limits    Repeat in a yr  Ok to continue taking  plaquenil    # Type II diabetes mellitus with mild nonproliferative diabetic retinopathy right eye    Dx'd ~2016   HbA1c 6.4% 01/05/2022   Metformin only   Blood pressure (<120/80) and blood glucose (HbA1c <7.0) control discussed with patient. Patient advised that failure to adequately control each may lead to vision loss. The patient expressed understanding.  Fluorescein angiography with mild nonproliferative diabetic retinopathy and midperipheral leakage right eye - observe    # Pseudophakia each eye  With mild posterior capsular opacity (PCO)   Observe for  Now  If worsening consider yag laser     # drusen both eyes  Mostly extrafoveal   Few macula drusen  Observe    # History of chronic cough   Patient will follow up with primary care physician     RTC: 4 months repeat OCT mac both eyes; inf cuts right eye       ~~~~~~~~~~~~~~~~~~~~~~~~~~~~~~~~~~   Complete documentation of historical and exam elements from today's encounter can be found in the full encounter summary report (not reduplicated in this progress note).  I personally obtained the chief complaint(s) and history of present illness.  I confirmed and edited as necessary the review of systems, past medical/surgical history, family history, social history, and examination findings as documented by others; and I examined the patient myself.  I personally reviewed the relevant tests, images, and reports as documented above.  I formulated and edited as necessary the assessment and plan and discussed the findings and management plan with the patient and family    Dalia Sawyer MD   of Ophthalmology.  Retina Service   Department of Ophthalmology and Visual Neurosciences   [facility]  Phone: [phone]   Fax: [phone]

## 2022-10-24 NOTE — PROGRESS NOTES
"HISTORY OF PRESENT ILLNESS:  Krystian is a 71-year-old female with interstitial lung disease secondary to rheumatoid arthritis.  Her last visit with Dr. Cross was in 5/2022. Serial PFTs monitoring was being pursued while receiving ofev, which is currently at 100 mg BID due not tolerating the 150 dose with nausea. She is also on HCQN and sulfasalazine with fair joint symptom control. Her daughter is an RN and translating for her. She mentions that her shortness of breath has been progressing and now she is dyspneic walking a few steps. She needs O2 2-4 LPM for walking in the neighborhood. She also has a chronic cough with partial response to tessalon previously prescribed. She had a short course of prednisone in late July (14 days) with marked improvement in respiratory symptoms at the time. She denies symptoms of infection. At times, she has bowel issues and slows down eating and taking her meds, but notes that her cough improves with that, suggestive of underlying reflux. She is on pepcid.     PHYSICAL EXAMINATION:    VITAL SIGNS:  BP (!) 151/87   Pulse 93   Resp 17   Ht 1.575 m (5' 2\")   Wt 72.6 kg (160 lb)   SpO2 95%   BMI 29.26 kg/m      HEENT:  Eyes are anicteric.  Mouth and throat without lesions.  NECK:  No thyromegaly.  LYMPHATIC:  No adenopathy appreciated.  CHEST:  Crackles at least alf up bilaterally, with scattered expiratory wheezes.   HEART:  Shows regular rate and rhythm.  Normal S1, S2.  ABDOMEN:  Nontender.  No hepatosplenomegaly.    EXTREMITIES: BL digital clubbing.  MUSCULOSKELETAL:  I do not appreciate significant joint swelling or arthritis.  SKIN:  No rash.    PULMONARY FUNCTION TESTS today 10/26/22          ASSESSMENT AND PLAN:   1. RA ILD   2. Chronic hypoxemic respiratory failure  3. DM   4. HTN       Krystian is a 71-year-old female with interstitial lung disease secondary to RA.  Her RA is being treated with sulfasalazine and hydroxychloroquine.  She saw Dr. Staley in May 2022 " and received a short course of steroids. She also had another course of prednisone in late July with respiratory symptomatic improvement.  She is not complaining necessarily of a big change in her RA.  Her exertional dyspnea has been progressive. 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 She is tolerating Ofev 100 BID. PFTs show stability compared to May and DLCO maneuver was not accurate. Her CRP is at 6.5 today. I think we need to add immunosuppression for RA lung, however we are very limited with long course steroids due to recently diagnosed DM and hypertension, as well as BMI 29. Her LFTs are within normal on Ofev.     Plan:   - Cellcept 500 mg po BID then increase gradually to 1000 mg BID over 4 weeks  - Cellcept monitoring labs   - Pulmonary rehab referral   - Return in 3 months with HRCT chest, PFTs and six minutes walk   - In regards to international travel, will likely need O2 at 6 LPM, will decide based on next six minutes walk        Jase Keller MD    Total time spent on the day of the visit was 40 minutes.         Answers for HPI/ROS submitted by the patient on 10/25/2022  General Symptoms: No  Skin Symptoms: No  HENT Symptoms: No  EYE SYMPTOMS: No  HEART SYMPTOMS: No  LUNG SYMPTOMS: Yes  INTESTINAL SYMPTOMS: No  URINARY SYMPTOMS: No  GYNECOLOGIC SYMPTOMS: No  BREAST SYMPTOMS: No  SKELETAL SYMPTOMS: No  BLOOD SYMPTOMS: No  NERVOUS SYSTEM SYMPTOMS: No  MENTAL HEALTH SYMPTOMS: No  Cough: Yes  Sputum or phlegm: No  Coughing up blood: No  Difficulty breating or shortness of breath: Yes  Snoring: No  Wheezing: No  Difficulty breathing on exertion: Yes  Nighttime Cough: Yes  Difficulty breathing when lying flat: No

## 2022-10-25 ENCOUNTER — MYC MEDICAL ADVICE (OUTPATIENT)
Dept: RHEUMATOLOGY | Facility: CLINIC | Age: 72
End: 2022-10-25

## 2022-10-25 ASSESSMENT — ENCOUNTER SYMPTOMS
COUGH: 1
HEMOPTYSIS: 0
POSTURAL DYSPNEA: 0
COUGH DISTURBING SLEEP: 1
SPUTUM PRODUCTION: 0
SNORES LOUDLY: 0
SHORTNESS OF BREATH: 1
DYSPNEA ON EXERTION: 1
WHEEZING: 0

## 2022-10-26 ENCOUNTER — OFFICE VISIT (OUTPATIENT)
Dept: PULMONOLOGY | Facility: CLINIC | Age: 72
End: 2022-10-26
Attending: INTERNAL MEDICINE
Payer: COMMERCIAL

## 2022-10-26 ENCOUNTER — LAB (OUTPATIENT)
Dept: LAB | Facility: CLINIC | Age: 72
End: 2022-10-26
Payer: COMMERCIAL

## 2022-10-26 VITALS
RESPIRATION RATE: 17 BRPM | OXYGEN SATURATION: 95 % | SYSTOLIC BLOOD PRESSURE: 151 MMHG | BODY MASS INDEX: 29.44 KG/M2 | HEART RATE: 93 BPM | HEIGHT: 62 IN | DIASTOLIC BLOOD PRESSURE: 87 MMHG | WEIGHT: 160 LBS

## 2022-10-26 DIAGNOSIS — M05.10 RHEUMATOID LUNG (H): Primary | ICD-10-CM

## 2022-10-26 DIAGNOSIS — M05.10 RHEUMATOID LUNG (H): ICD-10-CM

## 2022-10-26 DIAGNOSIS — J84.9 ILD (INTERSTITIAL LUNG DISEASE) (H): ICD-10-CM

## 2022-10-26 DIAGNOSIS — J84.9 ILD (INTERSTITIAL LUNG DISEASE) (H): Primary | ICD-10-CM

## 2022-10-26 LAB
ANION GAP SERPL CALCULATED.3IONS-SCNC: 8 MMOL/L (ref 7–15)
BASOPHILS # BLD AUTO: 0.1 10E3/UL (ref 0–0.2)
BASOPHILS NFR BLD AUTO: 1 %
BUN SERPL-MCNC: 15.8 MG/DL (ref 8–23)
CALCIUM SERPL-MCNC: 9.7 MG/DL (ref 8.8–10.2)
CHLORIDE SERPL-SCNC: 97 MMOL/L (ref 98–107)
CREAT SERPL-MCNC: 0.79 MG/DL (ref 0.51–0.95)
CRP SERPL-MCNC: 6.36 MG/L
DEPRECATED HCO3 PLAS-SCNC: 29 MMOL/L (ref 22–29)
EOSINOPHIL # BLD AUTO: 0.4 10E3/UL (ref 0–0.7)
EOSINOPHIL NFR BLD AUTO: 6 %
ERYTHROCYTE [DISTWIDTH] IN BLOOD BY AUTOMATED COUNT: 12.9 % (ref 10–15)
GFR SERPL CREATININE-BSD FRML MDRD: 80 ML/MIN/1.73M2
GLUCOSE SERPL-MCNC: 146 MG/DL (ref 70–99)
HCT VFR BLD AUTO: 34.5 % (ref 35–47)
HGB BLD-MCNC: 11.5 G/DL (ref 11.7–15.7)
IMM GRANULOCYTES # BLD: 0 10E3/UL
IMM GRANULOCYTES NFR BLD: 0 %
LYMPHOCYTES # BLD AUTO: 2.1 10E3/UL (ref 0.8–5.3)
LYMPHOCYTES NFR BLD AUTO: 28 %
MCH RBC QN AUTO: 30.2 PG (ref 26.5–33)
MCHC RBC AUTO-ENTMCNC: 33.3 G/DL (ref 31.5–36.5)
MCV RBC AUTO: 91 FL (ref 78–100)
MONOCYTES # BLD AUTO: 0.6 10E3/UL (ref 0–1.3)
MONOCYTES NFR BLD AUTO: 8 %
NEUTROPHILS # BLD AUTO: 4.2 10E3/UL (ref 1.6–8.3)
NEUTROPHILS NFR BLD AUTO: 57 %
NRBC # BLD AUTO: 0 10E3/UL
NRBC BLD AUTO-RTO: 0 /100
PLATELET # BLD AUTO: 286 10E3/UL (ref 150–450)
POTASSIUM SERPL-SCNC: 4.1 MMOL/L (ref 3.4–5.3)
RBC # BLD AUTO: 3.81 10E6/UL (ref 3.8–5.2)
SODIUM SERPL-SCNC: 134 MMOL/L (ref 136–145)
WBC # BLD AUTO: 7.4 10E3/UL (ref 4–11)

## 2022-10-26 PROCEDURE — 94729 DIFFUSING CAPACITY: CPT | Performed by: INTERNAL MEDICINE

## 2022-10-26 PROCEDURE — 36415 COLL VENOUS BLD VENIPUNCTURE: CPT | Performed by: PATHOLOGY

## 2022-10-26 PROCEDURE — 85025 COMPLETE CBC W/AUTO DIFF WBC: CPT | Performed by: PATHOLOGY

## 2022-10-26 PROCEDURE — 86140 C-REACTIVE PROTEIN: CPT | Performed by: PATHOLOGY

## 2022-10-26 PROCEDURE — 99215 OFFICE O/P EST HI 40 MIN: CPT | Mod: 25 | Performed by: INTERNAL MEDICINE

## 2022-10-26 PROCEDURE — G0463 HOSPITAL OUTPT CLINIC VISIT: HCPCS | Mod: 25

## 2022-10-26 PROCEDURE — 94375 RESPIRATORY FLOW VOLUME LOOP: CPT | Performed by: INTERNAL MEDICINE

## 2022-10-26 PROCEDURE — 80048 BASIC METABOLIC PNL TOTAL CA: CPT | Performed by: PATHOLOGY

## 2022-10-26 RX ORDER — MYCOPHENOLATE MOFETIL 500 MG/1
TABLET ORAL
Qty: 190 TABLET | Refills: 4 | Status: SHIPPED | OUTPATIENT
Start: 2022-10-26 | End: 2022-10-26

## 2022-10-26 RX ORDER — MYCOPHENOLATE MOFETIL 500 MG/1
TABLET ORAL
Qty: 190 TABLET | Refills: 4 | Status: SHIPPED | OUTPATIENT
Start: 2022-10-26 | End: 2022-10-28

## 2022-10-26 ASSESSMENT — PAIN SCALES - GENERAL: PAINLEVEL: MILD PAIN (3)

## 2022-10-26 NOTE — NURSING NOTE
Chief Complaint   Patient presents with     RECHECK     Return ILD     Medications reviewed and vital signs taken.   Yamilka Otero, LISANDRO

## 2022-10-26 NOTE — PROGRESS NOTES
Discussed with patient and daughter, side effects, and drug monitoring for Cellcept.  Prefer to get labs done at the [facility]. Prescription for Cellcept sent to [facility] in [place]. Pulm rehab order placed.  Patient would like to do Pulm Rehab at [facility].

## 2022-10-26 NOTE — LETTER
"    10/26/2022         RE: Krystian Boland  [id number]  [facility] [id number]        Dear Colleague,    Thank you for referring your patient, Krystian Boland, to the [facility] [place]. Please see a copy of my visit note below.    HISTORY OF PRESENT ILLNESS:  Krystian is a 71-year-old female with interstitial lung disease secondary to rheumatoid arthritis.  Her last visit with Dr. Cross was in 5/2022. Serial PFTs monitoring was being pursued while receiving ofev, which is currently at 100 mg BID due not tolerating the 150 dose with nausea. She is also on HCQN and sulfasalazine with fair joint symptom control. Her daughter is an RN and translating for her. She mentions that her shortness of breath has been progressing and now she is dyspneic walking a few steps. She needs O2 2-4 LPM for walking in the neighborhood. She also has a chronic cough with partial response to tessalon previously prescribed. She had a short course of prednisone in late July (14 days) with marked improvement in respiratory symptoms at the time. She denies symptoms of infection. At times, she has bowel issues and slows down eating and taking her meds, but notes that her cough improves with that, suggestive of underlying reflux. She is on pepcid.     PHYSICAL EXAMINATION:    VITAL SIGNS:  BP (!) 151/87   Pulse 93   Resp 17   Ht 1.575 m (5' 2\")   Wt 72.6 kg (160 lb)   SpO2 95%   BMI 29.26 kg/m      HEENT:  Eyes are anicteric.  Mouth and throat without lesions.  NECK:  No thyromegaly.  LYMPHATIC:  No adenopathy appreciated.  CHEST:  Crackles at least alf up bilaterally, with scattered expiratory wheezes.   HEART:  Shows regular rate and rhythm.  Normal S1, S2.  ABDOMEN:  Nontender.  No hepatosplenomegaly.    EXTREMITIES: BL digital clubbing.  MUSCULOSKELETAL:  I do not appreciate significant joint swelling or arthritis.  SKIN:  No rash.    PULMONARY FUNCTION TESTS today " 10/26/22          ASSESSMENT AND PLAN:   1. RA ILD   2. Chronic hypoxemic respiratory failure  3. DM   4. HTN       Krystian is a 71-year-old female with interstitial lung disease secondary to RA.  Her RA is being treated with sulfasalazine and hydroxychloroquine.  She saw Dr. Staley in May 2022 and received a short course of steroids. She also had another course of prednisone in late July with respiratory symptomatic improvement.  She is not complaining necessarily of a big change in her RA.  Her exertional dyspnea has been progressive. 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 She is tolerating Ofev 100 BID. PFTs show stability compared to May and DLCO maneuver was not accurate. Her CRP is at 6.5 today. I think we need to add immunosuppression for RA lung, however we are very limited with long course steroids due to recently diagnosed DM and hypertension, as well as BMI 29. Her LFTs are within normal on Ofev.     Plan:   - Cellcept 500 mg po BID then increase gradually to 1000 mg BID over 4 weeks  - Cellcept monitoring labs   - Pulmonary rehab referral   - Return in 3 months with HRCT chest, PFTs and six minutes walk   - In regards to international travel, will likely need O2 at 6 LPM, will decide based on next six minutes walk        Jase Keller MD    Total time spent on the day of the visit was 40 minutes.         Answers for HPI/ROS submitted by the patient on 10/25/2022  General Symptoms: No  Skin Symptoms: No  HENT Symptoms: No  EYE SYMPTOMS: No  HEART SYMPTOMS: No  LUNG SYMPTOMS: Yes  INTESTINAL SYMPTOMS: No  URINARY SYMPTOMS: No  GYNECOLOGIC SYMPTOMS: No  BREAST SYMPTOMS: No  SKELETAL SYMPTOMS: No  BLOOD SYMPTOMS: No  NERVOUS SYSTEM SYMPTOMS: No  MENTAL HEALTH SYMPTOMS: No  Cough: Yes  Sputum or phlegm: No  Coughing up blood: No  Difficulty breating or shortness of breath: Yes  Snoring: No  Wheezing: No  Difficulty breathing on exertion: Yes  Nighttime Cough: Yes  Difficulty breathing when  lying flat: No        Discussed with patient and daughter, side effects, and drug monitoring for Cellcept.  Prefer to get labs done at the [facility]. Prescription for Cellcept sent to [facility] in [place]. Pulm rehab order placed.  Patient would like to do Pulm Rehab at [facility].       Again, thank you for allowing me to participate in the care of your patient.        Sincerely,        Jase Keller MD

## 2022-10-28 DIAGNOSIS — M05.10 RHEUMATOID LUNG (H): ICD-10-CM

## 2022-10-28 DIAGNOSIS — J84.9 ILD (INTERSTITIAL LUNG DISEASE) (H): ICD-10-CM

## 2022-10-28 DIAGNOSIS — R05.9 COUGH: ICD-10-CM

## 2022-10-28 LAB
DLCOUNC-%PRED-PRE: 51 %
DLCOUNC-PRE: 9.28 ML/MIN/MMHG
DLCOUNC-PRED: 18.17 ML/MIN/MMHG
ERV-%PRED-PRE: 91 %
ERV-PRE: 0.44 L
ERV-PRED: 0.48 L
EXPTIME-PRE: 6.66 SEC
FEF2575-%PRED-PRE: 170 %
FEF2575-PRE: 2.89 L/SEC
FEF2575-PRED: 1.7 L/SEC
FEFMAX-%PRED-PRE: 127 %
FEFMAX-PRE: 6.81 L/SEC
FEFMAX-PRED: 5.34 L/SEC
FEV1-%PRED-PRE: 73 %
FEV1-PRE: 1.42 L
FEV1FEV6-PRE: 94 %
FEV1FEV6-PRED: 79 %
FEV1FVC-PRE: 94 %
FEV1FVC-PRED: 79 %
FEV1SVC-PRE: 103 %
FEV1SVC-PRED: 69 %
FIFMAX-PRE: 3.96 L/SEC
FVC-%PRED-PRE: 62 %
FVC-PRE: 1.51 L
FVC-PRED: 2.44 L
IC-%PRED-PRE: 40 %
IC-PRE: 0.95 L
IC-PRED: 2.33 L
VA-%PRED-PRE: 51 %
VA-PRE: 2.24 L
VC-%PRED-PRE: 49 %
VC-PRE: 1.38 L
VC-PRED: 2.81 L

## 2022-10-28 RX ORDER — BENZONATATE 200 MG/1
200 CAPSULE ORAL 3 TIMES DAILY PRN
Qty: 90 CAPSULE | Refills: 3 | Status: SHIPPED | OUTPATIENT
Start: 2022-10-28 | End: 2023-05-26

## 2022-10-28 RX ORDER — MYCOPHENOLATE MOFETIL 500 MG/1
TABLET ORAL
Qty: 190 TABLET | Refills: 4 | Status: SHIPPED | OUTPATIENT
Start: 2022-10-28 | End: 2023-01-19

## 2022-11-21 ENCOUNTER — PATIENT OUTREACH (OUTPATIENT)
Dept: GERIATRIC MEDICINE | Facility: CLINIC | Age: 72
End: 2022-11-21

## 2022-11-21 NOTE — PROGRESS NOTES
[facility] Care Coordination Contact    Encounter opened due to Regulatory [employer] [place] Update to open FVP Program.    Kaycee Tinsley  Care Management Specialist  [facility]  [phone]      
hard copy, drawn during this pregnancy

## 2022-11-21 NOTE — PROGRESS NOTES
[facility] Care Coordination Contact    Encounter opened due to Regulatory [employer] [place] Update to close FVP Program.    Kaycee Tinsley  Care Management Specialist  [facility]  [phone]

## 2022-11-28 ENCOUNTER — APPOINTMENT (OUTPATIENT)
Dept: CT IMAGING | Facility: CLINIC | Age: 72
DRG: 193 | End: 2022-11-28
Attending: EMERGENCY MEDICINE
Payer: COMMERCIAL

## 2022-11-28 ENCOUNTER — HOSPITAL ENCOUNTER (INPATIENT)
Facility: CLINIC | Age: 72
LOS: 4 days | Discharge: HOME HEALTH | DRG: 193 | End: 2022-12-02
Attending: EMERGENCY MEDICINE | Admitting: HOSPITALIST
Payer: COMMERCIAL

## 2022-11-28 ENCOUNTER — MEDICAL CORRESPONDENCE (OUTPATIENT)
Dept: HEALTH INFORMATION MANAGEMENT | Facility: CLINIC | Age: 72
End: 2022-11-28

## 2022-11-28 ENCOUNTER — APPOINTMENT (OUTPATIENT)
Dept: GENERAL RADIOLOGY | Facility: CLINIC | Age: 72
DRG: 193 | End: 2022-11-28
Attending: EMERGENCY MEDICINE
Payer: COMMERCIAL

## 2022-11-28 DIAGNOSIS — G47.00 INSOMNIA, UNSPECIFIED TYPE: ICD-10-CM

## 2022-11-28 DIAGNOSIS — R05.1 ACUTE COUGH: ICD-10-CM

## 2022-11-28 DIAGNOSIS — J84.112 IPF (IDIOPATHIC PULMONARY FIBROSIS) (H): ICD-10-CM

## 2022-11-28 DIAGNOSIS — J84.9 ILD (INTERSTITIAL LUNG DISEASE) (H): Primary | ICD-10-CM

## 2022-11-28 DIAGNOSIS — R59.1 LYMPHADENOPATHY: ICD-10-CM

## 2022-11-28 DIAGNOSIS — J96.01 ACUTE RESPIRATORY FAILURE WITH HYPOXIA (H): ICD-10-CM

## 2022-11-28 DIAGNOSIS — J15.9 BACTERIAL PNEUMONIA: ICD-10-CM

## 2022-11-28 DIAGNOSIS — D72.829 LEUKOCYTOSIS, UNSPECIFIED TYPE: ICD-10-CM

## 2022-11-28 DIAGNOSIS — K21.9 GASTROESOPHAGEAL REFLUX DISEASE: ICD-10-CM

## 2022-11-28 DIAGNOSIS — R12 HEARTBURN: ICD-10-CM

## 2022-11-28 DIAGNOSIS — E87.1 HYPONATREMIA: ICD-10-CM

## 2022-11-28 LAB
ALBUMIN SERPL-MCNC: 2.7 G/DL (ref 3.4–5)
ALP SERPL-CCNC: 213 U/L (ref 40–150)
ALT SERPL W P-5'-P-CCNC: 32 U/L (ref 0–50)
ANION GAP SERPL CALCULATED.3IONS-SCNC: 9 MMOL/L (ref 3–14)
AST SERPL W P-5'-P-CCNC: 23 U/L (ref 0–45)
ATRIAL RATE - MUSE: 97 BPM
BASE EXCESS BLDV CALC-SCNC: 4.8 MMOL/L (ref -7.7–1.9)
BASOPHILS # BLD AUTO: 0.1 10E3/UL (ref 0–0.2)
BASOPHILS NFR BLD AUTO: 0 %
BILIRUB SERPL-MCNC: 0.9 MG/DL (ref 0.2–1.3)
BUN SERPL-MCNC: 5 MG/DL (ref 7–30)
CALCIUM SERPL-MCNC: 9.2 MG/DL (ref 8.5–10.1)
CHLORIDE BLD-SCNC: 87 MMOL/L (ref 94–109)
CO2 SERPL-SCNC: 27 MMOL/L (ref 20–32)
CREAT SERPL-MCNC: 0.53 MG/DL (ref 0.52–1.04)
CRP SERPL-MCNC: 207 MG/L (ref 0–8)
D DIMER PPP FEU-MCNC: 1.19 UG/ML FEU (ref 0–0.5)
DIASTOLIC BLOOD PRESSURE - MUSE: NORMAL MMHG
EOSINOPHIL # BLD AUTO: 0.2 10E3/UL (ref 0–0.7)
EOSINOPHIL NFR BLD AUTO: 1 %
ERYTHROCYTE [DISTWIDTH] IN BLOOD BY AUTOMATED COUNT: 12.4 % (ref 10–15)
FLUAV RNA SPEC QL NAA+PROBE: NEGATIVE
FLUBV RNA RESP QL NAA+PROBE: NEGATIVE
GFR SERPL CREATININE-BSD FRML MDRD: >90 ML/MIN/1.73M2
GLUCOSE BLD-MCNC: 146 MG/DL (ref 70–99)
HCO3 BLDV-SCNC: 29 MMOL/L (ref 21–28)
HCO3 BLDV-SCNC: 31 MMOL/L (ref 21–28)
HCT VFR BLD AUTO: 31.1 % (ref 35–47)
HGB BLD-MCNC: 10.7 G/DL (ref 11.7–15.7)
HOLD SPECIMEN: 1
HOLD SPECIMEN: NORMAL
IMM GRANULOCYTES # BLD: 0.2 10E3/UL
IMM GRANULOCYTES NFR BLD: 1 %
INTERPRETATION ECG - MUSE: NORMAL
LACTATE BLD-SCNC: 0.8 MMOL/L
LYMPHOCYTES # BLD AUTO: 0.8 10E3/UL (ref 0.8–5.3)
LYMPHOCYTES NFR BLD AUTO: 4 %
MCH RBC QN AUTO: 30.1 PG (ref 26.5–33)
MCHC RBC AUTO-ENTMCNC: 34.4 G/DL (ref 31.5–36.5)
MCV RBC AUTO: 88 FL (ref 78–100)
MONOCYTES # BLD AUTO: 1.2 10E3/UL (ref 0–1.3)
MONOCYTES NFR BLD AUTO: 6 %
NEUTROPHILS # BLD AUTO: 19.1 10E3/UL (ref 1.6–8.3)
NEUTROPHILS NFR BLD AUTO: 88 %
NRBC # BLD AUTO: 0 10E3/UL
NRBC BLD AUTO-RTO: 0 /100
NT-PROBNP SERPL-MCNC: 143 PG/ML (ref 0–900)
O2/TOTAL GAS SETTING VFR VENT: ABNORMAL %
OSMOLALITY SERPL: 256 MMOL/KG (ref 280–301)
OSMOLALITY UR: 153 MMOL/KG (ref 100–1200)
OXYHGB MFR BLDV: 51 % (ref 70–75)
P AXIS - MUSE: 46 DEGREES
PCO2 BLDV: 46 MM HG (ref 40–50)
PCO2 BLDV: 49 MM HG (ref 40–50)
PH BLDV: 7.41 [PH] (ref 7.32–7.43)
PH BLDV: 7.41 [PH] (ref 7.32–7.43)
PLATELET # BLD AUTO: 316 10E3/UL (ref 150–450)
PO2 BLDV: 28 MM HG (ref 25–47)
PO2 BLDV: 28 MM HG (ref 25–47)
POTASSIUM BLD-SCNC: 3.5 MMOL/L (ref 3.4–5.3)
PR INTERVAL - MUSE: 148 MS
PROCALCITONIN SERPL-MCNC: 0.33 NG/ML
PROT SERPL-MCNC: 7.9 G/DL (ref 6.8–8.8)
QRS DURATION - MUSE: 88 MS
QT - MUSE: 338 MS
QTC - MUSE: 429 MS
R AXIS - MUSE: 56 DEGREES
RBC # BLD AUTO: 3.55 10E6/UL (ref 3.8–5.2)
RSV RNA SPEC NAA+PROBE: NEGATIVE
SAO2 % BLDV: 52 % (ref 94–100)
SARS-COV-2 RNA RESP QL NAA+PROBE: NEGATIVE
SODIUM SERPL-SCNC: 123 MMOL/L (ref 133–144)
SODIUM UR-SCNC: 26 MMOL/L
SYSTOLIC BLOOD PRESSURE - MUSE: NORMAL MMHG
T AXIS - MUSE: 27 DEGREES
TROPONIN I SERPL HS-MCNC: 4 NG/L
VENTRICULAR RATE- MUSE: 97 BPM
WBC # BLD AUTO: 21.5 10E3/UL (ref 4–11)

## 2022-11-28 PROCEDURE — 250N000011 HC RX IP 250 OP 636: Performed by: EMERGENCY MEDICINE

## 2022-11-28 PROCEDURE — 250N000012 HC RX MED GY IP 250 OP 636 PS 637: Performed by: HOSPITALIST

## 2022-11-28 PROCEDURE — 87637 SARSCOV2&INF A&B&RSV AMP PRB: CPT | Performed by: EMERGENCY MEDICINE

## 2022-11-28 PROCEDURE — 83605 ASSAY OF LACTIC ACID: CPT

## 2022-11-28 PROCEDURE — 82803 BLOOD GASES ANY COMBINATION: CPT

## 2022-11-28 PROCEDURE — 83930 ASSAY OF BLOOD OSMOLALITY: CPT | Performed by: HOSPITALIST

## 2022-11-28 PROCEDURE — 99223 1ST HOSP IP/OBS HIGH 75: CPT | Mod: AI | Performed by: HOSPITALIST

## 2022-11-28 PROCEDURE — 82374 ASSAY BLOOD CARBON DIOXIDE: CPT | Performed by: EMERGENCY MEDICINE

## 2022-11-28 PROCEDURE — 93005 ELECTROCARDIOGRAM TRACING: CPT

## 2022-11-28 PROCEDURE — 84484 ASSAY OF TROPONIN QUANT: CPT | Performed by: EMERGENCY MEDICINE

## 2022-11-28 PROCEDURE — 74177 CT ABD & PELVIS W/CONTRAST: CPT

## 2022-11-28 PROCEDURE — 94640 AIRWAY INHALATION TREATMENT: CPT

## 2022-11-28 PROCEDURE — 83880 ASSAY OF NATRIURETIC PEPTIDE: CPT | Performed by: EMERGENCY MEDICINE

## 2022-11-28 PROCEDURE — 83935 ASSAY OF URINE OSMOLALITY: CPT | Performed by: HOSPITALIST

## 2022-11-28 PROCEDURE — 250N000009 HC RX 250: Performed by: EMERGENCY MEDICINE

## 2022-11-28 PROCEDURE — 85004 AUTOMATED DIFF WBC COUNT: CPT | Performed by: EMERGENCY MEDICINE

## 2022-11-28 PROCEDURE — 99285 EMERGENCY DEPT VISIT HI MDM: CPT | Mod: 25

## 2022-11-28 PROCEDURE — 87040 BLOOD CULTURE FOR BACTERIA: CPT | Performed by: EMERGENCY MEDICINE

## 2022-11-28 PROCEDURE — 86140 C-REACTIVE PROTEIN: CPT | Performed by: EMERGENCY MEDICINE

## 2022-11-28 PROCEDURE — 71046 X-RAY EXAM CHEST 2 VIEWS: CPT

## 2022-11-28 PROCEDURE — 258N000003 HC RX IP 258 OP 636: Performed by: EMERGENCY MEDICINE

## 2022-11-28 PROCEDURE — 84145 PROCALCITONIN (PCT): CPT | Performed by: HOSPITALIST

## 2022-11-28 PROCEDURE — 96365 THER/PROPH/DIAG IV INF INIT: CPT

## 2022-11-28 PROCEDURE — 120N000001 HC R&B MED SURG/OB

## 2022-11-28 PROCEDURE — 36415 COLL VENOUS BLD VENIPUNCTURE: CPT | Performed by: EMERGENCY MEDICINE

## 2022-11-28 PROCEDURE — C9803 HOPD COVID-19 SPEC COLLECT: HCPCS

## 2022-11-28 PROCEDURE — 250N000011 HC RX IP 250 OP 636: Performed by: HOSPITALIST

## 2022-11-28 PROCEDURE — 84300 ASSAY OF URINE SODIUM: CPT | Performed by: HOSPITALIST

## 2022-11-28 PROCEDURE — 250N000012 HC RX MED GY IP 250 OP 636 PS 637: Performed by: EMERGENCY MEDICINE

## 2022-11-28 PROCEDURE — 36415 COLL VENOUS BLD VENIPUNCTURE: CPT | Performed by: HOSPITALIST

## 2022-11-28 PROCEDURE — 82310 ASSAY OF CALCIUM: CPT | Performed by: EMERGENCY MEDICINE

## 2022-11-28 PROCEDURE — 85379 FIBRIN DEGRADATION QUANT: CPT | Performed by: EMERGENCY MEDICINE

## 2022-11-28 PROCEDURE — 82805 BLOOD GASES W/O2 SATURATION: CPT | Performed by: EMERGENCY MEDICINE

## 2022-11-28 PROCEDURE — 250N000013 HC RX MED GY IP 250 OP 250 PS 637: Performed by: HOSPITALIST

## 2022-11-28 RX ORDER — IPRATROPIUM BROMIDE AND ALBUTEROL SULFATE 2.5; .5 MG/3ML; MG/3ML
3 SOLUTION RESPIRATORY (INHALATION) ONCE
Status: COMPLETED | OUTPATIENT
Start: 2022-11-28 | End: 2022-11-28

## 2022-11-28 RX ORDER — LOSARTAN POTASSIUM 25 MG/1
25 TABLET ORAL EVERY EVENING
Status: DISCONTINUED | OUTPATIENT
Start: 2022-11-28 | End: 2022-12-02 | Stop reason: HOSPADM

## 2022-11-28 RX ORDER — FAMOTIDINE 20 MG/1
40 TABLET, FILM COATED ORAL EVERY EVENING
Status: DISCONTINUED | OUTPATIENT
Start: 2022-11-28 | End: 2022-12-02 | Stop reason: HOSPADM

## 2022-11-28 RX ORDER — BENZONATATE 100 MG/1
200 CAPSULE ORAL 3 TIMES DAILY PRN
Status: DISCONTINUED | OUTPATIENT
Start: 2022-11-28 | End: 2022-11-30

## 2022-11-28 RX ORDER — PIPERACILLIN SODIUM, TAZOBACTAM SODIUM 4; .5 G/20ML; G/20ML
4.5 INJECTION, POWDER, LYOPHILIZED, FOR SOLUTION INTRAVENOUS ONCE
Status: COMPLETED | OUTPATIENT
Start: 2022-11-28 | End: 2022-11-28

## 2022-11-28 RX ORDER — GUAIFENESIN 200 MG/10ML
10 LIQUID ORAL EVERY 4 HOURS PRN
Status: DISCONTINUED | OUTPATIENT
Start: 2022-11-28 | End: 2022-12-02 | Stop reason: HOSPADM

## 2022-11-28 RX ORDER — HYDROXYCHLOROQUINE SULFATE 200 MG/1
200 TABLET, FILM COATED ORAL 2 TIMES DAILY
Status: DISCONTINUED | OUTPATIENT
Start: 2022-11-28 | End: 2022-12-02 | Stop reason: HOSPADM

## 2022-11-28 RX ORDER — PIPERACILLIN SODIUM, TAZOBACTAM SODIUM 3; .375 G/15ML; G/15ML
3.38 INJECTION, POWDER, LYOPHILIZED, FOR SOLUTION INTRAVENOUS EVERY 6 HOURS
Status: DISCONTINUED | OUTPATIENT
Start: 2022-11-28 | End: 2022-12-02 | Stop reason: HOSPADM

## 2022-11-28 RX ORDER — MYCOPHENOLATE MOFETIL 250 MG/1
250 CAPSULE ORAL
Status: DISCONTINUED | OUTPATIENT
Start: 2022-11-28 | End: 2022-11-28

## 2022-11-28 RX ORDER — ONDANSETRON 2 MG/ML
4 INJECTION INTRAMUSCULAR; INTRAVENOUS EVERY 6 HOURS PRN
Status: DISCONTINUED | OUTPATIENT
Start: 2022-11-28 | End: 2022-12-02 | Stop reason: HOSPADM

## 2022-11-28 RX ORDER — ONDANSETRON 4 MG/1
4 TABLET, ORALLY DISINTEGRATING ORAL EVERY 6 HOURS PRN
Status: DISCONTINUED | OUTPATIENT
Start: 2022-11-28 | End: 2022-12-02 | Stop reason: HOSPADM

## 2022-11-28 RX ORDER — MYCOPHENOLATE MOFETIL 250 MG/1
250 CAPSULE ORAL
Status: DISCONTINUED | OUTPATIENT
Start: 2022-11-28 | End: 2022-12-02 | Stop reason: HOSPADM

## 2022-11-28 RX ORDER — CHOLECALCIFEROL (VITAMIN D3) 50 MCG
50 TABLET ORAL DAILY
Status: DISCONTINUED | OUTPATIENT
Start: 2022-11-29 | End: 2022-12-02 | Stop reason: HOSPADM

## 2022-11-28 RX ORDER — IOPAMIDOL 755 MG/ML
81 INJECTION, SOLUTION INTRAVASCULAR ONCE
Status: COMPLETED | OUTPATIENT
Start: 2022-11-28 | End: 2022-11-28

## 2022-11-28 RX ORDER — ASPIRIN 81 MG/1
81 TABLET, CHEWABLE ORAL DAILY
Status: DISCONTINUED | OUTPATIENT
Start: 2022-11-29 | End: 2022-12-02 | Stop reason: HOSPADM

## 2022-11-28 RX ORDER — METHYLPREDNISOLONE SODIUM SUCCINATE 125 MG/2ML
60 INJECTION, POWDER, LYOPHILIZED, FOR SOLUTION INTRAMUSCULAR; INTRAVENOUS EVERY 12 HOURS
Status: DISCONTINUED | OUTPATIENT
Start: 2022-11-28 | End: 2022-11-29

## 2022-11-28 RX ORDER — MYCOPHENOLATE MOFETIL 500 MG/1
500 TABLET ORAL
Status: DISCONTINUED | OUTPATIENT
Start: 2022-11-28 | End: 2022-12-02 | Stop reason: HOSPADM

## 2022-11-28 RX ORDER — AMLODIPINE BESYLATE 5 MG/1
5 TABLET ORAL DAILY
Status: DISCONTINUED | OUTPATIENT
Start: 2022-11-29 | End: 2022-12-02 | Stop reason: HOSPADM

## 2022-11-28 RX ORDER — SULFASALAZINE 500 MG/1
1000 TABLET, DELAYED RELEASE ORAL 2 TIMES DAILY
Status: DISCONTINUED | OUTPATIENT
Start: 2022-11-28 | End: 2022-12-02 | Stop reason: HOSPADM

## 2022-11-28 RX ORDER — ACETAMINOPHEN 650 MG/1
650 SUPPOSITORY RECTAL EVERY 6 HOURS PRN
Status: DISCONTINUED | OUTPATIENT
Start: 2022-11-28 | End: 2022-12-02 | Stop reason: HOSPADM

## 2022-11-28 RX ORDER — ACETAMINOPHEN 325 MG/1
650 TABLET ORAL EVERY 6 HOURS PRN
Status: DISCONTINUED | OUTPATIENT
Start: 2022-11-28 | End: 2022-12-02 | Stop reason: HOSPADM

## 2022-11-28 RX ORDER — IPRATROPIUM BROMIDE AND ALBUTEROL SULFATE 2.5; .5 MG/3ML; MG/3ML
3 SOLUTION RESPIRATORY (INHALATION) EVERY 4 HOURS PRN
Status: DISCONTINUED | OUTPATIENT
Start: 2022-11-28 | End: 2022-12-02 | Stop reason: HOSPADM

## 2022-11-28 RX ADMIN — METHYLPREDNISOLONE SODIUM SUCCINATE 62.5 MG: 125 INJECTION, POWDER, FOR SOLUTION INTRAMUSCULAR; INTRAVENOUS at 19:08

## 2022-11-28 RX ADMIN — MYCOPHENOLATE MOFETIL 250 MG: 250 CAPSULE ORAL at 19:09

## 2022-11-28 RX ADMIN — PIPERACILLIN AND TAZOBACTAM 3.38 G: 3; .375 INJECTION, POWDER, FOR SOLUTION INTRAVENOUS at 19:09

## 2022-11-28 RX ADMIN — SULFASALAZINE 1000 MG: 500 TABLET, DELAYED RELEASE ORAL at 20:26

## 2022-11-28 RX ADMIN — PIPERACILLIN AND TAZOBACTAM 4.5 G: 4; .5 INJECTION, POWDER, FOR SOLUTION INTRAVENOUS at 12:25

## 2022-11-28 RX ADMIN — SODIUM CHLORIDE 500 ML: 9 INJECTION, SOLUTION INTRAVENOUS at 13:19

## 2022-11-28 RX ADMIN — BENZONATATE 200 MG: 100 CAPSULE ORAL at 19:08

## 2022-11-28 RX ADMIN — HYDROXYCHLOROQUINE SULFATE 200 MG: 200 TABLET ORAL at 20:26

## 2022-11-28 RX ADMIN — ACETAMINOPHEN 650 MG: 325 TABLET, FILM COATED ORAL at 19:07

## 2022-11-28 RX ADMIN — MYCOPHENOLATE MOFETIL 500 MG: 500 TABLET ORAL at 19:09

## 2022-11-28 RX ADMIN — IPRATROPIUM BROMIDE AND ALBUTEROL SULFATE 3 ML: .5; 3 SOLUTION RESPIRATORY (INHALATION) at 13:19

## 2022-11-28 RX ADMIN — FAMOTIDINE 40 MG: 20 TABLET ORAL at 19:08

## 2022-11-28 RX ADMIN — IOPAMIDOL 81 ML: 755 INJECTION, SOLUTION INTRAVENOUS at 13:35

## 2022-11-28 RX ADMIN — GUAIFENESIN 10 ML: 200 SOLUTION ORAL at 20:27

## 2022-11-28 RX ADMIN — SODIUM CHLORIDE 88 ML: 900 INJECTION INTRAVENOUS at 13:36

## 2022-11-28 RX ADMIN — LOSARTAN POTASSIUM 25 MG: 25 TABLET, FILM COATED ORAL at 20:27

## 2022-11-28 ASSESSMENT — ACTIVITIES OF DAILY LIVING (ADL)
ADLS_ACUITY_SCORE: 35

## 2022-11-28 NOTE — ED NOTES
Pt desats into low 80s when coughing or ambulation. Pt placed back on 2L NC. Daughter present at bedside and translating

## 2022-11-28 NOTE — ED NOTES
Rapid Assessment Note    History:   Krystian Boland is a 72 year old female who presents with her daughter with 24 hours ago shortness of breath, nonbloody productive cough with associated chest pain. She has history of interstitial lung disease but is not typically on oxygen at home but was placed on 4L of home oxygen due to shortness of breath. Of note, she stopped use of prednisone 2 weeks ago and was placed on Cellcept. She is vaccinated for influenza. She has not had fever. Daughter serves at [employer] and supplements history.     Exam:   General:  Alert, interactive  Cardiovascular:  Well perfused  Lungs:  No respiratory distress, no accessory muscle use. Coarse rhonchi bilaterally.  Neuro:  Moving all 4 extremities  Skin:  Warm, dry  Psych:  Normal affect      Plan of Care: COVID and influenza testing, chest x-ray, oxygen, and labs ordered.  I evaluated the patient and developed an initial plan of care. I discussed this plan and explained that I, or one of my partners, would be returning to complete the evaluation.         I, Marian Rojo, am serving as a scribe to document services personally performed by Carlota Reveles MD, based on my observations and the provider's statements to me.    11/28/2022  [employer]    Portions of this medical record were completed by a scribe. UPON MY REVIEW AND AUTHENTICATION BY ELECTRONIC SIGNATURE, this confirms (a) I performed the applicable clinical services, and (b) the record is accurate.        Carlota Reveles MD  11/28/22 2661

## 2022-11-28 NOTE — ED TRIAGE NOTES
Patient here with her daughter.  She has been SOB and has chest pain over the last 24 hours.  SOB and cough started first then burning CP.  Patient has interstitial lung disease but does not normally require oxygen.  She is on 4L of home oxygen due to her SOB.  She appears slightly pale and tachypnic.

## 2022-11-28 NOTE — PHARMACY-ADMISSION MEDICATION HISTORY
Pharmacy Medication History  Admission medication history interview status for the 11/28/2022  admission is complete. See EPIC admission navigator for prior to admission medications     Location of Interview: Patient room  Medication history sources: Patient's family/friend (spoke with patient's daughter to confirm meds) and Surescripts    Significant changes made to the medication list:  1) Patient takes famotidine everyday not PRN as prescribed    In the past week, patient estimated taking medication this percent of the time: greater than 90%    Additional medication history information:   1) Patient has been taking benzonatate three times daily on a schedule  2)  Patient is currently on 750mg BID of mycophenolate regimen with 1 week left until she increased to 1000mg BID dose.     Medication reconciliation completed by provider prior to medication history? No    Time spent in this activity: 20 minutes    Prior to Admission medications    Medication Sig Last Dose Taking? Auth Provider Long Term End Date   amLODIPine (NORVASC) 5 MG tablet TAKE 1 TABLET(5 MG) BY MOUTH DAILY 11/28/2022 at am Yes Adrianna Singh MD Yes    aspirin 81 MG chewable tablet Take 1 tablet (81 mg) by mouth daily 11/28/2022 at am Yes Adrianna Singh MD     benzonatate (TESSALON) 200 MG capsule Take 1 capsule (200 mg) by mouth 3 times daily as needed for cough  Patient taking differently: Take 200 mg by mouth 3 times daily 11/28/2022 Yes Jase Keller MD     famotidine (PEPCID) 40 MG tablet Take 1 tablet (40 mg) by mouth nightly as needed for heartburn  Patient taking differently: Take 40 mg by mouth every evening Past Week at PM Yes Adrianna Singh MD     hydroxychloroquine (PLAQUENIL) 200 MG tablet Take 1 tablet (200 mg) by mouth 2 times daily Annual Plaquenil toxicity eye screening required. 11/28/2022 at am x 1 dose Yes Harjit Staley MD     losartan (COZAAR) 25 MG tablet Take 1 tablet (25 mg) by mouth daily 11/27/2022 at PM  Yes Adrianna Singh MD Yes    OFEV 100 MG capsule TAKE ONE CAPSULE BY MOUTH TWICE A DAY 11/28/2022 at am x 1 dose Yes Sola Eric MD     ondansetron (ZOFRAN) 4 MG tablet Take 1 tablet (4 mg) by mouth every 8 hours as needed for nausea prn med Yes Adrianna Singh MD     sulfaSALAzine ER (AZULFIDINE EN) 500 MG EC tablet take 2 tabs twice a day. 11/28/2022 at am x 1 dose Yes Harjit Staley MD     VITAMIN D3 50 MCG (2000 UT) tablet TAKE 1 TABLET( 50MCG) BY MOUTH DAILY. 11/28/2022 at am Yes Adrianna Singh MD     blood glucose (NO BRAND SPECIFIED) test strip Use to test blood sugar 1 time daily or as directed. Blood Glucose Monitor Brands: per insurance.   Adrianna Singh MD     mycophenolate (GENERIC EQUIVALENT) 500 MG tablet Take 1 tablet (500 mg) by mouth 2 times daily for 14 days, THEN 1.5 tablets (750 mg) 2 times daily for 14 days, THEN 2 tablets (1,000 mg) 2 times daily for 30 days.  Patient taking differently: Take 1 tablet (500 mg) by mouth 2 times daily for 14 days, THEN 1.5 tablets (750 mg) 2 times daily for 14 days, THEN 2 tablets (1,000 mg) 2 times daily for 30 days.    Patient currently has one week left of 750mg twice daily dose and then moves to 1000mg twice daily. 11/25/2022  Jase Keller MD Yes 12/25/22   STATIN NOT PRESCRIBED (INTENTIONAL) Please choose reason not prescribed, below   Adrianna Singh MD         The information provided in this note is only as accurate as the sources available at the time of update(s)

## 2022-11-28 NOTE — ED PROVIDER NOTES
History     Chief Complaint:  Shortness of Breath and Chest Pain       Hospitals in [place]   Krystian Boland is a 72 year old female who presents with her daughter for concern of cough, shortness of breath and associated chest pain.  Her daughter serves as  with permission of the patient.  The patient's primary language is Tibetan.  The patient has a history of interstitial lung disease.  She is not normally on oxygen but does have a concentrator at home for if she becomes short of breath with exertion.  She has been having shortness of breath and coughing for the past 3 days.  She was noted to be satting 70% at home.  They put her on 2 L of oxygen with a concentrator but she continued to have low oxygen levels and so they increased it to 4 L.  She has been coughing up creamy yellow sputum.  She has not had a fever that they are aware of.  She has had a burning constant chest pain since yesterday as well.  She has no history of a blood clot.  She is not on any blood thinning medications.  No history of congestive heart failure or COPD.  She has had no sick contacts.  Of note, she was recently started on CellCept for her interstitial lung disease by her pulmonologist.      History and review of systems limited by language barrier.    ROS:  Review of Systems  As noted per HPI.  Remainder of a 10 point review of systems was negative.    Allergies:  Atorvastatin     Medications:    amLODIPine (NORVASC) 5 MG tablet  aspirin 81 MG chewable tablet  benzonatate (TESSALON) 200 MG capsule  famotidine (PEPCID) 40 MG tablet  hydroxychloroquine (PLAQUENIL) 200 MG tablet  losartan (COZAAR) 25 MG tablet  OFEV 100 MG capsule  ondansetron (ZOFRAN) 4 MG tablet  sulfaSALAzine ER (AZULFIDINE EN) 500 MG EC tablet  VITAMIN D3 50 MCG (2000 UT) tablet  blood glucose (NO BRAND SPECIFIED) test strip  mycophenolate (GENERIC EQUIVALENT) 500 MG tablet  STATIN NOT PRESCRIBED (INTENTIONAL)        Past Medical History:    Past Medical History:    Diagnosis Date     Early onset Alzheimer's dementia without behavioral disturbance (H)      Interstitial lung disease (H)      Obesity (BMI 30-39.9)      Osteopenia      Rheumatoid factor positive      Type 2 diabetes mellitus (H)        Past Surgical History:    Past Surgical History:   Procedure Laterality Date     CATARACT EXTRACTION W/ INTRAOCULAR LENS IMPLANT Left 10/25/2021     CATARACT IOL, RT/LT Right 10/11/2021     CHOLECYSTECTOMY, OPEN  1990        Family History:    family history includes Cerebrovascular Disease in her brother and father; Dementia in her mother.    Social History:   reports that she has never smoked. She has never used smokeless tobacco. She reports that she does not currently use alcohol. She reports that she does not use drugs.  PCP: Adrianna Singh     Physical Exam     Patient Vitals for the past 24 hrs:   BP Temp Temp src Pulse Resp SpO2   11/28/22 1726 -- -- -- -- 20 --   11/28/22 1626 (!) 169/81 -- -- -- -- 92 %   11/28/22 1556 (!) 151/77 -- -- -- -- 97 %   11/28/22 1526 135/74 -- -- -- -- 97 %   11/28/22 1459 (!) 140/76 -- -- 100 -- 96 %   11/28/22 1429 (!) 152/77 -- -- 102 -- 91 %   11/28/22 1329 135/61 -- -- 96 -- 96 %   11/28/22 1300 127/66 -- -- 100 -- 97 %   11/28/22 1205 -- -- -- 115 -- 98 %   11/28/22 1200 (!) 140/71 -- -- -- -- 99 %   11/28/22 1122 (!) 157/68 98  F (36.7  C) Temporal 102 (!) 40 99 %        Physical Exam  General: Well-nourished, tachypneic and appears short of breath, he saturating 99% on 4 L by nasal cannula   Eyes: PERRL, conjunctivae pink no scleral icterus or conjunctival injection  ENT:  Moist mucus membranes, posterior oropharynx clear without erythema or exudates  Respiratory:  Lungs coarse with diffuse wheezes symmetrically.  Increased work of breathing with tachypnea.  Frequent cough.  CV: Mildly tachycardic rate and regular rhythm, no murmurs/rubs/gallops  GI:  Abdomen soft and non-distended.  Normoactive BS.  No tenderness, guarding or  rebound  Skin: Warm, dry.  No rashes or petechiae  Musculoskeletal: No peripheral edema or calf tenderness  Neuro: Alert and oriented to person/place/time  Psychiatric: Unable to assess due to language barrier    Emergency Department Course   ECG:  ECG taken 1130, ECG read 1238  Normal sinus rhythm  Normal ECG  Rate 97 bpm. CO interval 148 ms. QRS duration 88 ms. QT/QTc 338/429 ms. P-R-T axes 46 56 27.    Imaging:  CT Chest (PE) Abdomen Pelvis w Contrast   Final Result   IMPRESSION:      1. Moderate to severe pulmonary fibrosis.   2. No pulmonary embolism demonstrated.   3. Progressive adenopathy in the chest which is nonspecific in this   setting.   4. No acute process demonstrated in the abdomen and pelvis.      AYANNA COOK MD            SYSTEM ID:  [id number]      XR Chest 2 Views   Final Result   IMPRESSION: Moderately extensive airspace disease may be progressive   fibrosis. A component of infectious infiltrate would be difficult to   exclude. No definite effusion. The cardiac silhouette is not enlarged.   Pulmonary vasculature is unremarkable.      AYANNA COOK MD            SYSTEM ID:  [id number]         Report per radiology    Laboratory:  Labs Ordered and Resulted from Time of ED Arrival to Time of ED Departure   COMPREHENSIVE METABOLIC PANEL - Abnormal       Result Value    Sodium 123 (*)     Potassium 3.5      Chloride 87 (*)     Carbon Dioxide (CO2) 27      Anion Gap 9      Urea Nitrogen 5 (*)     Creatinine 0.53      Calcium 9.2      Glucose 146 (*)     Alkaline Phosphatase 213 (*)     AST 23      ALT 32      Protein Total 7.9      Albumin 2.7 (*)     Bilirubin Total 0.9      GFR Estimate >90     CRP INFLAMMATION - Abnormal    CRP Inflammation 207.0 (*)    BLOOD GAS VENOUS WITH OXYHEMOGLOBIN - Abnormal    pH Venous 7.41      pCO2 Venous 49      pO2 Venous 28      Bicarbonate Venous 31 (*)     FIO2        Oxyhemoglobin Venous 51 (*)     Base Excess/Deficit (+/-) 4.8 (*)    CBC WITH PLATELETS AND  DIFFERENTIAL - Abnormal    WBC Count 21.5 (*)     RBC Count 3.55 (*)     Hemoglobin 10.7 (*)     Hematocrit 31.1 (*)     MCV 88      MCH 30.1      MCHC 34.4      RDW 12.4      Platelet Count 316      % Neutrophils 88      % Lymphocytes 4      % Monocytes 6      % Eosinophils 1      % Basophils 0      % Immature Granulocytes 1      NRBCs per 100 WBC 0      Absolute Neutrophils 19.1 (*)     Absolute Lymphocytes 0.8      Absolute Monocytes 1.2      Absolute Eosinophils 0.2      Absolute Basophils 0.1      Absolute Immature Granulocytes 0.2      Absolute NRBCs 0.0     D DIMER QUANTITATIVE - Abnormal    D-Dimer Quantitative 1.19 (*)    ISTAT GASES LACTATE VENOUS POCT - Abnormal    Lactic Acid POCT 0.8      Bicarbonate Venous POCT 29 (*)     O2 Sat, Venous POCT 52 (*)     pCO2V Venous POCT 46      pH Venous POCT 7.41      pO2 Venous POCT 28     INFLUENZA A/B & SARS-COV2 PCR MULTIPLEX - Normal    Influenza A PCR Negative      Influenza B PCR Negative      RSV PCR Negative      SARS CoV2 PCR Negative     TROPONIN I - Normal    Troponin I High Sensitivity 4     NT PROBNP INPATIENT - Normal    N terminal Pro BNP Inpatient 143     PROCALCITONIN   SODIUM RANDOM URINE   OSMOLALITY, RANDOM URINE   OSMOLALITY   BLOOD CULTURE   RESPIRATORY PANEL PCR        Emergency Department Course:       Reviewed:  I reviewed nursing notes, vitals and past medical history    Assessments:  I obtained history and examined the patient as noted above.   I rechecked the patient and explained findings.     Consults:   I consulted with Dr. Escobedo.    Interventions:  Medications   nintedanib (OFEV) capsule 100 mg (has no administration in time range)   benzonatate (TESSALON) capsule 200 mg (has no administration in time range)   guaiFENesin (ROBITUSSIN) 20 mg/mL solution 10 mL (has no administration in time range)   piperacillin-tazobactam (ZOSYN) 3.375 g vial to attach to  mL bag (has no administration in time range)   methylPREDNISolone sodium  succinate (solu-MEDROL) injection 62.5 mg (has no administration in time range)   famotidine (PEPCID) tablet 40 mg (has no administration in time range)   hydroxychloroquine (PLAQUENIL) tablet 200 mg (has no administration in time range)   losartan (COZAAR) tablet 25 mg (has no administration in time range)   mycophenolate (GENERIC EQUIVALENT) tablet 500 mg (has no administration in time range)   mycophenolate (CELLCEPT BRAND) capsule 250 mg (has no administration in time range)   sulfaSALAzine ER (AZULFIDINE EN) EC tablet 1,000 mg (has no administration in time range)   vitamin D3 (CHOLECALCIFEROL) tablet 50 mcg (has no administration in time range)   piperacillin-tazobactam (ZOSYN) 4.5 g vial to attach to  mL bag (0 g Intravenous Stopped 11/28/22 1255)   ipratropium - albuterol 0.5 mg/2.5 mg/3 mL (DUONEB) neb solution 3 mL (3 mLs Nebulization Given 11/28/22 1319)   0.9% sodium chloride BOLUS (500 mLs Intravenous New Bag 11/28/22 1319)   iopamidol (ISOVUE-370) solution 81 mL (81 mLs Intravenous Given 11/28/22 1335)   Saline Flush (88 mLs Intravenous Given 11/28/22 1336)      Disposition:  The patient was admitted to the hospital under the care of Dr. Escobedo.     Impression & Plan      Medical Decision Making:  Krystian Boland is a 72 year old female who comes today with shortness of breath and a new oxygen requirement with a persistent cough.  She does have a history of interstitial lung disease but she is significantly worse than she has been.  She was not febrile but tachycardic and she has a significant elevation of her white count.  Infectious etiology was considered.  Blood cultures are obtained and pending.  We went ahead and treated her with antibiotics.  Fortunately her lactic acid is normal and she has not been hypotensive.  Troponin is negative and her EKG is nonischemic.  Laboratory studies revealed a normal BNP without evidence of fluid overload or congestive heart failure.  Chest x-ray shows  findings of interstitial lung disease.  D-dimer was elevated and so CT scan was obtained.  Fortunately no signs of a pulmonary embolism on the CT scan.  CRP is quite elevated for an unclear reason.  She does have lymphadenopathy noted.  Malignancy would certainly be on the differential and she may need further evaluation for this.  We tried a nebulizer with minimal to no improvement.  She has no history of COPD.  She will be admitted to the hospital for further evaluation and treatment.  Dr. Aceves's agreed to meet the patient.    Diagnosis:    ICD-10-CM    1. Acute respiratory failure with hypoxia (H)  J96.01       2. Hyponatremia  E87.1       3. Acute cough  R05.1       4. Leukocytosis, unspecified type  D72.829       5. IPF (idiopathic pulmonary fibrosis) (H)  J84.112       6. Lymphadenopathy  R59.1            Discharge Medications:  New Prescriptions    No medications on file        11/28/2022   Ambika Orantes MD Cho, Amy C, MD  11/28/22 1800

## 2022-11-28 NOTE — ED NOTES
[facility]  ED Nurse Handoff Report    ED Chief complaint: Shortness of Breath and Chest Pain      ED Diagnosis:   Final diagnoses:   None       Code Status: MD to Address    Allergies:   Allergies   Allergen Reactions     Atorvastatin Muscle Pain (Myalgia)       Patient Story: Pt presents with increased SOB, MATUTE, and cough. Pt has a hx of interstitial lung disease. Pt placed and maintained on 2L NC - pt will desat into low 80s when coughing or exertion. Pt chest CT/x-ray completed - severe pulmonary fibrosis. Pt d-dimer elevated.   Focused Assessment:  Aox4. Tibetan  - daughter present and interpreting. VSS on 2L NC. Ambulate with assist.     Treatments and/or interventions provided: imaging and neb  Labs Ordered and Resulted from Time of ED Arrival to Time of ED Departure   COMPREHENSIVE METABOLIC PANEL - Abnormal       Result Value    Sodium 123 (*)     Potassium 3.5      Chloride 87 (*)     Carbon Dioxide (CO2) 27      Anion Gap 9      Urea Nitrogen 5 (*)     Creatinine 0.53      Calcium 9.2      Glucose 146 (*)     Alkaline Phosphatase 213 (*)     AST 23      ALT 32      Protein Total 7.9      Albumin 2.7 (*)     Bilirubin Total 0.9      GFR Estimate >90     CRP INFLAMMATION - Abnormal    CRP Inflammation 207.0 (*)    BLOOD GAS VENOUS WITH OXYHEMOGLOBIN - Abnormal    pH Venous 7.41      pCO2 Venous 49      pO2 Venous 28      Bicarbonate Venous 31 (*)     FIO2        Oxyhemoglobin Venous 51 (*)     Base Excess/Deficit (+/-) 4.8 (*)    CBC WITH PLATELETS AND DIFFERENTIAL - Abnormal    WBC Count 21.5 (*)     RBC Count 3.55 (*)     Hemoglobin 10.7 (*)     Hematocrit 31.1 (*)     MCV 88      MCH 30.1      MCHC 34.4      RDW 12.4      Platelet Count 316      % Neutrophils 88      % Lymphocytes 4      % Monocytes 6      % Eosinophils 1      % Basophils 0      % Immature Granulocytes 1      NRBCs per 100 WBC 0      Absolute Neutrophils 19.1 (*)     Absolute Lymphocytes 0.8      Absolute  Monocytes 1.2      Absolute Eosinophils 0.2      Absolute Basophils 0.1      Absolute Immature Granulocytes 0.2      Absolute NRBCs 0.0     D DIMER QUANTITATIVE - Abnormal    D-Dimer Quantitative 1.19 (*)    ISTAT GASES LACTATE VENOUS POCT - Abnormal    Lactic Acid POCT 0.8      Bicarbonate Venous POCT 29 (*)     O2 Sat, Venous POCT 52 (*)     pCO2V Venous POCT 46      pH Venous POCT 7.41      pO2 Venous POCT 28     INFLUENZA A/B & SARS-COV2 PCR MULTIPLEX - Normal    Influenza A PCR Negative      Influenza B PCR Negative      RSV PCR Negative      SARS CoV2 PCR Negative     TROPONIN I - Normal    Troponin I High Sensitivity 4     NT PROBNP INPATIENT - Normal    N terminal Pro BNP Inpatient 143     BLOOD CULTURE     Patient's response to treatments and/or interventions: improving     To be done/followed up on inpatient unit: respiratory consult     Does this patient have any cognitive concerns?: aox4    Activity level - Baseline/Home:  Independent  Activity Level - Current:   Stand with Assist    Patient's Preferred language: Tibetan   Needed?: yes     Isolation: None  Infection: Not Applicable  Patient tested for COVID 19 prior to admission: YES  Bariatric?: No    Vital Signs:   Vitals:    11/28/22 1300 11/28/22 1329 11/28/22 1429 11/28/22 1459   BP: 127/66 135/61 (!) 152/77 (!) 140/76   BP Location:       Pulse: 100 96 102 100   Resp:       Temp:       TempSrc:       SpO2: 97% 96% 91% 96%       Cardiac Rhythm:     Was the PSS-3 completed:   Yes  What interventions are required if any?               Family Comments: daughter present at bedside   OBS brochure/video discussed/provided to patient/family: N/A              Name of person given brochure if not patient: n/a              Relationship to patient: n/a    For the majority of the shift this patient's behavior was Green.   Behavioral interventions performed were n/a.    ED NURSE PHONE NUMBER: *[id number]

## 2022-11-28 NOTE — H&P
[facility]    History and Physical - Hospitalist Service       Date of Admission:  11/28/2022    Assessment & Plan      Krystian Boland is a 72 year old female with medical history significant for rheumatoid lung disease/interstitial pulmonary fibrosis, HTN, DM was brought to the ER for evaluation of increasing cough and shortness of breath, and is being admitted on 11/28/2022 for further management.      Acute hypoxic respiratory failure  Possible bacterial pneumonia  Possible acute flare up of interstitial lung disease  Mediastinal lymphadenopathy  Recently started on CellCept as in HPI.  Noted marked leukocytosis, elevated CRP.  CT shows mod-severe pulmonary fibrosis.  Increased dyspnea for 3 days with bouts of coughing and hypoxia.  Influenza A&B, RSV, COVID-19 PCR negative.  -Admit to inpatient  -Continue IV Zosyn started in ER  -Pulmonary consult, discussed with Dr. Florence,will start on Solu-Medrol as well.  -Check procalcitonin, complete respiratory virus panel.  -Continue PTA sulfasalazine, cellcept, hydroxychloroquine and Ofev  -Supplemental O2, wean as able  -As needed nebs and Tessalon Perles/guaifenesin as well made available    Hyponatremia  Sodium 123.  Appears hypochloremic.  Suspect related to underlying pulmonary process.  I will check urine sodium, urine osmolality, serum osmolality to evaluate for SIADH.  -I will empirically place her on fluid restriction to 1.2L and follow sodium.    DM2  -HbA1c was 6.7 in August and metformin was discontinued by PCP.  -Since starting her on a steroid, I will place her on insulin sliding scale.    HTN  Mildly hypertensive in ER.  -Continue PTA Norvasc and losartan       Diet:  Regular  DVT Prophylaxis: Pneumatic Compression Devices  Wilkes Catheter: Not present  Central Lines: None  Cardiac Monitoring: None  Code Status:  Full code, discussed with patient's daughters    Clinically Significant Risk Factors Present on Admission          # Hyponatremia: Lowest Na = 123 mmol/L (Ref range: 136-145) in last 2 days, will monitor as appropriate   # Hypercalcemia: corrected calcium is >10.1, will monitor as appropriate    # Hypoalbuminemia: Lowest albumin = 2.7 g/dL (Ref range: 3.5-5.2) at 11/28/2022 11:28 AM, will monitor as appropriate     # Hypertension: home medication list includes antihypertensive(s)   # Dementia: noted on problem list           Disposition Plan      Expected Discharge Date: 11/30/2022                The patient's care was discussed with the Patient, Patient's Family and pulm Consultant.    Donna Escobedo MD  Hospitalist Service  [facility]  Securely message with the Vocera Web Console (learn more here)  Text page via [facility] Paging/Directory         ______________________________________________________________________    Chief Complaint   Cough, increasing shortness of breath    History is obtained from the patient's family members present in the room, chart review, discussion with ER physician.  Patient is to be Tibetian speaking, and 2 of her daughters who are RNs at [facility] present at bedside and assisting with communication.       History of Present Illness   Krystian Boland is a 72 year old female with medical history significant for rheumatoid lung disease/interstitial pulmonary fibrosis, HTN, DM was brought to the ER for evaluation of increasing cough and shortness of breath.     As per family, patient has baseline shortness of breath which is progressed over the last 3 days.  Associated with cough productive of yellowish sputum and also chest pain.  No fever.  Patient was noted to be hypoxic even at rest as low as 82 and down to as low as 60s with activity.  Given this, she started using supplemental O2 and was needing 2-4 LPM O2.  Her  had some URI symptoms but no fever.  Patient is well without fever.  Patient goes through coughing fits which drops her saturation.  She has vomited mucus  after coughing spell.  Denies abdominal pain, diarrhea, hematochezia or melena.  Denies urinary symptoms.    She was seen by her pulmonologist a month ago on 10/26/2022 for dyspnea on exertion.  Per report, patient had spells of increased dyspnea in May and July this year which were treated with short bursts of prednisone.  Patient had good response with steroid.  Given patient's hypertension and diabetes as well, instead of steroid, CellCept was considered.  Was a started on 500 Mg twice daily, to increase to 1 g twice daily.  Plan was to follow-up in 3 months with high-resolution CT and PFT.  Patient has not taken any of her medications for the last 3 days due to coughing fits and occasional vomiting.    In ER, patient was evaluated by Dr. Orantes.  Patient was mildly hypertensive and tachycardic to 100-115.  Her oxygen saturation was down to 70s after a coughing spell and so was placed on 2-4 LPM NC O2.  Respiratory virus panel including influenza A, B, RSV and COVID-19 negative.  CBC showed marked leukocytosis of 21.5.  BMP showed hypochloremic hyponatremia and mildly elevated alkaline phosphatase.  CRP was markedly elevated at 207.  Troponin and proBNP and lactic acid negative.  CXR showed moderately extensive airspace disease, possible progressive fibrosis.  Infiltrate would be difficult to rule out.  CT chest PE, abdomen pelvis was completed, which was negative for PE but showed moderate to severe pulmonary fibrosis and progressive adenopathy in the chest which is nonspecific.  Patient had blood cultures drawn, IV Zosyn was ordered and hospitalist service consulted for admission.    Review of Systems    The 10 point Review of Systems is negative other than noted in the HPI or here.      Past Medical History    I have reviewed this patient's medical history and updated it with pertinent information if needed.   Past Medical History:   Diagnosis Date     Early onset Alzheimer's dementia without behavioral  disturbance (H)      Interstitial lung disease (H)      Obesity (BMI 30-39.9)      Osteopenia      Rheumatoid factor positive      Type 2 diabetes mellitus (H)        Past Surgical History   I have reviewed this patient's surgical history and updated it with pertinent information if needed.  Past Surgical History:   Procedure Laterality Date     CATARACT EXTRACTION W/ INTRAOCULAR LENS IMPLANT Left 10/25/2021     CATARACT IOL, RT/LT Right 10/11/2021     CHOLECYSTECTOMY, OPEN  1990       Social History   I have reviewed this patient's social history and updated it with pertinent information if needed.  Social History     Tobacco Use     Smoking status: Never     Smokeless tobacco: Never   Vaping Use     Vaping Use: Never used   Substance Use Topics     Alcohol use: Not Currently     Drug use: No       Family History   I have reviewed this patient's family history and updated it with pertinent information if needed.  Family History   Problem Relation Age of Onset     Dementia Mother      Cerebrovascular Disease Father      Cerebrovascular Disease Brother      Diabetes No family hx of      Myocardial Infarction No family hx of      Coronary Artery Disease Early Onset No family hx of      Breast Cancer No family hx of      Ovarian Cancer No family hx of      Colon Cancer No family hx of      Glaucoma No family hx of      Macular Degeneration No family hx of        Prior to Admission Medications   Prior to Admission Medications   Prescriptions Last Dose Informant Patient Reported? Taking?   OFEV 100 MG capsule   No No   Sig: TAKE ONE CAPSULE BY MOUTH TWICE A DAY   STATIN NOT PRESCRIBED (INTENTIONAL)   No No   Sig: Please choose reason not prescribed, below   VITAMIN D3 50 MCG (2000 UT) tablet   No No   Sig: TAKE 1 TABLET( 50MCG) BY MOUTH DAILY.   amLODIPine (NORVASC) 5 MG tablet   No No   Sig: TAKE 1 TABLET(5 MG) BY MOUTH DAILY   aspirin 81 MG chewable tablet   No No   Sig: Take 1 tablet (81 mg) by mouth daily    benzonatate (TESSALON) 200 MG capsule   No No   Sig: Take 1 capsule (200 mg) by mouth 3 times daily as needed for cough   blood glucose (NO BRAND SPECIFIED) test strip   No No   Sig: Use to test blood sugar 1 time daily or as directed. Blood Glucose Monitor Brands: per insurance.   famotidine (PEPCID) 40 MG tablet   No No   Sig: Take 1 tablet (40 mg) by mouth nightly as needed for heartburn   hydroxychloroquine (PLAQUENIL) 200 MG tablet   No No   Sig: Take 1 tablet (200 mg) by mouth 2 times daily Annual Plaquenil toxicity eye screening required.   losartan (COZAAR) 25 MG tablet   No No   Sig: Take 1 tablet (25 mg) by mouth daily   mycophenolate (GENERIC EQUIVALENT) 500 MG tablet   No No   Sig: Take 1 tablet (500 mg) by mouth 2 times daily for 14 days, THEN 1.5 tablets (750 mg) 2 times daily for 14 days, THEN 2 tablets (1,000 mg) 2 times daily for 30 days.   ondansetron (ZOFRAN) 4 MG tablet   No No   Sig: Take 1 tablet (4 mg) by mouth every 8 hours as needed for nausea   sulfaSALAzine ER (AZULFIDINE EN) 500 MG EC tablet   No No   Sig: take 2 tabs twice a day.      Facility-Administered Medications: None     Allergies   Allergies   Allergen Reactions     Atorvastatin Muscle Pain (Myalgia)       Physical Exam   Vital Signs: Temp: 98  F (36.7  C) Temp src: Temporal BP: (!) 140/76 Pulse: 100   Resp: (!) 40 SpO2: 96 % O2 Device: Nasal cannula Oxygen Delivery: 2 LPM  Weight: 0 lbs 0 oz    General: AAOx3, appears comfortable.  HEENT: PERRLA EOMI. Mucosa moist/congested.   Lungs: Bilateral equal air entry.  Very coarse with crackles bilaterally, no wheezing.  Patient appears mostly comfortable although noted shallow breaths and mildly tachypneic.  CVS: S1S2 regular, no tachycardia or murmur.   Abdomen: Soft, NT, ND. BS heard.  MSK: No edema or deformities.  Neuro: AAOX3.  Face symmetrical.  Moving all extremities  Skin: No rash.       Data   Data reviewed today: I reviewed all medications, new labs and imaging results over  the last 24 hours. I personally reviewed twelve-lead EKG shows sinus rhythm, normal rate, no ischemic ST-T changes.  CT chest PE protocol, abdomen and pelvis results reviewed, moderate to severe pulmonary fibrosis noted, no PE.  Progressive adenopathy in the chest noted.    Recent Labs   Lab 11/28/22  1128   WBC 21.5*   HGB 10.7*   MCV 88      *   POTASSIUM 3.5   CHLORIDE 87*   CO2 27   BUN 5*   CR 0.53   ANIONGAP 9   SANDI 9.2   *   ALBUMIN 2.7*   PROTTOTAL 7.9   BILITOTAL 0.9   ALKPHOS 213*   ALT 32   AST 23     Recent Results (from the past 24 hour(s))   XR Chest 2 Views    Narrative    CHEST TWO VIEWS 11/28/2022 12:19 PM     HISTORY: Interstitial lung disease, cough, shortness of breath     COMPARISON: 4/2/2018       Impression    IMPRESSION: Moderately extensive airspace disease may be progressive  fibrosis. A component of infectious infiltrate would be difficult to  exclude. No definite effusion. The cardiac silhouette is not enlarged.  Pulmonary vasculature is unremarkable.    AYANNA COOK MD         SYSTEM ID:  [id number]   CT Chest (PE) Abdomen Pelvis w Contrast    Narrative    CT CHEST PE ABDOMEN AND PELVIS WITH CONTRAST 11/28/2022 1:55 PM    CLINICAL HISTORY: Chest pain. Hypoxia, cough, chest pain, shortness of  breath, elevated D-dimer.    TECHNIQUE: CT scan of the chest, abdomen, and pelvis was performed  following injection of IV contrast. Multiplanar reformats were  obtained. Dose reduction techniques were used.   CONTRAST: 81  mL isovue 370    COMPARISON: None.    FINDINGS:   ANGIOGRAM CHEST: Pulmonary arteries are normal caliber and negative  for pulmonary emboli. Thoracic aorta is negative for dissection. No CT  evidence of right heart strain.    LUNGS AND PLEURA: Moderate to severe pulmonary fibrosis. Moderate  traction bronchiectasis. Areas of honeycombing present. No effusion.    MEDIASTINUM/AXILLAE: Mediastinal and bilateral hilar adenopathy  progressed from previous,  nonspecific in this setting. No aneurysm.    CORONARY ARTERY CALCIFICATIONS: Mild.    HEPATOBILIARY: No significant mass or bile duct dilatation. No  calcified gallstones.     PANCREAS: No significant mass, duct dilatation, or inflammatory  change.    SPLEEN: Normal size.    ADRENAL GLANDS: No significant nodules.    KIDNEYS/BLADDER: No significant mass, stones, or hydronephrosis.    BOWEL: No obstruction or inflammatory change.    PELVIC ORGANS: No pelvic masses.    ADDITIONAL FINDINGS: No ascites. There are mild atherosclerotic  changes of the visualized aorta and its branches. There is no evidence  of aortic dissection or aneurysm.    MUSCULOSKELETAL: No frankly destructive bony lesions.      Impression    IMPRESSION:    1. Moderate to severe pulmonary fibrosis.  2. No pulmonary embolism demonstrated.  3. Progressive adenopathy in the chest which is nonspecific in this  setting.  4. No acute process demonstrated in the abdomen and pelvis.    AYANNA COOK MD         SYSTEM ID:  [id number]

## 2022-11-29 ENCOUNTER — MYC MEDICAL ADVICE (OUTPATIENT)
Dept: RHEUMATOLOGY | Facility: CLINIC | Age: 72
End: 2022-11-29

## 2022-11-29 ENCOUNTER — PATIENT OUTREACH (OUTPATIENT)
Dept: GERIATRIC MEDICINE | Facility: CLINIC | Age: 72
End: 2022-11-29

## 2022-11-29 LAB
ANION GAP SERPL CALCULATED.3IONS-SCNC: 5 MMOL/L (ref 3–14)
BASOPHILS # BLD AUTO: 0 10E3/UL (ref 0–0.2)
BASOPHILS NFR BLD AUTO: 0 %
BUN SERPL-MCNC: 7 MG/DL (ref 7–30)
CALCIUM SERPL-MCNC: 9.1 MG/DL (ref 8.5–10.1)
CHLORIDE BLD-SCNC: 93 MMOL/L (ref 94–109)
CO2 SERPL-SCNC: 26 MMOL/L (ref 20–32)
CREAT SERPL-MCNC: 0.5 MG/DL (ref 0.52–1.04)
EOSINOPHIL # BLD AUTO: 0 10E3/UL (ref 0–0.7)
EOSINOPHIL NFR BLD AUTO: 0 %
ERYTHROCYTE [DISTWIDTH] IN BLOOD BY AUTOMATED COUNT: 12.4 % (ref 10–15)
GFR SERPL CREATININE-BSD FRML MDRD: >90 ML/MIN/1.73M2
GLUCOSE BLD-MCNC: 171 MG/DL (ref 70–99)
GLUCOSE BLDC GLUCOMTR-MCNC: 157 MG/DL (ref 70–99)
GLUCOSE BLDC GLUCOMTR-MCNC: 170 MG/DL (ref 70–99)
GLUCOSE BLDC GLUCOMTR-MCNC: 190 MG/DL (ref 70–99)
HCT VFR BLD AUTO: 29.2 % (ref 35–47)
HGB BLD-MCNC: 9.9 G/DL (ref 11.7–15.7)
IMM GRANULOCYTES # BLD: 0.2 10E3/UL
IMM GRANULOCYTES NFR BLD: 1 %
LYMPHOCYTES # BLD AUTO: 0.6 10E3/UL (ref 0.8–5.3)
LYMPHOCYTES NFR BLD AUTO: 4 %
MCH RBC QN AUTO: 29.9 PG (ref 26.5–33)
MCHC RBC AUTO-ENTMCNC: 33.9 G/DL (ref 31.5–36.5)
MCV RBC AUTO: 88 FL (ref 78–100)
MONOCYTES # BLD AUTO: 0.1 10E3/UL (ref 0–1.3)
MONOCYTES NFR BLD AUTO: 1 %
NEUTROPHILS # BLD AUTO: 15 10E3/UL (ref 1.6–8.3)
NEUTROPHILS NFR BLD AUTO: 94 %
NRBC # BLD AUTO: 0 10E3/UL
NRBC BLD AUTO-RTO: 0 /100
PLATELET # BLD AUTO: 337 10E3/UL (ref 150–450)
POTASSIUM BLD-SCNC: 4.1 MMOL/L (ref 3.4–5.3)
RBC # BLD AUTO: 3.31 10E6/UL (ref 3.8–5.2)
SODIUM SERPL-SCNC: 124 MMOL/L (ref 133–144)
SODIUM SERPL-SCNC: 127 MMOL/L (ref 133–144)
SODIUM SERPL-SCNC: 130 MMOL/L (ref 133–144)
WBC # BLD AUTO: 15.9 10E3/UL (ref 4–11)

## 2022-11-29 PROCEDURE — 250N000012 HC RX MED GY IP 250 OP 636 PS 637: Performed by: HOSPITALIST

## 2022-11-29 PROCEDURE — 80048 BASIC METABOLIC PNL TOTAL CA: CPT | Performed by: HOSPITALIST

## 2022-11-29 PROCEDURE — 250N000013 HC RX MED GY IP 250 OP 250 PS 637: Performed by: HOSPITALIST

## 2022-11-29 PROCEDURE — 250N000011 HC RX IP 250 OP 636: Performed by: HOSPITALIST

## 2022-11-29 PROCEDURE — 99233 SBSQ HOSP IP/OBS HIGH 50: CPT | Performed by: HOSPITALIST

## 2022-11-29 PROCEDURE — 258N000003 HC RX IP 258 OP 636: Performed by: HOSPITALIST

## 2022-11-29 PROCEDURE — 85025 COMPLETE CBC W/AUTO DIFF WBC: CPT | Performed by: HOSPITALIST

## 2022-11-29 PROCEDURE — 99222 1ST HOSP IP/OBS MODERATE 55: CPT | Performed by: INTERNAL MEDICINE

## 2022-11-29 PROCEDURE — 250N000012 HC RX MED GY IP 250 OP 636 PS 637: Performed by: EMERGENCY MEDICINE

## 2022-11-29 PROCEDURE — 36415 COLL VENOUS BLD VENIPUNCTURE: CPT | Performed by: INTERNAL MEDICINE

## 2022-11-29 PROCEDURE — 36415 COLL VENOUS BLD VENIPUNCTURE: CPT | Performed by: HOSPITALIST

## 2022-11-29 PROCEDURE — 250N000013 HC RX MED GY IP 250 OP 250 PS 637: Performed by: INTERNAL MEDICINE

## 2022-11-29 PROCEDURE — 120N000001 HC R&B MED SURG/OB

## 2022-11-29 PROCEDURE — 84295 ASSAY OF SERUM SODIUM: CPT | Performed by: INTERNAL MEDICINE

## 2022-11-29 RX ORDER — AZITHROMYCIN 500 MG/1
500 INJECTION, POWDER, LYOPHILIZED, FOR SOLUTION INTRAVENOUS EVERY 24 HOURS
Status: COMPLETED | OUTPATIENT
Start: 2022-11-29 | End: 2022-12-01

## 2022-11-29 RX ORDER — BISACODYL 10 MG
10 SUPPOSITORY, RECTAL RECTAL DAILY PRN
Status: DISCONTINUED | OUTPATIENT
Start: 2022-11-29 | End: 2022-12-02 | Stop reason: HOSPADM

## 2022-11-29 RX ORDER — LIDOCAINE 40 MG/G
CREAM TOPICAL
Status: DISCONTINUED | OUTPATIENT
Start: 2022-11-29 | End: 2022-12-02 | Stop reason: HOSPADM

## 2022-11-29 RX ORDER — PREDNISONE 20 MG/1
40 TABLET ORAL DAILY
Status: DISCONTINUED | OUTPATIENT
Start: 2022-11-29 | End: 2022-12-02 | Stop reason: HOSPADM

## 2022-11-29 RX ORDER — DEXTROSE MONOHYDRATE 25 G/50ML
25-50 INJECTION, SOLUTION INTRAVENOUS
Status: DISCONTINUED | OUTPATIENT
Start: 2022-11-29 | End: 2022-12-02 | Stop reason: HOSPADM

## 2022-11-29 RX ORDER — POLYETHYLENE GLYCOL 3350 17 G/17G
17 POWDER, FOR SOLUTION ORAL DAILY PRN
Status: DISCONTINUED | OUTPATIENT
Start: 2022-11-29 | End: 2022-12-02 | Stop reason: HOSPADM

## 2022-11-29 RX ORDER — NICOTINE POLACRILEX 4 MG
15-30 LOZENGE BUCCAL
Status: DISCONTINUED | OUTPATIENT
Start: 2022-11-29 | End: 2022-12-02 | Stop reason: HOSPADM

## 2022-11-29 RX ADMIN — AZITHROMYCIN MONOHYDRATE 500 MG: 500 INJECTION, POWDER, LYOPHILIZED, FOR SOLUTION INTRAVENOUS at 10:02

## 2022-11-29 RX ADMIN — GUAIFENESIN 10 ML: 200 SOLUTION ORAL at 17:42

## 2022-11-29 RX ADMIN — INSULIN ASPART 1 UNITS: 100 INJECTION, SOLUTION INTRAVENOUS; SUBCUTANEOUS at 18:01

## 2022-11-29 RX ADMIN — GUAIFENESIN 10 ML: 200 SOLUTION ORAL at 08:59

## 2022-11-29 RX ADMIN — FAMOTIDINE 40 MG: 20 TABLET ORAL at 20:11

## 2022-11-29 RX ADMIN — PIPERACILLIN AND TAZOBACTAM 3.38 G: 3; .375 INJECTION, POWDER, FOR SOLUTION INTRAVENOUS at 06:51

## 2022-11-29 RX ADMIN — MYCOPHENOLATE MOFETIL 250 MG: 250 CAPSULE ORAL at 09:50

## 2022-11-29 RX ADMIN — HYDROXYCHLOROQUINE SULFATE 200 MG: 200 TABLET ORAL at 20:10

## 2022-11-29 RX ADMIN — ACETAMINOPHEN 650 MG: 325 TABLET, FILM COATED ORAL at 21:49

## 2022-11-29 RX ADMIN — PIPERACILLIN AND TAZOBACTAM 3.38 G: 3; .375 INJECTION, POWDER, FOR SOLUTION INTRAVENOUS at 13:07

## 2022-11-29 RX ADMIN — HYDROXYCHLOROQUINE SULFATE 200 MG: 200 TABLET ORAL at 09:50

## 2022-11-29 RX ADMIN — PIPERACILLIN AND TAZOBACTAM 3.38 G: 3; .375 INJECTION, POWDER, FOR SOLUTION INTRAVENOUS at 18:22

## 2022-11-29 RX ADMIN — BENZOCAINE 6 MG-MENTHOL 10 MG LOZENGES 1 LOZENGE: at 04:48

## 2022-11-29 RX ADMIN — INSULIN ASPART 1 UNITS: 100 INJECTION, SOLUTION INTRAVENOUS; SUBCUTANEOUS at 13:50

## 2022-11-29 RX ADMIN — MYCOPHENOLATE MOFETIL 500 MG: 500 TABLET ORAL at 17:43

## 2022-11-29 RX ADMIN — LOSARTAN POTASSIUM 25 MG: 25 TABLET, FILM COATED ORAL at 20:10

## 2022-11-29 RX ADMIN — SULFASALAZINE 1000 MG: 500 TABLET, DELAYED RELEASE ORAL at 20:10

## 2022-11-29 RX ADMIN — GUAIFENESIN 10 ML: 200 SOLUTION ORAL at 01:19

## 2022-11-29 RX ADMIN — BENZONATATE 200 MG: 100 CAPSULE ORAL at 04:32

## 2022-11-29 RX ADMIN — ASPIRIN 81 MG CHEWABLE TABLET 81 MG: 81 TABLET CHEWABLE at 09:16

## 2022-11-29 RX ADMIN — PIPERACILLIN AND TAZOBACTAM 3.38 G: 3; .375 INJECTION, POWDER, FOR SOLUTION INTRAVENOUS at 23:46

## 2022-11-29 RX ADMIN — SODIUM CHLORIDE 500 ML: 9 INJECTION, SOLUTION INTRAVENOUS at 09:22

## 2022-11-29 RX ADMIN — MYCOPHENOLATE MOFETIL 500 MG: 500 TABLET ORAL at 09:50

## 2022-11-29 RX ADMIN — GUAIFENESIN 10 ML: 200 SOLUTION ORAL at 21:49

## 2022-11-29 RX ADMIN — MYCOPHENOLATE MOFETIL 250 MG: 250 CAPSULE ORAL at 17:43

## 2022-11-29 RX ADMIN — SULFASALAZINE 1000 MG: 500 TABLET, DELAYED RELEASE ORAL at 09:50

## 2022-11-29 RX ADMIN — AMLODIPINE BESYLATE 5 MG: 5 TABLET ORAL at 09:16

## 2022-11-29 RX ADMIN — PIPERACILLIN AND TAZOBACTAM 3.38 G: 3; .375 INJECTION, POWDER, FOR SOLUTION INTRAVENOUS at 01:19

## 2022-11-29 RX ADMIN — PREDNISONE 40 MG: 20 TABLET ORAL at 17:42

## 2022-11-29 RX ADMIN — METHYLPREDNISOLONE SODIUM SUCCINATE 62.5 MG: 125 INJECTION, POWDER, FOR SOLUTION INTRAMUSCULAR; INTRAVENOUS at 06:51

## 2022-11-29 RX ADMIN — Medication 50 MCG: at 09:16

## 2022-11-29 ASSESSMENT — ACTIVITIES OF DAILY LIVING (ADL)
ADLS_ACUITY_SCORE: 29
ADLS_ACUITY_SCORE: 37
ADLS_ACUITY_SCORE: 37
ADLS_ACUITY_SCORE: 35
ADLS_ACUITY_SCORE: 29
ADLS_ACUITY_SCORE: 24
ADLS_ACUITY_SCORE: 37
ADLS_ACUITY_SCORE: 29
ADLS_ACUITY_SCORE: 37
ADLS_ACUITY_SCORE: 28

## 2022-11-29 NOTE — CONSULTS
PULMONOLOGY CONSULTATION  Date of service: 11/29/2022    [facility]  ____________________________________________________    Primary Care Provider:  Adrianna Singh  Admission Date: 11/28/2022  Hospital Attending Physician:  Donna Escobedo MD  ________________________________________    CHIEF COMPLAINT: Hypoxemia    ASSESSMENT / PLAN      Pulmonary diagnoses:  Abnl CT/CXR R91.8  Cough R05  Hypoxemia R09.02  Obesity E66.9  Pulmonary fibrosis NOS J84.10  SOB R06.02    ASSESSMENT:  71 yo female with PMH RA c/b RA-ILD (UIP) on cellcept, nintedanib, home O2, DM, HTN who presented with cough, SOB and found to have hypoxemia.    CT chest showing worsened basilar predominant consolidative and ground glass opacities on top of know peripheral, basilar predominant fibrosis with traction bronchiectasis and honeycombing. Low BNP and no pleural effusion. Consistent with viral pneumonia or exacerbation of ILD on top of known RA associated UIP.     PLAN:     Await full viral panel    OK to change to prednisone 40mg daily for now    Continue home RA treatment/immunosuppression for now    Duonebs q4 PRN while inpatient; resume home inhalers at discharge    Antibiotics per primary    Natanael Florence MD  Interventional Pulmonology  [facility]        HISTORY OF PRESENT ILLNESS     71 yo female with PMH RA c/b RA-ILD (UIP) on cellcept, nintedanib, home O2, DM, HTN who presented with cough, SOB and found to have hypoxemia.    Followed by [facility] pulmonary for known RA-ILD.  Recently started on CellCept by pulmonary for continued cough and shortness of breath thought related to worsening of RA related lung disease.  Recently began having worsening of cough, shortness of breath, with new chest pain.  Cough was productive of yellow sputum.  Denies fevers chills night sweats.  Family noted low oxygen saturations when she normally only wears oxygen as needed with exertion for shortness of breath.  Brought to ED,  started on antibiotics and steroids, and admitted.    Today notes she feels better but still has a cough.    HOME MEDICATIONS     Medications Prior to Admission   Medication Sig Dispense Refill Last Dose     amLODIPine (NORVASC) 5 MG tablet TAKE 1 TABLET(5 MG) BY MOUTH DAILY 90 tablet 0 11/28/2022 at am     aspirin 81 MG chewable tablet Take 1 tablet (81 mg) by mouth daily 90 tablet 2 11/28/2022 at am     benzonatate (TESSALON) 200 MG capsule Take 1 capsule (200 mg) by mouth 3 times daily as needed for cough (Patient taking differently: Take 200 mg by mouth 3 times daily) 90 capsule 3 11/28/2022     famotidine (PEPCID) 40 MG tablet Take 1 tablet (40 mg) by mouth nightly as needed for heartburn (Patient taking differently: Take 40 mg by mouth every evening) 90 tablet 0 Past Week at PM     hydroxychloroquine (PLAQUENIL) 200 MG tablet Take 1 tablet (200 mg) by mouth 2 times daily Annual Plaquenil toxicity eye screening required. 180 tablet 3 11/28/2022 at am x 1 dose     losartan (COZAAR) 25 MG tablet Take 1 tablet (25 mg) by mouth daily 90 tablet 0 11/27/2022 at PM     OFEV 100 MG capsule TAKE ONE CAPSULE BY MOUTH TWICE A DAY 60 capsule 11 11/28/2022 at am x 1 dose     ondansetron (ZOFRAN) 4 MG tablet Take 1 tablet (4 mg) by mouth every 8 hours as needed for nausea 30 tablet 1 prn med     sulfaSALAzine ER (AZULFIDINE EN) 500 MG EC tablet take 2 tabs twice a day. 120 tablet 4 11/28/2022 at am x 1 dose     VITAMIN D3 50 MCG (2000 UT) tablet TAKE 1 TABLET( 50MCG) BY MOUTH DAILY. 90 tablet 0 11/28/2022 at am     blood glucose (NO BRAND SPECIFIED) test strip Use to test blood sugar 1 time daily or as directed. Blood Glucose Monitor Brands: per insurance. 100 strip 6      mycophenolate (GENERIC EQUIVALENT) 500 MG tablet Take 1 tablet (500 mg) by mouth 2 times daily for 14 days, THEN 1.5 tablets (750 mg) 2 times daily for 14 days, THEN 2 tablets (1,000 mg) 2 times daily for 30 days. (Patient taking differently: Take 1 tablet  (500 mg) by mouth 2 times daily for 14 days, THEN 1.5 tablets (750 mg) 2 times daily for 14 days, THEN 2 tablets (1,000 mg) 2 times daily for 30 days.    Patient currently has one week left of 750mg twice daily dose and then moves to 1000mg twice daily.) 190 tablet 4 11/25/2022     STATIN NOT PRESCRIBED (INTENTIONAL) Please choose reason not prescribed, below          PAST MEDICAL HISTORY      Past Medical History:   Diagnosis Date     Early onset Alzheimer's dementia without behavioral disturbance (H)      Interstitial lung disease (H)      Obesity (BMI 30-39.9)      Osteopenia      Rheumatoid factor positive      Type 2 diabetes mellitus (H)      Past Surgical History:   Procedure Laterality Date     CATARACT EXTRACTION W/ INTRAOCULAR LENS IMPLANT Left 10/25/2021     CATARACT IOL, RT/LT Right 10/11/2021     CHOLECYSTECTOMY, OPEN  1990       ALLERGIES     Allergies   Allergen Reactions     Atorvastatin Muscle Pain (Myalgia)       SOCIAL / SUBSTANCE HISTORY     Social History     Socioeconomic History     Marital status:      Spouse name: Gladys Mccain     Number of children: 8     Years of education: Not on file     Highest education level: 3rd grade   Occupational History     Occupation: Retired - was  in [place]   Tobacco Use     Smoking status: Never     Smokeless tobacco: Never   Vaping Use     Vaping Use: Never used   Substance and Sexual Activity     Alcohol use: Not Currently     Drug use: No     Sexual activity: Not Currently   Other Topics Concern     Parent/sibling w/ CABG, MI or angioplasty before 65F 55M? Not Asked   Social History Narrative    .     8 children.     14 grandchildren (as of 2021).    Originally from [facility]. [place] (all kids born in [facility]). [place]oved to [place] in [place].     Living with daughter and her family. Taking care of  (~25 years her senior).     Goes for walks occasionally.      Social Determinants of Health     Financial Resource Strain: Not on  "file   Food Insecurity: Not on file   Transportation Needs: Not on file   Physical Activity: Not on file   Stress: Not on file   Social Connections: Not on file   Intimate Partner Violence: Not on file   Housing Stability: Not on file       FAMILY HISTORY     Family History   Problem Relation Age of Onset     Dementia Mother      Cerebrovascular Disease Father      Cerebrovascular Disease Brother      Diabetes No family hx of      Myocardial Infarction No family hx of      Coronary Artery Disease Early Onset No family hx of      Breast Cancer No family hx of      Ovarian Cancer No family hx of      Colon Cancer No family hx of      Glaucoma No family hx of      Macular Degeneration No family hx of        REVIEW OF SYSTEMS   A comprehensive review of systems was negative except for items noted in HPI/Subjective.    PHYSICAL EXAMINATION   Temp (24hrs), Av  F (36.7  C), Min:97.9  F (36.6  C), Max:98.2  F (36.8  C)    Vital signs:  Temp: 98.2  F (36.8  C) Temp src: Oral BP: 129/75 Pulse: 75   Resp: 20 SpO2: 98 % O2 Device: Nasal cannula Oxygen Delivery: 2 LPM      Estimated body mass index is 29.26 kg/m  as calculated from the following:    Height as of 10/26/22: 1.575 m (5' 2\").    Weight as of 10/26/22: 72.6 kg (160 lb).        I/O last 3 completed shifts:  In: 100 [IV Piggyback:100]  Out: -     CONSTITUTIONAL/GENERAL APPEARANCE: Alert. No Apparent Distress.  PSYCHIATRIC: Pleasant and appropriate mood and affect. Oriented x 3.  EARS, NOSE,THROAT,MOUTH: External ears and nose overall normal. Normal oral mucosa.   NECK: Neck appearance normal. No neck masses and the thyroid not enlarged.   RESPIRATORY: Non-labored effort.   CARDIOVASCULAR: S1, S2, regular rate and rhythm. Extremities with no edema.  ABDOMEN: round, soft, non-tender, non-distended, no palpable masses. No presence of hernia.  LYMPHATIC: no cervical or axillary lymphadenopathy.  SKIN: Skin color was normal. No clubbing or cyanosis. No palpable skin " abnormalities.    LABORATORY ASSESSMENT    Arterial Blood Gas  Recent Labs   Lab 11/28/22  1209   PH 7.41     CBC  Recent Labs   Lab 11/29/22  0328 11/28/22  1128   WBC 15.9* 21.5*   RBC 3.31* 3.55*   HGB 9.9* 10.7*   HCT 29.2* 31.1*   MCV 88 88   MCH 29.9 30.1   MCHC 33.9 34.4   RDW 12.4 12.4    316     BMP  Recent Labs   Lab 11/29/22  0536 11/28/22  1128   * 123*   POTASSIUM 4.1 3.5   CHLORIDE 93* 87*   SANDI 9.1 9.2   CO2 26 27   BUN 7 5*   CR 0.50* 0.53   * 146*     INRNo lab results found in last 7 days.   BNPNo lab results found in last 7 days.  VENOUS BLOOD GASES  Recent Labs   Lab 11/28/22  1209 11/28/22  1128   PHV  --  7.41   PCO2V  --  49   PO2V  --  28   HCO3V 29* 31*   FRANSISCO  --  4.8*       IMAGING      Results for orders placed or performed during the hospital encounter of 11/28/22   XR Chest 2 Views    Impression    IMPRESSION: Moderately extensive airspace disease may be progressive  fibrosis. A component of infectious infiltrate would be difficult to  exclude. No definite effusion. The cardiac silhouette is not enlarged.  Pulmonary vasculature is unremarkable.    AYANNA COOK MD         SYSTEM ID:  [id number]   CT Chest (PE) Abdomen Pelvis w Contrast    Impression    IMPRESSION:    1. Moderate to severe pulmonary fibrosis.  2. No pulmonary embolism demonstrated.  3. Progressive adenopathy in the chest which is nonspecific in this  setting.  4. No acute process demonstrated in the abdomen and pelvis.    AYANNA COOK MD         SYSTEM ID:  [id number]       PFT & OTHER TESTING     PFT Latest Ref Rng & Units 10/26/2022   FVC L 1.51   FEV1 L 1.42   FVC% % 62   FEV1% % 73

## 2022-11-29 NOTE — PLAN OF CARE
Goal Outcome Evaluation:  Alert and oriented x4. Tibetan speaking-daughter in room to help with translation. O2 stable on 2 L. Hypertensive at times. Denies pain, reports some throat discomfort from coughing. Throat lozenge given. Frequent nonproductive cough. Managed with tessalon and robitussin. Tele NSR.

## 2022-11-29 NOTE — CONSULTS
Consult Date: 11/29/2022    RENAL CONSULTATION    IDENTIFICATION:  This 72-year-old female presents to the ED dated 11/28/2022 in the setting of chest pain and shortness of breath.  Diagnostic evaluation demonstrates acute respiratory failure with hypoxia.  She was also found to be significantly hyponatremic.    REASON FOR CONSULTATION:  Evaluation and assessment with respect to severe hyponatremia.    IMPRESSION:     1.  Data review demonstrates baseline normal kidney function.  Serum creatinine in the range of 0.50 mg/dL.  This value has been relatively stable by review over the last 5 years.  2.  Remote urinalysis demonstrating modest dipstick proteinuria in the setting of questionable urinary tract infection.  3.  Hyperosmolar hyponatremia.  Diagnostic studies included urine sodium of 26 and urine osmolality of 153.  Her urine osmolality is not fully dilute but may be her maximal dilution.  Alternatively, there could be a small ADH affect in this setting. She should, however, be corrected with saline. Osmolality 280.  She has received saline bolus x 2 with 500 out on each occasion.  Sodium is increased today to 124 from an admission value of 123.  4.  Acute hypoxic respiratory failure with concern for underlying bacterial pneumonia.  5.  Interstitial lung disease.  6.  Type 2 diabetes.  7.  Hypertension.    DISCUSSION:  A 72-year-old female with some previous intermittent history of borderline low sodiums.  We note sodium in 2018 of 132 and most recent one 1 year ago at 130 millimole per liter.  She presents with a pulmonary process on top of her underlying idiopathic pulmonary fibrosis with associated hyponatremia.  Intake has been poor by report suggesting at least some depletional component.  Urine sodium is on the lower side at 26.  Her urine osmolality, however, is not demonstrating significant ADH affect. On the basis of her urine sodium and urine osmolality, she should correct with saline  infusion.    RECOMMENDATIONS:     1.  Document TSH.  2.  Stat sodium level to assist further therapy.  She received her second bolus this morning subsequent to a serum sodium level of 124.  Further intervention may be as simple as saline infusion at a cautious rate.  3.  Follow frequent laboratory studies.  4.  Continue to follow with you.    HISTORY:  A 72-year-old female with a number of comorbid medical problems including rheumatoid lung disease, interstitial pulmonary fibrosis, hypertension, diabetes, who presents for a several-day history of cough and shortness of breath.    Evaluation did demonstrate hyponatremia for which I am asked to evaluate the patient.    The patient does not speak English, but her daughter, a nurse at this facility, is present as an .  She has, as we noted, had a several-day history of cough, shortness of breath with poor p.o. intake.  She has been taking her outpatient medications as prescribed.  Review of her medications do not demonstrate any medications that may predispose to hyponatremia.    She is awake, alert, appropriate, interactive, answers questions.  Denies headache or chest pain.  She has a persistent cough.  She has no edema.    As I noted, she has been treated with normal saline and repeat sodium is pending.    ALLERGIES:  ATORVASTATIN.    MEDICATIONS:    1.  Amlodipine.  2.  Aspirin.  3.  Famotidine.  4.  Hydroxychloroquine.  5.  Losartan.  6.  Ofev.  7.  Zofran.  8.  Azulfidine.  9.  Vitamin D.  10.  Mycophenolate.    PAST MEDICAL HISTORY:  Interstitial lung disease, rheumatoid factor positive, type 2 diabetes, hypertension.    SOCIAL HISTORY:  Tibetan, relocated to [place].  Seven children.  Several in the healthcare work place.  No alcohol or tobacco.  Lives with her .    FAMILY HISTORY:  Demonstrates cerebrovascular disease, some dementia.    PHYSICAL EXAMINATION:    VITAL SIGNS:  Finds her to be afebrile, her blood pressure is moderately controlled  in the range of 150-160/70-80.  Her rhythm is sinus, rate is 100, temperature is 98, respiratory rate is in the range of 20-25, sats are 97%.  GENERAL:  No acute distress, appropriate, interactive.  HEENT:  Normocephalic, atraumatic.  Pupils equal, round, react to light and accommodation.  Extraocular muscles are intact.  Sclerae nonicteric.  Pharynx does not appear overtly dry.  CHEST:  Clear.  CARDIOVASCULAR:  Regular.  ABDOMEN:  Soft.  EXTREMITIES:  Warm.  No edema.  PSYCHIATRIC:  Pleasant.  NEUROLOGIC:  Grossly nonfocal.  Cranials nerve intact.      LABORATORY DATA:  Current and historic reviewed in detail.    ALIREZA Garber MD        D: 2022   T: 2022   MT: TORITO    Name:     CAMERON BARNES  MRN:      [id number]        Account:      [id number]   :      1950           Consult Date: 2022     Document: [id number]

## 2022-11-29 NOTE — PROGRESS NOTES
[facility]    Medicine Progress Note - Hospitalist Service    Date of Admission:  11/28/2022    Assessment & Plan    Krystian Boland is a 72 year old female with medical history significant for rheumatoid lung disease/interstitial pulmonary fibrosis, HTN, DM was brought to the ER for evaluation of increasing cough and shortness of breath, and is being admitted on 11/28/2022 for further management.       Acute hypoxic respiratory failure  Possible bacterial pneumonia  Possible acute flare up of interstitial lung disease  Mediastinal lymphadenopathy  Recently started on CellCept as in HPI.  Noted marked leukocytosis, elevated CRP.  CT shows mod-severe pulmonary fibrosis.  Increased dyspnea for 3 days with bouts of coughing and hypoxia.  Influenza A&B, RSV, COVID-19 PCR negative.   - Continue Zosyn. Ad azythromycin.  - Cont solumedrol.  - Supportive care including oxygen, antitussives, bronchodilators.  - Continue PTA sulfasalazine, cellcept, hydroxychloroquine and Ofev  -- Pulmonary consulted.       Hyponatremia  Sodium 123 on admit. Unclear etiology. urine studies, serum osm reviewed. Did not improve with FR (to 124).  - Fluid bolus challenge.  - Monitor Sodium (recheck this afternoon.  - Consult nephrology.       DM2  -HbA1c was 6.7 in August and metformin was discontinued by PCP.  - on insulin sliding scale.  - Monitor BG.     HTN  -Continue PTA Norvasc and losartan     Diet:  Regular  DVT Prophylaxis: Pneumatic Compression Devices  Wilkes Catheter: Not present  Central Lines: None  Cardiac Monitoring: None  Code Status:  Full code, discussed with patient's daughters          Disposition Plan     Expected Discharge Date: 2-3 days.                  The patient's care was discussed with the Patient and DTR who is RN.       Leni Roa MD  Hospitalist Service  [facility]          ______________________________________________________________________    Interval  History   slight improvement in sob.   DTR at bedside.    Physical Exam   Vital Signs: Temp: 98.2  F (36.8  C) Temp src: Oral BP: 129/75 Pulse: 75   Resp: 20 SpO2: 98 % O2 Device: Nasal cannula Oxygen Delivery: 2 LPM  Weight: 0 lbs 0 oz  General: AAOx3, appears comfortable.  HEENT: PERRLA EOMI. Mucosa moist/congested.   Lungs: bibasil;ar rales..  CVS: S1S2 regular, no tachycardia or murmur.   Abdomen: Soft, NT, ND. BS heard.  MSK: No edema or deformities.  Neuro: AAOX3.  Face symmetrical.  Moving all extremities    Data   Recent Labs   Lab 11/29/22  0536 11/29/22  0328 11/28/22  1128   WBC  --  15.9* 21.5*   HGB  --  9.9* 10.7*   MCV  --  88 88   PLT  --  337 316   *  --  123*   POTASSIUM 4.1  --  3.5   CHLORIDE 93*  --  87*   CO2 26  --  27   BUN 7  --  5*   CR 0.50*  --  0.53   ANIONGAP 5  --  9   SANDI 9.1  --  9.2   *  --  146*   ALBUMIN  --   --  2.7*   PROTTOTAL  --   --  7.9   BILITOTAL  --   --  0.9   ALKPHOS  --   --  213*   ALT  --   --  32   AST  --   --  23       Recent Results (from the past 24 hour(s))   XR Chest 2 Views    Narrative    CHEST TWO VIEWS 11/28/2022 12:19 PM     HISTORY: Interstitial lung disease, cough, shortness of breath     COMPARISON: 4/2/2018       Impression    IMPRESSION: Moderately extensive airspace disease may be progressive  fibrosis. A component of infectious infiltrate would be difficult to  exclude. No definite effusion. The cardiac silhouette is not enlarged.  Pulmonary vasculature is unremarkable.    AYANNA COOK MD         SYSTEM ID:  [id number]   CT Chest (PE) Abdomen Pelvis w Contrast    Narrative    CT CHEST PE ABDOMEN AND PELVIS WITH CONTRAST 11/28/2022 1:55 PM    CLINICAL HISTORY: Chest pain. Hypoxia, cough, chest pain, shortness of  breath, elevated D-dimer.    TECHNIQUE: CT scan of the chest, abdomen, and pelvis was performed  following injection of IV contrast. Multiplanar reformats were  obtained. Dose reduction techniques were used.   CONTRAST: 81  mL  isovue 370    COMPARISON: None.    FINDINGS:   ANGIOGRAM CHEST: Pulmonary arteries are normal caliber and negative  for pulmonary emboli. Thoracic aorta is negative for dissection. No CT  evidence of right heart strain.    LUNGS AND PLEURA: Moderate to severe pulmonary fibrosis. Moderate  traction bronchiectasis. Areas of honeycombing present. No effusion.    MEDIASTINUM/AXILLAE: Mediastinal and bilateral hilar adenopathy  progressed from previous, nonspecific in this setting. No aneurysm.    CORONARY ARTERY CALCIFICATIONS: Mild.    HEPATOBILIARY: No significant mass or bile duct dilatation. No  calcified gallstones.     PANCREAS: No significant mass, duct dilatation, or inflammatory  change.    SPLEEN: Normal size.    ADRENAL GLANDS: No significant nodules.    KIDNEYS/BLADDER: No significant mass, stones, or hydronephrosis.    BOWEL: No obstruction or inflammatory change.    PELVIC ORGANS: No pelvic masses.    ADDITIONAL FINDINGS: No ascites. There are mild atherosclerotic  changes of the visualized aorta and its branches. There is no evidence  of aortic dissection or aneurysm.    MUSCULOSKELETAL: No frankly destructive bony lesions.      Impression    IMPRESSION:    1. Moderate to severe pulmonary fibrosis.  2. No pulmonary embolism demonstrated.  3. Progressive adenopathy in the chest which is nonspecific in this  setting.  4. No acute process demonstrated in the abdomen and pelvis.    AYANNA COOK MD         SYSTEM ID:  [id number]     Medications       amLODIPine  5 mg Oral Daily     aspirin  81 mg Oral Daily     famotidine  40 mg Oral QPM     hydroxychloroquine  200 mg Oral BID     losartan  25 mg Oral QPM     methylPREDNISolone  62.5 mg Intravenous Q12H     mycophenolate  250 mg Oral BID IS     mycophenolate  500 mg Oral BID IS     nintedanib  100 mg Oral BID     piperacillin-tazobactam  3.375 g Intravenous Q6H     sulfaSALAzine ER  1,000 mg Oral BID     vitamin D3  50 mcg Oral Daily

## 2022-11-29 NOTE — PLAN OF CARE
Goal Outcome Evaluation:  DATE & TIME: 11/29/22 2867-0430   Cognitive Concerns/ Orientation : A&OX4, daughter interprets/speaks Tibetan   BEHAVIOR & AGGRESSION TOOL COLOR: Green  CIWA SCORE: NA   ABNL VS/O2: Vss, slight HTN,on 1.5 liters sats 98%, desats with ambulation   MOBILITY: Sba  PAIN MANAGMENT: Denies  DIET: Regular  BOWEL/BLADDER: Continent  ABNL LAB/BG: Na 127, WBC 15.9,   DRAIN/DEVICES: NA  TELEMETRY RHYTHM: NSR  SKIN: WNL  TESTS/PROCEDURES: NA  D/C DATE: Pending 2-3 days  Discharge Barriers: NA  OTHER IMPORTANT INFO: Daughter will stay with patient 24/7 to interpret. Lungs have faint crackles, MATUTE. Uses Oxygen prn at home.

## 2022-11-29 NOTE — PROVIDER NOTIFICATION
MD Notification    Notified Person: MD    Notified Person Name: Dr Patiño    Notification Date/Time:11/29/22 4589    Notification Interaction: Phone    Purpose of Notification: Na 127(was 124) Dr Roa wondering if you wanted to start IVF?      Orders Received: No IVF at this time per Dr Patiño    Comments:

## 2022-11-29 NOTE — PROGRESS NOTES
RECEIVING UNIT ED HANDOFF REVIEW    ED Nurse Handoff Report was reviewed by: Shruti Richardson RN on November 29, 2022 at 8:19 AM

## 2022-11-30 ENCOUNTER — APPOINTMENT (OUTPATIENT)
Dept: PHYSICAL THERAPY | Facility: CLINIC | Age: 72
DRG: 193 | End: 2022-11-30
Attending: HOSPITALIST
Payer: COMMERCIAL

## 2022-11-30 ENCOUNTER — PATIENT OUTREACH (OUTPATIENT)
Dept: GERIATRIC MEDICINE | Facility: CLINIC | Age: 72
End: 2022-11-30

## 2022-11-30 ENCOUNTER — APPOINTMENT (OUTPATIENT)
Dept: OCCUPATIONAL THERAPY | Facility: CLINIC | Age: 72
DRG: 193 | End: 2022-11-30
Attending: HOSPITALIST
Payer: COMMERCIAL

## 2022-11-30 DIAGNOSIS — N39.46 MIXED STRESS AND URGE URINARY INCONTINENCE: Primary | ICD-10-CM

## 2022-11-30 LAB
ALBUMIN SERPL-MCNC: 2.6 G/DL (ref 3.4–5)
ALP SERPL-CCNC: 170 U/L (ref 40–150)
ALT SERPL W P-5'-P-CCNC: 50 U/L (ref 0–50)
ANION GAP SERPL CALCULATED.3IONS-SCNC: 8 MMOL/L (ref 3–14)
AST SERPL W P-5'-P-CCNC: 43 U/L (ref 0–45)
BILIRUB SERPL-MCNC: 1 MG/DL (ref 0.2–1.3)
BUN SERPL-MCNC: 9 MG/DL (ref 7–30)
CALCIUM SERPL-MCNC: 9.3 MG/DL (ref 8.5–10.1)
CHLORIDE BLD-SCNC: 93 MMOL/L (ref 94–109)
CO2 SERPL-SCNC: 28 MMOL/L (ref 20–32)
CREAT SERPL-MCNC: 0.71 MG/DL (ref 0.52–1.04)
GFR SERPL CREATININE-BSD FRML MDRD: >90 ML/MIN/1.73M2
GLUCOSE BLD-MCNC: 138 MG/DL (ref 70–99)
GLUCOSE BLDC GLUCOMTR-MCNC: 124 MG/DL (ref 70–99)
GLUCOSE BLDC GLUCOMTR-MCNC: 128 MG/DL (ref 70–99)
GLUCOSE BLDC GLUCOMTR-MCNC: 152 MG/DL (ref 70–99)
GLUCOSE BLDC GLUCOMTR-MCNC: 188 MG/DL (ref 70–99)
GLUCOSE BLDC GLUCOMTR-MCNC: 189 MG/DL (ref 70–99)
POTASSIUM BLD-SCNC: 3.5 MMOL/L (ref 3.4–5.3)
PROT SERPL-MCNC: 7.8 G/DL (ref 6.8–8.8)
SODIUM SERPL-SCNC: 125 MMOL/L (ref 133–144)
SODIUM SERPL-SCNC: 128 MMOL/L (ref 133–144)
SODIUM SERPL-SCNC: 129 MMOL/L (ref 133–144)
TSH SERPL DL<=0.005 MIU/L-ACNC: 0.67 MU/L (ref 0.4–4)

## 2022-11-30 PROCEDURE — 99232 SBSQ HOSP IP/OBS MODERATE 35: CPT | Performed by: INTERNAL MEDICINE

## 2022-11-30 PROCEDURE — 99233 SBSQ HOSP IP/OBS HIGH 50: CPT | Performed by: HOSPITALIST

## 2022-11-30 PROCEDURE — 97530 THERAPEUTIC ACTIVITIES: CPT | Mod: GP | Performed by: PHYSICAL THERAPIST

## 2022-11-30 PROCEDURE — 84443 ASSAY THYROID STIM HORMONE: CPT | Performed by: INTERNAL MEDICINE

## 2022-11-30 PROCEDURE — 250N000012 HC RX MED GY IP 250 OP 636 PS 637: Performed by: HOSPITALIST

## 2022-11-30 PROCEDURE — 250N000012 HC RX MED GY IP 250 OP 636 PS 637: Performed by: EMERGENCY MEDICINE

## 2022-11-30 PROCEDURE — 250N000013 HC RX MED GY IP 250 OP 250 PS 637: Performed by: HOSPITALIST

## 2022-11-30 PROCEDURE — 97116 GAIT TRAINING THERAPY: CPT | Mod: GP | Performed by: PHYSICAL THERAPIST

## 2022-11-30 PROCEDURE — 84295 ASSAY OF SERUM SODIUM: CPT | Performed by: INTERNAL MEDICINE

## 2022-11-30 PROCEDURE — 250N000011 HC RX IP 250 OP 636: Performed by: HOSPITALIST

## 2022-11-30 PROCEDURE — 97530 THERAPEUTIC ACTIVITIES: CPT | Mod: GO

## 2022-11-30 PROCEDURE — 36415 COLL VENOUS BLD VENIPUNCTURE: CPT | Performed by: INTERNAL MEDICINE

## 2022-11-30 PROCEDURE — 250N000013 HC RX MED GY IP 250 OP 250 PS 637: Performed by: INTERNAL MEDICINE

## 2022-11-30 PROCEDURE — 258N000003 HC RX IP 258 OP 636: Performed by: INTERNAL MEDICINE

## 2022-11-30 PROCEDURE — 120N000001 HC R&B MED SURG/OB

## 2022-11-30 PROCEDURE — 97165 OT EVAL LOW COMPLEX 30 MIN: CPT | Mod: GO

## 2022-11-30 PROCEDURE — 80053 COMPREHEN METABOLIC PANEL: CPT | Performed by: HOSPITALIST

## 2022-11-30 PROCEDURE — 97161 PT EVAL LOW COMPLEX 20 MIN: CPT | Mod: GP | Performed by: PHYSICAL THERAPIST

## 2022-11-30 PROCEDURE — 36415 COLL VENOUS BLD VENIPUNCTURE: CPT | Performed by: HOSPITALIST

## 2022-11-30 RX ORDER — BENZONATATE 100 MG/1
200 CAPSULE ORAL 3 TIMES DAILY
Status: DISCONTINUED | OUTPATIENT
Start: 2022-11-30 | End: 2022-12-02 | Stop reason: HOSPADM

## 2022-11-30 RX ORDER — SODIUM CHLORIDE 9 MG/ML
INJECTION, SOLUTION INTRAVENOUS CONTINUOUS
Status: DISCONTINUED | OUTPATIENT
Start: 2022-11-30 | End: 2022-12-02 | Stop reason: HOSPADM

## 2022-11-30 RX ADMIN — SULFASALAZINE 1000 MG: 500 TABLET, DELAYED RELEASE ORAL at 20:38

## 2022-11-30 RX ADMIN — BENZONATATE 200 MG: 100 CAPSULE ORAL at 13:19

## 2022-11-30 RX ADMIN — GUAIFENESIN 10 ML: 200 SOLUTION ORAL at 13:19

## 2022-11-30 RX ADMIN — Medication 50 MCG: at 09:11

## 2022-11-30 RX ADMIN — MYCOPHENOLATE MOFETIL 500 MG: 500 TABLET ORAL at 16:55

## 2022-11-30 RX ADMIN — LOSARTAN POTASSIUM 25 MG: 25 TABLET, FILM COATED ORAL at 20:38

## 2022-11-30 RX ADMIN — ACETAMINOPHEN 650 MG: 325 TABLET, FILM COATED ORAL at 22:31

## 2022-11-30 RX ADMIN — BENZOCAINE 6 MG-MENTHOL 10 MG LOZENGES 1 LOZENGE: at 02:48

## 2022-11-30 RX ADMIN — GUAIFENESIN 10 ML: 200 SOLUTION ORAL at 16:54

## 2022-11-30 RX ADMIN — PIPERACILLIN AND TAZOBACTAM 3.38 G: 3; .375 INJECTION, POWDER, FOR SOLUTION INTRAVENOUS at 18:04

## 2022-11-30 RX ADMIN — PIPERACILLIN AND TAZOBACTAM 3.38 G: 3; .375 INJECTION, POWDER, FOR SOLUTION INTRAVENOUS at 06:27

## 2022-11-30 RX ADMIN — SULFASALAZINE 1000 MG: 500 TABLET, DELAYED RELEASE ORAL at 09:11

## 2022-11-30 RX ADMIN — HYDROXYCHLOROQUINE SULFATE 200 MG: 200 TABLET ORAL at 09:12

## 2022-11-30 RX ADMIN — AMLODIPINE BESYLATE 5 MG: 5 TABLET ORAL at 09:11

## 2022-11-30 RX ADMIN — HYDROXYCHLOROQUINE SULFATE 200 MG: 200 TABLET ORAL at 20:38

## 2022-11-30 RX ADMIN — AZITHROMYCIN MONOHYDRATE 500 MG: 500 INJECTION, POWDER, LYOPHILIZED, FOR SOLUTION INTRAVENOUS at 10:48

## 2022-11-30 RX ADMIN — PIPERACILLIN AND TAZOBACTAM 3.38 G: 3; .375 INJECTION, POWDER, FOR SOLUTION INTRAVENOUS at 12:18

## 2022-11-30 RX ADMIN — GUAIFENESIN 10 ML: 200 SOLUTION ORAL at 22:32

## 2022-11-30 RX ADMIN — MYCOPHENOLATE MOFETIL 250 MG: 250 CAPSULE ORAL at 16:55

## 2022-11-30 RX ADMIN — PREDNISONE 40 MG: 20 TABLET ORAL at 09:11

## 2022-11-30 RX ADMIN — INSULIN ASPART 1 UNITS: 100 INJECTION, SOLUTION INTRAVENOUS; SUBCUTANEOUS at 16:47

## 2022-11-30 RX ADMIN — GUAIFENESIN 10 ML: 200 SOLUTION ORAL at 09:22

## 2022-11-30 RX ADMIN — MYCOPHENOLATE MOFETIL 500 MG: 500 TABLET ORAL at 09:11

## 2022-11-30 RX ADMIN — BENZONATATE 200 MG: 100 CAPSULE ORAL at 04:57

## 2022-11-30 RX ADMIN — GUAIFENESIN 10 ML: 200 SOLUTION ORAL at 02:44

## 2022-11-30 RX ADMIN — ASPIRIN 81 MG CHEWABLE TABLET 81 MG: 81 TABLET CHEWABLE at 09:12

## 2022-11-30 RX ADMIN — MYCOPHENOLATE MOFETIL 250 MG: 250 CAPSULE ORAL at 09:13

## 2022-11-30 RX ADMIN — BENZONATATE 200 MG: 100 CAPSULE ORAL at 22:31

## 2022-11-30 RX ADMIN — SODIUM CHLORIDE: 9 INJECTION, SOLUTION INTRAVENOUS at 12:20

## 2022-11-30 RX ADMIN — FAMOTIDINE 40 MG: 20 TABLET ORAL at 20:39

## 2022-11-30 ASSESSMENT — ACTIVITIES OF DAILY LIVING (ADL)
ADLS_ACUITY_SCORE: 28
ADLS_ACUITY_SCORE: 24
ADLS_ACUITY_SCORE: 28
ADLS_ACUITY_SCORE: 24
ADLS_ACUITY_SCORE: 28
ADLS_ACUITY_SCORE: 24
ADLS_ACUITY_SCORE: 28

## 2022-11-30 NOTE — TELEPHONE ENCOUNTER
I do not have privileges to see patients in house at [facility].  I am glad that patient is improving. I recommend that she resume hydroxychloroquine and sulfasalazine at pre-admission doses as soon as she leaves the hospital.   Yes, I would like to use a MEMO within 2 months for hospital discharge.

## 2022-11-30 NOTE — PROGRESS NOTES
Renal Medicine Progress Note                                Krystian Boland MRN# [id number]   Age: 72 year old YOB: 1950   Date of Admission: 11/28/2022 Hospital LOS: 2                  Assessment/Plan:     72-year-old female presents to the ED dated 11/28/2022 in the setting of chest pain and shortness of breath.  Diagnostic evaluation demonstrates acute respiratory failure with hypoxia.  She was also found to be significantly hyponatremic.    Evaluated with respect to severe hyponatremia.      1.  Data review demonstrates baseline normal kidney function.     -Serum creatinine in the range of 0.50 mg/dL.     -This value has been relatively stable by review over the last 5 years.    2.  Remote urinalysis demonstrating modest dipstick proteinuria in the setting of questionable urinary tract infection.    3.  Hyperosmolar hyponatremia.     -urine sodium of 26 and urine osmolality of 153.      Her urine osmolality is not fully dilute but may be her maximal dilution.  Alternatively, there could be a small ADH affect in this setting. She should, however, correct with saline.  She has received saline bolus x 2 with 500 ml on each occasion.  Sodium is increased today to 124 from an admission value of 123.    4.  Acute hypoxic respiratory failure with concern for underlying bacterial pneumonia.  5.  Interstitial lung disease.  6.  Type 2 diabetes.  7.  Hypertension.    Na has normalized  Creatinine up slightly     NS 50 ml/hr for 1 liter  TSH added     Follow labs    Call with questions   Signing off     Interval History:     Cough persists  Perhaps less frequent     Labs reviewed with patient/daughter     ROS:     GENERAL: NAD, No fever,chills  RESP:cough-non productive  CV: NEGATIVE for chest pain, palpitations  EXT: no change edema  ROS otherwise negative    Medications and Allergies:     Reviewed    Physical Exam:     Vitals were reviewed  Patient Vitals for the past 8 hrs:   BP Temp Temp src Pulse Resp  "SpO2 Height Weight   11/30/22 1053 -- -- -- -- -- -- 1.575 m (5' 2\") 72.7 kg (160 lb 4.4 oz)   11/30/22 0919 -- -- -- -- -- 94 % -- --   11/30/22 0752 (!) 166/93 98.2  F (36.8  C) Oral 80 18 99 % -- --   11/30/22 0448 138/75 98.2  F (36.8  C) Axillary 73 16 99 % -- --     I/O last 3 completed shifts:  In: 1400 [P.O.:1400]  Out: 3650 [Urine:3650]    Vitals:    11/30/22 1053   Weight: 72.7 kg (160 lb 4.4 oz)       GENERAL: awake, alert, follows  SKIN: clear without significant rashes or lesions  NEURO: mentation intact, speech normal and cranial nerves 2-12 intact  PSYCH: affect normal/bright  EXT: warm, no edema    Data:     Recent Labs   Lab 11/30/22  0903 11/30/22  0816 11/30/22  0158 11/29/22  2151 11/29/22  1945 11/29/22  1733 11/29/22  1345 11/29/22  1248 11/29/22  0536 11/28/22  1128   *  --   --   --  130*  --  127*  --  124* 123*   POTASSIUM 3.5  --   --   --   --   --   --   --  4.1 3.5   CHLORIDE 93*  --   --   --   --   --   --   --  93* 87*   CO2 28  --   --   --   --   --   --   --  26 27   ANIONGAP 8  --   --   --   --   --   --   --  5 9   * 124* 189* 190*  --    < >  --    < > 171* 146*   BUN 9  --   --   --   --   --   --   --  7 5*   CR 0.71  --   --   --   --   --   --   --  0.50* 0.53   GFRESTIMATED >90  --   --   --   --   --   --   --  >90 >90   SANDI 9.3  --   --   --   --   --   --   --  9.1 9.2    < > = values in this interval not displayed.         Recent Labs   Lab Test 11/30/22  0903 11/29/22  1945 11/29/22  1345 11/29/22  0536 11/28/22  1128 10/26/22  1351 05/18/22  0734 11/24/21  0744 10/09/21  0943 03/17/21  0823   * 130* 127* 124* 123* 134* 137 137 130* 139     Recent Labs   Lab 11/28/22  1747 11/28/22  1719   OSMOSERUM 256*  --    UROSMOLALITY  --  153   UNAR  --  26         G Rajesh Richmond MD    [facility] - Nephrology  [phone]  "

## 2022-11-30 NOTE — PROGRESS NOTES
[facility]    Medicine Progress Note - Hospitalist Service    Date of Admission:  11/28/2022    Assessment & Plan    Krystian Boland is a 72 year old female with medical history significant for rheumatoid lung disease/interstitial pulmonary fibrosis, HTN, DM was brought to the ER for evaluation of increasing cough and shortness of breath, and is being admitted on 11/28/2022 for further management.       Acute hypoxic respiratory failure  Possible bacterial pneumonia  Possible acute flare up of interstitial lung disease  Mediastinal lymphadenopathy  Recently started on CellCept as in HPI.  Noted marked leukocytosis, elevated CRP.  CT shows mod-severe pulmonary fibrosis.  Increased dyspnea for 3 days PTA with bouts of coughing and hypoxia.  Resp viral panel including influenza A&B, RSV, COVID-19  negative. Sating mid to high 90's on RA.  - Continue Zosyn and azythromycin.  - Cont prednisone (initially on solumedrol).  - Supportive care including oxygen (wean off as able--watch off oxygen today), antitussives (scheduled and prn), bronchodilators.  - Continue PTA sulfasalazine, cellcept, hydroxychloroquine and Ofev  -- Pulmonary consulted (appreciate assistance).       Hyponatremia  Sodium 123 on admit. Improved with IVF. 130 this am. Nephrology on board.  - Monitor Sodium.  - Appreciate nephrology assistance.       DM2  -HbA1c was 6.7 in August and metformin was discontinued by PCP.  - on insulin sliding scale.  - Monitor BG.     HTN  -Continue PTA Norvasc and losartan     Diet:  Regular  DVT Prophylaxis: Pneumatic Compression Devices  Wilkes Catheter: Not present  Central Lines: None  Cardiac Monitoring: None  Code Status:  Full code.          Disposition Plan     Expected Discharge Date: 1-2 days pending sxs improvement, sodium stability and PT/OT eval. Wean off oxygen (at rest and with activity).    might need to be discharged with oxygen for activity.            The patient's care was  discussed with the Patient and DTR who is RN.       Leni Roa MD  Hospitalist Service  [facility]          ______________________________________________________________________    Interval History   Improving sob but persistent cough. No cp. DTR at bedside.   DTR at bedside.    Physical Exam   Vital Signs: Temp: 98.2  F (36.8  C) Temp src: Oral BP: (!) 166/93 Pulse: 80   Resp: 18 SpO2: 99 % O2 Device: Nasal cannula Oxygen Delivery: 2 LPM  Weight: 0 lbs 0 oz  General: AAOx3, appears comfortable.  HEENT: PERRLA EOMI. Mucosa moist/congested.   Lungs: bibasilar crackles.  CVS: S1S2 regular, no tachycardia or murmur.   Abdomen: Soft, NT, ND. BS heard.  MSK: No edema or deformities.  Neuro: AAOX3.  Face symmetrical.  Moving all extremities    Data   Recent Labs   Lab 11/30/22  0158 11/29/22  2151 11/29/22  1945 11/29/22  1733 11/29/22  1345 11/29/22  1248 11/29/22  0536 11/29/22  0328 11/28/22  1128   WBC  --   --   --   --   --   --   --  15.9* 21.5*   HGB  --   --   --   --   --   --   --  9.9* 10.7*   MCV  --   --   --   --   --   --   --  88 88   PLT  --   --   --   --   --   --   --  337 316   NA  --   --  130*  --  127*  --  124*  --  123*   POTASSIUM  --   --   --   --   --   --  4.1  --  3.5   CHLORIDE  --   --   --   --   --   --  93*  --  87*   CO2  --   --   --   --   --   --  26  --  27   BUN  --   --   --   --   --   --  7  --  5*   CR  --   --   --   --   --   --  0.50*  --  0.53   ANIONGAP  --   --   --   --   --   --  5  --  9   SANDI  --   --   --   --   --   --  9.1  --  9.2   * 190*  --  157*  --    < > 171*  --  146*   ALBUMIN  --   --   --   --   --   --   --   --  2.7*   PROTTOTAL  --   --   --   --   --   --   --   --  7.9   BILITOTAL  --   --   --   --   --   --   --   --  0.9   ALKPHOS  --   --   --   --   --   --   --   --  213*   ALT  --   --   --   --   --   --   --   --  32   AST  --   --   --   --   --   --   --   --  23    < > = values in this interval  not displayed.       No results found for this or any previous visit (from the past 24 hour(s)).  Medications       amLODIPine  5 mg Oral Daily     aspirin  81 mg Oral Daily     azithromycin  500 mg Intravenous Q24H     famotidine  40 mg Oral QPM     hydroxychloroquine  200 mg Oral BID     insulin aspart  1-7 Units Subcutaneous TID AC     insulin aspart  1-5 Units Subcutaneous At Bedtime     losartan  25 mg Oral QPM     mycophenolate  250 mg Oral BID IS     mycophenolate  500 mg Oral BID IS     nintedanib  100 mg Oral BID     piperacillin-tazobactam  3.375 g Intravenous Q6H     predniSONE  40 mg Oral Daily     sodium chloride (PF)  3 mL Intracatheter Q8H     sulfaSALAzine ER  1,000 mg Oral BID     vitamin D3  50 mcg Oral Daily

## 2022-11-30 NOTE — PROGRESS NOTES
[facility] Care Coordination Contact    TRANSITIONS OF CARE (EDD) LOG   EDD tasks should be completed by the CC within one (1) business day of notification of each transition. Follow up contact with member is required after return to their usual care setting.  Note:  If CC finds out about the transitions fifteen (15) days or more after the member has returned to their usual care setting, no EDD log is needed. However, the CC should check in with the member to discuss the transition process, any changes needed to the care plan and document it in a case note.    Member Name:  Krystian Boland MCO Name:  [employer]/Health Plan Member ID#: [id number]   Product: [place] Care Coordinator Contact:  JAQUELIN Langley Agency/County/Care System: [facility]   Transition Communication Actions from Care Management Contact   Transition #1   Notification Date: 11/29/22 Transition Date:   11/29/22 Transition From: Home     Is this the member s usual care setting?               yes Transition To: [facility]   Transition Type:  Unplanned  Reason for Admission/Comments:  Acute Respiratory failure  Contact member/responsible party to offer assistance with transition Date completed: 11/29/22    Notes from conversation with the member/responsible party, provider, discharging and receiving facility (as applicable):   11/29/22: CC contacted Hospital /discharge planner via the [employer] [facility]itional [facility] Hand-In Process, with community care plan included.  CC reached out to adult daughter Tenzen regarding transition and offered support as needed. Reviewed that member is due for her annual assessment in Dec. She shared that the whole household is ill at this time. Reviewed that once member has returned home we will assess the household illness and get a date scheduled. She also shared that with member's increased coughing she has had an increase in urinary incontinence, reporting that  the pad is not enough and that member now requires a pull up. Tenzen inquired if a pull up could be ordered. Reviewed that this Care Coordinator would request from the PCP new orders.  Reviewed and update care plan as needed.  No services to place on hold.  Transition log initiated.   PCP notified of hospitalization via EMR.  Vickie Molina [facility]  [phone]         Shared CC contact info, care plan/services with receiving setting--Date completed: 11/29/22   Name & Title of receiving setting contact: [facility]   Notified PCP of transition--Date completed:  11/29/22     via  EMR  Name of PCP: Adrianna Singh     Transition #2   Notification Date: 12/5/2022 Transition Date:   12/2/2022 Transition From: Hospital, [facility]     Is this the member s usual care setting?               no Transition To: Home   Transition Type:  Planned  Reason for Admission/Comments:  Discharge home  Contact member/responsible party to offer assistance with transition Date completed: 12/5/2022    Notes from conversation with the member/responsible party, provider, discharging and receiving facility (as applicable):   12/5/2022: CC contacted adult daughter David and reviewed discharge summary.  Member has a follow-up appointment with PCP in 7 days: Yes: scheduled on 12/12/22   Member has had a change in condition: No  Home visit needed: No , but is due for her annual HRA, and this was scheduled.  Care plan reviewed and updated.  No services to resume  New referrals placed: Yes: Therapies: PT and OT were ordered from hospital. Encouraged daughter to reach out to this Care Coordinator is [facility] doesn't call to schedule home therapy.  Transition log completed.   PCP notified of transition back to home via EMR.  Vickie Molina [facility]  [phone]         Shared CC contact info, care plan/services with receiving setting--Date completed: 12/5/22    Name & Title of receiving setting contact:see above   Notified PCP of transition--Date completed:  12/2/22     via  EMR  Name of PCP: Adrianna Singh      *RETURN TO USUAL CARE SETTING: *Complete tasks below when the member is discharging TO their usual care setting within one (1) business day of notification..      For situations where the Care Coordinator is notified of the discharge prior to the date of discharge, the Care Coordinator must follow up with the member or designated representative to confirm that discharge actually occurred and discuss required EDD tasks as outlined in the EDD Instructions.  (This includes situations where it may be a  new  usual care setting for the member. (i.e., a community member who decides upon permanent nursing home placement following hospitalization and rehab).    Discuss with Member/Responsible Party:    Check  Yes  - if the member, family member and/or SNF/facility staff manages the following:    If  No  provide explanation in the comments section.          Date completed: 12/5/22 Communicated with member or their designated representative about the following:  care transition process; about changes to the member s health status; plan of care updates; education about transitions and how to prevent unplanned transitions/readmissions    Four Pillars for Optimal Transition:    Check  Yes  - if the member, family member and/or SNF/facility staff manages the following:    If  No  provide explanation in the comments section.          [x]  Yes     []  No Does the member have a follow-up appointment scheduled with primary care or specialist? (Mental health hospitalizations--the appt. should be w/in 7 days)              For mental health hospitalizations:  []  Yes     []  No     Does the member have a follow-up appointment scheduled with a mental health practitioner within 7 days of discharge?  [x]  Yes     []  No     Has a medication review been completed with member? If no,  refer to PCP, home care nurse, MTM, pharmacist  [x]  Yes     []  No     Can the member manage their medications or is there a system in place to manage medications (e.g. home care set-up)?         [x]  Yes     []  No     Can the member verbalize warning signs and symptoms to watch for and how to respond?  [x]  Yes     []  No     Does the member have a copy of and understand their discharge instructions?  If no, assist to obtain copy of discharge instructions, review discharge instructions, and assist to contact PCP to discuss questions about their recent hospitalization.  [x]  Yes     []  No     Does the member have adequate food, housing and transportation?  If no, add goal and discuss additional supports available to the member                                                                                                                                                                                 [x]  Yes     []  No     Is the member safe in their home?  If no, document needs and support provided                                                                                                                                                                          []  Yes     [x]  No     Are there any concerns of vulnerability, abuse, or neglect?  If yes, document concerns and actions taken by Care Coordinator as a mandated                                                                                                                                                                              [x]  Yes     []  No     Does the member use a Personal Health Care Record?  Check  Yes  if visit summary, discharge summary, and/or healthcare summary are being used as a PHR.                                                                                                                                                                                  [x]  Yes     []  No     Have you reviewed the discharge summary  with the member? If  No  provide explanation in comments.  [x]  Yes     []  No     Have you updated the member s care plan/support plan? Add new diagnosis, medications, treatments, goals & interventions, as applicable. If No, provide explanation in comments.    Comments:           Notes from conversation with the member/responsible party, provider, discharging and receiving facility (as applicable):See Above

## 2022-11-30 NOTE — PROGRESS NOTES
"PULMONOLOGY PROGRESS NOTE    Date of Admission: 11/28/2022    CC/Reason for Hospital visit: Hypoxemia  __________________________________________    ASSESSMENT / PLAN      Pulmonary Diagnoses:  Abnl CT/CXR R91.8  Cough R05  Hypoxemia R09.02  Obesity E66.9  Pulmonary fibrosis NOS J84.10  SOB R06.02     ASSESSMENT:  73 yo female with PMH RA c/b RA-ILD (UIP) on cellcept, nintedanib, home O2, DM, HTN who presented with cough, SOB and found to have hypoxemia.     CT chest showing worsened basilar predominant consolidative and ground glass opacities on top of know peripheral, basilar predominant fibrosis with traction bronchiectasis and honeycombing. Low BNP and no pleural effusion. Consistent with viral pneumonia or exacerbation of ILD on top of known RA associated UIP.      PLAN:     Collect full viral panel    Prednisone 40mg daily for now    Continue home RA treatment/immunosuppression for now    Duonebs q4 PRN while inpatient; resume home inhalers at discharge    Antibiotics per primary    Natanael Florence MD  Interventional Pulmonology  [facility]      ____________________________________________    SUBJECTIVE      Feels better today, however cough still bothersome.    ROS: A Problem Pertinent review of systems was negative except for items noted in HPI.  Past Medical, Family, and Social/Substance History has been reviewed: No interval changes.  OBJECTIVE   Vital signs:  Temp: 98.8  F (37.1  C) Temp src: Oral BP: (!) 148/76 Pulse: 86   Resp: 18 SpO2: 99 % O2 Device: Nasal cannula Oxygen Delivery: 2 LPM Height: 157.5 cm (5' 2\") Weight: 72.7 kg (160 lb 4.4 oz)  Estimated body mass index is 29.31 kg/m  as calculated from the following:    Height as of this encounter: 1.575 m (5' 2\").    Weight as of this encounter: 72.7 kg (160 lb 4.4 oz).        I/O last 3 completed shifts:  In: 2220 [P.O.:1970; I.V.:250]  Out: 5250 [Urine:5250]      CONSTITUTIONAL/GENERAL APPEARANCE: Alert. No Apparent " Distress.  PSYCHIATRIC: Pleasant and appropriate mood and affect. Oriented x 3.  EARS, NOSE,THROAT,MOUTH: External ears and nose overall normal. Normal oral mucosa.   NECK: Neck appearance normal. No neck masses and the thyroid is not enlarged.   RESPIRATORY: Non-labored effort.   CARDIOVASCULAR: Extremities with no edema.      LABORATORY ASSESSMENT    Arterial Blood Gas  Recent Labs   Lab 11/28/22  1209   PH 7.41     CBC  Recent Labs   Lab 11/29/22  0328 11/28/22  1128   WBC 15.9* 21.5*   RBC 3.31* 3.55*   HGB 9.9* 10.7*   HCT 29.2* 31.1*   MCV 88 88   MCH 29.9 30.1   MCHC 33.9 34.4   RDW 12.4 12.4    316     BMP  Recent Labs   Lab 11/30/22  1237 11/30/22  0903 11/30/22  0816 11/30/22  0158 11/29/22  2151 11/29/22  1945 11/29/22  1733 11/29/22  1345 11/29/22  1248 11/29/22  0536 11/28/22  1128   NA  --  129*  --   --   --  130*  --  127*  --  124* 123*   POTASSIUM  --  3.5  --   --   --   --   --   --   --  4.1 3.5   CHLORIDE  --  93*  --   --   --   --   --   --   --  93* 87*   SANDI  --  9.3  --   --   --   --   --   --   --  9.1 9.2   CO2  --  28  --   --   --   --   --   --   --  26 27   BUN  --  9  --   --   --   --   --   --   --  7 5*   CR  --  0.71  --   --   --   --   --   --   --  0.50* 0.53   * 138* 124* 189*   < >  --    < >  --    < > 171* 146*    < > = values in this interval not displayed.     INRNo lab results found in last 7 days.   BNPNo lab results found in last 7 days.  VENOUS BLOOD GASES  Recent Labs   Lab 11/28/22  1209 11/28/22  1128   PHV  --  7.41   PCO2V  --  49   PO2V  --  28   HCO3V 29* 31*   FRANSISCO  --  4.8*         Additional labs and/or comments:     IMAGING      Results for orders placed or performed during the hospital encounter of 11/28/22   XR Chest 2 Views    Impression    IMPRESSION: Moderately extensive airspace disease may be progressive  fibrosis. A component of infectious infiltrate would be difficult to  exclude. No definite effusion. The cardiac silhouette is not  enlarged.  Pulmonary vasculature is unremarkable.    AYANNA COOK MD         SYSTEM ID:  [id number]   CT Chest (PE) Abdomen Pelvis w Contrast    Impression    IMPRESSION:    1. Moderate to severe pulmonary fibrosis.  2. No pulmonary embolism demonstrated.  3. Progressive adenopathy in the chest which is nonspecific in this  setting.  4. No acute process demonstrated in the abdomen and pelvis.    AYANNA COOK MD         SYSTEM ID:  [id number]       PFT & OTHER TESTING     PFT Latest Ref Rng & Units 10/26/2022   FVC L 1.51   FEV1 L 1.42   FVC% % 62   FEV1% % 73

## 2022-11-30 NOTE — PROGRESS NOTES
DATE & TIME: 11/29/22-11/30/22 [id number]  Summary: Increasing Cough, SOB  Cognitive Concerns/ Orientation : A&OX4, daughter interprets/speaks Tibetan   BEHAVIOR & AGGRESSION TOOL COLOR: Green  CIWA SCORE: NA   ABNL VS/O2:/85,on 2 L NC, sats in upper 90's, desats with ambulation and when coughing, per daughter's report. Uses Oxygen PRN at home.  MOBILITY: Sba  PAIN MANAGMENT: Mild pain with coughing, managed with PRN Tylenol, sore throat.  DIET: Regular  BOWEL/BLADDER: Continent  ABNL LAB/BG: Na 127, WBC 15.9, , 189  DRAIN/DEVICES:PIV SL with intermittent ABX  TELEMETRY RHYTHM: NSR  SKIN: WNL  TESTS/PROCEDURES: NA  D/C DATE: Pending 2-3 days  Discharge Barriers: NA  OTHER IMPORTANT INFO: Daughter at bedside with patient 24/7 to interpret. MATUTE.   Gave PRN Robitussin x2 and Benzonate x1 for frequent congested cough.

## 2022-11-30 NOTE — TELEPHONE ENCOUNTER
See [employer] message sent from the daughter, 11/28. Daughter [email or web address].  Her mom was admitted to [facility] on 11/28 with pneumonia.  H/o rheumatoid lung disease/ILD.  She is due to discharge tomorrow.  The daughter is wondering if she needs to have adjustments made in her rheum meds, currently on plaquenil and sulfasalazine. Last seen by you May 2022 with f/u rec in 4 months.  Do you see patient's at [facility] while in patient? Or would you like to see her post hospital?  Can a MEMO be used? Please advise.     Clarissa Soler RN

## 2022-11-30 NOTE — PROGRESS NOTES
11/30/22 1200   Appointment Info   Signing Clinician's Name / Credentials (PT) Denia Wilder PT   Rehab Comments (PT) Daughter present is ICU nurse on 3       Present yes   Language Tibetan - daughter present to interpret   Living Environment   People in Home spouse;child(jennie), adult   Current Living Arrangements house   Home Accessibility stairs to enter home   Number of Stairs, Main Entrance 1   Living Environment Comments pt lives in supportive multi gen home; stairs to other level of home, pt does not access   Self-Care   Usual Activity Tolerance good   Current Activity Tolerance fair   Equipment Currently Used at Home none   Fall history within last six months no   Activity/Exercise/Self-Care Comment indep from constantin with ADLs selfcares and mobility   General Information   Onset of Illness/Injury or Date of Surgery 11/29/22   Pertinent History of Current Problem (include personal factors and/or comorbidities that impact the POC) 72 year old female with medical history significant for rheumatoid lung disease/interstitial pulmonary fibrosis, HTN, DM was brought to the ER for evaluation of increasing cough and shortness of breath, and is being admitted on 11/28/2022 for further management.   Existing Precautions/Restrictions oxygen therapy device and L/min;fall   Cognition   Affect/Mental Status (Cognition) WFL   Orientation Status (Cognition) oriented x 3   Follows Commands (Cognition) WFL   Safety Deficit (Cognition) at risk behavior observed   Posture    Posture Forward head position;Protracted shoulders   Range of Motion (ROM)   ROM Comment LE ROM WFL   Strength (Manual Muscle Testing)   Strength (Manual Muscle Testing) Deficits observed during functional mobility   Strength Comments at least 4/5 LE strength grossly, dec functional endurance observed   Bed Mobility   Comment, (Bed Mobility) SBA w HOB elevated   Transfers   Comment, (Transfers) CGA w/o AD sit to stand, mod dep on  UE to press up into standing   Gait/Stairs (Locomotion)   Comment, (Gait/Stairs) min A/CGA w ambulation w/o AD dec step length slow pace with in lateral trunk sway on RA sats dropping to 83%,    Balance   Balance Comments good in sitting and standing, mildy unsteady with walking tends to reach for support   Clinical Impression   Criteria for Skilled Therapeutic Intervention Yes, treatment indicated   PT Diagnosis (PT) Difficulty walking   Influenced by the following impairments impaired gait, dec strength, dec endurance   Functional limitations due to impairments impaired mobility   Clinical Presentation (PT Evaluation Complexity) Stable/Uncomplicated   Clinical Presentation Rationale clincial assessment   Clinical Decision Making (Complexity) low complexity   Planned Therapy Interventions (PT) bed mobility training;gait training;stair training;strengthening;transfer training;progressive activity/exercise   Anticipated Equipment Needs at Discharge (PT) walker, rolling   Risk & Benefits of therapy have been explained evaluation/treatment results reviewed;care plan/treatment goals reviewed;risks/benefits reviewed   Physical Therapy Goals   PT Frequency Daily   PT Discharge Planning   PT Discharge Recommendation (DC Rec) home with assist;home with home care physical therapy;home with outpatient pulmonary rehab   PT Rationale for DC Rec Pt below an indep baseline for mobility and ADLS, req A x 1 CGA to min A depending on fatigue for transfers and gait, limited activity tolerance w/o O2 support will benefit from PT during stay, anticipating pt to DC to home with support of family pending progress IP may benefit from home PT prior to transition to OP pulmonary

## 2022-11-30 NOTE — PROGRESS NOTES
11/30/22 1541   Appointment Info   Signing Clinician's Name / Credentials (OT) Theodora De La Cruz, OTR/L       Present yes   Language Tibetan  (daughter acting as )   Living Environment   People in Home spouse;child(jennie), adult   Current Living Arrangements house   Home Accessibility stairs to enter home   Number of Stairs, Main Entrance 1   Transportation Anticipated family or friend will provide   Living Environment Comments Pt lives in supportive multi gen home; stairs to other level of home, pt does not access. Pt has tub shower, typically sits while bathing   Self-Care   Usual Activity Tolerance good   Current Activity Tolerance fair   Equipment Currently Used at Home none   Fall history within last six months no   Activity/Exercise/Self-Care Comment Pt independent w/ ADL tasks and mobility at home w/out AD   Instrumental Activities of Daily Living (IADL)   IADL Comments Pt does not drive, daughter helps w/ med set up, laundry, cleaning, groceries.   General Information   Onset of Illness/Injury or Date of Surgery 11/28/22   Referring Physician Leni Roa MD   Patient/Family Therapy Goal Statement (OT) stop coughing and go home   Additional Occupational Profile Info/Pertinent History of Current Problem Pt is a 72 year old female with medical history significant for rheumatoid lung disease/interstitial pulmonary fibrosis, HTN, DM was brought to the ER for evaluation of increasing cough and shortness of breath, and is being admitted on 11/28/2022 for further management.   Existing Precautions/Restrictions fall;oxygen therapy device and L/min  (2L NC)   Cognitive Status Examination   Orientation Status orientation to person, place and time   Affect/Mental Status (Cognitive) WNL   Follows Commands WNL   Visual Perception   Visual Impairment/Limitations WNL   Sensory   Sensory Quick Adds UE sensation intact   Pain Assessment   Patient Currently in Pain No   Posture   Posture not  impaired   Range of Motion Comprehensive   General Range of Motion bilateral upper extremity ROM WNL   Strength Comprehensive (MMT)   Comment, General Manual Muscle Testing (MMT) Assessment mild generalized weakness, though WFL   Coordination   Upper Extremity Coordination No deficits were identified   Transfers   Transfers sit-stand transfer;toilet transfer   Sit-Stand Transfer   Sit-Stand [place] (Transfers) supervision   Toilet Transfer   Type (Toilet Transfer) stand-sit;sit-stand   [place] Level (Toilet Transfer) supervision   Activities of Daily Living   BADL Assessment/Intervention lower body dressing   Lower Body Dressing Assessment/Training   Comment, (Lower Body Dressing) to don/doff socks while seated   [place] Level (Lower Body Dressing) supervision   Clinical Impression   Criteria for Skilled Therapeutic Interventions Met (OT) Yes, treatment indicated   OT Diagnosis decreased I/ADL independence   OT Problem List-Impairments impacting ADL problems related to;activity tolerance impaired   Assessment of Occupational Performance 3-5 Performance Deficits   Identified Performance Deficits decreased independence w/ dressing, bathing, functional mobilty   Planned Therapy Interventions (OT) ADL retraining;IADL retraining;strengthening;progressive activity/exercise;risk factor education   Clinical Decision Making Complexity (OT) low complexity   Anticipated Equipment Needs Upon Discharge (OT) shower chair   Risk & Benefits of therapy have been explained evaluation/treatment results reviewed;care plan/treatment goals reviewed;risks/benefits reviewed;current/potential barriers reviewed;participants voiced agreement with care plan;participants included;patient;daughter   OT Total Evaluation Time   OT Eval, Low Complexity Minutes (96786) 10   OT Goals   Therapy Frequency (OT) Daily   OT Predicted Duration/Target Date for Goal Attainment 12/07/22   OT Goals Hygiene/Grooming;Upper Body Dressing;Aerobic  Activity;Toilet Transfer/Toileting   OT: Hygiene/Grooming supervision/stand-by assist;while standing   OT: Upper Body Dressing Supervision/stand-by assist;including set-up/clothing retrieval   OT: Toilet Transfer/Toileting Supervision/stand-by assist;toilet transfer;cleaning and garment management   OT: Perform aerobic activity with stable cardiovascular response continuous activity;ambulation;5 minutes   Interventions   Interventions Quick Adds Therapeutic Activity   Therapeutic Activities   Therapeutic Activity Minutes (05980) 10   Symptoms noted during/after treatment shortness of breath;fatigue   Treatment Detail/Skilled Intervention Pt ambulated in hallway w/ SBA and no AD to progress activity tolerance for I/ADL independence. Pt on 2L N/C, SpO2 >95% w/ activity. SBA for toilet transfer and chair transfer. Provided pt with visual aide for pursed lip/square breathing. Pt able to complete exercises w/ cues for technique, does begin coughing - cued to take breaks as needed. Pt encouraged to complete outside of therapy session. Also encouraged use of IS. Left w/ all needs met, daughter in room   OT Discharge Planning   OT Plan progress activity tolerance for ADL tasks in bathroom, breathing, HEP   OT Discharge Recommendation (DC Rec) home with assist;home with home care occupational therapy   OT Rationale for DC Rec Pt below baseline for I/ADL independence, significantly limited by decreased activity tolerance. Anticipate w/ medical intervention, she will progress and be safe to d/c home w/ family assisting. Also recommend  OT to further progress activity tolerance for I/ADL at home.   OT Brief overview of current status SBA w/out AD on 2L NC, poor activity tolerance, SBA LB dressing/toilet transfer   Total Session Time   Timed Code Treatment Minutes 10   Total Session Time (sum of timed and untimed services) 20

## 2022-11-30 NOTE — PROGRESS NOTES
[facility] Care Coordination Contact    DME supplies, incontinent product have been requested by the patient's daughter, David. DME orders(s) have been queued up and pended for the provider to review. Patient reports the need for DME supplies due to increased incontinence and needing to change from a pad to a pull up. Once completed, please route the encounter to JAQUELIN Langley to fax completed documentation and order requisition to [facility] (she is already receiving her pads thru this company).   JAQUELIN Langley  [facility]  [phone]

## 2022-11-30 NOTE — PROGRESS NOTES
[facility] Care Coordination Contact    [facility]  Ambulatory Care Coordination to Inpatient Care Management   Hand-In Communication    Date:  November 30, 2022  Name: Krystian Boland is enrolled in [facility] Care Coordination program and I am the Lead Care Coordinator.  CC Contact Information:. JAQUELIN Langley: [phone]  Payor Source: Payor: [facility] / Plan: [facility] DUAL / Product Type: HMO /   Current services in place:  Please see the CC Snaphot and Care Management Flowsheets for specific details of this Krystian Boland care plan.   Additional details/specific concerns r/t this admission:    Discharge Disposition She was not using O2 prior to hospitalization. Lives in a multigenerational household, and her  is elderly-making sure that Krystian is stable to prevent spread of infection.    I will follow this admission in Epic. Please feel free to contact me with questions or for further collaboration in discharge planning.    JAQUELIN Langley  [facility]  [phone]

## 2022-12-01 ENCOUNTER — APPOINTMENT (OUTPATIENT)
Dept: PHYSICAL THERAPY | Facility: CLINIC | Age: 72
DRG: 193 | End: 2022-12-01
Payer: COMMERCIAL

## 2022-12-01 LAB
ANION GAP SERPL CALCULATED.3IONS-SCNC: 7 MMOL/L (ref 3–14)
BUN SERPL-MCNC: 10 MG/DL (ref 7–30)
C PNEUM DNA SPEC QL NAA+PROBE: NOT DETECTED
CALCIUM SERPL-MCNC: 9 MG/DL (ref 8.5–10.1)
CHLORIDE BLD-SCNC: 94 MMOL/L (ref 94–109)
CO2 SERPL-SCNC: 28 MMOL/L (ref 20–32)
CREAT SERPL-MCNC: 0.64 MG/DL (ref 0.52–1.04)
ERYTHROCYTE [DISTWIDTH] IN BLOOD BY AUTOMATED COUNT: 12.8 % (ref 10–15)
FLUAV H1 2009 PAND RNA SPEC QL NAA+PROBE: NOT DETECTED
FLUAV H1 RNA SPEC QL NAA+PROBE: NOT DETECTED
FLUAV H3 RNA SPEC QL NAA+PROBE: NOT DETECTED
FLUAV RNA SPEC QL NAA+PROBE: NOT DETECTED
FLUBV RNA SPEC QL NAA+PROBE: NOT DETECTED
GFR SERPL CREATININE-BSD FRML MDRD: >90 ML/MIN/1.73M2
GLUCOSE BLD-MCNC: 164 MG/DL (ref 70–99)
GLUCOSE BLDC GLUCOMTR-MCNC: 122 MG/DL (ref 70–99)
GLUCOSE BLDC GLUCOMTR-MCNC: 132 MG/DL (ref 70–99)
GLUCOSE BLDC GLUCOMTR-MCNC: 157 MG/DL (ref 70–99)
GLUCOSE BLDC GLUCOMTR-MCNC: 179 MG/DL (ref 70–99)
GLUCOSE BLDC GLUCOMTR-MCNC: 247 MG/DL (ref 70–99)
HADV DNA SPEC QL NAA+PROBE: NOT DETECTED
HCOV PNL SPEC NAA+PROBE: NOT DETECTED
HCT VFR BLD AUTO: 30.9 % (ref 35–47)
HGB BLD-MCNC: 10.3 G/DL (ref 11.7–15.7)
HMPV RNA SPEC QL NAA+PROBE: NOT DETECTED
HPIV1 RNA SPEC QL NAA+PROBE: NOT DETECTED
HPIV2 RNA SPEC QL NAA+PROBE: NOT DETECTED
HPIV3 RNA SPEC QL NAA+PROBE: NOT DETECTED
HPIV4 RNA SPEC QL NAA+PROBE: NOT DETECTED
M PNEUMO DNA SPEC QL NAA+PROBE: NOT DETECTED
MCH RBC QN AUTO: 29.7 PG (ref 26.5–33)
MCHC RBC AUTO-ENTMCNC: 33.3 G/DL (ref 31.5–36.5)
MCV RBC AUTO: 89 FL (ref 78–100)
PLATELET # BLD AUTO: 399 10E3/UL (ref 150–450)
POTASSIUM BLD-SCNC: 3.5 MMOL/L (ref 3.4–5.3)
RBC # BLD AUTO: 3.47 10E6/UL (ref 3.8–5.2)
RSV RNA SPEC QL NAA+PROBE: NOT DETECTED
RSV RNA SPEC QL NAA+PROBE: NOT DETECTED
RV+EV RNA SPEC QL NAA+PROBE: NOT DETECTED
SODIUM SERPL-SCNC: 129 MMOL/L (ref 133–144)
WBC # BLD AUTO: 11.8 10E3/UL (ref 4–11)

## 2022-12-01 PROCEDURE — 250N000012 HC RX MED GY IP 250 OP 636 PS 637: Performed by: HOSPITALIST

## 2022-12-01 PROCEDURE — 94640 AIRWAY INHALATION TREATMENT: CPT | Mod: 76

## 2022-12-01 PROCEDURE — 250N000012 HC RX MED GY IP 250 OP 636 PS 637: Performed by: EMERGENCY MEDICINE

## 2022-12-01 PROCEDURE — 97530 THERAPEUTIC ACTIVITIES: CPT | Mod: GP

## 2022-12-01 PROCEDURE — 94640 AIRWAY INHALATION TREATMENT: CPT

## 2022-12-01 PROCEDURE — 36415 COLL VENOUS BLD VENIPUNCTURE: CPT | Performed by: HOSPITALIST

## 2022-12-01 PROCEDURE — 85027 COMPLETE CBC AUTOMATED: CPT | Performed by: HOSPITALIST

## 2022-12-01 PROCEDURE — 250N000011 HC RX IP 250 OP 636: Performed by: HOSPITALIST

## 2022-12-01 PROCEDURE — 87486 CHLMYD PNEUM DNA AMP PROBE: CPT | Performed by: HOSPITALIST

## 2022-12-01 PROCEDURE — 250N000013 HC RX MED GY IP 250 OP 250 PS 637: Performed by: HOSPITALIST

## 2022-12-01 PROCEDURE — 120N000001 HC R&B MED SURG/OB

## 2022-12-01 PROCEDURE — 999N000157 HC STATISTIC RCP TIME EA 10 MIN

## 2022-12-01 PROCEDURE — 99232 SBSQ HOSP IP/OBS MODERATE 35: CPT | Performed by: HOSPITALIST

## 2022-12-01 PROCEDURE — 97116 GAIT TRAINING THERAPY: CPT | Mod: GP

## 2022-12-01 PROCEDURE — 82310 ASSAY OF CALCIUM: CPT | Performed by: HOSPITALIST

## 2022-12-01 PROCEDURE — 250N000009 HC RX 250: Performed by: HOSPITALIST

## 2022-12-01 RX ORDER — HYDROXYZINE HYDROCHLORIDE 25 MG/1
25 TABLET, FILM COATED ORAL
Status: DISCONTINUED | OUTPATIENT
Start: 2022-12-01 | End: 2022-12-02 | Stop reason: HOSPADM

## 2022-12-01 RX ORDER — LANOLIN ALCOHOL/MO/W.PET/CERES
3 CREAM (GRAM) TOPICAL
Status: DISCONTINUED | OUTPATIENT
Start: 2022-12-01 | End: 2022-12-02 | Stop reason: HOSPADM

## 2022-12-01 RX ORDER — ACETYLCYSTEINE 200 MG/ML
2 SOLUTION ORAL; RESPIRATORY (INHALATION) EVERY 4 HOURS
Status: DISCONTINUED | OUTPATIENT
Start: 2022-12-01 | End: 2022-12-02

## 2022-12-01 RX ADMIN — PIPERACILLIN AND TAZOBACTAM 3.38 G: 3; .375 INJECTION, POWDER, FOR SOLUTION INTRAVENOUS at 01:33

## 2022-12-01 RX ADMIN — Medication 50 MCG: at 08:14

## 2022-12-01 RX ADMIN — IPRATROPIUM BROMIDE AND ALBUTEROL SULFATE 3 ML: .5; 3 SOLUTION RESPIRATORY (INHALATION) at 23:26

## 2022-12-01 RX ADMIN — AZITHROMYCIN MONOHYDRATE 500 MG: 500 INJECTION, POWDER, LYOPHILIZED, FOR SOLUTION INTRAVENOUS at 10:32

## 2022-12-01 RX ADMIN — SULFASALAZINE 1000 MG: 500 TABLET, DELAYED RELEASE ORAL at 08:14

## 2022-12-01 RX ADMIN — SULFASALAZINE 1000 MG: 500 TABLET, DELAYED RELEASE ORAL at 19:07

## 2022-12-01 RX ADMIN — ACETYLCYSTEINE 2 ML: 200 SOLUTION ORAL; RESPIRATORY (INHALATION) at 23:26

## 2022-12-01 RX ADMIN — IPRATROPIUM BROMIDE AND ALBUTEROL SULFATE 3 ML: .5; 3 SOLUTION RESPIRATORY (INHALATION) at 12:41

## 2022-12-01 RX ADMIN — GUAIFENESIN 10 ML: 200 SOLUTION ORAL at 19:09

## 2022-12-01 RX ADMIN — BENZONATATE 200 MG: 100 CAPSULE ORAL at 08:14

## 2022-12-01 RX ADMIN — INSULIN ASPART 1 UNITS: 100 INJECTION, SOLUTION INTRAVENOUS; SUBCUTANEOUS at 16:51

## 2022-12-01 RX ADMIN — BENZONATATE 200 MG: 100 CAPSULE ORAL at 22:22

## 2022-12-01 RX ADMIN — LOSARTAN POTASSIUM 25 MG: 25 TABLET, FILM COATED ORAL at 19:07

## 2022-12-01 RX ADMIN — ASPIRIN 81 MG CHEWABLE TABLET 81 MG: 81 TABLET CHEWABLE at 08:14

## 2022-12-01 RX ADMIN — HYDROXYCHLOROQUINE SULFATE 200 MG: 200 TABLET ORAL at 19:07

## 2022-12-01 RX ADMIN — PIPERACILLIN AND TAZOBACTAM 3.38 G: 3; .375 INJECTION, POWDER, FOR SOLUTION INTRAVENOUS at 06:46

## 2022-12-01 RX ADMIN — IPRATROPIUM BROMIDE AND ALBUTEROL SULFATE 3 ML: .5; 3 SOLUTION RESPIRATORY (INHALATION) at 19:42

## 2022-12-01 RX ADMIN — PIPERACILLIN AND TAZOBACTAM 3.38 G: 3; .375 INJECTION, POWDER, FOR SOLUTION INTRAVENOUS at 12:19

## 2022-12-01 RX ADMIN — IPRATROPIUM BROMIDE AND ALBUTEROL SULFATE 3 ML: .5; 3 SOLUTION RESPIRATORY (INHALATION) at 01:22

## 2022-12-01 RX ADMIN — IPRATROPIUM BROMIDE AND ALBUTEROL SULFATE 3 ML: .5; 3 SOLUTION RESPIRATORY (INHALATION) at 16:07

## 2022-12-01 RX ADMIN — ACETYLCYSTEINE 2 ML: 200 SOLUTION ORAL; RESPIRATORY (INHALATION) at 19:42

## 2022-12-01 RX ADMIN — MYCOPHENOLATE MOFETIL 250 MG: 250 CAPSULE ORAL at 08:14

## 2022-12-01 RX ADMIN — INSULIN ASPART 3 UNITS: 100 INJECTION, SOLUTION INTRAVENOUS; SUBCUTANEOUS at 12:21

## 2022-12-01 RX ADMIN — PREDNISONE 40 MG: 20 TABLET ORAL at 08:14

## 2022-12-01 RX ADMIN — ACETAMINOPHEN 650 MG: 325 TABLET, FILM COATED ORAL at 22:22

## 2022-12-01 RX ADMIN — GUAIFENESIN 10 ML: 200 SOLUTION ORAL at 10:38

## 2022-12-01 RX ADMIN — AMLODIPINE BESYLATE 5 MG: 5 TABLET ORAL at 08:14

## 2022-12-01 RX ADMIN — FAMOTIDINE 40 MG: 20 TABLET ORAL at 19:07

## 2022-12-01 RX ADMIN — MYCOPHENOLATE MOFETIL 250 MG: 250 CAPSULE ORAL at 18:54

## 2022-12-01 RX ADMIN — MELATONIN TAB 3 MG 3 MG: 3 TAB at 22:22

## 2022-12-01 RX ADMIN — PIPERACILLIN AND TAZOBACTAM 3.38 G: 3; .375 INJECTION, POWDER, FOR SOLUTION INTRAVENOUS at 18:56

## 2022-12-01 RX ADMIN — GUAIFENESIN 10 ML: 200 SOLUTION ORAL at 03:49

## 2022-12-01 RX ADMIN — HYDROXYCHLOROQUINE SULFATE 200 MG: 200 TABLET ORAL at 08:14

## 2022-12-01 RX ADMIN — BENZONATATE 200 MG: 100 CAPSULE ORAL at 16:43

## 2022-12-01 RX ADMIN — ACETYLCYSTEINE 2 ML: 200 SOLUTION ORAL; RESPIRATORY (INHALATION) at 16:07

## 2022-12-01 RX ADMIN — ACETYLCYSTEINE 2 ML: 200 SOLUTION ORAL; RESPIRATORY (INHALATION) at 12:41

## 2022-12-01 RX ADMIN — MYCOPHENOLATE MOFETIL 500 MG: 500 TABLET ORAL at 08:14

## 2022-12-01 RX ADMIN — MYCOPHENOLATE MOFETIL 500 MG: 500 TABLET ORAL at 18:54

## 2022-12-01 ASSESSMENT — ACTIVITIES OF DAILY LIVING (ADL)
ADLS_ACUITY_SCORE: 25
ADLS_ACUITY_SCORE: 24
ADLS_ACUITY_SCORE: 25
ADLS_ACUITY_SCORE: 24
ADLS_ACUITY_SCORE: 24
ADLS_ACUITY_SCORE: 25
ADLS_ACUITY_SCORE: 24
ADLS_ACUITY_SCORE: 25
ADLS_ACUITY_SCORE: 24

## 2022-12-01 NOTE — PROVIDER NOTIFICATION
MD Notification    Notified Person: MD    Notified Person Name: Dr. eD La Vega    Notification Date/Time: 11/30/2022 1947    Notification Interaction: [employer]    Purpose of Notification: 89 YY- Pt Na trending down from 129 to 125. Please advise. - NORA Gill *[id number]    Orders Received: Recheck Na in 4 hours.    Comments:

## 2022-12-01 NOTE — PROGRESS NOTES
[facility]    Medicine Progress Note - Hospitalist Service    Date of Admission:  11/28/2022    Assessment & Plan    Krystian Boland is a 72 year old female with medical history significant for rheumatoid lung disease/interstitial pulmonary fibrosis, HTN, DM was brought to the ER for evaluation of increasing cough and shortness of breath, and admitted on 11/28/2022 for further management.       Acute hypoxic respiratory failure  Possible bacterial pneumonia  Possible acute flare up of interstitial lung disease  Mediastinal lymphadenopathy  Recently started on CellCept as in HPI.  Noted marked leukocytosis, elevated CRP.  CT shows mod-severe pulmonary fibrosis.  Increased dyspnea for 3 days PTA with bouts of coughing and hypoxia.  Resp viral panel including influenza A&B, RSV, COVID-19  negative.  Full respiratory virus panel is pending.  Sating mid to high 90's on RA.  -Continue Zosyn and azythromycin.  -IV Solu-Medrol on admission, changed to prednisone.  Gradual taper as per pulmonary recommendation, reviewed.  - Supportive care including oxygen, wean off as able, antitussives (scheduled and prn), bronchodilators.  Mucomyst nebs added.  - Continue PTA sulfasalazine, cellcept, hydroxychloroquine and Ofev  - Pulmonary consult appreciated assistance.   - Needs ambulatory oximetry prior to discharge.       Hyponatremia  Suspect component of SIADH given pulmonary process.  Sodium 123 on admit. Improved with IVF to 130 then trended down again and nephrology consulted.  -Sodium is stable between 126-129.  Monitor     DM2  -HbA1c was 6.7 in August and metformin was discontinued by PCP.  - on insulin sliding scale.  - Monitor BG.     HTN  -Continue PTA Norvasc and losartan     Diet:  Regular  DVT Prophylaxis: Pneumatic Compression Devices  Wilkes Catheter: Not present  Central Lines: None  Cardiac Monitoring: None  Code Status:  Full code.          Disposition Plan     Expected Discharge Date:   Likely 1 more day, pending sxs improvement, sodium stability and PT/OT eval. Wean off oxygen (at rest and with activity).    might need to be discharged with oxygen for activity.            The patient's care was discussed with the Patient and her daughter who is RN.       Donna Escobedo MD  Hospitalist Service  [facility]          ______________________________________________________________________    Interval History     Overnight event reviewed, breathing has improved since admission, able to maintain normal SPO2 without supplemental oxygen at rest mostly although desaturates with minimal coughing and activity.  Remains afebrile.  Daughter at bedside assisting with interpretation.  Reports cough is more productive now although sputum is yellowish and thick.  Noted blood-streaked sputum.  Denies chest pain.    Physical Exam   Vital Signs: Temp: 98  F (36.7  C) Temp src: Oral BP: 139/70 Pulse: 80   Resp: 20 SpO2: 96 % O2 Device: Nasal cannula Oxygen Delivery: 2 LPM  Weight: 158 lbs 1.12 oz     General: AAOx3, appears comfortable.  HEENT: PERRLA EOMI. Mucosa moist/congested.   Lungs: Extensive crackles bilaterally, no wheezing.  Normal work of breathing.  CVS: S1S2 regular, no tachycardia or murmur.   Abdomen: Soft, NT, ND. BS heard.  MSK: No edema or deformities.  Neuro: AAOX3.  Face symmetrical.  Moving all extremities    Data   Recent Labs   Lab 12/01/22  0735 12/01/22  0151 11/30/22  2232 11/30/22  2122 11/30/22  1806 11/30/22  1237 11/30/22  0903 11/29/22  1248 11/29/22  0536 11/29/22  0328 11/28/22  1128   WBC  --   --   --   --   --   --   --   --   --  15.9* 21.5*   HGB  --   --   --   --   --   --   --   --   --  9.9* 10.7*   MCV  --   --   --   --   --   --   --   --   --  88 88   PLT  --   --   --   --   --   --   --   --   --  337 316   NA  --   --  128*  --  125*  --  129*   < > 124*  --  123*   POTASSIUM  --   --   --   --   --   --  3.5  --  4.1  --  3.5   CHLORIDE  --    --   --   --   --   --  93*  --  93*  --  87*   CO2  --   --   --   --   --   --  28  --  26  --  27   BUN  --   --   --   --   --   --  9  --  7  --  5*   CR  --   --   --   --   --   --  0.71  --  0.50*  --  0.53   ANIONGAP  --   --   --   --   --   --  8  --  5  --  9   SANDI  --   --   --   --   --   --  9.3  --  9.1  --  9.2   * 132*  --  152*  --    < > 138*   < > 171*  --  146*   ALBUMIN  --   --   --   --   --   --  2.6*  --   --   --  2.7*   PROTTOTAL  --   --   --   --   --   --  7.8  --   --   --  7.9   BILITOTAL  --   --   --   --   --   --  1.0  --   --   --  0.9   ALKPHOS  --   --   --   --   --   --  170*  --   --   --  213*   ALT  --   --   --   --   --   --  50  --   --   --  32   AST  --   --   --   --   --   --  43  --   --   --  23    < > = values in this interval not displayed.       No results found for this or any previous visit (from the past 24 hour(s)).  Medications     sodium chloride 50 mL/hr at 11/30/22 1220       acetylcysteine  4 mL Nebulization Q4H     amLODIPine  5 mg Oral Daily     aspirin  81 mg Oral Daily     azithromycin  500 mg Intravenous Q24H     benzonatate  200 mg Oral TID     famotidine  40 mg Oral QPM     hydroxychloroquine  200 mg Oral BID     insulin aspart  1-7 Units Subcutaneous TID AC     insulin aspart  1-5 Units Subcutaneous At Bedtime     losartan  25 mg Oral QPM     mycophenolate  250 mg Oral BID IS     mycophenolate  500 mg Oral BID IS     nintedanib  100 mg Oral BID     piperacillin-tazobactam  3.375 g Intravenous Q6H     predniSONE  40 mg Oral Daily     sodium chloride (PF)  3 mL Intracatheter Q8H     sulfaSALAzine ER  1,000 mg Oral BID     vitamin D3  50 mcg Oral Daily

## 2022-12-01 NOTE — PROVIDER NOTIFICATION
MD Notification    Notified Person: MD    Notified Person Name: Dr Rossana Scott    Notification Date/Time:11/30/2022  1855 PM    Notification Interaction: Text paged    Purpose of Notification: Na+ trended down to 125 from 129, please advise    Orders Received: Pending    Comments:

## 2022-12-01 NOTE — PLAN OF CARE
Goal Outcome Evaluation:       DATE & TIME: 11/30/22-12/01/2022 [id number]  Summary: Increasing Cough, SOB  Cognitive Concerns/ Orientation : A&OX4, daughter interprets/speaks Tibetan at bedside  BEHAVIOR & AGGRESSION TOOL COLOR: Green  CIWA SCORE: NA   ABNL VS/O2: VSS on 2L O2  MOBILITY: SBA; to up bathroom and chair  PAIN MANAGMENT: denies pain  DIET: Regular; good intake  BOWEL/BLADDER: Continent  ABNL LAB/BG: Na 128, , 132  DRAIN/DEVICES: One time dose of 1000mL NS @ 50/hr and intermittent Abx.  TELEMETRY RHYTHM: NSR-ST  SKIN: WNL  TESTS/PROCEDURES: NA  D/C DATE: Pending 1-3 days  Discharge Barriers: NA  OTHER IMPORTANT INFO: Daughter at bedside with patient 24/7 to interpret.   Gave PRN Robitussin x1 for frequent congested cough. Pt experienced blood tinged sputum from coughing x1. Felt burning in throat afterwards.

## 2022-12-01 NOTE — PROGRESS NOTES
[facility] Care Coordination Contact    Received order back from PCP. Emailed Rx to [facility] to change product from pad to pull up.  JAQUELIN Langley  [facility]  [phone]

## 2022-12-01 NOTE — PLAN OF CARE
Goal Outcome Evaluation:    DATE & TIME: 12/01/2022 5093-5541  Summary: Increasing Cough, SOB  Cognitive Concerns/ Orientation : A&OX4, daughter interprets/speaks Tibetan at bedside  BEHAVIOR & AGGRESSION TOOL COLOR: Green  CIWA SCORE: NA   ABNL VS/O2: VSS on 2L O2; sats drop to 88% with cough on oxygen;  MOBILITY: SBA of daughter who is a RN; to bathroom and chair  PAIN MANAGMENT: denies pain  DIET: Regular; good intake  BOWEL/BLADDER: Continent  ABNL LAB/BG: WBC 11.8, Hgb 10.3, Na 129, ,   DRAIN/DEVICES: Completing one time dose of 1000mL NS @ 50/hr and intermittent Abx.  TELEMETRY RHYTHM: NSR  SKIN: WNL  TESTS/PROCEDURES: NA  D/C DATE: Pending 1-3 days  Discharge Barriers: NA  OTHER IMPORTANT INFO: Daughter at bedside with patient 24/7 to interpret. Robitussin x1, starting on scheduled mucomyst neb.  Poor sleep last night, will try melatonin and Atarax tonight prn. Swabbed for full respiratory panel - all negative.

## 2022-12-01 NOTE — PLAN OF CARE
Goal Outcome Evaluation:  DATE & TIME: 11/30/22 5234-3783 PM  Summary: Increasing Cough, SOB  Cognitive Concerns/ Orientation : A&OX4, daughter interprets/speaks Tibetan   BEHAVIOR & AGGRESSION TOOL COLOR: Green  CIWA SCORE: NA        ABNL VS/O2:  VSS on 2L NC now  MOBILITY: SBA; to up bathroom and chair  PAIN MANAGMENT: denies pain  DIET: Regular  BOWEL/BLADDER: Continent  ABNL LAB/BG: Na 129, recheck Na at 1800 was 125;   DRAIN/DEVICES: PIV with NS for 1 liter at 50/hr and intermittent Abx.  TELEMETRY RHYTHM: NSR  SKIN: WNL  TESTS/PROCEDURES: NA  D/C DATE: Pending 1-3 days  Discharge Barriers: NA  OTHER IMPORTANT INFO: Daughter at bedside with patient 24/7 to interpret.   Gave PRN Robitussin x1 for frequent congested cough; tesselon now scheduled.

## 2022-12-01 NOTE — CONSULTS
Care Management Initial Consult    General Information  Assessment completed with: Patient, Family, Son In Law  Type of CM/SW Visit: Initial Assessment    Primary Care Provider verified and updated as needed: Yes   Readmission within the last 30 days: no previous admission in last 30 days      Reason for Consult: discharge planning  Advance Care Planning:            Communication Assessment  Patient's communication style: spoken language (non-English)    Hearing Difficulty or Deaf: no   Wear Glasses or Blind: yes    Cognitive  Cognitive/Neuro/Behavioral: WDL                      Living Environment:   People in home: child(jennie), adult, spouse, grandchild(jennie)     Current living Arrangements: house      Able to return to prior arrangements: yes       Family/Social Support:  Care provided by: self, child(jennie), spouse/significant other  Provides care for: no one  Marital Status:   , Children          Description of Support System: Supportive, Involved         Current Resources:   Patient receiving home care services: No     Community Resources: None  Equipment currently used at home: none  Supplies currently used at home: Incontinence Supplies    Employment/Financial:  Employment Status: retired        Financial Concerns: No concerns identified           Lifestyle & Psychosocial Needs:  Social Determinants of Health     Tobacco Use: Low Risk      Smoking Tobacco Use: Never     Smokeless Tobacco Use: Never     Passive Exposure: Not on file   Alcohol Use: Not on file   Financial Resource Strain: Not on file   Food Insecurity: Not on file   Transportation Needs: Not on file   Physical Activity: Not on file   Stress: Not on file   Social Connections: Not on file   Intimate Partner Violence: Not on file   Depression: Not at risk     PHQ-2 Score: 0   Housing Stability: Not on file       Functional Status:  Prior to admission patient needed assistance:   Dependent ADLs:: Independent  Dependent IADLs:: Shopping,  Cleaning, Medication Management, Money Management, Transportation       Mental Health Status:  Mental Health Status: No Current Concerns       Chemical Dependency Status:  Chemical Dependency Status: No Current Concerns             Values/Beliefs:  Spiritual, Cultural Beliefs, Anabaptist Practices, Values that affect care: no               Additional Information:  Writer met with Pt in room and Son-in-Law present. Writer offered  and they declined. PHILIPPE would translate. Pt lives in house with Spouse, Daughter, PHILIPPE, and grandchildren. Pt also has other children that are very involved. PHILIPPE stated the Pt's  uses walker but the Pt doesn't, Writer told PHILIPPE that she would likely discharge with a walker. Pt is ind with all of her cares but has the family there if she would need help. Pt will be discharging on oxygen. PHILIPPE stated that they has already delivered the O2 tank to their house and they have a portable for discharge in the Pt's room. PT/OT are recommending home with C. Writer sent referral to [facility]. Family will provide ride at discharge.         Tashia Davis, RN, BSN, Care Coordinator

## 2022-12-01 NOTE — PROGRESS NOTES
"PULMONOLOGY PROGRESS NOTE    Date of Admission: 11/28/2022    CC/Reason for Hospital visit: Hypoxemia  __________________________________________    ASSESSMENT / PLAN      Pulmonary Diagnoses:  Abnl CT/CXR R91.8  Cough R05  Hypoxemia R09.02  Obesity E66.9  Pulmonary fibrosis NOS J84.10  SOB R06.02     ASSESSMENT:  73 yo female with PMH RA c/b RA-ILD (UIP) on cellcept, nintedanib, home O2, DM, HTN who presented with cough, SOB and found to have hypoxemia.     CT chest showing worsened basilar predominant consolidative and ground glass opacities on top of know peripheral, basilar predominant fibrosis with traction bronchiectasis and honeycombing. Low BNP and no pleural effusion. Consistent with viral pneumonia or exacerbation of ILD on top of known RA associated UIP.      PLAN:     Collect full viral panel    Prednisone taper: 40mg x7 days, 30mg x7 days, 30mg x7 days, 20mg x7 days, 10mg indefinitely until follow up with Rheumatology or Pulmonology    Continue home RA treatment/immunosuppression    Duonebs q4 PRN while inpatient; resume home inhalers at discharge    Antibiotics per primary    Outpatient follow up with [facility]    Will sign off, please page with questions    Natanael Florence MD  Interventional Pulmonology  [facility]      ____________________________________________    SUBJECTIVE      Feels better today, however cough still bothersome.    ROS: A Problem Pertinent review of systems was negative except for items noted in HPI.  Past Medical, Family, and Social/Substance History has been reviewed: No interval changes.  OBJECTIVE   Vital signs:  Temp: 98  F (36.7  C) Temp src: Oral BP: 139/70 Pulse: 80   Resp: 20 SpO2: 96 % O2 Device: Nasal cannula Oxygen Delivery: 2 LPM Height: 157.5 cm (5' 2\") Weight: 71.7 kg (158 lb 1.1 oz)  Estimated body mass index is 28.91 kg/m  as calculated from the following:    Height as of this encounter: 1.575 m (5' 2\").    Weight as of this " encounter: 71.7 kg (158 lb 1.1 oz).        I/O last 3 completed shifts:  In: 2180 [P.O.:1830; I.V.:350]  Out: 6500 [Urine:6500]      CONSTITUTIONAL/GENERAL APPEARANCE: Alert. No Apparent Distress.  PSYCHIATRIC: Pleasant and appropriate mood and affect. Oriented x 3.  EARS, NOSE,THROAT,MOUTH: External ears and nose overall normal. Normal oral mucosa.   NECK: Neck appearance normal. No neck masses and the thyroid is not enlarged.   RESPIRATORY: Non-labored effort.   CARDIOVASCULAR: Extremities with no edema.      LABORATORY ASSESSMENT    Arterial Blood Gas  Recent Labs   Lab 11/28/22  1209   PH 7.41     CBC  Recent Labs   Lab 12/01/22  0842 11/29/22  0328 11/28/22  1128   WBC 11.8* 15.9* 21.5*   RBC 3.47* 3.31* 3.55*   HGB 10.3* 9.9* 10.7*   HCT 30.9* 29.2* 31.1*   MCV 89 88 88   MCH 29.7 29.9 30.1   MCHC 33.3 33.9 34.4   RDW 12.8 12.4 12.4    337 316     BMP  Recent Labs   Lab 12/01/22  0842 12/01/22  0735 12/01/22  0151 11/30/22  2232 11/30/22  2122 11/30/22  1806 11/30/22  1237 11/30/22  0903 11/29/22  1248 11/29/22  0536 11/28/22  1128   *  --   --  128*  --  125*  --  129*   < > 124* 123*   POTASSIUM 3.5  --   --   --   --   --   --  3.5  --  4.1 3.5   CHLORIDE 94  --   --   --   --   --   --  93*  --  93* 87*   SANDI 9.0  --   --   --   --   --   --  9.3  --  9.1 9.2   CO2 28  --   --   --   --   --   --  28  --  26 27   BUN 10  --   --   --   --   --   --  9  --  7 5*   CR 0.64  --   --   --   --   --   --  0.71  --  0.50* 0.53   * 122* 132*  --  152*  --    < > 138*   < > 171* 146*    < > = values in this interval not displayed.     INRNo lab results found in last 7 days.   BNPNo lab results found in last 7 days.  VENOUS BLOOD GASES  Recent Labs   Lab 11/28/22  1209 11/28/22  1128   PHV  --  7.41   PCO2V  --  49   PO2V  --  28   HCO3V 29* 31*   FRANSISCO  --  4.8*         Additional labs and/or comments:     IMAGING      Results for orders placed or performed during the hospital encounter of  11/28/22   XR Chest 2 Views    Impression    IMPRESSION: Moderately extensive airspace disease may be progressive  fibrosis. A component of infectious infiltrate would be difficult to  exclude. No definite effusion. The cardiac silhouette is not enlarged.  Pulmonary vasculature is unremarkable.    AYANNA COOK MD         SYSTEM ID:  [id number]   CT Chest (PE) Abdomen Pelvis w Contrast    Impression    IMPRESSION:    1. Moderate to severe pulmonary fibrosis.  2. No pulmonary embolism demonstrated.  3. Progressive adenopathy in the chest which is nonspecific in this  setting.  4. No acute process demonstrated in the abdomen and pelvis.    AYANNA COOK MD         SYSTEM ID:  [id number]       PFT & OTHER TESTING     PFT Latest Ref Rng & Units 10/26/2022   FVC L 1.51   FEV1 L 1.42   FVC% % 62   FEV1% % 73

## 2022-12-02 VITALS
TEMPERATURE: 98 F | WEIGHT: 158.07 LBS | RESPIRATION RATE: 17 BRPM | HEIGHT: 62 IN | SYSTOLIC BLOOD PRESSURE: 173 MMHG | OXYGEN SATURATION: 98 % | DIASTOLIC BLOOD PRESSURE: 96 MMHG | HEART RATE: 103 BPM | BODY MASS INDEX: 29.09 KG/M2

## 2022-12-02 LAB
GLUCOSE BLDC GLUCOMTR-MCNC: 115 MG/DL (ref 70–99)
GLUCOSE BLDC GLUCOMTR-MCNC: 143 MG/DL (ref 70–99)
GLUCOSE BLDC GLUCOMTR-MCNC: 218 MG/DL (ref 70–99)
POTASSIUM BLD-SCNC: 3.4 MMOL/L (ref 3.4–5.3)
SODIUM SERPL-SCNC: 130 MMOL/L (ref 133–144)

## 2022-12-02 PROCEDURE — 250N000012 HC RX MED GY IP 250 OP 636 PS 637: Performed by: HOSPITALIST

## 2022-12-02 PROCEDURE — 250N000009 HC RX 250: Performed by: HOSPITALIST

## 2022-12-02 PROCEDURE — 84295 ASSAY OF SERUM SODIUM: CPT | Performed by: HOSPITALIST

## 2022-12-02 PROCEDURE — 94640 AIRWAY INHALATION TREATMENT: CPT | Mod: 76

## 2022-12-02 PROCEDURE — 94640 AIRWAY INHALATION TREATMENT: CPT

## 2022-12-02 PROCEDURE — 250N000013 HC RX MED GY IP 250 OP 250 PS 637: Performed by: HOSPITALIST

## 2022-12-02 PROCEDURE — 99239 HOSP IP/OBS DSCHRG MGMT >30: CPT | Performed by: HOSPITALIST

## 2022-12-02 PROCEDURE — 36415 COLL VENOUS BLD VENIPUNCTURE: CPT | Performed by: HOSPITALIST

## 2022-12-02 PROCEDURE — 84132 ASSAY OF SERUM POTASSIUM: CPT | Performed by: HOSPITALIST

## 2022-12-02 PROCEDURE — 999N000157 HC STATISTIC RCP TIME EA 10 MIN

## 2022-12-02 PROCEDURE — 250N000012 HC RX MED GY IP 250 OP 636 PS 637: Performed by: EMERGENCY MEDICINE

## 2022-12-02 PROCEDURE — 250N000011 HC RX IP 250 OP 636: Performed by: HOSPITALIST

## 2022-12-02 PROCEDURE — 999N000033 HC STATISTIC CHRONIC PULMONARY DISEASE SPECIALIST

## 2022-12-02 RX ORDER — FAMOTIDINE 40 MG/1
40 TABLET, FILM COATED ORAL EVERY EVENING
Start: 2022-12-02 | End: 2022-12-19

## 2022-12-02 RX ORDER — GUAIFENESIN 200 MG/10ML
10 LIQUID ORAL EVERY 4 HOURS PRN
Qty: 236 ML | Refills: 0 | Status: SHIPPED | OUTPATIENT
Start: 2022-12-02

## 2022-12-02 RX ORDER — IPRATROPIUM BROMIDE AND ALBUTEROL SULFATE 2.5; .5 MG/3ML; MG/3ML
3 SOLUTION RESPIRATORY (INHALATION) EVERY 4 HOURS PRN
Qty: 90 ML | Refills: 0 | Status: SHIPPED | OUTPATIENT
Start: 2022-12-02

## 2022-12-02 RX ORDER — POTASSIUM CHLORIDE 1500 MG/1
40 TABLET, EXTENDED RELEASE ORAL ONCE
Status: COMPLETED | OUTPATIENT
Start: 2022-12-02 | End: 2022-12-02

## 2022-12-02 RX ORDER — LANOLIN ALCOHOL/MO/W.PET/CERES
3 CREAM (GRAM) TOPICAL
COMMUNITY
Start: 2022-12-02

## 2022-12-02 RX ORDER — PREDNISONE 10 MG/1
TABLET ORAL
Qty: 54 TABLET | Refills: 0 | Status: SHIPPED | OUTPATIENT
Start: 2022-12-02 | End: 2023-01-19

## 2022-12-02 RX ORDER — ACETYLCYSTEINE 200 MG/ML
2 SOLUTION ORAL; RESPIRATORY (INHALATION) EVERY 4 HOURS PRN
Status: DISCONTINUED | OUTPATIENT
Start: 2022-12-02 | End: 2022-12-02 | Stop reason: HOSPADM

## 2022-12-02 RX ADMIN — HYDROXYCHLOROQUINE SULFATE 200 MG: 200 TABLET ORAL at 08:41

## 2022-12-02 RX ADMIN — MYCOPHENOLATE MOFETIL 500 MG: 500 TABLET ORAL at 08:41

## 2022-12-02 RX ADMIN — PIPERACILLIN AND TAZOBACTAM 3.38 G: 3; .375 INJECTION, POWDER, FOR SOLUTION INTRAVENOUS at 00:12

## 2022-12-02 RX ADMIN — ACETYLCYSTEINE 2 ML: 200 SOLUTION ORAL; RESPIRATORY (INHALATION) at 08:49

## 2022-12-02 RX ADMIN — IPRATROPIUM BROMIDE AND ALBUTEROL SULFATE 3 ML: .5; 3 SOLUTION RESPIRATORY (INHALATION) at 14:33

## 2022-12-02 RX ADMIN — PIPERACILLIN AND TAZOBACTAM 3.38 G: 3; .375 INJECTION, POWDER, FOR SOLUTION INTRAVENOUS at 06:16

## 2022-12-02 RX ADMIN — IPRATROPIUM BROMIDE AND ALBUTEROL SULFATE 3 ML: .5; 3 SOLUTION RESPIRATORY (INHALATION) at 02:53

## 2022-12-02 RX ADMIN — GUAIFENESIN 10 ML: 200 SOLUTION ORAL at 12:44

## 2022-12-02 RX ADMIN — PIPERACILLIN AND TAZOBACTAM 3.38 G: 3; .375 INJECTION, POWDER, FOR SOLUTION INTRAVENOUS at 12:41

## 2022-12-02 RX ADMIN — SULFASALAZINE 1000 MG: 500 TABLET, DELAYED RELEASE ORAL at 08:41

## 2022-12-02 RX ADMIN — PREDNISONE 40 MG: 20 TABLET ORAL at 08:40

## 2022-12-02 RX ADMIN — INSULIN ASPART 2 UNITS: 100 INJECTION, SOLUTION INTRAVENOUS; SUBCUTANEOUS at 13:58

## 2022-12-02 RX ADMIN — MYCOPHENOLATE MOFETIL 250 MG: 250 CAPSULE ORAL at 08:41

## 2022-12-02 RX ADMIN — BENZONATATE 200 MG: 100 CAPSULE ORAL at 08:41

## 2022-12-02 RX ADMIN — POTASSIUM CHLORIDE 40 MEQ: 1500 TABLET, EXTENDED RELEASE ORAL at 10:57

## 2022-12-02 RX ADMIN — Medication 50 MCG: at 08:41

## 2022-12-02 RX ADMIN — ASPIRIN 81 MG CHEWABLE TABLET 81 MG: 81 TABLET CHEWABLE at 08:41

## 2022-12-02 RX ADMIN — ACETYLCYSTEINE 2 ML: 200 SOLUTION ORAL; RESPIRATORY (INHALATION) at 02:53

## 2022-12-02 RX ADMIN — AMLODIPINE BESYLATE 5 MG: 5 TABLET ORAL at 08:41

## 2022-12-02 RX ADMIN — IPRATROPIUM BROMIDE AND ALBUTEROL SULFATE 3 ML: .5; 3 SOLUTION RESPIRATORY (INHALATION) at 08:49

## 2022-12-02 ASSESSMENT — ACTIVITIES OF DAILY LIVING (ADL)
ADLS_ACUITY_SCORE: 24
ADLS_ACUITY_SCORE: 25
ADLS_ACUITY_SCORE: 25
ADLS_ACUITY_SCORE: 24
ADLS_ACUITY_SCORE: 24

## 2022-12-02 NOTE — PROGRESS NOTES
Patient has been assessed for Home Oxygen needs.     Pulse oximetry (SpO2) and Oxygen flow readings:    SpO2 =  94% on room air at rest while awake.    SpO2 improved to  97% on 2 liters/minute at rest.    SpO2 =  77% on room air during activity/with exercise.    *SpO2 improved to  94% on 3 liters/minute during activity/with exercise.

## 2022-12-02 NOTE — DISCHARGE SUMMARY
[facility]  Hospitalist Discharge Summary      Date of Admission:  11/28/2022  Date of Discharge:  12/2/2022  Discharging Provider: Donna Escobedo MD  Discharge Service: Hospitalist Service    Discharge Diagnoses     Please refer to the hospital course below.    Follow-ups Needed After Discharge   Follow-up Appointments     Follow-up and recommended labs and tests       Follow up with primary care provider, Adrianna Singh, within 7-10 days   to evaluate medication change, for hospital follow- up, and to follow up   on results.  The following labs/tests are recommended: BMP with in a week.                 Unresulted Labs Ordered in the Past 30 Days of this Admission     Date and Time Order Name Status Description    11/28/2022 11:59 AM Blood Culture Peripheral Blood Preliminary       These results will be followed up by hospitalist/PCP    Discharge Disposition   Discharged to home  Condition at discharge: Stable      Hospital Course     Krystian Boland is a 72 year old female with medical history significant for rheumatoid lung disease/interstitial pulmonary fibrosis, HTN, DM was brought to the ER for evaluation of increasing cough and shortness of breath, and admitted on 11/28/2022 for further management.       Acute hypoxic respiratory failure  Possible bacterial pneumonia  Possible acute flare up of interstitial lung disease  Mediastinal lymphadenopathy  Recently started on CellCept as in HPI.  Noted marked leukocytosis, elevated CRP.  CT shows mod-severe pulmonary fibrosis.  Increased dyspnea for 3 days PTA with bouts of coughing and hypoxia.  Resp viral panel including influenza A&B, RSV, COVID-19  negative.  Full respiratory virus panel is pending.  Sating mid to high 90's on RA.  - Treated with Zosyn and azythromycin while in hospital, leukocytosis improved.  Discharging on 6 more days of Augmentin.  - IV Solu-Medrol on admission, changed to prednisone.  Gradual taper as per  pulmonary recommendation-40 mg daily for 7 days then 30 mg daily for 7 days then 20 mg daily for 7 days then 10 mg daily for 7 days  - Supportive care including oxygen, wean off as able, antitussives (scheduled and prn), bronchodilators.  Mucomyst nebs added but did not feel much expectoration and so discontinued.  - Continued with PTA sulfasalazine, cellcept, hydroxychloroquine and Ofev  - Pulmonary consult appreciated assistance.   - Ambulatory oximetry completed prior to discharge.  At rest she is able to maintain SPO2 on room air at times although desaturates after coughing and needs up to 2 L.  With activity she needs up to 3 LPM O2.   arranging supplemental O2 at discharge.  Patient was also seen by PT OT and discharging with home care PT/OT and RN.    Patient will have follow-up with her pulmonologist and rheumatologist as outpatient.    Hyponatremia  Suspect component of SIADH given pulmonary process.  Sodium 123 on admit. Improved with IVF to 130 then trended down again and nephrology consulted.  -Sodium is stable between 126-129.  It is 130 today.  Repeat BMP within a week.     DM2  -HbA1c was 6.7 in August and metformin was discontinued by PCP.  -Was placed on insulin sliding scale but has barely needed it and so discontinuing at discharge.  -Recommend follow-up A1c with PCP.     HTN  -Continued with PTA Norvasc and losartan      Discussed with patient's daughter who is an RN regarding discharge and follow-up care plan.  All questions answered.    Donna Escobedo MD  Hospitalist Service  [facility]          ______________________________________________________________________      Consultations This Hospital Stay   PULMONARY IP CONSULT  CARE MANAGEMENT / SOCIAL WORK IP CONSULT  NEPHROLOGY IP CONSULT  PHYSICAL THERAPY ADULT IP CONSULT  OCCUPATIONAL THERAPY ADULT IP CONSULT  IP RESPIRATORY CARE CHRONIC PULMONARY DISEASE SPECIALIST    Code Status   Full Code    Time  Spent on this Encounter   I, Donna Escobedo MD, personally saw the patient today and spent greater than 30 minutes discharging this patient.       Donna Escobedo MD  [place] [address] [facility]  [address] [place] [address]  Phone: [phone]  ______________________________________________________________________    Physical Exam   Vital Signs: Temp: 97.9  F (36.6  C) Temp src: Oral BP: (!) 144/81 Pulse: 86   Resp: 17 SpO2: 99 % O2 Device: Nasal cannula Oxygen Delivery: 2 LPM  Weight: 158 lbs 1.12 oz    General: AAOx3, appears comfortable.  HEENT: PERRLA EOMI. Mucosa moist/congested.   Lungs: Extensive crackles bilaterally which have much improved anteriorly, no wheezing.  Normal work of breathing. On RA at rest but does need O2 after coughing and minimal activity  CVS: S1S2 regular, no tachycardia or murmur.   Abdomen: Soft, NT, ND. BS heard.  MSK: No edema or deformities.  Neuro: AAOX3.  Face symmetrical.  Moving all extremities       Primary Care Physician   Adrianna Singh    Discharge Orders      NEBULIZER     Home Care Referral      Reason for your hospital stay    Acute hypoxic respiratory failure due to pneumonia/flareup of pulmonary fibrosis.     Follow-up and recommended labs and tests     Follow up with primary care provider, Adrianna Singh, within 7-10 days to evaluate medication change, for hospital follow- up, and to follow up on results.  The following labs/tests are recommended: BMP with in a week.     Activity    Your activity upon discharge: activity as tolerated     Full Code     Oxygen Adult/Peds    Oxygen Documentation:   I certify that this patient, Krystian Boland has been under my care (or a nurse practitioner or physician's assistant working with me). This is the face-to-face encounter for oxygen medical necessity.      Krystian Boland is now in a chronic stable state and continues to require supplemental oxygen. Patient has continued  oxygen desaturation due to Interstitial lung disease and pneumonia.    Alternative treatment(s) tried or considered and deemed clinically infective for treatment of  Interstitial lung disease and pneumonia. include nebulizers, steroids, pulmonary rehab, and antibiotic.  If portability is ordered, is the patient mobile within the home? yes    **Patients who qualify for home O2 coverage under the CMS guidelines require ABG tests or O2 sat readings obtained closest to, but no earlier than 2 days prior to the discharge, as evidence of the need for home oxygen therapy. Testing must be performed while patient is in the chronic stable state. See notes for O2 sats.**     Diet    Follow this diet upon discharge: Orders Placed This Encounter      Combination Diet Regular Diet Adult       Significant Results and Procedures   Most Recent 3 CBC's:Recent Labs   Lab Test 12/01/22  0842 11/29/22  0328 11/28/22  1128   WBC 11.8* 15.9* 21.5*   HGB 10.3* 9.9* 10.7*   MCV 89 88 88    337 316     Most Recent 3 BMP's:Recent Labs   Lab Test 12/02/22  1143 12/02/22  0853 12/02/22  0730 12/02/22  0155 12/01/22  1154 12/01/22  0842 12/01/22  0151 11/30/22  2232 11/30/22  1237 11/30/22  0903 11/29/22  1248 11/29/22  0536   NA  --  130*  --   --   --  129*  --  128*   < > 129*   < > 124*   POTASSIUM  --  3.4  --   --   --  3.5  --   --   --  3.5  --  4.1   CHLORIDE  --   --   --   --   --  94  --   --   --  93*  --  93*   CO2  --   --   --   --   --  28  --   --   --  28  --  26   BUN  --   --   --   --   --  10  --   --   --  9  --  7   CR  --   --   --   --   --  0.64  --   --   --  0.71  --  0.50*   ANIONGAP  --   --   --   --   --  7  --   --   --  8  --  5   SANDI  --   --   --   --   --  9.0  --   --   --  9.3  --  9.1   *  --  115* 143*   < > 164*   < >  --    < > 138*   < > 171*    < > = values in this interval not displayed.     Most Recent 2 LFT's:Recent Labs   Lab Test 11/30/22  0903 11/28/22  1128   AST 43 23   ALT 50  32   ALKPHOS 170* 213*   BILITOTAL 1.0 0.9     Most Recent TSH and T4:Recent Labs   Lab Test 11/30/22  0903   TSH 0.67     Most Recent 6 glucoses:Recent Labs   Lab Test 12/02/22  1143 12/02/22  0730 12/02/22  0155 12/01/22  2128 12/01/22  1644 12/01/22  1154   * 115* 143* 157* 179* 247*     Most Recent Urinalysis:Recent Labs   Lab Test 11/06/18  1810   COLOR Yellow   APPEARANCE Slightly Cloudy   URINEGLC Negative   URINEBILI Negative   URINEKETONE Negative   SG 1.018   UBLD Negative   URINEPH 5.5   PROTEIN 10*   NITRITE Negative   LEUKEST Large*   RBCU 2   WBCU 14*   ,   Results for orders placed or performed during the hospital encounter of 11/28/22   XR Chest 2 Views    Narrative    CHEST TWO VIEWS 11/28/2022 12:19 PM     HISTORY: Interstitial lung disease, cough, shortness of breath     COMPARISON: 4/2/2018       Impression    IMPRESSION: Moderately extensive airspace disease may be progressive  fibrosis. A component of infectious infiltrate would be difficult to  exclude. No definite effusion. The cardiac silhouette is not enlarged.  Pulmonary vasculature is unremarkable.    AYANNA COOK MD         SYSTEM ID:  [id number]   CT Chest (PE) Abdomen Pelvis w Contrast    Narrative    CT CHEST PE ABDOMEN AND PELVIS WITH CONTRAST 11/28/2022 1:55 PM    CLINICAL HISTORY: Chest pain. Hypoxia, cough, chest pain, shortness of  breath, elevated D-dimer.    TECHNIQUE: CT scan of the chest, abdomen, and pelvis was performed  following injection of IV contrast. Multiplanar reformats were  obtained. Dose reduction techniques were used.   CONTRAST: 81  mL isovue 370    COMPARISON: None.    FINDINGS:   ANGIOGRAM CHEST: Pulmonary arteries are normal caliber and negative  for pulmonary emboli. Thoracic aorta is negative for dissection. No CT  evidence of right heart strain.    LUNGS AND PLEURA: Moderate to severe pulmonary fibrosis. Moderate  traction bronchiectasis. Areas of honeycombing present. No  effusion.    MEDIASTINUM/AXILLAE: Mediastinal and bilateral hilar adenopathy  progressed from previous, nonspecific in this setting. No aneurysm.    CORONARY ARTERY CALCIFICATIONS: Mild.    HEPATOBILIARY: No significant mass or bile duct dilatation. No  calcified gallstones.     PANCREAS: No significant mass, duct dilatation, or inflammatory  change.    SPLEEN: Normal size.    ADRENAL GLANDS: No significant nodules.    KIDNEYS/BLADDER: No significant mass, stones, or hydronephrosis.    BOWEL: No obstruction or inflammatory change.    PELVIC ORGANS: No pelvic masses.    ADDITIONAL FINDINGS: No ascites. There are mild atherosclerotic  changes of the visualized aorta and its branches. There is no evidence  of aortic dissection or aneurysm.    MUSCULOSKELETAL: No frankly destructive bony lesions.      Impression    IMPRESSION:    1. Moderate to severe pulmonary fibrosis.  2. No pulmonary embolism demonstrated.  3. Progressive adenopathy in the chest which is nonspecific in this  setting.  4. No acute process demonstrated in the abdomen and pelvis.    AYANNA COOK MD         SYSTEM ID:  [id number]       Discharge Medications   Current Discharge Medication List      START taking these medications    Details   amoxicillin-clavulanate (AUGMENTIN) 875-125 MG tablet Take 1 tablet by mouth 2 times daily  Qty: 12 tablet, Refills: 0    Associated Diagnoses: Bacterial pneumonia      guaiFENesin (ROBITUSSIN) 20 mg/mL liquid Take 10 mLs by mouth every 4 hours as needed for cough  Qty: 236 mL, Refills: 0    Associated Diagnoses: ILD (interstitial lung disease) (H)      ipratropium - albuterol 0.5 mg/2.5 mg/3 mL (DUONEB) 0.5-2.5 (3) MG/3ML neb solution Take 1 vial (3 mLs) by nebulization every 4 hours as needed for wheezing  Qty: 90 mL, Refills: 0    Comments: Future refills by PCP Dr. Adrianna Singh with phone number [phone].  Associated Diagnoses: ILD (interstitial lung disease) (H)      melatonin 3 MG tablet Take 1 tablet  (3 mg) by mouth nightly as needed for sleep    Associated Diagnoses: Insomnia, unspecified type      predniSONE (DELTASONE) 10 MG tablet Take 40 Mg p.o. daily for 3 days then 30 Mg p.o. daily for next 7 days then 20 Mg p.o. daily for next 7 days then 10 Mg p.o. daily for next 7 days.  Qty: 54 tablet, Refills: 0    Associated Diagnoses: ILD (interstitial lung disease) (H)         CONTINUE these medications which have CHANGED    Details   famotidine (PEPCID) 40 MG tablet Take 1 tablet (40 mg) by mouth every evening    Associated Diagnoses: Heartburn         CONTINUE these medications which have NOT CHANGED    Details   amLODIPine (NORVASC) 5 MG tablet TAKE 1 TABLET(5 MG) BY MOUTH DAILY  Qty: 90 tablet, Refills: 0    Comments: ZERO refills remain on this prescription. Your patient is requesting advance approval of refills for this medication to PREVENT ANY MISSED DOSES  Associated Diagnoses: Benign essential hypertension      aspirin 81 MG chewable tablet Take 1 tablet (81 mg) by mouth daily  Qty: 90 tablet, Refills: 2    Associated Diagnoses: Type 2 diabetes mellitus without complication, without long-term current use of insulin (H)      benzonatate (TESSALON) 200 MG capsule Take 1 capsule (200 mg) by mouth 3 times daily as needed for cough  Qty: 90 capsule, Refills: 3    Associated Diagnoses: ILD (interstitial lung disease) (H); Cough      hydroxychloroquine (PLAQUENIL) 200 MG tablet Take 1 tablet (200 mg) by mouth 2 times daily Annual Plaquenil toxicity eye screening required.  Qty: 180 tablet, Refills: 3    Associated Diagnoses: Rheumatoid factor positive with cyclic citrullinated peptide (CCP) antibody negative      losartan (COZAAR) 25 MG tablet Take 1 tablet (25 mg) by mouth daily  Qty: 90 tablet, Refills: 0    Comments: Medication filled 1 time as pt is due for a follow-up in clinic. Please have patient call [phone] to schedule.  Associated Diagnoses: Microalbuminuria      OFEV 100 MG capsule TAKE ONE  CAPSULE BY MOUTH TWICE A DAY  Qty: 60 capsule, Refills: 11    Associated Diagnoses: ILD (interstitial lung disease) (H); Progressive fibrosing interstitial lung disease (H)      ondansetron (ZOFRAN) 4 MG tablet Take 1 tablet (4 mg) by mouth every 8 hours as needed for nausea  Qty: 30 tablet, Refills: 1    Associated Diagnoses: Nausea      sulfaSALAzine ER (AZULFIDINE EN) 500 MG EC tablet take 2 tabs twice a day.  Qty: 120 tablet, Refills: 4    Associated Diagnoses: Rheumatoid factor positive with cyclic citrullinated peptide (CCP) antibody negative      VITAMIN D3 50 MCG (2000 UT) tablet TAKE 1 TABLET( 50MCG) BY MOUTH DAILY.  Qty: 90 tablet, Refills: 0    Comments: Medication filled 1 time as pt is due for a follow-up in clinic. Please have patient call [phone] to schedule.  Associated Diagnoses: Vitamin D deficiency      blood glucose (NO BRAND SPECIFIED) test strip Use to test blood sugar 1 time daily or as directed. Blood Glucose Monitor Brands: per insurance.  Qty: 100 strip, Refills: 6    Associated Diagnoses: Type 2 diabetes mellitus without complication, without long-term current use of insulin (H)      mycophenolate (GENERIC EQUIVALENT) 500 MG tablet Take 1 tablet (500 mg) by mouth 2 times daily for 14 days, THEN 1.5 tablets (750 mg) 2 times daily for 14 days, THEN 2 tablets (1,000 mg) 2 times daily for 30 days.  Qty: 190 tablet, Refills: 4    Associated Diagnoses: Rheumatoid lung (H)      STATIN NOT PRESCRIBED (INTENTIONAL) Please choose reason not prescribed, below  Qty:      Associated Diagnoses: Type 2 diabetes mellitus without complication, without long-term current use of insulin (H)           Allergies   Allergies   Allergen Reactions     Atorvastatin Muscle Pain (Myalgia)

## 2022-12-02 NOTE — PROGRESS NOTES
Care Management Discharge Note    Discharge Date: 12/02/2022       Discharge Disposition: Home    Discharge Services: None    Discharge DME: Walker    Discharge Transportation: family or friend will provide    Private pay costs discussed: Not applicable    PAS Confirmation Code:    Patient/family educated on Medicare website which has current facility and service quality ratings:      Education Provided on the Discharge Plan:    Persons Notified of Discharge Plans: Bedside RN  Patient/Family in Agreement with the Plan: yes    Handoff Referral Completed: No    Additional Information:  Pt is discharging today and family will provide transportation. Pt will discharge with O2, NORA Perry did dot phrase assessment and the oxygen will be delivered to the Pt along with a nebulizer machine. Writer set up f/u appointment on 12/12 at 1140 with her PCP. Writer also set up [facility]OT for Pt with [facility].         Tashia Davis RN, BSN, Care Coordinator

## 2022-12-02 NOTE — PLAN OF CARE
Occupational Therapy Discharge Summary    Reason for therapy discharge:    Discharged to home.    Progress towards therapy goal(s). See goals on Care Plan in [facility] electronic health record for goal details.  Goals partially met.  Barriers to achieving goals:   discharge from facility.    Therapy recommendation(s):    Anticipate w/medical intervention, she will progress and be safe to d/c home w/ family assisting. Also recommend HH OT to further progress activity tolerance for I/ADL at home.

## 2022-12-02 NOTE — PLAN OF CARE
Goal Outcome Evaluation:    A&Ox4. VSS on 2L via NC. Scheduled nebs. Dyspnea w/ exertion. Expiratory wheezing & course crackles present. PRN Tylenol given for c/o sternal pain d/t coughing. Infrequent productive cough. Continent of B/B. Loose stool x2. BG AC & HS; 179 & 157. Regular diet; thin liquids; excellent appetite. SBA w/ GB/walker. Tele NSR. K+ protocol; recheck in a.m. Family at bedside, Tibetan speaking. Discharge pending Na+ stability, PT/OT eval, & oxygen needs.

## 2022-12-02 NOTE — PLAN OF CARE
Goal Outcome Evaluation:      DATE & TIME:12/2/22 [id number]  Summary: Increasing Cough, SOB  Cognitive Concerns/ Orientation : A&OX4, family member interprets/speaks Tibetan at bedside  BEHAVIOR & AGGRESSION TOOL COLOR: Green  CIWA SCORE: NA   ABNL VS/O2: VSS on 2L O2 at rest; 2-3 lpm with activity  MOBILITY: SBA to bathroom and ambulated medrano  PAIN MANAGMENT: denies pain  DIET: Regular; good intake  BOWEL/BLADDER: Continent  ABNL LAB/BG:  Na 130, K+ 3.4, replaced, recheck at 3 pm; , 218  DRAIN/DEVICES: intermittent IV Abx.  TELEMETRY RHYTHM: NSR- ST  SKIN: WNL  TESTS/PROCEDURES: NA  D/C DATE: this afternoon or evening - after oxygen and home care arranged  Discharge Barriers: NA  OTHER IMPORTANT INFO:

## 2022-12-02 NOTE — PLAN OF CARE
Goal Outcome Evaluation:       DATE & TIME: 12/01/2022-12/02/2022 [id number]  Summary: Increasing Cough, SOB  Cognitive Concerns/ Orientation : A&OX4, son-in-law interprets/speaks Tibetan at bedside  BEHAVIOR & AGGRESSION TOOL COLOR: Green  CIWA SCORE: NA   ABNL VS/O2: VSS on 2L O2; sats drop to 88% with cough on oxygen;  MOBILITY: SBA to bathroom   PAIN MANAGMENT: denies pain  DIET: Regular; good intake  BOWEL/BLADDER: Continent  ABNL LAB/BG: WBC 11.8, Hgb 10.3, Na 129,   DRAIN/DEVICES: Completing one time dose of 1000mL NS @ 50/hr and intermittent Abx.  TELEMETRY RHYTHM: NSR  SKIN: WNL  TESTS/PROCEDURES: NA  D/C DATE: Pending 1-3 days  Discharge Barriers: NA  OTHER IMPORTANT INFO: family member at bedside with patient 24/7 to interpret.  on scheduled mucomyst neb.   Swabbed for full respiratory panel - all negative.

## 2022-12-02 NOTE — PROGRESS NOTES
Hospitalist brief update note    Oxygen Documentation:         Pulse oximetry (SpO2) and Oxygen flow readings:  SpO2 =  94% on room air at rest while awake.  SpO2 improved to  97% on 2 liters/minute at rest.  SpO2 =  77% on room air during activity/with exercise.  *SpO2 improved to  94% on 3 liters/minute during activity/with exercise.    I certify that this patient, Krystian Boland has been under my care (or a nurse practitioner or physician's assistant working with me). This is the face-to-face encounter for oxygen medical necessity.      Krystian Boland is now in a chronic stable state and continues to require supplemental oxygen. Patient has continued oxygen desaturation due to Interstitial lung disease and pneumonia.    Alternative treatment(s) tried or considered and deemed clinically infective for treatment of  Interstitial lung disease and pneumonia. include nebulizers, steroids, pulmonary rehab, and antibiotic.  If portability is ordered, is the patient mobile within the home? yes    **Patients who qualify for home O2 coverage under the CMS guidelines require ABG tests or O2 sat readings obtained closest to, but no earlier than 2 days prior to the discharge, as evidence of the need for home oxygen therapy. Testing must be performed while patient is in the chronic stable state. See notes for O2 sats.**    Donna Escobedo MD  Hospitalist

## 2022-12-02 NOTE — PROGRESS NOTES
Chronic Pulmonary Disease Specialist  Progress Note     Received consult to assess if patient requires advanced respiratory equipment needs at home.     Current O2 requirements: Patient on 2L NC with sats of 99%. Per chart review, patient appears to be having increased MATUTE with the use of 2-4L O2 during activity.     Recommendations :  -Follow up wit patients OP Pulmonologist, Dr. Jase Keller. Has upcoming appointment on 1/25/23-perhaps earlier virtual visit can be arranged.   -Per IP Pulm recommendation, follow up with Rheumatology, upcoming appoinment on 1/19/23    -Pulmonary Rehab referral for strengthening and conditioning    -Does not appear patient has any home inhaled respiratory medications. Would continue home Tessalon Pearls for cough. Would defer to patients pulmonologist to recommend specific inhaled respiratory recommendations.     -Referral for OP sleep consult to assess for sleep disordered breathing, LALITHA, nocturnal hypoxia, and hypoventilation. Does patient?:    Wake up short of breath during the night    Exhibit Snoring/gasping/choking    Have Excessive sleepiness during the day    -Home Oxygen Assessment Testing 24-48 hours prior to discharge to determine if patient has NEW O2 needs at rest and with exertion/activity. If the patient requires oxygen at rest, the walk test must be performed using the new baseline O2 to determine if patient needs more oxygen with activity.        Elaina Garcia, RRT, CTTS  Chronic Pulmonary Disease Specialist  Office: [phone]  Pager: [phone]

## 2022-12-02 NOTE — PLAN OF CARE
Physical Therapy Discharge Summary    Reason for therapy discharge:    Discharged to home with home therapy.    Progress towards therapy goal(s). See goals on Care Plan in [facility] electronic health record for goal details.  Goals partially met.  Barriers to achieving goals:   discharge from facility.    Therapy recommendation(s):    Continued therapy is recommended.  Rationale/Recommendations:  Recommend continued skilled physical therapy services to continue improving independence with functional mobility including bed mobility, transfers, and ambulation.

## 2022-12-03 LAB — BACTERIA BLD CULT: NO GROWTH

## 2022-12-05 ENCOUNTER — TELEPHONE (OUTPATIENT)
Dept: INTERNAL MEDICINE | Facility: CLINIC | Age: 72
End: 2022-12-05

## 2022-12-05 ENCOUNTER — PATIENT OUTREACH (OUTPATIENT)
Dept: GERIATRIC MEDICINE | Facility: CLINIC | Age: 72
End: 2022-12-05

## 2022-12-05 NOTE — TELEPHONE ENCOUNTER
Reason for Call:  Form, our goal is to have forms completed with 72 hours, however, some forms may require a visit or additional information.    Type of letter, form or note:  FMLA    Who is the form from?: Patient    Where did the form come from: Patient or family brought in       What clinic location was the form placed at?: Internal Medicine    Where the form was placed: Given to physician    What number is listed as a contact on the form?     [phone]  Additional comments no    Call taken on 12/5/2022 at 10:35 AM by Debra Sweeney

## 2022-12-05 NOTE — PROGRESS NOTES
[facility] Care Coordination Contact    Called adult daughter David to schedule annual HRA home visit. HRA has been scheduled for 12/20/22. at 1pm. Daughter will  due to limited available  for language.  JAQUELIN Langley  [facility]  [phone]

## 2022-12-09 ENCOUNTER — TELEPHONE (OUTPATIENT)
Dept: INTERNAL MEDICINE | Facility: CLINIC | Age: 72
End: 2022-12-09

## 2022-12-09 NOTE — TELEPHONE ENCOUNTER
Amanda with [employer] called requesting approval for initial HC visit today. PT went to see pt but pt declined care since she has good family support.  Amanda still needs approval for the initial visit.     Ok to leave message at [phone]. Ok to leave a message.

## 2022-12-11 PROBLEM — J96.01 ACUTE RESPIRATORY FAILURE WITH HYPOXIA (H): Status: RESOLVED | Noted: 2022-11-28 | Resolved: 2022-12-11

## 2022-12-11 PROBLEM — J84.112 IPF (IDIOPATHIC PULMONARY FIBROSIS) (H): Status: RESOLVED | Noted: 2022-11-28 | Resolved: 2022-12-11

## 2022-12-11 PROBLEM — R59.1 LYMPHADENOPATHY: Status: RESOLVED | Noted: 2022-11-28 | Resolved: 2022-12-11

## 2022-12-11 PROBLEM — D72.829 LEUKOCYTOSIS, UNSPECIFIED TYPE: Status: RESOLVED | Noted: 2022-11-28 | Resolved: 2022-12-11

## 2022-12-11 PROBLEM — J84.9 ILD (INTERSTITIAL LUNG DISEASE) (H): Status: RESOLVED | Noted: 2018-05-23 | Resolved: 2022-12-11

## 2022-12-11 PROBLEM — R05.1 ACUTE COUGH: Status: RESOLVED | Noted: 2022-11-28 | Resolved: 2022-12-11

## 2022-12-11 PROBLEM — J84.9 INTERSTITIAL LUNG DISEASE (H): Status: ACTIVE | Noted: 2022-12-11

## 2022-12-11 PROBLEM — E87.1 HYPONATREMIA: Status: RESOLVED | Noted: 2022-11-28 | Resolved: 2022-12-11

## 2022-12-12 ENCOUNTER — MEDICAL CORRESPONDENCE (OUTPATIENT)
Dept: HEALTH INFORMATION MANAGEMENT | Facility: CLINIC | Age: 72
End: 2022-12-12

## 2022-12-12 ENCOUNTER — OFFICE VISIT (OUTPATIENT)
Dept: INTERNAL MEDICINE | Facility: CLINIC | Age: 72
End: 2022-12-12
Payer: COMMERCIAL

## 2022-12-12 VITALS
SYSTOLIC BLOOD PRESSURE: 134 MMHG | BODY MASS INDEX: 28.36 KG/M2 | HEART RATE: 97 BPM | HEIGHT: 62 IN | TEMPERATURE: 97.8 F | DIASTOLIC BLOOD PRESSURE: 74 MMHG | WEIGHT: 154.1 LBS | OXYGEN SATURATION: 97 %

## 2022-12-12 DIAGNOSIS — F02.80 EARLY ONSET ALZHEIMER'S DEMENTIA WITHOUT BEHAVIORAL DISTURBANCE (H): ICD-10-CM

## 2022-12-12 DIAGNOSIS — J96.01 ACUTE RESPIRATORY FAILURE WITH HYPOXIA (H): ICD-10-CM

## 2022-12-12 DIAGNOSIS — Z09 HOSPITAL DISCHARGE FOLLOW-UP: Primary | ICD-10-CM

## 2022-12-12 DIAGNOSIS — F41.9 ANXIETY: ICD-10-CM

## 2022-12-12 DIAGNOSIS — J84.9 INTERSTITIAL LUNG DISEASE (H): ICD-10-CM

## 2022-12-12 DIAGNOSIS — G30.0 EARLY ONSET ALZHEIMER'S DEMENTIA WITHOUT BEHAVIORAL DISTURBANCE (H): ICD-10-CM

## 2022-12-12 DIAGNOSIS — I10 BENIGN ESSENTIAL HYPERTENSION: ICD-10-CM

## 2022-12-12 DIAGNOSIS — R05.3 CHRONIC COUGH: ICD-10-CM

## 2022-12-12 PROCEDURE — 99214 OFFICE O/P EST MOD 30 MIN: CPT | Performed by: INTERNAL MEDICINE

## 2022-12-12 RX ORDER — HYDROXYZINE HYDROCHLORIDE 10 MG/1
10 TABLET, FILM COATED ORAL 3 TIMES DAILY PRN
Qty: 30 TABLET | Refills: 1 | Status: SHIPPED | OUTPATIENT
Start: 2022-12-12

## 2022-12-12 NOTE — PROGRESS NOTES
ASSESSMENT/PLAN      (Z09) Hospital discharge follow-up  (primary encounter diagnosis)  (J96.01) Acute respiratory failure with hypoxia (H)  (J84.9) Interstitial lung disease (H)  Comment: clinically stable with follow-up scheduled with both rheumatology and pulmonology next month.  Plan: continue present management.    (G30.0,  F02.80) Early onset Alzheimer's dementia without behavioral disturbance (H)  Comment: known issue that I take into account for their medical decisions, no current exacerbations or new concerns.    (F41.9) Anxiety  Plan: TRIAL of hydroxyzine as needed for acute anxiety.    (R05.3) Chronic cough  (I10) Benign essential hypertension  Comment: chronic cough is likely related to interstitial lung disease not current medication, the patient is insistent.  Plan: TRIAL of holding losartan 25 mg daily for 2 weeks; if cough improves, may continue off of losartan; if no change in cough, patient to resume losartan; patient/daughter will monitor blood pressures in the interim.      Adrianna Singh MD   [address] [address]  T: [phone], F: [phone]    VINCENZO Boland is a very pleasant 72 year old female who presents for hospital follow-up:    Accompanied by daughter, who translates.    Hospital: [facility]  Date of Admission: 11/28/2022  Date of Discharge: 12/2/2022  Reason(s) for Admission: acute hypoxic respiratory failure    Diagnostic tests, treatments/interventions, and discharge summary reviewed.    Summary of hospitalization:  - presented with increasing cough and shortness of breath  - admission labs significant for marked leukocytosis and elevated CRP  - CT demonstrated moderate to severe pulmonary fibrosis  - treated with Zosyn and azithromycin while inpatient with clinical improvement  - discharged on 6 more days of Augmentin  - IV Solu-Medrol on admission, changed to prednisone, with gradual taper, per  "pulmonary  - discharged on oxygen with instructions to wean as tolerated    Medication changes since discharge: completed Augmentin; weaned off of oxygen  Adherent to discharge medications: yes  Problems taking discharge medications: no    Follow-up: rheumatology follow-up mid January; pulmonary follow-up scheduled for end of January    Update since discharge:   - breathing is back to baseline (near continuous, mild shortness of breath at rest; dyspnea with minimal exertion)  - occasional dry cough, which patient suspects may be related to one of her blood pressure medicines  - anxious at times due to shortness of breath    OBJECTIVE      /74   Pulse 97   Temp 97.8  F (36.6  C) (Tympanic)   Ht 1.575 m (5' 2\")   Wt 69.9 kg (154 lb 1.6 oz)   LMP  (LMP Unknown)   SpO2 97%   Breastfeeding No   BMI 28.19 kg/m     Constitutional: well-appearing  Respiratory: tachypneic with increased work of breathing  Cardiovascular: regular rate and rhythm; no edema  ---  (Note was completed, in part, with [employer] voice-recognition software. Documentation was reviewed, but some grammatical, spelling, and word errors may remain.)    "

## 2022-12-12 NOTE — PATIENT INSTRUCTIONS
TRIAL of HOLDING losartan 25mg x 2 weeks.    If your cough improves, we'll stay off of losartan.    If your cough stays the same, we will restart it.     Check blood pressures several times a week in the meantime.    Blood pressure checks at home - set yourself up for success!     - use a good quality blood pressure machine ([employer] brand name recommended)  - use an appropriately sized, upper arm/brachial blood pressure cuff   - wait 2-3 hours after taking your blood pressure medication before checking blood pressure   - take your blood pressure in a quiet room  - take your blood pressure while seated with your feet flat on the ground  - avoid stimuli right before checking (coffee, exercise, heated discussions)  - avoid stimuli during your blood pressure check (TV, talking, loud music)  - once seated and blood pressure cuff is on, wait 5 minutes before checking blood pressure     Goal blood pressure is 130s/70s or less.

## 2022-12-13 DIAGNOSIS — R80.9 MICROALBUMINURIA: ICD-10-CM

## 2022-12-14 RX ORDER — LOSARTAN POTASSIUM 25 MG/1
25 TABLET ORAL DAILY
Qty: 90 TABLET | Refills: 2 | Status: SHIPPED | OUTPATIENT
Start: 2022-12-14 | End: 2024-04-01

## 2022-12-19 DIAGNOSIS — R12 HEARTBURN: ICD-10-CM

## 2022-12-20 ENCOUNTER — IMMUNIZATION (OUTPATIENT)
Dept: NURSING | Facility: CLINIC | Age: 72
End: 2022-12-20
Payer: COMMERCIAL

## 2022-12-20 ENCOUNTER — PATIENT OUTREACH (OUTPATIENT)
Dept: GERIATRIC MEDICINE | Facility: CLINIC | Age: 72
End: 2022-12-20

## 2022-12-20 PROCEDURE — 0124A COVID-19 VACCINE BIVALENT BOOSTER 12+ (PFIZER): CPT

## 2022-12-20 PROCEDURE — 91312 COVID-19 VACCINE BIVALENT BOOSTER 12+ (PFIZER): CPT

## 2022-12-20 RX ORDER — FAMOTIDINE 40 MG/1
40 TABLET, FILM COATED ORAL EVERY EVENING
Qty: 90 TABLET | Refills: 3 | Status: SHIPPED | OUTPATIENT
Start: 2022-12-20 | End: 2024-03-11

## 2022-12-20 SDOH — ECONOMIC STABILITY: FOOD INSECURITY: WITHIN THE PAST 12 MONTHS, YOU WORRIED THAT YOUR FOOD WOULD RUN OUT BEFORE YOU GOT MONEY TO BUY MORE.: NEVER TRUE

## 2022-12-20 SDOH — ECONOMIC STABILITY: INCOME INSECURITY: IN THE LAST 12 MONTHS, WAS THERE A TIME WHEN YOU WERE NOT ABLE TO PAY THE MORTGAGE OR RENT ON TIME?: YES

## 2022-12-20 SDOH — ECONOMIC STABILITY: INCOME INSECURITY: IN THE LAST 12 MONTHS, WAS THERE A TIME WHEN YOU WERE NOT ABLE TO PAY THE MORTGAGE OR RENT ON TIME?: NO

## 2022-12-20 SDOH — ECONOMIC STABILITY: FOOD INSECURITY: WITHIN THE PAST 12 MONTHS, THE FOOD YOU BOUGHT JUST DIDN'T LAST AND YOU DIDN'T HAVE MONEY TO GET MORE.: NEVER TRUE

## 2022-12-20 ASSESSMENT — LIFESTYLE VARIABLES
AUDIT-C TOTAL SCORE: 0
SKIP TO QUESTIONS 9-10: 1
HOW MANY STANDARD DRINKS CONTAINING ALCOHOL DO YOU HAVE ON A TYPICAL DAY: PATIENT DOES NOT DRINK
HOW OFTEN DO YOU HAVE A DRINK CONTAINING ALCOHOL: NEVER
HOW OFTEN DO YOU HAVE SIX OR MORE DRINKS ON ONE OCCASION: NEVER

## 2022-12-20 ASSESSMENT — SOCIAL DETERMINANTS OF HEALTH (SDOH): HOW HARD IS IT FOR YOU TO PAY FOR THE VERY BASICS LIKE FOOD, HOUSING, MEDICAL CARE, AND HEATING?: NOT HARD AT ALL

## 2022-12-20 ASSESSMENT — PATIENT HEALTH QUESTIONNAIRE - PHQ9: SUM OF ALL RESPONSES TO PHQ QUESTIONS 1-9: 3

## 2022-12-20 NOTE — Clinical Note
I am the [facility] Care Coordinator for Krystian. I was out to do her annual assessment in her home on 12/20/22. No concerns at this time. Thank you for your review. Vickie

## 2022-12-21 NOTE — TELEPHONE ENCOUNTER
Good Morning Kat.  I was working on the medication list and this patient looks like they came to us by mistake.

## 2022-12-26 SDOH — HEALTH STABILITY: PHYSICAL HEALTH: ON AVERAGE, HOW MANY DAYS PER WEEK DO YOU ENGAGE IN MODERATE TO STRENUOUS EXERCISE (LIKE A BRISK WALK)?: 0 DAYS

## 2022-12-26 SDOH — HEALTH STABILITY: PHYSICAL HEALTH: ON AVERAGE, HOW MANY MINUTES DO YOU ENGAGE IN EXERCISE AT THIS LEVEL?: 0 MIN

## 2022-12-27 ENCOUNTER — PATIENT OUTREACH (OUTPATIENT)
Dept: GERIATRIC MEDICINE | Facility: CLINIC | Age: 72
End: 2022-12-27

## 2022-12-27 NOTE — LETTER
December 27, 2022      KRYSTIAN BARNES  [address]  [place] [address]      Dear Krystian:    At [facility], we re dedicated to improving your health and wellness. Enclosed is the Care Plan developed with you on 12/20/2022. Please review the Care Plan carefully.    As a reminder, during your visit we talked about:    Ways to manage your physical and mental health    Using health care to maintain and improve your health     Your preventive care needs     Remember to contact your care coordinator if you:    Are hospitalized, or plan to be hospitalized     Have a fall      Have a change in your physical or mental health    Need help finding support or services    If you have questions, or don t agree with your Care Plan, call me at [phone]. You can also call me if your needs change. TTY users, call the [place] [employer] at (740) or 1-948.738.7946 (mkupdb-ac-zvjjlq relay service).    Sincerely,    JAQUELIN Langley    E-mail: [email or web address]  Phone: [phone]    Care Manager  [facility] is a health plan that contracts with both Medicare and the [place] Medical Assistance (Medicaid) program to provide benefits of both programs to enrollees. Enrollment in [facility] depends on contract renewal.    [id number] accepted    [id number] (07/2022)

## 2022-12-27 NOTE — PROGRESS NOTES
[facility] Care Coordination Contact    [facility] Home Visit Assessment     Home visit for Health Risk Assessment with Krystian Boland completed on December 20, 2022    Type of residence:: Private home - stairs (3 steps into the home. Full flight of steps into basement. Bedroom and bathroom are on main level.)  Current living arrangement:: I live in a private home with family (Lives with spouse in daughter's home with daughter's spouse and children.)     Assessment completed with:: Patient, Family, this Care Coordinator,  Partners trainee (Linda Andrews)    Current Care Plan  Member currently receiving the following home care services: None    Member currently receiving the following community resources: Incontinent products    Health Issues: Member rates her health as fair. She reports that her biggest health issue has been her pulmonary issues. She shared that she continues to recover from her respiratory illness, and is concerned about getting it again. She continues to have daily coughing and is following up with her MD as scheduled. She shared that she is hoping to be able to go back to [place] with her family for one last trip and shared that she will be to go. She shared that she takes her medications as directed by her MD. She shared that she continues to have daily joint pain but shared that her pain medication help give her relief. Has had 1 hospitalization in the past year.     Medication Review  Medication reconciliation completed in Epic: Yes  Medication set-up & administration: Family/informal caregiver sets up weekly.  Self-administers medications.  Medication Risk Assessment Medication (1 or more, place referral to MTM): N/A: No risk factors identified  MTM Referral Placed: No: No risk factors idenified    Mental/Behavioral Health   Depression Screening:   PHQ-9 Total Score: 3  Mental health DX:: No      Family is concerned that member has been more depressed lately, reporting that  member has been more withdrawn. Family shared that they will be discussing this this member's MD. Member reports that she has some forgetfulness but was able to recall most recent hospitalization, medication changes as well and her treatments.    Falls Assessment:   Fallen 2 or more times in the past year?: No   Any fall with injury in the past year?: No    ADL/IADL Dependencies:   Dependent ADLs:: Independent. Reviewed with family that due to member's breathing and increased fatigued due to pulmonary disease may result in rapid changes, and informed family that if member experiences these changes to reach back out to this Care Coordinator for a new assessment.  Dependent IADLs:: Shopping, Cleaning, Medication Management, Money Management, Transportation. Member lives with family who are able to provide informal support for IADL cares.    [facility] sponsored benefits: Shared information re: Silver Sneakers/gym memberships, ASA, Calcium +D.    PCA Assessment completed at visit: No     Elderly Waiver Eligibility: No-does not meet criteria    Care Plan & Recommendations: Member wants to continue to live with her  at her daughter's home. She wants to continue receive informal support from her family. Member strives to assist in the home where she is able, and that does not aggravate her breathing. Family is not interested in being paid care givers for the informal support that they are providing.     See [facility] for detailed assessment information.    Follow-Up Plan: Member informed of future contact, plan to f/u with member with a 6 month telephone assessment.  Contact information shared with member and family, encouraged member to call with any questions or concerns at any time.    [facility] care continuum providers: Please see Snapshot and Care Management Flowsheets for Specific details of care plan.    This CC note routed to PCP.  Vickie Molina KELLY  [facility]  [phone]

## 2022-12-27 NOTE — PROGRESS NOTES
[facility] Care Coordination Contact    Received after visit chart from care coordinator.  Completed following tasks: Mailed copy of care plan to client, Updated services in Database, Sent the following resources/forms: healthy savings info  and Mailed [employer] Safe Medication Disposal      -POC signed and saved to member file    Kaycee Tinsley  Care Management Specialist  [facility]  [phone]

## 2022-12-28 NOTE — TELEPHONE ENCOUNTER
Good Morning Yakelin.  We have received this Rx request a couple of times now.  Dr. Ceballos has never seen or prescribed this medication for this patient.  Is this coming to us by mistake?

## 2022-12-28 NOTE — TELEPHONE ENCOUNTER
We never sent this to haseeb we sent to frederic. I do not know how or why it keeps going to you

## 2022-12-28 NOTE — TELEPHONE ENCOUNTER
All is good. We just wanted to make sure the patient's were getting their medications timely.  Thank you for responding! I will disregard these.

## 2023-01-10 ENCOUNTER — MEDICAL CORRESPONDENCE (OUTPATIENT)
Dept: HEALTH INFORMATION MANAGEMENT | Facility: CLINIC | Age: 73
End: 2023-01-10

## 2023-01-16 NOTE — PROGRESS NOTES
[facility]  Rheumatology Clinic  Harjit Staley MD  2023     Name: Krystian Boland  MRN: [id number]  Age: 72 year old  : 1950  Referring provider: Referred Self     Assessment and Plan:  # RA with positive rheumatoid factor and anti-CCP noted in 2018:  Patient reports minimal joint pain since starting a course of moderate dose prednisone in early 2022 associated with hospitalization for respiratory failure.  Control of inflammatory joint pain has continued despite discontinuation of prednisone 3 weeks ago.  Exam shows no gross synovitis.  Data: Lab work in early 2022 showed mild hyponatremia; creatinine normal; transaminases normal, but albumin decreased at 2.6.  CBC showed mild anemia with hemoglobin 10.3.    Imaging: Moderate to severe pulmonary fibrosis and progressive adenopathy in the chest noted on chest CT done 2022.     Overall, I think that inflammatory arthropathy is well controlled on steroid sparing combination therapy.  Symptoms in the right shoulder, right thumb, and right knee are improved since last visit, in association with use of prednisone.  For arthropathy, I recommend continuing hydroxychloroquine and sulfasalazine combination for disease modifying effect.    We discussed the need for annual ophthalmologic monitoring while on plaquenil.  Dedicated retinal exam is scheduled for 2023.    # Interstitial lung disease:   Because of respiratory insufficiency that resulted in hospitalization in late  remains incompletely understood.  I agree with Dr. Keller, that the start of mycophenolate prior to worsening shortness of breath is not likely causal.  Most likely, the combination of respiratory viral illness superimposed on moderately severe interstitial lung disease resulted in decompensation.  Patient now reports modestly improved dyspnea on exertion and shortness of breath, despite having discontinued prednisone in late 2022.  I  agree with Dr. Keller that the cause of longstanding slowly progressive dyspnea is likely due to moderately severe interstitial lung disease associated with seropositive rheumatoid arthritis, and that enhanced immunomodulatory therapy is warranted (beyond sulfasalazine, hydroxychloroquine, and Ofev).  I think it is too soon to determine whether CellCept that was prescribed by Dr. Keller will be adequate.    We discussed my recommendation that patient follow through with planned CT scan of the chest, repeat PFTs, and blood work scheduled for late January 2023.  If there is radiographic progression compared to the last dedicated CT in May 2022, I recommend consideration of change in therapy.  Rituximab would be expected to provide not only effective immunomodulatory therapy for ILD and substitute for mycophenolate, but could replace multidrug therapy currently used to suppress arthropathy and act as a steroid sparing agent.  I will await results of Dr. Keller's evaluation and the indicated tests to make further recommendations.      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  Symptomatic treatment will be continued with heat, acetaminophen, and regular exercise for the hands.    Plan:  1.  Continue hydroxychloroquine 400 mg once daily; while using hydroxychloroquine, undergo annual screening examination for rare retinal Plaquenil toxicity, appointment due in February 2023  2.  Continue sulfasalazine 1000 mg (2 tablets) twice daily.  3.  Recheck blood work in 3 to 4 months;  4.  Physical therapy prescribed exercises for left shoulder pain  5.  Continue Ofev and other pulmonary recommended measures for interstitial lung disease.  6.  Continue CellCept 1000 mg twice  daily..     F/u 6 mos    Harjit Staley M.D.  Staff Rheumatologist, [facility]  Pager [phone]      HPI:   Krystian Boland has a history of ILD and RA and presents for follow-up. I last saw the patient in 5-2022 at which time rheumatoid arthritis was overall well controlled, but flaring with multiple joint inflammatory arthritis.  I recommended a prednisone burst and taper.  I recommended continuing combination sulfasalazine and hydroxychloroquine.    Interval history January 19, 2023    Daughter acted as     Patient saw Dr. Keller in pulmonology on October 26, 2022.  Impression was of rheumatoid arthritis associated interstitial lung disease, with progressive exertional dyspnea symptoms that had been prednisone sensitive in July 2022.  Recommendation was to add CellCept 500 mg twice daily and advance to 1000 mg twice daily, in addition to sulfasalazine and hydroxychloroquine.    Patient was admitted to [facility] from November 28 through December 2.  Discharge diagnosis included respiratory failure, possible flare of interstitial lung disease versus pneumonia, and mediastinal lymphadenopathy.  Patient had recently started on CellCept and then noted increasing cough.  CT scan showed moderately severe pulmonary fibrosis.  Patient was treated with Zosyn and azithromycin, and leukocytosis improved.  Patient was given intravenous Solu-Medrol, switched to prednisone with discharge on 40 mg of prednisone daily tapering to 10 mg daily over 3 weeks. dyspnea and cough improved.  Prior to admission sulfasalazine, CellCept, hydroxychloroquine and Ofev were continued.    Today, she reports that breathing and cough are improved. She finished prednisone several weeks ago. Exercise tolerance is improved.  There is no joint pain now. Before hospitalization she had swelling and pain in her R wrist and R hand fingers. Early morning stiffness is variable.   Her daughter says that prosper had tried to ramp up  cellcept prior to hospitalization; after hospitalization, she has increased to 1000 mg twice day. She notes mild nausea, otherwise well-tolerated.     Interval history May 26, 2022  Daughter acted as  throughout this session.  Through daughter , patient relates increasing pain in several joint areas over the past several weeks.  First, she notes right shoulder pain, worse with arm raising, and at night.  Pain is so severe that she is kept awake at night, and is unable to dress fully independently.  No antecedent injury.  No visible swelling redness warmth or fever.  Patient also notes worsening right base of thumb pain, made worse with grasping and lifting activities.  She reports swelling, warmth, and stiffness at the right knee, alleviated with movement.  Early morning stiffness overall has increased in recent weeks to 25 to 30 minutes.    She is taking Ofev, sulfasalazine (2 g daily), and hydroxychloroquine as recommended.  No specific side effects or adverse effects noted.    Patient saw Dr. Sawyer in ophthalmology in July 2021.  Impression was of long-term use of Plaquenil, no evidence of toxicity.    Interval history 1-    She has intermittent R > L thumb and finger pain, worse in the mornings. Knee pain resolved, and other joints beside R hand, doing well.  Massage and use of the joints, as well as thylenol, help.   Morning pain in the joints lasts about 2 hours, non-progressive.   ADLs are sometimes affected early in the morning.     Energy level is overall improved, and shortness of breath is stable.    Taking ofev, sulfasalazine and hydroxychloroquine as directed. No particular adverse effects noted      Review of Systems:   Pertinent items are noted in HPI or as below, remainder of complete ROS is negative.     No recent problems with hearing or vision. No swallowing problems.   No breathing difficulty, shortness of breath, or wheezing. + Continues to have dry cough.  No  chest pain or palpitations.  No heart burn, indigestion, abdominal pain, vomiting, diarrhea. + Nausea with Ofev.  No urination problems, no bloody, cloudy urine, no dysuria.  No numbing, tingling, weakness.  No headaches or confusion.  No rashes. No easy bleeding or bruising.   + Arthralgias, stiffness, swelling of multiple joints.  + Fatigue.    Active Medications:   Current Outpatient Medications   Medication Sig     amLODIPine (NORVASC) 5 MG tablet TAKE 1 TABLET(5 MG) BY MOUTH DAILY     aspirin 81 MG chewable tablet Take 1 tablet (81 mg) by mouth daily     benzonatate (TESSALON) 200 MG capsule Take 1 capsule (200 mg) by mouth 3 times daily as needed for cough     blood glucose (NO BRAND SPECIFIED) test strip Use to test blood sugar 1 time daily or as directed. Blood Glucose Monitor Brands: per insurance.     famotidine (PEPCID) 40 MG tablet Take 1 tablet (40 mg) by mouth every evening     guaiFENesin (ROBITUSSIN) 20 mg/mL liquid Take 10 mLs by mouth every 4 hours as needed for cough     hydroxychloroquine (PLAQUENIL) 200 MG tablet Take 1 tablet (200 mg) by mouth 2 times daily Annual Plaquenil toxicity eye screening required.     hydrOXYzine (ATARAX) 10 MG tablet Take 1 tablet (10 mg) by mouth 3 times daily as needed for anxiety     ipratropium - albuterol 0.5 mg/2.5 mg/3 mL (DUONEB) 0.5-2.5 (3) MG/3ML neb solution Take 1 vial (3 mLs) by nebulization every 4 hours as needed for wheezing     losartan (COZAAR) 25 MG tablet Take 1 tablet (25 mg) by mouth daily     melatonin 3 MG tablet Take 1 tablet (3 mg) by mouth nightly as needed for sleep     mycophenolate (GENERIC EQUIVALENT) 500 MG tablet Take 2 tablets (1,000 mg) by mouth 2 times daily Labs every 8-12 weeks.     OFEV 100 MG capsule TAKE ONE CAPSULE BY MOUTH TWICE A DAY     ondansetron (ZOFRAN) 4 MG tablet Take 1 tablet (4 mg) by mouth every 8 hours as needed for nausea     STATIN NOT PRESCRIBED (INTENTIONAL) Please choose reason not prescribed, below      sulfaSALAzine ER (AZULFIDINE EN) 500 MG EC tablet take 2 tabs twice a day.     VITAMIN D3 50 MCG (2000 UT) tablet TAKE 1 TABLET( 50MCG) BY MOUTH DAILY.     amoxicillin-clavulanate (AUGMENTIN) 875-125 MG tablet Take 1 tablet by mouth 2 times daily     No current facility-administered medications for this visit.          Allergies:   Atorvastatin      Past Medical History:  Obesity  Osteopenia  Type 2 diabetes mellitus without complication, without long-term current use of insulin  Interstitial lung disease  Abdominal tuberculosis (intestinal, 20 years ago)     Past Surgical History:  Open cholecystectomy    Family History:   Mother - dementia  Father - cerebrovascular disease  Brother - cerebrovascular disease, hypertension  No family history of - diabetes, myocardial infarction, early onset C.A.D., breast cancer, ovarian cancer, colon cancer, rheumatoid arthritis  Family history of - high blood pressure  (mentions that medical history is unlikely to be comprehensive because of poor/infrequent medical records)      Social History:   No smoking or tobacco use  Rare alcohol use (1-2 times a year)   with 8 children, 13 grandchildren, cooks for the family     Physical Exam:   Blood pressure 130/80, pulse 72, weight 71.3 kg (157 lb 3.2 oz), SpO2 98 %, not currently breastfeeding.  Wt Readings from Last 4 Encounters:   01/19/23 71.3 kg (157 lb 3.2 oz)   12/12/22 69.9 kg (154 lb 1.6 oz)   12/01/22 71.7 kg (158 lb 1.1 oz)   10/26/22 72.6 kg (160 lb)     Constitutional: Well-developed, appearing stated age; cooperative  Eyes: Normal EOM, PERRLA, vision, conjunctiva, sclera  ENT: Normal external ears, nose, hearing, lips, teeth, gums, throat.  Neck: No mass or thyroid enlargement  Resp: Breathing unlabored  MS: Right shoulder shows painless range of motion today.  Left shoulder shows full flexion with pain at extreme of flexion.  Left thumb exhibits crepitus but former tenderness at the first CMC and at several MCPs and  PIPs on the right has resolved.  Fist formation slightly reduced.  Right knee effusion has resolved.  Clubbing in finger nails.  Skin: No nail pitting, alopecia, rash, nodules or lesions.  Neuro: Normal cranial nerves    Laboratory:   RHEUM RESULTS Latest Ref Rng & Units 10/10/2016 1/31/2017 3/31/2018   ALBUMIN 3.4 - 5.0 g/dL 3.9 - 3.8   ALT 0 - 50 U/L 24 - 21   AST 0 - 45 U/L 13 - 21   ANCA <1:20 - - -   LYNDA INTERPRETATION NEG:Negative - - -   CK TOTAL 30 - 225 U/L - - -   CREATININE 0.52 - 1.04 mg/dL 0.56 0.61 0.64   CRP 0.0 - 8.0 mg/L - - -   GFR ESTIMATE, IF BLACK >60 mL/min/[1.73:m2] >90  African American GFR Calc   >90   GFR Calc   >90   GFR ESTIMATE >60 mL/min/1.73m2 >90  Non African American GFR Calc   >90  Non  GFR Calc   >90   HEMATOCRIT 35.0 - 47.0 % 38.0 - 37.4   HEMOGLOBIN 11.7 - 15.7 g/dL 12.7 - 12.3   HCVAB NR Nonreactive   Assay performance characteristics have not been established for newborns,   infants, and children   - -    - 1,620 mg/dL - - -   WBC 4.0 - 11.0 10e3/uL 6.7 - 8.0   RBC 3.80 - 5.20 10e6/uL 4.25 - 4.11   RDW 10.0 - 15.0 % 12.4 - 12.7   MCHC 31.5 - 36.5 g/dL 33.4 - 32.9   MCV 78 - 100 fL 89 - 91   PLATELET COUNT 150 - 450 10e3/uL 288 - 294   RHEUMATOID FACTOR <20 IU/mL - - -   ESR 0 - 30 mm/h - - -       Rheumatoid Factor   Date Value Ref Range Status   05/23/2018 700 (H) <20 IU/mL Final     Cyclic Citrullinated Peptide Antibody, IgG   Date Value Ref Range Status   05/23/2018 >340 (H) <7 U/mL Final     Comment:     Positive     Scleroderma Antibody Scl-70 CLAUDIO IgG   Date Value Ref Range Status   05/23/2018 <0.2 0.0 - 0.9 AI Final     Comment:     Negative  Antibody index (AI) values reflect qualitative changes in antibody   concentration that cannot be directly associated with clinical condition or   disease state.       SSA (Ro) (CLAUDIO) Antibody, IgG   Date Value Ref Range Status   05/23/2018 <0.2 0.0 - 0.9 AI Final     Comment:      Negative  Antibody index (AI) values reflect qualitative changes in antibody   concentration that cannot be directly associated with clinical condition or   disease state.       SSB (La) (CLAUDIO) Antibody, IgG   Date Value Ref Range Status   05/23/2018 <0.2 0.0 - 0.9 AI Final     Comment:     Negative  Antibody index (AI) values reflect qualitative changes in antibody   concentration that cannot be directly associated with clinical condition or   disease state.       LYNDA interpretation   Date Value Ref Range Status   05/23/2018 Negative NEG^Negative Final     Comment:                                        Reference range:  <1:40  NEGATIVE  1:40 - 1:80  BORDERLINE POSITIVE  >1:80 POSITIVE         Neutrophil Cytoplasmic IgG Antibody   Date Value Ref Range Status   05/23/2018 <1:20 <1:20 Final     Comment:     (Note)  The ANCA IFA is <1:20; therefore, no further testing will   be performed.  INTERPRETIVE INFORMATION: Anti-Neutrophil Cyto Ab, IgG  Neutrophil Cytoplasmic Antibodies (C-ANCA = granular   cytoplasmic staining, P-ANCA = perinuclear staining) are   found in the serum of over 90 percent of patients with   certain necrotizing systemic vasculitides, and usually in   less than 5 percent of patients with collagen vascular   disease or arthritis.  Performed by [facility],  [address] [place] [id number] [phone]  [email or web address], Marty Wright MD, Lab. Director         IGG   Date Value Ref Range Status   05/23/2018 1,980 (H) 695 - 1,620 mg/dL Final     Scleroderma Antibody Scl-70 CLAUDIO IgG   Date Value Ref Range Status   05/23/2018 <0.2 0.0 - 0.9 AI Final     Comment:     Negative  Antibody index (AI) values reflect qualitative changes in antibody   concentration that cannot be directly associated with clinical condition or   disease state.

## 2023-01-18 ASSESSMENT — ENCOUNTER SYMPTOMS
MEMORY LOSS: 0
SEIZURES: 0
SPEECH CHANGE: 0
WHEEZING: 0
DYSPNEA ON EXERTION: 1
NERVOUS/ANXIOUS: 1
NUMBNESS: 0
HEADACHES: 0
DEPRESSION: 1
HEMOPTYSIS: 0
LOSS OF CONSCIOUSNESS: 0
WEAKNESS: 1
POSTURAL DYSPNEA: 0
DECREASED CONCENTRATION: 0
SHORTNESS OF BREATH: 1
COUGH: 1
SPUTUM PRODUCTION: 1
DISTURBANCES IN COORDINATION: 0
TINGLING: 0
PARALYSIS: 0
DIZZINESS: 0
PANIC: 0
INSOMNIA: 1
COUGH DISTURBING SLEEP: 0
SNORES LOUDLY: 0
TREMORS: 0

## 2023-01-19 ENCOUNTER — OFFICE VISIT (OUTPATIENT)
Dept: RHEUMATOLOGY | Facility: CLINIC | Age: 73
End: 2023-01-19
Payer: COMMERCIAL

## 2023-01-19 VITALS
WEIGHT: 157.2 LBS | DIASTOLIC BLOOD PRESSURE: 80 MMHG | SYSTOLIC BLOOD PRESSURE: 130 MMHG | OXYGEN SATURATION: 98 % | HEART RATE: 72 BPM | BODY MASS INDEX: 28.75 KG/M2

## 2023-01-19 DIAGNOSIS — M05.10 RHEUMATOID LUNG (H): ICD-10-CM

## 2023-01-19 DIAGNOSIS — J84.9 INTERSTITIAL LUNG DISEASE (H): Primary | ICD-10-CM

## 2023-01-19 DIAGNOSIS — R76.8 RHEUMATOID FACTOR POSITIVE WITH CYCLIC CITRULLINATED PEPTIDE (CCP) ANTIBODY NEGATIVE: ICD-10-CM

## 2023-01-19 PROCEDURE — 99215 OFFICE O/P EST HI 40 MIN: CPT | Performed by: INTERNAL MEDICINE

## 2023-01-19 RX ORDER — MYCOPHENOLATE MOFETIL 500 MG/1
1000 TABLET ORAL 2 TIMES DAILY
Qty: 120 TABLET | Refills: 6 | Status: SHIPPED | OUTPATIENT
Start: 2023-01-19 | End: 2024-03-07

## 2023-01-19 RX ORDER — HYDROXYCHLOROQUINE SULFATE 200 MG/1
200 TABLET, FILM COATED ORAL 2 TIMES DAILY
Qty: 180 TABLET | Refills: 3 | Status: SHIPPED | OUTPATIENT
Start: 2023-01-19 | End: 2023-08-10

## 2023-01-19 RX ORDER — SULFASALAZINE 500 MG/1
TABLET, DELAYED RELEASE ORAL
Qty: 120 TABLET | Refills: 4 | Status: SHIPPED | OUTPATIENT
Start: 2023-01-19 | End: 2023-08-10

## 2023-01-19 ASSESSMENT — PAIN SCALES - GENERAL: PAINLEVEL: NO PAIN (0)

## 2023-01-19 NOTE — PATIENT INSTRUCTIONS
Diagnosis:  1.  Seropositive rheumatoid arthritis: Shoulder, thumb, and knee pain are overall improved since starting prednisone around the time of hospitalization in late 2022.  Overall control of arthritis is adequate with current medications hydroxychloroquine and sulfasalazine.  2.  Shoulder pain, L; likely muscular in origin.  I recommend resuming physical therapy provided exercises daily.  3. Interstitial lung disease: Breathing is now modestly improved, despite discontinuation of prednisone given for possible interstitial lung disease flare in December.  I agree with Dr. Keller's recommendation to start CellCept for rheumatoid arthritis associated lung disease.  If CT of the chest, pulmonary function test coming up in late January are worse despite the treatment, I recommend considering switch from CellCept to rituximab    Plan:  1.  Continue hydroxychloroquine 400 mg once daily; while using hydroxychloroquine, undergo annual screening examination for rare retinal Plaquenil toxicity, appointment due in February 2023  2.  Continue sulfasalazine 1000 mg (2 tablets) twice daily.  3.  Recheck blood work in 3 to 4 months;  4.  Physical therapy prescribed exercises for left shoulder pain  5.  Continue Ofev and other pulmonary recommended measures for interstitial lung disease.  6.  Continue CellCept 1000 mg twice daily.

## 2023-01-20 DIAGNOSIS — J84.9 ILD (INTERSTITIAL LUNG DISEASE) (H): Primary | ICD-10-CM

## 2023-01-20 LAB
6 MIN WALK (FT): 525 FT
6 MIN WALK (M): 160 M

## 2023-01-20 PROCEDURE — 94618 PULMONARY STRESS TESTING: CPT | Performed by: INTERNAL MEDICINE

## 2023-01-23 ENCOUNTER — HEALTH MAINTENANCE LETTER (OUTPATIENT)
Age: 73
End: 2023-01-23

## 2023-01-23 LAB — FIO2-PRE: 21 %

## 2023-01-24 ENCOUNTER — TELEPHONE (OUTPATIENT)
Dept: PULMONOLOGY | Facility: CLINIC | Age: 73
End: 2023-01-24
Payer: COMMERCIAL

## 2023-01-24 DIAGNOSIS — R09.02 HYPOXIA: ICD-10-CM

## 2023-01-24 DIAGNOSIS — J84.89 PROGRESSIVE FIBROSING INTERSTITIAL LUNG DISEASE (H): ICD-10-CM

## 2023-01-24 DIAGNOSIS — J84.9 ILD (INTERSTITIAL LUNG DISEASE) (H): Primary | ICD-10-CM

## 2023-01-24 DIAGNOSIS — R06.09 DYSPNEA ON EXERTION: ICD-10-CM

## 2023-01-24 NOTE — TELEPHONE ENCOUNTER
Spoke with daughter and advised that orders had been placed for pt and faxed to [facility] to go. Daughter will follow up with us if anything further is needed.   Dilma Cox RN BSN

## 2023-01-25 ENCOUNTER — OFFICE VISIT (OUTPATIENT)
Dept: PULMONOLOGY | Facility: CLINIC | Age: 73
End: 2023-01-25
Attending: INTERNAL MEDICINE
Payer: COMMERCIAL

## 2023-01-25 ENCOUNTER — ANCILLARY PROCEDURE (OUTPATIENT)
Dept: CT IMAGING | Facility: CLINIC | Age: 73
End: 2023-01-25
Attending: INTERNAL MEDICINE
Payer: COMMERCIAL

## 2023-01-25 ENCOUNTER — LAB (OUTPATIENT)
Dept: LAB | Facility: CLINIC | Age: 73
End: 2023-01-25
Attending: INTERNAL MEDICINE
Payer: COMMERCIAL

## 2023-01-25 VITALS
DIASTOLIC BLOOD PRESSURE: 80 MMHG | BODY MASS INDEX: 28.71 KG/M2 | SYSTOLIC BLOOD PRESSURE: 142 MMHG | OXYGEN SATURATION: 97 % | HEART RATE: 80 BPM | RESPIRATION RATE: 18 BRPM | HEIGHT: 62 IN | WEIGHT: 156 LBS

## 2023-01-25 DIAGNOSIS — Z11.59 ENCOUNTER FOR SCREENING FOR OTHER VIRAL DISEASES: ICD-10-CM

## 2023-01-25 DIAGNOSIS — M05.10 RHEUMATOID LUNG (H): ICD-10-CM

## 2023-01-25 DIAGNOSIS — J84.9 ILD (INTERSTITIAL LUNG DISEASE) (H): ICD-10-CM

## 2023-01-25 DIAGNOSIS — R76.8 HEPATITIS B ANTIBODY POSITIVE: ICD-10-CM

## 2023-01-25 DIAGNOSIS — B18.1 HEPATITIS B, CHRONIC (H): ICD-10-CM

## 2023-01-25 DIAGNOSIS — J84.9 ILD (INTERSTITIAL LUNG DISEASE) (H): Primary | ICD-10-CM

## 2023-01-25 LAB
ALBUMIN SERPL BCG-MCNC: 4.1 G/DL (ref 3.5–5.2)
ALP SERPL-CCNC: 80 U/L (ref 35–104)
ALT SERPL W P-5'-P-CCNC: 11 U/L (ref 10–35)
ANION GAP SERPL CALCULATED.3IONS-SCNC: 8 MMOL/L (ref 7–15)
AST SERPL W P-5'-P-CCNC: 23 U/L (ref 10–35)
BASOPHILS # BLD AUTO: 0.1 10E3/UL (ref 0–0.2)
BASOPHILS NFR BLD AUTO: 1 %
BILIRUB SERPL-MCNC: 0.3 MG/DL
BUN SERPL-MCNC: 14.1 MG/DL (ref 8–23)
CALCIUM SERPL-MCNC: 9.8 MG/DL (ref 8.8–10.2)
CHLORIDE SERPL-SCNC: 100 MMOL/L (ref 98–107)
CREAT SERPL-MCNC: 0.69 MG/DL (ref 0.51–0.95)
CRP SERPL-MCNC: <3 MG/L
DEPRECATED HCO3 PLAS-SCNC: 27 MMOL/L (ref 22–29)
EOSINOPHIL # BLD AUTO: 0.4 10E3/UL (ref 0–0.7)
EOSINOPHIL NFR BLD AUTO: 4 %
ERYTHROCYTE [DISTWIDTH] IN BLOOD BY AUTOMATED COUNT: 13.5 % (ref 10–15)
GFR SERPL CREATININE-BSD FRML MDRD: >90 ML/MIN/1.73M2
GLUCOSE SERPL-MCNC: 187 MG/DL (ref 70–99)
HBV SURFACE AB SERPL IA-ACNC: 122.68 M[IU]/ML
HBV SURFACE AB SERPL IA-ACNC: REACTIVE M[IU]/ML
HCT VFR BLD AUTO: 36.7 % (ref 35–47)
HCV AB SERPL QL IA: NONREACTIVE
HGB BLD-MCNC: 12.1 G/DL (ref 11.7–15.7)
HIV 1+2 AB+HIV1 P24 AG SERPL QL IA: NONREACTIVE
IMM GRANULOCYTES # BLD: 0 10E3/UL
IMM GRANULOCYTES NFR BLD: 1 %
LYMPHOCYTES # BLD AUTO: 1.8 10E3/UL (ref 0.8–5.3)
LYMPHOCYTES NFR BLD AUTO: 20 %
MCH RBC QN AUTO: 29.4 PG (ref 26.5–33)
MCHC RBC AUTO-ENTMCNC: 33 G/DL (ref 31.5–36.5)
MCV RBC AUTO: 89 FL (ref 78–100)
MONOCYTES # BLD AUTO: 0.7 10E3/UL (ref 0–1.3)
MONOCYTES NFR BLD AUTO: 8 %
NEUTROPHILS # BLD AUTO: 5.8 10E3/UL (ref 1.6–8.3)
NEUTROPHILS NFR BLD AUTO: 66 %
NRBC # BLD AUTO: 0 10E3/UL
NRBC BLD AUTO-RTO: 0 /100
PLATELET # BLD AUTO: 305 10E3/UL (ref 150–450)
POTASSIUM SERPL-SCNC: 4.1 MMOL/L (ref 3.4–5.3)
PROT SERPL-MCNC: 7.6 G/DL (ref 6.4–8.3)
RBC # BLD AUTO: 4.12 10E6/UL (ref 3.8–5.2)
SODIUM SERPL-SCNC: 135 MMOL/L (ref 136–145)
WBC # BLD AUTO: 8.8 10E3/UL (ref 4–11)

## 2023-01-25 PROCEDURE — 87340 HEPATITIS B SURFACE AG IA: CPT | Mod: 90 | Performed by: PATHOLOGY

## 2023-01-25 PROCEDURE — 86803 HEPATITIS C AB TEST: CPT | Mod: 90 | Performed by: PATHOLOGY

## 2023-01-25 PROCEDURE — 85025 COMPLETE CBC W/AUTO DIFF WBC: CPT | Performed by: PATHOLOGY

## 2023-01-25 PROCEDURE — 94375 RESPIRATORY FLOW VOLUME LOOP: CPT | Performed by: INTERNAL MEDICINE

## 2023-01-25 PROCEDURE — 80053 COMPREHEN METABOLIC PANEL: CPT | Performed by: PATHOLOGY

## 2023-01-25 PROCEDURE — 86140 C-REACTIVE PROTEIN: CPT | Performed by: PATHOLOGY

## 2023-01-25 PROCEDURE — 71250 CT THORAX DX C-: CPT | Performed by: RADIOLOGY

## 2023-01-25 PROCEDURE — G0463 HOSPITAL OUTPT CLINIC VISIT: HCPCS | Performed by: INTERNAL MEDICINE

## 2023-01-25 PROCEDURE — 99215 OFFICE O/P EST HI 40 MIN: CPT | Mod: 25 | Performed by: INTERNAL MEDICINE

## 2023-01-25 PROCEDURE — 87341 HEP B SURFACE AG NEUTRLZJ IA: CPT | Mod: 90 | Performed by: PATHOLOGY

## 2023-01-25 PROCEDURE — 36415 COLL VENOUS BLD VENIPUNCTURE: CPT | Performed by: PATHOLOGY

## 2023-01-25 PROCEDURE — 94726 PLETHYSMOGRAPHY LUNG VOLUMES: CPT | Performed by: INTERNAL MEDICINE

## 2023-01-25 PROCEDURE — 99207 PR NO CHARGE LOS: CPT

## 2023-01-25 PROCEDURE — 86706 HEP B SURFACE ANTIBODY: CPT | Mod: 90 | Performed by: PATHOLOGY

## 2023-01-25 PROCEDURE — 87389 HIV-1 AG W/HIV-1&-2 AB AG IA: CPT | Mod: 90 | Performed by: PATHOLOGY

## 2023-01-25 PROCEDURE — 99000 SPECIMEN HANDLING OFFICE-LAB: CPT | Performed by: PATHOLOGY

## 2023-01-25 PROCEDURE — 94729 DIFFUSING CAPACITY: CPT | Performed by: INTERNAL MEDICINE

## 2023-01-25 PROCEDURE — 86704 HEP B CORE ANTIBODY TOTAL: CPT | Mod: 90 | Performed by: PATHOLOGY

## 2023-01-25 ASSESSMENT — PAIN SCALES - GENERAL: PAINLEVEL: NO PAIN (0)

## 2023-01-25 NOTE — PROGRESS NOTES
"HISTORY OF PRESENT ILLNESS:  Krystian is a 72-year-old female with interstitial lung disease secondary to rheumatoid arthritis.  Her last visit was with me 10/26/2022. Serial PFTs monitoring was being pursued while receiving ofev, which remains currently at 100 mg BID due not tolerating the 150 dose with nausea. She is also on HCQN and sulfasalazine with fair joint symptom control. Her daughter is an RN and translating for her. As her respiratory symptoms progressed, I started her on MMF in 10/2022. In late November, she had acute respiratory failure requiring hospitalization, and CT was suggestive for rheumatoid lung flare with superimposed progressive fibrosis. She was discharged on oxygen, which she was able to discontinue a few weeks ago. She had only 3 days interruption in MMF, and now on 1000 BID. She has exertional dyspnea but seems to be better compared to 5/2022, and daughter thinks that she is starting to respond to MMF. No symptoms to suggest infection. RTX has been entertained with rheumatology.     PHYSICAL EXAMINATION:    VITAL SIGNS:  BP (!) 142/80   Pulse 80   Resp 18   Ht 1.575 m (5' 2\")   Wt 70.8 kg (156 lb)   LMP  (LMP Unknown)   SpO2 97%   BMI 28.53 kg/m        HEENT:  Eyes are anicteric.  Mouth and throat without lesions.  NECK:  No thyromegaly.  LYMPHATIC:  No adenopathy appreciated.  CHEST:  Crackles at least correction up bilaterally, with scattered expiratory wheezes.   HEART:  Shows regular rate and rhythm.  Normal S1, S2.  ABDOMEN:  Nontender.  No hepatosplenomegaly.    EXTREMITIES: BL digital clubbing.  MUSCULOSKELETAL:  I do not appreciate significant joint swelling or arthritis.  SKIN:  No rash.    PULMONARY FUNCTION TESTS today 1/25/23    My interpretation: Severe intra-thoracic restrictive disease, no obstruction.         Reviewed HRCT chest images with patient and her daughter, today 1/25 compared to 11/28/22 and 5/2022  Currently, significant advancement in honeycombing and " fibrotic findings with residual dense GGO/consolidative areas that are minimal compared to November. November's scan showed extensive GGO suggestive of CTD ILD flare.     ASSESSMENT AND PLAN:   1. RA ILD, rapidly progressive with flare in 11/2022    2. Chronic hypoxemic respiratory failure  3. DM   4. HTN     Krystian is a 71-year-old female with interstitial lung disease secondary to RA.  Her RA is being treated with sulfasalazine and hydroxychloroquine.  She saw Dr. Staley in 1/19/23 and continues to need intermittent courses of oral steroids which suppress her joint flares. Most recent steroid taper was 12/2/22 starting with 40 mg for approximately one month.  She is not complaining necessarily of a big change in her RA.  Her exertional dyspnea has been progressive, but seems to be slowly responsive to MMF which we started 10/2022. HRCT shows resolution of dense GGO from 11/2022, likely represented rheumatoid lung flare at the time, however progressive has rapidly progressed since 5/2022, and there is still some residual inflammatory findings that seem active (RUL). She is tolerating Ofev 100 BID. PFTs show gradual decline in FVC and DLCO.   Plan:   - Messaged Dr. Staley in agreement with rituximab therapy at this point  - TB QFT, viral hepatitis panel and HIV   - Cellcept discontinuation per rheumatology  - Recommended against travel to [place] next week, her six min walk today showed desaturation to 88%, and cannot guarantee O2 in flight to maintain her at 88%+ (pulsed 4 LPM concentrator), in addition the planned trip was for six weeks (concern for opportunistic infection)   - Return in 3 months with PFTs and six minutes walk       Jase Keller MD    Total time spent on the day of the visit was 45 minutes.         Answers for HPI/ROS submitted by the patient on 1/18/2023  General Symptoms: No  Skin Symptoms: No  HENT Symptoms: No  EYE SYMPTOMS: No  HEART SYMPTOMS: No  LUNG SYMPTOMS: Yes  INTESTINAL SYMPTOMS:  No  URINARY SYMPTOMS: No  GYNECOLOGIC SYMPTOMS: No  BREAST SYMPTOMS: No  SKELETAL SYMPTOMS: No  BLOOD SYMPTOMS: No  NERVOUS SYSTEM SYMPTOMS: Yes  MENTAL HEALTH SYMPTOMS: Yes  Cough: Yes  Sputum or phlegm: Yes  Coughing up blood: No  Difficulty breating or shortness of breath: Yes  Snoring: No  Wheezing: No  Difficulty breathing on exertion: Yes  Nighttime Cough: No  Difficulty breathing when lying flat: No  Trouble with coordination: No  Dizziness or trouble with balance: No  Fainting or black-out spells: No  Memory loss: No  Headache: No  Seizures: No  Speech problems: No  Tingling: No  Tremor: No  Weakness: Yes  Difficulty walking: No  Paralysis: No  Numbness: No  Nervous or Anxious: Yes  Depression: Yes  Trouble sleeping: Yes  Trouble thinking or concentrating: No  Mood changes: No  Panic attacks: No

## 2023-01-25 NOTE — LETTER
"    1/25/2023         RE: Krystian Boland  [address]  [facility] [address]        Dear Colleague,    Thank you for referring your patient, Krystian Boland, to the [facility] FOR LUNG SCIENCE AND [facility] [place]. Please see a copy of my visit note below.    HISTORY OF PRESENT ILLNESS:  Krystian is a 72-year-old female with interstitial lung disease secondary to rheumatoid arthritis.  Her last visit was with me 10/26/2022. Serial PFTs monitoring was being pursued while receiving ofev, which remains currently at 100 mg BID due not tolerating the 150 dose with nausea. She is also on HCQN and sulfasalazine with fair joint symptom control. Her daughter is an RN and translating for her. As her respiratory symptoms progressed, I started her on MMF in 10/2022. In late November, she had acute respiratory failure requiring hospitalization, and CT was suggestive for rheumatoid lung flare with superimposed progressive fibrosis. She was discharged on oxygen, which she was able to discontinue a few weeks ago. She had only 3 days interruption in MMF, and now on 1000 BID. She has exertional dyspnea but seems to be better compared to 5/2022, and daughter thinks that she is starting to respond to MMF. No symptoms to suggest infection. RTX has been entertained with rheumatology.     PHYSICAL EXAMINATION:    VITAL SIGNS:  BP (!) 142/80   Pulse 80   Resp 18   Ht 1.575 m (5' 2\")   Wt 70.8 kg (156 lb)   LMP  (LMP Unknown)   SpO2 97%   BMI 28.53 kg/m        HEENT:  Eyes are anicteric.  Mouth and throat without lesions.  NECK:  No thyromegaly.  LYMPHATIC:  No adenopathy appreciated.  CHEST:  Crackles at least nursing home up bilaterally, with scattered expiratory wheezes.   HEART:  Shows regular rate and rhythm.  Normal S1, S2.  ABDOMEN:  Nontender.  No hepatosplenomegaly.    EXTREMITIES: BL digital clubbing.  MUSCULOSKELETAL:  I do not appreciate significant joint swelling or arthritis.  SKIN:  No " rash.    PULMONARY FUNCTION TESTS today 1/25/23    My interpretation: Severe intra-thoracic restrictive disease, no obstruction.         Reviewed HRCT chest images with patient and her daughter, today 1/25 compared to 11/28/22 and 5/2022  Currently, significant advancement in honeycombing and fibrotic findings with residual dense GGO/consolidative areas that are minimal compared to November. November's scan showed extensive GGO suggestive of CTD ILD flare.     ASSESSMENT AND PLAN:   1. RA ILD, rapidly progressive with flare in 11/2022    2. Chronic hypoxemic respiratory failure  3. DM   4. HTN     Krystian is a 71-year-old female with interstitial lung disease secondary to RA.  Her RA is being treated with sulfasalazine and hydroxychloroquine.  She saw Dr. Staley in 1/19/23 and continues to need intermittent courses of oral steroids which suppress her joint flares. Most recent steroid taper was 12/2/22 starting with 40 mg for approximately one month.  She is not complaining necessarily of a big change in her RA.  Her exertional dyspnea has been progressive, but seems to be slowly responsive to MMF which we started 10/2022. HRCT shows resolution of dense GGO from 11/2022, likely represented rheumatoid lung flare at the time, however progressive has rapidly progressed since 5/2022, and there is still some residual inflammatory findings that seem active (RUL). She is tolerating Ofev 100 BID. PFTs show gradual decline in FVC and DLCO.   Plan:   - Messaged Dr. Staley in agreement with rituximab therapy at this point  - TB QFT, viral hepatitis panel and HIV   - Cellcept discontinuation per rheumatology  - Recommended against travel to [place] next week, her six min walk today showed desaturation to 88%, and cannot guarantee O2 in flight to maintain her at 88%+ (pulsed 4 LPM concentrator), in addition the planned trip was for six weeks (concern for opportunistic infection)   - Return in 3 months with PFTs and six minutes  walk       Jase Keller MD    Total time spent on the day of the visit was 45 minutes.         Answers for HPI/ROS submitted by the patient on 1/18/2023  General Symptoms: No  Skin Symptoms: No  HENT Symptoms: No  EYE SYMPTOMS: No  HEART SYMPTOMS: No  LUNG SYMPTOMS: Yes  INTESTINAL SYMPTOMS: No  URINARY SYMPTOMS: No  GYNECOLOGIC SYMPTOMS: No  BREAST SYMPTOMS: No  SKELETAL SYMPTOMS: No  BLOOD SYMPTOMS: No  NERVOUS SYSTEM SYMPTOMS: Yes  MENTAL HEALTH SYMPTOMS: Yes  Cough: Yes  Sputum or phlegm: Yes  Coughing up blood: No  Difficulty breating or shortness of breath: Yes  Snoring: No  Wheezing: No  Difficulty breathing on exertion: Yes  Nighttime Cough: No  Difficulty breathing when lying flat: No  Trouble with coordination: No  Dizziness or trouble with balance: No  Fainting or black-out spells: No  Memory loss: No  Headache: No  Seizures: No  Speech problems: No  Tingling: No  Tremor: No  Weakness: Yes  Difficulty walking: No  Paralysis: No  Numbness: No  Nervous or Anxious: Yes  Depression: Yes  Trouble sleeping: Yes  Trouble thinking or concentrating: No  Mood changes: No  Panic attacks: No          Again, thank you for allowing me to participate in the care of your patient.        Sincerely,        Jase Keller MD

## 2023-01-26 ENCOUNTER — TELEPHONE (OUTPATIENT)
Dept: PULMONOLOGY | Facility: CLINIC | Age: 73
End: 2023-01-26
Payer: COMMERCIAL

## 2023-01-26 ENCOUNTER — MYC MEDICAL ADVICE (OUTPATIENT)
Dept: PULMONOLOGY | Facility: CLINIC | Age: 73
End: 2023-01-26
Payer: COMMERCIAL

## 2023-01-26 DIAGNOSIS — J84.9 ILD (INTERSTITIAL LUNG DISEASE) (H): Primary | ICD-10-CM

## 2023-01-26 PROBLEM — B18.1 HEPATITIS B, CHRONIC (H): Status: ACTIVE | Noted: 2023-01-26

## 2023-01-26 LAB
DLCOCOR-%PRED-PRE: 49 %
DLCOCOR-PRE: 8.95 ML/MIN/MMHG
DLCOUNC-%PRED-PRE: 47 %
DLCOUNC-PRE: 8.57 ML/MIN/MMHG
DLCOUNC-PRED: 18.17 ML/MIN/MMHG
ERV-%PRED-PRE: 55 %
ERV-PRE: 0.28 L
ERV-PRED: 0.5 L
EXPTIME-PRE: 5.34 SEC
FEF2575-%PRED-PRE: 190 %
FEF2575-PRE: 3.23 L/SEC
FEF2575-PRED: 1.7 L/SEC
FEFMAX-%PRED-PRE: 85 %
FEFMAX-PRE: 4.55 L/SEC
FEFMAX-PRED: 5.34 L/SEC
FEV1-%PRED-PRE: 70 %
FEV1-PRE: 1.36 L
FEV1FEV6-PRE: 95 %
FEV1FEV6-PRED: 79 %
FEV1FVC-PRE: 95 %
FEV1FVC-PRED: 79 %
FEV1SVC-PRE: 94 %
FEV1SVC-PRED: 69 %
FIFMAX-PRE: 3.58 L/SEC
FVC-%PRED-PRE: 58 %
FVC-PRE: 1.43 L
FVC-PRED: 2.44 L
HBV CORE AB SERPL QL IA: REACTIVE
HBV SURFACE AG SERPL QL IA: REACTIVE
IC-%PRED-PRE: 50 %
IC-PRE: 1.17 L
IC-PRED: 2.31 L
VA-%PRED-PRE: 60 %
VA-PRE: 2.6 L
VC-%PRED-PRE: 51 %
VC-PRE: 1.45 L
VC-PRED: 2.81 L

## 2023-01-26 NOTE — TELEPHONE ENCOUNTER
----- Message from Jase Keller MD sent at 1/26/2023 11:08 AM CST -----  Positive hep B surface ag indicating either previous infection or active infection as anti core is also positive, patient needs ID consult with the specific question: Need to initiate Rituximab for RA, incidentally positive hepatitis B serologies.     I have also added to previous specimen hepatitis B PCR quant.

## 2023-01-27 ENCOUNTER — PATIENT OUTREACH (OUTPATIENT)
Dept: GERIATRIC MEDICINE | Facility: CLINIC | Age: 73
End: 2023-01-27
Payer: COMMERCIAL

## 2023-01-27 NOTE — PROGRESS NOTES
[facility] Care Coordination Contact    Received a call from member's daughter, Zachariah. She shared that member did not receive her incontinent products this month and was calling to check on the order status. Reviewed that this Care Coordinator would follow up with [facility] and get back to her.   Emailed [facility] inquiring on the status of the order.  JAQUELIN Langley  [facility]  [phone]

## 2023-02-02 NOTE — TELEPHONE ENCOUNTER
RECORDS RECEIVED FROM: Internal   DATE RECEIVED: 2.15.23   NOTES (Gather within 2 years) STATUS DETAILS   OFFICE NOTE from referring provider   Internal 1.25.23, 10.26.22  Arianna  Pulmonology   OFFICE NOTE from other specialist Internal 1.19.23, 5.26.22, 1.20.22  Luís  [facility]    12.12.22  Samantha      5.18.22, 11.24.21  [facility]  Pulmonology   DISCHARGE SUMMARY from hospital Internal 11.28-12.2.22  Gregg Mccarthy   LABS (any labs) Internal    MEDICATION LIST Internal    IMAGING  (NEED IMAGES AND REPORTS)     Other (anything related to diagnoses Internal 1.25.23, 11.28.22, 5.18.22  CT Chest    11.28.22  XR Chest

## 2023-02-15 ENCOUNTER — PRE VISIT (OUTPATIENT)
Dept: INFECTIOUS DISEASES | Facility: CLINIC | Age: 73
End: 2023-02-15
Payer: COMMERCIAL

## 2023-03-14 DIAGNOSIS — Z79.899 LONG-TERM USE OF PLAQUENIL: Primary | ICD-10-CM

## 2023-03-19 RX ORDER — MYCOPHENOLATE MOFETIL 250 MG/1
CAPSULE ORAL
Qty: 380 CAPSULE | Refills: 0 | OUTPATIENT
Start: 2023-03-19

## 2023-03-22 ENCOUNTER — OFFICE VISIT (OUTPATIENT)
Dept: OPHTHALMOLOGY | Facility: CLINIC | Age: 73
End: 2023-03-22
Attending: OPHTHALMOLOGY
Payer: COMMERCIAL

## 2023-03-22 DIAGNOSIS — Z79.899 LONG-TERM USE OF PLAQUENIL: ICD-10-CM

## 2023-03-22 PROCEDURE — 99214 OFFICE O/P EST MOD 30 MIN: CPT | Performed by: OPHTHALMOLOGY

## 2023-03-22 PROCEDURE — G0463 HOSPITAL OUTPT CLINIC VISIT: HCPCS | Performed by: OPHTHALMOLOGY

## 2023-03-22 PROCEDURE — 92134 CPTRZ OPH DX IMG PST SGM RTA: CPT | Performed by: OPHTHALMOLOGY

## 2023-03-22 ASSESSMENT — TONOMETRY
IOP_METHOD: TONOPEN
OD_IOP_MMHG: 12
OS_IOP_MMHG: 12

## 2023-03-22 ASSESSMENT — CONF VISUAL FIELD
OD_SUPERIOR_TEMPORAL_RESTRICTION: 0
METHOD: COUNTING FINGERS
OD_NORMAL: 1
OD_SUPERIOR_NASAL_RESTRICTION: 0
OS_SUPERIOR_NASAL_RESTRICTION: 0
OS_SUPERIOR_TEMPORAL_RESTRICTION: 0
OS_INFERIOR_NASAL_RESTRICTION: 0
OS_INFERIOR_TEMPORAL_RESTRICTION: 0
OD_INFERIOR_TEMPORAL_RESTRICTION: 0
OS_NORMAL: 1
OD_INFERIOR_NASAL_RESTRICTION: 0

## 2023-03-22 ASSESSMENT — REFRACTION_WEARINGRX
OS_CYLINDER: +0.75
OD_ADD: +2.75
OS_ADD: +2.75
OD_CYLINDER: SPHERE
OD_SPHERE: -3.50
OS_SPHERE: -0.50
OS_AXIS: 034

## 2023-03-22 ASSESSMENT — SLIT LAMP EXAM - LIDS
COMMENTS: DCL, MILD PTOSIS
COMMENTS: DCL, MILD PTOSIS

## 2023-03-22 ASSESSMENT — VISUAL ACUITY
OS_SC+: +1
METHOD: SNELLEN - LINEAR
OS_SC: 20/50
OS_PH_SC: 20/20
OS_PH_SC+: -2
OD_SC+: +2
OD_SC: 20/25

## 2023-03-22 ASSESSMENT — CUP TO DISC RATIO
OS_RATIO: 0.35
OD_RATIO: 0.4

## 2023-03-22 ASSESSMENT — EXTERNAL EXAM - RIGHT EYE: OD_EXAM: NORMAL

## 2023-03-22 ASSESSMENT — EXTERNAL EXAM - LEFT EYE: OS_EXAM: NORMAL

## 2023-03-22 NOTE — NURSING NOTE
Chief Complaints and History of Present Illnesses   Patient presents with     Follow Up     Long-term use of Plaquenil     Chief Complaint(s) and History of Present Illness(es)     Follow Up            Laterality: both eyes    Onset: gradual    Onset: months ago    Associated symptoms: tearing.  Negative for dryness, eye pain, flashes, floaters and itching    Treatments tried: no treatments    Pain scale: 0/10    Comments: Long-term use of Plaquenil          Comments    Pt is here with her daughter today who is helping translate.   No changes in her vision since last visit.     DM2 BS: unsure  Lab Results       Component                Value               Date                       A1C                      6.7                 08/03/2022                 A1C                      6.4                 01/05/2022                 A1C                      5.3                 10/09/2021                 A1C                      6.1                 03/17/2021                 A1C                      6.4                 08/24/2020                 A1C                      6.4                 02/12/2020                 A1C                      6.8                 05/08/2019                 A1C                      6.7                 11/06/2018               AIDAN WEBER COA March 22, 2023 10:29 AM

## 2023-03-22 NOTE — PROGRESS NOTES
CC: follow up Diabetes mellitus     Interval: Pt denies vision changes in either eye, no color change, no distortions.   History of chronic cough - improving     HPI: Krystian Boland is a 72 year old with history of plaquenil use for ILD and RA.  She has been on 400 mg daily since 5-2019. Outer retinal changes were seen on her last eye exam. She is also DMII.   A1C 6.1 3-17-21    Past ocular history:   S/p CE/IOL both eyes  Ocular hypertension both eyes   Dry eyes  Myopic astigmatism  Type II diabetes mellitus without retinopathy     Retinal Imaging:  Optical Coherence Tomography 03/22/23   RE:  inferior macula intraretinal fluid improving; normal foveal contour- observe  LE: normal foveal contour; questionable disruption of the Retinal pigment epithelium peripherally; normal choroid and vitreous    fluorescein angiography 10/05/22   Good AV and choroidal filling; microaneurysms; mild midperipheral leakage right eye> left eye   No neovascularization of the disc; no neovascularization elsewhere     Autofluorescence 7/27/2022 vs 7-22-21  right eye: abnormal hypoAF along inferior arcade; normal  left eye: normal    visual field 10-2 from 7/27/2022  right eye: Fixation losses, MD -7.6. not completely reliable   Left eye: reliable; normal, MD -0.9    Assessment & Plan:    # Type II diabetes mellitus with mild nonproliferative diabetic retinopathy right eye    Dx'd ~2016   HbA1c 6.4% 01/05/2022   Metformin only   Blood pressure (<120/80) and blood glucose (HbA1c <7.0) control discussed with patient. Patient advised that failure to adequately control each may lead to vision loss. The patient expressed understanding.  Fluorescein angiography with mild nonproliferative diabetic retinopathy and midperipheral leakage right eye - observe    # mild Diabetic macular edema right eye   Improving  Observe     # Long-term use of Plaquenil   H/o RF-positive RA and ILD   Started Plaquenil 5/2019; 200mg twice a day   ILD stable -  f/u 9/16/2020 with Dr. Cross for CT scan and PFTs   RA stable - dx'd 5/2018, Dr. Staley 10/13/2020   OCT mac grossly normal; new IRF right eye compared to prior- see below   Autofluorescence: normal   Optical Coherence Tomography: right eye not completely reliable ; left eye: within normal limits    Repeat in a yr  Ok to continue taking plaquenil  Follow up in July aug 2023    # Pseudophakia each eye  With posterior capsular opacity (PCO) left eye> right eye   Consider yag in the future left eye     # drusen both eyes  Mostly extrafoveal   Few macula drusen  Observe    # History of chronic cough   Patient will follow up with primary care physician     RTC: 4 months follow up Diabetes mellitus and plaquenil   repeat OCT mac both eyes (inf cuts right eye) ; Autofluorescence and 10-2 OVF  Consider yag in the future left eye when patient ready     ~~~~~~~~~~~~~~~~~~~~~~~~~~~~~~~~~~   Complete documentation of historical and exam elements from today's encounter can be found in the full encounter summary report (not reduplicated in this progress note).  I personally obtained the chief complaint(s) and history of present illness.  I confirmed and edited as necessary the review of systems, past medical/surgical history, family history, social history, and examination findings as documented by others; and I examined the patient myself.  I personally reviewed the relevant tests, images, and reports as documented above.  I formulated and edited as necessary the assessment and plan and discussed the findings and management plan with the patient and family    Dalia Sawyer MD   of Ophthalmology.  Retina Service   Department of Ophthalmology and Visual Neurosciences   [facility]  Phone: [phone]   Fax: [phone]

## 2023-04-08 ENCOUNTER — MYC REFILL (OUTPATIENT)
Dept: INTERNAL MEDICINE | Facility: CLINIC | Age: 73
End: 2023-04-08
Payer: COMMERCIAL

## 2023-04-08 ENCOUNTER — MYC MEDICAL ADVICE (OUTPATIENT)
Dept: INTERNAL MEDICINE | Facility: CLINIC | Age: 73
End: 2023-04-08
Payer: COMMERCIAL

## 2023-04-08 DIAGNOSIS — I10 BENIGN ESSENTIAL HYPERTENSION: ICD-10-CM

## 2023-04-10 DIAGNOSIS — J84.9 ILD (INTERSTITIAL LUNG DISEASE) (H): Primary | ICD-10-CM

## 2023-04-10 RX ORDER — AMLODIPINE BESYLATE 5 MG/1
5 TABLET ORAL DAILY
Qty: 90 TABLET | Refills: 0 | Status: SHIPPED | OUTPATIENT
Start: 2023-04-10 | End: 2023-04-18

## 2023-04-17 ASSESSMENT — ENCOUNTER SYMPTOMS
HEARTBURN: 0
SORE THROAT: 0
DIZZINESS: 0
ARTHRALGIAS: 0
CONSTIPATION: 0
DYSURIA: 0
BREAST MASS: 0
HEADACHES: 0
DIARRHEA: 0
NAUSEA: 0
MYALGIAS: 0
EYE PAIN: 0
COUGH: 0
PARESTHESIAS: 0
JOINT SWELLING: 0
FEVER: 0
HEMATOCHEZIA: 0
ABDOMINAL PAIN: 0
NERVOUS/ANXIOUS: 0
CHILLS: 0
PALPITATIONS: 0
FREQUENCY: 0
HEMATURIA: 0
SHORTNESS OF BREATH: 0
WEAKNESS: 0

## 2023-04-17 ASSESSMENT — ACTIVITIES OF DAILY LIVING (ADL)
CURRENT_FUNCTION: HOUSEWORK REQUIRES ASSISTANCE
CURRENT_FUNCTION: TELEPHONE REQUIRES ASSISTANCE
CURRENT_FUNCTION: SHOPPING REQUIRES ASSISTANCE
CURRENT_FUNCTION: MONEY MANAGEMENT REQUIRES ASSISTANCE
CURRENT_FUNCTION: TRANSPORTATION REQUIRES ASSISTANCE

## 2023-04-18 ENCOUNTER — LAB (OUTPATIENT)
Dept: LAB | Facility: CLINIC | Age: 73
End: 2023-04-18
Payer: COMMERCIAL

## 2023-04-18 ENCOUNTER — OFFICE VISIT (OUTPATIENT)
Dept: GASTROENTEROLOGY | Facility: CLINIC | Age: 73
End: 2023-04-18
Attending: INTERNAL MEDICINE
Payer: COMMERCIAL

## 2023-04-18 ENCOUNTER — OFFICE VISIT (OUTPATIENT)
Dept: INTERNAL MEDICINE | Facility: CLINIC | Age: 73
End: 2023-04-18
Payer: COMMERCIAL

## 2023-04-18 VITALS
WEIGHT: 158.8 LBS | HEIGHT: 62 IN | HEART RATE: 84 BPM | SYSTOLIC BLOOD PRESSURE: 118 MMHG | OXYGEN SATURATION: 97 % | RESPIRATION RATE: 16 BRPM | DIASTOLIC BLOOD PRESSURE: 68 MMHG | BODY MASS INDEX: 29.22 KG/M2

## 2023-04-18 VITALS
WEIGHT: 154 LBS | TEMPERATURE: 97.8 F | SYSTOLIC BLOOD PRESSURE: 116 MMHG | HEART RATE: 91 BPM | DIASTOLIC BLOOD PRESSURE: 85 MMHG | BODY MASS INDEX: 28.17 KG/M2 | OXYGEN SATURATION: 99 %

## 2023-04-18 DIAGNOSIS — R76.8 HEPATITIS B ANTIBODY POSITIVE: ICD-10-CM

## 2023-04-18 DIAGNOSIS — B18.9: ICD-10-CM

## 2023-04-18 DIAGNOSIS — R76.8 HEPATITIS B SURFACE ANTIGEN POSITIVE: ICD-10-CM

## 2023-04-18 DIAGNOSIS — R76.8 HEPATITIS B SURFACE ANTIGEN POSITIVE: Primary | ICD-10-CM

## 2023-04-18 DIAGNOSIS — E11.9 TYPE 2 DIABETES MELLITUS WITHOUT COMPLICATION, WITHOUT LONG-TERM CURRENT USE OF INSULIN (H): ICD-10-CM

## 2023-04-18 DIAGNOSIS — B18.1 CHRONIC HEPATITIS B (H): ICD-10-CM

## 2023-04-18 DIAGNOSIS — I10 BENIGN ESSENTIAL HYPERTENSION: ICD-10-CM

## 2023-04-18 DIAGNOSIS — E55.9 VITAMIN D DEFICIENCY: ICD-10-CM

## 2023-04-18 DIAGNOSIS — Z11.59 ENCOUNTER FOR SCREENING FOR OTHER VIRAL DISEASES: ICD-10-CM

## 2023-04-18 DIAGNOSIS — J84.9 ILD (INTERSTITIAL LUNG DISEASE) (H): ICD-10-CM

## 2023-04-18 DIAGNOSIS — F02.80 EARLY ONSET ALZHEIMER'S DEMENTIA WITHOUT BEHAVIORAL DISTURBANCE (H): ICD-10-CM

## 2023-04-18 DIAGNOSIS — R76.8 RHEUMATOID FACTOR POSITIVE WITH CYCLIC CITRULLINATED PEPTIDE (CCP) ANTIBODY NEGATIVE: ICD-10-CM

## 2023-04-18 DIAGNOSIS — G30.0 EARLY ONSET ALZHEIMER'S DEMENTIA WITHOUT BEHAVIORAL DISTURBANCE (H): ICD-10-CM

## 2023-04-18 DIAGNOSIS — E55.9 VITAMIN D DEFICIENCY: Primary | ICD-10-CM

## 2023-04-18 DIAGNOSIS — Z00.00 ROUTINE HISTORY AND PHYSICAL EXAMINATION OF ADULT: Primary | ICD-10-CM

## 2023-04-18 LAB
ALBUMIN SERPL BCG-MCNC: 4.1 G/DL (ref 3.5–5.2)
ALBUMIN SERPL BCG-MCNC: 4.1 G/DL (ref 3.5–5.2)
ALP SERPL-CCNC: 85 U/L (ref 35–104)
ALP SERPL-CCNC: 85 U/L (ref 35–104)
ALT SERPL W P-5'-P-CCNC: 11 U/L (ref 10–35)
ALT SERPL W P-5'-P-CCNC: 11 U/L (ref 10–35)
ANION GAP SERPL CALCULATED.3IONS-SCNC: 13 MMOL/L (ref 7–15)
ANION GAP SERPL CALCULATED.3IONS-SCNC: 13 MMOL/L (ref 7–15)
AST SERPL W P-5'-P-CCNC: 22 U/L (ref 10–35)
AST SERPL W P-5'-P-CCNC: 22 U/L (ref 10–35)
BILIRUB DIRECT SERPL-MCNC: <0.2 MG/DL (ref 0–0.3)
BILIRUB SERPL-MCNC: 0.3 MG/DL
BILIRUB SERPL-MCNC: 0.3 MG/DL
BUN SERPL-MCNC: 14.6 MG/DL (ref 8–23)
BUN SERPL-MCNC: 14.6 MG/DL (ref 8–23)
CALCIUM SERPL-MCNC: 9.8 MG/DL (ref 8.8–10.2)
CALCIUM SERPL-MCNC: 9.8 MG/DL (ref 8.8–10.2)
CHLORIDE SERPL-SCNC: 99 MMOL/L (ref 98–107)
CHLORIDE SERPL-SCNC: 99 MMOL/L (ref 98–107)
CHOLEST SERPL-MCNC: 154 MG/DL
CREAT SERPL-MCNC: 0.71 MG/DL (ref 0.51–0.95)
CREAT SERPL-MCNC: 0.71 MG/DL (ref 0.51–0.95)
DEPRECATED CALCIDIOL+CALCIFEROL SERPL-MC: 38 UG/L (ref 20–75)
DEPRECATED HCO3 PLAS-SCNC: 22 MMOL/L (ref 22–29)
DEPRECATED HCO3 PLAS-SCNC: 22 MMOL/L (ref 22–29)
ERYTHROCYTE [DISTWIDTH] IN BLOOD BY AUTOMATED COUNT: 13.9 % (ref 10–15)
GFR SERPL CREATININE-BSD FRML MDRD: >90 ML/MIN/1.73M2
GFR SERPL CREATININE-BSD FRML MDRD: >90 ML/MIN/1.73M2
GLUCOSE SERPL-MCNC: 151 MG/DL (ref 70–99)
GLUCOSE SERPL-MCNC: 151 MG/DL (ref 70–99)
HBA1C MFR BLD: 6.8 %
HBV SURFACE AB SERPL IA-ACNC: 6.05 M[IU]/ML
HBV SURFACE AB SERPL IA-ACNC: NONREACTIVE M[IU]/ML
HCT VFR BLD AUTO: 35.1 % (ref 35–47)
HDLC SERPL-MCNC: 47 MG/DL
HGB BLD-MCNC: 11.5 G/DL (ref 11.7–15.7)
INR PPP: 1.18 (ref 0.85–1.15)
LDLC SERPL CALC-MCNC: 91 MG/DL
MCH RBC QN AUTO: 28.9 PG (ref 26.5–33)
MCHC RBC AUTO-ENTMCNC: 32.8 G/DL (ref 31.5–36.5)
MCV RBC AUTO: 88 FL (ref 78–100)
NONHDLC SERPL-MCNC: 107 MG/DL
PLATELET # BLD AUTO: 296 10E3/UL (ref 150–450)
POTASSIUM SERPL-SCNC: 3.9 MMOL/L (ref 3.4–5.3)
POTASSIUM SERPL-SCNC: 3.9 MMOL/L (ref 3.4–5.3)
PROT SERPL-MCNC: 7.7 G/DL (ref 6.4–8.3)
PROT SERPL-MCNC: 7.7 G/DL (ref 6.4–8.3)
RBC # BLD AUTO: 3.98 10E6/UL (ref 3.8–5.2)
SODIUM SERPL-SCNC: 134 MMOL/L (ref 136–145)
SODIUM SERPL-SCNC: 134 MMOL/L (ref 136–145)
TRIGL SERPL-MCNC: 78 MG/DL
WBC # BLD AUTO: 7.2 10E3/UL (ref 4–11)

## 2023-04-18 PROCEDURE — 99204 OFFICE O/P NEW MOD 45 MIN: CPT | Performed by: INTERNAL MEDICINE

## 2023-04-18 PROCEDURE — 82306 VITAMIN D 25 HYDROXY: CPT | Mod: 90 | Performed by: PATHOLOGY

## 2023-04-18 PROCEDURE — 87350 HEPATITIS BE AG IA: CPT | Mod: 90 | Performed by: PATHOLOGY

## 2023-04-18 PROCEDURE — 86706 HEP B SURFACE ANTIBODY: CPT | Mod: 90 | Performed by: PATHOLOGY

## 2023-04-18 PROCEDURE — 84450 TRANSFERASE (AST) (SGOT): CPT | Mod: 90 | Performed by: PATHOLOGY

## 2023-04-18 PROCEDURE — 99000 SPECIMEN HANDLING OFFICE-LAB: CPT | Performed by: PATHOLOGY

## 2023-04-18 PROCEDURE — 85610 PROTHROMBIN TIME: CPT | Performed by: PATHOLOGY

## 2023-04-18 PROCEDURE — 84155 ASSAY OF PROTEIN SERUM: CPT | Mod: 90 | Performed by: PATHOLOGY

## 2023-04-18 PROCEDURE — 99214 OFFICE O/P EST MOD 30 MIN: CPT | Mod: 25 | Performed by: INTERNAL MEDICINE

## 2023-04-18 PROCEDURE — 84460 ALANINE AMINO (ALT) (SGPT): CPT | Mod: 90 | Performed by: PATHOLOGY

## 2023-04-18 PROCEDURE — G0463 HOSPITAL OUTPT CLINIC VISIT: HCPCS | Performed by: INTERNAL MEDICINE

## 2023-04-18 PROCEDURE — 84075 ASSAY ALKALINE PHOSPHATASE: CPT | Mod: 90 | Performed by: PATHOLOGY

## 2023-04-18 PROCEDURE — 87341 HEP B SURFACE AG NEUTRLZJ IA: CPT | Mod: 90 | Performed by: PATHOLOGY

## 2023-04-18 PROCEDURE — 99397 PER PM REEVAL EST PAT 65+ YR: CPT | Performed by: INTERNAL MEDICINE

## 2023-04-18 PROCEDURE — 82247 BILIRUBIN TOTAL: CPT | Mod: 90 | Performed by: PATHOLOGY

## 2023-04-18 PROCEDURE — 87517 HEPATITIS B DNA QUANT: CPT | Mod: 90 | Performed by: PATHOLOGY

## 2023-04-18 PROCEDURE — 83036 HEMOGLOBIN GLYCOSYLATED A1C: CPT | Mod: 90 | Performed by: PATHOLOGY

## 2023-04-18 PROCEDURE — 36415 COLL VENOUS BLD VENIPUNCTURE: CPT | Performed by: PATHOLOGY

## 2023-04-18 PROCEDURE — 99207 PR FOOT EXAM NO CHARGE: CPT | Mod: 25 | Performed by: INTERNAL MEDICINE

## 2023-04-18 PROCEDURE — 82248 BILIRUBIN DIRECT: CPT | Mod: 90 | Performed by: PATHOLOGY

## 2023-04-18 PROCEDURE — 86481 TB AG RESPONSE T-CELL SUSP: CPT | Mod: 90 | Performed by: PATHOLOGY

## 2023-04-18 PROCEDURE — 87340 HEPATITIS B SURFACE AG IA: CPT | Mod: 90 | Performed by: PATHOLOGY

## 2023-04-18 PROCEDURE — 85027 COMPLETE CBC AUTOMATED: CPT | Performed by: PATHOLOGY

## 2023-04-18 PROCEDURE — 80061 LIPID PANEL: CPT | Mod: 90 | Performed by: PATHOLOGY

## 2023-04-18 PROCEDURE — 86707 HEPATITIS BE ANTIBODY: CPT | Mod: 90 | Performed by: PATHOLOGY

## 2023-04-18 PROCEDURE — 86704 HEP B CORE ANTIBODY TOTAL: CPT | Mod: 90 | Performed by: PATHOLOGY

## 2023-04-18 ASSESSMENT — PAIN SCALES - GENERAL: PAINLEVEL: NO PAIN (0)

## 2023-04-18 NOTE — PATIENT INSTRUCTIONS
STOP amlodipine.    Keep an eye on blood pressures at home.     Repeat HgbA1c in 6 months (no office visit needed).

## 2023-04-18 NOTE — PROGRESS NOTES
ASSESSMENT/PLAN                                                       (Z00.00) Routine history and physical examination of adult  (primary encounter diagnosis)  Comment: PMH, PSH, FH, SH, medications, allergies, immunizations, and preventative health measures reviewed and updated as appropriate.  Plan: see below for plans.      (E11.9) Type 2 diabetes mellitus without complication, without long-term current use of insulin (H)  Comment: well-controlled on current regimen.    Plan: HgbA1c in 6 months; annual and diabetes follow-up in 1 year.     (I10) Benign essential hypertension  Plan: STOP amlodipine 5mg dialy; CONTINUE losartan 25mg daily; continue to monitor blood pressures at home and contact MD if elevated,    (G30.0,  F02.80) Early onset Alzheimer's dementia without behavioral disturbance (H)  Comment: known issue that I take into account for their medical decisions, no current exacerbations or new concerns.    Adrianna Singh MD   [facility] [address]  T: [phone], F: [phone]    VINCENZO Boland is a very pleasant 72 year old female who presents for a physical.    Accompanied by daughter who translates.     ROS:  Constitutional: no unintentional weight loss or gain reported; no fevers, chills, or sweats reported  Cardiovascular: no chest pain, palpitations, or edema reported  Respiratory: no cough, wheezing, shortness of breath, or dyspnea on exertion reported  Gastrointestinal: no nausea, vomiting, constipation, diarrhea, or abdominal pain reported  Genitourinary: no urinary frequency, urgency, dysuria, or hematuria reported  Integumentary: no rash or pruritus reported  Musculoskeletal: no back pain, muscle pain, joint pain, or joint swelling reported  Neurologic: no focal weakness, numbness, or tingling reported  Hematologic: no easy bruising or bleeding reported  Endocrine: no heat or  cold intolerance reported; no polyuria or polydipsia reported  Psychiatric: no anxiety or depression reported    Past Medical History:   Diagnosis Date     Early onset Alzheimer's dementia without behavioral disturbance (H)      Interstitial lung disease (H)      Obesity (BMI 30-39.9)      Osteopenia      Rheumatoid factor positive      Type 2 diabetes mellitus (H)      Past Surgical History:   Procedure Laterality Date     CATARACT EXTRACTION W/ INTRAOCULAR LENS IMPLANT Left 10/25/2021     CATARACT IOL, RT/LT Right 10/11/2021     CHOLECYSTECTOMY, OPEN  1990     Family History   Problem Relation Age of Onset     Dementia Mother      Cerebrovascular Disease Father      Cerebrovascular Disease Brother      Diabetes No family hx of      Myocardial Infarction No family hx of      Coronary Artery Disease Early Onset No family hx of      Breast Cancer No family hx of      Ovarian Cancer No family hx of      Colon Cancer No family hx of      Glaucoma No family hx of      Macular Degeneration No family hx of      Social History     Occupational History     Occupation: Retired - was  in restaurant   Tobacco Use     Smoking status: Never     Smokeless tobacco: Never   Vaping Use     Vaping status: Never Used   Substance and Sexual Activity     Alcohol use: Not Currently     Drug use: No     Sexual activity: Not Currently   Social History Narrative    .     8 children.     14 grandchildren (as of 2021).    Originally from [place] [facility]. Moved to [place] (all kids born in [facility]). Moved to  in 2012.     Living with daughter and her family. Taking care of  (~25 years her senior).     Goes for walks occasionally.      Allergies   Allergen Reactions     Atorvastatin Muscle Pain (Myalgia)     Current Outpatient Medications   Medication Sig     amLODIPine (NORVASC) 5 MG tablet Take 1 tablet (5 mg) by mouth daily     aspirin 81 MG chewable tablet Take 1 tablet (81 mg) by mouth daily     benzonatate (TESSALON) 200 MG  capsule Take 1 capsule (200 mg) by mouth 3 times daily as needed for cough     famotidine (PEPCID) 40 MG tablet Take 1 tablet (40 mg) by mouth every evening     guaiFENesin (ROBITUSSIN) 20 mg/mL liquid Take 10 mLs by mouth every 4 hours as needed for cough     hydroxychloroquine (PLAQUENIL) 200 MG tablet Take 1 tablet (200 mg) by mouth 2 times daily Annual Plaquenil toxicity eye screening required.     hydrOXYzine (ATARAX) 10 MG tablet Take 1 tablet (10 mg) by mouth 3 times daily as needed for anxiety     ipratropium - albuterol 0.5 mg/2.5 mg/3 mL (DUONEB) 0.5-2.5 (3) MG/3ML neb solution Take 1 vial (3 mLs) by nebulization every 4 hours as needed for wheezing     losartan (COZAAR) 25 MG tablet Take 1 tablet (25 mg) by mouth daily     melatonin 3 MG tablet Take 1 tablet (3 mg) by mouth nightly as needed for sleep     mycophenolate (GENERIC EQUIVALENT) 500 MG tablet Take 2 tablets (1,000 mg) by mouth 2 times daily Labs every 8-12 weeks.     OFEV 100 MG capsule TAKE ONE CAPSULE BY MOUTH TWICE A DAY     STATIN NOT PRESCRIBED (INTENTIONAL) Please choose reason not prescribed, below     sulfaSALAzine ER (AZULFIDINE EN) 500 MG EC tablet take 2 tabs twice a day.     Vitamin D3 50 mcg (2000 units) tablet TAKE 1 TABLET( 50MCG) BY MOUTH DAILY. Strength: 50 MCG (2000 UT)     Immunization History   Administered Date(s) Administered     COVID-19 Vaccine 12+ (Pfizer) 04/21/2021, 05/12/2021, 11/16/2021     COVID-19 Vaccine Bivalent Booster 12+ (Pfizer) 12/20/2022     Influenza (High Dose) 3 valent vaccine 11/07/2018, 02/12/2020     Influenza Vaccine 65+ (Fluzone HD) 12/16/2020, 10/04/2021, 10/17/2022     Influenza Vaccine >6 months (Alfuria,Fluzone) 11/23/2013, 10/01/2016     Influenza Vaccine IM Ages 6-35 Months 4 Valent (PF) 10/24/2015     Pneumo Conj 13-V (2010&after) 04/02/2018     Pneumococcal 23 valent 07/01/2013, 10/14/2016     TDAP Vaccine (Adacel) 07/04/2012     Zoster vaccine, live 07/04/2016     [facility]        "                                               BMI: overweight  Blood pressure: well-controlled on current regimen   Breast CA screening: DUE - patient declines  Cervical CA screening: screening no longer indicated  Colon CA screening: up to date   Lung CA screening: n/a   Dexa: up to date   Screening cholesterol: up to date   Screening diabetes: n/a - already being treated for this condition  STD testing: no risk factors present  Alcohol misuse screening: alcohol use reviewed - no intervention indicated at this time  Immunizations: reviewed; up to date     OBJECTIVE                                                      /68   Pulse 84   Resp 16   Ht 1.575 m (5' 2\")   Wt 72 kg (158 lb 12.8 oz)   LMP  (LMP Unknown)   SpO2 97%   BMI 29.04 kg/m    Constitutional: well-appearing  Head, Ears, and Eyes: normocephalic; normal external auditory canal and pinna; tympanic membranes visualized and normal; normal lids and conjunctivae  Neck: supple, symmetric, no thyromegaly or lymphadenopathy  Respiratory: normal respiratory effort; clear to auscultation bilaterally  Cardiovascular: regular rate and rhythm; no edema  Gastrointestinal: soft, non-tender, and non-distended; no organomegaly or masses  Musculoskeletal: normal gait and station  Psych: normal judgment and insight; normal mood and affect; recent and remote memory intact    ---  (Note was completed, in part, with [employer] voice-recognition software. Documentation was reviewed, but some grammatical, spelling, and word errors may remain.)    "

## 2023-04-18 NOTE — NURSING NOTE
Chief Complaint   Patient presents with     Consult     New pt consult.     Blood pressure 116/85, pulse 91, temperature 97.8  F (36.6  C), weight 69.9 kg (154 lb), SpO2 99 %, not currently breastfeeding.    SARAH BARRIOS

## 2023-04-18 NOTE — PROGRESS NOTES
[facility] Hepatology    New Patient Visit    Referring provider:  Referred Self    72 year old female    Chief complaint:  Positive hepatitis B surface antigen    HPI:  Patient comes to clinic this morning with her daughter, who is also acting as her Tibetan .    The patient presents for further evaluation and management of a positive hepatitis B serologies.  The patient reports that she had negative hepatitis B testing in [place] prior to immigrating to the [place].  As a result, she underwent hepatitis B vaccination series.  The patient has a history of interstitial lung disease secondary to rheumatoid arthritis and recently underwent testing in anticipation of rituximab therapy.  Hepatitis B surface antigen, hepatitis B core antibody and hepatitis B surface antibody all returned positive.  No prior history of chronic liver disease.    The patient is well today.  She has no symptoms related to liver disease.    The patient denies rash, joint symptoms, abdominal jaundice, abdominal distention, lower extremity edema, lethargy or confusion.    No history of melena, hematemesis or hematochezia.    The patient denies fever, sweats or chills.  She had COVID-19 infection in early 2021.  She has received all her vaccinations.    The patient has been losing weight recently, but has since plateaued around 155 pounds.  Appetite is okay.    The patient does not drink alcohol.  She has no history of alcohol use disorder or DUIs.    The patient has never smoked.  She denies any history of recreational drug use including marijuana, IN or IV drugs.      Medical hx Surgical hx   Past Medical History:   Diagnosis Date     Anxiety      Early onset Alzheimer's dementia without behavioral disturbance (H)      Hx of small bowel obstruction 11/2018     Interstitial lung disease (H) 05/2018     Intestinal TB 1990     Obesity (BMI 30-39.9)      Osteoarthritis      Osteopenia      Seropositive rheumatoid arthritis  (H) 01/2023     Type II diabetes mellitus (H)       Past Surgical History:   Procedure Laterality Date     CATARACT EXTRACTION W/ INTRAOCULAR LENS IMPLANT Left 10/25/2021     CATARACT IOL, RT/LT Right 10/11/2021     CHOLECYSTECTOMY, OPEN  1990     HEMICOLECTOMY, RT/LT Right 1990          Medications  Current Outpatient Medications   Medication Sig Dispense Refill     aspirin 81 MG chewable tablet Take 1 tablet (81 mg) by mouth daily 90 tablet 2     benzonatate (TESSALON) 200 MG capsule Take 1 capsule (200 mg) by mouth 3 times daily as needed for cough 90 capsule 3     famotidine (PEPCID) 40 MG tablet Take 1 tablet (40 mg) by mouth every evening 90 tablet 3     guaiFENesin (ROBITUSSIN) 20 mg/mL liquid Take 10 mLs by mouth every 4 hours as needed for cough 236 mL 0     hydroxychloroquine (PLAQUENIL) 200 MG tablet Take 1 tablet (200 mg) by mouth 2 times daily Annual Plaquenil toxicity eye screening required. 180 tablet 3     hydrOXYzine (ATARAX) 10 MG tablet Take 1 tablet (10 mg) by mouth 3 times daily as needed for anxiety 30 tablet 1     ipratropium - albuterol 0.5 mg/2.5 mg/3 mL (DUONEB) 0.5-2.5 (3) MG/3ML neb solution Take 1 vial (3 mLs) by nebulization every 4 hours as needed for wheezing 90 mL 0     losartan (COZAAR) 25 MG tablet Take 1 tablet (25 mg) by mouth daily 90 tablet 2     melatonin 3 MG tablet Take 1 tablet (3 mg) by mouth nightly as needed for sleep       mycophenolate (GENERIC EQUIVALENT) 500 MG tablet Take 2 tablets (1,000 mg) by mouth 2 times daily Labs every 8-12 weeks. 120 tablet 6     OFEV 100 MG capsule TAKE ONE CAPSULE BY MOUTH TWICE A DAY 60 capsule 11     STATIN NOT PRESCRIBED (INTENTIONAL) Please choose reason not prescribed, below       sulfaSALAzine ER (AZULFIDINE EN) 500 MG EC tablet take 2 tabs twice a day. 120 tablet 4     Vitamin D3 50 mcg (2000 units) tablet TAKE 1 TABLET( 50MCG) BY MOUTH DAILY. Strength: 50 MCG (2000 UT) 90 tablet 2       Allergies  Allergies   Allergen Reactions      Atorvastatin Muscle Pain (Myalgia)       Family hx Social hx   Family History   Problem Relation Age of Onset     Dementia Mother      Cerebrovascular Disease Father      Cerebrovascular Disease Brother      Diabetes Son      Myocardial Infarction No family hx of      Coronary Artery Disease Early Onset No family hx of      Breast Cancer No family hx of      Ovarian Cancer No family hx of      Colon Cancer No family hx of      Glaucoma No family hx of      Macular Degeneration No family hx of      Liver Disease No family hx of       Social History     Tobacco Use     Smoking status: Never     Smokeless tobacco: Never   Vaping Use     Vaping status: Never Used   Substance Use Topics     Alcohol use: Not Currently     Drug use: No     The patient lives in [place] with her , daughter and her daughter's family.  She arrived in the [place] 11 years ago from [facility].  She has 8 children who are all in good health.  Daughter is ICU nurse at [employer].  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       Review of systems  A 10-point review of systems was negative.    Examination  /85   Pulse 91   Temp 97.8  F (36.6  C)   Wt 69.9 kg (154 lb)   LMP  (LMP Unknown)   SpO2 99%   BMI 28.17 kg/m    Body mass index is 28.17 kg/m .    Gen- well, NAD, A+Ox3, normal color  Eye- EOMI  ENT- MMM, normal oropharynx  Lym- no palpable lymphadenopathy  CVS- S1, S2 normal, no added sounds, RRR  RS- CTA  Abd- laparotomy scar, soft, non-tender, no ascites or organomegaly on palpation or percussion, BS+  Extr- pulses good, no MARY  MS- hands normal- no clubbing  Neuro- A+Ox3, no asterixis  Skin- no rash or jaundice  Psych- normal mood    Laboratory  Lab Results   Component Value Date     01/25/2023     03/17/2021    POTASSIUM 4.1 01/25/2023    POTASSIUM 3.4 12/02/2022    POTASSIUM 4.3 03/17/2021    CHLORIDE 100 01/25/2023    CHLORIDE 94 12/01/2022    CHLORIDE 105 03/17/2021    CO2 27  01/25/2023    CO2 28 12/01/2022    CO2 31 03/17/2021    BUN 14.1 01/25/2023    BUN 10 12/01/2022    BUN 12 03/17/2021    CR 0.69 01/25/2023    CR 0.69 06/16/2021       Lab Results   Component Value Date    BILITOTAL 0.3 01/25/2023    BILITOTAL 0.2 06/16/2021    ALT 11 01/25/2023    ALT 28 06/16/2021    AST 23 01/25/2023    AST 20 06/16/2021    ALKPHOS 80 01/25/2023    ALKPHOS 61 06/16/2021       Lab Results   Component Value Date    ALBUMIN 4.1 01/25/2023    ALBUMIN 2.6 11/30/2022    ALBUMIN 3.7 06/16/2021    PROTTOTAL 7.6 01/25/2023    PROTTOTAL 8.0 06/16/2021        Lab Results   Component Value Date    WBC 8.8 01/25/2023    WBC 7.7 06/16/2021    HGB 12.1 01/25/2023    HGB 11.5 06/16/2021    MCV 89 01/25/2023    MCV 91 06/16/2021     01/25/2023     06/16/2021       No results found for: INR      Radiology  Nil recent    Assessment  72-year-old female with history of ILD secondary to rheumatoid arthritis, who presents for further evaluation and management of positive hepatitis B serologies, most likely secondary to false positive.  Will repeat hepatitis B serology test to rule out false positive, as well as hepatitis B viral load to evaluate for chronic HBV infection.  If follow-up testing is consistent with chronic hepatitis B infection, patient will need to start on antiviral therapy to prevent hepatitis B reactivation while on rituximab, which is a high risk agent for hepatitis B reactivation/flare.    Plan  1.  Check CMP, INR, CBC  2.  Check HBV SAg, HBV CAb, HBV SAb, HBV DNA  3.  If HBV DNA positive, start tenofovir 300mg PO Q24  4.  Follow-up TBAUGUSTINE Bray MD  Hepatology  [facility]

## 2023-04-18 NOTE — LETTER
4/18/2023         RE: Krystian Boland  [id number]  [facility] [address]        Dear Colleague,    Thank you for referring your patient, Krystian Boland, to the [facility] [place]. Please see a copy of my visit note below.    [facility] Hepatology    New Patient Visit    Referring provider:  Referred Self    72 year old female    Chief complaint:  Positive hepatitis B surface antigen    HPI:  Patient comes to clinic this morning with her daughter, who is also acting as her Tibetan .    The patient presents for further evaluation and management of a positive hepatitis B serologies.  The patient reports that she had negative hepatitis B testing in [place] prior to immigrating to the [place].  As a result, she underwent hepatitis B vaccination series.  The patient has a history of interstitial lung disease secondary to rheumatoid arthritis and recently underwent testing in anticipation of rituximab therapy.  Hepatitis B surface antigen, hepatitis B core antibody and hepatitis B surface antibody all returned positive.  No prior history of chronic liver disease.    The patient is well today.  She has no symptoms related to liver disease.    The patient denies rash, joint symptoms, abdominal jaundice, abdominal distention, lower extremity edema, lethargy or confusion.    No history of melena, hematemesis or hematochezia.    The patient denies fever, sweats or chills.  She had COVID-19 infection in early 2021.  She has received all her vaccinations.    The patient has been losing weight recently, but has since plateaued around 155 pounds.  Appetite is okay.    The patient does not drink alcohol.  She has no history of alcohol use disorder or DUIs.    The patient has never smoked.  She denies any history of recreational drug use including marijuana, IN or IV drugs.      Medical hx Surgical hx   Past Medical History:   Diagnosis Date    Anxiety     Early  onset Alzheimer's dementia without behavioral disturbance (H)     Hx of small bowel obstruction 11/2018    Interstitial lung disease (H) 05/2018    Intestinal TB 1990    Obesity (BMI 30-39.9)     Osteoarthritis     Osteopenia     Seropositive rheumatoid arthritis (H) 01/2023    Type II diabetes mellitus (H)       Past Surgical History:   Procedure Laterality Date    CATARACT EXTRACTION W/ INTRAOCULAR LENS IMPLANT Left 10/25/2021    CATARACT IOL, RT/LT Right 10/11/2021    CHOLECYSTECTOMY, OPEN  1990    HEMICOLECTOMY, RT/LT Right 1990          Medications  Current Outpatient Medications   Medication Sig Dispense Refill    aspirin 81 MG chewable tablet Take 1 tablet (81 mg) by mouth daily 90 tablet 2    benzonatate (TESSALON) 200 MG capsule Take 1 capsule (200 mg) by mouth 3 times daily as needed for cough 90 capsule 3    famotidine (PEPCID) 40 MG tablet Take 1 tablet (40 mg) by mouth every evening 90 tablet 3    guaiFENesin (ROBITUSSIN) 20 mg/mL liquid Take 10 mLs by mouth every 4 hours as needed for cough 236 mL 0    hydroxychloroquine (PLAQUENIL) 200 MG tablet Take 1 tablet (200 mg) by mouth 2 times daily Annual Plaquenil toxicity eye screening required. 180 tablet 3    hydrOXYzine (ATARAX) 10 MG tablet Take 1 tablet (10 mg) by mouth 3 times daily as needed for anxiety 30 tablet 1    ipratropium - albuterol 0.5 mg/2.5 mg/3 mL (DUONEB) 0.5-2.5 (3) MG/3ML neb solution Take 1 vial (3 mLs) by nebulization every 4 hours as needed for wheezing 90 mL 0    losartan (COZAAR) 25 MG tablet Take 1 tablet (25 mg) by mouth daily 90 tablet 2    melatonin 3 MG tablet Take 1 tablet (3 mg) by mouth nightly as needed for sleep      mycophenolate (GENERIC EQUIVALENT) 500 MG tablet Take 2 tablets (1,000 mg) by mouth 2 times daily Labs every 8-12 weeks. 120 tablet 6    OFEV 100 MG capsule TAKE ONE CAPSULE BY MOUTH TWICE A DAY 60 capsule 11    STATIN NOT PRESCRIBED (INTENTIONAL) Please choose reason not prescribed, below       sulfaSALAzine ER (AZULFIDINE EN) 500 MG EC tablet take 2 tabs twice a day. 120 tablet 4    Vitamin D3 50 mcg (2000 units) tablet TAKE 1 TABLET( 50MCG) BY MOUTH DAILY. Strength: 50 MCG (2000 UT) 90 tablet 2       Allergies  Allergies   Allergen Reactions    Atorvastatin Muscle Pain (Myalgia)       Family hx Social hx   Family History   Problem Relation Age of Onset    Dementia Mother     Cerebrovascular Disease Father     Cerebrovascular Disease Brother     Diabetes Son     Myocardial Infarction No family hx of     Coronary Artery Disease Early Onset No family hx of     Breast Cancer No family hx of     Ovarian Cancer No family hx of     Colon Cancer No family hx of     Glaucoma No family hx of     Macular Degeneration No family hx of     Liver Disease No family hx of       Social History     Tobacco Use    Smoking status: Never    Smokeless tobacco: Never   Vaping Use    Vaping status: Never Used   Substance Use Topics    Alcohol use: Not Currently    Drug use: No     The patient lives in [place] with her , daughter and her daughter's family.  She arrived in the [place] 11 years ago from [facility].  She has 8 children who are all in good health.  Daughter is ICU nurse at [employer].  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       Review of systems  A 10-point review of systems was negative.    Examination  /85   Pulse 91   Temp 97.8  F (36.6  C)   Wt 69.9 kg (154 lb)   LMP  (LMP Unknown)   SpO2 99%   BMI 28.17 kg/m    Body mass index is 28.17 kg/m .    Gen- well, NAD, A+Ox3, normal color  Eye- EOMI  ENT- MMM, normal oropharynx  Lym- no palpable lymphadenopathy  CVS- S1, S2 normal, no added sounds, RRR  RS- CTA  Abd- laparotomy scar, soft, non-tender, no ascites or organomegaly on palpation or percussion, BS+  Extr- pulses good, no MARY  MS- hands normal- no clubbing  Neuro- A+Ox3, no asterixis  Skin- no rash or jaundice  Psych- normal mood    Laboratory  Lab Results    Component Value Date     01/25/2023     03/17/2021    POTASSIUM 4.1 01/25/2023    POTASSIUM 3.4 12/02/2022    POTASSIUM 4.3 03/17/2021    CHLORIDE 100 01/25/2023    CHLORIDE 94 12/01/2022    CHLORIDE 105 03/17/2021    CO2 27 01/25/2023    CO2 28 12/01/2022    CO2 31 03/17/2021    BUN 14.1 01/25/2023    BUN 10 12/01/2022    BUN 12 03/17/2021    CR 0.69 01/25/2023    CR 0.69 06/16/2021       Lab Results   Component Value Date    BILITOTAL 0.3 01/25/2023    BILITOTAL 0.2 06/16/2021    ALT 11 01/25/2023    ALT 28 06/16/2021    AST 23 01/25/2023    AST 20 06/16/2021    ALKPHOS 80 01/25/2023    ALKPHOS 61 06/16/2021       Lab Results   Component Value Date    ALBUMIN 4.1 01/25/2023    ALBUMIN 2.6 11/30/2022    ALBUMIN 3.7 06/16/2021    PROTTOTAL 7.6 01/25/2023    PROTTOTAL 8.0 06/16/2021        Lab Results   Component Value Date    WBC 8.8 01/25/2023    WBC 7.7 06/16/2021    HGB 12.1 01/25/2023    HGB 11.5 06/16/2021    MCV 89 01/25/2023    MCV 91 06/16/2021     01/25/2023     06/16/2021       No results found for: INR      Radiology  Nil recent    Assessment  72-year-old female with history of ILD secondary to rheumatoid arthritis, who presents for further evaluation and management of positive hepatitis B serologies, most likely secondary to false positive.  Will repeat hepatitis B serology test to rule out false positive, as well as hepatitis B viral load to evaluate for chronic HBV infection.  If follow-up testing is consistent with chronic hepatitis B infection, patient will need to start on antiviral therapy to prevent hepatitis B reactivation while on rituximab, which is a high risk agent for hepatitis B reactivation/flare.    Plan  1.  Check CMP, INR, CBC  2.  Check HBV SAg, HBV CAb, HBV SAb, HBV DNA  3.  If HBV DNA positive, start tenofovir 300mg PO Q24  4.  Follow-up TBD    Luke Bray MD  Hepatology  [facility]          Again, thank you for allowing me to participate in the  care of your patient.        Sincerely,        Luke Bray MD     Cimetidine Counseling:  I discussed with the patient the risks of Cimetidine including but not limited to gynecomastia, headache, diarrhea, nausea, drowsiness, arrhythmias, pancreatitis, skin rashes, psychosis, bone marrow suppression and kidney toxicity.

## 2023-04-19 LAB
HBV CORE AB SERPL QL IA: REACTIVE
HBV SURFACE AG SERPL QL IA: REACTIVE
QUANTIFERON MITOGEN: 10 IU/ML
QUANTIFERON NIL TUBE: 0.05 IU/ML
QUANTIFERON TB1 TUBE: 0.31 IU/ML
QUANTIFERON TB2 TUBE: 0.72

## 2023-04-20 ENCOUNTER — TELEPHONE (OUTPATIENT)
Dept: PULMONOLOGY | Facility: CLINIC | Age: 73
End: 2023-04-20
Payer: COMMERCIAL

## 2023-04-20 DIAGNOSIS — R76.12 POSITIVE QUANTIFERON-TB GOLD TEST: Primary | ICD-10-CM

## 2023-04-20 DIAGNOSIS — M05.10 RHEUMATOID LUNG (H): ICD-10-CM

## 2023-04-20 LAB
GAMMA INTERFERON BACKGROUND BLD IA-ACNC: 0.05 IU/ML
M TB IFN-G BLD-IMP: POSITIVE
M TB IFN-G CD4+ BCKGRND COR BLD-ACNC: 9.95 IU/ML
MITOGEN IGNF BCKGRD COR BLD-ACNC: 0.26 IU/ML
MITOGEN IGNF BCKGRD COR BLD-ACNC: 0.67 IU/ML

## 2023-04-20 NOTE — PROGRESS NOTES
"HISTORY OF PRESENT ILLNESS:  Krystian is a 72-year-old female with interstitial lung disease secondary to rheumatoid arthritis.  Her last visit was with me 10/26/2022. Serial PFTs monitoring was being pursued while receiving ofev, which remains currently at 100 mg BID due not tolerating the 150 dose with nausea. She is also on HCQN and sulfasalazine with fair joint symptom control. Her daughter is an RN and translating for her. As her respiratory symptoms progressed, I started her on MMF in 10/2022. In late November, she had acute respiratory failure requiring hospitalization, and CT was suggestive for rheumatoid lung flare with superimposed progressive fibrosis. She was discharged on oxygen, which she was able to discontinue a few weeks ago. She had only 3 days interruption in MMF, and now on 1000 BID. She has exertional dyspnea but seems to be better compared to 5/2022, and daughter thinks that she is starting to respond to MMF. No symptoms to suggest infection. RTX has been entertained with rheumatology.     Updates from today 4/21/23  Patient returns for follow-up, we have previously discussed with rheumatology initiating rituximab for her rheumatoid lung, and performed routine infectious work-up which turned out positive for TB QuantiFERON.  She was born and raised in [facility] (incidence [place] and expresses no symptoms of active TB. CT 1/2023 also did not suggest active TB. She feels well today with no change in baseline dyspnea on exertion. Not using oxygen at home. Six min walk showed 2 LPM need with activity, she does have a concentrator and tanks.     PHYSICAL EXAMINATION:    VITAL SIGNS:  BP (!) 151/89 (BP Location: Right arm, Patient Position: Sitting, Cuff Size: Adult Regular)   Pulse 81   Ht 1.6 m (5' 3\")   Wt 71.3 kg (157 lb 1.6 oz)   LMP  (LMP Unknown)   SpO2 98%   BMI 27.83 kg/m      HEENT:  Eyes are anicteric.  Mouth and throat without lesions.  CHEST:  Crackles at least alf up " bilaterally, no wheezes.   HEART:  Shows regular rate and rhythm.  Normal S1, S2.  EXTREMITIES: BL digital clubbing.  SKIN:  No rash.    PULMONARY FUNCTION TESTS today 1/25/23    My interpretation: Severe intra-thoracic restrictive disease, no obstruction. Slow progression compared to 2022.         Reviewed HRCT chest images with patient and her daughter, today 1/25/23 compared to 11/28/22 and 5/2022  Advancement in honeycombing and fibrotic findings with residual dense GGO/consolidative areas that are minimal compared to November. November's scan showed extensive GGO (now resolved) suggestive of CTD ILD flare.     ASSESSMENT AND PLAN:   1. RA ILD, rapidly progressive with flare in 11/2022    2. Chronic hypoxemic respiratory failure  3. DM   4. HTN   5. Latent TB?    Krystian is a 72-year-old female with interstitial lung disease secondary to RA. Her RA is being treated with sulfasalazine and hydroxychloroquine.  She saw Dr. Staley on 1/19/23 and tolerating cellcept 1000 BID. Did not need steroid bursts in several months. Most recent steroid taper was 12/2/22 starting with 40 mg for approximately one month. She is tolerating Ofev 100 BID as well (did not tolerate 150). PFTs show gradual decline in FVC and DLCO. We discussed rituximab with Dr. Staley, however her Hep B came back positive and she saw GI, who ordered viral load and it is over 50 million, also her TB QFT is positive, she endorses remote history of intestinal TB, but no active symptoms from a respiratory or constitutional standpoint. She received 6 months TB treatment in [place] at the time (1980s) and recalls it was only one capsuel.   Plan:   - ID referral for previously treated TB and in the setting of needing rituximab   - F/u with GI regarding hepatitis B treatment   - Continue current dose cellcept   - Compliance with O2 whenever performing outside activities/long walks, with pulse ox monitoring   - Continue current dose ofev 100   - Keep q3  months labs, 4/18 were reviewed and show normal CBC, renal and hepatic function   - Return in 6 months with PFTs       Jase Keller MD    Total time spent on the day of the visit was 30 minutes.       Answers for HPI/ROS submitted by the patient on 4/14/2023  General Symptoms: No  Skin Symptoms: No  HENT Symptoms: No  EYE SYMPTOMS: No  HEART SYMPTOMS: No  LUNG SYMPTOMS: No  INTESTINAL SYMPTOMS: No  URINARY SYMPTOMS: No  GYNECOLOGIC SYMPTOMS: No  BREAST SYMPTOMS: No  SKELETAL SYMPTOMS: No  BLOOD SYMPTOMS: No  NERVOUS SYSTEM SYMPTOMS: No  MENTAL HEALTH SYMPTOMS: No

## 2023-04-20 NOTE — TELEPHONE ENCOUNTER
Daughter updated re: message from Dr. Keller below.  RTC visit tomorrow (4/21/23); daughter notes intent on being present for this. Appreciative of result insight and referral to ID.     Haley Yancey, RN, BSN  ILD Nurse Care Coordinator  (P) [phone]

## 2023-04-20 NOTE — TELEPHONE ENCOUNTER
----- Message from Jase Keller MD sent at 4/20/2023 10:50 AM CDT -----  Positive TB QFT likely representing latent TB, patient needs to see ID for clearance for rituximab therapy. Can you kindly call daughter (RN) about this update? ID referral order placed.

## 2023-04-21 ENCOUNTER — OFFICE VISIT (OUTPATIENT)
Dept: PULMONOLOGY | Facility: CLINIC | Age: 73
End: 2023-04-21
Attending: INTERNAL MEDICINE
Payer: COMMERCIAL

## 2023-04-21 ENCOUNTER — TELEPHONE (OUTPATIENT)
Dept: GASTROENTEROLOGY | Facility: CLINIC | Age: 73
End: 2023-04-21

## 2023-04-21 ENCOUNTER — ANCILLARY PROCEDURE (OUTPATIENT)
Dept: GENERAL RADIOLOGY | Facility: CLINIC | Age: 73
End: 2023-04-21
Payer: COMMERCIAL

## 2023-04-21 VITALS
HEART RATE: 81 BPM | SYSTOLIC BLOOD PRESSURE: 151 MMHG | DIASTOLIC BLOOD PRESSURE: 89 MMHG | WEIGHT: 157.1 LBS | HEIGHT: 63 IN | OXYGEN SATURATION: 98 % | BODY MASS INDEX: 27.84 KG/M2

## 2023-04-21 DIAGNOSIS — J84.9 ILD (INTERSTITIAL LUNG DISEASE) (H): ICD-10-CM

## 2023-04-21 DIAGNOSIS — B18.1 CHRONIC HEPATITIS B (H): Primary | ICD-10-CM

## 2023-04-21 DIAGNOSIS — M05.10 RHEUMATOID LUNG (H): Primary | ICD-10-CM

## 2023-04-21 LAB
6 MIN WALK (FT): 850 FT
6 MIN WALK (M): 259 M
DLCOCOR-%PRED-PRE: 41 %
DLCOCOR-PRE: 7.6 ML/MIN/MMHG
DLCOUNC-%PRED-PRE: 39 %
DLCOUNC-PRE: 7.12 ML/MIN/MMHG
DLCOUNC-PRED: 18.17 ML/MIN/MMHG
ERV-%PRED-PRE: 88 %
ERV-PRE: 0.45 L
ERV-PRED: 0.51 L
EXPTIME-PRE: 5.78 SEC
FEF2575-%PRED-PRE: 212 %
FEF2575-PRE: 3.61 L/SEC
FEF2575-PRED: 1.7 L/SEC
FEFMAX-%PRED-PRE: 98 %
FEFMAX-PRE: 5.26 L/SEC
FEFMAX-PRED: 5.34 L/SEC
FEV1-%PRED-PRE: 75 %
FEV1-PRE: 1.46 L
FEV1FEV6-PRE: 96 %
FEV1FEV6-PRED: 79 %
FEV1FVC-PRE: 96 %
FEV1FVC-PRED: 79 %
FEV1SVC-PRE: 99 %
FEV1SVC-PRED: 69 %
FIFMAX-PRE: 4.92 L/SEC
FRCPLETH-%PRED-PRE: 72 %
FRCPLETH-PRE: 1.89 L
FRCPLETH-PRED: 2.6 L
FVC-%PRED-PRE: 62 %
FVC-PRE: 1.53 L
FVC-PRED: 2.44 L
HBV DNA SERPL NAA+PROBE-ACNC: ABNORMAL IU/ML
HBV DNA SERPL NAA+PROBE-LOG IU: 7.8 {LOG_IU}/ML
IC-%PRED-PRE: 44 %
IC-PRE: 1.02 L
IC-PRED: 2.29 L
RVPLETH-%PRED-PRE: 72 %
RVPLETH-PRE: 1.43 L
RVPLETH-PRED: 1.97 L
TLCPLETH-%PRED-PRE: 63 %
TLCPLETH-PRE: 2.91 L
TLCPLETH-PRED: 4.6 L
VA-%PRED-PRE: 55 %
VA-PRE: 2.39 L
VC-%PRED-PRE: 52 %
VC-PRE: 1.48 L
VC-PRED: 2.81 L

## 2023-04-21 PROCEDURE — 94375 RESPIRATORY FLOW VOLUME LOOP: CPT | Performed by: INTERNAL MEDICINE

## 2023-04-21 PROCEDURE — 94726 PLETHYSMOGRAPHY LUNG VOLUMES: CPT | Performed by: INTERNAL MEDICINE

## 2023-04-21 PROCEDURE — 99214 OFFICE O/P EST MOD 30 MIN: CPT | Mod: 25 | Performed by: INTERNAL MEDICINE

## 2023-04-21 PROCEDURE — 94729 DIFFUSING CAPACITY: CPT | Performed by: INTERNAL MEDICINE

## 2023-04-21 PROCEDURE — 71046 X-RAY EXAM CHEST 2 VIEWS: CPT | Performed by: RADIOLOGY

## 2023-04-21 PROCEDURE — G0463 HOSPITAL OUTPT CLINIC VISIT: HCPCS | Performed by: INTERNAL MEDICINE

## 2023-04-21 PROCEDURE — 94618 PULMONARY STRESS TESTING: CPT | Performed by: INTERNAL MEDICINE

## 2023-04-21 ASSESSMENT — PAIN SCALES - GENERAL: PAINLEVEL: NO PAIN (0)

## 2023-04-21 NOTE — PATIENT INSTRUCTIONS
Continue cellcept and ofev current dose  Follow up with ID for clearance for rituximab.     Please call our direct ILD nurses line for questions and concerns: [phone]    For scheduling, please call: [phone]

## 2023-04-21 NOTE — NURSING NOTE
Chief Complaint   Patient presents with     Interstitial Lung Disease (ILD)     ILD Follow up      Vitals were taken and medications were reconciled.   Javad Snider, EMT  9:47 AM

## 2023-04-21 NOTE — TELEPHONE ENCOUNTER
Called patient's daughter to review lab results.    Labs reviewed and consistent with chronic hep B infection.    Daughter says her mom is uncertain if she will pursue rituximab at this time.    Plan  1.  Check LFTs, HBV eAg, HBV eAb, HBV DNA in 6 months  2.  Abd U/S for HCC screening  3.  If patient does start rituximab, would start tenofovir 300mg PO Q24  4.  Follow-up with me in clinic in 12 months for chronic HBV    Luke Bray MD  Hepatology

## 2023-04-21 NOTE — LETTER
4/21/2023         RE: Krystian Boland  [id number]  [facility] [address]        Dear Colleague,    Thank you for referring your patient, Krystian Boland, to the [facility] FOR LUNG SCIENCE AND [facility] CLINIC [place]. Please see a copy of my visit note below.    HISTORY OF PRESENT ILLNESS:  Krystian is a 72-year-old female with interstitial lung disease secondary to rheumatoid arthritis.  Her last visit was with me 10/26/2022. Serial PFTs monitoring was being pursued while receiving ofev, which remains currently at 100 mg BID due not tolerating the 150 dose with nausea. She is also on HCQN and sulfasalazine with fair joint symptom control. Her daughter is an RN and translating for her. As her respiratory symptoms progressed, I started her on MMF in 10/2022. In late November, she had acute respiratory failure requiring hospitalization, and CT was suggestive for rheumatoid lung flare with superimposed progressive fibrosis. She was discharged on oxygen, which she was able to discontinue a few weeks ago. She had only 3 days interruption in MMF, and now on 1000 BID. She has exertional dyspnea but seems to be better compared to 5/2022, and daughter thinks that she is starting to respond to MMF. No symptoms to suggest infection. RTX has been entertained with rheumatology.     Updates from today 4/21/23  Patient returns for follow-up, we have previously discussed with rheumatology initiating rituximab for her rheumatoid lung, and performed routine infectious work-up which turned out positive for TB QuantiFERON.  She was born and raised in [facility] [facility] (incidence 100[place]100 000), and expresses no symptoms of active TB. CT 1/2023 also did not suggest active TB. She feels well today with no change in baseline dyspnea on exertion. Not using oxygen at home. Six min walk showed 2 LPM need with activity, she does have a concentrator and tanks.     PHYSICAL EXAMINATION:    VITAL SIGNS:  BP (!) 151/89 (BP  "Location: Right arm, Patient Position: Sitting, Cuff Size: Adult Regular)   Pulse 81   Ht 1.6 m (5' 3\")   Wt 71.3 kg (157 lb 1.6 oz)   LMP  (LMP Unknown)   SpO2 98%   BMI 27.83 kg/m      HEENT:  Eyes are anicteric.  Mouth and throat without lesions.  CHEST:  Crackles at least group home up bilaterally, no wheezes.   HEART:  Shows regular rate and rhythm.  Normal S1, S2.  EXTREMITIES: BL digital clubbing.  SKIN:  No rash.    PULMONARY FUNCTION TESTS today 1/25/23    My interpretation: Severe intra-thoracic restrictive disease, no obstruction. Slow progression compared to 2022.         Reviewed HRCT chest images with patient and her daughter, today 1/25/23 compared to 11/28/22 and 5/2022  Advancement in honeycombing and fibrotic findings with residual dense GGO/consolidative areas that are minimal compared to November. November's scan showed extensive GGO (now resolved) suggestive of CTD ILD flare.     ASSESSMENT AND PLAN:   1. RA ILD, rapidly progressive with flare in 11/2022    2. Chronic hypoxemic respiratory failure  3. DM   4. HTN   5. Latent TB?    Krystian is a 72-year-old female with interstitial lung disease secondary to RA. Her RA is being treated with sulfasalazine and hydroxychloroquine.  She saw Dr. Staley on 1/19/23 and tolerating cellcept 1000 BID. Did not need steroid bursts in several months. Most recent steroid taper was 12/2/22 starting with 40 mg for approximately one month. She is tolerating Ofev 100 BID as well (did not tolerate 150). PFTs show gradual decline in FVC and DLCO. We discussed rituximab with Dr. Staley, however her Hep B came back positive and she saw GI, who ordered viral load and it is over 50 million, also her TB QFT is positive, she endorses remote history of intestinal TB, but no active symptoms from a respiratory or constitutional standpoint. She received 6 months TB treatment in [place] at the time (1980s) and recalls it was only one capsuel.   Plan:   - ID referral for " previously treated TB and in the setting of needing rituximab   - F/u with GI regarding hepatitis B treatment   - Continue current dose cellcept   - Compliance with O2 whenever performing outside activities/long walks, with pulse ox monitoring   - Continue current dose ofev 100   - Keep q3 months labs, 4/18 were reviewed and show normal CBC, renal and hepatic function   - Return in 6 months with PFTs       Jase Keller MD    Total time spent on the day of the visit was 30 minutes.       Answers for HPI/ROS submitted by the patient on 4/14/2023  General Symptoms: No  Skin Symptoms: No  HENT Symptoms: No  EYE SYMPTOMS: No  HEART SYMPTOMS: No  LUNG SYMPTOMS: No  INTESTINAL SYMPTOMS: No  URINARY SYMPTOMS: No  GYNECOLOGIC SYMPTOMS: No  BREAST SYMPTOMS: No  SKELETAL SYMPTOMS: No  BLOOD SYMPTOMS: No  NERVOUS SYSTEM SYMPTOMS: No  MENTAL HEALTH SYMPTOMS: No

## 2023-04-22 LAB
HBV E AB SERPL QL IA: NEGATIVE
HBV E AG SERPL QL IA: POSITIVE

## 2023-04-25 ENCOUNTER — HOSPITAL ENCOUNTER (OUTPATIENT)
Dept: CARDIAC REHAB | Facility: CLINIC | Age: 73
Discharge: HOME | End: 2023-04-25
Attending: INTERNAL MEDICINE
Payer: COMMERCIAL

## 2023-04-25 DIAGNOSIS — M05.10 RHEUMATOID LUNG (H): ICD-10-CM

## 2023-04-25 PROCEDURE — G0238 OTH RESP PROC, INDIV: HCPCS

## 2023-05-02 NOTE — PROGRESS NOTES
"Ori Theodore is a 10 y.o. male who presents for Emesis (Stomach pain,x4 days)    Brought in by his mother  HPI  This is a new problem. Ori Theodore is a 10 y.o. patient who presents to urgent care with c/o: Stomach ache and pains for 4 days.  He did not vomit yesterday but vomited today.  Yesterday he was doubled over in pain intermittently.  His appetite has been less than normal.  He has not had a fever.  No known exposures to ill persons.  He had a normal bowel movement today.  Treatments tried: Fluids, rest  No other aggravating or alleviating factors.       ROS See HPI    Allergies:     No Known Allergies    PMSFS Hx:  History reviewed. No pertinent past medical history.  History reviewed. No pertinent surgical history.  History reviewed. No pertinent family history.       Problems:   There is no problem list on file for this patient.      Medications:   No current outpatient medications on file prior to visit.     No current facility-administered medications on file prior to visit.          Objective:     Pulse 68   Temp 36.9 °C (98.4 °F) (Temporal)   Resp 24   Ht 1.265 m (4' 1.8\")   Wt 20.3 kg (44 lb 12.8 oz)   SpO2 95%   BMI 12.70 kg/m²     Physical Exam  Vitals and nursing note reviewed.   Constitutional:       General: He is active. He is not in acute distress.     Appearance: Normal appearance. He is well-groomed. He is ill-appearing.      Comments: He appears that he does not feel well.    Cardiovascular:      Rate and Rhythm: Normal rate and regular rhythm.      Pulses: Normal pulses.      Heart sounds: Normal heart sounds.   Pulmonary:      Effort: Pulmonary effort is normal.      Breath sounds: Normal breath sounds.   Abdominal:      General: Abdomen is flat. There is no distension.      Palpations: Abdomen is soft.      Tenderness: There is abdominal tenderness (generalized). There is no right CVA tenderness, left CVA tenderness, guarding or rebound. Negative signs include Rovsing's sign, " [facility]    Hospitalist Progress Note    Assessment & Plan   Krystian is a 68 year old female who presents with approximately 1 week of waxing and waning abdominal pain associated with nausea and vomiting, relieved with passing gas.  Diagnosed with small bowel obstruction 11/2/18, though attempted to manage as an outpatient.  Has continued to improve, though discomfort persisted resulting in PCP visit and transfer to emergency department for admission for small bowel obstruction.     Small bowel obstruction:   -open choleycystectomy 30 years ago  - has had 1-2 prior similar bouts of pain/symptoms over the last several years but not seek medication attention.   -Somewhat atypical waxing and waning presentation.  Question possible area of dysfunctional bowel related to historical diagnosis of intestinal tuberculosis.    -Potassium and magnesium replacement protocols in place  -General surgery consulted in the setting of small bowel obstruction, thickened appendix, history of intestinal tuberculosis.  Exam and laboratory findings are not consistent with appendicitis at this time.  -PRN pain medications and antiemetic therapies  - npo on admission  - now passing gas, less abd pain.   - surgery consulted.     Plan:   -surgery following, appreciate their input  - trial of clear liquids  -Daily ranitidine 300 mg daily converted to Protonix 40 IV at bedtime.   --Lactated Ringer's at 125/h- decrease rate if taking po  -npo if not tolerate clears and obtain SBFT per surgery  -bmp, CBC    Rheumatoid lung disease:   -Patient with CCP and rheumatoid factor positivity, paucity of arthritic symptoms.    -Follows with Dr. Harjit Staley of rheumatology.  Primary pulmonologist is Dr. Cross.  -Daily 5 mg prednisone held.  No need for stress dose steroids  -Recommend follow-up with rheumatology clinic.  Per daughter, patient had declined Plaquenil initiation.  Steroid sparing agent would be beneficial  long-term.  -slight crackles at bases on lung exam     Type 2 diabetes:  -Holding prior to admission metformin 1 g twice daily  -Holding prior to admission 81 mg aspirin daily  -Medium dose sliding scale insulin with every 4 hours blood glucose checks while n.p.o.  - BS in  range     Hyperlipidemia:  -Holding prior to admission pravastatin 40 mg daily     DVT Prophylaxis: Mechanical prophylaxis     Code Status: Full code     Disposition: Expected discharge in likely ~1-2 days pending improvement in nause and abdominal discomfort, ability to tolerate oral intake.       Augustin Burciaga MD  Text Page  (7am to 6pm)  Interval History   Non English speaking. Speaks Tibetan  dtr at bedside fluent in English    Feeling better  + passing gas  No abd pain, n/v  No sob  Seen by surgeon this am    -Data reviewed today: I reviewed all new labs and imaging results over the last 24 hours. I personally reviewed labs and imaging since admission    Physical Exam   Temp: 98.2  F (36.8  C) Temp src: Oral BP: 108/63   Heart Rate: 86 Resp: 16 SpO2: 96 % O2 Device: None (Room air)    Vitals:    11/06/18 1544 11/06/18 2222 11/07/18 0500   Weight: 75.3 kg (166 lb) 75.3 kg (166 lb) 74.7 kg (164 lb 10.9 oz)     Vital Signs with Ranges  Temp:  [97.4  F (36.3  C)-98.4  F (36.9  C)] 98.2  F (36.8  C)  Pulse:  [113] 113  Heart Rate:  [] 86  Resp:  [16-18] 16  BP: ()/(60-82) 108/63  SpO2:  [95 %-98 %] 96 %  I/O last 3 completed shifts:  In: 1923 [I.V.:1923]  Out: -     Constitutional: Sitting up bed, nad ,nontoxic  Neck: nl jvp  Respiratory:breathing easily, trace crackles bibasilar-? Dry crackles  Cardiovascular: RRR no r/g/m. Not tachy  GI: soft, NT,  ND + BS- ~ reduced.   Skin/Integumen: no rash or edema  Psych:  nl affect, pleasant, cooperative  Neuro: alert, nl speech and mentation, nl strength, grossly nonfocal    Medications     lactated ringers 150 mL/hr at 11/07/18 0030       insulin aspart  1-6 Units Subcutaneous Q4H  psoas sign and obturator sign.   Musculoskeletal:      Cervical back: Normal range of motion.   Skin:     General: Skin is warm.      Capillary Refill: Capillary refill takes less than 2 seconds.   Neurological:      Mental Status: He is alert and oriented for age.   Psychiatric:         Mood and Affect: Mood normal.         Behavior: Behavior normal. Behavior is cooperative.         Thought Content: Thought content normal.         Assessment /Associated Orders:      1. Abdominal pain, unspecified abdominal location  POCT CoV-2, Flu A/B, RSV by PCR      2. Vomiting, unspecified vomiting type, unspecified whether nausea present  [id number] VIEW    POCT CoV-2, Flu A/B, RSV by PCR          Medical Decision Making:    Pt is clinically stable at today's acute urgent care visit.  No acute distress noted. Appropriate for outpatient care at this time.   Acute problem today with uncertain prognosis.   Differential Diagnosis includes but is not limited to: Early appendicitis, AGE, or other viral illness  Discussed watchful waiting with her mom versus ER evaluation at this time.  Mom and not like to go to the emergency room at this point.    Discussed Dx, management options (risks,benefits, and alternatives to planned treatment), natural progression and supportive care.  Expressed understanding and the treatment plan was agreed upon.   Questions were encouraged and answered   Return to urgent care prn if new or worsening sx or if there is no improvement in condition prn.    Educated in Red flags and indications to immediately call 911 or present to the Emergency Department.       Time I spent evaluating Ori Theodore in urgent care today was 33  minutes. This time includes preparing for visit, reviewing any pertinent notes or test results, counseling/education, exam, obtaining HPI, interpretation of lab tests, medication management and documentation as indicated above.Time does not include separately billable procedures      pantoprazole (PROTONIX) IV  40 mg Intravenous At Bedtime     sodium chloride (PF)  3 mL Intracatheter Q8H       Data     Recent Labs  Lab 11/06/18  1820 11/02/18  1626   WBC 7.8  --    HGB 12.3  --    MCV 87  --      --     132*   POTASSIUM 3.8 3.9   CHLORIDE 105 97   CO2 25 26   BUN 7 16   CR 0.52 0.62   ANIONGAP 8 9   SANDI 9.3 9.6   * 152*   ALBUMIN 3.2*  --    PROTTOTAL 7.7  --    BILITOTAL 0.4  --    ALKPHOS 76  --    ALT 48  --    AST 33  --    LIPASE 99  --        Imaging:   Recent Results (from the past 24 hour(s))   CT Abdomen Pelvis w Contrast    Narrative    CT ABDOMEN AND PELVIS WITH CONTRAST   11/6/2018 8:20 PM     HISTORY: SBO 5 days ago, now worse.     TECHNIQUE:  83 mL Isovue-370. Radiation dose for this scan was reduced  using automated exposure control, adjustment of the mA and/or kV  according to patient size, or iterative reconstruction technique.    COMPARISON: None.    FINDINGS:  No diverticulitis. The appendix is thicker than usual (9  mm), but this can be normal and there is no adjacent inflammatory  change. If there is clinical concern for appendicitis, a short-term  limited follow-up exam could be performed. There are dilated proximal  small bowel loops and decompressed distal loops, consistent with  obstruction. Small amount of free fluid within the lower pelvis.  Bilateral pulmonary fibrosis. Nothing else acute is seen in the upper  abdominal organs.       Impression    IMPRESSION:  1. Small bowel obstruction.  2. Appendix thicker than usual, as above.     SANDIP SINGH MD      noted .       Please note that this dictation was created using voice recognition software. I have worked with consultants from the vendor as well as technical experts from [facility] to optimize the interface. I have made every reasonable attempt to correct obvious errors, but I expect that there are errors of grammar and possibly content that I did not discover before finalizing the note.  This note was electronically signed by provider

## 2023-05-09 ENCOUNTER — HOSPITAL ENCOUNTER (OUTPATIENT)
Dept: CARDIAC REHAB | Facility: CLINIC | Age: 73
Discharge: HOME | End: 2023-05-09
Attending: INTERNAL MEDICINE
Payer: COMMERCIAL

## 2023-05-09 PROCEDURE — G0238 OTH RESP PROC, INDIV: HCPCS

## 2023-05-11 ENCOUNTER — HOSPITAL ENCOUNTER (OUTPATIENT)
Dept: CARDIAC REHAB | Facility: CLINIC | Age: 73
Discharge: HOME | End: 2023-05-11
Attending: INTERNAL MEDICINE
Payer: COMMERCIAL

## 2023-05-11 PROCEDURE — G0239 OTH RESP PROC, GROUP: HCPCS

## 2023-05-15 ENCOUNTER — OFFICE VISIT (OUTPATIENT)
Dept: INFECTIOUS DISEASES | Facility: CLINIC | Age: 73
End: 2023-05-15
Attending: INTERNAL MEDICINE
Payer: COMMERCIAL

## 2023-05-15 VITALS
SYSTOLIC BLOOD PRESSURE: 148 MMHG | OXYGEN SATURATION: 96 % | TEMPERATURE: 98 F | DIASTOLIC BLOOD PRESSURE: 78 MMHG | BODY MASS INDEX: 27.63 KG/M2 | HEART RATE: 88 BPM | WEIGHT: 156 LBS

## 2023-05-15 DIAGNOSIS — M05.10 RHEUMATOID LUNG (H): ICD-10-CM

## 2023-05-15 DIAGNOSIS — R76.12 POSITIVE QUANTIFERON-TB GOLD TEST: ICD-10-CM

## 2023-05-15 PROCEDURE — 99205 OFFICE O/P NEW HI 60 MIN: CPT | Performed by: INTERNAL MEDICINE

## 2023-05-15 PROCEDURE — G0463 HOSPITAL OUTPT CLINIC VISIT: HCPCS | Performed by: INTERNAL MEDICINE

## 2023-05-15 ASSESSMENT — PAIN SCALES - GENERAL: PAINLEVEL: NO PAIN (0)

## 2023-05-15 NOTE — LETTER
5/15/2023       RE: Krystian Boland  [id number]  [facility] [address]     Dear Colleague,    Thank you for referring your patient, Krystian Boland, to the [facility] INFECTIOUS DISEASE CLINIC [place] at [facility]. Please see a copy of my visit note below.    [facility]  General Infectious Disease Clinic Note:  New Patient     Patient:  Krystian Boland, Date of birth 1950   Medical record number [id number]  Date of Visit:  05/15/2023  Consult requested by Jase Keller MD for evaluation of Positive QuantiFERON-TB Gold test.    ASSESSMENT AND PLAN     Krystian Boland is a 72 year old female with interstitial lung disease secondary to rheumatoid arthritis on  OFEV (nintedanib), hydroxychloroquine, sulfasalazine. She is referred for positive QuantiFERON-TB Gold test prior to rituximab therapy.     It is very challenging to ascertain if the positive test represent a previous infection in her case. Although we do not have documentation regarding this episode and the adequacy of treatment. There are no other signs and symptoms that she has active TB at this moment. She had a history of small bowel obstruction but this was thought to be unrelated to TB. I will request for stool afb. She has no abdominal symptoms that relate to intestinal TB. Her chronic cough is thought to be due to pulmonary fibrosis and she does not produce sputum. She could have been reinfected with the return travel to [place] and prolonged stay there. Overall, I would recommend treatment for latent TB. Would prioritize isoniazid due to less drug-drug interaction and reserving rifampin susceptibility.     For hepatitis B, I told them to follow closely with GI as they are directing the treatment.     IMPRESSION:  Positive Quantiferon Gold, most probably latent TB  Chronic hepatitis B  Interstitial lung disease secondary to rheumatoid arthritis on   OFEV (nintedanib), hydroxychloroquine, sulfasalazine    RECOMMENDATIONS:  Stool afb and CT abdomen/pelvis.   MTM in preparation for treatment.     75 minutes spent by me on the date of the encounter doing chart review, history and exam, documentation and further activities per the note    LEO PUENTE MD    Infectious Diseases  Contact me on [employer] or Find my pager on [email or web address].     SUBJECTIVE       History of Present lllness:  Krystian Boland is a 72 year old female with interstitial lung disease secondary to rheumatoid arthritis. She is on OFEV (nintedanib), hydroxychloroquine, sulfasalazine for these. She started having chronic cough after she got COVID. She was doing well before that. She has weight loss which she attributes to nausea from the medications. She has some joint pain. She does not have fever, chills, night sweats. Her cough has gotten better and not productive. She does not have diarrhea, constipation or dysuria.     She was born in [facility] and moved to [facility] as a refugee early on. She spent most of her life there. [place] in [place]. She was told during medical examination that everything was ok. She was diagnosed with intestinal tuberculosis in 1989 to 1990 and received complete treatment. She could not tell me the medications. She has since went back to [place] a few times before COVID happened. Last time she was there was in 2019 and stayed there for 2 months. She lives with her daughter and son in law, with her two grandchildren. None have tuberculosis.     She is also diagnosed with chronic hepatitis B but has not yet started treatment.     1//25  CT chest showed Unchanged confluent opacity in right middle lobe, cannot exclude nodule. Extensive findings consistent with usual interstitial pneumonia grossly similar to recent chest CT.  CXR showed Peripheral and lower lobe predominant fibrotic interstitial lung disease without definite focal airspace opacity.    She had a small  bowel obstruction in 2018.     Past Medical History:   Diagnosis Date    Anxiety     Early onset Alzheimer's dementia without behavioral disturbance (H)     Hx of small bowel obstruction 11/2018    Interstitial lung disease (H) 05/2018    Intestinal TB 1990    Obesity (BMI 30-39.9)     Osteoarthritis     Osteopenia     Seropositive rheumatoid arthritis (H) 01/2023    Type II diabetes mellitus (H)        Past Surgical History:   Procedure Laterality Date    CATARACT EXTRACTION W/ INTRAOCULAR LENS IMPLANT Left 10/25/2021    CATARACT IOL, RT/LT Right 10/11/2021    CHOLECYSTECTOMY, OPEN  1990    HEMICOLECTOMY, RT/LT Right 1990       Family History   Problem Relation Age of Onset    Dementia Mother     Cerebrovascular Disease Father     Cerebrovascular Disease Brother     Diabetes Son     Myocardial Infarction No family hx of     Coronary Artery Disease Early Onset No family hx of     Breast Cancer No family hx of     Ovarian Cancer No family hx of     Colon Cancer No family hx of     Glaucoma No family hx of     Macular Degeneration No family hx of     Liver Disease No family hx of        Social History     Social History Narrative    .     8 children.     15 grandchildren (as of 2023).    Originally from [facility] [place]. Moved to [place] (all kids born in [facility]). Moved to  in 2012.     Living with daughter and her family. Taking care of  (~25 years her senior).     Goes for walks occasionally.      Social History     Tobacco Use    Smoking status: Never    Smokeless tobacco: Never   Vaping Use    Vaping status: Never Used   Substance Use Topics    Alcohol use: Not Currently    Drug use: No         OBJECTIVE     BP (!) 148/78 (BP Location: Right arm, Patient Position: Sitting, Cuff Size: Adult Regular)   Pulse 88   Temp 98  F (36.7  C) (Oral)   Wt 70.8 kg (156 lb)   LMP  (LMP Unknown)   SpO2 96%   BMI 27.63 kg/m        Wt Readings from Last 4 Encounters:   04/21/23 71.3 kg (157 lb 1.6 oz)   04/18/23 72  kg (158 lb 12.8 oz)   04/18/23 69.9 kg (154 lb)   01/25/23 70.8 kg (156 lb)       Physical Exam:  GENERAL: well-developed, well-nourished, alert, oriented, in no acute distress.  EYES: Eyes have anicteric sclerae.    NECK: no CLAD  LUNGS: Bibasal crackles   CARDIOVASCULAR: Regular rate and rhythm   ABDOMEN: soft, nontender.    Laboratory Data:    4/18/23 Quantiferon - positive   1/25/23 HIV - neg  4/18 HBV DNA - 51m  4/18 HB testing - positive    Answers for HPI/ROS submitted by the patient on 5/9/2023  General Symptoms: No  Skin Symptoms: No  HENT Symptoms: No  EYE SYMPTOMS: No  HEART SYMPTOMS: No  LUNG SYMPTOMS: No  INTESTINAL SYMPTOMS: No  URINARY SYMPTOMS: No  GYNECOLOGIC SYMPTOMS: No  BREAST SYMPTOMS: No  SKELETAL SYMPTOMS: No  BLOOD SYMPTOMS: No  NERVOUS SYSTEM SYMPTOMS: No  MENTAL HEALTH SYMPTOMS: No    Sincerely,    LEO PUENTE MD

## 2023-05-15 NOTE — NURSING NOTE
Chief Complaint   Patient presents with     Consult     Positive quantiferon -TB     BP (!) 151/81 (BP Location: Right arm, Patient Position: Sitting, Cuff Size: Adult Regular)   Pulse 88   Temp 98  F (36.7  C) (Oral)   Wt 70.8 kg (156 lb)   LMP  (LMP Unknown)   SpO2 96%   BMI 27.63 kg/m

## 2023-05-15 NOTE — PROGRESS NOTES
[facility]  General Infectious Disease Clinic Note:  New Patient     Patient:  Krystian Boland, Date of birth 1950   Medical record number [id number]  Date of Visit:  05/15/2023  Consult requested by Jase Keller MD for evaluation of Positive QuantiFERON-TB Gold test.    ASSESSMENT AND PLAN     Krystian Boland is a 72 year old female with interstitial lung disease secondary to rheumatoid arthritis on  OFEV (nintedanib), hydroxychloroquine, sulfasalazine. She is referred for positive QuantiFERON-TB Gold test prior to rituximab therapy.     It is very challenging to ascertain if the positive test represent a previous infection in her case. Although we do not have documentation regarding this episode and the adequacy of treatment. There are no other signs and symptoms that she has active TB at this moment. She had a history of small bowel obstruction but this was thought to be unrelated to TB. I will request for stool afb. She has no abdominal symptoms that relate to intestinal TB. Her chronic cough is thought to be due to pulmonary fibrosis and she does not produce sputum. She could have been reinfected with the return travel to [place] and prolonged stay there. Overall, I would recommend treatment for latent TB. Would prioritize isoniazid due to less drug-drug interaction and reserving rifampin susceptibility.     For hepatitis B, I told them to follow closely with GI as they are directing the treatment.     IMPRESSION:  1. Positive Quantiferon Gold, most probably latent TB  2. Chronic hepatitis B  3. Interstitial lung disease secondary to rheumatoid arthritis on  OFEV (nintedanib), hydroxychloroquine, sulfasalazine    RECOMMENDATIONS:  1. Stool afb.  2. MTM in preparation for treatment.     75 minutes spent by me on the date of the encounter doing chart review, history and exam, documentation and further activities per the note    LEO PUENTE MD    Infectious  Diseases  Contact me on [employer] or Find my pager on [employer].     SUBJECTIVE       History of Present lllness:  Krystian Boland is a 72 year old female with interstitial lung disease secondary to rheumatoid arthritis. She is on OFEV (nintedanib), hydroxychloroquine, sulfasalazine for these. She started having chronic cough after she got COVID. She was doing well before that. She has weight loss which she attributes to nausea from the medications. She has some joint pain. She does not have fever, chills, night sweats. Her cough has gotten better and not productive. She does not have diarrhea, constipation or dysuria.     She was born in [facility] and moved to [place] as a refugee early on. She spent most of her life there. [place] to the [place] in [place]. She was told during medical examination that everything was ok. She was diagnosed with intestinal tuberculosis in 1989 to 1990 and received complete treatment. She could not tell me the medications. She has since went back to [place] a few times before COVID happened. Last time she was there was in 2019 and stayed there for 2 months. She lives with her daughter and son in law, with her two grandchildren. None have tuberculosis.     She is also diagnosed with chronic hepatitis B but has not yet started treatment.     1//25  CT chest showed Unchanged confluent opacity in right middle lobe, cannot exclude nodule. Extensive findings consistent with usual interstitial pneumonia grossly similar to recent chest CT.  CXR showed Peripheral and lower lobe predominant fibrotic interstitial lung disease without definite focal airspace opacity.    She had a small bowel obstruction in 2018.     Past Medical History:   Diagnosis Date     Anxiety      Early onset Alzheimer's dementia without behavioral disturbance (H)      Hx of small bowel obstruction 11/2018     Interstitial lung disease (H) 05/2018     Intestinal TB 1990     Obesity (BMI 30-39.9)      Osteoarthritis      Osteopenia       Seropositive rheumatoid arthritis (H) 01/2023     Type II diabetes mellitus (H)        Past Surgical History:   Procedure Laterality Date     CATARACT EXTRACTION W/ INTRAOCULAR LENS IMPLANT Left 10/25/2021     CATARACT IOL, RT/LT Right 10/11/2021     CHOLECYSTECTOMY, OPEN  1990     HEMICOLECTOMY, RT/LT Right 1990       Family History   Problem Relation Age of Onset     Dementia Mother      Cerebrovascular Disease Father      Cerebrovascular Disease Brother      Diabetes Son      Myocardial Infarction No family hx of      Coronary Artery Disease Early Onset No family hx of      Breast Cancer No family hx of      Ovarian Cancer No family hx of      Colon Cancer No family hx of      Glaucoma No family hx of      Macular Degeneration No family hx of      Liver Disease No family hx of        Social History     Social History Narrative    .     8 children.     15 grandchildren (as of 2023).    Originally from [facility]. Moved to [place] (all kids born in [place]). Moved to [place] in 2012.     Living with daughter and her family. Taking care of  (~25 years her senior).     Goes for walks occasionally.      Social History     Tobacco Use     Smoking status: Never     Smokeless tobacco: Never   Vaping Use     Vaping status: Never Used   Substance Use Topics     Alcohol use: Not Currently     Drug use: No         OBJECTIVE     BP (!) 148/78 (BP Location: Right arm, Patient Position: Sitting, Cuff Size: Adult Regular)   Pulse 88   Temp 98  F (36.7  C) (Oral)   Wt 70.8 kg (156 lb)   LMP  (LMP Unknown)   SpO2 96%   BMI 27.63 kg/m        Wt Readings from Last 4 Encounters:   04/21/23 71.3 kg (157 lb 1.6 oz)   04/18/23 72 kg (158 lb 12.8 oz)   04/18/23 69.9 kg (154 lb)   01/25/23 70.8 kg (156 lb)       Physical Exam:  GENERAL: well-developed, well-nourished, alert, oriented, in no acute distress.  EYES: Eyes have anicteric sclerae.    NECK: no CLAD  LUNGS: Bibasal crackles   CARDIOVASCULAR: Regular rate and rhythm    ABDOMEN: soft, nontender.    Laboratory Data:    4/18/23 Quantiferon - positive   1/25/23 HIV - neg  4/18 HBV DNA - 51m  4/18 HB testing - positive    Answers for HPI/ROS submitted by the patient on 5/9/2023  General Symptoms: No  Skin Symptoms: No  HENT Symptoms: No  EYE SYMPTOMS: No  HEART SYMPTOMS: No  LUNG SYMPTOMS: No  INTESTINAL SYMPTOMS: No  URINARY SYMPTOMS: No  GYNECOLOGIC SYMPTOMS: No  BREAST SYMPTOMS: No  SKELETAL SYMPTOMS: No  BLOOD SYMPTOMS: No  NERVOUS SYSTEM SYMPTOMS: No  MENTAL HEALTH SYMPTOMS: No

## 2023-05-16 ENCOUNTER — HOSPITAL ENCOUNTER (OUTPATIENT)
Dept: CARDIAC REHAB | Facility: CLINIC | Age: 73
Discharge: HOME | End: 2023-05-16
Attending: INTERNAL MEDICINE
Payer: COMMERCIAL

## 2023-05-16 PROCEDURE — G0239 OTH RESP PROC, GROUP: HCPCS

## 2023-05-17 ENCOUNTER — ANCILLARY PROCEDURE (OUTPATIENT)
Dept: MAMMOGRAPHY | Facility: CLINIC | Age: 73
End: 2023-05-17
Attending: INTERNAL MEDICINE
Payer: COMMERCIAL

## 2023-05-17 ENCOUNTER — VIRTUAL VISIT (OUTPATIENT)
Dept: PHARMACY | Facility: CLINIC | Age: 73
End: 2023-05-17
Attending: INTERNAL MEDICINE
Payer: COMMERCIAL

## 2023-05-17 DIAGNOSIS — F41.9 ANXIETY: ICD-10-CM

## 2023-05-17 DIAGNOSIS — R76.8 RHEUMATOID FACTOR POSITIVE: ICD-10-CM

## 2023-05-17 DIAGNOSIS — G47.00 INSOMNIA, UNSPECIFIED TYPE: ICD-10-CM

## 2023-05-17 DIAGNOSIS — B18.9: Primary | ICD-10-CM

## 2023-05-17 DIAGNOSIS — M85.80 OSTEOPENIA, UNSPECIFIED LOCATION: ICD-10-CM

## 2023-05-17 DIAGNOSIS — B18.1 CHRONIC HEPATITIS B (H): ICD-10-CM

## 2023-05-17 DIAGNOSIS — R12 HEARTBURN: ICD-10-CM

## 2023-05-17 DIAGNOSIS — Z12.31 VISIT FOR SCREENING MAMMOGRAM: ICD-10-CM

## 2023-05-17 DIAGNOSIS — I10 BENIGN ESSENTIAL HYPERTENSION: ICD-10-CM

## 2023-05-17 DIAGNOSIS — J84.9 INTERSTITIAL LUNG DISEASE (H): ICD-10-CM

## 2023-05-17 DIAGNOSIS — B18.1 HEPATITIS B, CHRONIC (H): ICD-10-CM

## 2023-05-17 DIAGNOSIS — E11.9 TYPE 2 DIABETES MELLITUS WITHOUT COMPLICATION, WITHOUT LONG-TERM CURRENT USE OF INSULIN (H): ICD-10-CM

## 2023-05-17 DIAGNOSIS — M85.80 OSTEOPENIA: ICD-10-CM

## 2023-05-17 DIAGNOSIS — Z22.7 LATENT TUBERCULOSIS: Primary | ICD-10-CM

## 2023-05-17 PROCEDURE — 77067 SCR MAMMO BI INCL CAD: CPT | Mod: TC | Performed by: RADIOLOGY

## 2023-05-17 PROCEDURE — 99607 MTMS BY PHARM ADDL 15 MIN: CPT | Performed by: PHARMACIST

## 2023-05-17 PROCEDURE — 99605 MTMS BY PHARM NP 15 MIN: CPT | Performed by: PHARMACIST

## 2023-05-17 PROCEDURE — 77063 BREAST TOMOSYNTHESIS BI: CPT | Mod: TC | Performed by: RADIOLOGY

## 2023-05-17 NOTE — LETTER
May 19, 2023  Krystian Boland  [address]  [facility] [address]    Dear Ms. Boland, [facility] AND INFECTIOUS DISEASES [facility]     Thank you for talking with me on May 17, 2023 about your health and medications. As a follow-up to our conversation, I have included two documents:      Your Recommended To-Do List has steps you should take to get the best results from your medications.  Your Medication List will help you keep track of your medications and how to take them.    If you want to talk about these documents, please call SANAM CAMARENA RPH at phone: [phone], Monday-Friday 8-4:30pm.    I look forward to working with you and your doctors to make sure your medications work well for you.    Sincerely,  SANAM CAMARENA RPH  MT Pharmacist, [facility]

## 2023-05-17 NOTE — LETTER
_  Medication List        Prepared on: May 19, 2023     Bring your Medication List when you go to the doctor, hospital, or   emergency room. And, share it with your family or caregivers.     Note any changes to how you take your medications.  Cross out medications when you no longer use them.    Medication How I take it Why I use it Prescriber   aspirin 81 MG chewable tablet Take 1 tablet (81 mg) by mouth daily Type 2 diabetes mellitus without complication, without long-term current use of insulin (H) Adrianna Singh MD   benzonatate (TESSALON) 200 MG capsule Take 1 capsule (200 mg) by mouth 3 times daily as needed for cough ILD (Interstitial Lung Disease) (H); Cough Aahd MD Arianna   famotidine (PEPCID) 40 MG tablet Take 1 tablet (40 mg) by mouth every evening Heartburn Adrianna Singh MD   guaiFENesin (ROBITUSSIN) 20 mg/mL liquid Take 10 mLs by mouth every 4 hours as needed for cough ILD (Interstitial Lung Disease) (H) Donna Escobedo MD   hydroxychloroquine (PLAQUENIL) 200 MG tablet Take 1 tablet (200 mg) by mouth 2 times daily Annual Plaquenil toxicity eye screening required. Rheumatoid factor positive with cyclic citrullinated peptide (CCP) antibody negative Harjit Staley MD   hydrOXYzine (ATARAX) 10 MG tablet Take 1 tablet (10 mg) by mouth 3 times daily as needed for anxiety Anxiety Adrianna Singh MD   ipratropium - albuterol 0.5 mg/2.5 mg/3 mL (DUONEB) 0.5-2.5 (3) MG/3ML neb solution Take 1 vial (3 mLs) by nebulization every 4 hours as needed for wheezing ILD (Interstitial Lung Disease) (H) Donna Escobedo MD   losartan (COZAAR) 25 MG tablet Take 1 tablet (25 mg) by mouth daily Microalbuminuria Adrianna Singh MD   melatonin 3 MG tablet Take 1 tablet (3 mg) by mouth nightly as needed for sleep Insomnia, unspecified type Donna Escobedo MD   mycophenolate (GENERIC EQUIVALENT) 500 MG tablet Take 2 tablets (1,000 mg) by mouth 2 times daily Labs every 8-12 weeks. Interstitial Lung Disease (H)  Harjit Staley MD   OFEV 100 MG capsule TAKE ONE CAPSULE BY MOUTH TWICE A DAY ILD (Interstitial Lung Disease) (H); Progressive fibrosing interstitial lung disease (H) Jase Keller MD   sulfaSALAzine ER (AZULFIDINE EN) 500 MG EC tablet take 2 tabs twice a day. Rheumatoid factor positive with cyclic citrullinated peptide (CCP) antibody negative Harjit Staley MD   Vitamin D3 50 mcg (2000 units) tablet TAKE 1 TABLET( 50MCG) BY MOUTH DAILY. Strength: 50 MCG (2000 UT) Vitamin D Deficiency Adrianna Singh MD         Add new medications, over-the-counter drugs, herbals, vitamins, or  minerals in the blank rows below.    Medication How I take it Why I use it Prescriber                                      Allergies:      atorvastatin        Side effects I have had:               Other Information:              My notes and questions:

## 2023-05-17 NOTE — PROGRESS NOTES
Medication Therapy Management (MTM) Encounter    ASSESSMENT:                            Medication Adherence/Access: See below for considerations    Latent tuberculosis infection: since patient may start rituximab, recommend starting treatment for possible latent tuberculosis. Options include rifampin daily for 4 months, isoniaizid daily for 6 to 9 months, or rifapentine plus isoniazid once weekly for 12 weeks.     Discussed with Dr. Lujan and prefer to try isoniazid first line to avoid drug interactions with mycophenolate and tenofovir alafenamide (TAF) if started for hepatitis B. Also prefer isoniazid in case patient does have underlying active tuberculosis so patient does not develop resistance to rifamycin class with rifampin monotherapy.     Note: Rifampin is not recommended with TAF (reduces levels) but can be used with TDF. If patient develops signs/symptoms hepatotoxicity, can switch to rifampin but would need to temporarily switch to TDF as well.     Recommend isoniazid 300 mg/day based on weight. (5 mg/kg/day, max 300 mg/day) for 6 to 9 months    Counseling:      Isoniazid side effects: neuropathy, liver toxicity    Avoid alcohol    Monitor hepatic panel after 2 weeks then monthly    Start about 1 month prior to starting rituximab    Chronic hepatitis B:If patient decides to start rituximab, will need to start tenofovir for hepatitis B. Consider starting tenofovir 1 month prior to starting rituximab. TAF is preferred over tenofovir disaproxil fumerate (TDF) if possible due to risk for renal side effects and decreased bone density with TDF and patient has osteopenia.    Interstitial lung disease 2/2 rheumatoid arthritis: Would benefit from MTM rheumatology consult if rituximab is started to help with monitoring during that change. Particularly concerned about risk of hepatotoxicity with adding isoniazid when other current medications also have a risk for hepatotoxicity (mycophenylate, Ofev, sulfasalazine).  These medications may be stopped by rheumatology once she starts rituximab which would decrease risk. Hepatic function currently stable.    Hypertension: blood pressure above goal <140/90. Can discuss more at future visit.    Type 2 Diabetes: a1c at goal <7%. No home blood sugar readings to assess.    Osteopenia: stable    Heartburn/cough: stable    Anxiety: stable    Insomnia: stable    PLAN:                            1. Follow-up with rheumatology on decision to start rituximab     2. If you decide to start rituximab, please let us know and we will plan to start isoniazid 300 mg per day for 6 to 9 months with close monitoring. GI will also plan to start tenofovir.     Follow-up: will check-in in 2 weeks     SUBJECTIVE/OBJECTIVE:                          Krystian Boland is a 72 year old female called for an initial visit. She was referred to me from Dr. Lujan. Patient was accompanied by her daughter (consent to communicate on file).   Reason for visit: latent tuberculosis infection    Allergies/ADRs: Reviewed in chart  Past Medical History: Reviewed in chart  Tobacco: She reports that she has never smoked. She has never used smokeless tobacco.  Alcohol: none    Medication Adherence/Access: no issues.   [place] and [facility].    Latent tuberculosis infection: patient is considering starting treatment for latent tuberculosis infection. Per Dr. Lujan, unsure if this is positive from a previous infection but recommend treatment in setting of possibly starting Rituximab.   QuantiFERON-TB Gold Plus: positive 4/18/23  Chest X-ray: 4/21/23, not concerning for tuberculosis  Symptoms: chronic cough likely related to pulmonary fibrosis (no sputum)  Baseline AST/ALT: normal  Weight: 70.8 kg    Chronic hepatitis B: per GI, planning to treat with tenofovir if she starts rituximab since rituximab has risk for hepB reactivation   Latest Reference Range & Units 04/18/23 09:20   Hep B Surface Agn Nonreactive  Reactive !   HEP B  Virus DNA Quant Log  7.8   Hepatitis B DNA IU/mL, Instrument <1 IU/mL 57,116,897 (H)   Hepatitis B Core Lori Nonreactive  Reactive !   Hepatitis B Surface Antibody Instrument Value <8.00 m[IU]/mL 6.05   Hepatitis B Surface Antibody  Nonreactive   Hepatitis Be Lori Negative  Negative   Hepatitis Be Agn Negative  Positive !   !: Data is abnormal  (H): Data is abnormally high    Interstitial lung disease 2/2 rheumatoid arthritis: chronic cough started after being sick with COVID. Currently taking the following:  OFEV (nintedanib) 100 mg 2 times daily (did not tolerate higher doses due to nausea and elevated ALT/AST)  Mycophenolate 1000 mg 2 times daily   Hydroxychloroquine 200 mg 2 times daily   Sulfasalazine ER enteric coated 1000 mg 2 times daily   Benzonatate 200 mg as needed   Guaifenesin as needed (not currently using)  Duonebs as needed (not currently using)  Per 1/2023 Rheumatology note: Rituximab would be expected to provide not only effective immunomodulatory therapy for ILD and substitute for mycophenolate, but could replace multidrug therapy currently used to suppress arthropathy and act as a steroid sparing agent.   Patient and family said they are leaning towards starting Rituximab to reduce med burden with other meds. Currently tolerating regimen okay.    Hypertension: Currently taking losartan 25 mg once daily. Patient reports no current medication side effects.  BP Readings from Last 3 Encounters:   05/15/23 (!) 148/78   04/21/23 (!) 151/89   04/18/23 118/68     Pulse Readings from Last 3 Encounters:   05/15/23 88   04/21/23 81   04/18/23 84     Type 2 Diabetes:  Currently taking no medications and not checking blood sugar.  Currently taking aspirin for primary prevention. Started in 2017.  Not on statin - possible myalgia (noted to atorvastatin; unclear if she experienced with pravastatin per chart review)  No signs/symptoms of bleeding.  Urine Albumin:   Lab Results   Component Value Date    UMALCR  55.32 (H) 10/09/2021      Lab Results   Component Value Date    A1C 6.8 04/18/2023    A1C 6.7 08/03/2022    A1C 6.4 01/05/2022    A1C 5.3 10/09/2021    A1C 6.1 03/17/2021    A1C 6.4 08/24/2020    A1C 6.4 02/12/2020    A1C 6.8 05/08/2019    A1C 6.7 11/06/2018     Recent Labs   Lab Test 04/18/23  0920 10/09/21  0943   CHOL 154 142   HDL 47* 49*   LDL 91 82   TRIG 78 57     Osteopenia: Current therapy includes: vitamin D 2000 units once daily. Patient is not experiencing side effects.  DEXA History: 2021, repeat in 3 years  Last vitamin D level:  Lab Results   Component Value Date    VITDT 38 04/18/2023    VITDT 19 (L) 08/24/2020    VITDT 33 10/10/2016     Heartburn/cough: Famotidine 40 mg once daily. Finds effective. No concerns today.    Anxiety: Hydroxyzine 10 mg 3 times daily for anxiety. Takes before doctor appointments. This helps. No concerns.    Insomnia: melatonin as needed. Not really taking right now. No concerns.    Last Comprehensive Metabolic Panel:  Sodium   Date Value Ref Range Status   04/18/2023 134 (L) 136 - 145 mmol/L Final   04/18/2023 134 (L) 136 - 145 mmol/L Final   03/17/2021 139 133 - 144 mmol/L Final     Potassium   Date Value Ref Range Status   04/18/2023 3.9 3.4 - 5.3 mmol/L Final   04/18/2023 3.9 3.4 - 5.3 mmol/L Final   12/02/2022 3.4 3.4 - 5.3 mmol/L Final   03/17/2021 4.3 3.4 - 5.3 mmol/L Final     Chloride   Date Value Ref Range Status   04/18/2023 99 98 - 107 mmol/L Final   04/18/2023 99 98 - 107 mmol/L Final   12/01/2022 94 94 - 109 mmol/L Final   03/17/2021 105 94 - 109 mmol/L Final     Carbon Dioxide   Date Value Ref Range Status   03/17/2021 31 20 - 32 mmol/L Final     Carbon Dioxide (CO2)   Date Value Ref Range Status   04/18/2023 22 22 - 29 mmol/L Final   04/18/2023 22 22 - 29 mmol/L Final   12/01/2022 28 20 - 32 mmol/L Final     Anion Gap   Date Value Ref Range Status   04/18/2023 13 7 - 15 mmol/L Final   04/18/2023 13 7 - 15 mmol/L Final   12/01/2022 7 3 - 14 mmol/L Final    03/17/2021 3 3 - 14 mmol/L Final     Glucose   Date Value Ref Range Status   04/18/2023 151 (H) 70 - 99 mg/dL Final   04/18/2023 151 (H) 70 - 99 mg/dL Final   12/01/2022 164 (H) 70 - 99 mg/dL Final   03/17/2021 154 (H) 70 - 99 mg/dL Final     GLUCOSE BY METER POCT   Date Value Ref Range Status   12/02/2022 218 (H) 70 - 99 mg/dL Final     Urea Nitrogen   Date Value Ref Range Status   04/18/2023 14.6 8.0 - 23.0 mg/dL Final   04/18/2023 14.6 8.0 - 23.0 mg/dL Final   12/01/2022 10 7 - 30 mg/dL Final   03/17/2021 12 7 - 30 mg/dL Final     Creatinine   Date Value Ref Range Status   04/18/2023 0.71 0.51 - 0.95 mg/dL Final   04/18/2023 0.71 0.51 - 0.95 mg/dL Final   06/16/2021 0.69 0.52 - 1.04 mg/dL Final     GFR Estimate   Date Value Ref Range Status   04/18/2023 >90 >60 mL/min/1.73m2 Final     Comment:     eGFR calculated using 2021 CKD-EPI equation.  eGFR calculated using 2021 CKD-EPI equation.   04/18/2023 >90 >60 mL/min/1.73m2 Final     Comment:     eGFR calculated using 2021 CKD-EPI equation.   06/16/2021 89 >60 mL/min/[1.73_m2] Final     Comment:     Non  GFR Calc  Starting 12/18/2018, serum creatinine based estimated GFR (eGFR) will be   calculated using the Chronic Kidney Disease Epidemiology Collaboration   (CKD-EPI) equation.       Calcium   Date Value Ref Range Status   04/18/2023 9.8 8.8 - 10.2 mg/dL Final   04/18/2023 9.8 8.8 - 10.2 mg/dL Final   03/17/2021 9.6 8.5 - 10.1 mg/dL Final     Bilirubin Total   Date Value Ref Range Status   04/18/2023 0.3 <=1.2 mg/dL Final   04/18/2023 0.3 <=1.2 mg/dL Final   06/16/2021 0.2 0.2 - 1.3 mg/dL Final     Alkaline Phosphatase   Date Value Ref Range Status   04/18/2023 85 35 - 104 U/L Final   04/18/2023 85 35 - 104 U/L Final   06/16/2021 61 40 - 150 U/L Final     ALT   Date Value Ref Range Status   04/18/2023 11 10 - 35 U/L Final   04/18/2023 11 10 - 35 U/L Final   06/16/2021 28 0 - 50 U/L Final     AST   Date Value Ref Range Status   04/18/2023 22 10 -  35 U/L Final   04/18/2023 22 10 - 35 U/L Final   06/16/2021 20 0 - 45 U/L Final     Today's Vitals: LMP  (LMP Unknown)   ----------------    I spent 20 minutes with this patient today. All changes were made via collaborative practice agreement with Dr. Lujan. A copy of the visit note was provided to the patient's provider(s).    A summary of these recommendations was sent via [employer].    Telemedicine Visit Details  Type of service:  Telephone visit  Start Time: 11 am  End Time: 11:20 am     Medication Therapy Recommendations  Latent tuberculosis    Rationale: Untreated condition - Needs additional medication therapy - Indication   Recommendation: Provide Education   Status: Patient Agreed - Adherence/Education

## 2023-05-17 NOTE — LETTER
"Recommended To-Do List      Prepared on: May 19, 2023       You can get the best results from your medications by completing the items on this \"To-Do List.\"      Bring your To-Do List when you go to your doctor. And, share it with your family or caregivers.    My To-Do List:  What we talked about: What I should do:   A new medication that may be of benefit to you    Education: let us know if you start rituximab and we will plan to start isoniazid and tenofovir          What we talked about: What I should do:                       "

## 2023-05-18 ENCOUNTER — HOSPITAL ENCOUNTER (OUTPATIENT)
Dept: CARDIAC REHAB | Facility: CLINIC | Age: 73
Discharge: HOME | End: 2023-05-18
Attending: INTERNAL MEDICINE
Payer: COMMERCIAL

## 2023-05-18 DIAGNOSIS — J84.9 ILD (INTERSTITIAL LUNG DISEASE) (H): ICD-10-CM

## 2023-05-18 DIAGNOSIS — J84.89 PROGRESSIVE FIBROSING INTERSTITIAL LUNG DISEASE (H): ICD-10-CM

## 2023-05-18 PROCEDURE — G0239 OTH RESP PROC, GROUP: HCPCS

## 2023-05-18 RX ORDER — NINTEDANIB 100 MG/1
CAPSULE ORAL
Qty: 60 CAPSULE | Refills: 11 | Status: SHIPPED | OUTPATIENT
Start: 2023-05-18 | End: 2024-04-02

## 2023-05-19 NOTE — PATIENT INSTRUCTIONS
"Recommendations from today's MTM visit:                                                    MTM (medication therapy management) is a service provided by a clinical pharmacist designed to help you get the most of out of your medicines.   Today we reviewed what your medicines are for, how to know if they are working, that your medicines are safe and how to make your medicine regimen as easy as possible.      1. Follow-up with rheumatology on decision to start rituximab      2. If you decide to start rituximab, please let us know and we will plan to start isoniazid 300 mg per day for 6 to 9 months with close monitoring. GI will also plan to start tenofovir.      Follow-up: will check-in in 2 weeks    It was great speaking with you today.  I value your experience and would be very thankful for your time in providing feedback in our clinic survey. In the next few days, you may receive an email or text message from [employer] with a link to a survey related to your  clinical pharmacist.\"     To schedule another MTM appointment, please call the clinic directly or you may call the MTM scheduling line at [phone] or toll-free at [phone].     My Clinical Pharmacist's contact information:                                                      Please feel free to contact me with any questions or concerns you have.      Shadia Lew, Chandrika, AAHIVP  Medication Therapy Management Pharmacist   May 19, 2023  [phone]    "

## 2023-05-23 ENCOUNTER — HOSPITAL ENCOUNTER (OUTPATIENT)
Dept: CARDIAC REHAB | Facility: CLINIC | Age: 73
Discharge: HOME | End: 2023-05-23
Attending: INTERNAL MEDICINE
Payer: COMMERCIAL

## 2023-05-23 PROCEDURE — G0239 OTH RESP PROC, GROUP: HCPCS | Performed by: REHABILITATION PRACTITIONER

## 2023-05-24 ENCOUNTER — HOSPITAL ENCOUNTER (OUTPATIENT)
Dept: ULTRASOUND IMAGING | Facility: CLINIC | Age: 73
Discharge: HOME | End: 2023-05-24
Attending: INTERNAL MEDICINE | Admitting: INTERNAL MEDICINE
Payer: COMMERCIAL

## 2023-05-24 DIAGNOSIS — B18.1 CHRONIC HEPATITIS B (H): Primary | ICD-10-CM

## 2023-05-24 DIAGNOSIS — B18.1 CHRONIC HEPATITIS B (H): ICD-10-CM

## 2023-05-24 PROCEDURE — 76700 US EXAM ABDOM COMPLETE: CPT

## 2023-05-25 ENCOUNTER — HOSPITAL ENCOUNTER (OUTPATIENT)
Dept: CARDIAC REHAB | Facility: CLINIC | Age: 73
Discharge: HOME | End: 2023-05-25
Attending: INTERNAL MEDICINE
Payer: COMMERCIAL

## 2023-05-25 PROCEDURE — G0239 OTH RESP PROC, GROUP: HCPCS

## 2023-05-26 DIAGNOSIS — J84.9 ILD (INTERSTITIAL LUNG DISEASE) (H): ICD-10-CM

## 2023-05-26 DIAGNOSIS — R05.9 COUGH: ICD-10-CM

## 2023-05-26 RX ORDER — BENZONATATE 200 MG/1
CAPSULE ORAL
Qty: 90 CAPSULE | Refills: 3 | Status: SHIPPED | OUTPATIENT
Start: 2023-05-26 | End: 2024-02-23

## 2023-05-30 ENCOUNTER — HOSPITAL ENCOUNTER (OUTPATIENT)
Dept: CARDIAC REHAB | Facility: CLINIC | Age: 73
Discharge: HOME | End: 2023-05-30
Attending: INTERNAL MEDICINE
Payer: COMMERCIAL

## 2023-05-30 PROCEDURE — G0239 OTH RESP PROC, GROUP: HCPCS

## 2023-06-01 ENCOUNTER — HOSPITAL ENCOUNTER (OUTPATIENT)
Dept: CARDIAC REHAB | Facility: CLINIC | Age: 73
Discharge: HOME | End: 2023-06-01
Attending: INTERNAL MEDICINE
Payer: COMMERCIAL

## 2023-06-01 PROCEDURE — G0239 OTH RESP PROC, GROUP: HCPCS | Performed by: CLINICAL EXERCISE PHYSIOLOGIST

## 2023-06-06 ENCOUNTER — HOSPITAL ENCOUNTER (OUTPATIENT)
Dept: CARDIAC REHAB | Facility: CLINIC | Age: 73
Discharge: HOME | End: 2023-06-06
Attending: INTERNAL MEDICINE
Payer: COMMERCIAL

## 2023-06-06 PROCEDURE — G0239 OTH RESP PROC, GROUP: HCPCS

## 2023-06-13 ENCOUNTER — HOSPITAL ENCOUNTER (OUTPATIENT)
Dept: CARDIAC REHAB | Facility: CLINIC | Age: 73
Discharge: HOME | End: 2023-06-13
Attending: INTERNAL MEDICINE
Payer: COMMERCIAL

## 2023-06-13 PROCEDURE — G0239 OTH RESP PROC, GROUP: HCPCS

## 2023-06-15 ENCOUNTER — HOSPITAL ENCOUNTER (OUTPATIENT)
Dept: CARDIAC REHAB | Facility: CLINIC | Age: 73
Discharge: HOME | End: 2023-06-15
Attending: INTERNAL MEDICINE
Payer: COMMERCIAL

## 2023-06-15 PROCEDURE — G0239 OTH RESP PROC, GROUP: HCPCS

## 2023-06-16 DIAGNOSIS — J84.9 ILD (INTERSTITIAL LUNG DISEASE) (H): Primary | ICD-10-CM

## 2023-06-20 ENCOUNTER — HOSPITAL ENCOUNTER (OUTPATIENT)
Dept: CARDIAC REHAB | Facility: CLINIC | Age: 73
Discharge: HOME | End: 2023-06-20
Attending: INTERNAL MEDICINE
Payer: COMMERCIAL

## 2023-06-20 PROCEDURE — G0239 OTH RESP PROC, GROUP: HCPCS

## 2023-06-21 ENCOUNTER — PATIENT OUTREACH (OUTPATIENT)
Dept: GERIATRIC MEDICINE | Facility: CLINIC | Age: 73
End: 2023-06-21
Payer: COMMERCIAL

## 2023-06-21 RX ORDER — MYCOPHENOLATE MOFETIL 250 MG/1
CAPSULE ORAL
Qty: 380 CAPSULE | Refills: 3 | Status: SHIPPED | OUTPATIENT
Start: 2023-06-21 | End: 2023-07-07

## 2023-06-21 NOTE — PROGRESS NOTES
[facility] Care Coordination Contact      [facility] Six-Month Telephone Assessment    6 month telephone assessment completed on 6/21/23. Called an spoke with daughterDavid.    ER visits: No  Hospitalizations: No  TCU stays: No  Significant health status changes: Continues with pulmonary therapy, and the plan is for her to continue until July. Daughter shared that member is doing much better since starting the therapy, and member has even reported an improvement in her mood. Daughter shared that member is now able to walk around the block, which everyone is much happier. Daughter shared that member has not been wearing her O2 as instructed, reporting that she doesn't feel that she needs it.  Reviewed the importance of using O2 daily.   Falls/Injuries: No  ADL/IADL changes: No  Changes in services: Yes: Requested to change incontinent products from pull ups back to pads, reporting that member's cough has improved and is now only needing the pads.    Caregiver Assessment follow up:  Daughter was getting frustrated with all of the MD appointments, but is feeling     Goals: See POC in chart for goal progress documentation. Talked with daughter about the health plan offered gym membership and encouraged her to look into this as an option once the pulmonary therapy has completed.     Will see member in 6 months for an annual health risk assessment.   Encouraged member to call CC with any questions or concerns in the meantime.     JAQUELIN Langley  [facility]  [phone]

## 2023-06-22 ENCOUNTER — HOSPITAL ENCOUNTER (OUTPATIENT)
Dept: CARDIAC REHAB | Facility: CLINIC | Age: 73
Discharge: HOME | End: 2023-06-22
Attending: INTERNAL MEDICINE
Payer: COMMERCIAL

## 2023-06-22 PROCEDURE — G0239 OTH RESP PROC, GROUP: HCPCS | Performed by: REHABILITATION PRACTITIONER

## 2023-06-27 ENCOUNTER — MYC MEDICAL ADVICE (OUTPATIENT)
Dept: PHARMACY | Facility: CLINIC | Age: 73
End: 2023-06-27
Payer: COMMERCIAL

## 2023-06-27 ENCOUNTER — HOSPITAL ENCOUNTER (OUTPATIENT)
Dept: CARDIAC REHAB | Facility: CLINIC | Age: 73
Discharge: HOME | End: 2023-06-27
Attending: INTERNAL MEDICINE
Payer: COMMERCIAL

## 2023-06-27 DIAGNOSIS — J84.9 ILD (INTERSTITIAL LUNG DISEASE) (H): ICD-10-CM

## 2023-06-27 PROCEDURE — G0239 OTH RESP PROC, GROUP: HCPCS | Performed by: REHABILITATION PRACTITIONER

## 2023-06-27 RX ORDER — MYCOPHENOLATE MOFETIL 250 MG/1
CAPSULE ORAL
Qty: 380 CAPSULE | Refills: 3 | OUTPATIENT
Start: 2023-06-27

## 2023-06-29 ENCOUNTER — HOSPITAL ENCOUNTER (OUTPATIENT)
Dept: CARDIAC REHAB | Facility: CLINIC | Age: 73
Discharge: HOME | End: 2023-06-29
Attending: INTERNAL MEDICINE
Payer: COMMERCIAL

## 2023-06-29 PROCEDURE — G0239 OTH RESP PROC, GROUP: HCPCS

## 2023-07-06 ENCOUNTER — HOSPITAL ENCOUNTER (OUTPATIENT)
Dept: CARDIAC REHAB | Facility: CLINIC | Age: 73
Discharge: HOME | End: 2023-07-06
Attending: INTERNAL MEDICINE
Payer: COMMERCIAL

## 2023-07-06 DIAGNOSIS — J84.9 ILD (INTERSTITIAL LUNG DISEASE) (H): ICD-10-CM

## 2023-07-06 PROCEDURE — G0239 OTH RESP PROC, GROUP: HCPCS

## 2023-07-07 RX ORDER — MYCOPHENOLATE MOFETIL 250 MG/1
CAPSULE ORAL
Qty: 380 CAPSULE | Refills: 3 | Status: ON HOLD | OUTPATIENT
Start: 2023-07-07 | End: 2024-04-08

## 2023-07-11 ENCOUNTER — HOSPITAL ENCOUNTER (OUTPATIENT)
Dept: CARDIAC REHAB | Facility: CLINIC | Age: 73
Discharge: HOME | End: 2023-07-11
Attending: INTERNAL MEDICINE
Payer: COMMERCIAL

## 2023-07-11 DIAGNOSIS — Z79.899 LONG-TERM USE OF PLAQUENIL: Primary | ICD-10-CM

## 2023-07-11 PROCEDURE — G0239 OTH RESP PROC, GROUP: HCPCS

## 2023-07-13 ENCOUNTER — HOSPITAL ENCOUNTER (OUTPATIENT)
Dept: CARDIAC REHAB | Facility: CLINIC | Age: 73
Discharge: HOME | End: 2023-07-13
Attending: INTERNAL MEDICINE
Payer: COMMERCIAL

## 2023-07-13 PROCEDURE — G0239 OTH RESP PROC, GROUP: HCPCS

## 2023-07-18 ENCOUNTER — HOSPITAL ENCOUNTER (OUTPATIENT)
Dept: CARDIAC REHAB | Facility: CLINIC | Age: 73
Discharge: HOME | End: 2023-07-18
Attending: INTERNAL MEDICINE
Payer: COMMERCIAL

## 2023-07-18 PROCEDURE — G0239 OTH RESP PROC, GROUP: HCPCS

## 2023-07-19 ENCOUNTER — OFFICE VISIT (OUTPATIENT)
Dept: OPHTHALMOLOGY | Facility: CLINIC | Age: 73
End: 2023-07-19
Attending: OPHTHALMOLOGY
Payer: COMMERCIAL

## 2023-07-19 DIAGNOSIS — Z79.899 LONG-TERM USE OF PLAQUENIL: ICD-10-CM

## 2023-07-19 PROCEDURE — 99214 OFFICE O/P EST MOD 30 MIN: CPT | Performed by: OPHTHALMOLOGY

## 2023-07-19 PROCEDURE — 92082 INTERMEDIATE VISUAL FIELD XM: CPT | Performed by: OPHTHALMOLOGY

## 2023-07-19 PROCEDURE — 92134 CPTRZ OPH DX IMG PST SGM RTA: CPT | Performed by: OPHTHALMOLOGY

## 2023-07-19 PROCEDURE — G0463 HOSPITAL OUTPT CLINIC VISIT: HCPCS | Performed by: OPHTHALMOLOGY

## 2023-07-19 ASSESSMENT — REFRACTION_WEARINGRX
OS_SPHERE: -0.50
OD_SPHERE: -0.25
OS_CYLINDER: +0.75
OS_AXIS: 034
OD_CYLINDER: SPHERE

## 2023-07-19 ASSESSMENT — CONF VISUAL FIELD
OD_INFERIOR_NASAL_RESTRICTION: 0
METHOD: COUNTING FINGERS
OS_INFERIOR_TEMPORAL_RESTRICTION: 0
OS_SUPERIOR_TEMPORAL_RESTRICTION: 0
OS_INFERIOR_NASAL_RESTRICTION: 0
OD_NORMAL: 1
OS_NORMAL: 1
OD_INFERIOR_TEMPORAL_RESTRICTION: 0
OD_SUPERIOR_TEMPORAL_RESTRICTION: 0
OS_SUPERIOR_NASAL_RESTRICTION: 0
OD_SUPERIOR_NASAL_RESTRICTION: 0

## 2023-07-19 ASSESSMENT — VISUAL ACUITY
OS_SC: 20/50+2
CORRECTION_TYPE: GLASSES
OD_CC: 20/20
OD_SC: 20/25+2
OS_PH_SC: 20/25+2
OS_CC: 20/20-2

## 2023-07-19 ASSESSMENT — CUP TO DISC RATIO
OD_RATIO: 0.4
OS_RATIO: 0.35

## 2023-07-19 ASSESSMENT — EXTERNAL EXAM - RIGHT EYE: OD_EXAM: NORMAL

## 2023-07-19 ASSESSMENT — TONOMETRY
IOP_METHOD: TONOPEN
OS_IOP_MMHG: 19
OD_IOP_MMHG: 19

## 2023-07-19 ASSESSMENT — EXTERNAL EXAM - LEFT EYE: OS_EXAM: NORMAL

## 2023-07-19 ASSESSMENT — SLIT LAMP EXAM - LIDS
COMMENTS: DCL, MILD PTOSIS
COMMENTS: DCL, MILD PTOSIS

## 2023-07-19 NOTE — PROGRESS NOTES
CC: follow up Diabetes mellitus and plaquenil    Interval: Pt denies vision changes in either eye, no color change, no distortions.   HPI: Krystian Boland is a 73 year old with history of plaquenil use for ILD and RA.  She has been on 400 mg daily since 5-2019. Outer retinal changes were seen on her last eye exam. She is also DMII.     History of chronic cough - improving     Past ocular history:  S/p CE/IOL both eyes  Ocular hypertension both eyes   Dry eyes  Myopic astigmatism  Type II diabetes mellitus without retinopathy     Retinal Imaging:  Optical Coherence Tomography 07/19/23   RE:  nasal macula intraretinal fluid worsening; normal foveal contour- observe  LE: normal foveal contour; questionable disruption of the Retinal pigment epithelium peripherally; normal choroid and vitreous    fluorescein angiography 10/05/22   Good AV and choroidal filling; microaneurysms; mild midperipheral leakage right eye> left eye   No neovascularization of the disc; no neovascularization elsewhere     Autofluorescence 7/27/2022 vs 7-22-21  right eye: abnormal hypoAF along inferior arcade; normal  left eye: normal    visual field 10-2 07/19/23   right eye: FP 2/9, MD 1.8. not completely reliable   Left eye: FP 2/10; normal, MD 2.0    Assessment & Plan:    # Type II diabetes mellitus with mild nonproliferative diabetic retinopathy right eye    Dx'd ~2016   HbA1c 6.4% 01/05/2022   Discontinued Metformin    Blood pressure (<120/80) and blood glucose (HbA1c <7.0) control discussed with patient. Patient advised that failure to adequately control each may lead to vision loss. The patient expressed understanding.  Fluorescein angiography with mild nonproliferative diabetic retinopathy and midperipheral leakage right eye - observe    # mild Diabetic macular edema right eye   Improving  Observe     # Long-term use of Plaquenil   H/o RF-positive RA and ILD   Started Plaquenil 5/2019; 200mg twice a day   ILD stable - f/u 9/16/2020  with Dr. Cross for CT scan and PFTs   RA stable - dx'd 5/2018, Dr. Staley 10/13/2020   OCT mac grossly normal; new IRF right eye compared to prior- see below   Autofluorescence: normal   Optical Coherence Tomography: right eye not completely reliable ; left eye: within normal limits    Repeat in a yr  Ok to continue taking plaquenil  Follow up in July aug 2023    # Pseudophakia each eye  With posterior capsular opacity (PCO) left eye> right eye   Consider yag in the future left eye     # drusen both eyes  Mostly extrafoveal   Few macula drusen  Observe     RTC: 8 months follow up Diabetes mellitus and plaquenil   With fluorescein angiography transits right eye;  OCT mac both eyes ; Autofluorescence   ~~~~~~~~~~~~~~~~~~~~~~~~~~~~~~~~~~   Complete documentation of historical and exam elements from today's encounter can be found in the full encounter summary report (not reduplicated in this progress note).  I personally obtained the chief complaint(s) and history of present illness.  I confirmed and edited as necessary the review of systems, past medical/surgical history, family history, social history, and examination findings as documented by others; and I examined the patient myself.  I personally reviewed the relevant tests, images, and reports as documented above.  I formulated and edited as necessary the assessment and plan and discussed the findings and management plan with the patient and family    Dalia Sawyer MD   of Ophthalmology.  Retina Service   Department of Ophthalmology and Visual Neurosciences   [facility]  Phone: [phone]   Fax: [phone]

## 2023-07-20 ENCOUNTER — HOSPITAL ENCOUNTER (OUTPATIENT)
Dept: CARDIAC REHAB | Facility: CLINIC | Age: 73
Discharge: HOME | End: 2023-07-20
Attending: INTERNAL MEDICINE
Payer: COMMERCIAL

## 2023-07-20 PROCEDURE — G0239 OTH RESP PROC, GROUP: HCPCS

## 2023-07-25 ENCOUNTER — HOSPITAL ENCOUNTER (OUTPATIENT)
Dept: CARDIAC REHAB | Facility: CLINIC | Age: 73
Discharge: HOME | End: 2023-07-25
Attending: INTERNAL MEDICINE
Payer: COMMERCIAL

## 2023-07-25 PROCEDURE — G0239 OTH RESP PROC, GROUP: HCPCS

## 2023-07-26 ENCOUNTER — LAB (OUTPATIENT)
Dept: LAB | Facility: CLINIC | Age: 73
End: 2023-07-26
Payer: COMMERCIAL

## 2023-07-26 DIAGNOSIS — R76.8 RHEUMATOID FACTOR POSITIVE WITH CYCLIC CITRULLINATED PEPTIDE (CCP) ANTIBODY NEGATIVE: ICD-10-CM

## 2023-07-26 LAB
ALBUMIN SERPL BCG-MCNC: 4.2 G/DL (ref 3.5–5.2)
ALT SERPL W P-5'-P-CCNC: 11 U/L (ref 0–50)
AST SERPL W P-5'-P-CCNC: 34 U/L (ref 0–45)
CREAT SERPL-MCNC: 0.74 MG/DL (ref 0.51–0.95)
ERYTHROCYTE [DISTWIDTH] IN BLOOD BY AUTOMATED COUNT: 12.4 % (ref 10–15)
GFR SERPL CREATININE-BSD FRML MDRD: 86 ML/MIN/1.73M2
HCT VFR BLD AUTO: 34.3 % (ref 35–47)
HGB BLD-MCNC: 11.3 G/DL (ref 11.7–15.7)
MCH RBC QN AUTO: 29.6 PG (ref 26.5–33)
MCHC RBC AUTO-ENTMCNC: 32.9 G/DL (ref 31.5–36.5)
MCV RBC AUTO: 90 FL (ref 78–100)
PLATELET # BLD AUTO: 295 10E3/UL (ref 150–450)
RBC # BLD AUTO: 3.82 10E6/UL (ref 3.8–5.2)
WBC # BLD AUTO: 5.6 10E3/UL (ref 4–11)

## 2023-07-26 PROCEDURE — 84460 ALANINE AMINO (ALT) (SGPT): CPT

## 2023-07-26 PROCEDURE — 84450 TRANSFERASE (AST) (SGOT): CPT

## 2023-07-26 PROCEDURE — 82565 ASSAY OF CREATININE: CPT

## 2023-07-26 PROCEDURE — 85027 COMPLETE CBC AUTOMATED: CPT

## 2023-07-26 PROCEDURE — 36415 COLL VENOUS BLD VENIPUNCTURE: CPT

## 2023-07-26 PROCEDURE — 82040 ASSAY OF SERUM ALBUMIN: CPT

## 2023-08-01 ENCOUNTER — HOSPITAL ENCOUNTER (OUTPATIENT)
Dept: CARDIAC REHAB | Facility: CLINIC | Age: 73
Discharge: HOME | End: 2023-08-01
Attending: INTERNAL MEDICINE
Payer: COMMERCIAL

## 2023-08-01 PROCEDURE — G0239 OTH RESP PROC, GROUP: HCPCS

## 2023-08-08 ENCOUNTER — HOSPITAL ENCOUNTER (OUTPATIENT)
Dept: CARDIAC REHAB | Facility: CLINIC | Age: 73
Discharge: HOME | End: 2023-08-08
Attending: INTERNAL MEDICINE
Payer: COMMERCIAL

## 2023-08-08 PROCEDURE — G0239 OTH RESP PROC, GROUP: HCPCS

## 2023-08-09 NOTE — PROGRESS NOTES
[facility]  Rheumatology Clinic  Harjit Staley MD  08/10/2023     Name: Krystian Boland  MRN: [id number]  Age: 73 year old  : 1950  Referring provider: Referred Self     Assessment and Plan:  # RA with positive rheumatoid factor and anti-CCP noted in 2018:  Patient reports minimal joint pain and stable low-grade morning stiffness.  Control of inflammatory joint pain has continued despite discontinuation of prednisone. Exam shows no gross synovitis.    Data: Lab work in 2023 showed normal transaminases and creatinine.  CBC showed mild anemia with hemoglobin 11.3    Imaging: Moderate to severe pulmonary fibrosis and progressive adenopathy in the chest noted on chest CT done 2022.     Overall, I think that inflammatory arthropathy is well controlled on steroid sparing combination therapy.  Symptoms in the right shoulder, right thumb, and right knee remain improved since last visit, despite discontinuation of prednisone.  For arthropathy, I recommend continuing hydroxychloroquine and sulfasalazine combination for disease modifying effect.    We discussed the need for annual ophthalmologic monitoring while on plaquenil.  Dedicated retinal exam occurred in 2023.    # Interstitial lung disease:   The cause of respiratory insufficiency that resulted in hospitalization in late  remains incompletely understood.  Patient relates essential stability in chronic dry nonproductive cough and easy dyspnea on exertion, but her ability to perform activities of daily living is improved with pulmonary rehabilitation in recent months.  I think that overall disease activity of ILD associated with rheumatoid arthritis is stable/low.  I think there is no urgency in advancing immunomodulatory therapy at present.  Mycophenolate may continue at current doses.  Rituximab remains an alternative treatment to mycophenolate should imaging or pulmonary function testing show continued decline at pulmonary  follow-up scheduled for October.    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  We discussed my expectation that thumb and finger pain would be minimally responsive to immunomodulatory therapy, and that gradual progression of bony enlargement and angulation in fingers could be expected on the basis of noninflammatory arthritis.  Symptomatic treatment will be continued with heat, acetaminophen, and regular exercise for the hands.    Plan:  1.  Continue hydroxychloroquine 400 mg once daily; while using hydroxychloroquine, undergo annual screening examination for rare retinal Plaquenil toxicity, appointment due in July 2024  2.  Continue sulfasalazine 1000 mg (2 tablets) twice daily.  3.  Recheck blood work in 4 months;  4.  Physical therapy prescribed exercises for low back pain  5.  Continue Ofev and other pulmonary-recommended measures for interstitial lung disease.  6.  Continue CellCept 1000 mg twice daily.     F/u 6-7 mos    Orders Placed This Encounter   Procedures    Physical Therapy Referral       Harjit Staley M.D.  Staff Rheumatologist,  Health  Pager [phone]      HPI:   Krystian Boland has a history of ILD and RA and presents for follow-up. I last saw the patient in 1-2023 at which time rheumatoid arthritis was overall well controlled. I recommended continuing combination sulfasalazine and hydroxychloroquine.  Rheumatoid arthritis associated interstitial lung disease was discussed, and continued treatment with Ofev and mycophenolate was recommended.    Interval history August 9, 2023    Daughter serves as   Patient was seen by Dr. Sawyer in ophthalmology on July 19, 2023.  Impression was of long-term use of hydroxychloroquine.  No definite evidence of macular toxicity was noted, and continued use of hydroxychloroquine was recommended.    Patient saw Dr. Keller and  pulmonary and critical care medicine in April 2023.  Impression was of interstitial lung disease associated with rheumatoid arthritis.  Declining pulmonary function tests were noted, and a plan to start rituximab was discussed.  However, hepatitis B with significant viremia was noted.  Recommendation was for follow-up with ID and with GI regarding TB and hepatitis B respectively.  Meanwhile continue CellCept and Ofev.    Patient was seen by infectious disease on May 15, 2023, impression was of positive QuantiFERON gold test, most likely representing latent TB.  Recommendation was to consult MTM in preparation for treatment of latent TB with isoniazid.    Today,     Early morning stiffness is < 30 minutes  R 3rd finger swells intermittmently.  No disruption of ADLs due to arthritis.  Continues SULFASALAZINE and hydroxychloroquine unchanged. MMF continues twice daily.  Daughter reports that GI has not recommended Rx for HBV.; ID has said that Rx of latent disease should happen before rituxan.  Still + cough and easy caba, but no worse. Cough non productive  Pulm rehab has helped with function in recent months.  CLBP unchanged, worse at night.      Interval history January 19, 2023    Daughter acted as     Patient saw Dr. Keller in pulmonology on October 26, 2022.  Impression was of rheumatoid arthritis associated interstitial lung disease, with progressive exertional dyspnea symptoms that had been prednisone sensitive in July 2022.  Recommendation was to add CellCept 500 mg twice daily and advance to 1000 mg twice daily, in addition to sulfasalazine and hydroxychloroquine.    Patient was admitted to [facility] from November 28 through December 2.  Discharge diagnosis included respiratory failure, possible flare of interstitial lung disease versus pneumonia, and mediastinal lymphadenopathy.  Patient had recently started on CellCept and then noted increasing cough.  CT scan showed moderately severe  pulmonary fibrosis.  Patient was treated with Zosyn and azithromycin, and leukocytosis improved.  Patient was given intravenous Solu-Medrol, switched to prednisone with discharge on 40 mg of prednisone daily tapering to 10 mg daily over 3 weeks. dyspnea and cough improved.  Prior to admission sulfasalazine, CellCept, hydroxychloroquine and Ofev were continued.    Today, she reports that breathing and cough are improved. She finished prednisone several weeks ago. Exercise tolerance is improved.  There is no joint pain now. Before hospitalization she had swelling and pain in her R wrist and R hand fingers. Early morning stiffness is variable.   Her daughter says that prosper had tried to ramp up cellcept prior to hospitalization; after hospitalization, she has increased to 1000 mg twice day. She notes mild nausea, otherwise well-tolerated.     Interval history May 26, 2022  Daughter acted as  throughout this session.  Through daughter , patient relates increasing pain in several joint areas over the past several weeks.  First, she notes right shoulder pain, worse with arm raising, and at night.  Pain is so severe that she is kept awake at night, and is unable to dress fully independently.  No antecedent injury.  No visible swelling redness warmth or fever.  Patient also notes worsening right base of thumb pain, made worse with grasping and lifting activities.  She reports swelling, warmth, and stiffness at the right knee, alleviated with movement.  Early morning stiffness overall has increased in recent weeks to 25 to 30 minutes.    She is taking Ofev, sulfasalazine (2 g daily), and hydroxychloroquine as recommended.  No specific side effects or adverse effects noted.    Patient saw Dr. Sawyer in ophthalmology in July 2021.  Impression was of long-term use of Plaquenil, no evidence of toxicity.    Interval history 1-    She has intermittent R > L thumb and finger pain, worse in the  mornings. Knee pain resolved, and other joints beside R hand, doing well.  Massage and use of the joints, as well as thylenol, help.   Morning pain in the joints lasts about 2 hours, non-progressive.   ADLs are sometimes affected early in the morning.     Energy level is overall improved, and shortness of breath is stable.    Taking ofev, sulfasalazine and hydroxychloroquine as directed. No particular adverse effects noted      Review of Systems:   Pertinent items are noted in HPI or as below, remainder of complete ROS is negative.     No recent problems with hearing or vision. No swallowing problems.   No breathing difficulty, shortness of breath, or wheezing. + Continues to have dry cough.  No chest pain or palpitations.  No heart burn, indigestion, abdominal pain, vomiting, diarrhea. + Nausea with Ofev.  No urination problems, no bloody, cloudy urine, no dysuria.  No numbing, tingling, weakness.  No headaches or confusion.  No rashes. No easy bleeding or bruising.   + Arthralgias, stiffness, swelling of multiple joints.  + Fatigue.    Active Medications:   Current Outpatient Medications   Medication Sig    aspirin 81 MG chewable tablet Take 1 tablet (81 mg) by mouth daily    benzonatate (TESSALON) 200 MG capsule TAKE 1 CAPSULE(200 MG) BY MOUTH THREE TIMES DAILY AS NEEDED FOR COUGH    famotidine (PEPCID) 40 MG tablet Take 1 tablet (40 mg) by mouth every evening    hydroxychloroquine (PLAQUENIL) 200 MG tablet Take 1 tablet (200 mg) by mouth 2 times daily Annual Plaquenil toxicity eye screening required.    losartan (COZAAR) 25 MG tablet Take 1 tablet (25 mg) by mouth daily    mycophenolate (GENERIC EQUIVALENT) 250 MG capsule TAKE FOUR CAPSULES BY MOUTH TWICE A DAY    OFEV 100 MG capsule TAKE ONE CAPSULE BY MOUTH TWICE A DAY    sulfaSALAzine ER (AZULFIDINE EN) 500 MG EC tablet take 2 tabs twice a day.    Vitamin D3 50 mcg (2000 units) tablet TAKE 1 TABLET( 50MCG) BY MOUTH DAILY. Strength: 50 MCG (2000 UT)     guaiFENesin (ROBITUSSIN) 20 mg/mL liquid Take 10 mLs by mouth every 4 hours as needed for cough    hydrOXYzine (ATARAX) 10 MG tablet Take 1 tablet (10 mg) by mouth 3 times daily as needed for anxiety    ipratropium - albuterol 0.5 mg/2.5 mg/3 mL (DUONEB) 0.5-2.5 (3) MG/3ML neb solution Take 1 vial (3 mLs) by nebulization every 4 hours as needed for wheezing    melatonin 3 MG tablet Take 1 tablet (3 mg) by mouth nightly as needed for sleep    mycophenolate (GENERIC EQUIVALENT) 500 MG tablet Take 2 tablets (1,000 mg) by mouth 2 times daily Labs every 8-12 weeks.    STATIN NOT PRESCRIBED (INTENTIONAL) Please choose reason not prescribed, below     No current facility-administered medications for this visit.          Allergies:   Atorvastatin      Past Medical History:  Obesity  Osteopenia  Type 2 diabetes mellitus without complication, without long-term current use of insulin  Interstitial lung disease  Abdominal tuberculosis (intestinal, 20 years ago)     Past Surgical History:  Open cholecystectomy    Family History:   Mother - dementia  Father - cerebrovascular disease  Brother - cerebrovascular disease, hypertension  No family history of - diabetes, myocardial infarction, early onset C.A.D., breast cancer, ovarian cancer, colon cancer, rheumatoid arthritis  Family history of - high blood pressure  (mentions that medical history is unlikely to be comprehensive because of poor/infrequent medical records)      Social History:   No smoking or tobacco use  Rare alcohol use (1-2 times a year)   with 8 children, 13 grandchildren, cooks for the family     Physical Exam:   Blood pressure (!) 145/81, pulse 87, weight 71.2 kg (157 lb), SpO2 99 %, not currently breastfeeding.  Wt Readings from Last 4 Encounters:   08/10/23 71.2 kg (157 lb)   05/15/23 70.8 kg (156 lb)   04/21/23 71.3 kg (157 lb 1.6 oz)   04/18/23 72 kg (158 lb 12.8 oz)     Constitutional: Well-developed, appearing stated age; cooperative  Eyes: Normal  EOM, PERRLA, vision, conjunctiva, sclera  ENT: Normal external ears, nose, hearing, lips, teeth, gums, throat.  Neck: No mass or thyroid enlargement  Resp: Breathing unlabored  MS:  Left thumb exhibits crepitus but former tenderness at the first CMC and at several MCPs and PIPs on the right has resolved.  Fist formation slightly reduced.  Right knee effusion has resolved.  Clubbing in finger nails, prominent in right third fingernail.  No erythema or tenderness over the indicated area of discomfort at the right third finger nailbed.  Skin: No nail pitting, alopecia, rash, nodules or lesions.  Neuro: Normal cranial nerves    Laboratory:       Latest Ref Rng & Units 1/25/2023     8:36 AM 4/18/2023     9:20 AM 7/26/2023    10:19 AM   RHEUM RESULTS   Albumin 3.5 - 5.2 g/dL 4.1  4.1     4.1  4.2    ALT 0 - 50 U/L 11  11     11  11    AST 0 - 45 U/L 23  22     22  34    Creatinine 0.51 - 0.95 mg/dL 0.69  0.71     0.71  0.74    CRP Inflammation <5.00 mg/L <3.00      GFR Estimate >60 mL/min/1.73m2 >90  >90     >90  86    Hematocrit 35.0 - 47.0 % 36.7  35.1  34.3    Hemoglobin 11.7 - 15.7 g/dL 12.1  11.5  11.3    Hep B Surface Agn Nonreactive Reactive  Reactive     Hepatitis C Antibody Nonreactive Nonreactive      WBC 4.0 - 11.0 10e3/uL 8.8  7.2  5.6    RBC Count 3.80 - 5.20 10e6/uL 4.12  3.98  3.82    RDW 10.0 - 15.0 % 13.5  13.9  12.4    MCHC 31.5 - 36.5 g/dL 33.0  32.8  32.9    MCV 78 - 100 fL 89  88  90    Platelet Count 150 - 450 10e3/uL 305  296  295    Quantiferon-TB Gold Plus Result Negative  Positive         Rheumatoid Factor   Date Value Ref Range Status   05/23/2018 700 (H) <20 IU/mL Final     Cyclic Citrullinated Peptide Antibody, IgG   Date Value Ref Range Status   05/23/2018 >340 (H) <7 U/mL Final     Comment:     Positive     Scleroderma Antibody Scl-70 CLAUDIO IgG   Date Value Ref Range Status   05/23/2018 <0.2 0.0 - 0.9 AI Final     Comment:     Negative  Antibody index (AI) values reflect qualitative changes in  antibody   concentration that cannot be directly associated with clinical condition or   disease state.       SSA (Ro) (CLAUDIO) Antibody, IgG   Date Value Ref Range Status   05/23/2018 <0.2 0.0 - 0.9 AI Final     Comment:     Negative  Antibody index (AI) values reflect qualitative changes in antibody   concentration that cannot be directly associated with clinical condition or   disease state.       SSB (La) (CLAUDIO) Antibody, IgG   Date Value Ref Range Status   05/23/2018 <0.2 0.0 - 0.9 AI Final     Comment:     Negative  Antibody index (AI) values reflect qualitative changes in antibody   concentration that cannot be directly associated with clinical condition or   disease state.       LYNDA interpretation   Date Value Ref Range Status   05/23/2018 Negative NEG^Negative Final     Comment:                                        Reference range:  <1:40  NEGATIVE  1:40 - 1:80  BORDERLINE POSITIVE  >1:80 POSITIVE         Neutrophil Cytoplasmic IgG Antibody   Date Value Ref Range Status   05/23/2018 <1:20 <1:20 Final     Comment:     (Note)  The ANCA IFA is <1:20; therefore, no further testing will   be performed.  INTERPRETIVE INFORMATION: Anti-Neutrophil Cyto Ab, IgG  Neutrophil Cytoplasmic Antibodies (C-ANCA = granular   cytoplasmic staining, P-ANCA = perinuclear staining) are   found in the serum of over 90 percent of patients with   certain necrotizing systemic vasculitides, and usually in   less than 5 percent of patients with collagen vascular   disease or arthritis.  Performed by [facility],  [address] [place] [id number] [phone]  [email or web address], Marty Wright MD, Lab. Director         IGG   Date Value Ref Range Status   05/23/2018 1,980 (H) 695 - 1,620 mg/dL Final     Scleroderma Antibody Scl-70 CLAUDIO IgG   Date Value Ref Range Status   05/23/2018 <0.2 0.0 - 0.9 AI Final     Comment:     Negative  Antibody index (AI) values reflect qualitative changes in antibody   concentration that cannot be directly  associated with clinical condition or   disease state.

## 2023-08-10 ENCOUNTER — HOSPITAL ENCOUNTER (OUTPATIENT)
Dept: CARDIAC REHAB | Facility: CLINIC | Age: 73
Discharge: HOME | End: 2023-08-10
Attending: INTERNAL MEDICINE
Payer: COMMERCIAL

## 2023-08-10 ENCOUNTER — OFFICE VISIT (OUTPATIENT)
Dept: RHEUMATOLOGY | Facility: CLINIC | Age: 73
End: 2023-08-10
Payer: COMMERCIAL

## 2023-08-10 VITALS
HEART RATE: 87 BPM | BODY MASS INDEX: 27.81 KG/M2 | SYSTOLIC BLOOD PRESSURE: 145 MMHG | OXYGEN SATURATION: 99 % | DIASTOLIC BLOOD PRESSURE: 81 MMHG | WEIGHT: 157 LBS

## 2023-08-10 DIAGNOSIS — M54.50 LOW BACK PAIN AT MULTIPLE SITES: Primary | ICD-10-CM

## 2023-08-10 DIAGNOSIS — R76.8 RHEUMATOID FACTOR POSITIVE WITH CYCLIC CITRULLINATED PEPTIDE (CCP) ANTIBODY NEGATIVE: ICD-10-CM

## 2023-08-10 PROCEDURE — 99214 OFFICE O/P EST MOD 30 MIN: CPT | Performed by: INTERNAL MEDICINE

## 2023-08-10 PROCEDURE — G0239 OTH RESP PROC, GROUP: HCPCS

## 2023-08-10 RX ORDER — HYDROXYCHLOROQUINE SULFATE 200 MG/1
200 TABLET, FILM COATED ORAL 2 TIMES DAILY
Qty: 180 TABLET | Refills: 3 | Status: SHIPPED | OUTPATIENT
Start: 2023-08-10 | End: 2024-03-07

## 2023-08-10 RX ORDER — SULFASALAZINE 500 MG/1
TABLET, DELAYED RELEASE ORAL
Qty: 120 TABLET | Refills: 6 | Status: SHIPPED | OUTPATIENT
Start: 2023-08-10 | End: 2023-11-22

## 2023-08-10 ASSESSMENT — PAIN SCALES - GENERAL: PAINLEVEL: NO PAIN (0)

## 2023-08-10 NOTE — PATIENT INSTRUCTIONS
Diagnosis:  1.  Seropositive rheumatoid arthritis: Shoulder, thumb, and knee pain are overall improved since starting prednisone around the time of hospitalization in late 2022.  Overall control of arthritis is adequate with current medications hydroxychloroquine and sulfasalazine.  2.  Low back pain  3. Interstitial lung disease: Breathing is now modestly improved/stable.  I agree with Dr. Keller's recommendation to continue CellCept for rheumatoid arthritis associated lung disease.  Follow-up imaging or pulmonary function testing may show worsening; rituximab remains an alternative/additional medication if that happens.     Plan:  1.  Continue hydroxychloroquine 400 mg once daily; while using hydroxychloroquine, undergo annual screening examination for rare retinal Plaquenil toxicity, appointment due in July 2024  2.  Continue sulfasalazine 1000 mg (2 tablets) twice daily.  3.  Recheck blood work in 4 months;  4.  Physical therapy prescribed exercises for low back pain  5.  Continue Ofev and other pulmonary-recommended measures for interstitial lung disease.  6.  Continue CellCept 1000 mg twice daily.

## 2023-08-10 NOTE — NURSING NOTE
Chief Complaint   Patient presents with    RECHECK     Arthritis  Reports of tingling on both legs   Reports pain and swelling on right middle finger sometimes.       Vitals:    08/10/23 0906   BP: (!) 145/81   BP Location: Left arm   Patient Position: Sitting   Cuff Size: Adult Regular   Pulse: 87   SpO2: 99%   Weight: 71.2 kg (157 lb)       Body mass index is 27.81 kg/m .    Oseas Jesus MA

## 2023-08-15 ENCOUNTER — HOSPITAL ENCOUNTER (OUTPATIENT)
Dept: CARDIAC REHAB | Facility: CLINIC | Age: 73
Discharge: HOME | End: 2023-08-15
Attending: INTERNAL MEDICINE
Payer: COMMERCIAL

## 2023-08-15 PROCEDURE — 94625 PHY/QHP OP PULM RHB W/O MNTR: CPT

## 2023-08-17 ENCOUNTER — HOSPITAL ENCOUNTER (OUTPATIENT)
Dept: CARDIAC REHAB | Facility: CLINIC | Age: 73
Discharge: HOME | End: 2023-08-17
Attending: INTERNAL MEDICINE
Payer: COMMERCIAL

## 2023-08-17 PROCEDURE — G0238 OTH RESP PROC, INDIV: HCPCS

## 2023-08-26 ENCOUNTER — TELEPHONE (OUTPATIENT)
Dept: PULMONOLOGY | Facility: CLINIC | Age: 73
End: 2023-08-26
Payer: COMMERCIAL

## 2023-08-26 NOTE — TELEPHONE ENCOUNTER
Left Voicemail (1st Attempt) for the patient to call back and schedule the following:    Appointment type:RTN  Provider: NELSY  Return date: 10/25/23  Specialty phone number: [phone]  Additional appointment(s) needed: FPFT  Additonal Notes: N/A

## 2023-08-30 ENCOUNTER — TELEPHONE (OUTPATIENT)
Dept: PULMONOLOGY | Facility: CLINIC | Age: 73
End: 2023-08-30
Payer: COMMERCIAL

## 2023-08-30 NOTE — TELEPHONE ENCOUNTER
PA Initiation    Medication: OFEV 100 MG PO CAPS  Insurance Company: [facility] - Phone [phone] Fax [phone]  Pharmacy Filling the Rx: [facility] - [place], [place] - [facility] [address]  Filling Pharmacy Phone: [phone]  Filling Pharmacy Fax:    Start Date: 8/30/2023    Key: [id number]

## 2023-08-31 ENCOUNTER — TELEPHONE (OUTPATIENT)
Dept: PULMONOLOGY | Facility: CLINIC | Age: 73
End: 2023-08-31
Payer: COMMERCIAL

## 2023-08-31 NOTE — TELEPHONE ENCOUNTER
Left Voicemail (2nd Attempt) for the patient to call back and schedule the following:    Appointment type: RTN  Provider: NELSY   Return date: 10/25/23  Specialty phone number: [phone]  Additional appointment(s) needed: N/A   Additonal Notes: N/A

## 2023-09-07 NOTE — TELEPHONE ENCOUNTER
Prior Authorization Approval    Medication: OFEV 100 MG PO CAPS  Authorization Effective Date: 4/19/2023  Authorization Expiration Date: 5/19/2024  Approved Dose/Quantity: #60 per 30 days  Reference #: Key: [id number]   Insurance Company: [employer] - Phone [phone] Fax [phone]  Expected CoPay: $0.00     CoPay Card Available: No    Financial Assistance Needed: No  Which Pharmacy is filling the prescription: [facility] - [place], [place] - [address]  Pharmacy Notified: No  Patient Notified: No

## 2023-09-25 ENCOUNTER — IMMUNIZATION (OUTPATIENT)
Dept: NURSING | Facility: CLINIC | Age: 73
End: 2023-09-25
Payer: COMMERCIAL

## 2023-09-25 PROCEDURE — G0008 ADMIN INFLUENZA VIRUS VAC: HCPCS

## 2023-09-25 PROCEDURE — 90662 IIV NO PRSV INCREASED AG IM: CPT

## 2023-10-09 ENCOUNTER — THERAPY VISIT (OUTPATIENT)
Dept: PHYSICAL THERAPY | Facility: CLINIC | Age: 73
End: 2023-10-09
Attending: INTERNAL MEDICINE
Payer: COMMERCIAL

## 2023-10-09 DIAGNOSIS — M54.16 LUMBAR BACK PAIN WITH RADICULOPATHY AFFECTING LEFT LOWER EXTREMITY: Primary | ICD-10-CM

## 2023-10-09 DIAGNOSIS — M54.16 LUMBAR BACK PAIN WITH RADICULOPATHY AFFECTING RIGHT LOWER EXTREMITY: ICD-10-CM

## 2023-10-09 DIAGNOSIS — M54.50 LOW BACK PAIN AT MULTIPLE SITES: ICD-10-CM

## 2023-10-09 PROCEDURE — 97110 THERAPEUTIC EXERCISES: CPT | Mod: GP | Performed by: PHYSICAL THERAPIST

## 2023-10-09 PROCEDURE — 97161 PT EVAL LOW COMPLEX 20 MIN: CPT | Mod: GP | Performed by: PHYSICAL THERAPIST

## 2023-10-09 NOTE — PROGRESS NOTES
"PHYSICAL THERAPY EVALUATION  Type of Visit: Evaluation    See electronic medical record for Abuse and Falls Screening details.    Subjective       Presenting condition or subjective complaint: Low back pain sometimes radiates to legs  Date of onset: 08/10/23 (MD order date)    Relevant medical history: Cold or hot arm or leg; Diabetes; Emphysema; Hepatitis; High blood pressure; Osteoporosis; Rheumatoid arthritis   Dates & types of surgery:      Prior diagnostic imaging/testing results: Bone scan     Prior therapy history for the same diagnosis, illness or injury: Yes Dont remember    Prior Level of Function  Transfers: Independent  Ambulation: Independent  ADL: Independent      Living Environment  Social support: With family members   Type of home: House   Stairs to enter the home: Yes 15 Is there a railing: Yes   Ramp: No   Stairs inside the home: Yes 15 Is there a railing: Yes   Help at home: Home management tasks (cooking, cleaning); Home and Yard maintenance tasks; Assist for driving and community activities  Equipment owned:       Employment: No    Hobbies/Interests:      Patient goals for therapy: Walk and be more active    Patient has c/o chronic 20+ year history of LBP, initial injury was a slip/fall on floor and landed on her back.  She took medication, no other treatment as was told she had a \"bone pinched\".  She had a recent exacerbation which started a few months ago.  Pain is intermittent across the low back, ranges 0-6/10, describes as \"dull\", with intermittent numbness bilateral lower extremities to the toes.  Symptoms increase with sitting slouched, standing>1 hour, bending forward. She denies pain with coughing, denies bowel and bladder changes.  Symptoms decrease with sitting with support/erect, lying supine.  Patient is sedentary, watches TV on a soft chair.       Objective   Posture/alignment  Poor sitting posture.  Decreased lumbar lordosis in standing, no trunk shift.     Gait  No assistive " "device, WNL    Lumbar AROM  Flexion:  WNL  Extension:  WNL with slight deviation to the right   Right sideglide:  min loss with LBP  Left sideglide:  WNL    Lumbar Dermatomes  WNL/symetrical to light touch    Lumbar Myotomes  WNL/symetrical    Reflexes  Quad  Not assessed   Achilles Not assessed    Slump test  Right  negative  Left  negative    Repeated movement testing  Symptoms prior to test movemens 0/10  Correction of sitting posture with lumbar roll:  no effect  Prone:  0/10  REIL: fatigue/breathing issues, LB \"warmth\"/NW  FLACO with hips against counter:  NE         Assessment & Plan   CLINICAL IMPRESSIONS  Medical Diagnosis: LBP    Treatment Diagnosis: LBP with bilateral radiculopathy   Impression/Assessment: Patient is a 73 year old female with LB and bilateral LE complaints.  The following significant findings have been identified: Pain, Decreased activity tolerance, and Impaired posture. These impairments interfere with their ability to perform self care tasks, recreational activities, and household chores as compared to previous level of function.     Clinical Decision Making (Complexity):  Clinical Presentation: Stable/Uncomplicated  Clinical Presentation Rationale: based on medical and personal factors listed in PT evaluation  Clinical Decision Making (Complexity): Low complexity    PLAN OF CARE  Treatment Interventions:  Interventions: Neuromuscular Re-education, Therapeutic Activity, Therapeutic Exercise, Self-Care/Home Management    Long Term Goals     PT Goal 1  Goal Identifier: Bending  Goal Description: Patient will be able to bend forward, full ROM without symptoms  Rationale: to maximize safety and independence with performance of ADLs and functional tasks;to maximize safety and independence within the home;to maximize safety and independence with self cares  Target Date: 12/04/23      Frequency of Treatment: 1x/week  Duration of Treatment: 4 weeks, then 2x/month for 1 month    Education " Assessment:        Risks and benefits of evaluation/treatment have been explained.   Patient/Family/caregiver agrees with Plan of Care.     Evaluation Time:     PT Eval, Low Complexity Minutes (75573): 25   Present: Yes: Language: tibetan, ID Number/Identifier: daughter      Signing Clinician: Marianne Sarah PT      [facility]                                                                                   OUTPATIENT PHYSICAL THERAPY      PLAN OF TREATMENT FOR OUTPATIENT REHABILITATION   Patient's Last Name, First Name, M.Krystian Durham    YOB: 1950   Provider's Name   [facility]   Medical Record No.  [id number]     Onset Date: 08/10/23 (MD order date)  Start of Care Date: 10/09/23     Medical Diagnosis:  LBP      PT Treatment Diagnosis:  LBP with bilateral radiculopathy Plan of Treatment  Frequency/Duration: 1x/week/ 4 weeks, then 2x/month for 1 month    Certification date from 10/09/23 to 12/04/23         See note for plan of treatment details and functional goals     Marianne Sarah, PT                         I CERTIFY THE NEED FOR THESE SERVICES FURNISHED UNDER        THIS PLAN OF TREATMENT AND WHILE UNDER MY CARE     (Physician attestation of this document indicates review and certification of the therapy plan).                Referring Provider:  Harjit Staley      Initial Assessment  See Epic Evaluation- Start of Care Date: 10/09/23

## 2023-10-16 ENCOUNTER — THERAPY VISIT (OUTPATIENT)
Dept: PHYSICAL THERAPY | Facility: CLINIC | Age: 73
End: 2023-10-16
Payer: COMMERCIAL

## 2023-10-16 DIAGNOSIS — M54.16 LUMBAR BACK PAIN WITH RADICULOPATHY AFFECTING RIGHT LOWER EXTREMITY: ICD-10-CM

## 2023-10-16 DIAGNOSIS — I10 BENIGN ESSENTIAL HYPERTENSION: ICD-10-CM

## 2023-10-16 DIAGNOSIS — M54.16 LUMBAR BACK PAIN WITH RADICULOPATHY AFFECTING LEFT LOWER EXTREMITY: Primary | ICD-10-CM

## 2023-10-16 PROCEDURE — 97110 THERAPEUTIC EXERCISES: CPT | Mod: GP | Performed by: PHYSICAL THERAPIST

## 2023-10-16 RX ORDER — AMLODIPINE BESYLATE 5 MG/1
TABLET ORAL
Qty: 90 TABLET | Refills: 0 | Status: SHIPPED | OUTPATIENT
Start: 2023-10-16 | End: 2023-12-23

## 2023-10-30 ENCOUNTER — PATIENT OUTREACH (OUTPATIENT)
Dept: GERIATRIC MEDICINE | Facility: CLINIC | Age: 73
End: 2023-10-30
Payer: COMMERCIAL

## 2023-10-30 NOTE — PROGRESS NOTES
[facility] Care Coordination Contact    Called adult daughter David  to schedule annual HRA home visit. HRA has been scheduled for 11/10/23 at 9am. Member only wants to use their daughter for interpreting due to the limited available interpreters and small community-preferring privacy.  JAQUELIN Langley  [facility]  [phone]

## 2023-11-01 ENCOUNTER — OFFICE VISIT (OUTPATIENT)
Dept: PULMONOLOGY | Facility: CLINIC | Age: 73
End: 2023-11-01
Attending: INTERNAL MEDICINE
Payer: COMMERCIAL

## 2023-11-01 ENCOUNTER — DOCUMENTATION ONLY (OUTPATIENT)
Dept: PULMONOLOGY | Facility: CLINIC | Age: 73
End: 2023-11-01

## 2023-11-01 ENCOUNTER — LAB (OUTPATIENT)
Dept: LAB | Facility: CLINIC | Age: 73
End: 2023-11-01
Payer: COMMERCIAL

## 2023-11-01 VITALS
HEART RATE: 89 BPM | WEIGHT: 155 LBS | DIASTOLIC BLOOD PRESSURE: 78 MMHG | SYSTOLIC BLOOD PRESSURE: 129 MMHG | BODY MASS INDEX: 28.52 KG/M2 | OXYGEN SATURATION: 97 % | HEIGHT: 62 IN

## 2023-11-01 DIAGNOSIS — E11.9 TYPE 2 DIABETES MELLITUS (H): Primary | ICD-10-CM

## 2023-11-01 DIAGNOSIS — J18.9 PNEUMONIA, UNSPECIFIED ORGANISM: ICD-10-CM

## 2023-11-01 DIAGNOSIS — M05.10 RHEUMATOID LUNG (H): Primary | ICD-10-CM

## 2023-11-01 DIAGNOSIS — Z79.624 ON MYCOPHENOLATE MOFETIL THERAPY: ICD-10-CM

## 2023-11-01 DIAGNOSIS — E11.9 TYPE 2 DIABETES MELLITUS WITHOUT COMPLICATION, WITHOUT LONG-TERM CURRENT USE OF INSULIN (H): ICD-10-CM

## 2023-11-01 DIAGNOSIS — J84.9 ILD (INTERSTITIAL LUNG DISEASE) (H): Primary | ICD-10-CM

## 2023-11-01 DIAGNOSIS — M05.10 RHEUMATOID LUNG (H): ICD-10-CM

## 2023-11-01 DIAGNOSIS — R76.8 RHEUMATOID FACTOR POSITIVE WITH CYCLIC CITRULLINATED PEPTIDE (CCP) ANTIBODY NEGATIVE: ICD-10-CM

## 2023-11-01 DIAGNOSIS — B18.1 CHRONIC HEPATITIS B (H): ICD-10-CM

## 2023-11-01 LAB
ALBUMIN SERPL BCG-MCNC: 4 G/DL (ref 3.5–5.2)
ALP SERPL-CCNC: 93 U/L (ref 35–104)
ALT SERPL W P-5'-P-CCNC: 12 U/L (ref 0–50)
AST SERPL W P-5'-P-CCNC: 25 U/L (ref 0–45)
BILIRUB DIRECT SERPL-MCNC: <0.2 MG/DL (ref 0–0.3)
BILIRUB SERPL-MCNC: 0.4 MG/DL
CREAT SERPL-MCNC: 0.72 MG/DL (ref 0.51–0.95)
EGFRCR SERPLBLD CKD-EPI 2021: 89 ML/MIN/1.73M2
ERYTHROCYTE [DISTWIDTH] IN BLOOD BY AUTOMATED COUNT: 14 % (ref 10–15)
HBA1C MFR BLD: 6.7 %
HCT VFR BLD AUTO: 36.6 % (ref 35–47)
HGB BLD-MCNC: 11.9 G/DL (ref 11.7–15.7)
MCH RBC QN AUTO: 28.7 PG (ref 26.5–33)
MCHC RBC AUTO-ENTMCNC: 32.5 G/DL (ref 31.5–36.5)
MCV RBC AUTO: 88 FL (ref 78–100)
PLATELET # BLD AUTO: 225 10E3/UL (ref 150–450)
PROT SERPL-MCNC: 7.6 G/DL (ref 6.4–8.3)
RBC # BLD AUTO: 4.15 10E6/UL (ref 3.8–5.2)
WBC # BLD AUTO: 5.2 10E3/UL (ref 4–11)

## 2023-11-01 PROCEDURE — 99214 OFFICE O/P EST MOD 30 MIN: CPT | Mod: 25 | Performed by: INTERNAL MEDICINE

## 2023-11-01 PROCEDURE — 87517 HEPATITIS B DNA QUANT: CPT | Performed by: INTERNAL MEDICINE

## 2023-11-01 PROCEDURE — 80076 HEPATIC FUNCTION PANEL: CPT | Performed by: PATHOLOGY

## 2023-11-01 PROCEDURE — 86692 HEPATITIS DELTA AGENT ANTBDY: CPT | Mod: 90 | Performed by: PATHOLOGY

## 2023-11-01 PROCEDURE — 83036 HEMOGLOBIN GLYCOSYLATED A1C: CPT | Performed by: INTERNAL MEDICINE

## 2023-11-01 PROCEDURE — 94729 DIFFUSING CAPACITY: CPT | Performed by: INTERNAL MEDICINE

## 2023-11-01 PROCEDURE — 94375 RESPIRATORY FLOW VOLUME LOOP: CPT | Performed by: INTERNAL MEDICINE

## 2023-11-01 PROCEDURE — 36415 COLL VENOUS BLD VENIPUNCTURE: CPT | Performed by: PATHOLOGY

## 2023-11-01 PROCEDURE — G0463 HOSPITAL OUTPT CLINIC VISIT: HCPCS | Performed by: INTERNAL MEDICINE

## 2023-11-01 PROCEDURE — 82565 ASSAY OF CREATININE: CPT | Performed by: PATHOLOGY

## 2023-11-01 PROCEDURE — 99000 SPECIMEN HANDLING OFFICE-LAB: CPT | Performed by: PATHOLOGY

## 2023-11-01 PROCEDURE — 85027 COMPLETE CBC AUTOMATED: CPT | Performed by: PATHOLOGY

## 2023-11-01 ASSESSMENT — PAIN SCALES - GENERAL: PAINLEVEL: NO PAIN (0)

## 2023-11-01 NOTE — PATIENT INSTRUCTIONS
Continue cellcept and ofev  Continue labs every 3 months  Continue pulmonary rehab exercises at home    RSV vaccine and COVID booster recommended   Return in 6 months with PFTs     Please call our direct ILD nurses line for questions and concerns: [phone]    For scheduling, please call: [phone]

## 2023-11-01 NOTE — NURSING NOTE
Chief Complaint   Patient presents with    Interstitial Lung Disease (ILD)     ILD follow up      Vitals were taken and medications were reconciled.     Margaret Shirley RMA  7:54 AM

## 2023-11-01 NOTE — LETTER
11/1/2023         RE: Krystian Boland  [id number]  [facility] [address]        Dear Colleague,    Thank you for referring your patient, Kyrstian Boland, to the [facility] LUNG [facility] AND [facility]. Please see a copy of my visit note below.    HISTORY OF PRESENT ILLNESS:  Krystian is a 72-year-old female with interstitial lung disease secondary to rheumatoid arthritis.  Her last visit was with me 10/26/2022. Serial PFTs monitoring was being pursued while receiving ofev, which remains currently at 100 mg BID due not tolerating the 150 dose with nausea. She is also on HCQN and sulfasalazine with fair joint symptom control. Her daughter is an RN and translating for her. As her respiratory symptoms progressed, I started her on MMF in 10/2022. In late November, she had acute respiratory failure requiring hospitalization, and CT was suggestive for rheumatoid lung flare with superimposed progressive fibrosis. She was discharged on oxygen, which she was able to discontinue a few weeks ago. She had only 3 days interruption in MMF, and now on 1000 BID. She has exertional dyspnea but seems to be better compared to 5/2022, and daughter thinks that she is starting to respond to MMF. No symptoms to suggest infection. RTX has been entertained with rheumatology.     Updates from 4/21/23  Patient returns for follow-up, we have previously discussed with rheumatology initiating rituximab for her rheumatoid lung, and performed routine infectious work-up which turned out positive for TB QuantiFERON.  She was born and raised in [facility] (incidence [place]), and expresses no symptoms of active TB. CT 1/2023 also did not suggest active TB. She feels well today with no change in baseline dyspnea on exertion. Not using oxygen at home. Six min walk showed 2 LPM need with activity, she does have a concentrator and tanks.     Updates from 11/1/23  Krystian returns for follow up with her  "daughter who interprets for her (ICU RN). She completed pulmonary rehab. Feels 15% better in terms of activity level. Using O2 with exertion at 2 LPM. Tolerating MMF 1000 BID and Ofev 100 BID without side effects. No symptoms to suggest infection. Received flu vaccine for this season recently. Last saw rheumatology August with no changes in plan.      PHYSICAL EXAMINATION:    VITAL SIGNS:  /78 (BP Location: Right arm, Patient Position: Sitting, Cuff Size: Adult Regular)   Pulse 89   Ht 1.575 m (5' 2\")   Wt 70.3 kg (155 lb)   LMP  (LMP Unknown)   SpO2 97%   BMI 28.35 kg/m      HEENT:  Eyes are anicteric.  Mouth and throat without lesions.  CHEST:  Crackles at least snf up bilaterally, no wheezes.   HEART:  Shows regular rate and rhythm.  Normal S1, S2.  EXTREMITIES: BL digital clubbing.  SKIN:  No rash.    PULMONARY FUNCTION TESTS     My interpretation: Severe intra-thoracic restrictive disease, no obstruction. Slow progression compared to 2022.       My interpretation: improvement in intra-thoracic restrictive disease, currently classified as moderate in severity.     Reviewed HRCT chest images with patient and her daughter, today 1/25/23 compared to 11/28/22 and 5/2022  Advancement in honeycombing and fibrotic findings with residual dense GGO/consolidative areas that are minimal compared to November. November's scan showed extensive GGO (now resolved) suggestive of CTD ILD flare.     ASSESSMENT AND PLAN:   1. RA ILD, rapidly progressive with flare in 11/2022    2. Chronic hypoxemic respiratory failure, on O2 with exertion   3. DM   4. HTN   5. Latent TB?, treated intestinal TB in [place] 1980s     Krystian is a 73-year-old female with interstitial lung disease secondary to RA. Her RA is being treated with sulfasalazine and hydroxychloroquine.  She saw Dr. Staley on August 2023 and tolerating cellcept 1000 BID. Did not need steroid bursts in almost a year. Most recent steroid taper was 12/2/22 " starting with 40 mg for approximately one month. She continues to tolerate Ofev 100 BID as well (did not tolerate 150 for nausea). PFTs show some recovery in DLCO from 39 to 52% and also recovery in FVC from 62% to 70% with completion of pulmonary rehab. Rituximab was previously discussed with Dr. Staley, but she has active hepatitis B and latent TB, and this was discussed with hepatology and ID and we had a plan to treat with tenofovir and latent TB treatment however the patient and her family decided not to pursue this to avoid complexity of treatment plan with immunosuppression, which I am supporting. Most of her lung disease is fibrotic at this stage so even if we do rituximab we will not be able to recover normal lung function.   Plan:   - Follow up with ID and GI regarding latent TB and Hep B, respectively   - Continue current dose cellcept 1000 BID   - Compliance with O2 whenever performing outside activities/long walks, with pulse ox monitoring   - Continue current dose ofev 100 (had nausea with 150 BID)  - Keep q3 months labs  - RSV vaccine recommended   - COVID booster recommended   - Return in 6 months with PFTs       Jase Keller MD    Total time spent on the day of the visit was 30 minutes.       Again, thank you for allowing me to participate in the care of your patient.        Sincerely,        Jase Keller MD

## 2023-11-01 NOTE — PROGRESS NOTES
I certify that this patient, Krystian Boland has been under my care (or a nurse practitioner or physican's assistant working with me). This is the face-to-face encounter for oxygen medical necessity.      At the time of this encounter supplemental oxygen is reasonable and necessary and is expected to improve the patient's condition in a home setting.       Patient has continued oxygen desaturation due to ILD J84.9.    If portability is ordered, is the patient mobile within the home? yes

## 2023-11-01 NOTE — PROGRESS NOTES
HISTORY OF PRESENT ILLNESS:  Krystian is a 72-year-old female with interstitial lung disease secondary to rheumatoid arthritis.  Her last visit was with me 10/26/2022. Serial PFTs monitoring was being pursued while receiving ofev, which remains currently at 100 mg BID due not tolerating the 150 dose with nausea. She is also on HCQN and sulfasalazine with fair joint symptom control. Her daughter is an RN and translating for her. As her respiratory symptoms progressed, I started her on MMF in 10/2022. In late November, she had acute respiratory failure requiring hospitalization, and CT was suggestive for rheumatoid lung flare with superimposed progressive fibrosis. She was discharged on oxygen, which she was able to discontinue a few weeks ago. She had only 3 days interruption in MMF, and now on 1000 BID. She has exertional dyspnea but seems to be better compared to 5/2022, and daughter thinks that she is starting to respond to MMF. No symptoms to suggest infection. RTX has been entertained with rheumatology.     Updates from 4/21/23  Patient returns for follow-up, we have previously discussed with rheumatology initiating rituximab for her rheumatoid lung, and performed routine infectious work-up which turned out positive for TB QuantiFERON.  She was born and raised in [facility] (incidence [place]), and expresses no symptoms of active TB. CT 1/2023 also did not suggest active TB. She feels well today with no change in baseline dyspnea on exertion. Not using oxygen at home. Six min walk showed 2 LPM need with activity, she does have a concentrator and tanks.     Updates from 11/1/23  Krystian returns for follow up with her daughter who interprets for her (ICU RN). She completed pulmonary rehab. Feels 15% better in terms of activity level. Using O2 with exertion at 2 LPM. Tolerating MMF 1000 BID and Ofev 100 BID without side effects. No symptoms to suggest infection. Received flu vaccine for this season recently. Last  "saw rheumatology August with no changes in plan.      PHYSICAL EXAMINATION:    VITAL SIGNS:  /78 (BP Location: Right arm, Patient Position: Sitting, Cuff Size: Adult Regular)   Pulse 89   Ht 1.575 m (5' 2\")   Wt 70.3 kg (155 lb)   LMP  (LMP Unknown)   SpO2 97%   BMI 28.35 kg/m      HEENT:  Eyes are anicteric.  Mouth and throat without lesions.  CHEST:  Crackles at least USP up bilaterally, no wheezes.   HEART:  Shows regular rate and rhythm.  Normal S1, S2.  EXTREMITIES: BL digital clubbing.  SKIN:  No rash.    PULMONARY FUNCTION TESTS     My interpretation: Severe intra-thoracic restrictive disease, no obstruction. Slow progression compared to 2022.       My interpretation: improvement in intra-thoracic restrictive disease, currently classified as moderate in severity.     Reviewed HRCT chest images with patient and her daughter, today 1/25/23 compared to 11/28/22 and 5/2022  Advancement in honeycombing and fibrotic findings with residual dense GGO/consolidative areas that are minimal compared to November. November's scan showed extensive GGO (now resolved) suggestive of CTD ILD flare.     ASSESSMENT AND PLAN:   1. RA ILD, rapidly progressive with flare in 11/2022    2. Chronic hypoxemic respiratory failure, on O2 with exertion   3. DM   4. HTN   5. Latent TB?, treated intestinal TB in [place] 1980s     Krystian is a 73-year-old female with interstitial lung disease secondary to RA. Her RA is being treated with sulfasalazine and hydroxychloroquine.  She saw Dr. Staley on August 2023 and tolerating cellcept 1000 BID. Did not need steroid bursts in almost a year. Most recent steroid taper was 12/2/22 starting with 40 mg for approximately one month. She continues to tolerate Ofev 100 BID as well (did not tolerate 150 for nausea). PFTs show some recovery in DLCO from 39 to 52% and also recovery in FVC from 62% to 70% with completion of pulmonary rehab. Rituximab was previously discussed with Dr." Luís, but she has active hepatitis B and latent TB, and this was discussed with hepatology and ID and we had a plan to treat with tenofovir and latent TB treatment however the patient and her family decided not to pursue this to avoid complexity of treatment plan with immunosuppression, which I am supporting. Most of her lung disease is fibrotic at this stage so even if we do rituximab we will not be able to recover normal lung function.   Plan:   - Follow up with ID and GI regarding latent TB and Hep B, respectively   - Continue current dose cellcept 1000 BID   - Compliance with O2 whenever performing outside activities/long walks, with pulse ox monitoring   - Continue current dose ofev 100 (had nausea with 150 BID)  - Keep q3 months labs  - RSV vaccine recommended   - COVID booster recommended   - Return in 6 months with PFTs       Jase Keller MD    Total time spent on the day of the visit was 30 minutes.

## 2023-11-02 LAB
ERV-%PRED-PRE: 64 %
ERV-PRE: 0.57 L
ERV-PRED: 0.89 L
EXPTIME-PRE: 5.82 SEC
FEF2575-%PRED-PRE: 114 %
FEF2575-PRE: 1.89 L/SEC
FEF2575-PRED: 1.65 L/SEC
FEFMAX-%PRED-PRE: 85 %
FEFMAX-PRE: 4.49 L/SEC
FEFMAX-PRED: 5.24 L/SEC
FEV1-%PRED-PRE: 80 %
FEV1-PRE: 1.51 L
FEV1FEV6-PRE: 84 %
FEV1FEV6-PRED: 79 %
FEV1FVC-PRE: 89 %
FEV1FVC-PRED: 79 %
FEV1SVC-PRE: 100 %
FEV1SVC-PRED: 66 %
FIFMAX-PRE: 5.68 L/SEC
FVC-%PRED-PRE: 70 %
FVC-PRE: 1.7 L
FVC-PRED: 2.4 L
HBV DNA SERPL NAA+PROBE-ACNC: ABNORMAL IU/ML
HBV DNA SERPL NAA+PROBE-LOG IU: 7.7 {LOG_IU}/ML
IC-%PRED-PRE: 52 %
IC-PRE: 0.94 L
IC-PRED: 1.79 L
VC-%PRED-PRE: 52 %
VC-PRE: 1.51 L
VC-PRED: 2.87 L

## 2023-11-04 LAB — HDV AB SER QL IA: NEGATIVE

## 2023-11-10 ENCOUNTER — PATIENT OUTREACH (OUTPATIENT)
Dept: GERIATRIC MEDICINE | Facility: CLINIC | Age: 73
End: 2023-11-10
Payer: COMMERCIAL

## 2023-11-10 SDOH — HEALTH STABILITY: PHYSICAL HEALTH: ON AVERAGE, HOW MANY DAYS PER WEEK DO YOU ENGAGE IN MODERATE TO STRENUOUS EXERCISE (LIKE A BRISK WALK)?: 0 DAYS

## 2023-11-10 SDOH — HEALTH STABILITY: PHYSICAL HEALTH: ON AVERAGE, HOW MANY MINUTES DO YOU ENGAGE IN EXERCISE AT THIS LEVEL?: 0 MIN

## 2023-11-10 ASSESSMENT — PATIENT HEALTH QUESTIONNAIRE - PHQ9: SUM OF ALL RESPONSES TO PHQ QUESTIONS 1-9: 1

## 2023-11-10 ASSESSMENT — LIFESTYLE VARIABLES
HOW OFTEN DO YOU HAVE A DRINK CONTAINING ALCOHOL: NEVER
HOW OFTEN DO YOU HAVE SIX OR MORE DRINKS ON ONE OCCASION: NEVER
HOW MANY STANDARD DRINKS CONTAINING ALCOHOL DO YOU HAVE ON A TYPICAL DAY: PATIENT DOES NOT DRINK
AUDIT-C TOTAL SCORE: 0
SKIP TO QUESTIONS 9-10: 1

## 2023-11-10 NOTE — Clinical Note
I am the [facility] Coordinator for Krystian. I was out to her home on the 10th. No current services in place as she has informal supports in place. Her daughter shared, hat she does take her mycophenolate at 1000mg BID. Daughter shared that she prefers the 250mg tabs at 4 tabs BID stating that the 250mg tab is smaller and easier to swallow. She shared that she ran out of the 250mg tabs and has been currently using the 500mg tabs (so taking 2 500 mg tabs BID). Daughter shared that she is working with member's doctor's office to get the correct quantity of the 250 mg tabs so she can use these tabs for easier swallowing. Thank you for your review. Vickie

## 2023-11-17 NOTE — PROGRESS NOTES
[facility] Care Coordination Contact    [facility] Home Visit Assessment     Home visit for Health Risk Assessment with Krystian Boland completed on November 10, 2023.   Member only requested her daughter do the interpreting stating that the [facility] community is very small and has limited interpreters and prefers only family to interpret for privacy.     Type of residence:: Private home - stairs (3 steps into the home. Full flight of steps into basement. Bedroom and bathroom are on main level.)  Current living arrangement:: I live in a private home with family (Lives with spouse in daughter's home with daughter's spouse and children.)     Assessment completed with:: Patient, Family    Current Care Plan  Member currently receiving the following home care services:  None   Member currently receiving the following community resources: Receives incontinent products.    Health Issues: Reports that her health is fair. She shared that she needs to use O2 at 2-4L when walking outside of the home. She shared that she does know when her O2 drops as she feels short of breath. Daughter shared that she will also provide cues if she notices that member requires oxygen. Shared that she will get more short of breath with coughing. She shared that her pulmonologist has made medication changes and her coughing and breathing has improved since last assessment. Shared that she has chest pain only when she bouts of coughing. Shared that she manages her diabetes with diet control and does not check her blood sugars at home. Shared that she does take her mycophenolate at 1000mg BID. She shared that she prefers the 250mg tabs at 4 tabs BID stating that the 250mg tab is smaller and easier to swallow. She shared that she ran out of the 250mg tabs and has been currently using the 500mg tabs (so taking 2 tabs BID). Daughter shared that she is working with member's doctor's office to get the correct quantity of the 250 mg tabs  so she can use these tabs for easier swallowing. Reports no pain. No hospitalizations or ED visits since this 's last assessment.     Medication Review  Medication reconciliation completed in Epic: Yes  Medication set-up & administration: Family/informal caregiver sets up weekly.  Self-administers medications.  Medication Risk Assessment Medication (1 or more, place referral to MTM): N/A: No risk factors identified  MTM Referral Placed: No: has been seen by MTM.    Mental/Behavioral Health   Depression Screening:   PHQ-9 Total Score: 1  Mental health DX:: No        Falls Assessment:   Fallen 2 or more times in the past year?: No   Any fall with injury in the past year?: No    ADL/IADL Dependencies:   Dependent ADLs:: Independent  Dependent IADLs:: Shopping, Cleaning, Medication Management, Money Management, Transportation, Meal Preparation, Laundry, Cooking    [facility] sponsored benefits: Shared information re: Silver Sneakers/gym memberships, ASA, Calcium +D.    PCA Assessment completed at visit:  [employer] Assessment completed, and indicated no PCA hours.      Elderly Waiver Eligibility: Meets NF LOC for elderly waiver, all assessed needs are met by informal supports. Due to informal supports meeting needs no waiver services recommended.     Care Plan & Recommendations: Member wants to continue to live with her  and family. Member wants to continue to help out in the home as much as possible, but is grateful that her family is able to assist her daily as needed. Member hopes that her health will be stable enough in the coming year to be able to travel back to [facility] for a visit. No formal services are recommended at this time, as member has informal supports. Talked with member's daughter reaching out to this Care Coordinator in the event member is in need of more supportive services in the home. Member shared that she has a large stock pile of incontinent products and asked that her product  be placed on hold and she will reach out when she requires more.    See [facility] for detailed assessment information.    Follow-Up Plan: Member informed of future contact, plan to f/u with member with a 6 month telephone assessment.  Contact information shared with member and family, encouraged member to call with any questions or concerns at any time.    [facility] care continuum providers: Please see Snapshot and Care Management Flowsheets for Specific details of care plan.    Emailed [facility] requesting to place her incontinent products on hold.    This CC note routed to PCP, Adrianna Singh LSW  [facility]  [phone]

## 2023-11-21 ENCOUNTER — PATIENT OUTREACH (OUTPATIENT)
Dept: GERIATRIC MEDICINE | Facility: CLINIC | Age: 73
End: 2023-11-21
Payer: COMMERCIAL

## 2023-11-21 DIAGNOSIS — R76.8 RHEUMATOID FACTOR POSITIVE WITH CYCLIC CITRULLINATED PEPTIDE (CCP) ANTIBODY NEGATIVE: ICD-10-CM

## 2023-11-21 NOTE — PROGRESS NOTES
[facility] Care Coordination Contact    Received after visit chart from care coordinator.  Completed following tasks: Mailed copy of care plan to client, Mailed Safe Medication Disposal , and Updated services in Database   and Provider Signature - No POC Shared:  Member indicates that they do not want their POC shared with any EW providers.    Mailed page 3&4 DHS signature page to member    Sophie Amador  Care Management Specialist  [facility]  [phone]

## 2023-11-21 NOTE — LETTER
November 21, 2023      KRYSTIAN BARNES  [id number]  [facility] [address]      Dear Krystian:    At [facility], we re dedicated to improving your health and wellness. Enclosed is the Care Plan developed with you on 11/10/23. Please review the Care Plan carefully.    As a reminder, during your visit we talked about:  Ways to manage your physical and mental health  Using health care to maintain and improve your health   Your preventive care needs     Remember to contact your care coordinator if you:  Are hospitalized, or plan to be hospitalized   Have a fall    Have a change in your physical or mental health  Need help finding support or services    If you have questions, or don t agree with your Care Plan, call me at [phone]. You can also call me if your needs change. TTY users, call the [place] [employer] at (904) or 1-828.810.7278 (lcwlyu-kx-ouoltw relay service).    Sincerely,    JAQUELIN Langley    E-mail: [email or web address]  Phone: [phone]    Care Manager  [facility] ([facility]) is a health plan that contracts with both Medicare and the [place] Medical Assistance (Medicaid) program to provide benefits of both programs to enrollees. Enrollment in [facility] depends on contract renewal.    [id number] accepted    [id number] (07/2022)

## 2023-11-27 RX ORDER — SULFASALAZINE 500 MG/1
TABLET, DELAYED RELEASE ORAL
Qty: 360 TABLET | Refills: 1 | Status: SHIPPED | OUTPATIENT
Start: 2023-11-27 | End: 2023-12-23

## 2023-11-27 NOTE — TELEPHONE ENCOUNTER
sulfaSALAzine ER (AZULFIDINE EN) 500 MG EC tablet 120 tablet 6 8/10/2023     Last Office Visit: 8/10/23  Future Office visit:   3/7/24    CBC RESULTS:   Recent Labs   Lab Test 11/01/23  0640   WBC 5.2   RBC 4.15   HGB 11.9   HCT 36.6   MCV 88   MCH 28.7   MCHC 32.5   RDW 14.0          Creatinine   Date Value Ref Range Status   11/01/2023 0.72 0.51 - 0.95 mg/dL Final   06/16/2021 0.69 0.52 - 1.04 mg/dL Final   ]    Liver Function Studies -   Recent Labs   Lab Test 11/01/23  0640   PROTTOTAL 7.6   ALBUMIN 4.0   BILITOTAL 0.4   ALKPHOS 93   AST 25   ALT 12       Routing refill request to provider for review/approval because:  Provider must authorize    Macie Mitchell RN

## 2023-11-29 DIAGNOSIS — E55.9 VITAMIN D DEFICIENCY: ICD-10-CM

## 2023-11-29 RX ORDER — CHOLECALCIFEROL (VITAMIN D3) 50 MCG
TABLET ORAL
Qty: 90 TABLET | Refills: 0 | Status: SHIPPED | OUTPATIENT
Start: 2023-11-29 | End: 2024-02-20

## 2023-12-10 PROBLEM — M54.16 LUMBAR BACK PAIN WITH RADICULOPATHY AFFECTING LEFT LOWER EXTREMITY: Status: RESOLVED | Noted: 2023-10-09 | Resolved: 2023-12-10

## 2023-12-10 PROBLEM — M54.16 LUMBAR BACK PAIN WITH RADICULOPATHY AFFECTING RIGHT LOWER EXTREMITY: Status: RESOLVED | Noted: 2023-10-09 | Resolved: 2023-12-10

## 2023-12-23 ENCOUNTER — MYC REFILL (OUTPATIENT)
Dept: RHEUMATOLOGY | Facility: CLINIC | Age: 73
End: 2023-12-23
Payer: COMMERCIAL

## 2023-12-23 ENCOUNTER — MYC REFILL (OUTPATIENT)
Dept: INTERNAL MEDICINE | Facility: CLINIC | Age: 73
End: 2023-12-23
Payer: COMMERCIAL

## 2023-12-23 DIAGNOSIS — E55.9 VITAMIN D DEFICIENCY: ICD-10-CM

## 2023-12-23 DIAGNOSIS — I10 BENIGN ESSENTIAL HYPERTENSION: ICD-10-CM

## 2023-12-23 DIAGNOSIS — R76.8 RHEUMATOID FACTOR POSITIVE WITH CYCLIC CITRULLINATED PEPTIDE (CCP) ANTIBODY NEGATIVE: ICD-10-CM

## 2023-12-26 RX ORDER — CHOLECALCIFEROL (VITAMIN D3) 50 MCG
TABLET ORAL
Qty: 90 TABLET | Refills: 0 | OUTPATIENT
Start: 2023-12-26

## 2023-12-26 RX ORDER — AMLODIPINE BESYLATE 5 MG/1
5 TABLET ORAL DAILY
Qty: 90 TABLET | Refills: 0 | Status: SHIPPED | OUTPATIENT
Start: 2023-12-26 | End: 2024-03-20

## 2023-12-27 RX ORDER — SULFASALAZINE 500 MG/1
TABLET, DELAYED RELEASE ORAL
Qty: 360 TABLET | Refills: 2 | Status: SHIPPED | OUTPATIENT
Start: 2023-12-27 | End: 2024-03-07

## 2024-01-12 DIAGNOSIS — J84.9 ILD (INTERSTITIAL LUNG DISEASE) (H): ICD-10-CM

## 2024-01-17 NOTE — TELEPHONE ENCOUNTER
mycophenolate (GENERIC EQUIVALENT) 250 MG capsule   Last Written Prescription Date:  7/7/2023  Last Fill Quantity: 380,   # refills: 3  Last Office Visit: 8/10/2023  Future Office visit:  3/7/2024    CBC RESULTS:   Recent Labs   Lab Test 11/01/23  0640   WBC 5.2   RBC 4.15   HGB 11.9   HCT 36.6   MCV 88   MCH 28.7   MCHC 32.5   RDW 14.0          Creatinine   Date Value Ref Range Status   11/01/2023 0.72 0.51 - 0.95 mg/dL Final   06/16/2021 0.69 0.52 - 1.04 mg/dL Final   ]    Liver Function Studies -   Recent Labs   Lab Test 11/01/23  0640   PROTTOTAL 7.6   ALBUMIN 4.0   BILITOTAL 0.4   ALKPHOS 93   AST 25   ALT 12       Routing refill request to provider for review/approval because:  Orders do not match.   Order from pharmacy is different.   Is Pt tasking 4 Capsules twice a day?  OR Taking 2 Caps twice a day?  Please review and send new order.     Miriam Daniels RN  Central Triage Red Flags/Med Refills

## 2024-01-17 NOTE — TELEPHONE ENCOUNTER
Please help because I do not want to risk confusion for the patient by changing the size of mmf table that she is currently taking.  is patient currently taking 250 mg tabs? Or 500 mg tabs?    The correct MMF dose, per my note and per Pulmonary note, is 1000 mg twice daily. So the number of tabs prescribed will depend upon the tablet size.   Thank you.

## 2024-01-18 RX ORDER — MYCOPHENOLATE MOFETIL 250 MG/1
1000 CAPSULE ORAL
Qty: 380 CAPSULE | OUTPATIENT
Start: 2024-01-18

## 2024-01-18 NOTE — TELEPHONE ENCOUNTER
I called and spoke with the daughter.  Patient is taking 1000mg BID, using 250mg tablets.  Patient was given a script thru pulmonology in 7/23 with 3 refills so she does not need any now.  I refused the RX.     Clarissa Soler RN

## 2024-02-19 DIAGNOSIS — E55.9 VITAMIN D DEFICIENCY: ICD-10-CM

## 2024-02-20 RX ORDER — CHOLECALCIFEROL (VITAMIN D3) 50 MCG
TABLET ORAL
Qty: 90 TABLET | Refills: 0 | Status: SHIPPED | OUTPATIENT
Start: 2024-02-20 | End: 2024-05-20

## 2024-02-23 ENCOUNTER — MYC REFILL (OUTPATIENT)
Dept: PULMONOLOGY | Facility: CLINIC | Age: 74
End: 2024-02-23
Payer: COMMERCIAL

## 2024-02-23 DIAGNOSIS — J84.9 ILD (INTERSTITIAL LUNG DISEASE) (H): ICD-10-CM

## 2024-02-23 DIAGNOSIS — R05.9 COUGH: ICD-10-CM

## 2024-02-23 RX ORDER — BENZONATATE 200 MG/1
200 CAPSULE ORAL 3 TIMES DAILY PRN
Qty: 90 CAPSULE | Refills: 0 | Status: SHIPPED | OUTPATIENT
Start: 2024-02-23 | End: 2024-04-04

## 2024-03-04 ENCOUNTER — TRANSFERRED RECORDS (OUTPATIENT)
Dept: HEALTH INFORMATION MANAGEMENT | Facility: CLINIC | Age: 74
End: 2024-03-04
Payer: COMMERCIAL

## 2024-03-04 LAB — RETINOPATHY: POSITIVE

## 2024-03-05 DIAGNOSIS — B18.1 CHRONIC HEPATITIS B (H): Primary | ICD-10-CM

## 2024-03-06 NOTE — PROGRESS NOTES
[facility]  Rheumatology Clinic  Harjit Staley MD  2024     Name: Krystian Boland  MRN: [id number]  Age: 73 year old  : 1950  Referring provider: Referred Self     Assessment and Plan:  # RA with positive rheumatoid factor and anti-CCP noted in 2018:  Patient reports only R 3rd finger DIP pain, and stable brief morning stiffness.  Control of inflammatory joint pain has continued despite discontinuation of prednisone. Exam shows no gross synovitis.    Data: In 2023, creatinine, albumin, transaminases, CBC were normal.    Imaging: Moderate to severe pulmonary fibrosis and progressive adenopathy in the chest noted on chest CT done 2022.     Overall, I think that inflammatory arthropathy remains well controlled on steroid sparing combination therapy.  For arthropathy, I recommend continuing hydroxychloroquine and sulfasalazine combination for disease modifying effect.    We discussed the need for annual ophthalmologic monitoring while on plaquenil.  Dedicated retinal exam occurred in 2023.    # Interstitial lung disease:    Patient relates continued chronic dry nonproductive cough and easy dyspnea on exertion, but her ability to perform activities of daily living is stable. I think that overall disease activity of ILD associated with rheumatoid arthritis is suppressed.  I think there is no urgency in advancing immunomodulatory therapy at present.  Mycophenolate may continue at current doses.  Rituximab remains an alternative treatment to mycophenolate should imaging or pulmonary function testing show continued decline at pulmonary follow-up scheduled for October.    # Chronic hepatitis B: In 2023, hepatitis B DNA was present at 52,800 units.    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  Symptomatic treatment will be continued with heat, acetaminophen, and  regular exercise for the hands.    Plan:  1.  Continue hydroxychloroquine 400 mg once daily; while using hydroxychloroquine, undergo annual screening examination for rare retinal Plaquenil toxicity, appointment due in July 2024  2.  Continue sulfasalazine 1000 mg (2 tablets) twice daily.  3.  Recheck blood work today and in 4 months.  5.  Continue Ofev and other pulmonary-recommended measures for interstitial lung disease.  6.  Continue CellCept 1000 mg twice daily.     F/u 6-7 mos      Harjit Staley M.D.  Staff Rheumatologist,  Health  Pager [phone]    Orders Placed This Encounter   Procedures    CBC with platelets    AST    ALT    Albumin level    Creatinine    CRP inflammation     On the day of the encounter, a total of 31 minutes was spent in chart review, and in counseling and coordination of care, regarding the patient's complex medical problem of rheumatoid arthritis, interstitial lung disease, osteoarthritis.    The longitudinal plan of care for the diagnosis(es)/condition(s) as documented were addressed during this visit. Due to the added complexity in care, I will continue to support Krystian in the subsequent management and with ongoing continuity of care.      HPI:   Krystian Boland has a history of ILD and RA and presents for follow-up. I last saw the patient in 8-2023 at which time rheumatoid arthritis was overall well controlled. I recommended continuing combination sulfasalazine and hydroxychloroquine.  Rheumatoid arthritis associated interstitial lung disease was discussed, and continued treatment with Doxy chloroquine, sulfasalazine, Ofev and mycophenolate was recommended.    Interval history March 7, 2024      Daughter serves as   Patient saw Dr. Keller in pulmonology on November 1, 2023.  Impression was of rheumatoid arthritis associated ILD, fibrosis predominant lung disease.  Recommendation was to continue immunomodulation with CellCept 1000 twice daily, and antifibrotic  therapy with Ofev 100 mg daily.    Today,     Early morning stiffness is < 30 minutes  R 3rd finger swells intermittmently.  No disruption of ADLs due to arthritis.  Still + cough and easy caba, but no worse. Cough non productive  Walks a few blocks o\n flat ground.  CLBP unchanged, does not waken her.    Continues SULFASALAZINE and hydroxychloroquine unchanged. MMF continues 1000 mg twice daily.  No change in understanding regarding chronic HBV infection- that GI has not recommended Rx for HBV;  ID has said that Rx of latent disease should happen before rituxan, if that medication is needed.      Interval history August 9, 2023    Daughter serves as   Patient was seen by Dr. Sawyer in ophthalmology on July 19, 2023.  Impression was of long-term use of hydroxychloroquine.  No definite evidence of macular toxicity was noted, and continued use of hydroxychloroquine was recommended.    Patient saw Dr. Keller and pulmonary and critical care medicine in April 2023.  Impression was of interstitial lung disease associated with rheumatoid arthritis.  Declining pulmonary function tests were noted, and a plan to start rituximab was discussed.  However, hepatitis B with significant viremia was noted.  Recommendation was for follow-up with ID and with GI regarding TB and hepatitis B respectively.  Meanwhile continue CellCept and Ofev.    Patient was seen by infectious disease on May 15, 2023, impression was of positive QuantiFERON gold test, most likely representing latent TB.  Recommendation was to consult MTM in preparation for treatment of latent TB with isoniazid.    Today,     Early morning stiffness is < 30 minutes  R 3rd finger swells intermittmently.  No disruption of ADLs due to arthritis.  Continues SULFASALAZINE and hydroxychloroquine unchanged. MMF continues twice daily.  Daughter reports that GI has not recommended Rx for HBV.; ID has said that Rx of latent disease should happen before rituxan.  Still  + cough and easy caba, but no worse. Cough non productive  Pulm rehab has helped with function in recent months.  CLBP unchanged, worse at night.      Interval history January 19, 2023    Daughter acted as     Patient saw Dr. Keller in pulmonology on October 26, 2022.  Impression was of rheumatoid arthritis associated interstitial lung disease, with progressive exertional dyspnea symptoms that had been prednisone sensitive in July 2022.  Recommendation was to add CellCept 500 mg twice daily and advance to 1000 mg twice daily, in addition to sulfasalazine and hydroxychloroquine.    Patient was admitted to [facility] from November 28 through December 2.  Discharge diagnosis included respiratory failure, possible flare of interstitial lung disease versus pneumonia, and mediastinal lymphadenopathy.  Patient had recently started on CellCept and then noted increasing cough.  CT scan showed moderately severe pulmonary fibrosis.  Patient was treated with Zosyn and azithromycin, and leukocytosis improved.  Patient was given intravenous Solu-Medrol, switched to prednisone with discharge on 40 mg of prednisone daily tapering to 10 mg daily over 3 weeks. dyspnea and cough improved.  Prior to admission sulfasalazine, CellCept, hydroxychloroquine and Ofev were continued.    Today, she reports that breathing and cough are improved. She finished prednisone several weeks ago. Exercise tolerance is improved.  There is no joint pain now. Before hospitalization she had swelling and pain in her R wrist and R hand fingers. Early morning stiffness is variable.   Her daughter says that prosper had tried to ramp up cellcept prior to hospitalization; after hospitalization, she has increased to 1000 mg twice day. She notes mild nausea, otherwise well-tolerated.     Interval history May 26, 2022  Daughter acted as  throughout this session.  Through daughter , patient relates increasing pain in several  joint areas over the past several weeks.  First, she notes right shoulder pain, worse with arm raising, and at night.  Pain is so severe that she is kept awake at night, and is unable to dress fully independently.  No antecedent injury.  No visible swelling redness warmth or fever.  Patient also notes worsening right base of thumb pain, made worse with grasping and lifting activities.  She reports swelling, warmth, and stiffness at the right knee, alleviated with movement.  Early morning stiffness overall has increased in recent weeks to 25 to 30 minutes.    She is taking Ofev, sulfasalazine (2 g daily), and hydroxychloroquine as recommended.  No specific side effects or adverse effects noted.    Patient saw Dr. Sawyer in ophthalmology in July 2021.  Impression was of long-term use of Plaquenil, no evidence of toxicity.    Interval history 1-    She has intermittent R > L thumb and finger pain, worse in the mornings. Knee pain resolved, and other joints beside R hand, doing well.  Massage and use of the joints, as well as thylenol, help.   Morning pain in the joints lasts about 2 hours, non-progressive.   ADLs are sometimes affected early in the morning.     Energy level is overall improved, and shortness of breath is stable.    Taking ofev, sulfasalazine and hydroxychloroquine as directed. No particular adverse effects noted      Review of Systems:   Pertinent items are noted in HPI or as below, remainder of complete ROS is negative.     No recent problems with hearing or vision. No swallowing problems.   No breathing difficulty, shortness of breath, or wheezing. + Continues to have dry cough.  No chest pain or palpitations.  No heart burn, indigestion, abdominal pain, vomiting, diarrhea. + Nausea with Ofev.  No urination problems, no bloody, cloudy urine, no dysuria.  No numbing, tingling, weakness.  No headaches or confusion.  No rashes. No easy bleeding or bruising.   + Arthralgias, stiffness, swelling  of multiple joints.  + Fatigue.    Active Medications:   Current Outpatient Medications   Medication Sig    amLODIPine (NORVASC) 5 MG tablet Take 1 tablet (5 mg) by mouth daily    aspirin 81 MG chewable tablet Take 1 tablet (81 mg) by mouth daily    benzonatate (TESSALON) 200 MG capsule Take 1 capsule (200 mg) by mouth 3 times daily as needed for cough    famotidine (PEPCID) 40 MG tablet Take 1 tablet (40 mg) by mouth every evening    guaiFENesin (ROBITUSSIN) 20 mg/mL liquid Take 10 mLs by mouth every 4 hours as needed for cough    hydroxychloroquine (PLAQUENIL) 200 MG tablet Take 1 tablet (200 mg) by mouth 2 times daily Annual Plaquenil toxicity eye screening required.    hydrOXYzine (ATARAX) 10 MG tablet Take 1 tablet (10 mg) by mouth 3 times daily as needed for anxiety    ipratropium - albuterol 0.5 mg/2.5 mg/3 mL (DUONEB) 0.5-2.5 (3) MG/3ML neb solution Take 1 vial (3 mLs) by nebulization every 4 hours as needed for wheezing    losartan (COZAAR) 25 MG tablet Take 1 tablet (25 mg) by mouth daily (Patient not taking: Reported on 11/1/2023)    melatonin 3 MG tablet Take 1 tablet (3 mg) by mouth nightly as needed for sleep    mycophenolate (GENERIC EQUIVALENT) 250 MG capsule TAKE FOUR CAPSULES BY MOUTH TWICE A DAY    mycophenolate (GENERIC EQUIVALENT) 500 MG tablet Take 2 tablets (1,000 mg) by mouth 2 times daily Labs every 8-12 weeks.    OFEV 100 MG capsule TAKE ONE CAPSULE BY MOUTH TWICE A DAY    STATIN NOT PRESCRIBED (INTENTIONAL) Please choose reason not prescribed, below    sulfaSALAzine ER (AZULFIDINE EN) 500 MG EC tablet take 2 tabs twice a day.    Vitamin D3 50 mcg (2000 units) tablet TAKE 1 TABLET BY MOUTH DAILY     No current facility-administered medications for this visit.          Allergies:   Atorvastatin      Past Medical History:  Obesity  Osteopenia  Type 2 diabetes mellitus without complication, without long-term current use of insulin  Interstitial lung disease  Abdominal tuberculosis (intestinal,  20 years ago)     Past Surgical History:  Open cholecystectomy    Family History:   Mother - dementia  Father - cerebrovascular disease  Brother - cerebrovascular disease, hypertension  No family history of - diabetes, myocardial infarction, early onset C.A.D., breast cancer, ovarian cancer, colon cancer, rheumatoid arthritis  Family history of - high blood pressure  (mentions that medical history is unlikely to be comprehensive because of poor/infrequent medical records)      Social History:   No smoking or tobacco use  Rare alcohol use (1-2 times a year)   with 8 children, 13 grandchildren, cooks for the family     Physical Exam:   Blood pressure 138/79, pulse 99, resp. rate 18, weight 69.4 kg (153 lb), SpO2 92%, not currently breastfeeding.  Wt Readings from Last 4 Encounters:   03/07/24 69.4 kg (153 lb)   11/01/23 70.3 kg (155 lb)   08/10/23 71.2 kg (157 lb)   05/15/23 70.8 kg (156 lb)     Constitutional: Well-developed, appearing stated age; cooperative  Eyes: Normal EOM, PERRLA, vision, conjunctiva, sclera  ENT: Normal external ears, nose, hearing, lips, teeth, gums, throat.  Neck: No mass or thyroid enlargement  Resp: Breathing unlabored  MS:  Left thumb exhibits crepitus but no tenderness at the first CMC and at several MCPs and PIPs. Fist formation slightly reduced.  Right knee effusion has resolved.  Clubbing in finger nails, prominent in right third fingernail.  No erythema or tenderness over the indicated area of discomfort at the right third finger nailbed.  Skin: No nail pitting, alopecia, rash, nodules or lesions.  Neuro: Normal cranial nerves    Laboratory:       Latest Ref Rng & Units 4/18/2023     9:20 AM 7/26/2023    10:19 AM 11/1/2023     6:40 AM   RHEUM RESULTS   Albumin 3.5 - 5.2 g/dL 4.1     4.1  4.2  4.0    ALT 0 - 50 U/L 11     11  11  12    AST 0 - 45 U/L 22     22  34  25    Creatinine 0.51 - 0.95 mg/dL 0.71     0.71  0.74  0.72    GFR Estimate >60 mL/min/1.73m2 >90     >90  86  89     Hematocrit 35.0 - 47.0 % 35.1  34.3  36.6    Hemoglobin 11.7 - 15.7 g/dL 11.5  11.3  11.9    Hep B Surface Agn Nonreactive Reactive      WBC 4.0 - 11.0 10e3/uL 7.2  5.6  5.2    RBC Count 3.80 - 5.20 10e6/uL 3.98  3.82  4.15    RDW 10.0 - 15.0 % 13.9  12.4  14.0    MCHC 31.5 - 36.5 g/dL 32.8  32.9  32.5    MCV 78 - 100 fL 88  90  88    Platelet Count 150 - 450 10e3/uL 296  295  225    Quantiferon-TB Gold Plus Result Negative Positive          Rheumatoid Factor   Date Value Ref Range Status   05/23/2018 700 (H) <20 IU/mL Final     Cyclic Citrullinated Peptide Antibody, IgG   Date Value Ref Range Status   05/23/2018 >340 (H) <7 U/mL Final     Comment:     Positive     Scleroderma Antibody Scl-70 CLAUDIO IgG   Date Value Ref Range Status   05/23/2018 <0.2 0.0 - 0.9 AI Final     Comment:     Negative  Antibody index (AI) values reflect qualitative changes in antibody   concentration that cannot be directly associated with clinical condition or   disease state.       SSA (Ro) (CLAUDIO) Antibody, IgG   Date Value Ref Range Status   05/23/2018 <0.2 0.0 - 0.9 AI Final     Comment:     Negative  Antibody index (AI) values reflect qualitative changes in antibody   concentration that cannot be directly associated with clinical condition or   disease state.       SSB (La) (CLAUDIO) Antibody, IgG   Date Value Ref Range Status   05/23/2018 <0.2 0.0 - 0.9 AI Final     Comment:     Negative  Antibody index (AI) values reflect qualitative changes in antibody   concentration that cannot be directly associated with clinical condition or   disease state.       LYNDA interpretation   Date Value Ref Range Status   05/23/2018 Negative NEG^Negative Final     Comment:                                        Reference range:  <1:40  NEGATIVE  1:40 - 1:80  BORDERLINE POSITIVE  >1:80 POSITIVE         Neutrophil Cytoplasmic IgG Antibody   Date Value Ref Range Status   05/23/2018 <1:20 <1:20 Final     Comment:     (Note)  The ANCA IFA is <1:20; therefore, no  further testing will   be performed.  INTERPRETIVE INFORMATION: Anti-Neutrophil Cyto Ab, IgG  Neutrophil Cytoplasmic Antibodies (C-ANCA = granular   cytoplasmic staining, P-ANCA = perinuclear staining) are   found in the serum of over 90 percent of patients with   certain necrotizing systemic vasculitides, and usually in   less than 5 percent of patients with collagen vascular   disease or arthritis.  Performed by [facility],  [address] [place] [id number] [phone]  [email or web address], Mraty Wright MD, Lab. Director         IGG   Date Value Ref Range Status   05/23/2018 1,980 (H) 695 - 1,620 mg/dL Final     Scleroderma Antibody Scl-70 CLAUDIO IgG   Date Value Ref Range Status   05/23/2018 <0.2 0.0 - 0.9 AI Final     Comment:     Negative  Antibody index (AI) values reflect qualitative changes in antibody   concentration that cannot be directly associated with clinical condition or   disease state.

## 2024-03-07 ENCOUNTER — OFFICE VISIT (OUTPATIENT)
Dept: RHEUMATOLOGY | Facility: CLINIC | Age: 74
End: 2024-03-07
Payer: COMMERCIAL

## 2024-03-07 ENCOUNTER — LAB (OUTPATIENT)
Dept: LAB | Facility: CLINIC | Age: 74
End: 2024-03-07
Payer: COMMERCIAL

## 2024-03-07 VITALS
WEIGHT: 153 LBS | HEART RATE: 99 BPM | DIASTOLIC BLOOD PRESSURE: 79 MMHG | SYSTOLIC BLOOD PRESSURE: 138 MMHG | OXYGEN SATURATION: 92 % | RESPIRATION RATE: 18 BRPM | BODY MASS INDEX: 27.98 KG/M2

## 2024-03-07 DIAGNOSIS — Z79.899 HIGH RISK MEDICATION USE: ICD-10-CM

## 2024-03-07 DIAGNOSIS — Z79.899 HIGH RISK MEDICATION USE: Primary | ICD-10-CM

## 2024-03-07 DIAGNOSIS — R76.8 RHEUMATOID FACTOR POSITIVE WITH CYCLIC CITRULLINATED PEPTIDE (CCP) ANTIBODY NEGATIVE: ICD-10-CM

## 2024-03-07 DIAGNOSIS — B18.1 CHRONIC HEPATITIS B (H): ICD-10-CM

## 2024-03-07 LAB
ERYTHROCYTE [DISTWIDTH] IN BLOOD BY AUTOMATED COUNT: 12.7 % (ref 10–15)
HCT VFR BLD AUTO: 37.7 % (ref 35–47)
HGB BLD-MCNC: 12.4 G/DL (ref 11.7–15.7)
INR PPP: 1.16 (ref 0.85–1.15)
MCH RBC QN AUTO: 30 PG (ref 26.5–33)
MCHC RBC AUTO-ENTMCNC: 32.9 G/DL (ref 31.5–36.5)
MCV RBC AUTO: 91 FL (ref 78–100)
PLATELET # BLD AUTO: 254 10E3/UL (ref 150–450)
RBC # BLD AUTO: 4.14 10E6/UL (ref 3.8–5.2)
WBC # BLD AUTO: 5.8 10E3/UL (ref 4–11)

## 2024-03-07 PROCEDURE — 85027 COMPLETE CBC AUTOMATED: CPT

## 2024-03-07 PROCEDURE — 87517 HEPATITIS B DNA QUANT: CPT

## 2024-03-07 PROCEDURE — 82105 ALPHA-FETOPROTEIN SERUM: CPT

## 2024-03-07 PROCEDURE — 85610 PROTHROMBIN TIME: CPT

## 2024-03-07 PROCEDURE — 86140 C-REACTIVE PROTEIN: CPT

## 2024-03-07 PROCEDURE — 99214 OFFICE O/P EST MOD 30 MIN: CPT | Performed by: INTERNAL MEDICINE

## 2024-03-07 PROCEDURE — 36415 COLL VENOUS BLD VENIPUNCTURE: CPT

## 2024-03-07 PROCEDURE — 80053 COMPREHEN METABOLIC PANEL: CPT

## 2024-03-07 PROCEDURE — 82248 BILIRUBIN DIRECT: CPT

## 2024-03-07 RX ORDER — HYDROXYCHLOROQUINE SULFATE 200 MG/1
200 TABLET, FILM COATED ORAL 2 TIMES DAILY
Qty: 180 TABLET | Refills: 3 | Status: SHIPPED | OUTPATIENT
Start: 2024-03-07

## 2024-03-07 RX ORDER — SULFASALAZINE 500 MG/1
TABLET, DELAYED RELEASE ORAL
Qty: 360 TABLET | Refills: 3 | Status: SHIPPED | OUTPATIENT
Start: 2024-03-07

## 2024-03-07 ASSESSMENT — PAIN SCALES - GENERAL: PAINLEVEL: NO PAIN (0)

## 2024-03-07 NOTE — PATIENT INSTRUCTIONS
Diagnosis:  1.  Seropositive rheumatoid arthritis: Shoulder, thumb, and knee pain remain improved with current medications hydroxychloroquine and sulfasalazine.  2.  Low back pain: occasional, stable.  3. Interstitial lung disease: Breathing is stable. I agree with Dr. Keller's recommendation to continue CellCept for rheumatoid arthritis associated lung disease.  Plan Follow-up imaging or pulmonary function testing to keep tabs, rituximab remains an alternative/additional medication if that happens.     Plan:  1.  Continue hydroxychloroquine 400 mg once daily; while using hydroxychloroquine, undergo annual screening examination for rare retinal Plaquenil toxicity, appointment due in July 2024  2.  Continue sulfasalazine 1000 mg (2 tablets) twice daily.  3.  Recheck blood work today and in 4 months;  5.  Continue Ofev and other pulmonary-recommended measures for interstitial lung disease.  6.  Continue CellCept 1000 mg twice daily.

## 2024-03-07 NOTE — NURSING NOTE
Chief Complaint   Patient presents with    RECHECK     Low back pain at multiple sites +1 more       Vitals:    03/07/24 1053 03/07/24 1057   BP: (!) 144/77 138/79   BP Location: Left arm    Patient Position: Sitting    Cuff Size: Adult Regular    Pulse: 99    Resp: 18    SpO2: 92%    Weight: 69.4 kg (153 lb)        Body mass index is 27.98 kg/m .

## 2024-03-08 DIAGNOSIS — Z79.899 LONG-TERM USE OF PLAQUENIL: Primary | ICD-10-CM

## 2024-03-08 LAB
AFP SERPL-MCNC: 2.6 NG/ML
ALBUMIN SERPL BCG-MCNC: 4.2 G/DL (ref 3.5–5.2)
ALP SERPL-CCNC: 84 U/L (ref 40–150)
ALT SERPL W P-5'-P-CCNC: 15 U/L (ref 0–50)
ANION GAP SERPL CALCULATED.3IONS-SCNC: 7 MMOL/L (ref 7–15)
AST SERPL W P-5'-P-CCNC: 19 U/L (ref 0–45)
BILIRUB DIRECT SERPL-MCNC: <0.2 MG/DL (ref 0–0.3)
BILIRUB SERPL-MCNC: 0.3 MG/DL
BUN SERPL-MCNC: 18.1 MG/DL (ref 8–23)
CALCIUM SERPL-MCNC: 9.5 MG/DL (ref 8.8–10.2)
CHLORIDE SERPL-SCNC: 99 MMOL/L (ref 98–107)
CREAT SERPL-MCNC: 0.66 MG/DL (ref 0.51–0.95)
CRP SERPL-MCNC: 3.22 MG/L
DEPRECATED HCO3 PLAS-SCNC: 28 MMOL/L (ref 22–29)
EGFRCR SERPLBLD CKD-EPI 2021: >90 ML/MIN/1.73M2
GLUCOSE SERPL-MCNC: 112 MG/DL (ref 70–99)
POTASSIUM SERPL-SCNC: 4.5 MMOL/L (ref 3.4–5.3)
PROT SERPL-MCNC: 7.8 G/DL (ref 6.4–8.3)
SODIUM SERPL-SCNC: 134 MMOL/L (ref 135–145)

## 2024-03-09 LAB
HBV DNA SERPL NAA+PROBE-ACNC: ABNORMAL IU/ML
HBV DNA SERPL NAA+PROBE-LOG IU: 7.7 {LOG_IU}/ML

## 2024-03-11 ENCOUNTER — MYC REFILL (OUTPATIENT)
Dept: INTERNAL MEDICINE | Facility: CLINIC | Age: 74
End: 2024-03-11
Payer: COMMERCIAL

## 2024-03-11 DIAGNOSIS — R12 HEARTBURN: ICD-10-CM

## 2024-03-12 RX ORDER — FAMOTIDINE 40 MG/1
40 TABLET, FILM COATED ORAL EVERY EVENING
Qty: 90 TABLET | Refills: 3 | Status: SHIPPED | OUTPATIENT
Start: 2024-03-12

## 2024-03-14 ENCOUNTER — TELEPHONE (OUTPATIENT)
Dept: OPHTHALMOLOGY | Facility: CLINIC | Age: 74
End: 2024-03-14
Payer: COMMERCIAL

## 2024-03-20 DIAGNOSIS — I10 BENIGN ESSENTIAL HYPERTENSION: ICD-10-CM

## 2024-03-20 RX ORDER — AMLODIPINE BESYLATE 5 MG/1
5 TABLET ORAL DAILY
Qty: 90 TABLET | Refills: 0 | Status: SHIPPED | OUTPATIENT
Start: 2024-03-20 | End: 2024-07-05

## 2024-03-30 ENCOUNTER — OFFICE VISIT (OUTPATIENT)
Dept: URGENT CARE | Facility: URGENT CARE | Age: 74
End: 2024-03-30
Payer: COMMERCIAL

## 2024-03-30 VITALS
SYSTOLIC BLOOD PRESSURE: 135 MMHG | OXYGEN SATURATION: 96 % | RESPIRATION RATE: 18 BRPM | BODY MASS INDEX: 27.07 KG/M2 | DIASTOLIC BLOOD PRESSURE: 73 MMHG | WEIGHT: 148 LBS | HEART RATE: 108 BPM | TEMPERATURE: 98.3 F

## 2024-03-30 DIAGNOSIS — J84.9 INTERSTITIAL LUNG DISEASE (H): ICD-10-CM

## 2024-03-30 DIAGNOSIS — J06.9 UPPER RESPIRATORY TRACT INFECTION, UNSPECIFIED TYPE: Primary | ICD-10-CM

## 2024-03-30 PROCEDURE — 87635 SARS-COV-2 COVID-19 AMP PRB: CPT | Performed by: FAMILY MEDICINE

## 2024-03-30 PROCEDURE — 99213 OFFICE O/P EST LOW 20 MIN: CPT | Performed by: FAMILY MEDICINE

## 2024-03-30 RX ORDER — BENZONATATE 200 MG/1
200 CAPSULE ORAL 3 TIMES DAILY PRN
Qty: 21 CAPSULE | Refills: 0 | Status: ON HOLD | OUTPATIENT
Start: 2024-03-30 | End: 2024-04-08

## 2024-03-30 NOTE — PROGRESS NOTES
"SUBJECTIVE: Krystian Boland is a 73 year old female presenting with a chief complaint of \"cold symptoms\".  Onset of symptoms was 3 day(s) ago.  Predisposing factors include lung dz.    Past Medical History:   Diagnosis Date    Anxiety     Early onset Alzheimer's dementia without behavioral disturbance (H)     Hx of small bowel obstruction 11/2018    Hypertension     Interstitial lung disease (H) 05/2018    Intestinal TB 1990    Obesity (BMI 30-39.9)     Osteoarthritis     Osteopenia     Seropositive rheumatoid arthritis (H) 01/2023    Type II diabetes mellitus (H)      Allergies   Allergen Reactions    Atorvastatin Muscle Pain (Myalgia)     Social History     Tobacco Use    Smoking status: Never    Smokeless tobacco: Never   Substance Use Topics    Alcohol use: Not Currently       ROS:  SKIN: no rash  GI: no vomiting    OBJECTIVE:  /73 (BP Location: Right arm, Patient Position: Sitting, Cuff Size: Adult Regular)   Pulse 108   Temp 98.3  F (36.8  C) (Oral)   Resp 18   Wt 67.1 kg (148 lb)   LMP  (LMP Unknown)   SpO2 96%   BMI 27.07 kg/m  GENERAL APPEARANCE: healthy, alert and no distress  EYES: EOMI,  PERRL, conjunctiva clear  HENT: ear canals and TM's normal.  Nose and mouth without ulcers, erythema or lesions  RESP: lungs clear to auscultation - no rales, rhonchi or wheezes  SKIN: no suspicious lesions or rashes      ICD-10-CM    1. Upper respiratory tract infection, unspecified type  J06.9 Symptomatic COVID-19 Virus (Coronavirus) by PCR     benzonatate (TESSALON) 200 MG capsule      2. Interstitial lung disease (H)  J84.9 Symptomatic COVID-19 Virus (Coronavirus) by PCR     benzonatate (TESSALON) 200 MG capsule          Fluids/Rest, f/u if worse/not any better    "

## 2024-03-31 LAB — SARS-COV-2 RNA RESP QL NAA+PROBE: NEGATIVE

## 2024-04-01 ENCOUNTER — VIRTUAL VISIT (OUTPATIENT)
Dept: GASTROENTEROLOGY | Facility: CLINIC | Age: 74
End: 2024-04-01
Attending: INTERNAL MEDICINE
Payer: COMMERCIAL

## 2024-04-01 VITALS — WEIGHT: 148 LBS | HEIGHT: 62 IN | BODY MASS INDEX: 27.23 KG/M2

## 2024-04-01 DIAGNOSIS — J84.89 PROGRESSIVE FIBROSING INTERSTITIAL LUNG DISEASE (H): ICD-10-CM

## 2024-04-01 DIAGNOSIS — J84.9 ILD (INTERSTITIAL LUNG DISEASE) (H): ICD-10-CM

## 2024-04-01 DIAGNOSIS — B18.1 CHRONIC HEPATITIS B (H): Primary | ICD-10-CM

## 2024-04-01 PROCEDURE — 99214 OFFICE O/P EST MOD 30 MIN: CPT | Mod: 95 | Performed by: INTERNAL MEDICINE

## 2024-04-01 ASSESSMENT — PAIN SCALES - GENERAL: PAINLEVEL: NO PAIN (0)

## 2024-04-01 NOTE — PROGRESS NOTES
[facility] Hepatology    Follow-up Visit    Follow-up visit for hepatitis B    Subjective:  73 year old female    HBV  - dx Jan 2023  - ?transmission  - eAg (+) , eAb (-)  - no prior liver bx  - no prior antiviral therapy  - last HBV DNA 3/7/2024= 50,200,000    Video visit is conducted with patient's daughter who also acts as Tibetan .    Last visit April 2023.  At that time, patient did not elect to pursue rituximab therefore antiviral therapy was not started.  No new medications, ER visits or hospitalizations since last visit.    Patient is well today.  She has no symptoms related to liver disease.  Her joint and respiratory symptoms are well-controlled on current medication regimen.    Patient denies rash, new joint symptoms, jaundice, lower extremity edema, abdominal distension, lethargy or confusion.    Patient denies melena, hematemesis or hematochezia.    Patient denies fevers, sweats or chills.  She currently has a URI but is slowly improving.    Weight stable.  Appetite is good.    Patient does not drink alcohol.      Medical hx Surgical hx   Past Medical History:   Diagnosis Date     Anxiety      Early onset Alzheimer's dementia without behavioral disturbance (H)      Hx of small bowel obstruction 11/2018     Hypertension      Interstitial lung disease (H) 05/2018     Intestinal TB 1990     Obesity (BMI 30-39.9)      Osteoarthritis      Osteopenia      Seropositive rheumatoid arthritis (H) 01/2023     Type II diabetes mellitus (H)       Past Surgical History:   Procedure Laterality Date     CATARACT EXTRACTION W/ INTRAOCULAR LENS IMPLANT Left 10/25/2021     CATARACT IOL, RT/LT Right 10/11/2021     CHOLECYSTECTOMY, OPEN  1990     HEMICOLECTOMY, RT/LT Right 1990          Medications  Prior to Admission medications    Medication Sig Start Date End Date Taking? Authorizing Provider   amLODIPine (NORVASC) 5 MG tablet TAKE 1 TABLET(5 MG) BY MOUTH DAILY 3/20/24  Yes Adrianna Singh MD    aspirin 81 MG chewable tablet Take 1 tablet (81 mg) by mouth daily 7/27/18  Yes Adrianna Singh MD   benzonatate (TESSALON) 200 MG capsule Take 1 capsule (200 mg) by mouth 3 times daily as needed for cough 3/30/24 4/6/24 Yes Michel Abdias LintonDO   benzonatate (TESSALON) 200 MG capsule Take 1 capsule (200 mg) by mouth 3 times daily as needed for cough 2/23/24  Yes Jase Keller MD   famotidine (PEPCID) 40 MG tablet Take 1 tablet (40 mg) by mouth every evening 3/12/24  Yes Adrianna Singh MD   guaiFENesin (ROBITUSSIN) 20 mg/mL liquid Take 10 mLs by mouth every 4 hours as needed for cough 12/2/22  Yes Donna Escobedo MD   hydroxychloroquine (PLAQUENIL) 200 MG tablet Take 1 tablet (200 mg) by mouth 2 times daily Annual Plaquenil toxicity eye screening required. 3/7/24  Yes Harjit Staley MD   hydrOXYzine (ATARAX) 10 MG tablet Take 1 tablet (10 mg) by mouth 3 times daily as needed for anxiety 12/12/22  Yes Adrianna Singh MD   melatonin 3 MG tablet Take 1 tablet (3 mg) by mouth nightly as needed for sleep 12/2/22  Yes Donna Escobedo MD   mycophenolate (GENERIC EQUIVALENT) 250 MG capsule TAKE FOUR CAPSULES BY MOUTH TWICE A DAY 7/7/23  Yes Jase Keller MD   OFEV 100 MG capsule TAKE ONE CAPSULE BY MOUTH TWICE A DAY 5/18/23  Yes Jase Keller MD   STATIN NOT PRESCRIBED (INTENTIONAL) Please choose reason not prescribed, below 8/25/20  Yes Adrianna Singh MD   sulfaSALAzine ER (AZULFIDINE EN) 500 MG EC tablet take 2 tabs twice a day. 3/7/24  Yes Harjit Staley MD   Vitamin D3 50 mcg (2000 units) tablet TAKE 1 TABLET BY MOUTH DAILY 2/20/24  Yes Adrianna Singh MD   ipratropium - albuterol 0.5 mg/2.5 mg/3 mL (DUONEB) 0.5-2.5 (3) MG/3ML neb solution Take 1 vial (3 mLs) by nebulization every 4 hours as needed for wheezing  Patient not taking: Reported on 4/1/2024 12/2/22   Donna Escobedo MD       Allergies  Allergies   Allergen Reactions     Atorvastatin Muscle Pain (Myalgia)       Review of  "systems  A 10-point review of systems was negative    Examination  Ht 1.575 m (5' 2\")   Wt 67.1 kg (148 lb)   LMP  (LMP Unknown)   BMI 27.07 kg/m    Body mass index is 27.07 kg/m .    Gen- well, NAD, A+Ox3, normal color  Eye- EOMI, no scleral icterus  Skin- no rash or jaundice  Psych- normal mood    Laboratory  Lab Results   Component Value Date     03/07/2024     03/17/2021    POTASSIUM 4.5 03/07/2024    POTASSIUM 3.4 12/02/2022    POTASSIUM 4.3 03/17/2021    CHLORIDE 99 03/07/2024    CHLORIDE 94 12/01/2022    CHLORIDE 105 03/17/2021    CO2 28 03/07/2024    CO2 28 12/01/2022    CO2 31 03/17/2021    BUN 18.1 03/07/2024    BUN 10 12/01/2022    BUN 12 03/17/2021    CR 0.66 03/07/2024    CR 0.69 06/16/2021       Lab Results   Component Value Date    BILITOTAL 0.3 03/07/2024    BILITOTAL 0.2 06/16/2021    ALT 15 03/07/2024    ALT 28 06/16/2021    AST 19 03/07/2024    AST 20 06/16/2021    ALKPHOS 84 03/07/2024    ALKPHOS 61 06/16/2021       Lab Results   Component Value Date    ALBUMIN 4.2 03/07/2024    ALBUMIN 2.6 11/30/2022    ALBUMIN 3.7 06/16/2021    PROTTOTAL 7.8 03/07/2024    PROTTOTAL 8.0 06/16/2021        Lab Results   Component Value Date    WBC 5.8 03/07/2024    WBC 7.7 06/16/2021    HGB 12.4 03/07/2024    HGB 11.5 06/16/2021    MCV 91 03/07/2024    MCV 91 06/16/2021     03/07/2024     06/16/2021       Lab Results   Component Value Date    INR 1.16 03/07/2024       Radiology  Nil recent    Assessment  73 year old female who presents for follow-up of immune tolerant chronic hepatitis B.  Liver function test normal.  No radiographic or biochemical evidence of cirrhosis.  Patient is not starting biologic therapy therefore no indications to start antiviral therapy.  Patient is due soon for HCC screening- will obtain ultrasound with elastography for formal assessment of hepatic fibrosis.    Plan  1.  Abd U/S  2.  Check LFTs, HBV DNA in 6 months  3.  Follow-up with me in 12 " bk Bray MD  Hepatology  [facility]    I spent 30 minutes on the date of the encounter doing chart review, history and exam, documentation and further activities as noted above.   ______________________________________    Virtual Visit Details    Type of service:  Video Visit   Video Start Time:  1039  Video End Time: 1050    Originating Location (pt. Location): Home  Distant Location (provider location):  On-site  Platform used for Video Visit: [place]

## 2024-04-01 NOTE — NURSING NOTE
Is the patient currently in the state of [place]? YES    Visit mode:VIDEO    If the visit is dropped, the patient can be reconnected by: VIDEO VISIT: Text to cell phone:   Telephone Information:   Mobile [phone]       Will anyone else be joining the visit? NO  (If patient encounters technical issues they should call [phone]    How would you like to obtain your AVS? MyChart    Are changes needed to the allergy or medication list? No    Reason for visit: Video Visit (Recheck)    Linn CUNNINGHAM

## 2024-04-01 NOTE — Clinical Note
Can we set up US soon (non-urgent) please?  This can be done at [facility]  And a visit with me in 12 months with lab visit and US elastography?  Thanks  janey

## 2024-04-01 NOTE — LETTER
4/1/2024         RE: Krystian Boland  [id number]  [place] [address]        Dear Colleague,    Thank you for referring your patient, Krystian Boland, to the [facility] [place]. Please see a copy of my visit note below.    [facility] Hepatology    Follow-up Visit    Follow-up visit for hepatitis B    Subjective:  73 year old female    HBV  - dx Jan 2023  - ?transmission  - eAg (+) , eAb (-)  - no prior liver bx  - no prior antiviral therapy  - last HBV DNA 3/7/2024= 50,200,000    Video visit is conducted with patient's daughter who also acts as [employer] .    Last visit April 2023.  At that time, patient did not elect to pursue rituximab therefore antiviral therapy was not started.  No new medications, ER visits or hospitalizations since last visit.    Patient is well today.  She has no symptoms related to liver disease.  Her joint and respiratory symptoms are well-controlled on current medication regimen.    Patient denies rash, new joint symptoms, jaundice, lower extremity edema, abdominal distension, lethargy or confusion.    Patient denies melena, hematemesis or hematochezia.    Patient denies fevers, sweats or chills.  She currently has a URI but is slowly improving.    Weight stable.  Appetite is good.    Patient does not drink alcohol.      Medical hx Surgical hx   Past Medical History:   Diagnosis Date    Anxiety     Early onset Alzheimer's dementia without behavioral disturbance (H)     Hx of small bowel obstruction 11/2018    Hypertension     Interstitial lung disease (H) 05/2018    Intestinal TB 1990    Obesity (BMI 30-39.9)     Osteoarthritis     Osteopenia     Seropositive rheumatoid arthritis (H) 01/2023    Type II diabetes mellitus (H)       Past Surgical History:   Procedure Laterality Date    CATARACT EXTRACTION W/ INTRAOCULAR LENS IMPLANT Left 10/25/2021    CATARACT IOL, RT/LT Right 10/11/2021    CHOLECYSTECTOMY, OPEN  1990     HEMICOLECTOMY, RT/LT Right 1990          Medications  Prior to Admission medications    Medication Sig Start Date End Date Taking? Authorizing Provider   amLODIPine (NORVASC) 5 MG tablet TAKE 1 TABLET(5 MG) BY MOUTH DAILY 3/20/24  Yes Adrianna Singh MD   aspirin 81 MG chewable tablet Take 1 tablet (81 mg) by mouth daily 7/27/18  Yes Adrianna Singh MD   benzonatate (TESSALON) 200 MG capsule Take 1 capsule (200 mg) by mouth 3 times daily as needed for cough 3/30/24 4/6/24 Yes Abdias Pearson DO   benzonatate (TESSALON) 200 MG capsule Take 1 capsule (200 mg) by mouth 3 times daily as needed for cough 2/23/24  Yes Jase Keller MD   famotidine (PEPCID) 40 MG tablet Take 1 tablet (40 mg) by mouth every evening 3/12/24  Yes Adrianna Singh MD   guaiFENesin (ROBITUSSIN) 20 mg/mL liquid Take 10 mLs by mouth every 4 hours as needed for cough 12/2/22  Yes Donna Escobedo MD   hydroxychloroquine (PLAQUENIL) 200 MG tablet Take 1 tablet (200 mg) by mouth 2 times daily Annual Plaquenil toxicity eye screening required. 3/7/24  Yes Harjit Staley MD   hydrOXYzine (ATARAX) 10 MG tablet Take 1 tablet (10 mg) by mouth 3 times daily as needed for anxiety 12/12/22  Yes Adrianna Singh MD   melatonin 3 MG tablet Take 1 tablet (3 mg) by mouth nightly as needed for sleep 12/2/22  Yes Donna Escobedo MD   mycophenolate (GENERIC EQUIVALENT) 250 MG capsule TAKE FOUR CAPSULES BY MOUTH TWICE A DAY 7/7/23  Yes Jase Keller MD   OFEV 100 MG capsule TAKE ONE CAPSULE BY MOUTH TWICE A DAY 5/18/23  Yes Jase Keller MD   STATIN NOT PRESCRIBED (INTENTIONAL) Please choose reason not prescribed, below 8/25/20  Yes Adrianna Singh MD   sulfaSALAzine ER (AZULFIDINE EN) 500 MG EC tablet take 2 tabs twice a day. 3/7/24  Yes Harjit Staley MD   Vitamin D3 50 mcg (2000 units) tablet TAKE 1 TABLET BY MOUTH DAILY 2/20/24  Yes Adrianna Singh MD   ipratropium - albuterol 0.5 mg/2.5 mg/3 mL (DUONEB) 0.5-2.5 (3) MG/3ML neb  "solution Take 1 vial (3 mLs) by nebulization every 4 hours as needed for wheezing  Patient not taking: Reported on 4/1/2024 12/2/22   Donna Escobedo MD       Allergies  Allergies   Allergen Reactions    Atorvastatin Muscle Pain (Myalgia)       Review of systems  A 10-point review of systems was negative    Examination  Ht 1.575 m (5' 2\")   Wt 67.1 kg (148 lb)   LMP  (LMP Unknown)   BMI 27.07 kg/m    Body mass index is 27.07 kg/m .    Gen- well, NAD, A+Ox3, normal color  Eye- EOMI, no scleral icterus  Skin- no rash or jaundice  Psych- normal mood    Laboratory  Lab Results   Component Value Date     03/07/2024     03/17/2021    POTASSIUM 4.5 03/07/2024    POTASSIUM 3.4 12/02/2022    POTASSIUM 4.3 03/17/2021    CHLORIDE 99 03/07/2024    CHLORIDE 94 12/01/2022    CHLORIDE 105 03/17/2021    CO2 28 03/07/2024    CO2 28 12/01/2022    CO2 31 03/17/2021    BUN 18.1 03/07/2024    BUN 10 12/01/2022    BUN 12 03/17/2021    CR 0.66 03/07/2024    CR 0.69 06/16/2021       Lab Results   Component Value Date    BILITOTAL 0.3 03/07/2024    BILITOTAL 0.2 06/16/2021    ALT 15 03/07/2024    ALT 28 06/16/2021    AST 19 03/07/2024    AST 20 06/16/2021    ALKPHOS 84 03/07/2024    ALKPHOS 61 06/16/2021       Lab Results   Component Value Date    ALBUMIN 4.2 03/07/2024    ALBUMIN 2.6 11/30/2022    ALBUMIN 3.7 06/16/2021    PROTTOTAL 7.8 03/07/2024    PROTTOTAL 8.0 06/16/2021        Lab Results   Component Value Date    WBC 5.8 03/07/2024    WBC 7.7 06/16/2021    HGB 12.4 03/07/2024    HGB 11.5 06/16/2021    MCV 91 03/07/2024    MCV 91 06/16/2021     03/07/2024     06/16/2021       Lab Results   Component Value Date    INR 1.16 03/07/2024       Radiology  Nil recent    Assessment  73 year old female who presents for follow-up of immune tolerant chronic hepatitis B.  Liver function test normal.  No radiographic or biochemical evidence of cirrhosis.  Patient is not starting biologic therapy therefore no " indications to start antiviral therapy.  Patient is due soon for HCC screening- will obtain ultrasound with elastography for formal assessment of hepatic fibrosis.    Plan  1.  Abd U/S  2.  Check LFTs, HBV DNA in 6 months  3.  Follow-up with me in 12 months    Luke Bray MD  Hepatology  [facility]    I spent 30 minutes on the date of the encounter doing chart review, history and exam, documentation and further activities as noted above.   ______________________________________    Virtual Visit Details    Type of service:  Video Visit   Video Start Time:  1039  Video End Time: 1050    Originating Location (pt. Location): Home  Distant Location (provider location):  On-site  Platform used for Video Visit: [facility]      Again, thank you for allowing me to participate in the care of your patient.        Sincerely,        Luke Bray MD

## 2024-04-02 ENCOUNTER — PATIENT OUTREACH (OUTPATIENT)
Dept: GERIATRIC MEDICINE | Facility: CLINIC | Age: 74
End: 2024-04-02
Payer: COMMERCIAL

## 2024-04-02 ENCOUNTER — TELEPHONE (OUTPATIENT)
Dept: GASTROENTEROLOGY | Facility: CLINIC | Age: 74
End: 2024-04-02
Payer: COMMERCIAL

## 2024-04-02 RX ORDER — NINTEDANIB 100 MG/1
CAPSULE ORAL
Qty: 60 CAPSULE | Refills: 11 | Status: SHIPPED | OUTPATIENT
Start: 2024-04-02

## 2024-04-02 NOTE — PROGRESS NOTES
[facility] Care Coordination Contact    Received a call from member's daughter, David, and she was calling for an early assessment. She shared that member got a cold/upper respiratory infection last week and is now in need of O2 at all times, is too weak to do tasks on her own, and struggles now with just getting out of bed. She shared that member has been slowly getting worse and now needs assist with most all tasks. Daughter inquired if she would be able to have member assessed for PCA services. Daughter shared that she and her sister have had to take time off to care for member, and even reduce from full time status to part time status. Reviewed that this Care Coordinator is able to get member scheduled for 4/8/24 and daughter picked a time of 10am. Daughter had no further questions at this time.   Called [facility] to schedule an  and was informed that they do no have any Tibetan interpreters at this time.   Called [employer] and was told that they have limited Tibetan interpreters and would try to fill the need. Job number [id number].  JAQUELIN Langley  [facility]  [phone]

## 2024-04-04 ENCOUNTER — HOSPITAL ENCOUNTER (INPATIENT)
Facility: CLINIC | Age: 74
LOS: 4 days | Discharge: HOME | DRG: 193 | End: 2024-04-08
Attending: EMERGENCY MEDICINE | Admitting: HOSPITALIST
Payer: COMMERCIAL

## 2024-04-04 ENCOUNTER — PATIENT OUTREACH (OUTPATIENT)
Dept: GERIATRIC MEDICINE | Facility: CLINIC | Age: 74
End: 2024-04-04

## 2024-04-04 ENCOUNTER — APPOINTMENT (OUTPATIENT)
Dept: CT IMAGING | Facility: CLINIC | Age: 74
DRG: 193 | End: 2024-04-04
Attending: EMERGENCY MEDICINE
Payer: COMMERCIAL

## 2024-04-04 DIAGNOSIS — J84.9 ILD (INTERSTITIAL LUNG DISEASE) (H): ICD-10-CM

## 2024-04-04 DIAGNOSIS — J96.01 ACUTE RESPIRATORY FAILURE WITH HYPOXIA (H): ICD-10-CM

## 2024-04-04 DIAGNOSIS — J06.9 UPPER RESPIRATORY TRACT INFECTION, UNSPECIFIED TYPE: ICD-10-CM

## 2024-04-04 DIAGNOSIS — J18.9 COMMUNITY ACQUIRED PNEUMONIA OF RIGHT LOWER LOBE OF LUNG: ICD-10-CM

## 2024-04-04 DIAGNOSIS — J84.9 INTERSTITIAL LUNG DISEASE (H): ICD-10-CM

## 2024-04-04 LAB
ALBUMIN SERPL BCG-MCNC: 3.4 G/DL (ref 3.5–5.2)
ALBUMIN SERPL BCG-MCNC: 3.6 G/DL (ref 3.5–5.2)
ALP SERPL-CCNC: 161 U/L (ref 40–150)
ALP SERPL-CCNC: ABNORMAL U/L
ALT SERPL W P-5'-P-CCNC: 22 U/L (ref 0–50)
ALT SERPL W P-5'-P-CCNC: ABNORMAL U/L
ANION GAP SERPL CALCULATED.3IONS-SCNC: 12 MMOL/L (ref 7–15)
ANION GAP SERPL CALCULATED.3IONS-SCNC: 13 MMOL/L (ref 7–15)
AST SERPL W P-5'-P-CCNC: 22 U/L (ref 0–45)
AST SERPL W P-5'-P-CCNC: 45 U/L (ref 0–45)
ATRIAL RATE - MUSE: 87 BPM
BASE EXCESS BLDV CALC-SCNC: 3 MMOL/L (ref -3–3)
BASOPHILS # BLD AUTO: 0.1 10E3/UL (ref 0–0.2)
BASOPHILS NFR BLD AUTO: 0 %
BILIRUB SERPL-MCNC: 0.4 MG/DL
BILIRUB SERPL-MCNC: 0.5 MG/DL
BUN SERPL-MCNC: 5.9 MG/DL (ref 8–23)
BUN SERPL-MCNC: 6.9 MG/DL (ref 8–23)
CALCIUM SERPL-MCNC: 9.4 MG/DL (ref 8.8–10.2)
CALCIUM SERPL-MCNC: 9.5 MG/DL (ref 8.8–10.2)
CHLORIDE SERPL-SCNC: 92 MMOL/L (ref 98–107)
CHLORIDE SERPL-SCNC: 92 MMOL/L (ref 98–107)
CREAT SERPL-MCNC: 0.5 MG/DL (ref 0.51–0.95)
CREAT SERPL-MCNC: 0.51 MG/DL (ref 0.51–0.95)
D DIMER PPP FEU-MCNC: 0.68 UG/ML FEU (ref 0–0.5)
DEPRECATED HCO3 PLAS-SCNC: 24 MMOL/L (ref 22–29)
DEPRECATED HCO3 PLAS-SCNC: 24 MMOL/L (ref 22–29)
DIASTOLIC BLOOD PRESSURE - MUSE: NORMAL MMHG
EGFRCR SERPLBLD CKD-EPI 2021: >90 ML/MIN/1.73M2
EGFRCR SERPLBLD CKD-EPI 2021: >90 ML/MIN/1.73M2
EOSINOPHIL # BLD AUTO: 0.3 10E3/UL (ref 0–0.7)
EOSINOPHIL NFR BLD AUTO: 2 %
ERYTHROCYTE [DISTWIDTH] IN BLOOD BY AUTOMATED COUNT: 12.3 % (ref 10–15)
FLUAV RNA SPEC QL NAA+PROBE: NEGATIVE
FLUBV RNA RESP QL NAA+PROBE: NEGATIVE
GLUCOSE BLDC GLUCOMTR-MCNC: 106 MG/DL (ref 70–99)
GLUCOSE BLDC GLUCOMTR-MCNC: 138 MG/DL (ref 70–99)
GLUCOSE BLDC GLUCOMTR-MCNC: 219 MG/DL (ref 70–99)
GLUCOSE SERPL-MCNC: 143 MG/DL (ref 70–99)
GLUCOSE SERPL-MCNC: 161 MG/DL (ref 70–99)
HBA1C MFR BLD: 6.4 %
HCO3 BLDV-SCNC: 29 MMOL/L (ref 21–28)
HCT VFR BLD AUTO: 32.8 % (ref 35–47)
HGB BLD-MCNC: 11.2 G/DL (ref 11.7–15.7)
HOLD SPECIMEN: NORMAL
IMM GRANULOCYTES # BLD: 0.1 10E3/UL
IMM GRANULOCYTES NFR BLD: 0 %
INTERPRETATION ECG - MUSE: NORMAL
L PNEUMO1 AG UR QL IA: NEGATIVE
LACTATE BLD-SCNC: 0.9 MMOL/L
LACTATE SERPL-SCNC: 1 MMOL/L (ref 0.7–2)
LYMPHOCYTES # BLD AUTO: 0.9 10E3/UL (ref 0.8–5.3)
LYMPHOCYTES NFR BLD AUTO: 7 %
MCH RBC QN AUTO: 29.9 PG (ref 26.5–33)
MCHC RBC AUTO-ENTMCNC: 34.1 G/DL (ref 31.5–36.5)
MCV RBC AUTO: 88 FL (ref 78–100)
MONOCYTES # BLD AUTO: 1 10E3/UL (ref 0–1.3)
MONOCYTES NFR BLD AUTO: 7 %
NEUTROPHILS # BLD AUTO: 11.1 10E3/UL (ref 1.6–8.3)
NEUTROPHILS NFR BLD AUTO: 84 %
NRBC # BLD AUTO: 0 10E3/UL
NRBC BLD AUTO-RTO: 0 /100
NT-PROBNP SERPL-MCNC: 115 PG/ML (ref 0–900)
P AXIS - MUSE: 55 DEGREES
PCO2 BLDV: 49 MM HG (ref 40–50)
PH BLDV: 7.38 [PH] (ref 7.32–7.43)
PLATELET # BLD AUTO: 333 10E3/UL (ref 150–450)
PO2 BLDV: 22 MM HG (ref 25–47)
POTASSIUM SERPL-SCNC: 3.8 MMOL/L (ref 3.4–5.3)
POTASSIUM SERPL-SCNC: 5.1 MMOL/L (ref 3.4–5.3)
PR INTERVAL - MUSE: 158 MS
PROCALCITONIN SERPL IA-MCNC: 0.11 NG/ML
PROT SERPL-MCNC: 7.6 G/DL (ref 6.4–8.3)
PROT SERPL-MCNC: 7.7 G/DL (ref 6.4–8.3)
QRS DURATION - MUSE: 80 MS
QT - MUSE: 340 MS
QTC - MUSE: 409 MS
R AXIS - MUSE: 79 DEGREES
RBC # BLD AUTO: 3.74 10E6/UL (ref 3.8–5.2)
RSV RNA SPEC NAA+PROBE: NEGATIVE
SAO2 % BLDV: 35 % (ref 70–75)
SARS-COV-2 RNA RESP QL NAA+PROBE: NEGATIVE
SODIUM SERPL-SCNC: 128 MMOL/L (ref 135–145)
SODIUM SERPL-SCNC: 129 MMOL/L (ref 135–145)
SYSTOLIC BLOOD PRESSURE - MUSE: NORMAL MMHG
T AXIS - MUSE: 32 DEGREES
TROPONIN T SERPL HS-MCNC: <6 NG/L
VENTRICULAR RATE- MUSE: 87 BPM
WBC # BLD AUTO: 13.4 10E3/UL (ref 4–11)

## 2024-04-04 PROCEDURE — 84300 ASSAY OF URINE SODIUM: CPT | Performed by: HOSPITALIST

## 2024-04-04 PROCEDURE — 87637 SARSCOV2&INF A&B&RSV AMP PRB: CPT | Performed by: EMERGENCY MEDICINE

## 2024-04-04 PROCEDURE — 94640 AIRWAY INHALATION TREATMENT: CPT | Mod: 76

## 2024-04-04 PROCEDURE — 250N000011 HC RX IP 250 OP 636: Performed by: HOSPITALIST

## 2024-04-04 PROCEDURE — 250N000012 HC RX MED GY IP 250 OP 636 PS 637: Performed by: HOSPITALIST

## 2024-04-04 PROCEDURE — 83880 ASSAY OF NATRIURETIC PEPTIDE: CPT | Performed by: EMERGENCY MEDICINE

## 2024-04-04 PROCEDURE — 85025 COMPLETE CBC W/AUTO DIFF WBC: CPT | Performed by: EMERGENCY MEDICINE

## 2024-04-04 PROCEDURE — 99223 1ST HOSP IP/OBS HIGH 75: CPT | Mod: AI | Performed by: HOSPITALIST

## 2024-04-04 PROCEDURE — 87186 SC STD MICRODIL/AGAR DIL: CPT | Performed by: EMERGENCY MEDICINE

## 2024-04-04 PROCEDURE — 250N000013 HC RX MED GY IP 250 OP 250 PS 637: Performed by: HOSPITALIST

## 2024-04-04 PROCEDURE — 94640 AIRWAY INHALATION TREATMENT: CPT

## 2024-04-04 PROCEDURE — 85379 FIBRIN DEGRADATION QUANT: CPT | Performed by: EMERGENCY MEDICINE

## 2024-04-04 PROCEDURE — 250N000009 HC RX 250: Performed by: HOSPITALIST

## 2024-04-04 PROCEDURE — 99223 1ST HOSP IP/OBS HIGH 75: CPT | Performed by: INTERNAL MEDICINE

## 2024-04-04 PROCEDURE — 93005 ELECTROCARDIOGRAM TRACING: CPT

## 2024-04-04 PROCEDURE — 83605 ASSAY OF LACTIC ACID: CPT | Performed by: EMERGENCY MEDICINE

## 2024-04-04 PROCEDURE — 82803 BLOOD GASES ANY COMBINATION: CPT

## 2024-04-04 PROCEDURE — 87899 AGENT NOS ASSAY W/OPTIC: CPT | Performed by: HOSPITALIST

## 2024-04-04 PROCEDURE — 87149 DNA/RNA DIRECT PROBE: CPT | Performed by: EMERGENCY MEDICINE

## 2024-04-04 PROCEDURE — 36415 COLL VENOUS BLD VENIPUNCTURE: CPT | Performed by: EMERGENCY MEDICINE

## 2024-04-04 PROCEDURE — 84145 PROCALCITONIN (PCT): CPT | Performed by: EMERGENCY MEDICINE

## 2024-04-04 PROCEDURE — 250N000009 HC RX 250: Performed by: EMERGENCY MEDICINE

## 2024-04-04 PROCEDURE — 250N000011 HC RX IP 250 OP 636: Performed by: EMERGENCY MEDICINE

## 2024-04-04 PROCEDURE — 250N000013 HC RX MED GY IP 250 OP 250 PS 637: Performed by: EMERGENCY MEDICINE

## 2024-04-04 PROCEDURE — 99285 EMERGENCY DEPT VISIT HI MDM: CPT | Mod: 25

## 2024-04-04 PROCEDURE — 84450 TRANSFERASE (AST) (SGOT): CPT | Performed by: EMERGENCY MEDICINE

## 2024-04-04 PROCEDURE — 84155 ASSAY OF PROTEIN SERUM: CPT | Performed by: EMERGENCY MEDICINE

## 2024-04-04 PROCEDURE — 83036 HEMOGLOBIN GLYCOSYLATED A1C: CPT | Performed by: HOSPITALIST

## 2024-04-04 PROCEDURE — 120N000001 HC R&B MED SURG/OB

## 2024-04-04 PROCEDURE — 83935 ASSAY OF URINE OSMOLALITY: CPT | Performed by: HOSPITALIST

## 2024-04-04 PROCEDURE — 84484 ASSAY OF TROPONIN QUANT: CPT | Performed by: EMERGENCY MEDICINE

## 2024-04-04 PROCEDURE — 71275 CT ANGIOGRAPHY CHEST: CPT

## 2024-04-04 RX ORDER — ACETAMINOPHEN 325 MG/1
650 TABLET ORAL EVERY 4 HOURS PRN
Status: DISCONTINUED | OUTPATIENT
Start: 2024-04-04 | End: 2024-04-08 | Stop reason: HOSPADM

## 2024-04-04 RX ORDER — CEFTRIAXONE 2 G/1
2 INJECTION, POWDER, FOR SOLUTION INTRAMUSCULAR; INTRAVENOUS ONCE
Status: COMPLETED | OUTPATIENT
Start: 2024-04-04 | End: 2024-04-04

## 2024-04-04 RX ORDER — HYDROXYCHLOROQUINE SULFATE 200 MG/1
200 TABLET, FILM COATED ORAL 2 TIMES DAILY
Status: DISCONTINUED | OUTPATIENT
Start: 2024-04-04 | End: 2024-04-08 | Stop reason: HOSPADM

## 2024-04-04 RX ORDER — NALOXONE HYDROCHLORIDE 0.4 MG/ML
0.4 INJECTION, SOLUTION INTRAMUSCULAR; INTRAVENOUS; SUBCUTANEOUS
Status: DISCONTINUED | OUTPATIENT
Start: 2024-04-04 | End: 2024-04-08 | Stop reason: HOSPADM

## 2024-04-04 RX ORDER — BENZONATATE 100 MG/1
200 CAPSULE ORAL 3 TIMES DAILY PRN
Status: DISCONTINUED | OUTPATIENT
Start: 2024-04-04 | End: 2024-04-06

## 2024-04-04 RX ORDER — FAMOTIDINE 20 MG/1
40 TABLET, FILM COATED ORAL EVERY EVENING
Status: DISCONTINUED | OUTPATIENT
Start: 2024-04-04 | End: 2024-04-08

## 2024-04-04 RX ORDER — GUAIFENESIN/DEXTROMETHORPHAN 100-10MG/5
10 SYRUP ORAL EVERY 4 HOURS PRN
Status: DISCONTINUED | OUTPATIENT
Start: 2024-04-04 | End: 2024-04-08 | Stop reason: HOSPADM

## 2024-04-04 RX ORDER — HYDROXYZINE HYDROCHLORIDE 10 MG/1
10 TABLET, FILM COATED ORAL 3 TIMES DAILY PRN
Status: DISCONTINUED | OUTPATIENT
Start: 2024-04-04 | End: 2024-04-08 | Stop reason: HOSPADM

## 2024-04-04 RX ORDER — AMOXICILLIN 250 MG
2 CAPSULE ORAL 2 TIMES DAILY PRN
Status: DISCONTINUED | OUTPATIENT
Start: 2024-04-04 | End: 2024-04-08 | Stop reason: HOSPADM

## 2024-04-04 RX ORDER — METHYLPREDNISOLONE SODIUM SUCCINATE 40 MG/ML
40 INJECTION, POWDER, LYOPHILIZED, FOR SOLUTION INTRAMUSCULAR; INTRAVENOUS EVERY 12 HOURS
Status: DISCONTINUED | OUTPATIENT
Start: 2024-04-04 | End: 2024-04-06

## 2024-04-04 RX ORDER — CALCIUM CARBONATE 500 MG/1
1000 TABLET, CHEWABLE ORAL 4 TIMES DAILY PRN
Status: DISCONTINUED | OUTPATIENT
Start: 2024-04-04 | End: 2024-04-08 | Stop reason: HOSPADM

## 2024-04-04 RX ORDER — ASPIRIN 81 MG/1
81 TABLET, CHEWABLE ORAL DAILY
Status: DISCONTINUED | OUTPATIENT
Start: 2024-04-04 | End: 2024-04-08 | Stop reason: HOSPADM

## 2024-04-04 RX ORDER — AZITHROMYCIN 500 MG/1
500 INJECTION, POWDER, LYOPHILIZED, FOR SOLUTION INTRAVENOUS ONCE
Status: COMPLETED | OUTPATIENT
Start: 2024-04-04 | End: 2024-04-04

## 2024-04-04 RX ORDER — NICOTINE POLACRILEX 4 MG
15-30 LOZENGE BUCCAL
Status: DISCONTINUED | OUTPATIENT
Start: 2024-04-04 | End: 2024-04-08 | Stop reason: HOSPADM

## 2024-04-04 RX ORDER — SULFASALAZINE 500 MG/1
1000 TABLET, DELAYED RELEASE ORAL 2 TIMES DAILY
Status: DISCONTINUED | OUTPATIENT
Start: 2024-04-04 | End: 2024-04-08 | Stop reason: HOSPADM

## 2024-04-04 RX ORDER — PROCHLORPERAZINE 25 MG
12.5 SUPPOSITORY, RECTAL RECTAL EVERY 12 HOURS PRN
Status: DISCONTINUED | OUTPATIENT
Start: 2024-04-04 | End: 2024-04-08 | Stop reason: HOSPADM

## 2024-04-04 RX ORDER — PROCHLORPERAZINE MALEATE 5 MG
5 TABLET ORAL EVERY 6 HOURS PRN
Status: DISCONTINUED | OUTPATIENT
Start: 2024-04-04 | End: 2024-04-08 | Stop reason: HOSPADM

## 2024-04-04 RX ORDER — IPRATROPIUM BROMIDE AND ALBUTEROL SULFATE 2.5; .5 MG/3ML; MG/3ML
3 SOLUTION RESPIRATORY (INHALATION) ONCE
Status: COMPLETED | OUTPATIENT
Start: 2024-04-04 | End: 2024-04-04

## 2024-04-04 RX ORDER — CHOLECALCIFEROL (VITAMIN D3) 50 MCG
50 TABLET ORAL DAILY
Status: DISCONTINUED | OUTPATIENT
Start: 2024-04-04 | End: 2024-04-08 | Stop reason: HOSPADM

## 2024-04-04 RX ORDER — GUAIFENESIN 600 MG/1
600 TABLET, EXTENDED RELEASE ORAL 2 TIMES DAILY PRN
COMMUNITY

## 2024-04-04 RX ORDER — IOPAMIDOL 755 MG/ML
58 INJECTION, SOLUTION INTRAVASCULAR ONCE
Status: COMPLETED | OUTPATIENT
Start: 2024-04-04 | End: 2024-04-04

## 2024-04-04 RX ORDER — NALOXONE HYDROCHLORIDE 0.4 MG/ML
0.2 INJECTION, SOLUTION INTRAMUSCULAR; INTRAVENOUS; SUBCUTANEOUS
Status: DISCONTINUED | OUTPATIENT
Start: 2024-04-04 | End: 2024-04-08 | Stop reason: HOSPADM

## 2024-04-04 RX ORDER — AMOXICILLIN 250 MG
1 CAPSULE ORAL 2 TIMES DAILY PRN
Status: DISCONTINUED | OUTPATIENT
Start: 2024-04-04 | End: 2024-04-08 | Stop reason: HOSPADM

## 2024-04-04 RX ORDER — AZITHROMYCIN 250 MG/1
250 TABLET, FILM COATED ORAL DAILY
Status: COMPLETED | OUTPATIENT
Start: 2024-04-05 | End: 2024-04-08

## 2024-04-04 RX ORDER — IPRATROPIUM BROMIDE AND ALBUTEROL SULFATE 2.5; .5 MG/3ML; MG/3ML
3 SOLUTION RESPIRATORY (INHALATION) EVERY 4 HOURS PRN
Status: DISCONTINUED | OUTPATIENT
Start: 2024-04-04 | End: 2024-04-08 | Stop reason: HOSPADM

## 2024-04-04 RX ORDER — AMLODIPINE BESYLATE 5 MG/1
5 TABLET ORAL DAILY
Status: DISCONTINUED | OUTPATIENT
Start: 2024-04-04 | End: 2024-04-08 | Stop reason: HOSPADM

## 2024-04-04 RX ORDER — ACETAMINOPHEN 650 MG/1
650 SUPPOSITORY RECTAL EVERY 4 HOURS PRN
Status: DISCONTINUED | OUTPATIENT
Start: 2024-04-04 | End: 2024-04-08 | Stop reason: HOSPADM

## 2024-04-04 RX ORDER — ALBUTEROL SULFATE 90 UG/1
2 AEROSOL, METERED RESPIRATORY (INHALATION) ONCE
Status: COMPLETED | OUTPATIENT
Start: 2024-04-04 | End: 2024-04-04

## 2024-04-04 RX ORDER — LIDOCAINE 40 MG/G
CREAM TOPICAL
Status: DISCONTINUED | OUTPATIENT
Start: 2024-04-04 | End: 2024-04-08 | Stop reason: HOSPADM

## 2024-04-04 RX ORDER — DEXTROSE MONOHYDRATE 25 G/50ML
25-50 INJECTION, SOLUTION INTRAVENOUS
Status: DISCONTINUED | OUTPATIENT
Start: 2024-04-04 | End: 2024-04-08 | Stop reason: HOSPADM

## 2024-04-04 RX ORDER — MYCOPHENOLATE MOFETIL 250 MG/1
1000 CAPSULE ORAL
Status: DISCONTINUED | OUTPATIENT
Start: 2024-04-04 | End: 2024-04-08 | Stop reason: HOSPADM

## 2024-04-04 RX ORDER — CEFTRIAXONE 2 G/1
2 INJECTION, POWDER, FOR SOLUTION INTRAMUSCULAR; INTRAVENOUS EVERY 24 HOURS
Status: DISCONTINUED | OUTPATIENT
Start: 2024-04-05 | End: 2024-04-06

## 2024-04-04 RX ADMIN — METHYLPREDNISOLONE SODIUM SUCCINATE 40 MG: 40 INJECTION, POWDER, FOR SOLUTION INTRAMUSCULAR; INTRAVENOUS at 15:46

## 2024-04-04 RX ADMIN — CEFTRIAXONE SODIUM 2 G: 2 INJECTION, POWDER, FOR SOLUTION INTRAMUSCULAR; INTRAVENOUS at 12:50

## 2024-04-04 RX ADMIN — BENZONATATE 200 MG: 100 CAPSULE ORAL at 21:46

## 2024-04-04 RX ADMIN — MYCOPHENOLATE MOFETIL 1000 MG: 250 CAPSULE ORAL at 15:46

## 2024-04-04 RX ADMIN — ALBUTEROL SULFATE 2 PUFF: 90 AEROSOL, METERED RESPIRATORY (INHALATION) at 09:46

## 2024-04-04 RX ADMIN — HYDROXYCHLOROQUINE SULFATE 200 MG: 200 TABLET ORAL at 21:46

## 2024-04-04 RX ADMIN — SULFASALAZINE 1000 MG: 500 TABLET, DELAYED RELEASE ORAL at 21:46

## 2024-04-04 RX ADMIN — SODIUM CHLORIDE 95 ML: 9 INJECTION, SOLUTION INTRAVENOUS at 10:59

## 2024-04-04 RX ADMIN — IPRATROPIUM BROMIDE AND ALBUTEROL SULFATE 3 ML: .5; 3 SOLUTION RESPIRATORY (INHALATION) at 20:28

## 2024-04-04 RX ADMIN — IOPAMIDOL 58 ML: 755 INJECTION, SOLUTION INTRAVENOUS at 10:59

## 2024-04-04 RX ADMIN — IPRATROPIUM BROMIDE AND ALBUTEROL SULFATE 3 ML: .5; 3 SOLUTION RESPIRATORY (INHALATION) at 10:30

## 2024-04-04 RX ADMIN — ACETAMINOPHEN 650 MG: 325 TABLET, FILM COATED ORAL at 20:14

## 2024-04-04 RX ADMIN — AZITHROMYCIN MONOHYDRATE 500 MG: 500 INJECTION, POWDER, LYOPHILIZED, FOR SOLUTION INTRAVENOUS at 13:37

## 2024-04-04 RX ADMIN — FAMOTIDINE 40 MG: 20 TABLET ORAL at 20:14

## 2024-04-04 ASSESSMENT — ENCOUNTER SYMPTOMS
NAUSEA: 0
DYSURIA: 0
COUGH: 1
SHORTNESS OF BREATH: 1
FEVER: 0
ABDOMINAL PAIN: 0
VOMITING: 0
HEMATURIA: 0

## 2024-04-04 ASSESSMENT — ACTIVITIES OF DAILY LIVING (ADL)
DIFFICULTY_COMMUNICATING: NO
ADLS_ACUITY_SCORE: 21
TOILETING_ISSUES: NO
CHANGE_IN_FUNCTIONAL_STATUS_SINCE_ONSET_OF_CURRENT_ILLNESS/INJURY: NO
ADLS_ACUITY_SCORE: 21
DIFFICULTY_EATING/SWALLOWING: NO
ADLS_ACUITY_SCORE: 21
ADLS_ACUITY_SCORE: 21
ADLS_ACUITY_SCORE: 38
DOING_ERRANDS_INDEPENDENTLY_DIFFICULTY: NO
ADLS_ACUITY_SCORE: 38
ADLS_ACUITY_SCORE: 38
DRESSING/BATHING_DIFFICULTY: NO
HEARING_DIFFICULTY_OR_DEAF: NO
ADLS_ACUITY_SCORE: 38
ADLS_ACUITY_SCORE: 38
ADLS_ACUITY_SCORE: 21
ADLS_ACUITY_SCORE: 21
FALL_HISTORY_WITHIN_LAST_SIX_MONTHS: NO
ADLS_ACUITY_SCORE: 21
WALKING_OR_CLIMBING_STAIRS_DIFFICULTY: NO
WEAR_GLASSES_OR_BLIND: NO
CONCENTRATING,_REMEMBERING_OR_MAKING_DECISIONS_DIFFICULTY: NO

## 2024-04-04 NOTE — PLAN OF CARE
Summary: ED admit today for SOB, cold symptoms. RLL PNA with hx of ILD.   DATE & TIME: 4/4/2024 [id number]    Cognitive Concerns/ Orientation : A&O x4, tibetan speaking, understands basic english. Family translates and signed  to decline interpretor in ED. Family ok to stay overnight to help care/translate  BEHAVIOR & AGGRESSION TOOL COLOR: green, calm/cooperative   ABNL VS/O2: RR 22-26, other  VSS on 1 LPM NC (wean as able-weaned from 4 L), baseline 2 L PRN.   MOBILITY: Independent, steady on feet and no dizziness. PT/OT consulted though may not be indicated   PAIN MANAGMENT: denies  DIET: regular, carb count, tolerating  BOWEL/BLADDER: continent  ABNL LAB/BG: Na 129, WBC 13.4, Hgb 11.2; , 138  DRAIN/DEVICES: PIV SL  TELEMETRY RHYTHM: NSR  SKIN: WNL  TESTS/PROCEDURES: EKG 4/5 repeat tomorrow to monitor QT  D/C DAY/GOALS/PLACE: 2+ days pending pulmonology consult and abx plan, need to wean oxygen  OTHER IMPORTANT INFO: Lives at home with daughter, has two daughters that are RN's here in hospital. Frequent cough, sometimes productive, declines any cough medicine now but would like to take before bedtime. MATUTE and endorses some SOB. LS with coarse crackles in bases. Encouraged IS. Continue IV rocephin/azithromycin. Also on IV solumedrol. PRN nebs, none given this shift.

## 2024-04-04 NOTE — ED PROVIDER NOTES
History     Chief Complaint:  Shortness of Breath       LAVONNE Boland is a 73 year old female with history of rheumatoid arthritis, latent TB, obesity, hypertension, and interstitial lung disease who presents to the ED with her son for evaluation of shortness of breath. Patient's son reports a worsening cough that has not gotten better. Family is treating symptoms at home using OTC cold medications. Patient has been on 3L prn oxygen with activity but in the past 2 days has required 2L progressing to 3L continuously yesterday. Her pulse ox at home was reading high 70s to 80s without oxygen but was better when she was at rest. Endorses productive cough at night with white/yellowish phlegm, chest pain with coughing, decreased appetite, diarrhea 2 days ago that has since resolved, and baseline mild pain. Otherwise denies fever, abdominal pain, hematuria, hematochezia, nausea, or vomiting. Of note, patient was told she does not need her latent TB treatment. She went to Urgent Care for the sample complaints and has been requiring oxygen more since that time. Patient's son repots she was hospitalized for about a week in 2023. Her last Pulmonology appointment was 6 months ago and she is scheduled for an appointment in May.          Review of Systems   Constitutional:  Negative for fever.   Respiratory:  Positive for cough and shortness of breath.    Cardiovascular:  Negative for chest pain.   Gastrointestinal:  Negative for abdominal pain, nausea and vomiting.   Genitourinary:  Negative for dysuria and hematuria.       Independent Historian:   Son - They report above    Review of External Notes:    I reviewed Dr. Pearson's 3/30/24 Urgent Care visit note regarding URI and cough   I reviewed Dr. Staley's 3/7/24 Office Visit note discussing high risk medication use and rheumatoid arthritis    I reviewed Dr. Keller's 11/1/24 Pulmonology note regarding rheumatoid lung    Medications:    Norvasc  Aspirin 81 mg    Tessalon  Pepcid  Plaquenil  Duoneb   Atarax  Mycophenolate   Sulfasalazine     Past Medical History:    Anxiety  Early onset Alzheimer's dementia   Small bowel obstruction  Hypertension  Interstitial lung disease   Intestinal TB  Obesity  Osteoarthritis  Osteopenia  Seropositive rheumatoid arthritis    Type 2 diabetes mellitus   Chronic hepatitis B     Past Surgical History:    Cataract extraction  Cataract IOL  Cholecystectomy   Hemicolectomy      Physical Exam   Patient Vitals for the past 24 hrs:   BP Temp Temp src Pulse Resp SpO2   04/04/24 1150 (!) 157/76 -- -- 87 -- 100 %   04/04/24 1135 -- -- -- 84 -- 100 %   04/04/24 1130 (!) 152/84 -- -- 84 -- 100 %   04/04/24 1120 (!) 152/81 -- -- 94 -- 100 %   04/04/24 1050 138/76 -- -- 101 -- 96 %   04/04/24 1035 139/72 -- -- 85 -- 100 %   04/04/24 1020 136/84 -- -- 91 -- 100 %   04/04/24 1017 136/84 -- -- 89 -- 100 %   04/04/24 0957 (!) 146/79 -- -- 86 -- 100 %   04/04/24 0951 (!) 143/80 -- -- 108 -- 99 %   04/04/24 0925 135/72 -- -- 100 28 100 %   04/04/24 0921 (!) 167/81 -- -- 98 (!) 7 100 %   04/04/24 0907 (!) 147/64 97.6  F (36.4  C) Temporal 100 24 92 %        Physical Exam  Constitutional:       Appearance: Normal appearance.   HENT:      Head: Normocephalic and atraumatic.   Eyes:      Extraocular Movements: Extraocular movements intact.      Conjunctiva/sclera: Conjunctivae normal.   Cardiovascular:      Rate and Rhythm: Mildly tachycardic rate and regular rhythm.   Pulmonary:      Effort: Pulmonary effort is labored.        Breath sounds: Fine crackles in bilateral lungs.  No wheezing.  Abdominal:      General: Abdomen is flat. There is no distension.      Palpations: Abdomen is soft.      Tenderness: There is no abdominal tenderness.   Musculoskeletal:      Cervical back: Normal range of motion. No rigidity.       Right lower leg: No edema.      Left lower leg: No edema.   Skin:     General: Skin is warm and dry.   Neurological:      General: No focal  deficit present.      Mental Status: Alert and oriented to person, place, and time.   Psychiatric:         Mood and Affect: Mood normal.         Behavior: Behavior normal.    Emergency Department Course   ECG  ECG results from 04/04/24   EKG 12-lead, tracing only     Value    Systolic Blood Pressure     Diastolic Blood Pressure     Ventricular Rate 87    Atrial Rate 87    ID Interval 158    QRS Duration 80        QTc 409    P Axis 55    R AXIS 79    T Axis 32    Interpretation ECG      Sinus rhythm  Possible Left atrial enlargement  Borderline ECG  When compared with ECG of 28-NOV-2022 11:30,  No significant change was found        See ED course for independent interpretation.     Imaging:  CT Chest Pulmonary Embolism w Contrast   Preliminary Result   IMPRESSION:   1.  No pulmonary embolism demonstrated.   2.  Question new right lower lobe pneumonia and/or active inflammatory   change superimposed on moderate to severe fibrosis.         Report per radiology    Laboratory:  Labs Ordered and Resulted from Time of ED Arrival to Time of ED Departure   D DIMER QUANTITATIVE - Abnormal       Result Value    D-Dimer Quantitative 0.68 (*)    COMPREHENSIVE METABOLIC PANEL - Abnormal    Sodium 128 (*)     Potassium 5.1      Carbon Dioxide (CO2) 24      Anion Gap 12      Urea Nitrogen 5.9 (*)     Creatinine 0.50 (*)     GFR Estimate >90      Calcium 9.4      Chloride 92 (*)     Glucose 161 (*)     Alkaline Phosphatase        AST 45      ALT        Protein Total 7.7      Albumin 3.4 (*)     Bilirubin Total 0.5     CBC WITH PLATELETS AND DIFFERENTIAL - Abnormal    WBC Count 13.4 (*)     RBC Count 3.74 (*)     Hemoglobin 11.2 (*)     Hematocrit 32.8 (*)     MCV 88      MCH 29.9      MCHC 34.1      RDW 12.3      Platelet Count 333      % Neutrophils 84      % Lymphocytes 7      % Monocytes 7      % Eosinophils 2      % Basophils 0      % Immature Granulocytes 0      NRBCs per 100 WBC 0      Absolute Neutrophils 11.1 (*)      Absolute Lymphocytes 0.9      Absolute Monocytes 1.0      Absolute Eosinophils 0.3      Absolute Basophils 0.1      Absolute Immature Granulocytes 0.1      Absolute NRBCs 0.0     ISTAT GASES LACTATE VENOUS POCT - Abnormal    Lactic Acid POCT 0.9      Bicarbonate Venous POCT 29 (*)     O2 Sat, Venous POCT 35 (*)     pCO2 Venous POCT 49      pH Venous POCT 7.38      pO2 Venous POCT 22 (*)     Base Excess/Deficit (+/-) POCT 3.0     COMPREHENSIVE METABOLIC PANEL - Abnormal    Sodium 129 (*)     Potassium 3.8      Carbon Dioxide (CO2) 24      Anion Gap 13      Urea Nitrogen 6.9 (*)     Creatinine 0.51      GFR Estimate >90      Calcium 9.5      Chloride 92 (*)     Glucose 143 (*)     Alkaline Phosphatase 161 (*)     AST 22      ALT 22      Protein Total 7.6      Albumin 3.6      Bilirubin Total 0.4     LACTIC ACID WHOLE BLOOD - Normal    Lactic Acid 1.0     TROPONIN T, HIGH SENSITIVITY - Normal    Troponin T, High Sensitivity <6     NT PROBNP INPATIENT - Normal    N terminal Pro BNP Inpatient 115     INFLUENZA A/B, RSV, & SARS-COV2 PCR - Normal    Influenza A PCR Negative      Influenza B PCR Negative      RSV PCR Negative      SARS CoV2 PCR Negative     PROCALCITONIN   BLOOD CULTURE   BLOOD CULTURE        Procedures   None     Emergency Department Course & Assessments:         Interventions:  Medications   cefTRIAXone (ROCEPHIN) 2 g vial to attach to  ml bag for ADULTS or NS 50 ml bag for PEDS (has no administration in time range)   azithromycin (ZITHROMAX) 500 mg vial to attach to  mL bag (has no administration in time range)   ipratropium - albuterol 0.5 mg/2.5 mg/3 mL (DUONEB) neb solution 3 mL (3 mLs Nebulization $Given 4/4/24 1030)   albuterol (PROVENTIL HFA/VENTOLIN HFA) inhaler (2 puffs Inhalation $Given 4/4/24 0946)   iopamidol (ISOVUE-370) solution 58 mL (58 mLs Intravenous $Given 4/4/24 1059)   100mL Saline Flush (95 mLs Intravenous $Given 4/4/24 1059)        Independent Interpretation (X-rays,  CTs, rhythm strip):  None    Assessments/Consultations/Discussion of Management or Tests:  ED Course as of 24 1652   Thu 2024   0916 I obtained patient history and performed a physical exam.    0947 EKG 12-lead, tracing only  Normal sinus rhythm.  Rate of 87.  Normal [place] and QRS.  Normal QTc.  No acute ST elevation or depression as compared with 2022 EKG.   0956 D-Dimer Quantitative(!): 0.68  Borderline with age adjustment, especially given concurrent interstitial lung disease, questionable TB status, and worsening symptoms, will proceed with CT PE chest.   1017 Lactic Acid POCT: 0.9   1024 Sodium(!): 128   1207 I spoke with Dr. Escobedo, hospitalist, regarding the patient's history and presentation in the emergency department today. He accepted the patient for admission.       Social Determinants of Health affecting care:   Language Barrier    Disposition:  The patient was admitted to the hospital under the care of Dr. Escobedo.     Impression & Plan    CMS Diagnoses: None    MIPS (If applicable):  CT for PE was ordered because the patient had an abnormal d-dimer.     Medical Decision Makin-year-old female as described above presents to the emergency department for progressive worsening shortness of breath in conjunction with URI symptoms for the past week.  Patient hemodynamically stable at time evaluation.  Afebrile.  Per son at bedside, patient has had increasing need for oxygen.  Baseline on as needed oxygen, but now is continuously on 3 L due to hypoxia in the 70s and 80s at home especially after coughing fits.  Patient has history of latent TB that is untreated.  History of interstitial lung disease.  Differential diagnosis considered includes, but not limited to, exacerbation of interstitial lung disease due to underlying respiratory infection, pneumonia, CHF, active TB, and pulmonary embolism.  Dyspnea workup ordered.  D-dimer for screening for pulmonary embolism.  As needed nebulizer  treatment.  Discussed care plan with patient and son at bedside who voiced understanding and agreement with plan.  Answered all questions.  Additional workup and orders as listed in chart.     Please refer to ED course above as part of continuation of MDM for details on the patient's treatment course and any changes or updates in care plan beyond my initial evaluation and MDM creation.      Diagnosis:    ICD-10-CM    1. Acute respiratory failure with hypoxia (H)  J96.01       2. Community acquired pneumonia of right lower lobe of lung  J18.9       3. ILD (interstitial lung disease) (H)  J84.9            Discharge Medications:  New Prescriptions    No medications on file          Scribe Disclosure:  Elaine PEREZ, am serving as a scribe at 10:47 AM on 4/4/2024 to document services personally performed by Valentin Avina DO based on my observations and the provider's statements to me.   4/4/2024   Valentin Avina DO Yeh, Ferris, DO  04/04/24 1657

## 2024-04-04 NOTE — ED TRIAGE NOTES
Here with increased SOB on 3 L at baseline. Very SOB while walking.     Triage Assessment (Adult)       Row Name 04/04/24 0915          Triage Assessment    Airway WDL WDL        Respiratory WDL    Respiratory WDL X  Extremely SOB on 3L        Skin Circulation/Temperature WDL    Skin Circulation/Temperature WDL WDL        Cardiac WDL    Cardiac WDL X  tachycardic     Cardiac Rhythm ST        Peripheral/Neurovascular WDL    Peripheral Neurovascular WDL WDL        Cognitive/Neuro/Behavioral WDL    Cognitive/Neuro/Behavioral WDL WDL

## 2024-04-04 NOTE — PROGRESS NOTES
Admission    Patient arrives to room 613 via cart from ED.      Inpatient nursing criteria listed below were met:    Did you put disposition on whiteboard and in sticky note: NA  Full skin assessment done (add LDA if skin issue present). Initials of 2nd RN :Yes, MARIFER  Isolation education started/completed NA  Patient allergies verified with patient: Yes  Fall Risk? (Care plan updated, Education given and documented) No  Primary Care Plan initiated: Yes  Home medications documented in belongings flowsheet: NA  Patient belongings documented in belongings flowsheet: Yes  Reminder note (belongings/ medications) placed in discharge instructions:NA  Admission profile/ required documentation complete: Yes  If patient is a 72 hour hold/Commitment are belongings removed from room and locked up? NA

## 2024-04-04 NOTE — H&P
[facility]    History and Physical - Hospitalist Service       Date of Admission:  4/4/2024    Assessment & Plan      Krystian Boland is a 73 year old female with medical history significant for interstitial lung disease secondary to RA, rheumatoid arthritis, HTN, DM, hepatitis B presented to the ER due to cough, shortness of breath and hypoxia and admitted on 4/4/2024.        Right lower lobe pneumonia  ILD secondary to RA  Acute on chronic hypoxic respiratory failure  Sore throat, cough/yellowish sputum, chills.  Mild leukocytosis.  CT negative for PE, but has RLL pneumonia.  Was using 2 LPM NC O2 with activity prior, now needing continuously at 3 lpm  -Admit to inpatient  -Continue Rocephin IV and azithromycin p.o.  -Solu-Medrol 40 Mg IV twice daily  -Urine for Legionella antigen.  Sputum for Gram stain and culture  -Pulmonary consult  -As needed neb  -Continuous pulse ox, supplemental O2, Acapella, wean O2 as able.  -Continue PTA Ofev  -Follow EKG to monitor QT given concomitant use of azithromycin and Plaquenil.  on admission.  If gets prolonged, changed to doxycycline.    Hyponatremia  Patient does have mild hyponatremia in the past as well.  Sodium 129 at presentation.  Suspect related to underlying pulmonary process.  Reports poor p.o. intake which could also be the reason.  Encourage p.o.  Will avoid IV at this point unless sodium level drops further.  Checking urine for Legionella antigen given pneumonia.  Workup if sodium level worsens.  Could have low-grade SIADH given pneumonia.       Rheumatoid arthritis  -Continue PTA meds including hydroxychloroquine, sulfasalazine, CellCept    DM2, diet controlled  -Given underlying infection and steroid use, anticipate blood sugar likely will run high  -Insulin sliding scale ordered    Hypertension  Continue PTA aspirin and amlodipine    HBV infection  -Follows up with Dr. Bray as outpatient        Diet:  Regular  DVT Prophylaxis:  "Pneumatic Compression Devices  Wilkes Catheter: Not present  Lines: None     Cardiac Monitoring: None  Code Status:  Full code    Clinically Significant Risk Factors Present on Admission         # Drug Induced Platelet Defect: home medication list includes an antiplatelet medication     # Dementia: noted on problem list   # DMII: A1C = N/A within past 6 months    # Overweight: Estimated body mass index is 27.07 kg/m  as calculated from the following:    Height as of 4/1/24: 1.575 m (5' 2\").    Weight as of 4/1/24: 67.1 kg (148 lb).              Disposition Plan            Donna Escobedo MD  Hospitalist Service  [facility]  Securely message with [employer] (more info)  Text page via [facility] Paging/Directory     ______________________________________________________________________    Chief Complaint   Cough, shortness of breath, hypoxia    History is obtained from the patient's son in law at bedside helping as , chart review, discussion with ER MD    History of Present Illness     Krystian Boland is a 73 year old female with medical history significant for interstitial lung disease secondary to RA, rheumatoid arthritis, HTN, DM, hepatitis B presented to the ER due to cough, shortness of breath and hypoxia.    7 days ago, patient started having sore throat, cough and cold.  Maybe some chills but no fever, runny nose.  Had yellowish sputum no hemoptysis.  Patient is having chest pain with coughing.  Was taking Tylenol, DayQuil/NyQuil.  Had decreased appetite, nausea and poor p.o. intake but no vomiting, diarrhea, abdominal pain.    She is on home oxygen but was needing O2 only with activity prior to onset of this illness.  But for the last 3 days, she has been needing oxygen all the time.  Despite oxygen, her O2 sats were dropping to 70s-80s after coughing fits and with activity.    Patient was taken to urgent care last Saturday.  COVID was negative.  Was started on Tessalon Perles " but her symptoms continued and also became hypoxic as above and so she was brought to the ER.    In ER, patient was evaluated by Dr. Avina.  Patient is requiring 3 LPM O2.  Labs showed mild hyponatremia of 129, negative troponin, Pro-Adan 0.11, lactic acid of 0.9 and proBNP 115.  CBC showed mild leukocytosis of 13.4.  D-dimer was 0.68.  CT chest negative for PE but showed possible RLL pneumonia with known interstitial lung disease.  EKG showed sinus rhythm, no ischemic changes with QTc of 409.  Blood cultures ordered, IV Rocephin and azithromycin ordered and hospitalist contacted for admission.      Past Medical History    Past Medical History:   Diagnosis Date    Anxiety     Early onset Alzheimer's dementia without behavioral disturbance (H)     Hx of small bowel obstruction 11/2018    Hypertension     Interstitial lung disease (H) 05/2018    Intestinal TB 1990    Obesity (BMI 30-39.9)     Osteoarthritis     Osteopenia     Seropositive rheumatoid arthritis (H) 01/2023    Type II diabetes mellitus (H)        Past Surgical History   Past Surgical History:   Procedure Laterality Date    CATARACT EXTRACTION W/ INTRAOCULAR LENS IMPLANT Left 10/25/2021    CATARACT IOL, RT/LT Right 10/11/2021    CHOLECYSTECTOMY, OPEN  1990    HEMICOLECTOMY, RT/LT Right 1990       Prior to Admission Medications   Prior to Admission Medications   Prescriptions Last Dose Informant Patient Reported? Taking?   OFEV 100 MG capsule   No No   Sig: TAKE ONE CAPSULE BY MOUTH TWICE A DAY   STATIN NOT PRESCRIBED (INTENTIONAL)  Daughter No No   Sig: Please choose reason not prescribed, below   Vitamin D3 50 mcg (2000 units) tablet   No No   Sig: TAKE 1 TABLET BY MOUTH DAILY   amLODIPine (NORVASC) 5 MG tablet   No No   Sig: TAKE 1 TABLET(5 MG) BY MOUTH DAILY   aspirin 81 MG chewable tablet  Daughter No No   Sig: Take 1 tablet (81 mg) by mouth daily   benzonatate (TESSALON) 200 MG capsule   No No   Sig: Take 1 capsule (200 mg) by mouth 3 times daily as  needed for cough   benzonatate (TESSALON) 200 MG capsule   No No   Sig: Take 1 capsule (200 mg) by mouth 3 times daily as needed for cough   famotidine (PEPCID) 40 MG tablet   No No   Sig: Take 1 tablet (40 mg) by mouth every evening   guaiFENesin (ROBITUSSIN) 20 mg/mL liquid   No No   Sig: Take 10 mLs by mouth every 4 hours as needed for cough   hydrOXYzine (ATARAX) 10 MG tablet   No No   Sig: Take 1 tablet (10 mg) by mouth 3 times daily as needed for anxiety   hydroxychloroquine (PLAQUENIL) 200 MG tablet   No No   Sig: Take 1 tablet (200 mg) by mouth 2 times daily Annual Plaquenil toxicity eye screening required.   ipratropium - albuterol 0.5 mg/2.5 mg/3 mL (DUONEB) 0.5-2.5 (3) MG/3ML neb solution   No No   Sig: Take 1 vial (3 mLs) by nebulization every 4 hours as needed for wheezing   Patient not taking: Reported on 4/1/2024   melatonin 3 MG tablet   No No   Sig: Take 1 tablet (3 mg) by mouth nightly as needed for sleep   mycophenolate (GENERIC EQUIVALENT) 250 MG capsule   No No   Sig: TAKE FOUR CAPSULES BY MOUTH TWICE A DAY   sulfaSALAzine ER (AZULFIDINE EN) 500 MG EC tablet   No No   Sig: take 2 tabs twice a day.      Facility-Administered Medications: None        Review of Systems    The 10 point Review of Systems is negative other than noted in the HPI or here.      Social History   I have reviewed this patient's social history and updated it with pertinent information if needed.  Social History     Tobacco Use    Smoking status: Never    Smokeless tobacco: Never   Vaping Use    Vaping Use: Never used   Substance Use Topics    Alcohol use: Not Currently    Drug use: No         Family History   I have reviewed this patient's family history and updated it with pertinent information if needed.  Family History   Problem Relation Age of Onset    Dementia Mother     Cerebrovascular Disease Father     Cerebrovascular Disease Brother     Diabetes Son     Myocardial Infarction No family hx of     Coronary Artery  Disease Early Onset No family hx of     Breast Cancer No family hx of     Ovarian Cancer No family hx of     Colon Cancer No family hx of     Glaucoma No family hx of     Macular Degeneration No family hx of     Liver Disease No family hx of          Allergies   Allergies   Allergen Reactions    Atorvastatin Muscle Pain (Myalgia)        Physical Exam   Vital Signs: Temp: 97.6  F (36.4  C) Temp src: Temporal BP: (!) 157/76 Pulse: 87   Resp: 28 SpO2: 100 % O2 Device: Nasal cannula Oxygen Delivery: 3 LPM  Weight: 0 lbs 0 oz    General: AAOx3, very pleasant, frequent cough with some rattle during cough, appears comfortable.  HEENT: PERRLA EOMI. Mucosa moist.   Lungs: Bilateral equal air entry. Coarse breath sounds bilaterally with crackles, no wheezing, no increased work of breathing    CVS: S1S2 regular, no tachycardia or murmur.   Abdomen: Soft, NT, ND. BS heard.  MSK: No edema or deformities.  Neuro: AAOX3. CN 2-12 normal. Strength symmetrical.  Skin: No rash.       Medical Decision Making       78 MINUTES SPENT BY ME on the date of service doing chart review, history, exam, documentation & further activities per the note.  Reviewed extensive outpatient records including pulmonary follow-up, GI follow-up, rheumatology follow-up from her recent visit and urgent care notes and labs.    Data     I have personally reviewed the following data over the past 24 hrs:    13.4 (H)  \   11.2 (L)   / 333     129 (L) 92 (L) 6.9 (L) /  143 (H)   3.8 24 0.51 \     ALT: 22 AST: 22 AP: 161 (H) TBILI: 0.4   ALB: 3.6 TOT PROTEIN: 7.6 LIPASE: N/A     Trop: <6 BNP: 115     Procal: 0.11 CRP: N/A Lactic Acid: 0.9       INR:  N/A PTT:  N/A   D-dimer:  0.68 (H) Fibrinogen:  N/A       Imaging results reviewed over the past 24 hrs:   Recent Results (from the past 24 hour(s))   CT Chest Pulmonary Embolism w Contrast    Narrative    CT CHEST PULMONARY EMBOLISM WITH CONTRAST 4/4/2024 11:20 AM    CLINICAL HISTORY: Chest pain. Elevated D-dimer,  worsening shortness of  breath, interstitial lung disease, history of latent TB.    TECHNIQUE: CT angiogram chest during arterial phase injection IV  contrast. 2D and 3D MIP reconstructions were performed by the CT  technologist. Dose reduction techniques were used.     CONTRAST: 58 mL Isovue-370    COMPARISON: January 25, 2023    FINDINGS:  ANGIOGRAM CHEST: Opacified pulmonary arteries are normal caliber and  negative for pulmonary emboli. There is a gradual fading of contrast  in the medial right lower lobe compatible with decreased perfusion  and/or admixture artifact, pulmonary emboli here would be difficult to  entirely exclude. Thoracic aorta is negative for dissection. No CT  evidence of right heart strain.    LUNGS AND PLEURA: Moderate to severe peripheral and basilar fibrosis  with honeycombing and traction bronchiectasis similar to previous.  Question right lower lobe infiltrate and/or active inflammatory  change. No effusion.    MEDIASTINUM/AXILLAE: Mildly prominent lymph nodes noted which are  nonspecific similar to previous. No aneurysm.    CORONARY ARTERY CALCIFICATIONS: Mild.    UPPER ABDOMEN: No acute findings.    MUSCULOSKELETAL: No frankly destructive bony lesions.      Impression    IMPRESSION:  1.  No pulmonary embolism demonstrated.  2.  Question new right lower lobe pneumonia and/or active inflammatory  change superimposed on moderate to severe fibrosis.

## 2024-04-04 NOTE — ED TRIAGE NOTES
Cold sx, was using OTC meds, Saturday went to  because not getting better and having to use O2 more often, has prn O2 3 liters at home, covid neg, not any better per son who is a nurse and is interpretting for her, wearing O2 24 hours 2 liters and had to increase to 3 Liter      Triage Assessment (Adult)       Row Name 04/04/24 0987          Triage Assessment    Airway WDL WDL        Respiratory WDL    Respiratory WDL X  Extremely SOB on 3L        Skin Circulation/Temperature WDL    Skin Circulation/Temperature WDL WDL        Cardiac WDL    Cardiac WDL X  tachycardic     Cardiac Rhythm ST        Peripheral/Neurovascular WDL    Peripheral Neurovascular WDL WDL        Cognitive/Neuro/Behavioral WDL    Cognitive/Neuro/Behavioral WDL WDL

## 2024-04-04 NOTE — ED NOTES
[facility]  ED Nurse Handoff Report    ED Chief complaint: Shortness of Breath      ED Diagnosis:   Final diagnoses:   None       Code Status: not addressed     Allergies:   Allergies   Allergen Reactions    Atorvastatin Muscle Pain (Myalgia)       Patient Story: Cold sx, was using OTC meds, Saturday went to  because not getting better and having to use O2 more often, has prn O2 3 liters at home, covid neg, not any better per son who is a nurse and is interpretting for her, wearing O2 24 hours 2 liters and had to increase to 3 Liter   Focused Assessment:  very sob in triage per triage nurse, has been comfortable at rest sats 100% on 3 liters, complaints of cp dull minimal at rest worse with cough  , frequent cough     Treatments and/or interventions provided: inhaler,   Patient's response to treatments and/or interventions:     To be done/followed up on inpatient unit:  unknown     Does this patient have any cognitive concerns?:  none     Activity level - Baseline/Home:  Unknown  Activity Level - Current:   Wheelchair and Total Care    Patient's Preferred [facility]: [facility]   Needed?: Yes    Isolation: Infection:       Patient tested for COVID 19 prior to admission: YES  Bariatric?: No    Vital Signs:   Vitals:    04/04/24 0921 04/04/24 0925 04/04/24 0951 04/04/24 0957   BP: (!) 167/81 135/72 (!) 143/80 (!) 146/79   Pulse: 98 100 108 86   Resp: (!) 7 28     Temp:       TempSrc:       SpO2: 100% 100% 99% 100%       Cardiac Rhythm:Cardiac Rhythm: Sinus tachycardia    Was the PSS-3 completed:   Yes  What interventions are required if any?               Family Comments: son is a nurse and is here, 2 daughters are nurses at this hospital   OBS brochure/video discussed/provided to patient/family: No              Name of person given brochure if not patient:               Relationship to patient:     For the majority of the shift this patient's behavior was .   Behavioral interventions  performed were .    ED NURSE PHONE NUMBER: [phone]

## 2024-04-04 NOTE — PROGRESS NOTES
RECEIVING UNIT ED HANDOFF REVIEW    ED Nurse Handoff Report was reviewed by: Rossana Narayanan RN on April 4, 2024 at 1:07 PM

## 2024-04-04 NOTE — PHARMACY-ADMISSION MEDICATION HISTORY
"Pharmacist Admission Medication History    Admission medication history is complete. The information provided in this note is only as accurate as the sources available at the time of the update.    Information Source(s): Family member and CareEverywhere/SureScripts via phone    Pertinent Information:   -Duoneb: no fills in Surescripts but daughter reports patient had supply of this leftover from awhile back  -patient's daughter reports patient last had most of her medications about 3 days ago due to patient coughing so much    Changes made to PTA medication list:  Added: Mucinex  Deleted: benzonatate (duplicate)  Changed: Duoneb (reactivated from \"patient not taking\")    Allergies reviewed with patient and updates made in EHR: no    Medication History Completed By: Theodora Ga RPH 4/4/2024 12:45 PM    PTA Med List   Medication Sig Last Dose    amLODIPine (NORVASC) 5 MG tablet TAKE 1 TABLET(5 MG) BY MOUTH DAILY Past Week    aspirin 81 MG chewable tablet Take 1 tablet (81 mg) by mouth daily Past Week    benzonatate (TESSALON) 200 MG capsule Take 1 capsule (200 mg) by mouth 3 times daily as needed for cough  at PRN    famotidine (PEPCID) 40 MG tablet Take 1 tablet (40 mg) by mouth every evening Past Week    guaiFENesin (MUCINEX) 600 MG 12 hr tablet Take 600 mg by mouth 2 times daily as needed for congestion or cough  at PRN    guaiFENesin (ROBITUSSIN) 20 mg/mL liquid Take 10 mLs by mouth every 4 hours as needed for cough  at PRN    hydroxychloroquine (PLAQUENIL) 200 MG tablet Take 1 tablet (200 mg) by mouth 2 times daily Annual Plaquenil toxicity eye screening required. Past Week    hydrOXYzine (ATARAX) 10 MG tablet Take 1 tablet (10 mg) by mouth 3 times daily as needed for anxiety  at PRN    ipratropium - albuterol 0.5 mg/2.5 mg/3 mL (DUONEB) 0.5-2.5 (3) MG/3ML neb solution Take 1 vial (3 mLs) by nebulization every 4 hours as needed for wheezing  at PRN    melatonin 3 MG tablet Take 1 tablet (3 mg) by mouth nightly " as needed for sleep  at PRN    mycophenolate (GENERIC EQUIVALENT) 250 MG capsule TAKE FOUR CAPSULES BY MOUTH TWICE A DAY Past Week    OFEV 100 MG capsule TAKE ONE CAPSULE BY MOUTH TWICE A DAY Past Week    sulfaSALAzine ER (AZULFIDINE EN) 500 MG EC tablet take 2 tabs twice a day. Past Week    Vitamin D3 50 mcg (2000 units) tablet TAKE 1 TABLET BY MOUTH DAILY Past Week

## 2024-04-05 ENCOUNTER — APPOINTMENT (OUTPATIENT)
Dept: PHYSICAL THERAPY | Facility: CLINIC | Age: 74
DRG: 193 | End: 2024-04-05
Attending: HOSPITALIST
Payer: COMMERCIAL

## 2024-04-05 ENCOUNTER — PATIENT OUTREACH (OUTPATIENT)
Dept: GERIATRIC MEDICINE | Facility: CLINIC | Age: 74
End: 2024-04-05
Payer: COMMERCIAL

## 2024-04-05 LAB
ANION GAP SERPL CALCULATED.3IONS-SCNC: 13 MMOL/L (ref 7–15)
ATRIAL RATE - MUSE: 74 BPM
BASOPHILS # BLD AUTO: 0 10E3/UL (ref 0–0.2)
BASOPHILS NFR BLD AUTO: 0 %
BUN SERPL-MCNC: 7.4 MG/DL (ref 8–23)
CALCIUM SERPL-MCNC: 9.7 MG/DL (ref 8.8–10.2)
CHLORIDE SERPL-SCNC: 89 MMOL/L (ref 98–107)
CREAT SERPL-MCNC: 0.48 MG/DL (ref 0.51–0.95)
DEPRECATED HCO3 PLAS-SCNC: 23 MMOL/L (ref 22–29)
DIASTOLIC BLOOD PRESSURE - MUSE: NORMAL MMHG
EGFRCR SERPLBLD CKD-EPI 2021: >90 ML/MIN/1.73M2
ENTEROCOCCUS FAECALIS: NOT DETECTED
ENTEROCOCCUS FAECIUM: NOT DETECTED
EOSINOPHIL # BLD AUTO: 0 10E3/UL (ref 0–0.7)
EOSINOPHIL NFR BLD AUTO: 0 %
ERYTHROCYTE [DISTWIDTH] IN BLOOD BY AUTOMATED COUNT: 12.2 % (ref 10–15)
GLUCOSE BLDC GLUCOMTR-MCNC: 127 MG/DL (ref 70–99)
GLUCOSE BLDC GLUCOMTR-MCNC: 134 MG/DL (ref 70–99)
GLUCOSE BLDC GLUCOMTR-MCNC: 153 MG/DL (ref 70–99)
GLUCOSE BLDC GLUCOMTR-MCNC: 160 MG/DL (ref 70–99)
GLUCOSE BLDC GLUCOMTR-MCNC: 177 MG/DL (ref 70–99)
GLUCOSE SERPL-MCNC: 257 MG/DL (ref 70–99)
HCT VFR BLD AUTO: 33.9 % (ref 35–47)
HGB BLD-MCNC: 11.2 G/DL (ref 11.7–15.7)
IMM GRANULOCYTES # BLD: 0 10E3/UL
IMM GRANULOCYTES NFR BLD: 1 %
INTERPRETATION ECG - MUSE: NORMAL
LISTERIA SPECIES (DETECTED/NOT DETECTED): NOT DETECTED
LYMPHOCYTES # BLD AUTO: 0.3 10E3/UL (ref 0.8–5.3)
LYMPHOCYTES NFR BLD AUTO: 4 %
MAGNESIUM SERPL-MCNC: 1.8 MG/DL (ref 1.7–2.3)
MCH RBC QN AUTO: 29.3 PG (ref 26.5–33)
MCHC RBC AUTO-ENTMCNC: 33 G/DL (ref 31.5–36.5)
MCV RBC AUTO: 89 FL (ref 78–100)
MONOCYTES # BLD AUTO: 0.1 10E3/UL (ref 0–1.3)
MONOCYTES NFR BLD AUTO: 1 %
NEUTROPHILS # BLD AUTO: 7.9 10E3/UL (ref 1.6–8.3)
NEUTROPHILS NFR BLD AUTO: 94 %
NRBC # BLD AUTO: 0 10E3/UL
NRBC BLD AUTO-RTO: 0 /100
OSMOLALITY SERPL: 268 MMOL/KG (ref 280–301)
OSMOLALITY UR: 166 MMOL/KG (ref 100–1200)
P AXIS - MUSE: 59 DEGREES
PLATELET # BLD AUTO: 337 10E3/UL (ref 150–450)
POTASSIUM SERPL-SCNC: 4.5 MMOL/L (ref 3.4–5.3)
PR INTERVAL - MUSE: 168 MS
QRS DURATION - MUSE: 84 MS
QT - MUSE: 388 MS
QTC - MUSE: 430 MS
R AXIS - MUSE: 79 DEGREES
RBC # BLD AUTO: 3.82 10E6/UL (ref 3.8–5.2)
S PNEUM AG SPEC QL: NEGATIVE
SODIUM SERPL-SCNC: 125 MMOL/L (ref 135–145)
SODIUM SERPL-SCNC: 125 MMOL/L (ref 135–145)
SODIUM UR-SCNC: 33 MMOL/L
STAPHYLOCOCCUS AUREUS: NOT DETECTED
STAPHYLOCOCCUS EPIDERMIDIS: DETECTED
STAPHYLOCOCCUS LUGDUNENSIS: NOT DETECTED
STREPTOCOCCUS AGALACTIAE: NOT DETECTED
STREPTOCOCCUS ANGINOSUS GROUP: NOT DETECTED
STREPTOCOCCUS PNEUMONIAE: NOT DETECTED
STREPTOCOCCUS PYOGENES: NOT DETECTED
STREPTOCOCCUS SPECIES: NOT DETECTED
SYSTOLIC BLOOD PRESSURE - MUSE: NORMAL MMHG
T AXIS - MUSE: 29 DEGREES
TSH SERPL DL<=0.005 MIU/L-ACNC: 0.35 UIU/ML (ref 0.3–4.2)
VENTRICULAR RATE- MUSE: 74 BPM
WBC # BLD AUTO: 8.4 10E3/UL (ref 4–11)

## 2024-04-05 PROCEDURE — 999N000111 HC STATISTIC OT IP EVAL DEFER

## 2024-04-05 PROCEDURE — 120N000001 HC R&B MED SURG/OB

## 2024-04-05 PROCEDURE — 87186 SC STD MICRODIL/AGAR DIL: CPT | Performed by: HOSPITALIST

## 2024-04-05 PROCEDURE — 94640 AIRWAY INHALATION TREATMENT: CPT

## 2024-04-05 PROCEDURE — 84443 ASSAY THYROID STIM HORMONE: CPT | Performed by: HOSPITALIST

## 2024-04-05 PROCEDURE — 87899 AGENT NOS ASSAY W/OPTIC: CPT | Performed by: HOSPITALIST

## 2024-04-05 PROCEDURE — 250N000009 HC RX 250: Performed by: HOSPITALIST

## 2024-04-05 PROCEDURE — 83930 ASSAY OF BLOOD OSMOLALITY: CPT | Performed by: HOSPITALIST

## 2024-04-05 PROCEDURE — 258N000003 HC RX IP 258 OP 636: Performed by: HOSPITALIST

## 2024-04-05 PROCEDURE — 99233 SBSQ HOSP IP/OBS HIGH 50: CPT | Performed by: HOSPITALIST

## 2024-04-05 PROCEDURE — 85025 COMPLETE CBC W/AUTO DIFF WBC: CPT | Performed by: HOSPITALIST

## 2024-04-05 PROCEDURE — 250N000012 HC RX MED GY IP 250 OP 636 PS 637: Performed by: HOSPITALIST

## 2024-04-05 PROCEDURE — 250N000011 HC RX IP 250 OP 636: Performed by: HOSPITALIST

## 2024-04-05 PROCEDURE — 97161 PT EVAL LOW COMPLEX 20 MIN: CPT | Mod: GP

## 2024-04-05 PROCEDURE — 83735 ASSAY OF MAGNESIUM: CPT | Performed by: HOSPITALIST

## 2024-04-05 PROCEDURE — 80048 BASIC METABOLIC PNL TOTAL CA: CPT | Performed by: HOSPITALIST

## 2024-04-05 PROCEDURE — 84295 ASSAY OF SERUM SODIUM: CPT | Performed by: HOSPITALIST

## 2024-04-05 PROCEDURE — 36415 COLL VENOUS BLD VENIPUNCTURE: CPT | Performed by: HOSPITALIST

## 2024-04-05 PROCEDURE — 97530 THERAPEUTIC ACTIVITIES: CPT | Mod: GP

## 2024-04-05 PROCEDURE — 250N000013 HC RX MED GY IP 250 OP 250 PS 637: Performed by: HOSPITALIST

## 2024-04-05 PROCEDURE — 99233 SBSQ HOSP IP/OBS HIGH 50: CPT | Performed by: INTERNAL MEDICINE

## 2024-04-05 PROCEDURE — 999N000157 HC STATISTIC RCP TIME EA 10 MIN

## 2024-04-05 PROCEDURE — 87205 SMEAR GRAM STAIN: CPT | Performed by: HOSPITALIST

## 2024-04-05 PROCEDURE — 93005 ELECTROCARDIOGRAM TRACING: CPT

## 2024-04-05 RX ORDER — SODIUM CHLORIDE 9 MG/ML
INJECTION, SOLUTION INTRAVENOUS CONTINUOUS
Status: DISCONTINUED | OUTPATIENT
Start: 2024-04-05 | End: 2024-04-06

## 2024-04-05 RX ADMIN — SULFASALAZINE 1000 MG: 500 TABLET, DELAYED RELEASE ORAL at 08:07

## 2024-04-05 RX ADMIN — HYDROXYCHLOROQUINE SULFATE 200 MG: 200 TABLET ORAL at 08:07

## 2024-04-05 RX ADMIN — FAMOTIDINE 40 MG: 20 TABLET ORAL at 20:52

## 2024-04-05 RX ADMIN — INSULIN ASPART 1 UNITS: 100 INJECTION, SOLUTION INTRAVENOUS; SUBCUTANEOUS at 17:47

## 2024-04-05 RX ADMIN — METHYLPREDNISOLONE SODIUM SUCCINATE 40 MG: 40 INJECTION, POWDER, FOR SOLUTION INTRAMUSCULAR; INTRAVENOUS at 16:29

## 2024-04-05 RX ADMIN — Medication 50 MCG: at 08:07

## 2024-04-05 RX ADMIN — ACETAMINOPHEN 650 MG: 325 TABLET, FILM COATED ORAL at 12:39

## 2024-04-05 RX ADMIN — CEFTRIAXONE SODIUM 2 G: 2 INJECTION, POWDER, FOR SOLUTION INTRAMUSCULAR; INTRAVENOUS at 13:27

## 2024-04-05 RX ADMIN — IPRATROPIUM BROMIDE AND ALBUTEROL SULFATE 3 ML: .5; 3 SOLUTION RESPIRATORY (INHALATION) at 07:53

## 2024-04-05 RX ADMIN — SODIUM CHLORIDE: 9 INJECTION, SOLUTION INTRAVENOUS at 14:49

## 2024-04-05 RX ADMIN — MYCOPHENOLATE MOFETIL 1000 MG: 250 CAPSULE ORAL at 08:07

## 2024-04-05 RX ADMIN — SULFASALAZINE 1000 MG: 500 TABLET, DELAYED RELEASE ORAL at 20:52

## 2024-04-05 RX ADMIN — VANCOMYCIN HYDROCHLORIDE 1500 MG: 10 INJECTION, POWDER, LYOPHILIZED, FOR SOLUTION INTRAVENOUS at 10:25

## 2024-04-05 RX ADMIN — HYDROXYZINE HYDROCHLORIDE 10 MG: 10 TABLET ORAL at 21:00

## 2024-04-05 RX ADMIN — HYDROXYCHLOROQUINE SULFATE 200 MG: 200 TABLET ORAL at 20:52

## 2024-04-05 RX ADMIN — METHYLPREDNISOLONE SODIUM SUCCINATE 40 MG: 40 INJECTION, POWDER, FOR SOLUTION INTRAMUSCULAR; INTRAVENOUS at 04:52

## 2024-04-05 RX ADMIN — AZITHROMYCIN DIHYDRATE 250 MG: 250 TABLET ORAL at 08:08

## 2024-04-05 RX ADMIN — BENZONATATE 200 MG: 100 CAPSULE ORAL at 08:20

## 2024-04-05 RX ADMIN — AMLODIPINE BESYLATE 5 MG: 5 TABLET ORAL at 08:07

## 2024-04-05 RX ADMIN — ASPIRIN 81 MG CHEWABLE TABLET 81 MG: 81 TABLET CHEWABLE at 08:07

## 2024-04-05 RX ADMIN — GUAIFENESIN AND DEXTROMETHORPHAN 10 ML: 100; 10 SYRUP ORAL at 21:00

## 2024-04-05 RX ADMIN — GUAIFENESIN AND DEXTROMETHORPHAN 10 ML: 100; 10 SYRUP ORAL at 12:40

## 2024-04-05 ASSESSMENT — ACTIVITIES OF DAILY LIVING (ADL)
ADLS_ACUITY_SCORE: 21
ADLS_ACUITY_SCORE: 20
ADLS_ACUITY_SCORE: 21
ADLS_ACUITY_SCORE: 21
ADLS_ACUITY_SCORE: 20
ADLS_ACUITY_SCORE: 21
ADLS_ACUITY_SCORE: 20
ADLS_ACUITY_SCORE: 21
ADLS_ACUITY_SCORE: 22
ADLS_ACUITY_SCORE: 20
ADLS_ACUITY_SCORE: 21
ADLS_ACUITY_SCORE: 20
ADLS_ACUITY_SCORE: 22
ADLS_ACUITY_SCORE: 20

## 2024-04-05 NOTE — PROGRESS NOTES
[facility]   INPATIENT PULMONARY/IP PROGRESS NOTE    April 5, 2024         Assessment and Plan:     1. 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 O2 improving from 4L/m to 1L/m. Agree with current abx and steroids. The new RLL ggo's are likely an ILD exacerbation vs infection vs fluid. Given improvement without diuresis, likely infection/inflammation.   - doing better but not back to baseline from a dyspnea standpoint   - cont empiric antibiotics, steroids and diuresis   - if symptomatically better this weekend, recommend discharge with close followup in ILD clinic     Billing: I spent a total of 50min for a subsequent hospital visit including review of chart, images, labs and notes (15min), bedside exam and dialogue with the patient (15min), and post-visit documentation, decision making and discussion with primary team and nursing (20min).     Bhargav Orantes MD  Associate Professor of Medicine  Section of Interventional Pulmonology   Division of Pulmonary, Allergy, Critical Care and Sleep Medicine   [facility], [employer]  Pager: [phone]   Office: [phone]  Email: [email or web address]          Interval History:     Feeling better. O2 back down to baseline            Review of Systems:   12-point ROS was reviewed and normal other than stated above.           Medications:     Current Facility-Administered Medications   Medication Dose Route Frequency Provider Last Rate Last Admin    amLODIPine (NORVASC) tablet 5 mg  5 mg Oral Daily Donna Escobedo MD   5 mg at 04/05/24 0807    aspirin (ASA) chewable tablet 81 mg  81 mg Oral Daily Donan Escobedo MD   81 mg at 04/05/24 0807    azithromycin (ZITHROMAX) tablet 250 mg  250 mg Oral Daily Donna Escobedo MD   250 mg at 04/05/24 0808    cefTRIAXone (ROCEPHIN) 2 g vial to attach to  ml bag for ADULTS or NS 50 ml bag for PEDS  2 g Intravenous Q24H Donna Escobedo MD   2 g at 04/05/24 1327    famotidine (PEPCID) tablet  40 mg  40 mg Oral QPM Donna Escobedo MD   40 mg at 04/04/24 2014    hydroxychloroquine (PLAQUENIL) tablet 200 mg  200 mg Oral BID Donna Escobedo MD   200 mg at 04/05/24 0807    insulin aspart (NovoLOG) injection (RAPID ACTING)  1-7 Units Subcutaneous TID AC Donna Escobedo MD        insulin aspart (NovoLOG) injection (RAPID ACTING)  1-5 Units Subcutaneous At Bedtime Donna Escoebdo MD        methylPREDNISolone sodium succinate (SOLU-MEDROL) injection 40 mg  40 mg Intravenous Q12H Donna Escobedo MD   40 mg at 04/05/24 0452    [Held by provider] mycophenolate (GENERIC EQUIVALENT) capsule 1,000 mg  1,000 mg Oral BID Donna Escobedo MD   1,000 mg at 04/05/24 0807    nintedanib (OFEV) capsule 100 mg  100 mg Oral BID Donna Escobedo MD   100 mg at 04/05/24 0808    sodium chloride (PF) 0.9% PF flush 3 mL  3 mL Intracatheter Q8H Donna Escobedo MD   3 mL at 04/05/24 1415    sulfaSALAzine ER (AZULFIDINE EN) EC tablet 1,000 mg  1,000 mg Oral BID Donna Escobedo MD   1,000 mg at 04/05/24 0807    vitamin D3 (CHOLECALCIFEROL) tablet 50 mcg  50 mcg Oral Daily Donna Escobedo MD   50 mcg at 04/05/24 0807     Current Facility-Administered Medications   Medication Dose Route Frequency Provider Last Rate Last Admin    acetaminophen (TYLENOL) tablet 650 mg  650 mg Oral Q4H PRN Donna Escobedo MD   650 mg at 04/05/24 1239    Or    acetaminophen (TYLENOL) Suppository 650 mg  650 mg Rectal Q4H PRN Donna Escobedo MD        benzocaine-menthol (CHLORASEPTIC) 6-10 MG lozenge 1 lozenge  1 lozenge Buccal Q1H PRN Donna Escobedo MD        benzonatate (TESSALON) capsule 200 mg  200 mg Oral TID PRN Donna Escobedo MD   200 mg at 04/05/24 0820    calcium carbonate (TUMS) chewable tablet 1,000 mg  1,000 mg Oral 4x Daily PRN Donna Escobedo MD        glucose gel 15-30 g  15-30 g Oral Q15 Min PRDonna Manzano MD        Or    dextrose 50 % injection 25-50 mL  25-50 mL Intravenous Q15 Min Donna Min MD         Or    glucagon injection 1 mg  1 mg Subcutaneous Q15 Min PRDonna Manzano MD        guaiFENesin-dextromethorphan (ROBITUSSIN DM) 100-10 MG/5ML syrup 10 mL  10 mL Oral Q4H PRN Donna Escobedo MD   10 mL at 04/05/24 1240    HYDROmorphone (DILAUDID) half-tab 1 mg  1 mg Oral Q4H PRN Donna Escobedo MD        hydrOXYzine HCl (ATARAX) tablet 10 mg  10 mg Oral TID PRN Donna Escobedo MD        ipratropium - albuterol 0.5 mg/2.5 mg/3 mL (DUONEB) neb solution 3 mL  3 mL Nebulization Q4H PRDonna Manzano MD   3 mL at 04/05/24 0753    lidocaine (LMX4) cream   Topical Q1H PRDonna Manzano MD        lidocaine 1 % 0.1-1 mL  0.1-1 mL Other Q1H PRDonna Manzano MD        naloxone (NARCAN) injection 0.2 mg  0.2 mg Intravenous Q2 Min PRDonna Manzano MD        Or    naloxone (NARCAN) injection 0.4 mg  0.4 mg Intravenous Q2 Min PRDonna Manzano MD        Or    naloxone (NARCAN) injection 0.2 mg  0.2 mg Intramuscular Q2 Min Donna Min MD        Or    naloxone (NARCAN) injection 0.4 mg  0.4 mg Intramuscular Q2 Min PRDonna Manzano MD        prochlorperazine (COMPAZINE) injection 5 mg  5 mg Intravenous Q6H PRDonna Manzano MD        Or    prochlorperazine (COMPAZINE) tablet 5 mg  5 mg Oral Q6H PRDonna Manzano MD        Or    prochlorperazine (COMPAZINE) suppository 12.5 mg  12.5 mg Rectal Q12H PRDonna Manzano MD        senna-docusate (SENOKOT-S/PERICOLACE) 8.6-50 MG per tablet 1 tablet  1 tablet Oral BID PRN Donna Escobedo, MD        Or    senna-docusate (SENOKOT-S/PERICOLACE) 8.6-50 MG per tablet 2 tablet  2 tablet Oral BID Donna Min MD        sodium chloride (PF) 0.9% PF flush 3 mL  3 mL Intracatheter q1 min Donna Min MD   3 mL at 04/05/24 0457            Physical Exam:   Temp:  [97.9  F (36.6  C)-98.8  F (37.1  C)] 97.9  F (36.6  C)  Pulse:  [] 79  Resp:  [20-26] 20  BP: (116-146)/(65-78) 146/78  SpO2:  [96 %-98 %] 97  %    Intake/Output Summary (Last 24 hours) at 2024 1502  Last data filed at 2024 1352  Gross per 24 hour   Intake 260 ml   Output --   Net 260 ml     Temp:  [97.9  F (36.6  C)-98.8  F (37.1  C)] 97.9  F (36.6  C)  Pulse:  [] 79  Resp:  [20-26] 20  BP: (116-146)/(65-78) 146/78  SpO2:  [96 %-98 %] 97 %    Intake/Output Summary (Last 24 hours) at 2024 1502  Last data filed at 2024 1352  Gross per 24 hour   Intake 260 ml   Output --   Net 260 ml     Temp  Av.2  F (36.8  C)  Min: 97.6  F (36.4  C)  Max: 98.8  F (37.1  C)      Pulse  Av.3  Min: 79  Max: 108 Resp  Av.6  Min: 7  Max: 28  SpO2  Av.7 %  Min: 92 %  Max: 100 %         Intake/Output Summary (Last 24 hours) at 2024 1502  Last data filed at 2024 1352  Gross per 24 hour   Intake 260 ml   Output --   Net 260 ml     Wt Readings from Last 4 Encounters:   24 67.4 kg (148 lb 11.2 oz)   24 67.1 kg (148 lb)   24 67.1 kg (148 lb)   24 69.4 kg (153 lb)       Constitutional:   Awake, alert and in no apparent distress     Eyes:   nonicteric     ENT:    oral mucosa moist without lesions     Neck:   Supple without supraclavicular or cervical lymphadenopathy     Lungs:   Bibasilar rales     Cardiovascular:   Normal S1 and S2.  RRR.  No murmur, gallop or rub.     Abdomen:   NABS, soft, nontender, nondistended.  No HSM.     Musculoskeletal:   No edema     Neurologic:   Alert and conversant.     Skin:   Warm, dry.  No rash on limited exam.             Current Laboratory Data:   All laboratory and imaging data reviewed.    Results for orders placed or performed during the hospital encounter of 24 (from the past 24 hour(s))   Glucose by meter   Result Value Ref Range    GLUCOSE BY METER POCT 138 (H) 70 - 99 mg/dL   Glucose by meter   Result Value Ref Range    GLUCOSE BY METER POCT 219 (H) 70 - 99 mg/dL   Glucose by meter   Result Value Ref Range    GLUCOSE BY METER POCT 134 (H) 70 - 99 mg/dL   EKG  12-lead, tracing only   Result Value Ref Range    Systolic Blood Pressure  mmHg    Diastolic Blood Pressure  mmHg    Ventricular Rate 74 BPM    Atrial Rate 74 BPM    ID Interval 168 ms    QRS Duration 84 ms     ms    QTc 430 ms    P Axis 59 degrees    R AXIS 79 degrees    T Axis 29 degrees    Interpretation ECG       Sinus rhythm  Early repolarization  Normal ECG  When compared with ECG of 04-APR-2024 09:27,  No significant change was found  Confirmed by MD MAYA DEMOS ([id number]) on 4/5/2024 8:58:33 AM     Glucose by meter   Result Value Ref Range    GLUCOSE BY METER POCT 127 (H) 70 - 99 mg/dL   CBC with platelets differential    Narrative    The following orders were created for panel order CBC with platelets differential.  Procedure                               Abnormality         Status                     ---------                               -----------         ------                     CBC with platelets and d...[[id number]]  Abnormal            Final result                 Please view results for these tests on the individual orders.   Basic metabolic panel   Result Value Ref Range    Sodium 125 (L) 135 - 145 mmol/L    Potassium 4.5 3.4 - 5.3 mmol/L    Chloride 89 (L) 98 - 107 mmol/L    Carbon Dioxide (CO2) 23 22 - 29 mmol/L    Anion Gap 13 7 - 15 mmol/L    Urea Nitrogen 7.4 (L) 8.0 - 23.0 mg/dL    Creatinine 0.48 (L) 0.51 - 0.95 mg/dL    GFR Estimate >90 >60 mL/min/1.73m2    Calcium 9.7 8.8 - 10.2 mg/dL    Glucose 257 (H) 70 - 99 mg/dL   Magnesium   Result Value Ref Range    Magnesium 1.8 1.7 - 2.3 mg/dL   CBC with platelets and differential   Result Value Ref Range    WBC Count 8.4 4.0 - 11.0 10e3/uL    RBC Count 3.82 3.80 - 5.20 10e6/uL    Hemoglobin 11.2 (L) 11.7 - 15.7 g/dL    Hematocrit 33.9 (L) 35.0 - 47.0 %    MCV 89 78 - 100 fL    MCH 29.3 26.5 - 33.0 pg    MCHC 33.0 31.5 - 36.5 g/dL    RDW 12.2 10.0 - 15.0 %    Platelet Count 337 150 - 450 10e3/uL    % Neutrophils 94 %    % Lymphocytes 4 %     "% Monocytes 1 %    % Eosinophils 0 %    % Basophils 0 %    % Immature Granulocytes 1 %    NRBCs per 100 WBC 0 <1 /100    Absolute Neutrophils 7.9 1.6 - 8.3 10e3/uL    Absolute Lymphocytes 0.3 (L) 0.8 - 5.3 10e3/uL    Absolute Monocytes 0.1 0.0 - 1.3 10e3/uL    Absolute Eosinophils 0.0 0.0 - 0.7 10e3/uL    Absolute Basophils 0.0 0.0 - 0.2 10e3/uL    Absolute Immature Granulocytes 0.0 <=0.4 10e3/uL    Absolute NRBCs 0.0 10e3/uL   TSH with free T4 reflex   Result Value Ref Range    TSH 0.35 0.30 - 4.20 uIU/mL   Streptococcus pneumoniae antigen    Specimen: Urine, Clean Catch   Result Value Ref Range    Streptococcus pneumoniae antigen Negative Negative   Glucose by meter   Result Value Ref Range    GLUCOSE BY METER POCT 160 (H) 70 - 99 mg/dL     .No lab results found in last 7 days.           Previous Pulmonary Function Testing   ..Most Recent [facility] Pulmonary Function Testing    FVC-Pred   Date Value Ref Range Status   11/01/2023 2.40 L    04/21/2023 2.44 L    01/25/2023 2.44 L    10/26/2022 2.44 L    05/18/2022 2.47 L      FVC-Pre   Date Value Ref Range Status   11/01/2023 1.70 L    04/21/2023 1.53 L    01/25/2023 1.43 L    10/26/2022 1.51 L    05/18/2022 1.55 L      FVC-%Pred-Pre   Date Value Ref Range Status   11/01/2023 70 %    04/21/2023 62 %    01/25/2023 58 %    10/26/2022 62 %    05/18/2022 63 %      FEV1-Pre   Date Value Ref Range Status   11/01/2023 1.51 L    04/21/2023 1.46 L    01/25/2023 1.36 L    10/26/2022 1.42 L    05/18/2022 1.45 L      FEV1-%Pred-Pre   Date Value Ref Range Status   11/01/2023 80 %    04/21/2023 75 %    01/25/2023 70 %    10/26/2022 73 %    05/18/2022 74 %      FEV1FVC-Pred   Date Value Ref Range Status   11/01/2023 79 %    04/21/2023 79 %    01/25/2023 79 %    10/26/2022 79 %    05/18/2022 79 %      FEV1FVC-Pre   Date Value Ref Range Status   11/01/2023 89 %    04/21/2023 96 %    01/25/2023 95 %    10/26/2022 94 %    05/18/2022 93 %      No results found for: \"20029\"  FEFMax-Pred "   Date Value Ref Range Status   11/01/2023 5.24 L/sec    04/21/2023 5.34 L/sec    01/25/2023 5.34 L/sec    10/26/2022 5.34 L/sec    05/18/2022 5.42 L/sec      FEFMax-Pre   Date Value Ref Range Status   11/01/2023 4.49 L/sec    04/21/2023 5.26 L/sec    01/25/2023 4.55 L/sec    10/26/2022 6.81 L/sec    05/18/2022 5.35 L/sec      FEFMax-%Pred-Pre   Date Value Ref Range Status   11/01/2023 85 %    04/21/2023 98 %    01/25/2023 85 %    10/26/2022 127 %    05/18/2022 98 %      ExpTime-Pre   Date Value Ref Range Status   11/01/2023 5.82 sec    04/21/2023 5.78 sec    01/25/2023 5.34 sec    10/26/2022 6.66 sec    05/18/2022 2.45 sec      FIFMax-Pre   Date Value Ref Range Status   11/01/2023 5.68 L/sec    04/21/2023 4.92 L/sec    01/25/2023 3.58 L/sec    10/26/2022 3.96 L/sec    05/18/2022 3.47 L/sec      FEV1FEV6-Pred   Date Value Ref Range Status   11/01/2023 79 %    04/21/2023 79 %    01/25/2023 79 %    10/26/2022 79 %    05/18/2022 79 %      FEV1FEV6-Pre   Date Value Ref Range Status   11/01/2023 84 %    04/21/2023 96 %    01/25/2023 95 %    10/26/2022 94 %    05/18/2022 93 %

## 2024-04-05 NOTE — PROGRESS NOTES
[facility] Care Coordination Contact      TRANSITIONS OF CARE (EDD) LOG    EDD tasks should be completed by the CC within one (1) business day of notification of each transition. Follow up contact with member is required after return to their usual care setting.  Note:  If CC finds out about the transitions fifteen (15) days or more after the member has returned to their usual care setting, no EDD log is needed. However, the CC should check in with the member to discuss the transition process, any changes needed to the care plan and document it in a case note.     Member Name:  Krystian Boland MCO Name:  Vadim MCO/Health Plan Member ID#: [id number]   Product: [facility] Care Coordinator Contact:  JAQUELIN Langley Agency/County/Care System: [facility]   Transition Communication Actions from Care Management Contact   Transition #1   Notification Date: 4/4/24 Transition Date:   4/4/24 Transition From: Home     Is this the member s usual care setting?               yes Transition To: [facility]   Transition Type:  Unplanned    Documentation from conversation with the member/responsible party, provider, discharging and receiving facility:   Date: 4/4/24: Received notification of admission to hospital with dx of Acute respiratory failure with community acquired pneumonia.  Member's daughter, David called this Care Coordinator prior to member being officially admitted to the hospital regarding the ED/hospital admission. David shared that the hospital plan was to admit member to the hospital due to pneumonia. Reviewed with David that while member has a scheduled assessment with this Care Coordinator for the 8th. Reviewed that this appointment can be rescheduled once more is know about the hospitalization. Also talked with David that if the hospital recommends TCU stay, to strongly consider taking this intervention due to family's report of significant changes in ability  with daily care. Reviewed that if TCU is recommended it would be for a short period of time, and that family can still bring in food, but the main focus would be for therapy to gain strength. David shared that she would consider it if it is recommended. Reviewed and update care plan as needed.  No community service to place on hold.  Transition log initiated.   PCPSamantha Cassandra R, notified of hospitalization via EMR.  Vickie Molina, [facility]  [phone]      4/5/24: As member was officially admitted to the hospital on the 4th this Care Coordinator contacted Hospital /discharge plannerJasmin via the [employer] Transitional Care Hand-In Process, with community care plan included.   Vickie Molina, [facility]  [phone]       Transition #2   Notification Date: 4/9/24 Transition Date:   4/8/24 Transition From: [facility]     Is this the member s usual care setting?               no Transition To: Home   Transition Type:  Planned    Documentation from conversation with the member/responsible party, provider, discharging and receiving facility:   Date: 4/9/24: Received notification of transition to home.  CC contacted adult daughter David  and reviewed discharge summary.  Member has a follow-up appointment with PCP in 7 days: Yes: scheduled on 4/16/24    Member has had a change in condition: Yes: Change in oxygen needs and increased ADL needs.  Home visit needed: Yes: this was already scheduled and due to hospitalization needed to be rescheduled. Rescheduled early reassessment for 4/16/24 at 2:30pm. Called [employer] to scheduled  and job number [id number].  Care plan reviewed and updated.  No services to resume.   New referrals placed: No  Transition log completed.   PCP, Adrianna Singh, notified of transition back to home via EMR.  Vickie Molina, [facility]  [phone]             *RETURN TO USUAL CARE  SETTING: *Complete tasks below when the member is discharging TO their usual care setting within one (1) business day of notification..      For situations where the Care Coordinator is notified of the discharge prior to the date of discharge, the Care Coordinator must follow up with the member or designated representative to confirm that discharge actually occurred and discuss required EDD tasks as outlined in the EDD Instructions.  (This includes situations where it may be a  new  usual care setting for the member. (i.e., a community member who decides upon permanent nursing home placement following hospitalization and rehab).    Discuss with Member/Responsible Party:    Check  Yes  - if the member, family member and/or SNF/facility staff manages the following:    If  No  provide explanation in the comments section.          Date completed: 4/6/24 Communicated with member or their designated representative about the following:  care transition process; about changes to the member s health status; plan of care updates; education about transitions and how to prevent unplanned transitions/readmissions    Four Pillars for Optimal Transition:    Check  Yes  - if the member, family member and/or SNF/facility staff manages the following:    If  No  provide explanation in the comments section.          [x]  Yes     []  No Does the member have a follow-up appointment scheduled with primary care or specialist? (Mental health hospitalizations--the appt. should be w/in 7 days)              For mental health hospitalizations:  []  Yes     []  No     Does the member have a follow-up appointment scheduled with a mental health practitioner within 7 days of discharge?  [x]  Yes     []  No     Has a medication review been completed with member? If no, refer to PCP, home care nurse, MTM, pharmacist  [x]  Yes     []  No     Can the member manage their medications or is there a system in place to manage medications (e.g. home care  set-up)?         [x]  Yes     []  No     Can the member verbalize warning signs and symptoms to watch for and how to respond?  [x]  Yes     []  No     Does the member have a copy of and understand their discharge instructions?  If no, assist to obtain copy of discharge instructions, review discharge instructions, and assist to contact PCP to discuss questions about their recent hospitalization.  [x]  Yes     []  No     Does the member have adequate food, housing and transportation?  If no, add goal and discuss additional supports available to the member                                                                                                                                                                                 [x]  Yes     []  No     Is the member safe in their home?  If no, document needs and support provided                                                                                                                                                                          []  Yes     [x]  No     Are there any concerns of vulnerability, abuse, or neglect?  If yes, document concerns and actions taken by Care Coordinator as a mandated                                                                                                                                                                              [x]  Yes     []  No     Does the member use a Personal Health Care Record?  Check  Yes  if visit summary, discharge summary, and/or healthcare summary are being used as a PHR.                                                                                                                                                                                  [x]  Yes     []  No     Have you reviewed the discharge summary with the member? If  No  provide explanation in comments.  [x]  Yes     []  No     Have you updated the member s care plan/support plan? Add new diagnosis, medications, treatments,  goals & interventions, as applicable. If No, provide explanation in comments.    Comments:    Will update POC at early reassessment ([employer])       Notes from conversation with the member/responsible party, provider, discharging and receiving facility (as applicable): See above.

## 2024-04-05 NOTE — PROGRESS NOTES
Shift Summary [id number]    Admitting Diagnosis: ILD (interstitial lung disease)   Acute respiratory failure with hypoxia   Community acquired pneumonia of right lower lobe of lung  Vitals: VSS, to wean off O2 use PRN if needed, but when pt cough O2 sat drop to lower 80- 75%, so pt is now on 1L NC  Pain Denied pain but when coughting pt turn to have general pain, with was managed with PRN Tylenol.   A&Ox$  Voiding: in the BR  Mobility: independent, Family  Tele: None  ABNL LAB/BG: Na 125, Critical lab for  Gram Positive Cocci in Cluster and MRSE, Mg 1.8, K+ 4.5  CMS: intact  Dressing/Lines: Infusing NS at 100 mL/hr with intermittent abx(Rocephin Q24hrs), PO Azithromycin  Diet: Regular diet with BS check, Pt on Fluid Restriction of 1800 cc    Plan: Encourage IS, Pt on schedule Neb treatment and PRN Tessalon, Robitussin. Pt on Schedule Solumedrol. OT/PT consulted.  Daughter at bedside helpful with cares and interpretation, Tibetan speaking but understand basic.

## 2024-04-05 NOTE — PROGRESS NOTES
04/05/24 1000   Appointment Info   Signing Clinician's Name / Credentials (PT) Vida Oswald DPT   Rehab Comments (PT) No OT needs       Present yes   Language Tibetin via dtr, waiver signed in ED   Living Environment   People in Home child(jennie), adult;spouse   Current Living Arrangements house   Home Accessibility stairs to enter home;stairs within home   Number of Stairs, Main Entrance 1   Stair Railings, Main Entrance none   Number of Stairs, Within Home, Primary eight   Stair Railings, Within Home, Primary railings not in good condition, need repair for safe use   Transportation Anticipated family or friend will provide   Living Environment Comments Lives with dtr in house 1 [address], can stay on main level. SOmeone is always home with her. Tub shower   Self-Care   Usual Activity Tolerance moderate   Current Activity Tolerance moderate   Regular Exercise No   Equipment Currently Used at Home shower chair   Fall history within last six months no   Activity/Exercise/Self-Care Comment IND no AD, on 2-4L NC at home with exertion   PT Total Evaluation Time   PT Eval, Low Complexity Minutes (46652) 5   Physical Therapy Goals   PT Frequency One time eval and treatment only   PT Predicted Duration/Target Date for Goal Attainment 04/12/24   PT Goals Bed Mobility;Transfers;Gait;Stairs   PT: Bed Mobility Supervision/stand-by assist;Supine to/from sit;Rolling;Bridging   PT: Transfers Supervision/stand-by assist;Sit to/from stand;Bed to/from chair;Assistive device   PT: Gait Supervision/stand-by assist;150 feet   PT: Stairs Supervision/stand-by assist;1 stair   Interventions   Interventions Quick Adds Therapeutic Activity;Gait Training   Therapeutic Activity   Therapeutic Activities: dynamic activities to improve functional performance Minutes (16339) 25   Symptoms Noted During/After Treatment Shortness of breath   Treatment Detail/Skilled Intervention Pt bedside, dtr present and interpreting. O2 95% on  "RA. Time spent arranigng equipment and O2, started on 2L per dtr pt uses at home with amb. IND with all transfers, therapist managing lines and monitoring vitals throughout. Amb 400ft SBA with 1 seated rest, monitoring vitals throughout, VS on 2L. Trialed stairs x 4 with no railings, SBA, O2 bumped to 3L d/t \" heaviness\" in head. Resolved with rest. Trialed sit<>Stand x10 with no AD. Encouraged amb 3x/daily   Gait Training   Distance in Feet 400ft SBA/IND no AD   PT Discharge Planning   PT Plan DC   PT Discharge Recommendation (DC Rec) home with assist;home with outpatient pulmonary rehab   PT Rationale for DC Rec Pt moving IND, is at baseline for mobility though limtied by SOB so would benefit from OP pulmonary rehab. Pt has 24/7 family support at home and is safe to DC.   PT Brief overview of current status IND, rec amb 3x/daily with RN or family   Total Session Time   Timed Code Treatment Minutes 25   Total Session Time (sum of timed and untimed services) 30       "

## 2024-04-05 NOTE — CONSULTS
Care Management Initial Consult    General Information  Assessment completed with: Children, Dtr Zachariah  Type of CM/SW Visit: Initial Assessment    Primary Care Provider verified and updated as needed:     Readmission within the last 30 days: no previous admission in last 30 days      Reason for Consult: discharge planning  Advance Care Planning:            Communication Assessment  Patient's communication style: spoken language (non-English)    Hearing Difficulty or Deaf: no   Wear Glasses or Blind: no    Cognitive  Cognitive/Neuro/Behavioral: WDL                      Living Environment:   People in home: child(jennie), adult  Zachariah  Current living Arrangements: house      Able to return to prior arrangements: yes       Family/Social Support:  Care provided by: self, child(jennie)  Provides care for: no one  Marital Status:   Children          Description of Support System: Involved    Support Assessment: Adequate family and caregiver support    Current Resources:   Patient receiving home care services: No     Community Resources: None  Equipment currently used at home: shower chair  Supplies currently used at home: Incontinence Supplies    Employment/Financial:  Employment Status: retired        Financial Concerns: none           Does the patient's insurance plan have a 3 day qualifying hospital stay waiver?  No    Lifestyle & Psychosocial Needs:  Social Determinants of Health     Food Insecurity: Low Risk  (11/10/2023)    Food Insecurity     Within the past 12 months, did you worry that your food would run out before you got money to buy more?: No     Within the past 12 months, did the food you bought just not last and you didn t have money to get more?: No   Depression: Not at risk (3/7/2024)    PHQ-2     PHQ-2 Score: 0   Housing Stability: Low Risk  (11/10/2023)    Housing Stability     Do you have housing? : Yes     Are you worried about losing your housing?: No   Tobacco Use: Low Risk  (4/1/2024)    Patient  History     Smoking Tobacco Use: Never     Smokeless Tobacco Use: Never     Passive Exposure: Not on file   Financial Resource Strain: Low Risk  (11/10/2023)    Financial Resource Strain     Within the past 12 months, have you or your family members you live with been unable to get utilities (heat, electricity) when it was really needed?: No   Alcohol Use: Not At Risk (11/10/2023)    AUDIT-C     Frequency of Alcohol Consumption: Never     Average Number of Drinks: Patient does not drink     Frequency of Binge Drinking: Never   Transportation Needs: Low Risk  (11/10/2023)    Transportation Needs     Within the past 12 months, has lack of transportation kept you from medical appointments, getting your medicines, non-medical meetings or appointments, work, or from getting things that you need?: No   Physical Activity: Inactive (11/10/2023)    Exercise Vital Sign     Days of Exercise per Week: 0 days     Minutes of Exercise per Session: 0 min   Interpersonal Safety: Not on file   Stress: Not on file   Social Connections: Unknown (2/10/2021)    Social Connection and Isolation Panel [NHANES]     Frequency of Communication with Friends and Family: Not on file     Frequency of Social Gatherings with Friends and Family: Not on file     Attends Church Services: Not on file     Active Member of Clubs or Organizations: Not on file     Attends Club or Organization Meetings: Not on file     Marital Status:        Functional Status:  Prior to admission patient needed assistance:   Dependent ADLs:: Bathing, Incontinence, Toileting  Dependent IADLs:: Shopping, Laundry, Cooking, Cleaning, Medication Management, Meal Preparation, Transportation  Assesssment of Functional Status: Not at  functional baseline    Mental Health Status:  Mental Health Status: No Current Concerns       Chemical Dependency Status:  Chemical Dependency Status: No Current Concerns             Values/Beliefs:  Spiritual, Cultural Beliefs, Church  Practices, Values that affect care: no               Additional Information:  Spoke with patients daughter Zachariah  who is an RN at this hospital regarding discharge orders. Zachariah states patient and her spouse lives with her. Patient is at baseline independent with mobility however due to some dementia patient needs  close watching. Family assist with showers and medication,transportation and house hold chores.  Patient has Oxygen through [employer]. Patient uses o2 with activity. ,however due to recent illness patient has been using continuous o2.  Zachariah stated patient has a  care  who will be visiting patient to do an assessment for PCA services at home. Zachariah and her sister has been caring for patient and her spouse.  Care coordinator will cont to follow for discharge plans.     Markos Saavedra RN -[phone]

## 2024-04-05 NOTE — PROGRESS NOTES
[facility] Care Coordination Contact    [facility]  Ambulatory Care Coordination to Inpatient Care Management   Hand-In Communication    Date:  April 5, 2024  Name: Krystian Boland is enrolled in [facility] Care Coordination program and I am the Lead Care Coordinator.  CC Contact Information: JAQUELIN Langley: [phone]  Payor Source: Payor: [facility] / [facility] / Product Type: HMO /   Current services in place:     Please see the CC Snaphot and Care Management Flowsheets for specific details of this Krystian Boland care plan.   Additional details/specific concerns r/t this admission:    No additional concerns at this time . Member has strong family support who are very involved in her care needs. Family had reported a significant change in members abilities and had requested a new assessment, this assessment had been scheduled for 4/8/24, but is on hold due to hospitalization. Family is seeking paid PCA positions as they have had to cut back or stop working to care for member.     I will follow this admission in Epic. Please feel free to contact me with questions or for further collaboration in discharge planning.    JAQUELIN Langley  [facility]  [phone]

## 2024-04-05 NOTE — PHARMACY-VANCOMYCIN DOSING SERVICE
Pharmacy Vancomycin Initial Note  Date of Service 2024  Patient's  1950  73 year old, female    Indication: Bacteremia    Current estimated CrCl = Estimated Creatinine Clearance: 96.9 mL/min (A) (based on SCr of 0.48 mg/dL (L)).    Creatinine for last 3 days  2024:  9:29 AM Creatinine 0.50 mg/dL; 10:39 AM Creatinine 0.51 mg/dL  2024: 10:09 AM Creatinine 0.48 mg/dL    Recent Vancomycin Level(s) for last 3 days  No results found for requested labs within last 3 days.      Vancomycin IV Administrations (past 72 hours)                     vancomycin (VANCOCIN) 1,500 mg in 0.9% NaCl 250 mL intermittent infusion (mg) 1,500 mg New Bag 24 1025                    Nephrotoxins and other renal medications (From now, onward)      Start     Dose/Rate Route Frequency Ordered Stop    24 2200  vancomycin (VANCOCIN) 1,250 mg in 0.9% NaCl 250 mL intermittent infusion         1,250 mg  over 90 Minutes Intravenous EVERY 12 HOURS 24 1404              Contrast Orders - past 72 hours (72h ago, onward)      Start     Dose/Rate Route Frequency Stop    24 1050  iopamidol (ISOVUE-370) solution 58 mL         58 mL Intravenous ONCE 24 1059            [employer] Prediction of Planned Initial Vancomycin Regimen  Loading dose: 1500 mg IV once  Regimen: 1250 mg IV every 12 hours.  Start time: 22:00 on 2024  Exposure target: AUC24 (range)400-600 mg/L.hr   AUC24,ss: 577 mg/L.hr  Probability of AUC24 > 400: 84 %  Ctrough,ss: 17.1 mg/L  Probability of Ctrough,ss > 20: 38 %  Probability of nephrotoxicity (Lodise MADELEINE ): 13 %        Plan:  Start vancomycin 1250 mg IV q12h (received loading dose of 1500 mg IV x1).   Vancomycin monitoring method: AUC  Vancomycin therapeutic monitoring goal: 400-600 mg*h/L  Pharmacy will check vancomycin levels as appropriate in 1-3 Days.    Serum creatinine levels will be ordered every 48 hours.      Theodora Brooks [facility]

## 2024-04-05 NOTE — PLAN OF CARE
Occupational Therapy: Orders received. Chart reviewed and discussed with care team, including evaluating physical therapist.? Occupational Therapy not indicated as no OT needs identified by PT. Good support at discharge and no concern with ADL/IADL.? Defer discharge recommendations to care team.? Will complete orders.

## 2024-04-05 NOTE — PLAN OF CARE
Summary: SOB, cold symptoms. RLL PNA with hx of ILD.   DATE & TIME: 4/4/2024 1930-0730   Cognitive Concerns/ Orientation : A&O x4, tibetan speaking, understands basic english. Family translates and signed  to decline interpretor in ED. Family ok to stay overnight to help care/translate  BEHAVIOR & AGGRESSION TOOL COLOR: green, calm/cooperative  ABNL VS/O2: RR 22-26, other  VSS on 1 LPM NC (wean as able-weaned from 4 L) Stats in high 95-98% this shift, baseline 2 L PRN.   MOBILITY: Independent, steady on feet and no dizziness. PT/OT consulted though may not be indicated   PAIN MANAGMENT: PRN tylenol given x1,  DIET: regular, carb count, tolerating  BOWEL/BLADDER: continent  ABNL LAB/BG: AM labs pending. , 219, 134  DRAIN/DEVICES: PIV SL  TELEMETRY RHYTHM: NSR  SKIN: WNL  TESTS/PROCEDURES: EKG 4/5 repeat to monitor QT  D/C DAY/GOALS/PLACE: 2+ days, need to wean oxygen  OTHER IMPORTANT INFO: Lives at home with daughter, has two daughters that are RN's here in hospital. Frequent cough, sometimes productive, declines any cough medicine now but would like to take before bedtime. MATUTE and endorses some SOB, encouraged IS. Continue IV rocephin/azithromycin. Also on IV solumedrol. PRN neb given x1 this shift. 4/5 Rocephin q24hrs IV and Azithromycin tablet daily.

## 2024-04-05 NOTE — PROGRESS NOTES
[facility]    Hospitalist Progress Note    Interval History   Patient is awake and alert.  On 2 L nasal cannula.  Daughter at bedside who acted as the .    -Data reviewed today: I reviewed all new labs and imaging results over the last 24 hours. I personally reviewed the chest CT image(s) showing no pulmonary embolism.  Questionable right lower lobe pneumonia versus inflammation superimposed on moderate to severe fibrosis .    Physical Exam   Temp: 97.9  F (36.6  C) Temp src: Oral BP: (!) 146/78 Pulse: 79   Resp: 20 SpO2: 97 % O2 Device: Nasal cannula Oxygen Delivery: 1 LPM  Vitals:    04/04/24 1359 04/05/24 0500   Weight: 68.5 kg (151 lb 0.2 oz) 67.4 kg (148 lb 11.2 oz)     Vital Signs with Ranges  Temp:  [97.9  F (36.6  C)-98.8  F (37.1  C)] 97.9  F (36.6  C)  Pulse:  [] 79  Resp:  [18-26] 20  BP: (116-146)/(65-78) 146/78  SpO2:  [96 %-100 %] 97 %  I/O last 3 completed shifts:  In: 10 [I.V.:10]  Out: 800 [Urine:800]    Physical Exam  Constitutional:       Appearance: She is ill-appearing.   Cardiovascular:      Rate and Rhythm: Normal rate and regular rhythm.      Pulses: Normal pulses.      Heart sounds: Normal heart sounds.   Pulmonary:      Effort: No respiratory distress.      Breath sounds: Normal breath sounds.      Comments: Bilateral crackles with coarse breath sounds  Abdominal:      General: Abdomen is flat. Bowel sounds are normal. There is no distension.      Tenderness: There is no abdominal tenderness. There is no guarding.   Skin:     General: Skin is warm and dry.   Neurological:      General: No focal deficit present.           Medications   Current Facility-Administered Medications   Medication Dose Route Frequency Provider Last Rate Last Admin     Current Facility-Administered Medications   Medication Dose Route Frequency Provider Last Rate Last Admin    amLODIPine (NORVASC) tablet 5 mg  5 mg Oral Daily Donna Escobedo MD   5 mg at 04/05/24 0807     aspirin (ASA) chewable tablet 81 mg  81 mg Oral Daily Donna Escobedo MD   81 mg at 04/05/24 0807    azithromycin (ZITHROMAX) tablet 250 mg  250 mg Oral Daily Donna Escobedo MD   250 mg at 04/05/24 0808    cefTRIAXone (ROCEPHIN) 2 g vial to attach to  ml bag for ADULTS or NS 50 ml bag for PEDS  2 g Intravenous Q24H Donna Escobedo MD   2 g at 04/05/24 1327    famotidine (PEPCID) tablet 40 mg  40 mg Oral QPM Donna Escobedo MD   40 mg at 04/04/24 2014    hydroxychloroquine (PLAQUENIL) tablet 200 mg  200 mg Oral BID Donna Escobedo MD   200 mg at 04/05/24 0807    insulin aspart (NovoLOG) injection (RAPID ACTING)  1-7 Units Subcutaneous TID AC Donna Escobedo MD        insulin aspart (NovoLOG) injection (RAPID ACTING)  1-5 Units Subcutaneous At Bedtime Donna Escobedo MD        methylPREDNISolone sodium succinate (SOLU-MEDROL) injection 40 mg  40 mg Intravenous Q12H Donna Escobedo MD   40 mg at 04/05/24 0452    [Held by provider] mycophenolate (GENERIC EQUIVALENT) capsule 1,000 mg  1,000 mg Oral BID Donna Escobedo MD   1,000 mg at 04/05/24 0807    nintedanib (OFEV) capsule 100 mg  100 mg Oral BID Donna Escobedo MD   100 mg at 04/05/24 0808    sodium chloride (PF) 0.9% PF flush 3 mL  3 mL Intracatheter Q8H Donna Escobedo MD        sulfaSALAzine ER (AZULFIDINE EN) EC tablet 1,000 mg  1,000 mg Oral BID Donna Escobedo MD   1,000 mg at 04/05/24 0807    vitamin D3 (CHOLECALCIFEROL) tablet 50 mcg  50 mcg Oral Daily Donna Escobedo MD   50 mcg at 04/05/24 0807       Data   Recent Labs   Lab 04/05/24  1228 04/05/24  1009 04/05/24  0742 04/04/24  1428 04/04/24  1039 04/04/24  0929   WBC  --  8.4  --   --   --  13.4*   HGB  --  11.2*  --   --   --  11.2*   MCV  --  89  --   --   --  88   PLT  --  337  --   --   --  333   NA  --  125*  --   --  129* 128*   POTASSIUM  --  4.5  --   --  3.8 5.1   CHLORIDE  --  89*  --   --  92* 92*   CO2  --  23  --   --  24 24   BUN  --  7.4*  --   --  6.9*  5.9*   CR  --  0.48*  --   --  0.51 0.50*   ANIONGAP  --  13  --   --  13 12   SANDI  --  9.7  --   --  9.5 9.4   * 257* 127*   < > 143* 161*   ALBUMIN  --   --   --   --  3.6 3.4*   PROTTOTAL  --   --   --   --  7.6 7.7   BILITOTAL  --   --   --   --  0.4 0.5   ALKPHOS  --   --   --   --  161*  --    ALT  --   --   --   --  22  --    AST  --   --   --   --  22 45    < > = values in this interval not displayed.       No results found for this or any previous visit (from the past 24 hour(s)).      Assessment & Plan      Krystian Boland is a 73 year old female with medical history significant for interstitial lung disease secondary to RA, rheumatoid arthritis, HTN, DM, hepatitis B presented to the ER due to cough, shortness of breath and hypoxia and admitted on 4/4/2024.          Right lower lobe pneumonia  ILD secondary to RA  Acute on chronic hypoxic respiratory failure  Sore throat, cough/yellowish sputum, chills.  Mild leukocytosis.  CT negative for PE, but has RLL pneumonia.  Was using 2 LPM NC O2 with activity prior, now needing continuously at 3 lpm  -Admit to inpatient  -Continue Rocephin IV and azithromycin p.o.  -Solu-Medrol 40 Mg IV twice daily.  Plan on tapering down to oral 40 mg daily from 4/6/2024  -Urine for Legionella antigen and strep antigen.  Sputum for Gram stain and culture  -Pulmonary consult.  Reviewed their notes.  Agree with current plan.  -As needed neb  -Continuous pulse ox, supplemental O2, Acapella, wean O2 as able.  -Continue PTA Ofev for ILD  -Follow EKG to monitor QT given concomitant use of azithromycin and Plaquenil.  on admission.  If gets prolonged, changed to doxycycline.  -Blood cultures were positive for methicillin-resistant Staph epidermidis.  Likely contaminant.  Discussed with infectious disease team.  Recommended repeating blood cultures.  -One-time dose of vancomycin IV given but ID recommended against it until repeat blood cultures are resulted.   "Discontinued IV vancomycin.     Hyponatremia  Patient does have mild hyponatremia in the past as well.  Sodium 129 at presentation.    -Possible SIADH secondary to pulmonary process versus dehydration.   -Sodium did drop down to 125.   -Urine sodium, serum osmolarity and urine osmolarity ordered.   -Sh ordered and pending  -Will start on IV normal saline at 100 cc an hour.   -Urine Legionella and strep ordered.   -Every 6 hours sodium ordered  -Fluid restriction at 1800 cc every 24 hours     Rheumatoid arthritis  -Continue PTA meds including hydroxychloroquine, sulfasalazine, CellCept  -Holding CellCept in the setting of infection.     DM2, diet controlled  -Given underlying infection and steroid use, anticipate blood sugar likely will run high  -Insulin sliding scale ordered  -Blood sugars moderately controlled     Hypertension  Continue PTA aspirin and amlodipine     HBV infection  -Follows up with Dr. Bray as outpatient        Diet:  Regular  DVT Prophylaxis: Pneumatic Compression Devices  Cardiac Monitoring: None  Code Status:  Full code    Clinically Significant Risk Factors         # Hyponatremia: Lowest Na = 125 mmol/L in last 2 days, will monitor as appropriate      # Hypoalbuminemia: Lowest albumin = 3.4 g/dL at 4/4/2024  9:29 AM, will monitor as appropriate         # Dementia: noted on problem list    # Overweight: Estimated body mass index is 27.2 kg/m  as calculated from the following:    Height as of 4/1/24: 1.575 m (5' 2\").    Weight as of this encounter: 67.4 kg (148 lb 11.2 oz)., PRESENT ON ADMISSION             Greater than 50 minutes spent on review of images, examining the patient, documentation      Lisette Fung MD, MD  [phone](p)   "

## 2024-04-05 NOTE — CONSULTS
PULMONOLOGY CONSULTATION      Krystian Boland   MRN# [id number]   Age: 73 year old YOB: 1950     Date of Admission:  4/4/2024  Reason for Consultation:     Hypoxemia  Requesting Physician:         No referring provider defined for this encounter.       Assessment and Plan:    1. 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 O2 improving from 4L/m to 1L/m. Agree with current abx and steroids. The new RLL ggo's are likely an ILD exacerbation vs infection vs fluid. Given improvement without diuresis, likely infection/inflammation.    Billing: I spent a total of 75min for an initial consult including review of chart, images, labs and notes (20min), history taking and examination (30min) and post-consultation documentation, decision making and discussion with primary team and nursing (25min).     Bhargav Orantes MD, MHA  Associate Professor of Medicine  Section of Interventional Pulmonology   Division of Pulmonary, Allergy, Critical Care and Sleep Medicine   [facility], [employer]  Pager: [phone]   Office: [phone]  Email: [email or web address]         HPI/Interval history     Krystian Boland is a 73 year old female with sig h/o for dementia, RA-related IPF, DM2 who we are consulted for hypoxemia.    Known to [facility] ILD clinic and being treated for RA-related ILD. On anti-fibrotics and O2.   Worsening dyspnea over the past week or so. No sick contacts.   CT chest with more ggo's in RLL with background of severe fibrosis   Treated with CAP abx and IV steroids. Continues on cellcept and ofev.   We are consulted for assistance in management of acute/chronic hypoxemia         Past Medical History:      Past Medical History:   Diagnosis Date    Anxiety     Early onset Alzheimer's dementia without behavioral disturbance (H)     Hx of small bowel obstruction 11/2018    Hypertension     Interstitial lung disease (H) 05/2018    Intestinal TB 1990    Obesity (BMI 30-39.9)      Osteoarthritis     Osteopenia     Seropositive rheumatoid arthritis (H) 01/2023    Type II diabetes mellitus (H)            Past Surgical History:      Past Surgical History:   Procedure Laterality Date    CATARACT EXTRACTION W/ INTRAOCULAR LENS IMPLANT Left 10/25/2021    CATARACT IOL, RT/LT Right 10/11/2021    CHOLECYSTECTOMY, OPEN  1990    HEMICOLECTOMY, RT/LT Right 1990          Social History:     Social History     Tobacco Use    Smoking status: Never    Smokeless tobacco: Never   Substance Use Topics    Alcohol use: Not Currently          Family History:     Family History   Problem Relation Age of Onset    Dementia Mother     Cerebrovascular Disease Father     Cerebrovascular Disease Brother     Diabetes Son     Myocardial Infarction No family hx of     Coronary Artery Disease Early Onset No family hx of     Breast Cancer No family hx of     Ovarian Cancer No family hx of     Colon Cancer No family hx of     Glaucoma No family hx of     Macular Degeneration No family hx of     Liver Disease No family hx of            Allergies:      Allergies   Allergen Reactions    Atorvastatin Muscle Pain (Myalgia)          Medications:     Current Facility-Administered Medications   Medication Dose Route Frequency Provider Last Rate Last Admin    acetaminophen (TYLENOL) tablet 650 mg  650 mg Oral Q4H PRN Donna Escobedo MD   650 mg at 04/04/24 2014    Or    acetaminophen (TYLENOL) Suppository 650 mg  650 mg Rectal Q4H PRN Donna Escobedo MD        amLODIPine (NORVASC) tablet 5 mg  5 mg Oral Daily Donna Escobedo MD        aspirin (ASA) chewable tablet 81 mg  81 mg Oral Daily Donna Escobedo MD        [START ON 4/5/2024] azithromycin (ZITHROMAX) tablet 250 mg  250 mg Oral Daily Donna Escobedo MD        benzocaine-menthol (CHLORASEPTIC) 6-10 MG lozenge 1 lozenge  1 lozenge Buccal Q1H PRN Donna Escobedo MD        benzonatate (TESSALON) capsule 200 mg  200 mg Oral TID PRN Donna Escobedo MD   200 mg at 04/04/24  2146    calcium carbonate (TUMS) chewable tablet 1,000 mg  1,000 mg Oral 4x Daily PRN Donna Escobedo MD        [START ON 4/5/2024] cefTRIAXone (ROCEPHIN) 2 g vial to attach to  ml bag for ADULTS or NS 50 ml bag for PEDS  2 g Intravenous Q24H Donna Escobedo MD        glucose gel 15-30 g  15-30 g Oral Q15 Min PRN Donna Escobedo MD        Or    dextrose 50 % injection 25-50 mL  25-50 mL Intravenous Q15 Min PRN Donna Escobedo MD        Or    glucagon injection 1 mg  1 mg Subcutaneous Q15 Min PRN Donna Escobedo MD        famotidine (PEPCID) tablet 40 mg  40 mg Oral QPM Donna Escobedo MD   40 mg at 04/04/24 2014    guaiFENesin-dextromethorphan (ROBITUSSIN DM) 100-10 MG/5ML syrup 10 mL  10 mL Oral Q4H PRN Donna Escobedo MD        HYDROmorphone (DILAUDID) half-tab 1 mg  1 mg Oral Q4H PRN Donna Escobedo MD        hydroxychloroquine (PLAQUENIL) tablet 200 mg  200 mg Oral BID Donna Escobedo MD   200 mg at 04/04/24 2146    hydrOXYzine HCl (ATARAX) tablet 10 mg  10 mg Oral TID PRN Donna Escobedo MD        insulin aspart (NovoLOG) injection (RAPID ACTING)  1-7 Units Subcutaneous TID AC Donna Escobedo MD        insulin aspart (NovoLOG) injection (RAPID ACTING)  1-5 Units Subcutaneous At Bedtime Donna Escobedo MD        ipratropium - albuterol 0.5 mg/2.5 mg/3 mL (DUONEB) neb solution 3 mL  3 mL Nebulization Q4H PRN Donna Escobedo MD   3 mL at 04/04/24 2028    lidocaine (LMX4) cream   Topical Q1H PRDonna Manzano MD        lidocaine 1 % 0.1-1 mL  0.1-1 mL Other Q1H PRN Donna Escobedo MD        methylPREDNISolone sodium succinate (SOLU-MEDROL) injection 40 mg  40 mg Intravenous Q12H Donna Escobedo MD   40 mg at 04/04/24 1546    mycophenolate (GENERIC EQUIVALENT) capsule 1,000 mg  1,000 mg Oral BID Donna Escobedo MD   1,000 mg at 04/04/24 1546    naloxone (NARCAN) injection 0.2 mg  0.2 mg Intravenous Q2 Min PRN Donna Escobedo MD        Or    naloxone (NARCAN) injection 0.4  mg  0.4 mg Intravenous Q2 Min PRN Donna Escobedo MD        Or    naloxone (NARCAN) injection 0.2 mg  0.2 mg Intramuscular Q2 Min PRN Donna Escobedo MD        Or    naloxone (NARCAN) injection 0.4 mg  0.4 mg Intramuscular Q2 Min PRN Donna Escobedo MD        nintedanib (OFEV) capsule 100 mg  100 mg Oral BID Donna Escobedo MD   100 mg at 04/04/24 2156    prochlorperazine (COMPAZINE) injection 5 mg  5 mg Intravenous Q6H PRN Donna Escobedo MD        Or    prochlorperazine (COMPAZINE) tablet 5 mg  5 mg Oral Q6H PRN Donna Escobedo MD        Or    prochlorperazine (COMPAZINE) suppository 12.5 mg  12.5 mg Rectal Q12H PRN Donna Escobedo MD        senna-docusate (SENOKOT-S/PERICOLACE) 8.6-50 MG per tablet 1 tablet  1 tablet Oral BID PRN Donna Escobedo MD        Or    senna-docusate (SENOKOT-S/PERICOLACE) 8.6-50 MG per tablet 2 tablet  2 tablet Oral BID PRN Donna Escobedo MD        sodium chloride (PF) 0.9% PF flush 3 mL  3 mL Intracatheter Q8H Donna Escobedo MD        sodium chloride (PF) 0.9% PF flush 3 mL  3 mL Intracatheter q1 min prn Donna Escobedo MD        sulfaSALAzine ER (AZULFIDINE EN) EC tablet 1,000 mg  1,000 mg Oral BID Donan Escobedo MD   1,000 mg at 04/04/24 2146    vitamin D3 (CHOLECALCIFEROL) tablet 50 mcg  50 mcg Oral Daily Donna Escobedo MD              Review of Systems:     CONSTITUTIONAL: negative for fever, chills, change in weight  INTEGUMENTARY/SKIN: no rash or obvious new lesions  ENT/MOUTH: no sore throat, new sinus pain or nasal drainage  RESP: see interval history  CV: negative for chest pain, palpitations or peripheral edema  GI: no nausea, vomiting, change in stools  : no dysuria  MUSCULOSKELETAL: no myalgias, arthralgias  ENDOCRINE: blood sugars with adequate control  PSYCHIATRIC: mood stable  LYMPHATIC: no new lymphadenopathy  HEME: no bleeding or easy bruisability  NEURO: no numbness, weakness, headaches         Physical Exam:     Temp:  [97.6  F (36.4   C)-98.8  F (37.1  C)] 98.8  F (37.1  C)  Pulse:  [] 103  Resp:  [7-28] 22  BP: (131-167)/(64-89) 131/65  SpO2:  [92 %-100 %] 96 %  Wt Readings from Last 4 Encounters:   04/04/24 68.5 kg (151 lb 0.2 oz)   04/01/24 67.1 kg (148 lb)   03/30/24 67.1 kg (148 lb)   03/07/24 69.4 kg (153 lb)     Constitutional:   Awake, alert and in no apparent distress     Eyes:   Nonicteric, NATALIO     ENT:    Trachea is midline. No gross neck abnormalities      Neck:   Supple without supraclavicular or cervical lymphadenopathy     Lungs:   Bibasilar crackles     Cardiovascular:   Normal S1 and S2.  RRR.  No murmur, gallop or rub.  Radial, DP and PT pulses normal and symmetric     Abdomen:   NABS, soft, nontender, nondistended.  No HSM.     Musculoskeletal:   No edema.      Neurologic:   Alert and conversant. Cranial nerves  intact.       Skin:   Warm, dry.  No rash on limited exam.           Current Laboratory Data:   All laboratory and imaging data reviewed.    Results for orders placed or performed during the hospital encounter of 04/04/24 (from the past 24 hour(s))   EKG 12-lead, tracing only   Result Value Ref Range    Systolic Blood Pressure  mmHg    Diastolic Blood Pressure  mmHg    Ventricular Rate 87 BPM    Atrial Rate 87 BPM    NE Interval 158 ms    QRS Duration 80 ms     ms    QTc 409 ms    P Axis 55 degrees    R AXIS 79 degrees    T Axis 32 degrees    Interpretation ECG       Sinus rhythm  Possible Left atrial enlargement  Borderline ECG  When compared with ECG of 28-NOV-2022 11:30,  No significant change was found  Confirmed by GENERATED REPORT, COMPUTER ([id number]),  Lori Williamson ([id number]) on 4/4/2024 10:34:00 AM     Lactic Acid STAT   Result Value Ref Range    Lactic Acid 1.0 0.7 - 2.0 mmol/L   CBC with platelets differential    Narrative    The following orders were created for panel order CBC with platelets differential.  Procedure                               Abnormality         Status                      ---------                               -----------         ------                     CBC with platelets and d...[[id number]]  Abnormal            Final result                 Please view results for these tests on the individual orders.   D dimer quantitative   Result Value Ref Range    D-Dimer Quantitative 0.68 (H) 0.00 - 0.50 ug/mL FEU    Narrative    This D-dimer assay is intended for use in conjunction with a clinical pretest probability assessment model to exclude pulmonary embolism (PE) and deep venous thrombosis (DVT) in outpatients suspected of PE or DVT. The cut-off value is 0.50 ug/mL FEU.    For patients 50 years of age or older, the application of age-adjusted cut-off values for D-Dimer may increase the specificity without significant effect on sensitivity. The literature suggested calculation age adjusted cut-off in ug/L = age in years x 10 ug/L. The results in this laboratory are reported as ug/mL rather than ug/L. The calculation for age adjusted cut off in ug/mL= age in years x 0.01 ug/mL. For example, the cut off for a 76 year old male is 78 x 0.01 ug/mL = 0.76 ug/mL (760 ug/L).    M Kaykay et al. Age adjusted D-dimer cut-off levels to rule out pulmonary embolism: The ADJUST-PE Study. KAYCE 2014;311:6653-0608.; HJ Trenton et al. Diagnostic accuracy of conventional or age adjusted D-dimer cutoff values in older patients with suspected venous thromboembolism. Systemic review and meta-analysis. BMJ 2013:346:f2492.   Comprehensive metabolic panel   Result Value Ref Range    Sodium 128 (L) 135 - 145 mmol/L    Potassium 5.1 3.4 - 5.3 mmol/L    Carbon Dioxide (CO2) 24 22 - 29 mmol/L    Anion Gap 12 7 - 15 mmol/L    Urea Nitrogen 5.9 (L) 8.0 - 23.0 mg/dL    Creatinine 0.50 (L) 0.51 - 0.95 mg/dL    GFR Estimate >90 >60 mL/min/1.73m2    Calcium 9.4 8.8 - 10.2 mg/dL    Chloride 92 (L) 98 - 107 mmol/L    Glucose 161 (H) 70 - 99 mg/dL    Alkaline Phosphatase      AST 45 0 - 45 U/L    ALT      Protein Total  7.7 6.4 - 8.3 g/dL    Albumin 3.4 (L) 3.5 - 5.2 g/dL    Bilirubin Total 0.5 <=1.2 mg/dL   Nt probnp inpatient (BNP)   Result Value Ref Range    N terminal Pro BNP Inpatient 115 0 - 900 pg/mL   CBC with platelets and differential   Result Value Ref Range    WBC Count 13.4 (H) 4.0 - 11.0 10e3/uL    RBC Count 3.74 (L) 3.80 - 5.20 10e6/uL    Hemoglobin 11.2 (L) 11.7 - 15.7 g/dL    Hematocrit 32.8 (L) 35.0 - 47.0 %    MCV 88 78 - 100 fL    MCH 29.9 26.5 - 33.0 pg    MCHC 34.1 31.5 - 36.5 g/dL    RDW 12.3 10.0 - 15.0 %    Platelet Count 333 150 - 450 10e3/uL    % Neutrophils 84 %    % Lymphocytes 7 %    % Monocytes 7 %    % Eosinophils 2 %    % Basophils 0 %    % Immature Granulocytes 0 %    NRBCs per 100 WBC 0 <1 /100    Absolute Neutrophils 11.1 (H) 1.6 - 8.3 10e3/uL    Absolute Lymphocytes 0.9 0.8 - 5.3 10e3/uL    Absolute Monocytes 1.0 0.0 - 1.3 10e3/uL    Absolute Eosinophils 0.3 0.0 - 0.7 10e3/uL    Absolute Basophils 0.1 0.0 - 0.2 10e3/uL    Absolute Immature Granulocytes 0.1 <=0.4 10e3/uL    Absolute NRBCs 0.0 10e3/uL   Extra Tube    Narrative    The following orders were created for panel order Extra Tube.  Procedure                               Abnormality         Status                     ---------                               -----------         ------                     Extra Red Top Tube[[id number]]                               Final result                 Please view results for these tests on the individual orders.   Extra Red Top Tube   Result Value Ref Range    Hold Specimen JIC    Hemoglobin A1c   Result Value Ref Range    Hemoglobin A1C 6.4 (H) <5.7 %   Symptomatic Influenza A/B, RSV, & SARS-CoV2 PCR (COVID-19) Nasopharyngeal    Specimen: Nasopharyngeal; Swab   Result Value Ref Range    Influenza A PCR Negative Negative    Influenza B PCR Negative Negative    RSV PCR Negative Negative    SARS CoV2 PCR Negative Negative    Narrative    Testing was performed using the Xpert Xpress CoV2/Flu/RSV Assay  on the A[employer]rix GeneXpert Instrument. This test should be ordered for the detection of SARS-CoV-2, influenza, and RSV viruses in individuals who meet clinical and/or epidemiological criteria. Test performance is unknown in asymptomatic patients. This test is for in vitro diagnostic use under the FDA EUA for laboratories certified under CLIA to perform high or moderate complexity testing. This test has not been FDA cleared or approved. A negative result does not rule out the presence of PCR inhibitors in the specimen or target RNA in concentration below the limit of detection for the assay. If only one viral target is positive but coinfection with multiple targets is suspected, the sample should be re-tested with another FDA cleared, approved, or authorized test, if coinfection would change clinical management. This test was validated by the [facility] [employer]. These laboratories are certified under the Clinical Laboratory Improvement Amendments of 1988 (CLIA-88) as qualified to perform high complexity laboratory testing.   iStat Gases (lactate) venous, POCT   Result Value Ref Range    Lactic Acid POCT 0.9 <=2.0 mmol/L    Bicarbonate Venous POCT 29 (H) 21 - 28 mmol/L    O2 Sat, Venous POCT 35 (L) 70 - 75 %    pCO2 Venous POCT 49 40 - 50 mm Hg    pH Venous POCT 7.38 7.32 - 7.43    pO2 Venous POCT 22 (L) 25 - 47 mm Hg    Base Excess/Deficit (+/-) POCT 3.0 -3.0 - 3.0 mmol/L   Troponin T, High Sensitivity   Result Value Ref Range    Troponin T, High Sensitivity <6 <=14 ng/L   Comprehensive metabolic panel   Result Value Ref Range    Sodium 129 (L) 135 - 145 mmol/L    Potassium 3.8 3.4 - 5.3 mmol/L    Carbon Dioxide (CO2) 24 22 - 29 mmol/L    Anion Gap 13 7 - 15 mmol/L    Urea Nitrogen 6.9 (L) 8.0 - 23.0 mg/dL    Creatinine 0.51 0.51 - 0.95 mg/dL    GFR Estimate >90 >60 mL/min/1.73m2    Calcium 9.5 8.8 - 10.2 mg/dL    Chloride 92 (L) 98 - 107 mmol/L    Glucose 143 (H) 70 - 99 mg/dL    Alkaline Phosphatase 161  (H) 40 - 150 U/L    AST 22 0 - 45 U/L    ALT 22 0 - 50 U/L    Protein Total 7.6 6.4 - 8.3 g/dL    Albumin 3.6 3.5 - 5.2 g/dL    Bilirubin Total 0.4 <=1.2 mg/dL   Procalcitonin   Result Value Ref Range    Procalcitonin 0.11 <0.50 ng/mL   CT Chest Pulmonary Embolism w Contrast    Narrative    CT CHEST PULMONARY EMBOLISM WITH CONTRAST 4/4/2024 11:20 AM    CLINICAL HISTORY: Chest pain. Elevated D-dimer, worsening shortness of  breath, interstitial lung disease, history of latent TB.    TECHNIQUE: CT angiogram chest during arterial phase injection IV  contrast. 2D and 3D MIP reconstructions were performed by the CT  technologist. Dose reduction techniques were used.     CONTRAST: 58 mL Isovue-370    COMPARISON: January 25, 2023    FINDINGS:  ANGIOGRAM CHEST: Opacified pulmonary arteries are normal caliber and  negative for pulmonary emboli. There is a gradual fading of contrast  in the medial right lower lobe compatible with decreased perfusion  and/or admixture artifact, pulmonary emboli here would be difficult to  entirely exclude. Thoracic aorta is negative for dissection. No CT  evidence of right heart strain.    LUNGS AND PLEURA: Moderate to severe peripheral and basilar fibrosis  with honeycombing and traction bronchiectasis similar to previous.  Question right lower lobe infiltrate and/or active inflammatory  change. No effusion.    MEDIASTINUM/AXILLAE: Mildly prominent lymph nodes noted which are  nonspecific similar to previous. No aneurysm.    CORONARY ARTERY CALCIFICATIONS: Mild.    UPPER ABDOMEN: No acute findings.    MUSCULOSKELETAL: No frankly destructive bony lesions.      Impression    IMPRESSION:  1.  No pulmonary embolism demonstrated.  2.  Question new right lower lobe pneumonia and/or active inflammatory  change superimposed on moderate to severe fibrosis.    AYANNA COOK MD         SYSTEM ID:  [id number]   Glucose by meter   Result Value Ref Range    GLUCOSE BY METER POCT 106 (H) 70 - 99 mg/dL  "  Legionella pneumophila antigen urine    Specimen: Urine, Midstream   Result Value Ref Range    Legionella pneumophila serogroup 1 urinary antigen Negative Negative   Glucose by meter   Result Value Ref Range    GLUCOSE BY METER POCT 138 (H) 70 - 99 mg/dL   Glucose by meter   Result Value Ref Range    GLUCOSE BY METER POCT 219 (H) 70 - 99 mg/dL            Previous Pulmonary Function Testing     FVC-Pred   Date Value Ref Range Status   11/01/2023 2.40 L    04/21/2023 2.44 L    01/25/2023 2.44 L    10/26/2022 2.44 L    05/18/2022 2.47 L      FVC-Pre   Date Value Ref Range Status   11/01/2023 1.70 L    04/21/2023 1.53 L    01/25/2023 1.43 L    10/26/2022 1.51 L    05/18/2022 1.55 L      FVC-%Pred-Pre   Date Value Ref Range Status   11/01/2023 70 %    04/21/2023 62 %    01/25/2023 58 %    10/26/2022 62 %    05/18/2022 63 %      FEV1-Pre   Date Value Ref Range Status   11/01/2023 1.51 L    04/21/2023 1.46 L    01/25/2023 1.36 L    10/26/2022 1.42 L    05/18/2022 1.45 L      FEV1-%Pred-Pre   Date Value Ref Range Status   11/01/2023 80 %    04/21/2023 75 %    01/25/2023 70 %    10/26/2022 73 %    05/18/2022 74 %      FEV1FVC-Pred   Date Value Ref Range Status   11/01/2023 79 %    04/21/2023 79 %    01/25/2023 79 %    10/26/2022 79 %    05/18/2022 79 %      FEV1FVC-Pre   Date Value Ref Range Status   11/01/2023 89 %    04/21/2023 96 %    01/25/2023 95 %    10/26/2022 94 %    05/18/2022 93 %      No results found for: \"81402\"  FEFMax-Pred   Date Value Ref Range Status   11/01/2023 5.24 L/sec    04/21/2023 5.34 L/sec    01/25/2023 5.34 L/sec    10/26/2022 5.34 L/sec    05/18/2022 5.42 L/sec      FEFMax-Pre   Date Value Ref Range Status   11/01/2023 4.49 L/sec    04/21/2023 5.26 L/sec    01/25/2023 4.55 L/sec    10/26/2022 6.81 L/sec    05/18/2022 5.35 L/sec      FEFMax-%Pred-Pre   Date Value Ref Range Status   11/01/2023 85 %    04/21/2023 98 %    01/25/2023 85 %    10/26/2022 127 %    05/18/2022 98 %      ExpTime-Pre   Date " Value Ref Range Status   11/01/2023 5.82 sec    04/21/2023 5.78 sec    01/25/2023 5.34 sec    10/26/2022 6.66 sec    05/18/2022 2.45 sec      FIFMax-Pre   Date Value Ref Range Status   11/01/2023 5.68 L/sec    04/21/2023 4.92 L/sec    01/25/2023 3.58 L/sec    10/26/2022 3.96 L/sec    05/18/2022 3.47 L/sec      FEV1FEV6-Pred   Date Value Ref Range Status   11/01/2023 79 %    04/21/2023 79 %    01/25/2023 79 %    10/26/2022 79 %    05/18/2022 79 %      FEV1FEV6-Pre   Date Value Ref Range Status   11/01/2023 84 %    04/21/2023 96 %    01/25/2023 95 %    10/26/2022 94 %    05/18/2022 93 %

## 2024-04-06 LAB
ANION GAP SERPL CALCULATED.3IONS-SCNC: 15 MMOL/L (ref 7–15)
BUN SERPL-MCNC: 9 MG/DL (ref 8–23)
CALCIUM SERPL-MCNC: 9.3 MG/DL (ref 8.8–10.2)
CHLORIDE SERPL-SCNC: 95 MMOL/L (ref 98–107)
CHLORIDE SERPL-SCNC: 97 MMOL/L (ref 98–107)
CREAT SERPL-MCNC: 0.57 MG/DL (ref 0.51–0.95)
DEPRECATED HCO3 PLAS-SCNC: 23 MMOL/L (ref 22–29)
EGFRCR SERPLBLD CKD-EPI 2021: >90 ML/MIN/1.73M2
GLUCOSE BLDC GLUCOMTR-MCNC: 129 MG/DL (ref 70–99)
GLUCOSE BLDC GLUCOMTR-MCNC: 133 MG/DL (ref 70–99)
GLUCOSE BLDC GLUCOMTR-MCNC: 150 MG/DL (ref 70–99)
GLUCOSE BLDC GLUCOMTR-MCNC: 194 MG/DL (ref 70–99)
GLUCOSE BLDC GLUCOMTR-MCNC: 196 MG/DL (ref 70–99)
GLUCOSE SERPL-MCNC: 134 MG/DL (ref 70–99)
MAGNESIUM SERPL-MCNC: 1.7 MG/DL (ref 1.7–2.3)
POTASSIUM SERPL-SCNC: 4.1 MMOL/L (ref 3.4–5.3)
SODIUM SERPL-SCNC: 130 MMOL/L (ref 135–145)
SODIUM SERPL-SCNC: 131 MMOL/L (ref 135–145)
SODIUM SERPL-SCNC: 135 MMOL/L (ref 135–145)

## 2024-04-06 PROCEDURE — 250N000012 HC RX MED GY IP 250 OP 636 PS 637: Performed by: INTERNAL MEDICINE

## 2024-04-06 PROCEDURE — 82435 ASSAY OF BLOOD CHLORIDE: CPT | Performed by: INTERNAL MEDICINE

## 2024-04-06 PROCEDURE — 80048 BASIC METABOLIC PNL TOTAL CA: CPT | Performed by: INTERNAL MEDICINE

## 2024-04-06 PROCEDURE — 250N000013 HC RX MED GY IP 250 OP 250 PS 637: Performed by: INTERNAL MEDICINE

## 2024-04-06 PROCEDURE — 250N000009 HC RX 250: Performed by: HOSPITALIST

## 2024-04-06 PROCEDURE — 250N000013 HC RX MED GY IP 250 OP 250 PS 637: Performed by: HOSPITALIST

## 2024-04-06 PROCEDURE — 250N000011 HC RX IP 250 OP 636: Performed by: HOSPITALIST

## 2024-04-06 PROCEDURE — 258N000003 HC RX IP 258 OP 636: Performed by: INTERNAL MEDICINE

## 2024-04-06 PROCEDURE — 87040 BLOOD CULTURE FOR BACTERIA: CPT | Performed by: INTERNAL MEDICINE

## 2024-04-06 PROCEDURE — 84295 ASSAY OF SERUM SODIUM: CPT | Performed by: INTERNAL MEDICINE

## 2024-04-06 PROCEDURE — 36415 COLL VENOUS BLD VENIPUNCTURE: CPT | Performed by: INTERNAL MEDICINE

## 2024-04-06 PROCEDURE — 83735 ASSAY OF MAGNESIUM: CPT | Performed by: INTERNAL MEDICINE

## 2024-04-06 PROCEDURE — 120N000001 HC R&B MED SURG/OB

## 2024-04-06 PROCEDURE — 258N000003 HC RX IP 258 OP 636: Performed by: HOSPITALIST

## 2024-04-06 PROCEDURE — 250N000011 HC RX IP 250 OP 636: Performed by: INTERNAL MEDICINE

## 2024-04-06 PROCEDURE — 99233 SBSQ HOSP IP/OBS HIGH 50: CPT | Performed by: INTERNAL MEDICINE

## 2024-04-06 RX ORDER — CEFTRIAXONE 2 G/1
2 INJECTION, POWDER, FOR SOLUTION INTRAMUSCULAR; INTRAVENOUS EVERY 24 HOURS
Status: DISCONTINUED | OUTPATIENT
Start: 2024-04-06 | End: 2024-04-08

## 2024-04-06 RX ORDER — VANCOMYCIN HYDROCHLORIDE 1 G/200ML
1000 INJECTION, SOLUTION INTRAVENOUS EVERY 12 HOURS
Status: DISCONTINUED | OUTPATIENT
Start: 2024-04-06 | End: 2024-04-08

## 2024-04-06 RX ORDER — PIPERACILLIN SODIUM, TAZOBACTAM SODIUM 3; .375 G/15ML; G/15ML
3.38 INJECTION, POWDER, LYOPHILIZED, FOR SOLUTION INTRAVENOUS EVERY 8 HOURS
Status: DISCONTINUED | OUTPATIENT
Start: 2024-04-06 | End: 2024-04-06

## 2024-04-06 RX ORDER — BENZONATATE 100 MG/1
200 CAPSULE ORAL 3 TIMES DAILY
Status: DISCONTINUED | OUTPATIENT
Start: 2024-04-06 | End: 2024-04-08 | Stop reason: HOSPADM

## 2024-04-06 RX ORDER — SODIUM CHLORIDE 9 MG/ML
INJECTION, SOLUTION INTRAVENOUS CONTINUOUS
Status: DISCONTINUED | OUTPATIENT
Start: 2024-04-06 | End: 2024-04-07

## 2024-04-06 RX ORDER — CEFDINIR 300 MG/1
300 CAPSULE ORAL 2 TIMES DAILY
Status: DISCONTINUED | OUTPATIENT
Start: 2024-04-06 | End: 2024-04-06

## 2024-04-06 RX ORDER — CEFTRIAXONE 1 G/1
1 INJECTION, POWDER, FOR SOLUTION INTRAMUSCULAR; INTRAVENOUS EVERY 24 HOURS
Status: DISCONTINUED | OUTPATIENT
Start: 2024-04-06 | End: 2024-04-06

## 2024-04-06 RX ORDER — GUAIFENESIN 600 MG/1
600 TABLET, EXTENDED RELEASE ORAL 2 TIMES DAILY
Status: DISCONTINUED | OUTPATIENT
Start: 2024-04-06 | End: 2024-04-08 | Stop reason: HOSPADM

## 2024-04-06 RX ORDER — PREDNISONE 20 MG/1
60 TABLET ORAL DAILY
Status: DISCONTINUED | OUTPATIENT
Start: 2024-04-06 | End: 2024-04-08 | Stop reason: HOSPADM

## 2024-04-06 RX ADMIN — METHYLPREDNISOLONE SODIUM SUCCINATE 40 MG: 40 INJECTION, POWDER, FOR SOLUTION INTRAMUSCULAR; INTRAVENOUS at 04:30

## 2024-04-06 RX ADMIN — Medication 50 MCG: at 08:25

## 2024-04-06 RX ADMIN — GUAIFENESIN 600 MG: 600 TABLET, EXTENDED RELEASE ORAL at 10:25

## 2024-04-06 RX ADMIN — BENZONATATE 200 MG: 100 CAPSULE ORAL at 22:00

## 2024-04-06 RX ADMIN — GUAIFENESIN 600 MG: 600 TABLET, EXTENDED RELEASE ORAL at 20:08

## 2024-04-06 RX ADMIN — SULFASALAZINE 1000 MG: 500 TABLET, DELAYED RELEASE ORAL at 13:17

## 2024-04-06 RX ADMIN — SODIUM CHLORIDE: 9 INJECTION, SOLUTION INTRAVENOUS at 00:24

## 2024-04-06 RX ADMIN — IPRATROPIUM BROMIDE AND ALBUTEROL SULFATE 3 ML: .5; 3 SOLUTION RESPIRATORY (INHALATION) at 06:21

## 2024-04-06 RX ADMIN — GUAIFENESIN AND DEXTROMETHORPHAN 10 ML: 100; 10 SYRUP ORAL at 15:23

## 2024-04-06 RX ADMIN — AZITHROMYCIN DIHYDRATE 250 MG: 250 TABLET ORAL at 08:25

## 2024-04-06 RX ADMIN — AMLODIPINE BESYLATE 5 MG: 5 TABLET ORAL at 08:25

## 2024-04-06 RX ADMIN — BENZONATATE 200 MG: 100 CAPSULE ORAL at 06:20

## 2024-04-06 RX ADMIN — SULFASALAZINE 1000 MG: 500 TABLET, DELAYED RELEASE ORAL at 20:08

## 2024-04-06 RX ADMIN — GUAIFENESIN AND DEXTROMETHORPHAN 10 ML: 100; 10 SYRUP ORAL at 20:11

## 2024-04-06 RX ADMIN — HYDROXYCHLOROQUINE SULFATE 200 MG: 200 TABLET ORAL at 20:08

## 2024-04-06 RX ADMIN — CEFTRIAXONE SODIUM 2 G: 2 INJECTION, POWDER, FOR SOLUTION INTRAMUSCULAR; INTRAVENOUS at 16:51

## 2024-04-06 RX ADMIN — SODIUM CHLORIDE: 9 INJECTION, SOLUTION INTRAVENOUS at 16:51

## 2024-04-06 RX ADMIN — VANCOMYCIN HYDROCHLORIDE 1000 MG: 1 INJECTION, SOLUTION INTRAVENOUS at 17:30

## 2024-04-06 RX ADMIN — ASPIRIN 81 MG CHEWABLE TABLET 81 MG: 81 TABLET CHEWABLE at 08:25

## 2024-04-06 RX ADMIN — INSULIN ASPART 2 UNITS: 100 INJECTION, SOLUTION INTRAVENOUS; SUBCUTANEOUS at 17:30

## 2024-04-06 RX ADMIN — HYDROXYCHLOROQUINE SULFATE 200 MG: 200 TABLET ORAL at 08:25

## 2024-04-06 RX ADMIN — FAMOTIDINE 40 MG: 20 TABLET ORAL at 20:08

## 2024-04-06 RX ADMIN — PREDNISONE 60 MG: 20 TABLET ORAL at 10:24

## 2024-04-06 RX ADMIN — CEFDINIR 300 MG: 300 CAPSULE ORAL at 12:20

## 2024-04-06 RX ADMIN — BENZONATATE 200 MG: 100 CAPSULE ORAL at 15:23

## 2024-04-06 RX ADMIN — INSULIN ASPART 1 UNITS: 100 INJECTION, SOLUTION INTRAVENOUS; SUBCUTANEOUS at 12:20

## 2024-04-06 ASSESSMENT — ACTIVITIES OF DAILY LIVING (ADL)
ADLS_ACUITY_SCORE: 22

## 2024-04-06 NOTE — PHARMACY-VANCOMYCIN DOSING SERVICE
Pharmacy Vancomycin Initial Note  Date of Service 2024  Patient's  1950  73 year old, female    Indication: Bacteremia    Current estimated CrCl = Estimated Creatinine Clearance: 81.6 mL/min (based on SCr of 0.57 mg/dL).    Creatinine for last 3 days  2024:  9:29 AM Creatinine 0.50 mg/dL; 10:39 AM Creatinine 0.51 mg/dL  2024: 10:09 AM Creatinine 0.48 mg/dL  2024:  7:29 AM Creatinine 0.57 mg/dL    Recent Vancomycin Level(s) for last 3 days  No results found for requested labs within last 3 days.      Vancomycin IV Administrations (past 72 hours)                     vancomycin (VANCOCIN) 1,000 mg in 200 mL dextrose intermittent infusion (mg) 1,000 mg New Bag 24 1730    vancomycin (VANCOCIN) 1,500 mg in 0.9% NaCl 250 mL intermittent infusion (mg) 1,500 mg New Bag 24 1025                    Nephrotoxins and other renal medications (From now, onward)      Start     Dose/Rate Route Frequency Ordered Stop    24 1630  vancomycin (VANCOCIN) 1,000 mg in 200 mL dextrose intermittent infusion         1,000 mg  200 mL/hr over 1 Hours Intravenous EVERY 12 HOURS 24 1621              Contrast Orders - past 72 hours (72h ago, onward)      Start     Dose/Rate Route Frequency Stop    24 1050  iopamidol (ISOVUE-370) solution 58 mL         58 mL Intravenous ONCE 24 1059            [employer] Prediction of Planned Initial Vancomycin Regimen  Regimen: 1000 mg IV every 12 hours.  Exposure target: AUC24 (range)400-600 mg/L.hr   AUC24,ss: 524 mg/L.hr  Probability of AUC24 > 400: 77 %  Ctrough,ss: 16.1 mg/L  Probability of Ctrough,ss > 20: 32 %  Probability of nephrotoxicity (Lodise MADELEINE ): 11 %          Plan:  Start vancomycin  1000 mg IV q12h.   Vancomycin monitoring method: AUC  Vancomycin therapeutic monitoring goal: 400-600 mg*h/L  Pharmacy will check vancomycin levels as appropriate in 1-3 Days.    Serum creatinine levels will be ordered daily for the first week of  therapy and at least twice weekly for subsequent weeks.      Halima Gonzalez, RPH

## 2024-04-06 NOTE — PLAN OF CARE
Orientation: A&Ox4. Speaks tibetan, family at bedside.   Activity: Independent.   Diet/BS Checks: Regular diet. ACHS BG checks.   Tele:   IV Access/Drains: Lost IV access this AM. Float resource nurse called & will send next nurse up.   Pain Management: C/o cough. PRN robitussin, PRN tessalon pearls, PRN atarax.   Abnormal VS/Results: VSS on 1L oxygen. Desats when coughing. PRN neb given this AM. On Mag & K protocol.   Bowel/Bladder: Continent B/B.   Skin/Wounds: WDL  Consults:   D/C Disposition: Pending clinical improvement.

## 2024-04-06 NOTE — PROGRESS NOTES
[facility]    Medicine Progress Note - Hospitalist Service    Date of Admission:  4/4/2024    Assessment & Plan   Krystian Boland is a 73 year old female with medical history significant for interstitial lung disease secondary to RA, rheumatoid arthritis, HTN, DM, hepatitis B presented to the ER due to cough, shortness of breath and hypoxia and admitted on 4/4/2024.       Blood culture 1/2 4/4 staph epi; awaiting repeat blood cultures.      Right lower lobe pneumonia  ILD secondary to RA  Acute on chronic hypoxic respiratory failure    Presented with leukocytosis and cough  CT negative for PE, but has RLL pneumonia.    Was using 2 LPM NC O2 with activity prior, now needing continuously at 3 lpm    -appears to be clinically improving on Rocephin IV and azithromycin p.o.    4/6/24 -this am - has lost IV site  D/w pt and family present  Will transition to po omnicef and continue po azithromycin this am with close clinical monitoring    No wheezing - will transition to po Prednisone 60mg/day    -Urine for Legionella antigen and strep antigen NEGATIVE     Sputum for Gram stain and culture pending    -Pulmonary consulted and has been following    -As needed neb  -Continuous pulse ox, supplemental O2, Acapella, wean O2 as able.  -Continue PTA Ofev for ILD  -Follow EKG to monitor QT given concomitant use of azithromycin and Plaquenil.  on admission.  If gets prolonged, changed to doxycycline.    -Blood culture 1/2 (4/4/24) were positive for methicillin-resistant Staph epidermidis.  Can hospitalist discussed with infectious disease team who suspected was likely a contaminant.    Addendum - 1600    Notified by RN that repeat 1/2 BC from 4/5/24 now positive for gram + cocci in clusters    Will repeat BC x 2 now  restart IV vanco  ID consult requested  Transition back to IV rocephin, for now, as well    ECHO requested to r/o evidence of endocarditis     2. Hyponatremia  Patient does have  "mild hyponatremia in the past as well.  Sodium 129 at presentation.    -Possible SIADH secondary to pulmonary process versus dehydration.     Sodium reported as 135 this am (125)  Will repeat sodium later today  Saline lock IVF, for now, as lost IV site  Continue strict I/O's    -Fluid restriction at 1800 cc every 24 hours     Addendum - 1605  Sodium down a bit again to 130 after discontinuing IVF at 0900    Per chart review - IVF had been started 1449 (4/5/24)   Plan to restart IVF slowly at 50cc/hr  Recheck sodium at 2200 with parameters to notify on-call hospitalist for re-evaluation    3. Rheumatoid arthritis  -Continue PTA meds including hydroxychloroquine, sulfasalazine, CellCept  -Holding CellCept in the setting of infection.     4. DM2, diet controlled  -Given underlying infection and steroid use, anticipate blood sugar likely will run high  -Insulin sliding scale ordered  -Blood sugars moderately controlled     5. Hypertension  Continue PTA aspirin and amlodipine     6. HBV infection  -Follows up with Dr. Bray as outpatient       Medical Decision Making       65 MINUTES SPENT BY ME on the date of service doing chart review, history, exam, documentation & further activities per the note.        PPE Worn:  Mask, gloves     Diet: Combination Diet Regular Diet Adult  Fluid restriction 1800 ML FLUID    DVT Prophylaxis: Enoxaparin (Lovenox) SQ  Wilkes Catheter: Not present  Lines: None     Cardiac Monitoring: ACTIVE order. Indication: acute hypic resp failure  Code Status: Full Code      Clinically Significant Risk Factors         # Hyponatremia: Lowest Na = 125 mmol/L in last 2 days, will monitor as appropriate      # Hypoalbuminemia: Lowest albumin = 3.4 g/dL at 4/4/2024  9:29 AM, will monitor as appropriate         # Dementia: noted on problem list    # Overweight: Estimated body mass index is 27.2 kg/m  as calculated from the following:    Height as of 4/1/24: 1.575 m (5' 2\").    Weight as of this encounter: " "67.4 kg (148 lb 11.2 oz)., PRESENT ON ADMISSION     # Financial/Environmental Concerns: none         Disposition Plan     Expected Discharge Date: 04/07/2024      Destination: home with family;home with help/services            Katiana Pena DO  Hospitalist Service  [facility]  Securely message with [employer] (more info)  Text page via [employer] Paging/Directory   ______________________________________________________________________    Interval History   Seen with son at bedside.  Son declining interpretor on the phone at this time.      She is still feeling quite SOB and today more \"light-headed\" when up to the bathroom this am.  No new ant chest pain.  More harsh, productive cough today.  No HA, N/V, F/C.      No other new concerns per nursing.    Physical Exam   Vital Signs: Temp: 97.7  F (36.5  C) Temp src: Oral BP: (!) 148/80 Pulse: 81   Resp: 20 SpO2: 98 % O2 Device: Nasal cannula Oxygen Delivery: 1 LPM  Weight: 148 lbs 11.2 oz    GEN:  Alert, oriented x 3, appears mildly SOB and ill-appearing.  HEENT:  Normocephalic/atraumatic, no scleral icterus, no nasal discharge,   CV:  Regular rate and rhythm, no loud  murmur or JVD.  S1 + S2 noted, no S3 or S4.  LUNGS:  Clear to auscultation ant bilaterally; dry crackles post up to mid lung bilaterally - no rhonchi or wheezing; clear bilateral upper lobes.  No costal retractions.  Symmetric chest rise on inhalation noted.  ABD:  Active bowel sounds, soft, non-tender to light palpation, mildly distended.  No rebound/guarding/rigidity.  EXT:  No significant pretibial edema bilaterally.   SKIN:  Dry to touch, no exanthems noted in the visualized areas.    Medications   Current Facility-Administered Medications   Medication Dose Route Frequency Provider Last Rate Last Admin    sodium chloride 0.9 % infusion   Intravenous Continuous Lisette Fung  mL/hr at 04/06/24 0024 New Bag at 04/06/24 0024     Current Facility-Administered " Medications   Medication Dose Route Frequency Provider Last Rate Last Admin    amLODIPine (NORVASC) tablet 5 mg  5 mg Oral Daily Donna Escobedo MD   5 mg at 04/05/24 0807    aspirin (ASA) chewable tablet 81 mg  81 mg Oral Daily Donna Escobedo MD   81 mg at 04/05/24 0807    azithromycin (ZITHROMAX) tablet 250 mg  250 mg Oral Daily Donna Escobedo MD   250 mg at 04/05/24 0808    cefTRIAXone (ROCEPHIN) 2 g vial to attach to  ml bag for ADULTS or NS 50 ml bag for PEDS  2 g Intravenous Q24H Donna Escobedo MD   2 g at 04/05/24 1327    famotidine (PEPCID) tablet 40 mg  40 mg Oral QPM Donna Escobedo MD   40 mg at 04/05/24 2052    hydroxychloroquine (PLAQUENIL) tablet 200 mg  200 mg Oral BID Donna Escobedo MD   200 mg at 04/05/24 2052    insulin aspart (NovoLOG) injection (RAPID ACTING)  1-7 Units Subcutaneous TID AC Donna Escobedo MD   1 Units at 04/05/24 1747    insulin aspart (NovoLOG) injection (RAPID ACTING)  1-5 Units Subcutaneous At Bedtime Donna Escobedo MD   1 Units at 04/05/24 2116    methylPREDNISolone sodium succinate (SOLU-MEDROL) injection 40 mg  40 mg Intravenous Q12H Donna Escobedo MD   40 mg at 04/06/24 0430    [Held by provider] mycophenolate (GENERIC EQUIVALENT) capsule 1,000 mg  1,000 mg Oral BID Donna Escobedo MD   1,000 mg at 04/05/24 0807    nintedanib (OFEV) capsule 100 mg  100 mg Oral BID Donna Escobedo MD   100 mg at 04/05/24 2116    sodium chloride (PF) 0.9% PF flush 3 mL  3 mL Intracatheter Q8H Donna Escobedo MD   3 mL at 04/05/24 2130    sulfaSALAzine ER (AZULFIDINE EN) EC tablet 1,000 mg  1,000 mg Oral BID Donna Escobedo MD   1,000 mg at 04/05/24 2052    vitamin D3 (CHOLECALCIFEROL) tablet 50 mcg  50 mcg Oral Daily Donna Escobedo MD   50 mcg at 04/05/24 0807       Data     Labs and Imaging results below reviewed today.  Recent Labs   Lab 04/05/24  1009 04/04/24  0929   WBC 8.4 13.4*   HGB 11.2* 11.2*   HCT 33.9* 32.8*   MCV 89 88    333      Recent Labs   Lab 04/06/24  1720 04/06/24  1458 04/06/24  1203 04/06/24  0746 04/06/24  0729 04/05/24  1744 04/05/24  1502 04/05/24  1228 04/05/24  1009 04/04/24  1428 04/04/24  1039   NA  --  130*  --   --  135  --  125*  --  125*  --  129*   POTASSIUM  --   --   --   --  4.1  --   --   --  4.5  --  3.8   CHLORIDE  --   --   --   --  97*  --   --   --  89*  --  92*   CO2  --   --   --   --  23  --   --   --  23  --  24   ANIONGAP  --   --   --   --  15  --   --   --  13  --  13   *  --  150* 133* 134*   < >  --    < > 257*   < > 143*   BUN  --   --   --   --  9.0  --   --   --  7.4*  --  6.9*   CR  --   --   --   --  0.57  --   --   --  0.48*  --  0.51   GFRESTIMATED  --   --   --   --  >90  --   --   --  >90  --  >90   SANDI  --   --   --   --  9.3  --   --   --  9.7  --  9.5    < > = values in this interval not displayed.     7-Day Micro Results       Collected Updated Procedure Result Status      04/06/2024 1628 04/06/2024 1640 Blood Culture Hand, Right [[id number]]   Blood from Hand, Right    In process Component Value   No component results               04/06/2024 1627 04/06/2024 1639 Blood Culture Hand, Left [[id number]]   Blood from Hand, Left    In process Component Value   No component results               04/05/2024 1235 04/06/2024 1515 Blood Culture Hand, Right [[id number]]   (Abnormal)   Blood from Hand, Right    Preliminary result Component Value   Culture Positive on the 1st day of incubation  [P]     Gram positive cocci in clusters  [P]     1 of 2 bottles               04/05/2024 1231 04/06/2024 1547 Blood Culture Hand, Left [[id number]]   Blood from Hand, Left    Preliminary result Component Value   Culture No growth after 1 day  [P]                04/05/2024 1201 04/06/2024 1436 Respiratory Aerobic Bacterial Culture with Gram Stain [[id number]]   (Abnormal)   Sputum from Expectorate    Preliminary result Component Value   Culture Culture in progress  [P]     2+ Normal jessica to  date  [P]    Gram Stain Result <10 Squamous epithelial cells/low power field  [P]     >25 PMNs/low power field  [P]     2+ Gram positive cocci  [P]                04/05/2024 1013 04/05/2024 1433 Streptococcus pneumoniae antigen [[id number]]   Urine, Clean Catch    Final result Component Value   Streptococcus pneumoniae antigen Negative   A negative Streptococcus pneumoniae antigen result does not rule out infection with Streptococcus pneumoniae.            04/04/2024 1444 04/04/2024 1853 Legionella pneumophila antigen urine [[id number]]   Urine, Midstream    Final result Component Value   Legionella pneumophila serogroup 1 urinary antigen Negative   Suggests no recent or current infection. Infection due to Legionella cannot be ruled out, since other serogroups and species may cause disease, antigen may not be present in urine in early infection, and the level of antigen present in the urine may be below detectable limits of the test.            04/04/2024 1242 04/06/2024 1447 Blood Culture Arm, Left [[id number]]   Blood from Arm, Left    Preliminary result Component Value   Culture No growth after 2 days  [P]                04/04/2024 1206 04/06/2024 0831 Blood Culture Peripheral Blood [[id number]]   (Abnormal)   Peripheral Blood    Preliminary result Component Value   Culture Positive on the 1st day of incubation  [P]     Staphylococcus epidermidis  [P]     2 of 2 bottles               04/04/2024 1206 04/05/2024 1115 Verigene GP Panel [[id number]]    (Abnormal)   Peripheral Blood    Final result Component Value   Staphylococcus aureus Not Detected   Staphylococcus epidermidis Detected   Positive for methicillin-resistant Staphylococcus epidermidis (MRSE) by [employer] multiplex nucleic acid test. Final identification and antimicrobial susceptibility testing will be verified by standard methods.   Staphylococcus lugdunensis Not Detected   Enterococcus faecalis Not Detected   Enterococcus faecium Not Detected    Streptococcus species Not Detected   Streptococcus agalactiae Not Detected   Streptococcus anginosus group Not Detected   Streptococcus pneumoniae Not Detected   Streptococcus pyogenes Not Detected   Listeria species Not Detected            04/04/2024 0932 04/04/2024 1027 Symptomatic Influenza A/B, RSV, & SARS-CoV2 PCR (COVID-19) Nasopharyngeal [[id number]]    Swab from Nasopharyngeal    Final result Component Value   Influenza A PCR Negative   Influenza B PCR Negative   RSV PCR Negative   SARS CoV2 PCR Negative   NEGATIVE: SARS-CoV-2 (COVID-19) RNA not detected, presumed negative.                  Recent Labs   Lab 04/06/24  1720 04/06/24  1458 04/06/24  1203 04/06/24  0746 04/06/24  0729 04/05/24  1744 04/05/24  1502 04/05/24  1228 04/05/24  1009 04/04/24  1428 04/04/24  1039 04/04/24  0929   NA  --  130*  --   --  135  --  125*  --  125*  --  129* 128*   POTASSIUM  --   --   --   --  4.1  --   --   --  4.5  --  3.8 5.1   CHLORIDE  --   --   --   --  97*  --   --   --  89*  --  92* 92*   CO2  --   --   --   --  23  --   --   --  23  --  24 24   ANIONGAP  --   --   --   --  15  --   --   --  13  --  13 12   *  --  150* 133* 134*   < >  --    < > 257*   < > 143* 161*   BUN  --   --   --   --  9.0  --   --   --  7.4*  --  6.9* 5.9*   CR  --   --   --   --  0.57  --   --   --  0.48*  --  0.51 0.50*   GFRESTIMATED  --   --   --   --  >90  --   --   --  >90  --  >90 >90   SANDI  --   --   --   --  9.3  --   --   --  9.7  --  9.5 9.4   MAG  --   --   --   --  1.7  --   --   --  1.8  --   --   --    PROTTOTAL  --   --   --   --   --   --   --   --   --   --  7.6 7.7   ALBUMIN  --   --   --   --   --   --   --   --   --   --  3.6 3.4*   BILITOTAL  --   --   --   --   --   --   --   --   --   --  0.4 0.5   ALKPHOS  --   --   --   --   --   --   --   --   --   --  161*  --    AST  --   --   --   --   --   --   --   --   --   --  22 45   ALT  --   --   --   --   --   --   --   --   --   --  22  --     < > = values in  "this interval not displayed.     Recent Labs   Lab 04/05/24  1009   TSH 0.35     No results for input(s): \"TROPONIN\", \"TROPI\", \"TROPR\", \"TROPONINIS\" in the last 168 hours.    Invalid input(s): \"TROPT\", \"TROP\", \"TROPONINIES\", \"TNIH\"  No results for input(s): \"COLOR\", \"APPEARANCE\", \"URINEGLC\", \"URINEBILI\", \"URINEKETONE\", \"SG\", \"UBLD\", \"URINEPH\", \"PROTEIN\", \"UROBILINOGEN\", \"NITRITE\", \"LEUKEST\", \"RBCU\", \"WBCU\" in the last 168 hours.    No results found for this or any previous visit (from the past 24 hour(s)).     "

## 2024-04-06 NOTE — PLAN OF CARE
Summary: ED admit today for SOB, cold symptoms. RLL PNA with hx of ILD.   DATE & TIME: 4/6/24 7710-0078    Cognitive Concerns/ Orientation : A&O x4, Tibetan speaking but understands basic English. Family translates and signed waiver to decline . Ok for family to stay overnight to help care/translate  BEHAVIOR & AGGRESSION TOOL COLOR: Green  ABNL VS/O2: VSS but continues to desat to 80's when coughing - quickly recovers to 90's after; on RA now - uses O2 at home prn  MOBILITY: Independent  PAIN MANAGMENT: Denies  DIET: Regular, carb count, tolerating  BOWEL/BLADDER: Continent  ABNL LAB/BG: , 150; Na+ jumped from 125-135 recheck in am  DRAIN/DEVICES: None  TELEMETRY RHYTHM: NSR - now discontinued  SKIN: WNL  TESTS/PROCEDURES: None  D/C DAY/GOALS/PLACE: 2+ days, need to wean oxygen  OTHER IMPORTANT INFO: Lives at home with daughter; has two daughters that are RN's here in hospital. Frequent cough (sometimes productive) - on Mucinex. LS with coarse crackles in bases - IS encouraged. On oral antibiotics and Prednisone now. Per Pulmonary note, agreeing with abx and steroids, 4/5.

## 2024-04-06 NOTE — PROGRESS NOTES
VSS. A/O. 1L O2. Family helps with . Intermittent cough. Denies pain. Coarse lungs. Ind in room. Daughter ICU RN here & @ bedside. +BC, Abx changed, ID consulted. IV placed. IVF restarted. Na recheck 2130.

## 2024-04-07 ENCOUNTER — APPOINTMENT (OUTPATIENT)
Dept: CARDIOLOGY | Facility: CLINIC | Age: 74
DRG: 193 | End: 2024-04-07
Attending: INTERNAL MEDICINE
Payer: COMMERCIAL

## 2024-04-07 LAB
ANION GAP SERPL CALCULATED.3IONS-SCNC: 13 MMOL/L (ref 7–15)
BACTERIA BLD CULT: ABNORMAL
BACTERIA BLD CULT: ABNORMAL
BACTERIA SPT CULT: ABNORMAL
BUN SERPL-MCNC: 8.2 MG/DL (ref 8–23)
CALCIUM SERPL-MCNC: 9.1 MG/DL (ref 8.8–10.2)
CHLORIDE SERPL-SCNC: 98 MMOL/L (ref 98–107)
CREAT SERPL-MCNC: 0.54 MG/DL (ref 0.51–0.95)
DEPRECATED HCO3 PLAS-SCNC: 25 MMOL/L (ref 22–29)
EGFRCR SERPLBLD CKD-EPI 2021: >90 ML/MIN/1.73M2
ERYTHROCYTE [DISTWIDTH] IN BLOOD BY AUTOMATED COUNT: 12.1 % (ref 10–15)
GLUCOSE BLDC GLUCOMTR-MCNC: 171 MG/DL (ref 70–99)
GLUCOSE BLDC GLUCOMTR-MCNC: 194 MG/DL (ref 70–99)
GLUCOSE BLDC GLUCOMTR-MCNC: 195 MG/DL (ref 70–99)
GLUCOSE BLDC GLUCOMTR-MCNC: 87 MG/DL (ref 70–99)
GLUCOSE SERPL-MCNC: 91 MG/DL (ref 70–99)
GRAM STAIN RESULT: ABNORMAL
HCT VFR BLD AUTO: 31.2 % (ref 35–47)
HGB BLD-MCNC: 10.7 G/DL (ref 11.7–15.7)
LVEF ECHO: NORMAL
MCH RBC QN AUTO: 29.9 PG (ref 26.5–33)
MCHC RBC AUTO-ENTMCNC: 34.3 G/DL (ref 31.5–36.5)
MCV RBC AUTO: 87 FL (ref 78–100)
PLATELET # BLD AUTO: 377 10E3/UL (ref 150–450)
POTASSIUM SERPL-SCNC: 3.3 MMOL/L (ref 3.4–5.3)
POTASSIUM SERPL-SCNC: 4.4 MMOL/L (ref 3.4–5.3)
RBC # BLD AUTO: 3.58 10E6/UL (ref 3.8–5.2)
SODIUM SERPL-SCNC: 136 MMOL/L (ref 135–145)
WBC # BLD AUTO: 9 10E3/UL (ref 4–11)

## 2024-04-07 PROCEDURE — 93306 TTE W/DOPPLER COMPLETE: CPT | Mod: 26 | Performed by: INTERNAL MEDICINE

## 2024-04-07 PROCEDURE — 250N000013 HC RX MED GY IP 250 OP 250 PS 637: Performed by: INTERNAL MEDICINE

## 2024-04-07 PROCEDURE — 36415 COLL VENOUS BLD VENIPUNCTURE: CPT | Performed by: INTERNAL MEDICINE

## 2024-04-07 PROCEDURE — 250N000013 HC RX MED GY IP 250 OP 250 PS 637: Performed by: HOSPITALIST

## 2024-04-07 PROCEDURE — 258N000003 HC RX IP 258 OP 636: Performed by: INTERNAL MEDICINE

## 2024-04-07 PROCEDURE — 99232 SBSQ HOSP IP/OBS MODERATE 35: CPT | Performed by: INTERNAL MEDICINE

## 2024-04-07 PROCEDURE — 85027 COMPLETE CBC AUTOMATED: CPT | Performed by: INTERNAL MEDICINE

## 2024-04-07 PROCEDURE — 250N000011 HC RX IP 250 OP 636: Performed by: HOSPITALIST

## 2024-04-07 PROCEDURE — 80048 BASIC METABOLIC PNL TOTAL CA: CPT | Performed by: INTERNAL MEDICINE

## 2024-04-07 PROCEDURE — 84132 ASSAY OF SERUM POTASSIUM: CPT | Performed by: INTERNAL MEDICINE

## 2024-04-07 PROCEDURE — 250N000012 HC RX MED GY IP 250 OP 636 PS 637: Performed by: INTERNAL MEDICINE

## 2024-04-07 PROCEDURE — 255N000002 HC RX 255 OP 636: Performed by: HOSPITALIST

## 2024-04-07 PROCEDURE — 120N000001 HC R&B MED SURG/OB

## 2024-04-07 PROCEDURE — 250N000011 HC RX IP 250 OP 636: Performed by: INTERNAL MEDICINE

## 2024-04-07 PROCEDURE — 999N000208 ECHOCARDIOGRAM COMPLETE

## 2024-04-07 RX ORDER — POTASSIUM CHLORIDE 1500 MG/1
40 TABLET, EXTENDED RELEASE ORAL ONCE
Status: COMPLETED | OUTPATIENT
Start: 2024-04-07 | End: 2024-04-07

## 2024-04-07 RX ADMIN — POTASSIUM CHLORIDE 40 MEQ: 1500 TABLET, EXTENDED RELEASE ORAL at 11:52

## 2024-04-07 RX ADMIN — HYDROXYCHLOROQUINE SULFATE 200 MG: 200 TABLET ORAL at 08:17

## 2024-04-07 RX ADMIN — VANCOMYCIN HYDROCHLORIDE 1000 MG: 1 INJECTION, SOLUTION INTRAVENOUS at 04:24

## 2024-04-07 RX ADMIN — GUAIFENESIN 600 MG: 600 TABLET, EXTENDED RELEASE ORAL at 21:23

## 2024-04-07 RX ADMIN — INSULIN ASPART 1 UNITS: 100 INJECTION, SOLUTION INTRAVENOUS; SUBCUTANEOUS at 13:07

## 2024-04-07 RX ADMIN — HUMAN ALBUMIN MICROSPHERES AND PERFLUTREN 3 ML: 10; .22 INJECTION, SOLUTION INTRAVENOUS at 10:45

## 2024-04-07 RX ADMIN — SULFASALAZINE 1000 MG: 500 TABLET, DELAYED RELEASE ORAL at 08:18

## 2024-04-07 RX ADMIN — AMLODIPINE BESYLATE 5 MG: 5 TABLET ORAL at 08:17

## 2024-04-07 RX ADMIN — AZITHROMYCIN DIHYDRATE 250 MG: 250 TABLET ORAL at 08:17

## 2024-04-07 RX ADMIN — BENZONATATE 200 MG: 100 CAPSULE ORAL at 21:23

## 2024-04-07 RX ADMIN — SULFASALAZINE 1000 MG: 500 TABLET, DELAYED RELEASE ORAL at 21:24

## 2024-04-07 RX ADMIN — ASPIRIN 81 MG CHEWABLE TABLET 81 MG: 81 TABLET CHEWABLE at 08:18

## 2024-04-07 RX ADMIN — HYDROXYCHLOROQUINE SULFATE 200 MG: 200 TABLET ORAL at 21:23

## 2024-04-07 RX ADMIN — GUAIFENESIN AND DEXTROMETHORPHAN 10 ML: 100; 10 SYRUP ORAL at 12:17

## 2024-04-07 RX ADMIN — GUAIFENESIN AND DEXTROMETHORPHAN 10 ML: 100; 10 SYRUP ORAL at 04:27

## 2024-04-07 RX ADMIN — PREDNISONE 60 MG: 20 TABLET ORAL at 08:17

## 2024-04-07 RX ADMIN — Medication 50 MCG: at 08:18

## 2024-04-07 RX ADMIN — INSULIN ASPART 2 UNITS: 100 INJECTION, SOLUTION INTRAVENOUS; SUBCUTANEOUS at 18:33

## 2024-04-07 RX ADMIN — GUAIFENESIN 600 MG: 600 TABLET, EXTENDED RELEASE ORAL at 08:18

## 2024-04-07 RX ADMIN — BENZONATATE 200 MG: 100 CAPSULE ORAL at 17:12

## 2024-04-07 RX ADMIN — BENZONATATE 200 MG: 100 CAPSULE ORAL at 08:18

## 2024-04-07 RX ADMIN — VANCOMYCIN HYDROCHLORIDE 1000 MG: 1 INJECTION, SOLUTION INTRAVENOUS at 18:00

## 2024-04-07 RX ADMIN — GUAIFENESIN AND DEXTROMETHORPHAN 10 ML: 100; 10 SYRUP ORAL at 22:19

## 2024-04-07 RX ADMIN — SODIUM CHLORIDE: 9 INJECTION, SOLUTION INTRAVENOUS at 12:14

## 2024-04-07 RX ADMIN — FAMOTIDINE 40 MG: 20 TABLET ORAL at 21:23

## 2024-04-07 RX ADMIN — CEFTRIAXONE SODIUM 2 G: 2 INJECTION, POWDER, FOR SOLUTION INTRAMUSCULAR; INTRAVENOUS at 17:11

## 2024-04-07 ASSESSMENT — ACTIVITIES OF DAILY LIVING (ADL)
ADLS_ACUITY_SCORE: 22

## 2024-04-07 NOTE — PROVIDER NOTIFICATION
".MD Notification    Notified Person: MD    Notified Person Name:Wayne Quinonez    Notification Date/Time: 04/07/24 @ 1620    Notification Interaction: [employer]    Purpose of Notification: \"Hello,  Patient had blood cultures with possible contamination. Daughter is asking if Vanco is needed if unsure of growth. Will you be seeing pt tonight and can I hold med? thanks\"    Orders Received: received a call from Dr. Quinonez to get contact information of Dr. Christensen. Plan to give vanco and other abx until several blood cultures are negative. ID to visit patient tomorrow    Comments:   "

## 2024-04-07 NOTE — PLAN OF CARE
Goal Outcome Evaluation:  Summary: ED admit today for SOB, cold symptoms. RLL Pneumonia with hx Interstitial Lung Disease  DATE & TIME: 4/7/2024 [id number]  Cognitive Concerns/ Orientation : A&Ox4, Tibetan speaking but understands basic English. Family translates and signed waiver to decline . Ok for family to stay overnight to help care/translate  BEHAVIOR & AGGRESSION TOOL COLOR: Green  ABNL VS/O2: VSS on 1-2L O2 via NC at baseline, desats w/ coughing episodes, but has improved  MOBILITY: Independent with family in room   PAIN MANAGMENT: Denies  DIET: Regular with 1800 ml FR   BOWEL/BLADDER: Continent  ABNL LAB/BG: Blood cultures from 4/5 positive for gram + cocci in clusters.   DRAIN/DEVICES: IVF discontinued; SL, intermittent abx  TELEMETRY RHYTHM: discontinued  SKIN: WNL  TESTS/PROCEDURES: None  D/C DAY/GOALS/PLACE: 1-2 days, ID consulted d/t positive blood cultures; patient is on O2 at home per daughter who works here  OTHER IMPORTANT INFO: Lives at home with daughter; has two daughters that are RN's here in hospital. Frequent cough (sometimes productive) - on Mucinex. LS with crackles- IS self-administered, does throughout shift. On oral antibiotics and Prednisone now. Per Pulmonary note, agreeing with abx and steroids, 4/5. Awaiting ID recommendation.

## 2024-04-07 NOTE — PROGRESS NOTES
Assumed care, history reviewed.  Patient slowly feeling better.  Still requiring 1L NC O2.  Patient with 2 + blood cultures on 2 different days with other blood cultures negative.  Given different organisms, still suspect contaminant but unusual situation and patient chronically immunocompromised.  ID consult pending, continue empiric abx for now.  Full note to follow.  Likely home tomorrow if ID feels no need for ongoing IV abx and she continues to improve from resp standpoint.  Likely will need O2 at d/c but already had a prescription/concentrator that she hadn't been consistently using.

## 2024-04-07 NOTE — PROGRESS NOTES
[facility]    Medicine Progress Note - Hospitalist Service    Date of Admission:  4/4/2024    Assessment & Plan   Krystian Boland is a 73 year old female with medical history significant for interstitial lung disease secondary to RA, rheumatoid arthritis, HTN, DM, hepatitis B presented to the ER due to cough, shortness of breath and hypoxia and admitted on 4/4/2024.  Since admission she has made gradual pulmonary improvement.  During stay she has had 2 positive blood cultures of unclear significance given organisms found.    Potential d/c on 4/8 if continues to improve and ID does not feel needs ongoing IV abx for blood cultures.    Right lower lobe pneumonia  ILD secondary to RA  Acute on chronic hypoxic respiratory failure:  Summary:  Presented with leukocytosis and cough.  COVID/Flu/RSV negative. Urine for Legionella antigen and strep antigen NEGATIVE.  CT negative for PE, but has RLL pneumonia.  Was using 2 LPM NC O2 with activity prior, initially needing continuously at 3 lpm.  Since admission has improved and now on 1 L continuous.  -Follow EKG to monitor QT given concomitant use of azithromycin and Plaquenil. QTC recheck stable.      -  Continue  Rocephin IV and azithromycin p.o.  Needs one more dose azithromycin 4/8 then will complete that course.  Can move to oral cephalosporin soon.  Would complete 7 day course of cephalosporin for pneumonia.    -  Consider weaning prednisone in 1-2 days with gradual taper. Prednisone 60mg/day today.  -Pulmonary consulted and has been following, appreciate their assistance.    -As needed neb  -Continuous pulse ox, supplemental O2, Acapella, wean O2 as able.  -Continue PTA Ofev for ILD    Two Positive Blood cultures different days, ? Both contaminants:  -  Appear different organisms, potentially both contaminants.  Repeat cultures otherwise negative.  Continue vancomycin added for the possible bacteremia.   It is not for pneumonia.  ID consult  "requested and pending.  If ID in agreement, suspect can discontinue vancomycin soon.     Hyponatremia:  Patient does have mild hyponatremia in the past as well.  Sodium 129 at presentation.  Corrected since admission.  ? Some mild SIADH initially.  Stop IVF this afternoon, Recheck sodium in AM off IVF.  -Fluid restriction at 1800 cc every 24 hours     Rheumatoid arthritis  -Continue PTA meds including hydroxychloroquine, sulfasalazine.  -Holding CellCept in the setting of infection.  Can likely resume soon if BC's not felt significant by ID.     DM2, diet controlled baseline:  -Given underlying infection and steroid use, mild elevations with steroids/illness.  -Insulin sliding scale ordered     Hypertension:  Continue PTA aspirin and amlodipine     HBV infection:  -Follows up with Dr. Bray as outpatient     Medical Decision Making       40 MINUTES SPENT BY ME on the date of service doing chart review, history, exam, documentation & further activities per the note.      Labs/Imaging Reviewed:  See Information above and Data section below    PPE Used:  Mask       Diet: Combination Diet Regular Diet Adult  Fluid restriction 1800 ML FLUID    DVT Prophylaxis: Pneumatic Compression Devices  Wilkes Catheter: Not present  Lines: None     Cardiac Monitoring: None  Code Status: Full Code      Clinically Significant Risk Factors        # Hypokalemia: Lowest K = 3.3 mmol/L in last 2 days, will replace as needed       # Hypoalbuminemia: Lowest albumin = 3.4 g/dL at 4/4/2024  9:29 AM, will monitor as appropriate         # Dementia: noted on problem list    # Overweight: Estimated body mass index is 26.73 kg/m  as calculated from the following:    Height as of 4/1/24: 1.575 m (5' 2\").    Weight as of this encounter: 66.3 kg (146 lb 2.6 oz)., PRESENT ON ADMISSION     # Financial/Environmental Concerns: none         Disposition Plan      Expected Discharge Date: 04/08/2024      Destination: home with family;home with help/services     "        Nick Christensen DO  Hospitalist Service  [facility]  Securely message with [employer] (more info)  Text page via [facility] Paging/Directory   ______________________________________________________________________    Interval History   Assumed care, history reviewed.  Patients daughter assisted in translation at patient request.  Patient is feeling better.  SOB improved.  No nausea or other new complaints.    Physical Exam   Vital Signs: Temp: 98.3  F (36.8  C) Temp src: Oral BP: 123/68 Pulse: 82   Resp: 18 SpO2: 97 % O2 Device: Nasal cannula Oxygen Delivery: 1 LPM  Weight: 146 lbs 2.64 oz    GEN:  Alert, oriented, appears comfortable, no overt distress.  HEENT:  Normocephalic/atraumatic, no scleral icterus, no nasal discharge, mouth moist.  CV:  Regular rate and rhythm, distant.  LUNGS:  Mid to lower lung bilateral crackles.  No wheezes/retractions.  Symmetric chest rise on inhalation noted.  ABD:  Active bowel sounds, soft, non-tender/non-distended.  No guarding/rigidity.  EXT:  No worsening edema.  No cyanosis.   SKIN:  Dry to touch.    Medications   Current Facility-Administered Medications   Medication Dose Route Frequency Provider Last Rate Last Admin     Current Facility-Administered Medications   Medication Dose Route Frequency Provider Last Rate Last Admin    amLODIPine (NORVASC) tablet 5 mg  5 mg Oral Daily Donna Escobedo MD   5 mg at 04/07/24 0817    aspirin (ASA) chewable tablet 81 mg  81 mg Oral Daily Donna Escobedo MD   81 mg at 04/07/24 0818    azithromycin (ZITHROMAX) tablet 250 mg  250 mg Oral Daily Donna Escobedo MD   250 mg at 04/07/24 0817    benzonatate (TESSALON) capsule 200 mg  200 mg Oral TID Katiana Pena DO   200 mg at 04/07/24 0818    cefTRIAXone (ROCEPHIN) 2 g vial to attach to  ml bag for ADULTS or NS 50 ml bag for PEDS  2 g Intravenous Q24H Katiana Pena DO   2 g at 04/06/24 1651    famotidine (PEPCID) tablet 40 mg  40 mg  Oral QPM Donna Escobedo MD   40 mg at 04/06/24 2008    guaiFENesin (MUCINEX) 12 hr tablet 600 mg  600 mg Oral BID Katiana Pena DO   600 mg at 04/07/24 0818    hydroxychloroquine (PLAQUENIL) tablet 200 mg  200 mg Oral BID Donna Escobedo MD   200 mg at 04/07/24 0817    insulin aspart (NovoLOG) injection (RAPID ACTING)  1-7 Units Subcutaneous TID AC Donna Escobedo MD   1 Units at 04/07/24 1307    insulin aspart (NovoLOG) injection (RAPID ACTING)  1-5 Units Subcutaneous At Bedtime Donna Escobedo MD   1 Units at 04/05/24 2116    [Held by provider] mycophenolate (GENERIC EQUIVALENT) capsule 1,000 mg  1,000 mg Oral BID Donna Escobedo MD   1,000 mg at 04/05/24 0807    nintedanib (OFEV) capsule 100 mg  100 mg Oral BID Donna Escobedo MD   100 mg at 04/07/24 0828    predniSONE (DELTASONE) tablet 60 mg  60 mg Oral Daily Katiana Pena DO   60 mg at 04/07/24 0817    sodium chloride (PF) 0.9% PF flush 3 mL  3 mL Intracatheter Q8H Donna Escobedo MD   3 mL at 04/05/24 2130    sulfaSALAzine ER (AZULFIDINE EN) EC tablet 1,000 mg  1,000 mg Oral BID Donna Escobedo MD   1,000 mg at 04/07/24 0818    vancomycin (VANCOCIN) 1,000 mg in 200 mL dextrose intermittent infusion  1,000 mg Intravenous Q12H Donna Escobedo  mL/hr at 04/07/24 0424 1,000 mg at 04/07/24 0424    vitamin D3 (CHOLECALCIFEROL) tablet 50 mcg  50 mcg Oral Daily Donna Escobedo MD   50 mcg at 04/07/24 0818       Data     Labs and Imaging results below reviewed today.  Recent Labs   Lab 04/07/24  0854 04/05/24  1009 04/04/24  0929   WBC 9.0 8.4 13.4*   HGB 10.7* 11.2* 11.2*   HCT 31.2* 33.9* 32.8*   MCV 87 89 88    337 333     7-Day Micro Results       Collected Updated Procedure Result Status      04/06/2024 1628 04/07/2024 0702 Blood Culture Hand, Right [[id number]]   Blood from Hand, Right    Preliminary result Component Value   Culture No growth after 12 hours  [P]                04/06/2024 1627 04/07/2024  0702 Blood Culture Hand, Left [[id number]]   Blood from Hand, Left    Preliminary result Component Value   Culture No growth after 12 hours  [P]                04/05/2024 1235 04/07/2024 1147 Blood Culture Hand, Right [[id number]]   (Abnormal)   Blood from Hand, Right    Preliminary result Component Value   Culture Positive on the 1st day of incubation  [P]     Staphylococcus hominis  [P]     1 of 2 bottles               04/05/2024 1231 04/07/2024 1547 Blood Culture Hand, Left [[id number]]   Blood from Hand, Left    Preliminary result Component Value   Culture No growth after 2 days  [P]                04/05/2024 1201 04/07/2024 0821 Respiratory Aerobic Bacterial Culture with Gram Stain [[id number]]   (Abnormal)   Sputum from Expectorate    Final result Component Value   Culture 2+ Normal jessica   Gram Stain Result <10 Squamous epithelial cells/low power field    >25 PMNs/low power field    2+ Gram positive cocci               04/05/2024 1013 04/05/2024 1433 Streptococcus pneumoniae antigen [[id number]]   Urine, Clean Catch    Final result Component Value   Streptococcus pneumoniae antigen Negative   A negative Streptococcus pneumoniae antigen result does not rule out infection with Streptococcus pneumoniae.            04/04/2024 1444 04/04/2024 1853 Legionella pneumophila antigen urine [[id number]]   Urine, Midstream    Final result Component Value   Legionella pneumophila serogroup 1 urinary antigen Negative   Suggests no recent or current infection. Infection due to Legionella cannot be ruled out, since other serogroups and species may cause disease, antigen may not be present in urine in early infection, and the level of antigen present in the urine may be below detectable limits of the test.            04/04/2024 1242 04/07/2024 1447 Blood Culture Arm, Left [[id number]]   Blood from Arm, Left    Preliminary result Component Value   Culture No growth after 3 days  [P]                04/04/2024 1206  "04/07/2024 0744 Blood Culture Peripheral Blood [[id number]]    (Abnormal)   Peripheral Blood    Final result Component Value   Culture Positive on the 1st day of incubation    Staphylococcus epidermidis    2 of 2 bottles        Susceptibility        Staphylococcus epidermidis      SUSANNA      Ciprofloxacin <=0.5 ug/mL Susceptible      Clindamycin >=4 ug/mL Resistant      Daptomycin 0.5 ug/mL Susceptible      Doxycycline 1 ug/mL Susceptible      Erythromycin >=8 ug/mL Resistant      Gentamicin <=0.5 ug/mL Susceptible      Levofloxacin 0.25 ug/mL Susceptible      Oxacillin >=4 ug/mL Resistant  [1]       Tetracycline 2 ug/mL Susceptible      Vancomycin 2 ug/mL Susceptible                   [1]  Oxacillin susceptible isolates are susceptible to cephalosporins (example: cefazolin and cephalexin) and beta lactam combination agents. Oxacillin resistant isolates are resistant to these agents.               Susceptibility Comments       Staphylococcus epidermidis    Antibiotics listed as \"No Interpretation\" have no regulatory guidelines for susceptibility/resistance available.               04/04/2024 1206 04/05/2024 1115 Verigene GP Panel [[id number]]    (Abnormal)   Peripheral Blood    Final result Component Value   Staphylococcus aureus Not Detected   Staphylococcus epidermidis Detected   Positive for methicillin-resistant Staphylococcus epidermidis (MRSE) by Uniphore multiplex nucleic acid test. Final identification and antimicrobial susceptibility testing will be verified by standard methods.   Staphylococcus lugdunensis Not Detected   Enterococcus faecalis Not Detected   Enterococcus faecium Not Detected   Streptococcus species Not Detected   Streptococcus agalactiae Not Detected   Streptococcus anginosus group Not Detected   Streptococcus pneumoniae Not Detected   Streptococcus pyogenes Not Detected   Listeria species Not Detected            04/04/2024 0932 04/04/2024 1027 Symptomatic Influenza A/B, RSV, & SARS-CoV2 " PCR (COVID-19) Nasopharyngeal [[id number]]    Swab from Nasopharyngeal    Final result Component Value   Influenza A PCR Negative   Influenza B PCR Negative   RSV PCR Negative   SARS CoV2 PCR Negative   NEGATIVE: SARS-CoV-2 (COVID-19) RNA not detected, presumed negative.                  Recent Labs   Lab 04/07/24  1526 04/07/24  1201 04/07/24  0854 04/07/24  0823 04/06/24  2129 04/06/24  2113 04/06/24  1720 04/06/24  1458 04/06/24  0746 04/06/24  0729 04/05/24  1228 04/05/24  1009 04/04/24  1428 04/04/24  1039 04/04/24  0929   NA  --   --  136  --   --  131*  --  130*  --  135   < > 125*  --  129* 128*   POTASSIUM 4.4  --  3.3*  --   --   --   --   --   --  4.1  --  4.5  --  3.8 5.1   CHLORIDE  --   --  98  --   --  95*  --   --   --  97*  --  89*  --  92* 92*   CO2  --   --  25  --   --   --   --   --   --  23  --  23  --  24 24   ANIONGAP  --   --  13  --   --   --   --   --   --  15  --  13  --  13 12   GLC  --  171* 91 87   < >  --    < >  --    < > 134*   < > 257*   < > 143* 161*   BUN  --   --  8.2  --   --   --   --   --   --  9.0  --  7.4*  --  6.9* 5.9*   CR  --   --  0.54  --   --   --   --   --   --  0.57  --  0.48*  --  0.51 0.50*   GFRESTIMATED  --   --  >90  --   --   --   --   --   --  >90  --  >90  --  >90 >90   SANDI  --   --  9.1  --   --   --   --   --   --  9.3  --  9.7  --  9.5 9.4   MAG  --   --   --   --   --   --   --   --   --  1.7  --  1.8  --   --   --    PROTTOTAL  --   --   --   --   --   --   --   --   --   --   --   --   --  7.6 7.7   ALBUMIN  --   --   --   --   --   --   --   --   --   --   --   --   --  3.6 3.4*   BILITOTAL  --   --   --   --   --   --   --   --   --   --   --   --   --  0.4 0.5   ALKPHOS  --   --   --   --   --   --   --   --   --   --   --   --   --  161*  --    AST  --   --   --   --   --   --   --   --   --   --   --   --   --  22 45   ALT  --   --   --   --   --   --   --   --   --   --   --   --   --  22  --     < > = values in this interval not displayed.        Recent Results (from the past 24 hour(s))   Echocardiogram Complete   Result Value    LVEF  55-60%    [facility]    [id number]  [id number]  [id number]^RADHA^ADRIAN^SHIKHA     [facility]  Echocardiography Laboratory  [address] [facility] [place] [address]     Name: CAMERON BARNES  MRN: [id number]  : 1950  Study Date: 2024 09:58 AM  Age: 73 yrs  Gender: Female  Patient Location: [facility]  Reason For Study: Endocarditis  Ordering Physician: ADRIAN GARCIA  Referring Physician: AMY DOUGHERTY  Performed By: Linda Marlow     BSA: 1.7 m2  Height: 62 in  Weight: 148 lb  HR: 74  BP: 132/76 mmHg  ______________________________________________________________________________  Procedure  Complete Echo Adult.  ______________________________________________________________________________  Interpretation Summary     The visual ejection fraction is 55-60%.  No regional wall motion abnormalities noted.  No significant valvular heart disease.  Note that image resolution on transthoracic echocardiogram not sufficient to  exclude endocarditis.  ______________________________________________________________________________  Left Ventricle  The left ventricle is normal in size. There is normal left ventricular wall  thickness. Diastolic Doppler findings (E/E' ratio and/or other parameters)  suggest left ventricular filling pressures are indeterminate. The visual  ejection fraction is 55-60%. No regional wall motion abnormalities noted.     Right Ventricle  The right ventricle is normal in size and function.     Atria  Normal left atrial size. Right atrial size is normal. There is no color  Doppler evidence of an atrial shunt.     Mitral Valve  The mitral valve leaflets are mildly thickened. There is trace mitral  regurgitation.     Tricuspid Valve  There is trace tricuspid regurgitation. IVC diameter and respiratory changes  fall into an intermediate range suggesting an RA  pressure of 8 mmHg.     Aortic Valve  There is trivial trileaflet aortic sclerosis. No aortic regurgitation is  present.     Pulmonic Valve  There is trace pulmonic valvular regurgitation.     Vessels  Normal size aorta. Aortic root dilatation is present.     Pericardium  There is no pericardial effusion.     Rhythm  Sinus rhythm was noted.  ______________________________________________________________________________  MMode/2D Measurements & Calculations  IVSd: 1.0 cm     LVIDd: 4.6 cm  LVIDs: 3.4 cm  LVPWd: 1.0 cm  FS: 26.4 %  LV mass(C)d: 161.1 grams  LV mass(C)dI: 95.8 grams/m2  Ao root diam: 3.7 cm  asc Aorta Diam: 3.5 cm  LVOT diam: 2.2 cm  LVOT area: 3.9 cm2  Ao root diam index Ht(cm/m): 2.3  Ao root diam index BSA (cm/m2): 2.2  Asc Ao diam index BSA (cm/m2): 2.1  Asc Ao diam index Ht(cm/m): 2.2  LA Volume (BP): 29.0 ml     LA Volume Index (BP): 17.3 ml/m2  RWT: 0.43     Doppler Measurements & Calculations  MV E max margarito: 63.4 cm/sec  MV A max margarito: 99.4 cm/sec  MV E/A: 0.64  MV dec time: 0.20 sec  Ao V2 max: 111.0 cm/sec  Ao max P.0 mmHg  Ao V2 mean: 74.7 cm/sec  Ao mean PG: 3.0 mmHg  Ao V2 VTI: 23.3 cm  SUNITA(I,D): 2.8 cm2  SUNITA(V,D): 2.8 cm2  LV V1 max P.7 mmHg  LV V1 max: 81.8 cm/sec  LV V1 VTI: 16.8 cm  SV(LVOT): 64.7 ml  SI(LVOT): 38.5 ml/m2  PA V2 max: 80.5 cm/sec  PA max P.6 mmHg  PA acc time: 0.10 sec  AV Margarito Ratio (DI): 0.74  SUNITA Index (cm2/m2): 1.7  E/E' avg: 10.8  Lateral E/e': 9.9  Medial E/e': 11.7     RV S Margarito: 12.3 cm/sec     ______________________________________________________________________________  Report approved by: January Lynch 2024 12:58 PM

## 2024-04-07 NOTE — PLAN OF CARE
Goal Outcome Evaluation:        4/7/2024 NOC  Cognitive Concerns/ Orientation : A&O x4, Tibetan speaking but understands basic English. Family translates and signed waiver to decline . Ok for family to stay overnight to help care/translate  BEHAVIOR & AGGRESSION TOOL COLOR: Green  ABNL VS/O2: VSS but continues to desat to 80's when coughing - quickly recovers to 90's after; on 1 lpm NC   MOBILITY: Independent with family in room   PAIN MANAGMENT: Denies  DIET: Regular with 1800 ml FR   BOWEL/BLADDER: Continent  ABNL LAB/BG: , Na+ 131 recheck in am. Blood cultures from 4/5 positive for gram + cocci in clusters.   DRAIN/DEVICES: Peripheral IV left arm infusing NS at 50 ml per hour with intermittent antibiotics   TELEMETRY RHYTHM: NSR - now discontinued  SKIN: WNL  TESTS/PROCEDURES: None  D/C DAY/GOALS/PLACE: 2+ days, need to wean oxygen  OTHER IMPORTANT INFO: Lives at home with daughter; has two daughters that are RN's here in hospital. Frequent cough (sometimes productive) - on Mucinex. LS with coarse crackles in bases - IS encouraged. On oral antibiotics and Prednisone now. Per Pulmonary note, agreeing with abx and steroids, 4/5.

## 2024-04-08 VITALS
WEIGHT: 67.5 LBS | RESPIRATION RATE: 20 BRPM | SYSTOLIC BLOOD PRESSURE: 122 MMHG | HEART RATE: 90 BPM | BODY MASS INDEX: 12.35 KG/M2 | OXYGEN SATURATION: 91 % | DIASTOLIC BLOOD PRESSURE: 68 MMHG | TEMPERATURE: 98.8 F

## 2024-04-08 LAB
ANION GAP SERPL CALCULATED.3IONS-SCNC: 12 MMOL/L (ref 7–15)
BACTERIA BLD CULT: ABNORMAL
BACTERIA BLD CULT: ABNORMAL
BUN SERPL-MCNC: 11.9 MG/DL (ref 8–23)
C PNEUM DNA SPEC QL NAA+PROBE: NOT DETECTED
CALCIUM SERPL-MCNC: 9.5 MG/DL (ref 8.8–10.2)
CHLORIDE SERPL-SCNC: 94 MMOL/L (ref 98–107)
CREAT SERPL-MCNC: 0.53 MG/DL (ref 0.51–0.95)
DEPRECATED HCO3 PLAS-SCNC: 27 MMOL/L (ref 22–29)
EGFRCR SERPLBLD CKD-EPI 2021: >90 ML/MIN/1.73M2
ERYTHROCYTE [DISTWIDTH] IN BLOOD BY AUTOMATED COUNT: 12.2 % (ref 10–15)
FLUAV H1 2009 PAND RNA SPEC QL NAA+PROBE: NOT DETECTED
FLUAV H1 RNA SPEC QL NAA+PROBE: NOT DETECTED
FLUAV H3 RNA SPEC QL NAA+PROBE: NOT DETECTED
FLUAV RNA SPEC QL NAA+PROBE: NOT DETECTED
FLUBV RNA SPEC QL NAA+PROBE: NOT DETECTED
GLUCOSE BLDC GLUCOMTR-MCNC: 121 MG/DL (ref 70–99)
GLUCOSE BLDC GLUCOMTR-MCNC: 141 MG/DL (ref 70–99)
GLUCOSE BLDC GLUCOMTR-MCNC: 182 MG/DL (ref 70–99)
GLUCOSE SERPL-MCNC: 201 MG/DL (ref 70–99)
HADV DNA SPEC QL NAA+PROBE: NOT DETECTED
HCOV PNL SPEC NAA+PROBE: NOT DETECTED
HCT VFR BLD AUTO: 33.6 % (ref 35–47)
HGB BLD-MCNC: 11.4 G/DL (ref 11.7–15.7)
HMPV RNA SPEC QL NAA+PROBE: NOT DETECTED
HPIV1 RNA SPEC QL NAA+PROBE: NOT DETECTED
HPIV2 RNA SPEC QL NAA+PROBE: NOT DETECTED
HPIV3 RNA SPEC QL NAA+PROBE: NOT DETECTED
HPIV4 RNA SPEC QL NAA+PROBE: NOT DETECTED
LACTATE SERPL-SCNC: 1.5 MMOL/L (ref 0.7–2)
M PNEUMO DNA SPEC QL NAA+PROBE: NOT DETECTED
MCH RBC QN AUTO: 29.5 PG (ref 26.5–33)
MCHC RBC AUTO-ENTMCNC: 33.9 G/DL (ref 31.5–36.5)
MCV RBC AUTO: 87 FL (ref 78–100)
MRSA DNA SPEC QL NAA+PROBE: NEGATIVE
PLATELET # BLD AUTO: 438 10E3/UL (ref 150–450)
POTASSIUM SERPL-SCNC: 3.8 MMOL/L (ref 3.4–5.3)
RBC # BLD AUTO: 3.86 10E6/UL (ref 3.8–5.2)
RSV RNA SPEC QL NAA+PROBE: NOT DETECTED
RSV RNA SPEC QL NAA+PROBE: NOT DETECTED
RV+EV RNA SPEC QL NAA+PROBE: NOT DETECTED
SA TARGET DNA: NEGATIVE
SODIUM SERPL-SCNC: 133 MMOL/L (ref 135–145)
WBC # BLD AUTO: 9.4 10E3/UL (ref 4–11)

## 2024-04-08 PROCEDURE — 87641 MR-STAPH DNA AMP PROBE: CPT | Performed by: SPECIALIST

## 2024-04-08 PROCEDURE — 99233 SBSQ HOSP IP/OBS HIGH 50: CPT | Performed by: INTERNAL MEDICINE

## 2024-04-08 PROCEDURE — 85027 COMPLETE CBC AUTOMATED: CPT | Performed by: HOSPITALIST

## 2024-04-08 PROCEDURE — 250N000013 HC RX MED GY IP 250 OP 250 PS 637: Performed by: HOSPITALIST

## 2024-04-08 PROCEDURE — 36415 COLL VENOUS BLD VENIPUNCTURE: CPT | Performed by: INTERNAL MEDICINE

## 2024-04-08 PROCEDURE — 80048 BASIC METABOLIC PNL TOTAL CA: CPT | Performed by: INTERNAL MEDICINE

## 2024-04-08 PROCEDURE — 250N000011 HC RX IP 250 OP 636: Performed by: HOSPITALIST

## 2024-04-08 PROCEDURE — 87633 RESP VIRUS 12-25 TARGETS: CPT | Performed by: SPECIALIST

## 2024-04-08 PROCEDURE — 83605 ASSAY OF LACTIC ACID: CPT | Performed by: HOSPITALIST

## 2024-04-08 PROCEDURE — 250N000012 HC RX MED GY IP 250 OP 636 PS 637: Performed by: INTERNAL MEDICINE

## 2024-04-08 PROCEDURE — 250N000013 HC RX MED GY IP 250 OP 250 PS 637: Performed by: INTERNAL MEDICINE

## 2024-04-08 PROCEDURE — 99239 HOSP IP/OBS DSCHRG MGMT >30: CPT | Performed by: HOSPITALIST

## 2024-04-08 PROCEDURE — 99222 1ST HOSP IP/OBS MODERATE 55: CPT | Performed by: SPECIALIST

## 2024-04-08 PROCEDURE — 250N000011 HC RX IP 250 OP 636: Performed by: SPECIALIST

## 2024-04-08 RX ORDER — VANCOMYCIN HYDROCHLORIDE 1 G/200ML
1000 INJECTION, SOLUTION INTRAVENOUS EVERY 24 HOURS
Status: DISCONTINUED | OUTPATIENT
Start: 2024-04-09 | End: 2024-04-08

## 2024-04-08 RX ORDER — CEFUROXIME AXETIL 500 MG/1
500 TABLET ORAL EVERY 12 HOURS
Qty: 4 TABLET | Refills: 0 | Status: SHIPPED | OUTPATIENT
Start: 2024-04-09 | End: 2024-04-08

## 2024-04-08 RX ORDER — CEFTRIAXONE 2 G/1
2 INJECTION, POWDER, FOR SOLUTION INTRAMUSCULAR; INTRAVENOUS EVERY 24 HOURS
Qty: 20 ML | Refills: 0 | Status: COMPLETED | OUTPATIENT
Start: 2024-04-08 | End: 2024-04-08

## 2024-04-08 RX ORDER — FAMOTIDINE 20 MG/1
20 TABLET, FILM COATED ORAL EVERY EVENING
Status: DISCONTINUED | OUTPATIENT
Start: 2024-04-08 | End: 2024-04-08 | Stop reason: HOSPADM

## 2024-04-08 RX ORDER — BENZONATATE 200 MG/1
200 CAPSULE ORAL 3 TIMES DAILY PRN
Qty: 30 CAPSULE | Refills: 0 | Status: SHIPPED | OUTPATIENT
Start: 2024-04-08 | End: 2024-04-08

## 2024-04-08 RX ORDER — MYCOPHENOLATE MOFETIL 250 MG/1
1000 CAPSULE ORAL
COMMUNITY
Start: 2024-04-11 | End: 2024-07-10

## 2024-04-08 RX ORDER — PREDNISONE 10 MG/1
TABLET ORAL
Qty: 179 TABLET | Refills: 0 | Status: SHIPPED | OUTPATIENT
Start: 2024-04-09 | End: 2024-06-11

## 2024-04-08 RX ORDER — ENOXAPARIN SODIUM 100 MG/ML
30 INJECTION SUBCUTANEOUS EVERY 24 HOURS
Status: DISCONTINUED | OUTPATIENT
Start: 2024-04-08 | End: 2024-04-08 | Stop reason: HOSPADM

## 2024-04-08 RX ORDER — CEFUROXIME AXETIL 500 MG/1
500 TABLET ORAL EVERY 12 HOURS SCHEDULED
Status: DISCONTINUED | OUTPATIENT
Start: 2024-04-09 | End: 2024-04-08 | Stop reason: HOSPADM

## 2024-04-08 RX ORDER — SULFAMETHOXAZOLE/TRIMETHOPRIM 800-160 MG
1 TABLET ORAL
Qty: 30 TABLET | Refills: 0 | Status: SHIPPED | OUTPATIENT
Start: 2024-04-12

## 2024-04-08 RX ORDER — BENZONATATE 200 MG/1
200 CAPSULE ORAL 3 TIMES DAILY PRN
Qty: 30 CAPSULE | Refills: 0 | Status: SHIPPED | OUTPATIENT
Start: 2024-04-08 | End: 2024-09-27

## 2024-04-08 RX ORDER — CEFUROXIME AXETIL 500 MG/1
500 TABLET ORAL EVERY 12 HOURS
Qty: 4 TABLET | Refills: 0 | Status: SHIPPED | OUTPATIENT
Start: 2024-04-09 | End: 2024-04-16

## 2024-04-08 RX ORDER — ENOXAPARIN SODIUM 100 MG/ML
40 INJECTION SUBCUTANEOUS EVERY 24 HOURS
Status: DISCONTINUED | OUTPATIENT
Start: 2024-04-08 | End: 2024-04-08

## 2024-04-08 RX ORDER — SULFAMETHOXAZOLE/TRIMETHOPRIM 800-160 MG
1 TABLET ORAL
Qty: 30 TABLET | Refills: 0 | Status: SHIPPED | OUTPATIENT
Start: 2024-04-12 | End: 2024-04-08

## 2024-04-08 RX ORDER — PREDNISONE 10 MG/1
TABLET ORAL
Qty: 179 TABLET | Refills: 0 | Status: SHIPPED | OUTPATIENT
Start: 2024-04-09 | End: 2024-04-08

## 2024-04-08 RX ADMIN — Medication 50 MCG: at 08:02

## 2024-04-08 RX ADMIN — PREDNISONE 60 MG: 20 TABLET ORAL at 08:02

## 2024-04-08 RX ADMIN — CEFTRIAXONE SODIUM 2 G: 2 INJECTION, POWDER, FOR SOLUTION INTRAMUSCULAR; INTRAVENOUS at 15:50

## 2024-04-08 RX ADMIN — AMLODIPINE BESYLATE 5 MG: 5 TABLET ORAL at 08:02

## 2024-04-08 RX ADMIN — AZITHROMYCIN DIHYDRATE 250 MG: 250 TABLET ORAL at 08:02

## 2024-04-08 RX ADMIN — ASPIRIN 81 MG CHEWABLE TABLET 81 MG: 81 TABLET CHEWABLE at 08:02

## 2024-04-08 RX ADMIN — BENZONATATE 200 MG: 100 CAPSULE ORAL at 15:50

## 2024-04-08 RX ADMIN — GUAIFENESIN 600 MG: 600 TABLET, EXTENDED RELEASE ORAL at 08:02

## 2024-04-08 RX ADMIN — SULFASALAZINE 1000 MG: 500 TABLET, DELAYED RELEASE ORAL at 08:01

## 2024-04-08 RX ADMIN — HYDROXYCHLOROQUINE SULFATE 200 MG: 200 TABLET ORAL at 08:02

## 2024-04-08 RX ADMIN — INSULIN ASPART 1 UNITS: 100 INJECTION, SOLUTION INTRAVENOUS; SUBCUTANEOUS at 12:04

## 2024-04-08 RX ADMIN — VANCOMYCIN HYDROCHLORIDE 1000 MG: 1 INJECTION, SOLUTION INTRAVENOUS at 06:09

## 2024-04-08 RX ADMIN — GUAIFENESIN AND DEXTROMETHORPHAN 10 ML: 100; 10 SYRUP ORAL at 12:12

## 2024-04-08 RX ADMIN — BENZONATATE 200 MG: 100 CAPSULE ORAL at 08:02

## 2024-04-08 ASSESSMENT — ACTIVITIES OF DAILY LIVING (ADL)
ADLS_ACUITY_SCORE: 22

## 2024-04-08 NOTE — PLAN OF CARE
Summary: ED admit today for SOB, cold symptoms. RLL Pneumonia with hx Interstitial Lung Disease  DATE & TIME: 4/08/24 [id number]  Cognitive Concerns/ Orientation : A&Ox4, Tibetan speaking but understands basic English. Family translates and signed waiver to decline . Ok for family to stay overnight to help care/translate  BEHAVIOR & AGGRESSION TOOL COLOR: Green  ABNL VS/O2: VSS on 1 O2 (sats mid 90s) via NC at baseline, desats w/ coughing episodes, but has improved, per family baseline 1-2L NC at home  MOBILITY: Independent with family in room   PAIN MANAGMENT: Denies  DIET: Regular with 1800 ml FR  BOWEL/BLADDER: Continent  ABNL LAB/BG: Blood cultures from 4/5 positive for gram + cocci in clusters.   DRAIN/DEVICES: PIV saline locked  TELEMETRY RHYTHM: NA  SKIN: WNL  TESTS/PROCEDURES: None  D/C DAY/GOALS/PLACE: 1-2 days, ID consulted d/t positive blood cultures  OTHER IMPORTANT INFO: Frequent cough - on Mucinex. LS with crackles at bases- IS self-administered, does throughout shift. On oral antibiotics and Prednisone. Per Continues on PO abx and IV Vancomycin. Awaiting ID recommendation.

## 2024-04-08 NOTE — PROGRESS NOTES
[facility]   INPATIENT PULMONARY PROGRESS NOTE    April 8, 2024         Assessment and Plan:     1. 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 O2 improving from 4L/m to 1L/m. Agree with current abx and steroids. The new RLL ggo/consolidation likely infection vs flare or both. Unusual for such isolated pulmonary edema in RLL- patient improved without diuresis. Would also suspect viral infection to be more diffuse. Patient improved without MRSA coverage.       - Improved back to baseline O2 both at rest and exertion(RA, spO>90%)   - Complete antibiotics and agree with switching to PO form for CAP treatment; once complete with antibiotic course, can resume previous MMF dosage  -Can taper prednisone 10mg/week starting at 60mg (current dosage). Once at 30mg, can slow taper down to 5mg/week for now- may consider hastening taper later  -As patient on MMF and high dose steroids, start PJP Ppx with Bactrim  -Can complete nocturnal oximetry study at home- will message ILD nurses  -Can follow up in ILD in next 4-6 weeks with repeat PFTs/6MW- will also let ILD  know  -Will message Dr. Strauss updating him on pt's care    Billing: I spent a total of 50min for a subsequent hospital visit including review of chart, images, labs and notes (15min), bedside exam and dialogue with the patient (15min), and post-visit documentation, decision making and discussion with primary team and nursing (20min).     Moe Jain MD  [facility],  of Medicine  Pulmonary/Critical Care Medicine  April 8, 2024            Interval History:     Feeling well, walked hallways using 1-2 LPM with some desat.  On 1 LPM at rest though high spO2 (?97%)         Medications:     Current Facility-Administered Medications   Medication Dose Route Frequency Provider Last Rate Last Admin    amLODIPine (NORVASC) tablet 5 mg  5 mg Oral Daily Donna Escobedo MD   5 mg at 04/08/24 0802    aspirin (ASA)  chewable tablet 81 mg  81 mg Oral Daily Donna Escobedo MD   81 mg at 04/08/24 0802    benzonatate (TESSALON) capsule 200 mg  200 mg Oral TID Katiana Pena DO   200 mg at 04/08/24 0802    cefTRIAXone (ROCEPHIN) 2 g vial to attach to  ml bag for ADULTS or NS 50 ml bag for PEDS  2 g Intravenous Q24H Jennifer Randolph MD        [START ON 4/9/2024] cefuroxime (CEFTIN) tablet 500 mg  500 mg Oral Q12H [facility] (08/20) Jennifer Randolph MD        enoxaparin ANTICOAGULANT (LOVENOX) injection 30 mg  30 mg Subcutaneous Q24H Donna Escobedo MD        famotidine (PEPCID) tablet 20 mg  20 mg Oral QPM Donna Escobedo MD        guaiFENesin (MUCINEX) 12 hr tablet 600 mg  600 mg Oral BID Katiana Pena DO   600 mg at 04/08/24 0802    hydroxychloroquine (PLAQUENIL) tablet 200 mg  200 mg Oral BID Donna Escobedo MD   200 mg at 04/08/24 0802    insulin aspart (NovoLOG) injection (RAPID ACTING)  1-7 Units Subcutaneous TID AC Donna Escobedo MD   1 Units at 04/08/24 1204    insulin aspart (NovoLOG) injection (RAPID ACTING)  1-5 Units Subcutaneous At Bedtime Donna Escobedo MD   1 Units at 04/05/24 2116    [Held by provider] mycophenolate (GENERIC EQUIVALENT) capsule 1,000 mg  1,000 mg Oral BID Donna Escobedo MD   1,000 mg at 04/05/24 0807    nintedanib (OFEV) capsule 100 mg  100 mg Oral BID Donna Escobedo MD   100 mg at 04/08/24 0804    predniSONE (DELTASONE) tablet 60 mg  60 mg Oral Daily Katiana Pena DO   60 mg at 04/08/24 0802    sodium chloride (PF) 0.9% PF flush 3 mL  3 mL Intracatheter Q8H Donna Escobedo MD   3 mL at 04/08/24 0610    sulfaSALAzine ER (AZULFIDINE EN) EC tablet 1,000 mg  1,000 mg Oral BID Donna Escobedo MD   1,000 mg at 04/08/24 0801    vitamin D3 (CHOLECALCIFEROL) tablet 50 mcg  50 mcg Oral Daily Donna Escobedo MD   50 mcg at 04/08/24 0802     Current Facility-Administered Medications   Medication Dose Route Frequency Provider Last Rate Last Admin    acetaminophen  (TYLENOL) tablet 650 mg  650 mg Oral Q4H PRDonna Manzano MD   650 mg at 04/05/24 1239    Or    acetaminophen (TYLENOL) Suppository 650 mg  650 mg Rectal Q4H PRDonna Manzano MD        benzocaine-menthol (CHLORASEPTIC) 6-10 MG lozenge 1 lozenge  1 lozenge Buccal Q1H PRDonna Manzano MD        calcium carbonate (TUMS) chewable tablet 1,000 mg  1,000 mg Oral 4x Daily PRN Donna Escobedo MD        glucose gel 15-30 g  15-30 g Oral Q15 Min PRDonna Manzano MD        Or    dextrose 50 % injection 25-50 mL  25-50 mL Intravenous Q15 Min Donna Min MD        Or    glucagon injection 1 mg  1 mg Subcutaneous Q15 Min Donna Min MD        guaiFENesin-dextromethorphan (ROBITUSSIN DM) 100-10 MG/5ML syrup 10 mL  10 mL Oral Q4H PRDonna Manzano MD   10 mL at 04/08/24 1212    HYDROmorphone (DILAUDID) half-tab 1 mg  1 mg Oral Q4H PRDonna Manzano MD        hydrOXYzine HCl (ATARAX) tablet 10 mg  10 mg Oral TID PRDonna Manzano MD   10 mg at 04/05/24 2100    ipratropium - albuterol 0.5 mg/2.5 mg/3 mL (DUONEB) neb solution 3 mL  3 mL Nebulization Q4H PRDonna Manzano MD   3 mL at 04/06/24 0621    lidocaine (LMX4) cream   Topical Q1H PRDonna Manzano MD        lidocaine 1 % 0.1-1 mL  0.1-1 mL Other Q1H Donna Min MD        naloxone (NARCAN) injection 0.2 mg  0.2 mg Intravenous Q2 Min PRDonna Manzano MD        Or    naloxone (NARCAN) injection 0.4 mg  0.4 mg Intravenous Q2 Min Donna Min MD        Or    naloxone (NARCAN) injection 0.2 mg  0.2 mg Intramuscular Q2 Min PRN Donna Escobedo MD        Or    naloxone (NARCAN) injection 0.4 mg  0.4 mg Intramuscular Q2 Min PRN Donna Escobedo MD        prochlorperazine (COMPAZINE) injection 5 mg  5 mg Intravenous Q6H PRN Donna Escobedo MD        Or    prochlorperazine (COMPAZINE) tablet 5 mg  5 mg Oral Q6H PRN Donna Escobedo MD        Or    prochlorperazine (COMPAZINE) suppository 12.5 mg   12.5 mg Rectal Q12H Donna Min MD        senna-docusate (SENOKOT-S/PERICOLACE) 8.6-50 MG per tablet 1 tablet  1 tablet Oral BID Donna Min MD        Or    senna-docusate (SENOKOT-S/PERICOLACE) 8.6-50 MG per tablet 2 tablet  2 tablet Oral BID Donna Min MD        sodium chloride (PF) 0.9% PF flush 3 mL  3 mL Intracatheter q1 min Donna Min MD   3 mL at 24            Physical Exam:   Temp:  [97.7  F (36.5  C)-98.8  F (37.1  C)] 98.8  F (37.1  C)  Pulse:  [77-96] 90  Resp:  [18-20] 20  BP: (117-149)/(44-78) 122/68  SpO2:  [91 %-100 %] 91 %    Intake/Output Summary (Last 24 hours) at 2024 1502  Last data filed at 2024 1352  Gross per 24 hour   Intake 260 ml   Output --   Net 260 ml     Temp:  [97.7  F (36.5  C)-98.8  F (37.1  C)] 98.8  F (37.1  C)  Pulse:  [77-96] 90  Resp:  [18-20] 20  BP: (117-149)/(44-78) 122/68  SpO2:  [91 %-100 %] 91 %    Intake/Output Summary (Last 24 hours) at 2024 1502  Last data filed at 2024 1352  Gross per 24 hour   Intake 260 ml   Output --   Net 260 ml     Temp  Av.2  F (36.8  C)  Min: 97.6  F (36.4  C)  Max: 98.8  F (37.1  C)      Pulse  Av.3  Min: 79  Max: 108 Resp  Av.6  Min: 7  Max: 28  SpO2  Av.7 %  Min: 92 %  Max: 100 %         Intake/Output Summary (Last 24 hours) at 2024 1502  Last data filed at 2024 1352  Gross per 24 hour   Intake 260 ml   Output --   Net 260 ml     Wt Readings from Last 4 Encounters:   24 30.6 kg (67 lb 8 oz)   24 67.1 kg (148 lb)   24 67.1 kg (148 lb)   24 69.4 kg (153 lb)       Constitutional:   Awake, alert and in no apparent distress   Lungs:   Bibasilar rales     Cardiovascular:   Normal S1 and S2.  RRR.  No murmur, gallop or rub.       Musculoskeletal:   No edema     Neurologic:   Alert and conversant.              Current Laboratory Data:   All laboratory and imaging data reviewed.    Results for orders placed or performed during the  hospital encounter of 04/04/24 (from the past 24 hour(s))   Potassium   Result Value Ref Range    Potassium 4.4 3.4 - 5.3 mmol/L   Glucose by meter   Result Value Ref Range    GLUCOSE BY METER POCT 194 (H) 70 - 99 mg/dL   Glucose by meter   Result Value Ref Range    GLUCOSE BY METER POCT 195 (H) 70 - 99 mg/dL   Glucose by meter   Result Value Ref Range    GLUCOSE BY METER POCT 141 (H) 70 - 99 mg/dL   Glucose by meter   Result Value Ref Range    GLUCOSE BY METER POCT 121 (H) 70 - 99 mg/dL   MRSA MSSA PCR, Nasal Swab    Specimen: Nares, Bilateral; Swab   Result Value Ref Range    MRSA Target DNA Negative Negative    SA Target DNA Negative     Narrative    The [facility]  Xpert SA Nasal Complete assay performed in the [employer] is a qualitative in vitro diagnostic test designed for rapid detection of Staphylococcus aureus (SA) and methicillin-resistant Staphylococcus aureus (MRSA) from nasal swabs in patients at risk for nasal colonization. The test utilizes automated real-time polymerase chain reaction (PCR) to detect MRSA/SA DNA. The Xpert SA Nasal Complete assay is intended to aid in the prevention and control of MRSA/SA infections in healthcare settings. The assay is not intended to diagnose, guide or monitor treatment for MRSA/SA infections, or provide results of susceptibility to methicillin. A negative result does not preclude MRSA/SA nasal colonization.    Glucose by meter   Result Value Ref Range    GLUCOSE BY METER POCT 182 (H) 70 - 99 mg/dL   Basic metabolic panel   Result Value Ref Range    Sodium 133 (L) 135 - 145 mmol/L    Potassium 3.8 3.4 - 5.3 mmol/L    Chloride 94 (L) 98 - 107 mmol/L    Carbon Dioxide (CO2) 27 22 - 29 mmol/L    Anion Gap 12 7 - 15 mmol/L    Urea Nitrogen 11.9 8.0 - 23.0 mg/dL    Creatinine 0.53 0.51 - 0.95 mg/dL    GFR Estimate >90 >60 mL/min/1.73m2    Calcium 9.5 8.8 - 10.2 mg/dL    Glucose 201 (H) 70 - 99 mg/dL   CBC with platelets   Result Value Ref Range    WBC Count 9.4  4.0 - 11.0 10e3/uL    RBC Count 3.86 3.80 - 5.20 10e6/uL    Hemoglobin 11.4 (L) 11.7 - 15.7 g/dL    Hematocrit 33.6 (L) 35.0 - 47.0 %    MCV 87 78 - 100 fL    MCH 29.5 26.5 - 33.0 pg    MCHC 33.9 31.5 - 36.5 g/dL    RDW 12.2 10.0 - 15.0 %    Platelet Count 438 150 - 450 10e3/uL   Lactic Acid STAT   Result Value Ref Range    Lactic Acid 1.5 0.7 - 2.0 mmol/L   Respiratory Panel PCR    Specimen: Nasopharyngeal; Swab    Narrative    The following orders were created for panel order Respiratory Panel PCR.  Procedure                               Abnormality         Status                     ---------                               -----------         ------                     Respiratory Panel PCR, N...[[id number]]                      In process                   Please view results for these tests on the individual orders.     .No lab results found in last 7 days.           Previous Pulmonary Function Testing   ..Most Recent [facility] Pulmonary Function Testing    FVC-Pred   Date Value Ref Range Status   11/01/2023 2.40 L    04/21/2023 2.44 L    01/25/2023 2.44 L    10/26/2022 2.44 L    05/18/2022 2.47 L      FVC-Pre   Date Value Ref Range Status   11/01/2023 1.70 L    04/21/2023 1.53 L    01/25/2023 1.43 L    10/26/2022 1.51 L    05/18/2022 1.55 L      FVC-%Pred-Pre   Date Value Ref Range Status   11/01/2023 70 %    04/21/2023 62 %    01/25/2023 58 %    10/26/2022 62 %    05/18/2022 63 %      FEV1-Pre   Date Value Ref Range Status   11/01/2023 1.51 L    04/21/2023 1.46 L    01/25/2023 1.36 L    10/26/2022 1.42 L    05/18/2022 1.45 L      FEV1-%Pred-Pre   Date Value Ref Range Status   11/01/2023 80 %    04/21/2023 75 %    01/25/2023 70 %    10/26/2022 73 %    05/18/2022 74 %      FEV1FVC-Pred   Date Value Ref Range Status   11/01/2023 79 %    04/21/2023 79 %    01/25/2023 79 %    10/26/2022 79 %    05/18/2022 79 %      FEV1FVC-Pre   Date Value Ref Range Status   11/01/2023 89 %    04/21/2023 96 %    01/25/2023 95 %   "  10/26/2022 94 %    05/18/2022 93 %      No results found for: \"20029\"  FEFMax-Pred   Date Value Ref Range Status   11/01/2023 5.24 L/sec    04/21/2023 5.34 L/sec    01/25/2023 5.34 L/sec    10/26/2022 5.34 L/sec    05/18/2022 5.42 L/sec      FEFMax-Pre   Date Value Ref Range Status   11/01/2023 4.49 L/sec    04/21/2023 5.26 L/sec    01/25/2023 4.55 L/sec    10/26/2022 6.81 L/sec    05/18/2022 5.35 L/sec      FEFMax-%Pred-Pre   Date Value Ref Range Status   11/01/2023 85 %    04/21/2023 98 %    01/25/2023 85 %    10/26/2022 127 %    05/18/2022 98 %      ExpTime-Pre   Date Value Ref Range Status   11/01/2023 5.82 sec    04/21/2023 5.78 sec    01/25/2023 5.34 sec    10/26/2022 6.66 sec    05/18/2022 2.45 sec      FIFMax-Pre   Date Value Ref Range Status   11/01/2023 5.68 L/sec    04/21/2023 4.92 L/sec    01/25/2023 3.58 L/sec    10/26/2022 3.96 L/sec    05/18/2022 3.47 L/sec      FEV1FEV6-Pred   Date Value Ref Range Status   11/01/2023 79 %    04/21/2023 79 %    01/25/2023 79 %    10/26/2022 79 %    05/18/2022 79 %      FEV1FEV6-Pre   Date Value Ref Range Status   11/01/2023 84 %    04/21/2023 96 %    01/25/2023 95 %    10/26/2022 94 %    05/18/2022 93 %                     "

## 2024-04-08 NOTE — DISCHARGE SUMMARY
"[facility]  Hospitalist Discharge Summary      Date of Admission:  4/4/2024  Date of Discharge:  4/8/2024  Discharging Provider: Inga Esparza MD  Discharge Service: Hospitalist Service    Discharge Diagnoses     Acute hypoxic respiratory failure -resolved due to right lower lobe pneumonia and history of interstitial lung disease secondary due to rheumatoid arthritis  Chronic hypoxic respiratory failure with needing oxygen with activity     Clinically Significant Risk Factors     # Cachexia: Estimated body mass index is 12.35 kg/m  as calculated from the following:    Height as of 4/1/24: 1.575 m (5' 2\").    Weight as of this encounter: 30.6 kg (67 lb 8 oz).       Follow-ups Needed After Discharge   Follow-up Appointments     Follow-up and recommended labs and tests       Follow up with primary care provider, Adrianna Singh, within 7 days   for hospital follow- up.  The following labs/tests are recommended: CBC   and basic metabolic panel.    Follow-up with primary pulmonologist in 3 to 4 weeks and discussed ongoing   use of steroids        {Additional follow-up instructions/to-do's for PCP        Unresulted Labs Ordered in the Past 30 Days of this Admission       Date and Time Order Name Status Description    4/8/2024  1:33 PM Respiratory Panel PCR, Nasopharyngeal In process     4/8/2024 12:24 PM Respiratory Panel PCR In process     4/6/2024  3:52 PM Blood Culture Hand, Right Preliminary     4/6/2024  3:52 PM Blood Culture Hand, Left Preliminary     4/5/2024 11:54 AM Blood Culture Hand, Left Preliminary     4/4/2024 11:52 AM Blood Culture Arm, Left Preliminary         These results will be followed up by     Discharge Disposition   Discharged to home  Condition at discharge: Stable    Hospital Course     This is a 73-year-old female with medical history which includes interstitial lung disease secondary due to rheumatoid arthritis, rheumatoid arthritis, hypertension, diabetes mellitus,  " hepatitis B who presented to the ER on 4/4 with chief complaint of cough along with shortness of breath and has chronic hypoxic failure with needing 2 L nasal cannula with activity and diagnosed with right lower pneumonia and acute hypoxic respiratory failure        Acute hypoxic respiratory failure due to right lower lobe pneumonia and history of interstitial lung disease secondary due to rheumatoid arthritis  Chronic hypoxic respiratory failure with needing oxygen with activity   -on admission did had elevated white count of 13.4 and lactic acid and procalcitonin were normal  -Blood cultures as below  -CT chest PE protocol did not show any evidence of pulmonary embolism and question new right lower lobe pneumonia and/or inflammatory changes superimposed on moderate to severe fibrosis  -Strep pneumo and Legionella antigen on 4/5 is negative  -Sputum cultures from 4/5 are showing gram-positive cocci  -Pulmonary team consulted during the hospital stay and they recommended to continue with steroids and antibiotics and close follow-up in ILD clinic  -- On exam she has chronic cough and speaks in full sentences with no accessory muscle and was on room air and per RN need One liter with activity which is her baseline  - seen by ID and MRSA Swab is negative and Respiratory panel sent and is pending result  -Will need 2 more doses of  oral Cefuroxime for another 2 days starting  on 4/9/24 to complete 7 day treatment course and need repeat cxr in 2-3 weeks to look for resolution   -Spoke with pulmonary team and will be on prednisone taper and will decrease by 10 mg every week and once she is at 30 then will decrease by 5 mg and will start on Bactrim on Friday on 4/12/24  -Discussed with pulmonary and CellCept to be restarted on 4/11/2024 when she finishes antibiotics  -ILD team to arrange outpatient overnight oximetry  - continue with prednisone and will need taper and bactrim  for pcp prophlaxis.and close follow up with  pulmonary     Two positive blood cultures  -Blood cultures 2/2 on 4/4/24-Staphylococcus epidermidis  -Blood cultures 1/2 on 4/5/24-Staphylococcus hominis  -Blood cultures 2/2 on 4/6/2024 have been negative  -Echo done on 4/7/2024 does not show any evidence of any endocarditis but it is limited study to exclude endocarditis  -No evidence of any fever and white count has improved and infectious disease team consulted no concern for infection and viral pcr is pending  and mrsaa swab  is negative and vancomycin and azithromycin dced     Chronic anemia  -Baseline hemoglobin fluctuates around 11.3-12.4  -Hemoglobin during this hospitalization has been 11.2-10.7 with no evidence of any bleeding        Hyponatremia-POA-resolved  -Sodium on admit was 128 and normalized to 136 on 4/7  -  sodium is around 133  -Repeat labs as outpatient     Mild hypokalemia-resolved        Rheumatoid arthritis  -Patient's PTA medications including sulfasalazine and hydroxychloroquine were continued but CellCept held in setting of infection     Diabetes mellitus  -At baseline is diet controlled  -Continue with sliding scale insulin     Hypertension  -Continue with PTA aspirin and PT amlodipine     History of HPV infection  -Follows with Dr. Bray as outpatient    Consultations This Hospital Stay   PHYSICAL THERAPY ADULT IP CONSULT  OCCUPATIONAL THERAPY ADULT IP CONSULT  CARE MANAGEMENT / SOCIAL WORK IP CONSULT  PULMONARY IP CONSULT  PHARMACY TO DOSE VANCO  PHYSICAL THERAPY ADULT IP CONSULT  OCCUPATIONAL THERAPY ADULT IP CONSULT  PHARMACY TO DOSE VANCO  INFECTIOUS DISEASES IP CONSULT    Code Status   Full Code    Time Spent on this Encounter   I, Inga Esparza MD, personally saw the patient today and spent greater than 30 minutes discharging this patient.       Inga Esparza MD  [place] [address] [facility]  [address] [place] [address]  Phone:  [phone]  ______________________________________________________________________    Physical Exam   Vital Signs: Temp: 98.8  F (37.1  C) Temp src: Oral BP: 122/68 Pulse: 90   Resp: 20 SpO2: 91 % O2 Device: None (Room air) Oxygen Delivery: 1 LPM  Weight: 67 lbs 8 oz    General: Patient appears comfortable and in no acute distress.  HEENT: Head is atraumatic,  Neck: Neck is supple   Respiratory: Lungs are clear to auscultation bilaterally with no wheeze or crackles   Cardiovascular: Regular rate , S1 and S2 normal with no murmer or rubs or gallops  Abdomen:   soft , non tender , non distended and bowel sound present   Neurologic:  No facial droop  Musculoskeletal: Normal Range of motion over upper and lower extremities bilaterally   Psychiatric: cooperative            Primary Care Physician   Adrianna Singh    Discharge Orders      Reason for your hospital stay    Acute hypoxic respiratory failure     Follow-up and recommended labs and tests     Follow up with primary care provider, Adrianna Singh, within 7 days for hospital follow- up.  The following labs/tests are recommended: CBC and basic metabolic panel.    Follow-up with primary pulmonologist in 3 to 4 weeks and discussed ongoing use of steroids     Activity    Your activity upon discharge: activity as tolerated     Diet    Follow this diet upon discharge: Orders Placed This Encounter      Fluid restriction 1800 ML FLUID      Combination Diet Regular Diet Adult       Significant Results and Procedures   Most Recent 3 CBC's:  Recent Labs   Lab Test 04/08/24  1312 04/07/24  0854 04/05/24  1009   WBC 9.4 9.0 8.4   HGB 11.4* 10.7* 11.2*   MCV 87 87 89    377 337     Most Recent 3 BMP's:  Recent Labs   Lab Test 04/08/24  1312 04/08/24  1145 04/08/24  0758 04/07/24  1805 04/07/24  1526 04/07/24  1201 04/07/24  0854 04/06/24  2129 04/06/24  2113 04/06/24  0746 04/06/24  0729   *  --   --   --   --   --  136  --  131*   < > 135   POTASSIUM 3.8  --    --   --  4.4  --  3.3*  --   --   --  4.1   CHLORIDE 94*  --   --   --   --   --  98  --  95*  --  97*   CO2 27  --   --   --   --   --  25  --   --   --  23   BUN 11.9  --   --   --   --   --  8.2  --   --   --  9.0   CR 0.53  --   --   --   --   --  0.54  --   --   --  0.57   ANIONGAP 12  --   --   --   --   --  13  --   --   --  15   SANDI 9.5  --   --   --   --   --  9.1  --   --   --  9.3   * 182* 121*   < >  --    < > 91   < >  --    < > 134*    < > = values in this interval not displayed.     Most Recent 2 LFT's:  Recent Labs   Lab Test 04/04/24  1039 04/04/24  0929 03/07/24  1300   AST 22 45 19   ALT 22  --  15   ALKPHOS 161*  --  84   BILITOTAL 0.4 0.5 0.3     Most Recent 3 INR's:  Recent Labs   Lab Test 03/07/24  1300 04/18/23  0920   INR 1.16* 1.18*     Most Recent INR's and Anticoagulation Dosing History:  Anticoagulation Dose History          Latest Ref Rng & Units 4/18/2023 3/7/2024   Recent Dosing and Labs   INR 0.85 - 1.15 1.18  1.16      Most Recent 3 Creatinines:  Recent Labs   Lab Test 04/08/24  1312 04/07/24  0854 04/06/24  0729   CR 0.53 0.54 0.57     Most Recent 3 Hemoglobins:  Recent Labs   Lab Test 04/08/24  1312 04/07/24  0854 04/05/24  1009   HGB 11.4* 10.7* 11.2*     Most Recent 3 Troponin's:No lab results found.,   Results for orders placed or performed during the hospital encounter of 04/04/24   CT Chest Pulmonary Embolism w Contrast    Narrative    CT CHEST PULMONARY EMBOLISM WITH CONTRAST 4/4/2024 11:20 AM    CLINICAL HISTORY: Chest pain. Elevated D-dimer, worsening shortness of  breath, interstitial lung disease, history of latent TB.    TECHNIQUE: CT angiogram chest during arterial phase injection IV  contrast. 2D and 3D MIP reconstructions were performed by the CT  technologist. Dose reduction techniques were used.     CONTRAST: 58 mL [id number]    COMPARISON: January 25, 2023    FINDINGS:  ANGIOGRAM CHEST: Opacified pulmonary arteries are normal caliber and  negative for  pulmonary emboli. There is a gradual fading of contrast  in the medial right lower lobe compatible with decreased perfusion  and/or admixture artifact, pulmonary emboli here would be difficult to  entirely exclude. Thoracic aorta is negative for dissection. No CT  evidence of right heart strain.    LUNGS AND PLEURA: Moderate to severe peripheral and basilar fibrosis  with honeycombing and traction bronchiectasis similar to previous.  Question right lower lobe infiltrate and/or active inflammatory  change. No effusion.    MEDIASTINUM/AXILLAE: Mildly prominent lymph nodes noted which are  nonspecific similar to previous. No aneurysm.    CORONARY ARTERY CALCIFICATIONS: Mild.    UPPER ABDOMEN: No acute findings.    MUSCULOSKELETAL: No frankly destructive bony lesions.      Impression    IMPRESSION:  1.  No pulmonary embolism demonstrated.  2.  Question new right lower lobe pneumonia and/or active inflammatory  change superimposed on moderate to severe fibrosis.    AYANNA COOK MD         SYSTEM ID:  [id number]   Echocardiogram Complete     Value    LVEF  55-60%    Narrative    [id number]  [id number]  [id number]  [id number]^RADHA^ADRIAN^SHIKHA     [facility]  Echocardiography Laboratory  [address] [place] [address]     Name: CAMERON BARNES  MRN: [id number]  : 1950  Study Date: 2024 09:58 AM  Age: 73 yrs  Gender: Female  Patient Location: [facility]  Reason For Study: Endocarditis  Ordering Physician: ADRIAN GARCIA  Referring Physician: AMY DOUGHERTY  Performed By: Linda Marlow     BSA: 1.7 m2  Height: 62 in  Weight: 148 lb  HR: 74  BP: 132/76 mmHg  ______________________________________________________________________________  Procedure  Complete Echo Adult.  ______________________________________________________________________________  Interpretation Summary     The visual ejection fraction is 55-60%.  No regional wall motion abnormalities noted.  No significant  valvular heart disease.  Note that image resolution on transthoracic echocardiogram not sufficient to  exclude endocarditis.  ______________________________________________________________________________  Left Ventricle  The left ventricle is normal in size. There is normal left ventricular wall  thickness. Diastolic Doppler findings (E/E' ratio and/or other parameters)  suggest left ventricular filling pressures are indeterminate. The visual  ejection fraction is 55-60%. No regional wall motion abnormalities noted.     Right Ventricle  The right ventricle is normal in size and function.     Atria  Normal left atrial size. Right atrial size is normal. There is no color  Doppler evidence of an atrial shunt.     Mitral Valve  The mitral valve leaflets are mildly thickened. There is trace mitral  regurgitation.     Tricuspid Valve  There is trace tricuspid regurgitation. IVC diameter and respiratory changes  fall into an intermediate range suggesting an RA pressure of 8 mmHg.     Aortic Valve  There is trivial trileaflet aortic sclerosis. No aortic regurgitation is  present.     Pulmonic Valve  There is trace pulmonic valvular regurgitation.     Vessels  Normal size aorta. Aortic root dilatation is present.     Pericardium  There is no pericardial effusion.     Rhythm  Sinus rhythm was noted.  ______________________________________________________________________________  MMode/2D Measurements & Calculations  IVSd: 1.0 cm     LVIDd: 4.6 cm  LVIDs: 3.4 cm  LVPWd: 1.0 cm  FS: 26.4 %  LV mass(C)d: 161.1 grams  LV mass(C)dI: 95.8 grams/m2  Ao root diam: 3.7 cm  asc Aorta Diam: 3.5 cm  LVOT diam: 2.2 cm  LVOT area: 3.9 cm2  Ao root diam index Ht(cm/m): 2.3  Ao root diam index BSA (cm/m2): 2.2  Asc Ao diam index BSA (cm/m2): 2.1  Asc Ao diam index Ht(cm/m): 2.2  LA Volume (BP): 29.0 ml     LA Volume Index (BP): 17.3 ml/m2  RWT: 0.43     Doppler Measurements & Calculations  MV E max rylee: 63.4 cm/sec  MV A max rylee: 99.4  cm/sec  MV E/A: 0.64  MV dec time: 0.20 sec  Ao V2 max: 111.0 cm/sec  Ao max P.0 mmHg  Ao V2 mean: 74.7 cm/sec  Ao mean PG: 3.0 mmHg  Ao V2 VTI: 23.3 cm  SUNITA(I,D): 2.8 cm2  SUNITA(V,D): 2.8 cm2  LV V1 max P.7 mmHg  LV V1 max: 81.8 cm/sec  LV V1 VTI: 16.8 cm  SV(LVOT): 64.7 ml  SI(LVOT): 38.5 ml/m2  PA V2 max: 80.5 cm/sec  PA max P.6 mmHg  PA acc time: 0.10 sec  AV Margarito Ratio (DI): 0.74  SUNITA Index (cm2/m2): 1.7  E/E' avg: 10.8  Lateral E/e': 9.9  Medial E/e': 11.7     RV S Margarito: 12.3 cm/sec     ______________________________________________________________________________  Report approved by: January Lynch 2024 12:58 PM             Discharge Medications   Current Discharge Medication List        START taking these medications    Details   cefuroxime (CEFTIN) 500 MG tablet Take 1 tablet (500 mg) by mouth every 12 hours  Qty: 4 tablet, Refills: 0    Associated Diagnoses: Community acquired pneumonia of right lower lobe of lung      predniSONE (DELTASONE) 10 MG tablet Take 6 tablets (60 mg) by mouth daily for 7 days, THEN 5 tablets (50 mg) daily for 7 days, THEN 4 tablets (40 mg) daily for 7 days, THEN 3 tablets (30 mg) daily for 7 days, THEN 2.5 tablets (25 mg) daily for 7 days, THEN 2 tablets (20 mg) daily for 7 days, THEN 1.5 tablets (15 mg) daily for 7 days, THEN 1 tablet (10 mg) daily for 7 days, THEN 0.5 tablets (5 mg) daily for 7 days.  Qty: 179 tablet, Refills: 0    Associated Diagnoses: Interstitial lung disease (H)      sulfamethoxazole-trimethoprim (BACTRIM DS) 800-160 MG tablet Take 1 tablet by mouth Every Mon, Wed, Fri Morning  Qty: 30 tablet, Refills: 0    Associated Diagnoses: Interstitial lung disease (H)           CONTINUE these medications which have CHANGED    Details   benzonatate (TESSALON) 200 MG capsule Take 1 capsule (200 mg) by mouth 3 times daily as needed for cough  Qty: 30 capsule, Refills: 0    Associated Diagnoses: Upper respiratory tract infection, unspecified type;  Interstitial lung disease (H)      mycophenolate (GENERIC EQUIVALENT) 250 MG capsule Take 4 capsules (1,000 mg) by mouth 2 times daily    Associated Diagnoses: ILD (interstitial lung disease) (H)           CONTINUE these medications which have NOT CHANGED    Details   amLODIPine (NORVASC) 5 MG tablet TAKE 1 TABLET(5 MG) BY MOUTH DAILY  Qty: 90 tablet, Refills: 0    Associated Diagnoses: Benign essential hypertension      aspirin 81 MG chewable tablet Take 1 tablet (81 mg) by mouth daily  Qty: 90 tablet, Refills: 2    Associated Diagnoses: Type 2 diabetes mellitus without complication, without long-term current use of insulin (H)      famotidine (PEPCID) 40 MG tablet Take 1 tablet (40 mg) by mouth every evening  Qty: 90 tablet, Refills: 3    Associated Diagnoses: Heartburn      guaiFENesin (MUCINEX) 600 MG 12 hr tablet Take 600 mg by mouth 2 times daily as needed for congestion or cough      guaiFENesin (ROBITUSSIN) 20 mg/mL liquid Take 10 mLs by mouth every 4 hours as needed for cough  Qty: 236 mL, Refills: 0    Associated Diagnoses: ILD (interstitial lung disease) (H)      hydroxychloroquine (PLAQUENIL) 200 MG tablet Take 1 tablet (200 mg) by mouth 2 times daily Annual Plaquenil toxicity eye screening required.  Qty: 180 tablet, Refills: 3    Associated Diagnoses: Rheumatoid factor positive with cyclic citrullinated peptide (CCP) antibody negative      hydrOXYzine (ATARAX) 10 MG tablet Take 1 tablet (10 mg) by mouth 3 times daily as needed for anxiety  Qty: 30 tablet, Refills: 1    Associated Diagnoses: Anxiety      ipratropium - albuterol 0.5 mg/2.5 mg/3 mL (DUONEB) 0.5-2.5 (3) MG/3ML neb solution Take 1 vial (3 mLs) by nebulization every 4 hours as needed for wheezing  Qty: 90 mL, Refills: 0    Comments: Future refills by PCP Dr. Adrianna Singh with phone number [phone].  Associated Diagnoses: ILD (interstitial lung disease) (H)      melatonin 3 MG tablet Take 1 tablet (3 mg) by mouth nightly as needed  for sleep    Associated Diagnoses: Insomnia, unspecified type      OFEV 100 MG capsule TAKE ONE CAPSULE BY MOUTH TWICE A DAY  Qty: 60 capsule, Refills: 11    Associated Diagnoses: ILD (interstitial lung disease) (H); Progressive fibrosing interstitial lung disease (H)      sulfaSALAzine ER (AZULFIDINE EN) 500 MG EC tablet take 2 tabs twice a day.  Qty: 360 tablet, Refills: 3    Associated Diagnoses: Rheumatoid factor positive with cyclic citrullinated peptide (CCP) antibody negative      Vitamin D3 50 mcg (2000 units) tablet TAKE 1 TABLET BY MOUTH DAILY  Qty: 90 tablet, Refills: 0    Associated Diagnoses: Vitamin D deficiency      STATIN NOT PRESCRIBED (INTENTIONAL) Please choose reason not prescribed, below  Qty:      Associated Diagnoses: Type 2 diabetes mellitus without complication, without long-term current use of insulin (H)           Allergies   Allergies   Allergen Reactions    Atorvastatin Muscle Pain (Myalgia)

## 2024-04-08 NOTE — PROGRESS NOTES
"Care Management Discharge Note    Discharge Date: 04/08/2024       Discharge Disposition: Home    Discharge Services:  NA    Discharge DME: Walker    Discharge Transportation: family or friend will provide    Private pay costs discussed: Not applicable    Does the patient's insurance plan have a 3 day qualifying hospital stay waiver?  Yes     Which insurance plan 3 day waiver is available? Alternative insurance waiver    Will the waiver be used for post-acute placement? No    PAS Confirmation Code:  NA  Patient/family educated on Medicare website which has current facility and service quality ratings:  NA    Education Provided on the Discharge Plan:  yes  Persons Notified of Discharge Plans:  patient and Nsg  Patient/Family in Agreement with the Plan: yes    Handoff Referral Completed: Yes    Additional Information:  Patient is discharging home this afternoon.  Patient has the following appointments scheduled:   April 16  ED/Hospital Follow Up with Adrianna Singh  Tuesday Apr 16, 2024 1:15 PM  Please plan to arrive at the clinic at your \"Arrive By\" time for your appointment. Our late policy will be enforced based on this time. [facility]  [address] [address]  [phone]     April18   Preventative Adult Visit with Adrianna Singh  Thursday Apr 18, 2024 8:10 AM  The purpose of this visit is to discuss your medical history and prevent health problems before you are sick. If you have additional concerns you would like to discuss outside of preventive care, you may be billed for that service.  If you have further questions, you may want to contact your insurance company to understand what is included in your preventive health benefits.  [facility]  [address] [address]  [phone]    April 24   RETURN RETINA with Dalia Sawyer  Wednesday Apr 24, 2024 1:15 PM  [facility] - [address]  9th Fl Clin 9A  [facility] [address]  [phone]    May 9   LAB  Thursday May 9, 2024 12:15 PM  Please do not eat 10-12 hours before your appointment if you are coming in fasting for labs on lipids, cholesterol, or glucose (sugar). Does not apply to pregnant women. Water, tea and black coffee (with nothing added) is okay. Do not drink other fluids, diet soda or gum. If you have concerns about taking your medications, please send a message by clicking on Secure Messaging, Message Your Care Team.  [facility] [address]  [address]st [facility] [id number] [phone]          6 Min Walk or O2  Thursday May 9, 2024 12:15 PM  [facility] [address]  3rd [facility] [id number] [phone]          Loops & DLCO  Thursday May 9, 2024 12:45 PM  [facility] [address]  3[address] [facility] [id number] [phone]          Return Interstitial Lung with Aahd Kubbara  Thursday May 9, 2024 1:15 PM  [facility] [address] [address]  [phone]  MAY    May 22   US ABDOMEN COMPLETE  Wednesday May 22, 2024 7:15 AM  [facility]  [address] [place] [address] [phone]       Jasmin Steen, RN  Inpatient Care Management  [phone]

## 2024-04-08 NOTE — CONSULTS
[facility]    Infectious Disease Consultation     Date of Admission:  4/4/2024  Date of Consult : 04/08/24    Assessment:  73 YF with interstitial lung disease secondary to rheumatoid arthritis, who has been admitted with cough, shortness of breath and hypoxia, and was found to have a RLL pneumonia. She additionally had positive blood cxs for staph epidermidis and staph hominis which are likely contaminants. Overall improving    -RLL pneumonia  -Positive blood cxs for staph epidermidis and staph hominis, likely contaminants  -interstitial lung disease related to Rheumatoid arthritis -on hydroxychloroquine, mycophenolate and sulfasalazine  -Anemia  -Diabetes  -Chronic medical conditions - Dementia, anxiety, HTN, Obesity, osteo arthritis, osteopenia    Recommendations  Discontinue Azithromycin and Vancomycin  Check MRSA Pcr and viral PCR panel  Could discontinue Ceftriaxone after dose today and transition to oral Cefuroxime for another 2 days starting tomorrow to complete 7 day treatment course  Will need  follow up CXR PA/lateral in 2-3 weeks to ensure resolution of infiltrate  Can discharge from the ID stand point once medically stable    Jennifer Randolph MD    Reason for Consult   Reason for consult: I was asked to evaluate this patient for Pneumonia and bacteremia.    Primary Care Physician   Adrianna Singh    Chief Complaint   Cough and shortness of breath    History is obtained from the patient and medical records    History of Present Illness   Krystian Boland is a 73 year old female with interstitial lung disease secondary to rheumatoid arthritis, who has been admitted with cough, shortness of breath and hypoxia, and was found to have a RLL pneumonia. She additionally had positive blood cxs for staph epidermidis and staph hominis and she has been maintained on ceftriaxone, azithromycin and vancomycin    Patient was ill for about a week prior to hospitalization wth sore throat, cough  and cold symptoms. She denied fever, developed poor appetite, no nausea or vomiting. She continued to develop worsening shortness of breath and increased oxygen requirement (is on home oxygen therapy)    She was hypoxic on admission with leukocytosis of 13.4K, and cT chest showed RLL pneumonia. Strep pneumoniae and legionella urine antigens were negative  Procalcitonin was also negative. Covid and influenza screens were negative as well.    ID has been asked to assist with further management  She feels much better, oxygen requirement is improving, though not back to baseline yet.    Antimicrobial therapy  4/4 Ceftriaxone, azithromycin    Past Medical History   I have reviewed this patient's medical history and updated it with pertinent information if needed.   Past Medical History:   Diagnosis Date    Anxiety     Early onset Alzheimer's dementia without behavioral disturbance (H)     Hx of small bowel obstruction 11/2018    Hypertension     Interstitial lung disease (H) 05/2018    Intestinal TB 1990    Obesity (BMI 30-39.9)     Osteoarthritis     Osteopenia     Seropositive rheumatoid arthritis (H) 01/2023    Type II diabetes mellitus (H)        Past Surgical History   I have reviewed this patient's surgical history and updated it with pertinent information if needed.  Past Surgical History:   Procedure Laterality Date    CATARACT EXTRACTION W/ INTRAOCULAR LENS IMPLANT Left 10/25/2021    CATARACT IOL, RT/LT Right 10/11/2021    CHOLECYSTECTOMY, OPEN  1990    HEMICOLECTOMY, RT/LT Right 1990       Prior to Admission Medications   Prior to Admission Medications   Prescriptions Last Dose Informant Patient Reported? Taking?   OFEV 100 MG capsule Past Week  No Yes   Sig: TAKE ONE CAPSULE BY MOUTH TWICE A DAY   STATIN NOT PRESCRIBED (INTENTIONAL)  Daughter No No   Sig: Please choose reason not prescribed, below   Vitamin D3 50 mcg (2000 units) tablet Past Week  No Yes   Sig: TAKE 1 TABLET BY MOUTH DAILY   amLODIPine  (NORVASC) 5 MG tablet Past Week  No Yes   Sig: TAKE 1 TABLET(5 MG) BY MOUTH DAILY   aspirin 81 MG chewable tablet Past Week Daughter No Yes   Sig: Take 1 tablet (81 mg) by mouth daily   benzonatate (TESSALON) 200 MG capsule  at PRN  No Yes   Sig: Take 1 capsule (200 mg) by mouth 3 times daily as needed for cough   famotidine (PEPCID) 40 MG tablet Past Week  No Yes   Sig: Take 1 tablet (40 mg) by mouth every evening   guaiFENesin (MUCINEX) 600 MG 12 hr tablet  at PRN  Yes Yes   Sig: Take 600 mg by mouth 2 times daily as needed for congestion or cough   guaiFENesin (ROBITUSSIN) 20 mg/mL liquid  at PRN  No Yes   Sig: Take 10 mLs by mouth every 4 hours as needed for cough   hydrOXYzine (ATARAX) 10 MG tablet  at PRN  No Yes   Sig: Take 1 tablet (10 mg) by mouth 3 times daily as needed for anxiety   hydroxychloroquine (PLAQUENIL) 200 MG tablet Past Week  No Yes   Sig: Take 1 tablet (200 mg) by mouth 2 times daily Annual Plaquenil toxicity eye screening required.   ipratropium - albuterol 0.5 mg/2.5 mg/3 mL (DUONEB) 0.5-2.5 (3) MG/3ML neb solution  at PRN  No Yes   Sig: Take 1 vial (3 mLs) by nebulization every 4 hours as needed for wheezing   melatonin 3 MG tablet  at PRN  No Yes   Sig: Take 1 tablet (3 mg) by mouth nightly as needed for sleep   mycophenolate (GENERIC EQUIVALENT) 250 MG capsule Past Week  No Yes   Sig: TAKE FOUR CAPSULES BY MOUTH TWICE A DAY   sulfaSALAzine ER (AZULFIDINE EN) 500 MG EC tablet Past Week  No Yes   Sig: take 2 tabs twice a day.      Facility-Administered Medications: None     Allergies   Allergies   Allergen Reactions    Atorvastatin Muscle Pain (Myalgia)       Immunization History   Immunization History   Administered Date(s) Administered    COVID-19 Bivalent 12+ (Pfizer) 12/20/2022    COVID-19 MONOVALENT 12+ (Pfizer) 04/21/2021, 05/12/2021, 11/16/2021    Influenza (High Dose) 3 valent vaccine 11/07/2018, 02/12/2020, 10/17/2022, 09/25/2023    Influenza Vaccine 65+ (Fluzone HD) 12/16/2020,  10/04/2021, 10/17/2022, 09/25/2023    Influenza Vaccine >6 months,quad, PF 11/23/2013, 10/01/2016    Influenza Vaccine IM Ages 6-35 Months 4 Valent (PF) 10/24/2015    Pneumo Conj 13-V (2010&after) 04/02/2018    Pneumococcal 23 valent 07/01/2013, 10/14/2016    TDAP Vaccine (Adacel) 07/04/2012    Zoster vaccine, live 07/04/2016       Social History   I have reviewed this patient's social history and updated it with pertinent information if needed. Krystian Boland  reports that she has never smoked. She has never used smokeless tobacco. She reports that she does not currently use alcohol. She reports that she does not use drugs.    Family History   I have reviewed this patient's family history and updated it with pertinent information if needed.   Family History   Problem Relation Age of Onset    Dementia Mother     Cerebrovascular Disease Father     Cerebrovascular Disease Brother     Diabetes Son     Myocardial Infarction No family hx of     Coronary Artery Disease Early Onset No family hx of     Breast Cancer No family hx of     Ovarian Cancer No family hx of     Colon Cancer No family hx of     Glaucoma No family hx of     Macular Degeneration No family hx of     Liver Disease No family hx of        Review of Systems   The 10 point Review of Systems is as per HPI    Physical Exam   Temp: 98  F (36.7  C) Temp src: Oral BP: 117/44 Pulse: 79   Resp: 18 SpO2: 97 % O2 Device: Nasal cannula Oxygen Delivery: 1 LPM  Vital Signs with Ranges  Temp:  [97.7  F (36.5  C)-98.3  F (36.8  C)] 98  F (36.7  C)  Pulse:  [77-82] 79  Resp:  [18] 18  BP: (117-123)/(44-71) 117/44  SpO2:  [97 %] 97 %  67 lbs 8 oz  Body mass index is 12.35 kg/m .    GENERAL APPEARANCE:  awake  EYES: Eyes grossly normal to inspection  NECK: no adenopathy  RESP: lungs clear   CV: regular rates and rhythm  LYMPHATICS: normal ant/post cervical and supraclavicular nodes  ABDOMEN: soft, nontender  MS: extremities normal  SKIN: no suspicious lesions or  jennifer        Data   All laboratory data reviewed  Component      Latest Ref Rn 4/7/2024  8:54 AM   Sodium      135 - 145 mmol/L 136    Potassium      3.4 - 5.3 mmol/L 3.3 (L)    Chloride      98 - 107 mmol/L 98    Carbon Dioxide (CO2)      22 - 29 mmol/L 25    Anion Gap      7 - 15 mmol/L 13    Urea Nitrogen      8.0 - 23.0 mg/dL 8.2    Creatinine      0.51 - 0.95 mg/dL 0.54    GFR Estimate      >60 mL/min/1.73m2 >90    Calcium      8.8 - 10.2 mg/dL 9.1    Glucose      70 - 99 mg/dL 91    WBC      4.0 - 11.0 10e3/uL 9.0    RBC Count      3.80 - 5.20 10e6/uL 3.58 (L)    Hemoglobin      11.7 - 15.7 g/dL 10.7 (L)    Hematocrit      35.0 - 47.0 % 31.2 (L)    MCV      78 - 100 fL 87    MCH      26.5 - 33.0 pg 29.9    MCHC      31.5 - 36.5 g/dL 34.3    RDW      10.0 - 15.0 % 12.1    Platelet Count      150 - 450 10e3/uL 377       Component      Latest Ref Rn 4/4/2024  2:44 PM 4/5/2024  10:13 AM   L Pneumo Urine Antigen      Negative  Negative     S Pneumoniae Antigen      Negative   Negative        Component      Latest Ref [facility] 4/4/2024  10:39 AM   Procalcitonin      <0.50 ng/mL 0.11        Component      Latest Ref [facility] 4/4/2024  9:29 AM 4/4/2024  9:32 AM   Influenza A      Negative   Negative    Influenza B      Negative   Negative    Resp Syncytial Virus      Negative   Negative    SARS CoV2 PCR      Negative   Negative    N-Terminal Pro BNP Inpatient      0 - 900 pg/mL 115

## 2024-04-08 NOTE — PROGRESS NOTES
[facility]    Medicine Progress Note - Hospitalist Service    Date of Admission:  4/4/2024    Assessment & Plan     This is a 73-year-old female with medical history which includes interstitial lung disease secondary due to rheumatoid arthritis, rheumatoid arthritis, hypertension, diabetes mellitus,  hepatitis B who presented to the ER on 4/4 with chief complaint of cough along with shortness of breath and has chronic hypoxic failure with needing 2 L nasal cannula with activity and diagnosed with right lower pneumonia and acute hypoxic respiratory failure      Acute hypoxic respiratory failure due to right lower lobe pneumonia and history of interstitial lung disease secondary due to rheumatoid arthritis  Chronic hypoxic respiratory failure with needing oxygen with activity   -on admission did had elevated white count of 13.4 and lactic acid and procalcitonin were normal  -Blood cultures as below  -CT chest PE protocol did not show any evidence of pulmonary embolism and question new right lower lobe pneumonia and/or inflammatory changes superimposed on moderate to severe fibrosis  -Strep pneumo and Legionella antigen on 4/5 is negative  -Sputum cultures from 4/5 are showing gram-positive cocci  -Pulmonary team consulted during the hospital stay and they recommended to continue with steroids and antibiotics and close follow-up in ILD clinic  -- On exam she has chronic cough and speaks in full sentences with no accessory muscle and was on room air and per RN need One liter with activity which is her baseline  - seen by ID and MRSA Swab is negative and Respiratory panel sent and is pending result  -Will need 2 more doses of  oral Cefuroxime for another 2 days starting tomorrow to complete 7 day treatment course and need repeat cxr in 2-3 weeks to look for resolution   -Spoke with pulmonary team and will be on prednisone taper and will decrease by 10 every week and once he is at 30 then will  decrease by 5 mg and will start on Bactrim on Friday  -Discussed with pulmonary and CellCept to be restarted on 4/11/2024 when she finishes antibiotics  -ILD team to arrange outpatient overnight oximetry  - continue with prednisone and will need taper and bactrim and close follow up with pulmonary    Two positive blood cultures  -Blood cultures 2/2 on 4/4/24-Staphylococcus epidermidis  -Blood cultures 1/2 on 4/5/24-Staphylococcus hominis  -Blood cultures 2/2 on 4/6/2024 have been negative  -Echo done on 4/7/2024 does not show any evidence of any endocarditis but it is limited study to exclude endocarditis  -No evidence of any fever and white count has improved and infectious disease team consulted no concern for infection and viral pcr and mrsaa swab as above and vancomycin and azithromycin dced    Chronic anemia  -Baseline hemoglobin fluctuates around 11.3-12.4  -Hemoglobin during this hospitalization has been 11.2-10.7 with no evidence of any bleeding      Hyponatremia-POA-resolved  -Sodium on admit was 128 and normalized to 136 on 4/7  -  sodium is around 133  -Repeat labs as outpatient    Mild hypokalemia-resolved      Rheumatoid arthritis  -Patient's PTA medications including sulfasalazine and hydroxychloroquine were continued but CellCept held in setting of infection    Diabetes mellitus  -At baseline is diet controlled  -Continue with sliding scale insulin    Hypertension  -Continue with PTA aspirin and PT amlodipine    History of HPV infection  -Follows with Dr. Bray as outpatient    Family communication  -I had spoken at length with patient's son-in-law and her daughter who is a nurse and discussed plan of care.  I also spoke with her daughter twice who was wondering if for overnight oximetry can be done outpatient and pulmonary team also spoke with her and they will message the ILD team.  MRSA swab came back negative.  Discussed with infectious disease team and also with pulmonary and patient can be  "discharged later in the day when she completes IV antibiotics          Diet: Combination Diet Regular Diet Adult  Fluid restriction 1800 ML FLUID    DVT Prophylaxis: Will start DVT prophylaxis  Wilkes Catheter: Not present  Lines: None     Cardiac Monitoring: None  Code Status: Full Code      Clinically Significant Risk Factors        # Hypokalemia: Lowest K = 3.3 mmol/L in last 2 days, will replace as needed       # Hypoalbuminemia: Lowest albumin = 3.4 g/dL at 4/4/2024  9:29 AM, will monitor as appropriate         # Dementia: noted on problem list    # Cachexia: Estimated body mass index is 12.35 kg/m  as calculated from the following:    Height as of 4/1/24: 1.575 m (5' 2\").    Weight as of this encounter: 30.6 kg (67 lb 8 oz)., PRESENT ON ADMISSION     # Financial/Environmental Concerns: none         Disposition Plan      Expected Discharge Date: 04/08/2024      Destination: home with family;home with help/services              Inga Esparza MD  Hospitalist Service  [facility]  Securely message with [employer] (more info)  Text page via [employer] Paging/Directory   ______________________________________________________________________    Interval History     Patient admitted to the hospital on 4/4 and I am assuming her care for the first time on 4/8/2024    Feeling better and walked with rn and need one liter with acitivity  Has chronic cough and use prn robitussin and tessalon  Spoke with ID and pulmonary team and pulmonary team recommended overnight oximetry.  No fever or chills.    Patient also told me that she walked with the nurse today and was only needing 1 L of oxygen    Physical Exam   Vital Signs: Temp: 98  F (36.7  C) Temp src: Oral BP: 117/44 Pulse: 79   Resp: 18 SpO2: 97 % O2 Device: Nasal cannula Oxygen Delivery: 1 LPM  Weight: 67 lbs 8 oz          General: Patient appears comfortable and in no acute distress.  HEENT: Head is atraumatic,  Neck: Neck is supple   Respiratory: Lungs " are clear to auscultation bilaterally with no wheeze or crackles   Cardiovascular: Regular rate , S1 and S2 normal with no murmer or rubs or gallops  Abdomen:   soft , non tender , non distended and bowel sound present   Neurologic:  No facial droop  Musculoskeletal: Normal Range of motion over upper and lower extremities bilaterally   Psychiatric: cooperative    Medical Decision Making        I   time spent in care of patient is 50 minutes and discussed plan of care in detail with the patient, nursing staff, daughter, son-in-law, daughter again on the phone who is an ICU nurse, pulmonary and infectious disease team    Data     I have personally reviewed the following data over the past 24 hrs:    9.4  \   11.4 (L)   / 438     133 (L) 94 (L) 11.9 /  201 (H)   3.8 27 0.53 \     Procal: N/A CRP: N/A Lactic Acid: 1.5         Imaging results reviewed over the past 24 hrs:   No results found for this or any previous visit (from the past 24 hour(s)).

## 2024-04-08 NOTE — PLAN OF CARE
Goal Outcome Evaluation:                    Summary: ED admit today for SOB, cold symptoms. RLL Pneumonia with hx Interstitial Lung Disease  DATE & TIME: 4/08/24 1500  Cognitive Concerns/ Orientation : A&Ox4, Tibetan speaking but understands basic English. Family translates and signed waiver to decline . Ok for family to stay overnight to help care/translate  BEHAVIOR & AGGRESSION TOOL COLOR: Green  ABNL VS/O2: VSS:  Pt currently on RA, desats with exertion. Walked in halls with 1L NC and desats by the end of the walk to 88% but once she rests the Sat returns to normal within 1-2 min. Per family baseline 1-2L NC at home prn.  MOBILITY: Independent with family in room   PAIN MANAGMENT: Denies  DIET: Regular with 1800 ml FR  BOWEL/BLADDER: Continent, had BM today.  ABNL LAB/BG:  & 189, Na+ 133, Hb 11.4  DRAIN/DEVICES: PIV saline locked  TELEMETRY RHYTHM: NA  SKIN: WNL  TESTS/PROCEDURES: Viral and MRSA nasal swab sent to lab today.  D/C DAY/GOALS/PLACE: Today after 4P IV antibiotic infuses.  OTHER IMPORTANT INFO: Frequent prod cough, pale yellow phlegm - on Mucinex. LS clear, IS self-administered, does throughout shift. On oral and IV antibiotics and Prednisone. Per Continues on PO abx and IV Rocephin.. ID clearing pt to discharge.

## 2024-04-08 NOTE — PROGRESS NOTES
Discharge    Patient discharged to Home via wheelchair with Son  Care plan note:See POC note    Listed belongings gathered and given to patient (including from security/pharmacy). Yes  Care Plan and Patient education resolved: Yes  Prescriptions if needed, hard copies sent with patient  Yes  Medication Bin checked and emptied on discharge Yes  SW/care coordinator/charge RN aware of discharge: Yes

## 2024-04-09 ENCOUNTER — MYC MEDICAL ADVICE (OUTPATIENT)
Dept: PULMONOLOGY | Facility: CLINIC | Age: 74
End: 2024-04-09
Payer: COMMERCIAL

## 2024-04-09 ENCOUNTER — TELEPHONE (OUTPATIENT)
Dept: PULMONOLOGY | Facility: CLINIC | Age: 74
End: 2024-04-09
Payer: COMMERCIAL

## 2024-04-09 DIAGNOSIS — J84.9 ILD (INTERSTITIAL LUNG DISEASE) (H): Primary | ICD-10-CM

## 2024-04-09 NOTE — TELEPHONE ENCOUNTER
----- Message from Mehdi Esposito sent at 4/8/2024  4:03 PM CDT -----  Regarding: RE: Clinic follow up  She is actually sched'd to see Jase on 5/9, maybe we can keep that ..      ----- Message -----  From: Jase Keller MD  Sent: 4/8/2024   3:47 PM CDT  To: Moe Jain MD; Mehdi Esposito; #  Subject: RE: Clinic follow up                             I agree to taper down prednisone faster if you feel that is clinically appropriate, her disease is mainly fibrotic from what I remember although we tried RTX.     I can see her in follow up next available, either Gen or ILD slot.     AK     ----- Message -----  From: Moe Jain MD  Sent: 4/8/2024   3:10 PM CDT  To: Jase Keller MD; #  Subject: Clinic follow up                                 Saw Krystian today at [facility] on pulm consults.    She had RLL ggo/consolidation, admitted last week- Treated with antibiotics and steroids with MMF held.   Improved back to her baseline RA at rest, 1-2 LPM with exertion. When she was admitted, was on 4LPM.    Steroid train already started so doing a weekly taper starting at 10,g/week, then slow down taper to 5mg/week taper once at 30mg. However, a little benevolent and wonder if taper her faster sicne I think the CAP was the cause of her hypoxia.   She likely does need follow up in the next 1-2 months with Jase with repeat PFTs and +/- repeat 6MW (though seems stable on 2LPM with exertion per her daughter- an ICU nurse).   She does need a nocturnal oximetry though. Can someone help set her up with one?    Thanks!    Moe Jain

## 2024-04-09 NOTE — TELEPHONE ENCOUNTER
----- Message from Moe Jain MD sent at 4/8/2024 10:22 PM CDT -----  Regarding: RE: Clinic follow up  I can talk to Zachariah (her daughter who also works in the [facility] ICU) and let her know that if she really does improve clinically back to baseline rather fast, can accelerate the taper.   ----- Message -----  From: Jase Keller MD  Sent: 4/8/2024   3:47 PM CDT  To: Moe Jain MD; Mehdi Vaibhav; #  Subject: RE: Clinic follow up                             I agree to taper down prednisone faster if you feel that is clinically appropriate, her disease is mainly fibrotic from what I remember although we tried RTX.     I can see her in follow up next available, either Gen or ILD slot.     AK     ----- Message -----  From: Moe Jain MD  Sent: 4/8/2024   3:10 PM CDT  To: Jase Keller MD; #  Subject: Clinic follow up                                 Saw Krystian today at [facility] on pulm consults.    She had RLL ggo/consolidation, admitted last week- Treated with antibiotics and steroids with MMF held.   Improved back to her baseline RA at rest, 1-2 LPM with exertion. When she was admitted, was on 4LPM.    Steroid train already started so doing a weekly taper starting at 10,g/week, then slow down taper to 5mg/week taper once at 30mg. However, a little benevolent and wonder if taper her faster sicne I think the CAP was the cause of her hypoxia.   She likely does need follow up in the next 1-2 months with Jase with repeat PFTs and +/- repeat 6MW (though seems stable on 2LPM with exertion per her daughter- an ICU nurse).   She does need a nocturnal oximetry though. Can someone help set her up with one?    Thanks!    Moe Jain

## 2024-04-10 LAB — BACTERIA BLD CULT: NO GROWTH

## 2024-04-11 LAB
BACTERIA BLD CULT: NO GROWTH
BACTERIA BLD CULT: NO GROWTH

## 2024-04-12 LAB
BACTERIA BLD CULT: ABNORMAL
BACTERIA BLD CULT: ABNORMAL

## 2024-04-15 PROBLEM — J18.9 COMMUNITY ACQUIRED PNEUMONIA OF RIGHT LOWER LOBE OF LUNG: Status: RESOLVED | Noted: 2024-04-04 | Resolved: 2024-04-15

## 2024-04-15 PROBLEM — B18.1 HEPATITIS B, CHRONIC (H): Status: RESOLVED | Noted: 2023-01-26 | Resolved: 2024-04-15

## 2024-04-15 PROBLEM — J96.01 ACUTE RESPIRATORY FAILURE WITH HYPOXIA (H): Status: RESOLVED | Noted: 2024-04-04 | Resolved: 2024-04-15

## 2024-04-15 PROBLEM — M05.9 SEROPOSITIVE RHEUMATOID ARTHRITIS (H): Status: ACTIVE | Noted: 2023-01-01

## 2024-04-15 PROBLEM — J84.9 INTERSTITIAL LUNG DISEASE (H): Status: RESOLVED | Noted: 2022-12-11 | Resolved: 2024-04-15

## 2024-04-16 ENCOUNTER — PATIENT OUTREACH (OUTPATIENT)
Dept: GERIATRIC MEDICINE | Facility: CLINIC | Age: 74
End: 2024-04-16

## 2024-04-16 ENCOUNTER — OFFICE VISIT (OUTPATIENT)
Dept: INTERNAL MEDICINE | Facility: CLINIC | Age: 74
End: 2024-04-16
Payer: COMMERCIAL

## 2024-04-16 VITALS
HEART RATE: 105 BPM | DIASTOLIC BLOOD PRESSURE: 70 MMHG | RESPIRATION RATE: 16 BRPM | OXYGEN SATURATION: 96 % | WEIGHT: 149.7 LBS | SYSTOLIC BLOOD PRESSURE: 128 MMHG | BODY MASS INDEX: 27.55 KG/M2 | HEIGHT: 62 IN

## 2024-04-16 DIAGNOSIS — Z09 HOSPITAL DISCHARGE FOLLOW-UP: Primary | ICD-10-CM

## 2024-04-16 DIAGNOSIS — J18.9 COMMUNITY ACQUIRED PNEUMONIA OF RIGHT LOWER LOBE OF LUNG: ICD-10-CM

## 2024-04-16 DIAGNOSIS — E11.9 TYPE 2 DIABETES MELLITUS WITHOUT COMPLICATION, WITHOUT LONG-TERM CURRENT USE OF INSULIN (H): ICD-10-CM

## 2024-04-16 DIAGNOSIS — R10.13 DYSPEPSIA: ICD-10-CM

## 2024-04-16 DIAGNOSIS — F02.80 EARLY ONSET ALZHEIMER'S DEMENTIA WITHOUT BEHAVIORAL DISTURBANCE (H): ICD-10-CM

## 2024-04-16 DIAGNOSIS — G30.0 EARLY ONSET ALZHEIMER'S DEMENTIA WITHOUT BEHAVIORAL DISTURBANCE (H): ICD-10-CM

## 2024-04-16 DIAGNOSIS — J84.9 ILD (INTERSTITIAL LUNG DISEASE) (H): ICD-10-CM

## 2024-04-16 DIAGNOSIS — J96.01 ACUTE RESPIRATORY FAILURE WITH HYPOXIA (H): ICD-10-CM

## 2024-04-16 LAB
ERYTHROCYTE [DISTWIDTH] IN BLOOD BY AUTOMATED COUNT: 13.6 % (ref 10–15)
HCT VFR BLD AUTO: 38.1 % (ref 35–47)
HGB BLD-MCNC: 12.5 G/DL (ref 11.7–15.7)
MCH RBC QN AUTO: 29.5 PG (ref 26.5–33)
MCHC RBC AUTO-ENTMCNC: 32.8 G/DL (ref 31.5–36.5)
MCV RBC AUTO: 90 FL (ref 78–100)
PLATELET # BLD AUTO: 376 10E3/UL (ref 150–450)
RBC # BLD AUTO: 4.24 10E6/UL (ref 3.8–5.2)
WBC # BLD AUTO: 9.9 10E3/UL (ref 4–11)

## 2024-04-16 PROCEDURE — 99214 OFFICE O/P EST MOD 30 MIN: CPT | Performed by: INTERNAL MEDICINE

## 2024-04-16 PROCEDURE — 80061 LIPID PANEL: CPT | Performed by: INTERNAL MEDICINE

## 2024-04-16 PROCEDURE — 80053 COMPREHEN METABOLIC PANEL: CPT | Performed by: INTERNAL MEDICINE

## 2024-04-16 PROCEDURE — 85027 COMPLETE CBC AUTOMATED: CPT | Performed by: INTERNAL MEDICINE

## 2024-04-16 PROCEDURE — 36415 COLL VENOUS BLD VENIPUNCTURE: CPT | Performed by: INTERNAL MEDICINE

## 2024-04-16 RX ORDER — PANTOPRAZOLE SODIUM 40 MG/1
40 TABLET, DELAYED RELEASE ORAL DAILY
Qty: 90 TABLET | Refills: 3 | Status: SHIPPED | OUTPATIENT
Start: 2024-04-16

## 2024-04-16 SDOH — HEALTH STABILITY: PHYSICAL HEALTH: ON AVERAGE, HOW MANY MINUTES DO YOU ENGAGE IN EXERCISE AT THIS LEVEL?: 0 MIN

## 2024-04-16 SDOH — HEALTH STABILITY: PHYSICAL HEALTH: ON AVERAGE, HOW MANY DAYS PER WEEK DO YOU ENGAGE IN MODERATE TO STRENUOUS EXERCISE (LIKE A BRISK WALK)?: 0 DAYS

## 2024-04-16 ASSESSMENT — LIFESTYLE VARIABLES
HOW OFTEN DO YOU HAVE A DRINK CONTAINING ALCOHOL: NEVER
AUDIT-C TOTAL SCORE: 0
HOW MANY STANDARD DRINKS CONTAINING ALCOHOL DO YOU HAVE ON A TYPICAL DAY: PATIENT DOES NOT DRINK
SKIP TO QUESTIONS 9-10: 1
HOW OFTEN DO YOU HAVE SIX OR MORE DRINKS ON ONE OCCASION: NEVER

## 2024-04-16 ASSESSMENT — PATIENT HEALTH QUESTIONNAIRE - PHQ9: SUM OF ALL RESPONSES TO PHQ QUESTIONS 1-9: 9

## 2024-04-16 NOTE — PROGRESS NOTES
ASSESSMENT/PLAN      (Z09) Hospital discharge follow-up  (primary encounter diagnosis)  (J96.01) Acute respiratory failure with hypoxia (H)  (J18.9) Community acquired pneumonia of right lower lobe of lung  (J84.9) ILD (interstitial lung disease) (H)  Comment: patient is clinically improving over time; antibiotics completed.  Plan: CBC and CMP today; continue steroid taper; follow-up with pulmonology as discussed/scheduled; if symptoms worsen or change, patient to contact MD.    (R10.13) Dyspepsia  Plan: START pantoprazole 40 mg daily and consistently; continue Pepcid twice a day as needed for acute relief of symptoms.    (E11.9) Type 2 diabetes mellitus without complication, without long-term current use of insulin (H)  Plan: nonfasting lipid profile today with above labs.    (G30.0,  F02.80) Early onset Alzheimer's dementia without behavioral disturbance (H)  Comment: known issue that I take into account for their medical decisions, no current exacerbations or new concerns.    Adrianna Singh MD   [address] [address]  T: [phone], F: [phone]    VINCENZO Boland is a very pleasant 73 year old female who presents for hospital follow-up:    Accompanied by daughter, who translates.    Hospital: [facility]  Date of Admission: 4/4/2024  Date of Discharge: 4/8/2024  Reason(s) for Admission: acute hypoxic respiratory failure due to right lower lobe pneumonia, in the setting of interstitial lung disease (secondary to rheumatoid arthritis)    Diagnostic tests, treatments/interventions, and discharge summary reviewed.    Summary of hospitalization:   - presented with shortness of breath and a cough  - CT chest demonstrated a right lower lobe pneumonia  - treated for community-acquired pneumonia and started on a steroid taper  - discharged on Ceftin, oral steroid taper, and Bactrim for PCP prophylaxis    Medication changes since discharge:  "completed Ceftin  Adherent to discharge medications: yes  Problems taking discharge medications: no    Follow-up: telephone follow-up with pulmonology in 1-2 weeks    Update since discharge:   - shortness of breath and coughing with movement/exertion - improving over time  - still requiring oxygen with exertion (not at rest or overnight)  - no fevers or chills  - appetite has increased since hospitalization; no nausea or vomiting; normal bowel movements  - reports a burning sensation in her stomach after meals; Pepcid helps a little    OBJECTIVE      /70   Pulse 105   Resp 16   Ht 1.575 m (5' 2\")   Wt 67.9 kg (149 lb 11.2 oz)   LMP  (LMP Unknown)   SpO2 96%   BMI 27.38 kg/m    On 2L NC  Constitutional: well-appearing  Respiratory: increased respiratory effort; inspiratory crackles especially at bases; good airflow; minimal cough  Cardiovascular: regular rate and rhythm; no edema  Psych: normal judgment and insight; normal mood and affect; recent and remote memory intact  ---  (Note was completed, in part, with [employer] voice-recognition software. Documentation was reviewed, but some grammatical, spelling, and word errors may remain.)  "

## 2024-04-16 NOTE — Clinical Note
Dr. Singh, I am the [facility] Care Coordinator for Krystian. I was out to her home on the 16th. She does requires services in the community to support her and her family. I have opened her to elderly waiver and am currently in the process of getting services set up to support her. No Rx needed at this time. Should that change, I will reach out. Thank you for your review. Vickie

## 2024-04-16 NOTE — COMMUNITY RESOURCES LIST (ENGLISH)
April 16, 2024           YOUR PERSONALIZED LIST OF SERVICES & PROGRAMS           & RECREATION    Sports      of the North - Sports clubs and recreational activities - [facility]  [address] [place] [address] (Distance: 4.0 miles)  Language: English  Fee: Self pay, Sliding scale      LEAGUE - LITTLE LEAGUE BASEBALL AND SOFTBALL  Website: http://www.Pivit Labsague.org      [facility] - Adult Enrichment  Phone: [phone]  Website: https://Mechio/adults-seniors/adult-enrichment/  Language: English  Hours: Mon 7:30 AM - 4:00 PM Tue 7:30 AM - 4:00 PM Wed 7:30 AM - 4:00 PM Thu 7:30 AM - 4:00 PM Fri 7:30 AM - 4:00 PM    Classes/Groups      Park & Recreation Board - Gym or workout facility - [place] [facility]  [address] [place] [address] (Distance: 8.2 miles)  Phone: [phone]  Language: English, Panamanian  Fee: Free, Self pay  Accessibility: Translation services      Park & Recreation Board - Gym or workout facility - [facility]  [address] [place] [address] (Distance: 10.1 miles)  Language: English  Fee: Free, Self pay  Accessibility: Ada accessible      - Online and Local Fitness Classes  Phone: [phone]  Website: https://tools.R[employer] Internet/Search/OnlineClasses  Language: English  Fee: Free               IMPORTANT NUMBERS & WEBSITES        Emergency Services  911  .   United Way  211 http://211unitedway.org  .   Poison Control  (158) 936-7576 http://[place]poison.org http://wisconsinpoison.org  .     Suicide and Crisis Lifeline  988 http://988lifeline.org  .   Childhelp National Child Abuse Hotline  723.518.6233 http://Childhelphotline.org   .   National Sexual Assault Hotline  (470) 252-9189 (HOPE) http://Rainn.org   .     National Runaway Safeline  (287) 388-7260 (RUNAWAY) http://[facility].org  .   Pregnancy &  Postpartum Support  Call/text 096-545-6580  [place]: http://ppsupportmn.org  [place]: http://Vets USA.com/wi  .   Substance Abuse National Helpline ([facility])  120-557-HELP (8323) http://Findtreatment.gov   .                DISCLAIMER: These resources have been generated via the [employer] Platform. [employer] does not endorse any service providers mentioned in this resource list. [employer] does not guarantee that the services mentioned in this resource list will be available to you or will improve your health or wellness.    [facility]

## 2024-04-17 ENCOUNTER — MYC MEDICAL ADVICE (OUTPATIENT)
Dept: PULMONOLOGY | Facility: CLINIC | Age: 74
End: 2024-04-17
Payer: COMMERCIAL

## 2024-04-17 LAB
ALBUMIN SERPL BCG-MCNC: 4.1 G/DL (ref 3.5–5.2)
ALP SERPL-CCNC: 80 U/L (ref 40–150)
ALT SERPL W P-5'-P-CCNC: 21 U/L (ref 0–50)
ANION GAP SERPL CALCULATED.3IONS-SCNC: 11 MMOL/L (ref 7–15)
AST SERPL W P-5'-P-CCNC: 20 U/L (ref 0–45)
BILIRUB SERPL-MCNC: 0.5 MG/DL
BUN SERPL-MCNC: 27.3 MG/DL (ref 8–23)
CALCIUM SERPL-MCNC: 10 MG/DL (ref 8.8–10.2)
CHLORIDE SERPL-SCNC: 97 MMOL/L (ref 98–107)
CHOLEST SERPL-MCNC: 163 MG/DL
CREAT SERPL-MCNC: 0.78 MG/DL (ref 0.51–0.95)
DEPRECATED HCO3 PLAS-SCNC: 26 MMOL/L (ref 22–29)
EGFRCR SERPLBLD CKD-EPI 2021: 81 ML/MIN/1.73M2
FASTING STATUS PATIENT QL REPORTED: NO
GLUCOSE SERPL-MCNC: 213 MG/DL (ref 70–99)
HDLC SERPL-MCNC: 52 MG/DL
LDLC SERPL CALC-MCNC: 97 MG/DL
NONHDLC SERPL-MCNC: 111 MG/DL
POTASSIUM SERPL-SCNC: 5 MMOL/L (ref 3.4–5.3)
PROT SERPL-MCNC: 7.5 G/DL (ref 6.4–8.3)
SODIUM SERPL-SCNC: 134 MMOL/L (ref 135–145)
TRIGL SERPL-MCNC: 70 MG/DL

## 2024-04-17 NOTE — TELEPHONE ENCOUNTER
Spoke to [facility], they received the MARINO on RA order that was faxed 4/11/24. They have called and left voicemail's for the mobile phone number listed for the pt which is the same mobile phone number listed for the daughter Zachariah. Writer called mobile phone number and received generic voicemail, did not leave message. Sent MyC update.     Nuvia Segal RN, BSN  ILD Nurse   [phone]

## 2024-04-17 NOTE — PROGRESS NOTES
[facility] Care Coordination Contact    [facility] Early Reassessment     Home visit for Change in Condition Health Risk Assessment with Krystian Boland completed on April 16, 2024. An  was ordered for the assessment, and the  was a no show. Due to memory and hearing over the phone family offered to do the interpreting. Family provided interpretation.     Reason for Early reassessment: Health Status Change  Yes, if yes explain now requires oxygen and assistance with ADLs.    Type of residence:: Private home - stairs (3 steps into the home. Full flight of steps into basement. Bedroom and bathroom are on main level.). The bathtub has a very large edge that member must get over in order to shower. The edge of the tub is larger than the average tubs.  Current living arrangement:: I live in a private home with family (Lives with spouse in daughter's home with daughter's spouse and children.)     Assessment completed with:: Patient, Care Team Member, Children    Current Care Plan  Member currently receiving the following home care services: None  Member currently receiving the following community resources: None    Health Issues: Member was recently hospitalized with dx of pnuemonia. She continue to be very short of breath, requiring continuous O2 at 2L. Family are also checking O2 saturation levels over the course of the day and at night as well. Member shared that in recent months her breathing has become difficult to the point that she is no longer able to do tasks she once enjoyed, mainly cooking. Family shared that the steam from cooking makes member cough, and reports these coughing fits can last several minutes. Family shared that member seems to be more anxious with regards to steam and the fear that it will cause coughing. The coughing also causes stress incontinence and will require the need to use incontinent products to manage. Has had 1 hospitalization in the past year.  No ED visit. Member daughter keeps in regular contact with member's PCP and specialty MDs via My Chart.       Medication Review  Medication reconciliation completed in Epic: Yes  Medication set-up & administration: Independent and sets up on own weekly.  Self-administers medications, but family check the pill containers to ensure that all medication was taken. Member will miss doses and doesn't always like to take her medications as prescribed.  Medication Risk Assessment Medication (1 or more, place referral to MTM): N/A: No risk factors identified  MTM Referral Placed: No: No risk factors idenified    Mental/Behavioral Health   Depression Screening:   PHQ-9 Total Score: 9  Mental health DX:: No      Family has noticed that member has become more forgetful. Family shared that member will forget if she has taken medications or completed a task. Member also isn't able to recall things that have been told to her or will forget to tell family important information. Is not able to go to medical appointments alone because she would not be able to recall what the physician have said.     Falls Assessment:   Fallen 2 or more times in the past year?: No   Any fall with injury in the past year?: No    ADL/IADL Dependencies:   Dependent ADLs:: Bathing, Incontinence, Toileting, Grooming, Dressing  Dependent IADLs:: Shopping, Laundry, Cooking, Cleaning, Medication Management, Meal Preparation, Transportation, Money Management    Health Plan sponsored benefits: [facility]: Shared information regarding [employer] Program. Reviewed preventative health screening and health plan supplemental benefits/incentives. Reviewed medication disposal form.    PCA Assessment completed at visit: Not Applicable . Member was assessed using Mnchoices, and the assessment indicated 22 units (5.5 hours) per day. [facility] to manage member's care needs, so PCA will be incorporated into CDCS. A referral to  Tila Dexter to be the [facility] support planner has been made.    Elderly Waiver Eligibility: Yes-will open to elderly waiver. [id number] and [id number] sent to [place] on 4/16/24. (4/22/24: Daughter emailed stating that she received a letter from [facility] stating that member's status was changed to waiver).    Care Plan & Recommendations: Member wants to continue to live with her spouse at her daughter's home. She shared that she doesn't want to burden her family as she is aware that her cares have increased. All of the family members currently work, and have been taking taking off of work to care for member. Reviewed PCA with waiver options as well as CDCS. After discussion both member and family elected [facility] as the route that they would like to go. Reviewed that a support planner is recommended and daughter was open to recommendations for support planners. Reviewed that Tila Dexter was had stated that she would be able to take by member and her  and a referral will be placed with her.     See LTCC for detailed assessment information.    Follow-Up Plan: Member informed of future contact, plan to f/u with member with a 6 month telephone assessment.  Contact information shared with member and family, encouraged member to call with any questions or concerns at any time.    [place] care continuum providers: Please see Snapshot and Care Management Flowsheets for Specific details of care plan.    This CC note routed to PCP, Adrianan Singh, [facility]  [phone]

## 2024-04-24 ENCOUNTER — OFFICE VISIT (OUTPATIENT)
Dept: OPHTHALMOLOGY | Facility: CLINIC | Age: 74
End: 2024-04-24
Attending: OPHTHALMOLOGY
Payer: COMMERCIAL

## 2024-04-24 ENCOUNTER — MYC MEDICAL ADVICE (OUTPATIENT)
Dept: INTERNAL MEDICINE | Facility: CLINIC | Age: 74
End: 2024-04-24
Payer: COMMERCIAL

## 2024-04-24 DIAGNOSIS — H04.129 DRY EYE: Primary | ICD-10-CM

## 2024-04-24 DIAGNOSIS — Z79.899 LONG-TERM USE OF PLAQUENIL: ICD-10-CM

## 2024-04-24 PROCEDURE — 92250 FUNDUS PHOTOGRAPHY W/I&R: CPT | Performed by: OPHTHALMOLOGY

## 2024-04-24 PROCEDURE — 99207 FUNDUS AUTOFLUORESCENCE IMAGE (FAF) OU (BOTH EYES): CPT | Mod: 26 | Performed by: OPHTHALMOLOGY

## 2024-04-24 PROCEDURE — 92134 CPTRZ OPH DX IMG PST SGM RTA: CPT | Performed by: OPHTHALMOLOGY

## 2024-04-24 PROCEDURE — 92235 FLUORESCEIN ANGRPH MLTIFRAME: CPT | Performed by: OPHTHALMOLOGY

## 2024-04-24 PROCEDURE — 99214 OFFICE O/P EST MOD 30 MIN: CPT | Performed by: OPHTHALMOLOGY

## 2024-04-24 PROCEDURE — G0463 HOSPITAL OUTPT CLINIC VISIT: HCPCS | Mod: 25 | Performed by: OPHTHALMOLOGY

## 2024-04-24 ASSESSMENT — VISUAL ACUITY
OS_PH_SC: 20/25
OD_SC+: -1
OS_SC: 20/40
METHOD: SNELLEN - LINEAR
OD_SC: 20/25
OS_SC+: +2

## 2024-04-24 ASSESSMENT — REFRACTION_WEARINGRX
OS_AXIS: 034
OS_SPHERE: -0.50
OS_CYLINDER: +0.75
OD_SPHERE: -0.25
OD_CYLINDER: SPHERE

## 2024-04-24 ASSESSMENT — CONF VISUAL FIELD
OS_INFERIOR_TEMPORAL_RESTRICTION: 0
OD_INFERIOR_TEMPORAL_RESTRICTION: 0
OS_NORMAL: 1
OS_SUPERIOR_TEMPORAL_RESTRICTION: 0
OS_SUPERIOR_NASAL_RESTRICTION: 0
OD_SUPERIOR_NASAL_RESTRICTION: 0
OD_NORMAL: 1
OD_INFERIOR_NASAL_RESTRICTION: 0
OS_INFERIOR_NASAL_RESTRICTION: 0
OD_SUPERIOR_TEMPORAL_RESTRICTION: 0

## 2024-04-24 ASSESSMENT — SLIT LAMP EXAM - LIDS
COMMENTS: DCL, MILD PTOSIS
COMMENTS: DCL, MILD PTOSIS

## 2024-04-24 ASSESSMENT — CUP TO DISC RATIO
OD_RATIO: 0.4
OS_RATIO: 0.35

## 2024-04-24 ASSESSMENT — TONOMETRY
OD_IOP_MMHG: 18
IOP_METHOD: TONOPEN
OS_IOP_MMHG: 18

## 2024-04-24 ASSESSMENT — EXTERNAL EXAM - LEFT EYE: OS_EXAM: NORMAL

## 2024-04-24 ASSESSMENT — EXTERNAL EXAM - RIGHT EYE: OD_EXAM: NORMAL

## 2024-04-24 NOTE — PROGRESS NOTES
CC: follow up Diabetes mellitus and plaquenil    Interval: Vision has remained stable. No flashes or floaters. No pain, some burning.     HPI: Krystian Boland is a 73 year old with history of plaquenil use for ILD and RA.  She has been on 400 mg daily since 5-2019. Outer retinal changes were seen on her last eye exam. She is also DMII.     History of chronic cough - improving   Lab Results   Component Value Date    A1C 6.4 04/04/2024    A1C 6.7 11/01/2023    A1C 6.8 04/18/2023       Past ocular history:  S/p CE/IOL both eyes  Ocular hypertension both eyes   Dry eyes  Myopic astigmatism  Type II diabetes mellitus     Retinal Imaging:  Optical Coherence Tomography 04/24/24   RE:  normal foveal contour- mild inferonasal intraretinal fluid, improved compared to previous scan. PHF attached.   LE: normal foveal contour; no intraretinal fluid or subretinal fluid. PHF attached    Optos photos consistent with exam 04/24/24   Right eye: Clear media, optic disc is flat without atrophy or edema. C/D ratio 0.3. Macula is flat. Vessels are attenuated with small pigmented spot on inferior arcade.  Periphery with drusen deposits superior and inferior anterior to the arcades.   Left eye: Clear media, optic disc is flat without atrophy or edema. C/D ratio 0.3. Macula is flat. Periphery with drusen deposits superior and inferior anterior to the arcades.     fluorescein angiography 04/24/24   Good AV and choroidal filling; microaneurysms; mild inf midperipheral leakage right eye> left eye   No neovascularization of the disc; no neovascularization elsewhere     Autofluorescence 7/27/2022 vs 7-22-21 vs 04/24/24   right eye: spot of hypoAF along inferior arcade; small two hyperFAF spots superior to the macula  left eye: normal    visual field 10-2  07/19/23   right eye: FP 2/9, MD 1.8. not completely reliable   Left eye: FP 2/10; normal, MD 2.0    Assessment & Plan:    # Type II diabetes mellitus with mild nonproliferative diabetic  retinopathy right eye    Dx'd ~2016   HbA1c 6.4% 04/04/2024   Discontinued Metformin    Blood pressure (<120/80) and blood glucose (HbA1c <7.0) control discussed with patient. Patient advised that failure to adequately control each may lead to vision loss. The patient expressed understanding.  Fluorescein angiography with mild nonproliferative diabetic retinopathy and midperipheral leakage right eye - observe    # mild Diabetic macular edema right eye   Improving  Observe     # Long-term use of Plaquenil   H/o RF-positive RA and ILD   Started Plaquenil 5/2019; 200mg twice a day   ILD stable - f/u 9/16/2020 with Dr. Cross for CT scan and PFTs   RA stable - dx'd 5/2018, Dr. Staley 10/13/2020   OCT mac grossly normal; new IRF right eye compared to prior- see below   Autofluorescence: normal   Optical Coherence Tomography: right eye not completely reliable ; left eye: within normal limits    Repeat in a yr  Ok to continue taking plaquenil  Follow up in a yr    # Pseudophakia each eye  With posterior capsular opacity (PCO) left eye> right eye   Consider yag in the future left eye     # drusen both eyes  Mostly extrafoveal   Few macula drusen  Observe     # Dry eye both eyes   Lid scrubs  Warm compresses  Artificial tears    RTC: 6-9 months follow up Diabetes mellitus and plaquenil  OCT mac both eyes   Consider repeating fluorescein angiography every other elizabeth   ~~~~~~~~~~~~~~~~~~~~~~~~~~~~~~~~~~   Complete documentation of historical and exam elements from today's encounter can be found in the full encounter summary report (not reduplicated in this progress note).  I personally obtained the chief complaint(s) and history of present illness.  I confirmed and edited as necessary the review of systems, past medical/surgical history, family history, social history, and examination findings as documented by others; and I examined the patient myself.  I personally reviewed the relevant tests, images, and reports as  documented above.  I formulated and edited as necessary the assessment and plan and discussed the findings and management plan with the patient and family    Dalia Sawyer MD   of Ophthalmology.  Retina Service   Department of Ophthalmology and Visual Neurosciences   [facility]  Phone: [phone]   Fax: [phone]

## 2024-04-24 NOTE — TELEPHONE ENCOUNTER
Reason for Call:  Form, our goal is to have forms completed with 72 hours, however, some forms may require a visit or additional information.    Type of letter, form or note:  FMLA    Who is the form from?: Patient    Where did the form come from: Patient or family brought in       What clinic location was the form placed at?: Internal Medicine    Where the form was placed: Given to physician    What number is listed as a contact on the form?   [phone]       Additional comments  no    Call taken on 4/24/2024 at 12:52 PM by Debra Sweeney

## 2024-04-24 NOTE — NURSING NOTE
Chief Complaints and History of Present Illnesses   Patient presents with    Monitor Current High Risk Meds     Chief Complaint(s) and History of Present Illness(es)       Monitor Current High Risk Meds              Laterality: both eyes    Onset: 8 months ago              Comments    Pt. States that she is doing well. No change in VA BE. No pain BE. No flashes or floaters BE.   Carlota Renetta COT 1:44 PM April 24, 2024

## 2024-04-26 DIAGNOSIS — R09.02 HYPOXIA: ICD-10-CM

## 2024-04-26 DIAGNOSIS — J84.9 ILD (INTERSTITIAL LUNG DISEASE) (H): Primary | ICD-10-CM

## 2024-04-26 NOTE — TELEPHONE ENCOUNTER
Spoke to pt daughter Zachariah, informed her of Dr. Keller pred taper instruction with read back:     Let's reduced to 20 mg right away, then 10 mg in 1 week then 5 mg and stay on 5 mg until she sees me and rheum in clinic.    TRAVIS Soni expressed understanding/agreed to plan. MyC update sent as well.     Nuvia Segal, RN, BSN  ILD Nurse   [phone]

## 2024-04-26 NOTE — TELEPHONE ENCOUNTER
Call placed to patients daughter to follow up on prednisone taper  Patient initially had some heartburn, thought to be caused by the prednisone. Patient's primary ordered omeprazole and that has been helping. The only other side effect she has noticed is possibly thinning skin, the patient had a blister after doing dishes the daughter reports  Patient will be down to 40 mg on Monday.   Patients symptoms are improving after being home, has had some coughing with clear sputum, they are using nebs for cough    Daughter is wondering if they can continue going down by 10 mg every 7 days until off.     Nurse will send message to Dr. Arianna Gu RN

## 2024-04-30 ENCOUNTER — PATIENT OUTREACH (OUTPATIENT)
Dept: GERIATRIC MEDICINE | Facility: CLINIC | Age: 74
End: 2024-04-30
Payer: COMMERCIAL

## 2024-04-30 NOTE — PROGRESS NOTES
[facility] Care Coordination Contact    Have not received an update on the referral sent to [facility]ila Dex[facility]. Emailed Tila inquiring on an update to when she will be reaching out to the family.  JAQUELIN Langley  [facility]  [phone]

## 2024-05-01 DIAGNOSIS — R09.02 HYPOXIA: ICD-10-CM

## 2024-05-01 DIAGNOSIS — J84.9 ILD (INTERSTITIAL LUNG DISEASE) (H): Primary | ICD-10-CM

## 2024-05-01 NOTE — PROGRESS NOTES
MARINO on RA reviewed and advised by Dr. Keller, sent pt MyC update. Order placed for MARINO on 2L and faxed to [facility] via [employer] by writer. Pt reminder set to check results of MARINO on 2L and to see if pt made sleep medicine appt.     Nuvia Segal RN, BSN  ILD Nurse   [phone]    From: Jase Keller [email or web address]  Sent: Tuesday, April 30, 2024 5:32 PM  To: Roro Gu <[email or web address]>  Subject: Re: Fw: A fax has arrived from remote ID '[place] 02'.     Needs repeat MARINO on 2 LPM + sleep medicine referral r/o LALITHA.     AK

## 2024-05-02 ENCOUNTER — TELEPHONE (OUTPATIENT)
Dept: PULMONOLOGY | Facility: CLINIC | Age: 74
End: 2024-05-02
Payer: COMMERCIAL

## 2024-05-02 DIAGNOSIS — J84.9 ILD (INTERSTITIAL LUNG DISEASE) (H): Primary | ICD-10-CM

## 2024-05-02 NOTE — TELEPHONE ENCOUNTER
Spoke to Gosia at [facility], she verified pt's home oxygen equipment:    POC on wheels (called [employer]) up to 3L continuous. Also does pulse dose up to 5 pulse. Runs on batteries and can be plugged into outlet for noc use. Per Gosia, this is suitable for pt to use for 2L O2 at night.     MARINO on 2L order re-faxed a second time, but not received, will re-fax.    Nuvia Segal, RN, BSN  ILD Nurse   [phone]

## 2024-05-02 NOTE — TELEPHONE ENCOUNTER
Spoke to daughter Zachariah re: MARINO on 2L results from 4/26/24, let her know Dr. Keller recommends wearing 2L and repeat the MARINO on 2L. Daughter says the probe kept falling off during the study and we were going to repeat it and pt hasn't done that yet. She has not heard from [facility] to schedule date/time for them to drop off the MARINO monitor.     Explained we have two ONOs on RA dated 4/19/24 and 4/26/24 with different data; 4/26 showing much improvement but still requiring 2L O2 at HS. Daughter states MARINO on RA was not completed on 4/26/24.      Let daughter know I will call  [facility] and seek clarification, then get back to her.     Nuvia Segal, RN, BSN  ILD Nurse   [phone]

## 2024-05-02 NOTE — TELEPHONE ENCOUNTER
Spoke to [facility], verified they did not receive the MARINO on 2L order, re-faxed.     Pt home equipment:   POC     They could not verify if pt has a home concentrator, but verified pt has a POC; they will have a rep call me back with list of pt home equipment.     Nuvia Segal, RN, BSN  ILD Nurse   [phone]

## 2024-05-03 ENCOUNTER — DOCUMENTATION ONLY (OUTPATIENT)
Dept: PULMONOLOGY | Facility: CLINIC | Age: 74
End: 2024-05-03
Payer: COMMERCIAL

## 2024-05-03 NOTE — PROGRESS NOTES
MARINO RX SIGNED BY: FLAQUITA WHITTINGTON  DATE: 5/3/2024  STUDY PERFORMED ON (PAP/O2/RA): RA  DOWNLOAD METHOD (BREATHE/NONIN): NONIN   PROVIDER FAX: [phone]    DID YOU DOCUMENT CONTACT ATTEMPTS IN [facility]? YES   AFTER FIRST ATTEMPT PLEASE VERIFY PHONE NUMBER IN [facility] MATCHES PHONE NUMBER IN EPIC.    RX COMMENTS:  PT REPORTS PROBE KEPT FALLING OFF DURING INITIAL MARINO ON RA AS RESULTS ARE NOT ACCURATE., THIS IS A REPEAT MARINO ON RA.    5/3 AV- SPOKE WITH CLINIC NURSE NOLAN SHE CONFIRMED ORDER FOR MARINO ON OXYGEN HAS BEEN CANCELLED AND NEED PATIENT TO COMPLETE ON ROOM AIR. SPOKE WITH DAUGHTER INFORMING HER OF THIS AND SHE WILL  FROM [place].

## 2024-05-03 NOTE — TELEPHONE ENCOUNTER
Spoke with Jg at [facility], they received the MARINO on RA order. Clarified we are cancelling the MARINO on 2L and repeating the MARINO on RA now. She will reach out to pt daughter to  MARINO to do on RA.     Nuvia Segal, RN, BSN  ILD Nurse   [phone]

## 2024-05-03 NOTE — TELEPHONE ENCOUNTER
Spoke to [facility] re: MARINO on RA 4/19/24 and 4/26/24. They received a second Marino on RA order via fax but cover sheet said MARINO on 2L.      Home [facility] records state Marino on 2L 4/26 was completed on 2L, although the results ILD clinic received state on RA.       [facility] LVM this am for Zachariah that marino on 2L is ready for  in [place] showroom, daughter called back and will .     Lots of confusion. The plan will be to repeat the MARINO on RA since it is unclear if the pt completed the second MARINO on RA or 2L. Order faxed to [facility] via [employer], received confirmation it was sent.     Spoke to daughter Zachariah, explained we will repeat the MARINO on RA, I just faxed the order to  [facility]. She just picked up the MARINO on 2L from the [place] showroom. Instructed her to return it and wait for them to reissue a new one with the MARINO on RA order. Zachariah verbalized agreement.     Spoke to  Home [facility], they have not received the MARINO on RA order yet and cannot see it in EPIC. Will call back this afternoon to verify they received the fax.     Nuvia Segal, RN, BSN  ILD Nurse   [phone]

## 2024-05-05 ENCOUNTER — TRANSFERRED RECORDS (OUTPATIENT)
Dept: HEALTH INFORMATION MANAGEMENT | Facility: CLINIC | Age: 74
End: 2024-05-05
Payer: COMMERCIAL

## 2024-05-08 DIAGNOSIS — J84.9 ILD (INTERSTITIAL LUNG DISEASE) (H): Primary | ICD-10-CM

## 2024-05-09 ENCOUNTER — OFFICE VISIT (OUTPATIENT)
Dept: PULMONOLOGY | Facility: CLINIC | Age: 74
End: 2024-05-09
Attending: INTERNAL MEDICINE
Payer: COMMERCIAL

## 2024-05-09 ENCOUNTER — LAB (OUTPATIENT)
Dept: LAB | Facility: CLINIC | Age: 74
End: 2024-05-09
Attending: INTERNAL MEDICINE
Payer: COMMERCIAL

## 2024-05-09 VITALS
OXYGEN SATURATION: 96 % | DIASTOLIC BLOOD PRESSURE: 86 MMHG | WEIGHT: 152 LBS | HEIGHT: 62 IN | BODY MASS INDEX: 27.97 KG/M2 | SYSTOLIC BLOOD PRESSURE: 160 MMHG | HEART RATE: 95 BPM

## 2024-05-09 DIAGNOSIS — M05.10 RHEUMATOID LUNG (H): ICD-10-CM

## 2024-05-09 DIAGNOSIS — J84.9 ILD (INTERSTITIAL LUNG DISEASE) (H): Primary | ICD-10-CM

## 2024-05-09 DIAGNOSIS — R06.09 DYSPNEA ON EXERTION: ICD-10-CM

## 2024-05-09 DIAGNOSIS — R06.00 NOCTURNAL DYSPNEA: ICD-10-CM

## 2024-05-09 DIAGNOSIS — R09.02 HYPOXIA: ICD-10-CM

## 2024-05-09 DIAGNOSIS — Z79.624 ON MYCOPHENOLATE MOFETIL THERAPY: ICD-10-CM

## 2024-05-09 LAB
6 MIN WALK (FT): 950 FT
6 MIN WALK (M): 290 M
ALBUMIN SERPL BCG-MCNC: 3.9 G/DL (ref 3.5–5.2)
ALP SERPL-CCNC: 70 U/L (ref 40–150)
ALT SERPL W P-5'-P-CCNC: 13 U/L (ref 0–50)
ANION GAP SERPL CALCULATED.3IONS-SCNC: 8 MMOL/L (ref 7–15)
AST SERPL W P-5'-P-CCNC: 16 U/L (ref 0–45)
BASOPHILS # BLD AUTO: 0 10E3/UL (ref 0–0.2)
BASOPHILS NFR BLD AUTO: 0 %
BILIRUB SERPL-MCNC: 0.2 MG/DL
BUN SERPL-MCNC: 15.7 MG/DL (ref 8–23)
CALCIUM SERPL-MCNC: 9.2 MG/DL (ref 8.8–10.2)
CHLORIDE SERPL-SCNC: 96 MMOL/L (ref 98–107)
CREAT SERPL-MCNC: 0.67 MG/DL (ref 0.51–0.95)
DEPRECATED HCO3 PLAS-SCNC: 27 MMOL/L (ref 22–29)
EGFRCR SERPLBLD CKD-EPI 2021: >90 ML/MIN/1.73M2
EOSINOPHIL # BLD AUTO: 0.3 10E3/UL (ref 0–0.7)
EOSINOPHIL NFR BLD AUTO: 4 %
ERYTHROCYTE [DISTWIDTH] IN BLOOD BY AUTOMATED COUNT: 14.6 % (ref 10–15)
GLUCOSE SERPL-MCNC: 185 MG/DL (ref 70–99)
HCT VFR BLD AUTO: 35.4 % (ref 35–47)
HGB BLD-MCNC: 11.6 G/DL (ref 11.7–15.7)
IMM GRANULOCYTES # BLD: 0 10E3/UL
IMM GRANULOCYTES NFR BLD: 0 %
LYMPHOCYTES # BLD AUTO: 1.4 10E3/UL (ref 0.8–5.3)
LYMPHOCYTES NFR BLD AUTO: 19 %
MCH RBC QN AUTO: 30.3 PG (ref 26.5–33)
MCHC RBC AUTO-ENTMCNC: 32.8 G/DL (ref 31.5–36.5)
MCV RBC AUTO: 92 FL (ref 78–100)
MONOCYTES # BLD AUTO: 0.5 10E3/UL (ref 0–1.3)
MONOCYTES NFR BLD AUTO: 7 %
NEUTROPHILS # BLD AUTO: 5.2 10E3/UL (ref 1.6–8.3)
NEUTROPHILS NFR BLD AUTO: 70 %
NRBC # BLD AUTO: 0 10E3/UL
NRBC BLD AUTO-RTO: 0 /100
PLATELET # BLD AUTO: 244 10E3/UL (ref 150–450)
POTASSIUM SERPL-SCNC: 4 MMOL/L (ref 3.4–5.3)
PROT SERPL-MCNC: 6.7 G/DL (ref 6.4–8.3)
RBC # BLD AUTO: 3.83 10E6/UL (ref 3.8–5.2)
SODIUM SERPL-SCNC: 131 MMOL/L (ref 135–145)
WBC # BLD AUTO: 7.5 10E3/UL (ref 4–11)

## 2024-05-09 PROCEDURE — 84295 ASSAY OF SERUM SODIUM: CPT | Performed by: INTERNAL MEDICINE

## 2024-05-09 PROCEDURE — 94729 DIFFUSING CAPACITY: CPT | Performed by: INTERNAL MEDICINE

## 2024-05-09 PROCEDURE — 99207 PR NO CHARGE LOS: CPT

## 2024-05-09 PROCEDURE — 99215 OFFICE O/P EST HI 40 MIN: CPT | Mod: 25 | Performed by: INTERNAL MEDICINE

## 2024-05-09 PROCEDURE — 94375 RESPIRATORY FLOW VOLUME LOOP: CPT | Performed by: INTERNAL MEDICINE

## 2024-05-09 PROCEDURE — 99000 SPECIMEN HANDLING OFFICE-LAB: CPT | Performed by: PATHOLOGY

## 2024-05-09 PROCEDURE — 94618 PULMONARY STRESS TESTING: CPT | Performed by: INTERNAL MEDICINE

## 2024-05-09 PROCEDURE — G0463 HOSPITAL OUTPT CLINIC VISIT: HCPCS | Performed by: INTERNAL MEDICINE

## 2024-05-09 PROCEDURE — 36415 COLL VENOUS BLD VENIPUNCTURE: CPT | Performed by: PATHOLOGY

## 2024-05-09 PROCEDURE — 85025 COMPLETE CBC W/AUTO DIFF WBC: CPT | Performed by: PATHOLOGY

## 2024-05-09 RX ORDER — PREDNISONE 5 MG/1
5 TABLET ORAL DAILY
Qty: 30 TABLET | Refills: 5 | Status: SHIPPED | OUTPATIENT
Start: 2024-05-09

## 2024-05-09 NOTE — NURSING NOTE
Chief Complaint   Patient presents with    Interstitial Lung Disease (ILD)     7 month follow up      Vitals were taken and medications were reconciled.     Margaret BRIGHT  1:05 PM

## 2024-05-09 NOTE — PATIENT INSTRUCTIONS
Continue mycophenolate, 4 tablets twice day   Reduce prednisone to the new smaller pill, 5 mg, which I am sending to your pharmacy, and stay on this until next visit     Lung transplant clinic will call you with an appointment to discuss this potential option for the future     Use Oxygen with walking if your Oxygen saturation is less than 89%    Continue ofev at current dose     Pulmonary rehab will call you to schedule appointments     Please call our direct ILD nurses line for questions and concerns: [phone]    For scheduling, please call: [phone]

## 2024-05-09 NOTE — LETTER
5/9/2024         RE: Krystian Boland  [address]  [facility] [address]        Dear Colleague,    Thank you for referring your patient, Krystian Boland, to the [facility]G [facility]. Please see a copy of my visit note below.    HISTORY OF PRESENT ILLNESS:  Krystian is a 72-year-old female with interstitial lung disease secondary to rheumatoid arthritis.  Her last visit was with me 10/26/2022. Serial PFTs monitoring was being pursued while receiving ofev, which remains currently at 100 mg BID due not tolerating the 150 dose with nausea. She is also on HCQN and sulfasalazine with fair joint symptom control. Her daughter is an RN and translating for her. As her respiratory symptoms progressed, I started her on MMF in 10/2022. In late November, she had acute respiratory failure requiring hospitalization, and CT was suggestive for rheumatoid lung flare with superimposed progressive fibrosis. She was discharged on oxygen, which she was able to discontinue a few weeks ago. She had only 3 days interruption in MMF, and now on 1000 BID. She has exertional dyspnea but seems to be better compared to 5/2022, and daughter thinks that she is starting to respond to MMF. No symptoms to suggest infection. RTX has been entertained with rheumatology.     Updates from 4/21/23  Patient returns for follow-up, we have previously discussed with rheumatology initiating rituximab for her rheumatoid lung, and performed routine infectious work-up which turned out positive for TB QuantiFERON.  She was born and raised in [facility] [facility] (incidence [place] and expresses no symptoms of active TB. CT 1/2023 also did not suggest active TB. She feels well today with no change in baseline dyspnea on exertion. Not using oxygen at home. Six min walk showed 2 LPM need with activity, she does have a concentrator and tanks.     Updates from 11/1/23  Krystian returns for follow up with her  "daughter who interprets for her (ICU RN). She completed pulmonary rehab. Feels 15% better in terms of activity level. Using O2 with exertion at 2 LPM. Tolerating MMF 1000 BID and Ofev 100 BID without side effects. No symptoms to suggest infection. Received flu vaccine for this season recently. Last saw rheumatology August with no changes in plan.    Updates from 5/9/24  Krystian returns for follow up. She was recently admitted in early 4/2024 for acute on chronic hypoxic respiratory failure, and CT scan findings suggestive of ILD flare. She was discharged within 3-4 days and did not require more than 5 LPM maximum, back to baseline O2 at home which is 2 LPM at rest. She received high dose steroids in the hospital and discharge on slow prednisone taper which is now at 10 mg. She continues to take MMF 1000 BID with good tolerance as well as ofev 100 mg po BID. She has dry cough that is rarely productive of white sputum. She has no symptoms to suggest infection. She thinks her musculoskeletal symptoms are under good control.     PHYSICAL EXAMINATION:    VITAL SIGNS:  BP (!) 160/86 (BP Location: Left arm, Patient Position: Sitting, Cuff Size: Adult Regular)   Pulse 95   Ht 1.575 m (5' 2\")   Wt 68.9 kg (152 lb)   LMP  (LMP Unknown)   SpO2 96%   BMI 27.80 kg/m        HEENT:  Eyes are anicteric.  Mouth and throat without lesions.  CHEST:  Crackles at least MCFP up bilaterally, no wheezes.   HEART:  Shows regular rate and rhythm.  Normal S1, S2.  EXTREMITIES: BL digital clubbing.  SKIN:  No rash.    PULMONARY FUNCTION TESTS     My interpretation: Severe intra-thoracic restrictive disease, no obstruction. Slow progression compared to 2022.       My interpretation: improvement in intra-thoracic restrictive disease, currently classified as moderate in severity.     Reviewed HRCT chest images with patient and her daughter, 1/25/23 compared to 11/28/22 and 5/2022  Advancement in honeycombing and fibrotic findings with " residual dense GGO/consolidative areas that are minimal compared to November. November's scan showed extensive GGO (now resolved) suggestive of CTD ILD flare.         Reviewed CTA chest images performed 4/4/24, agreed with radiology:  FINDINGS:  ANGIOGRAM CHEST: Opacified pulmonary arteries are normal caliber and  negative for pulmonary emboli. There is a gradual fading of contrast  in the medial right lower lobe compatible with decreased perfusion  and/or admixture artifact, pulmonary emboli here would be difficult to  entirely exclude. Thoracic aorta is negative for dissection. No CT  evidence of right heart strain.     LUNGS AND PLEURA: Moderate to severe peripheral and basilar fibrosis  with honeycombing and traction bronchiectasis similar to previous.  Question right lower lobe infiltrate and/or active inflammatory  change. No effusion.     MEDIASTINUM/AXILLAE: Mildly prominent lymph nodes noted which are  nonspecific similar to previous. No aneurysm.     CORONARY ARTERY CALCIFICATIONS: Mild.     UPPER ABDOMEN: No acute findings.     MUSCULOSKELETAL: No frankly destructive bony lesions.                                                                      IMPRESSION:  1.  No pulmonary embolism demonstrated.  2.  Question new right lower lobe pneumonia and/or active inflammatory  change superimposed on moderate to severe fibrosis.       ASSESSMENT AND PLAN:   1. RA ILD, rapidly progressive with flare in 11/2022 and recurrent mild flare 4/2024 on MMF and ofev   2. Chronic hypoxemic respiratory failure, on O2 with exertion   3. DM   4. HTN   5. Latent TB?, treated intestinal TB in [place] 1980s (combination pill)     Krystian is a 73-year-old female with interstitial lung disease secondary to RA. Her RA is being treated with sulfasalazine and hydroxychloroquine.  She saw Dr. Staley last on 3/6/2024 and tolerating MMF 1000 BID. Did not need steroid bursts in almost a year but with flare 4/2024 she was placed on  prednisone 60 mg and tapering down, now on 10 mg. The prior steroid taper was 12/2/22 starting with 40 mg for approximately one month. She continues to tolerate Ofev 100 BID as well (did not tolerate 150 for nausea). PFTs show stability actually despite recent flare. Rituximab was previously discussed with Dr. Staley, but she has active hepatitis B and latent TB, and this was discussed with hepatology and ID and we had a plan to treat with tenofovir and latent TB treatment however the patient and her family decided not to pursue this to avoid complexity of treatment plan with immunosuppression, which I supported. Most of her lung disease is fibrotic at this stage so even if we do rituximab we will not be able to recover normal lung function. However, she is interested in having a discussion with lung transplant clinic to explore her options given reasonable exercise capacity despite advanced lung disease. She completed pulmonary rehab a few months ago.   Plan:   - Follow up with ID and GI regarding latent TB and Hep B, respectively   - Continue current dose MMF 1000 BID   - Reduce prednisone from 10 mg to 5 mg, could taper off next visit   - Compliance with O2 whenever performing outside activities/long walks, with pulse ox monitoring   - Continue current dose ofev 100 (had nausea with 150 BID)  - Keep q3 months labs  - Lung transplant referral   - New Pulmonary rehab referral  - Return in 4 months with spirometry       Jase Keller MD    Total time spent on the day of the visit was 40 minutes.       Again, thank you for allowing me to participate in the care of your patient.        Sincerely,        Jase Keller MD

## 2024-05-09 NOTE — PROGRESS NOTES
HISTORY OF PRESENT ILLNESS:  Krystian is a 72-year-old female with interstitial lung disease secondary to rheumatoid arthritis.  Her last visit was with me 10/26/2022. Serial PFTs monitoring was being pursued while receiving ofev, which remains currently at 100 mg BID due not tolerating the 150 dose with nausea. She is also on HCQN and sulfasalazine with fair joint symptom control. Her daughter is an RN and translating for her. As her respiratory symptoms progressed, I started her on MMF in 10/2022. In late November, she had acute respiratory failure requiring hospitalization, and CT was suggestive for rheumatoid lung flare with superimposed progressive fibrosis. She was discharged on oxygen, which she was able to discontinue a few weeks ago. She had only 3 days interruption in MMF, and now on 1000 BID. She has exertional dyspnea but seems to be better compared to 5/2022, and daughter thinks that she is starting to respond to MMF. No symptoms to suggest infection. RTX has been entertained with rheumatology.     Updates from 4/21/23  Patient returns for follow-up, we have previously discussed with rheumatology initiating rituximab for her rheumatoid lung, and performed routine infectious work-up which turned out positive for TB QuantiFERON.  She was born and raised in [facility] [facility] [place] (incidence [place]), and expresses no symptoms of active TB. CT 1/2023 also did not suggest active TB. She feels well today with no change in baseline dyspnea on exertion. Not using oxygen at home. Six min walk showed 2 LPM need with activity, she does have a concentrator and tanks.     Updates from 11/1/23  Krystian returns for follow up with her daughter who interprets for her (ICU RN). She completed pulmonary rehab. Feels 15% better in terms of activity level. Using O2 with exertion at 2 LPM. Tolerating MMF 1000 BID and Ofev 100 BID without side effects. No symptoms to suggest infection. Received flu vaccine for this season recently. Last  "saw rheumatology August with no changes in plan.    Updates from 5/9/24  Krystian returns for follow up. She was recently admitted in early 4/2024 for acute on chronic hypoxic respiratory failure, and CT scan findings suggestive of ILD flare. She was discharged within 3-4 days and did not require more than 5 LPM maximum, back to baseline O2 at home which is 2 LPM at rest. She received high dose steroids in the hospital and discharge on slow prednisone taper which is now at 10 mg. She continues to take MMF 1000 BID with good tolerance as well as ofev 100 mg po BID. She has dry cough that is rarely productive of white sputum. She has no symptoms to suggest infection. She thinks her musculoskeletal symptoms are under good control.     PHYSICAL EXAMINATION:    VITAL SIGNS:  BP (!) 160/86 (BP Location: Left arm, Patient Position: Sitting, Cuff Size: Adult Regular)   Pulse 95   Ht 1.575 m (5' 2\")   Wt 68.9 kg (152 lb)   LMP  (LMP Unknown)   SpO2 96%   BMI 27.80 kg/m        HEENT:  Eyes are anicteric.  Mouth and throat without lesions.  CHEST:  Crackles at least nursing home up bilaterally, no wheezes.   HEART:  Shows regular rate and rhythm.  Normal S1, S2.  EXTREMITIES: BL digital clubbing.  SKIN:  No rash.    PULMONARY FUNCTION TESTS     My interpretation: Severe intra-thoracic restrictive disease, no obstruction. Slow progression compared to 2022.       My interpretation: improvement in intra-thoracic restrictive disease, currently classified as moderate in severity.     Reviewed HRCT chest images with patient and her daughter, 1/25/23 compared to 11/28/22 and 5/2022  Advancement in honeycombing and fibrotic findings with residual dense GGO/consolidative areas that are minimal compared to November. November's scan showed extensive GGO (now resolved) suggestive of CTD ILD flare.         Reviewed CTA chest images performed 4/4/24, agreed with radiology:  FINDINGS:  ANGIOGRAM CHEST: Opacified pulmonary arteries are " normal caliber and  negative for pulmonary emboli. There is a gradual fading of contrast  in the medial right lower lobe compatible with decreased perfusion  and/or admixture artifact, pulmonary emboli here would be difficult to  entirely exclude. Thoracic aorta is negative for dissection. No CT  evidence of right heart strain.     LUNGS AND PLEURA: Moderate to severe peripheral and basilar fibrosis  with honeycombing and traction bronchiectasis similar to previous.  Question right lower lobe infiltrate and/or active inflammatory  change. No effusion.     MEDIASTINUM/AXILLAE: Mildly prominent lymph nodes noted which are  nonspecific similar to previous. No aneurysm.     CORONARY ARTERY CALCIFICATIONS: Mild.     UPPER ABDOMEN: No acute findings.     MUSCULOSKELETAL: No frankly destructive bony lesions.                                                                      IMPRESSION:  1.  No pulmonary embolism demonstrated.  2.  Question new right lower lobe pneumonia and/or active inflammatory  change superimposed on moderate to severe fibrosis.       ASSESSMENT AND PLAN:   1. RA ILD, rapidly progressive with flare in 11/2022 and recurrent mild flare 4/2024 on MMF and ofev   2. Chronic hypoxemic respiratory failure, on O2 with exertion   3. DM   4. HTN   5. Latent TB?, treated intestinal TB in [place] 1980s (combination pill)     Krystian is a 73-year-old female with interstitial lung disease secondary to RA. Her RA is being treated with sulfasalazine and hydroxychloroquine.  She saw Dr. Staley last on 3/6/2024 and tolerating MMF 1000 BID. Did not need steroid bursts in almost a year but with flare 4/2024 she was placed on prednisone 60 mg and tapering down, now on 10 mg. The prior steroid taper was 12/2/22 starting with 40 mg for approximately one month. She continues to tolerate Ofev 100 BID as well (did not tolerate 150 for nausea). PFTs show stability actually despite recent flare. Rituximab was previously  discussed with Dr. Staley, but she has active hepatitis B and latent TB, and this was discussed with hepatology and ID and we had a plan to treat with tenofovir and latent TB treatment however the patient and her family decided not to pursue this to avoid complexity of treatment plan with immunosuppression, which I supported. Most of her lung disease is fibrotic at this stage so even if we do rituximab we will not be able to recover normal lung function. However, she is interested in having a discussion with lung transplant clinic to explore her options given reasonable exercise capacity despite advanced lung disease. She completed pulmonary rehab a few months ago.   Plan:   - Follow up with ID and GI regarding latent TB and Hep B, respectively   - Continue current dose MMF 1000 BID   - Reduce prednisone from 10 mg to 5 mg, could taper off next visit   - Compliance with O2 whenever performing outside activities/long walks, with pulse ox monitoring   - Continue current dose ofev 100 (had nausea with 150 BID)  - Keep q3 months labs  - Lung transplant referral   - New Pulmonary rehab referral  - Return in 4 months with spirometry       Jase Keller MD    Total time spent on the day of the visit was 40 minutes.

## 2024-05-10 LAB
DLCOCOR-%PRED-PRE: 41 %
DLCOCOR-PRE: 7.4 ML/MIN/MMHG
DLCOUNC-%PRED-PRE: 38 %
DLCOUNC-PRE: 6.95 ML/MIN/MMHG
DLCOUNC-PRED: 17.91 ML/MIN/MMHG
ERV-%PRED-PRE: 42 %
ERV-PRE: 0.38 L
ERV-PRED: 0.89 L
EXPTIME-PRE: 5.52 SEC
FEF2575-%PRED-PRE: 132 %
FEF2575-PRE: 2.16 L/SEC
FEF2575-PRED: 1.64 L/SEC
FEFMAX-%PRED-PRE: 81 %
FEFMAX-PRE: 4.24 L/SEC
FEFMAX-PRED: 5.2 L/SEC
FEV1-%PRED-PRE: 76 %
FEV1-PRE: 1.44 L
FEV1FEV6-PRE: 90 %
FEV1FEV6-PRED: 79 %
FEV1FVC-PRE: 88 %
FEV1FVC-PRED: 79 %
FEV1SVC-PRE: 95 %
FEV1SVC-PRED: 66 %
FIFMAX-PRE: 4.52 L/SEC
FVC-%PRED-PRE: 68 %
FVC-PRE: 1.63 L
FVC-PRED: 2.39 L
IC-%PRED-PRE: 63 %
IC-PRE: 1.13 L
IC-PRED: 1.77 L
VA-%PRED-PRE: 55 %
VA-PRE: 2.36 L
VC-%PRED-PRE: 52 %
VC-PRE: 1.52 L
VC-PRED: 2.86 L

## 2024-05-13 ENCOUNTER — PATIENT OUTREACH (OUTPATIENT)
Dept: GERIATRIC MEDICINE | Facility: CLINIC | Age: 74
End: 2024-05-13
Payer: COMMERCIAL

## 2024-05-13 ENCOUNTER — TELEPHONE (OUTPATIENT)
Dept: PULMONOLOGY | Facility: CLINIC | Age: 74
End: 2024-05-13
Payer: COMMERCIAL

## 2024-05-13 NOTE — LETTER
[facility]  [address], [address]  [place] [address]  Phone:  [phone]  Fax:  [phone]      May 13, 2024    Zachariah Boyd  [address] [place] [address]        Dear Zachariah Boyd,     Enclosed is a caregiver assessment as you are a person who provides assistance to CAMERON BARNES on a regular basis.    Please complete and return the assessment in the self-addressed stamped envelope provided at your earliest convenience.  If you have questions about the form, please feel free to call me at [phone].  Thank you.    Sincerely,    JAQUELIN Langley    E-mail: [email or web address]  Phone: [phone]    Care Manager  [facility]            Enclosures:   Caregiver assessment     Self-addressed stamped envelope

## 2024-05-13 NOTE — PROGRESS NOTES
[facility] Care Coordination Contact    Received after visit chart from care coordinator.  Completed following tasks: Mailed copy of care plan/support plan to member, Mailed [place] Cho[place] signature sheet pages 3-4, Mailed Safe Medication Disposal , Updated services in Database, and Mailed caregiver assmt, Requested from [facility] Memory support kit, Stress relief kit, and [employer] Stable.   and Provider Signature - No POC Shared:  Member indicates that they do not want their POC shared with any EW providers.  [facility]:  Emailed required PCA documents to [facility].  mailed copy to member.  Faxed MD Communication to PCP.   [place] Cho[place] Support [place] is open per CC request.  Waiting to get [facility] information.  Mailed member Assessment Summary    Sophie Amador  Care Management Specialist  [facility]  [phone]

## 2024-05-13 NOTE — LETTER
May 13, 2024      KRYSTIAN BARNES  [address]  [facility] [address]      Dear Krystian:    At [facility], we re dedicated to improving your health and wellness. Enclosed is the Care Plan developed with you on 4/16/2024. Please review the Care Plan carefully.    As a reminder, during your visit we talked about:  Ways to manage your physical and mental health  Using health care to maintain and improve your health   Your preventive care needs     Remember to contact your care coordinator if you:  Are hospitalized, or plan to be hospitalized   Have a fall    Have a change in your physical or mental health  Need help finding support or services    If you have questions, or don t agree with your Care Plan, call me at [phone]. You can also call me if your needs change. TTY users, call the [place] [employer] at (836) or 1-351.304.3916 (szfeek-ql-uvhmvk relay service).    Sincerely,    JAQUELIN Langley    E-mail: [email or web address]  Phone: [phone]    Care Manager  [facility]) is a health plan that contracts with both Medicare and the [place] Medical Assistance (Medicaid) program to provide benefits of both programs to enrollees. Enrollment in [facility] depends on contract renewal.    [id number] accepted    [id number] (07/2022)

## 2024-05-13 NOTE — TELEPHONE ENCOUNTER
RN spoke with [facility]. They will repeat MARINO on 2 liters and ensure oxygen set up is ok. [facility] has direct nurse line to call if further questions or concerns.   Dilma Cox RN BSN.

## 2024-05-15 ENCOUNTER — PATIENT OUTREACH (OUTPATIENT)
Dept: GERIATRIC MEDICINE | Facility: CLINIC | Age: 74
End: 2024-05-15
Payer: COMMERCIAL

## 2024-05-16 NOTE — PROGRESS NOTES
[facility] Care Coordination Contact    Received a call from Dilma [facility] juan jose Management agency:   [employer]   [address] [place] [address]   Office: [phone] Fax: [phone]  Dilma shared that she has spoke with member's daughter, Zachariah, about using this agency for the [facility] juan jose management. She shared that she has been working with the [facility] Support planner Alicia Dexter as well. She shared that she would send over the contact information and NPI for authorization. Notified her that once the [facility] Support Plan is received that the auth would be sent to [facility]. Updated her on how the authorization would work with the 11 month visit cycle. Dilma had no further questions.  JAQUELIN Langley  [facility]  [phone]

## 2024-05-19 DIAGNOSIS — E55.9 VITAMIN D DEFICIENCY: ICD-10-CM

## 2024-05-20 RX ORDER — CHOLECALCIFEROL (VITAMIN D3) 50 MCG
TABLET ORAL
Qty: 90 TABLET | Refills: 0 | Status: SHIPPED | OUTPATIENT
Start: 2024-05-20

## 2024-05-22 ENCOUNTER — HOSPITAL ENCOUNTER (OUTPATIENT)
Dept: ULTRASOUND IMAGING | Facility: CLINIC | Age: 74
Discharge: HOME | End: 2024-05-22
Attending: INTERNAL MEDICINE | Admitting: INTERNAL MEDICINE
Payer: COMMERCIAL

## 2024-05-22 DIAGNOSIS — N28.9 KIDNEY LESION, NATIVE, LEFT: ICD-10-CM

## 2024-05-22 DIAGNOSIS — B18.9: ICD-10-CM

## 2024-05-22 DIAGNOSIS — R76.8 HEPATITIS B SURFACE ANTIGEN POSITIVE: Primary | ICD-10-CM

## 2024-05-22 DIAGNOSIS — B18.1 CHRONIC HEPATITIS B (H): ICD-10-CM

## 2024-05-22 LAB — RADIOLOGIST FLAGS: NORMAL

## 2024-05-22 PROCEDURE — 76700 US EXAM ABDOM COMPLETE: CPT

## 2024-06-01 PROCEDURE — 99358 PROLONG SERVICE W/O CONTACT: CPT | Performed by: INTERNAL MEDICINE

## 2024-06-03 ENCOUNTER — PATIENT OUTREACH (OUTPATIENT)
Dept: GERIATRIC MEDICINE | Facility: CLINIC | Age: 74
End: 2024-06-03

## 2024-06-03 ENCOUNTER — ANCILLARY PROCEDURE (OUTPATIENT)
Dept: MAMMOGRAPHY | Facility: CLINIC | Age: 74
End: 2024-06-03
Attending: INTERNAL MEDICINE
Payer: COMMERCIAL

## 2024-06-03 DIAGNOSIS — Z12.31 VISIT FOR SCREENING MAMMOGRAM: ICD-10-CM

## 2024-06-03 PROCEDURE — 77067 SCR MAMMO BI INCL CAD: CPT | Mod: TC | Performed by: RADIOLOGY

## 2024-06-03 PROCEDURE — 77063 BREAST TOMOSYNTHESIS BI: CPT | Mod: TC | Performed by: RADIOLOGY

## 2024-06-03 NOTE — PROGRESS NOTES
[facility] Care Coordination Contact    Received the [facility] support plan from Tila Dexter ([facility] Support planner). Approved the support plan, and emailed Tila Dexter and the [facility] Michael Management the approval.  JAQUELIN Langley  [facility]  [phone]

## 2024-06-03 NOTE — PROGRESS NOTES
Received task from care coordinator.  Completed following tasks: Submitted referrals/auths for [facility] and Updated services in Database    Sophie Amador  Care Management Specialist  [facility]  [phone]

## 2024-06-05 ENCOUNTER — OFFICE VISIT (OUTPATIENT)
Dept: TRANSPLANT | Facility: CLINIC | Age: 74
End: 2024-06-05
Attending: INTERNAL MEDICINE
Payer: COMMERCIAL

## 2024-06-05 VITALS
DIASTOLIC BLOOD PRESSURE: 79 MMHG | SYSTOLIC BLOOD PRESSURE: 139 MMHG | BODY MASS INDEX: 29.29 KG/M2 | WEIGHT: 155 LBS | OXYGEN SATURATION: 97 % | HEART RATE: 92 BPM | TEMPERATURE: 98 F

## 2024-06-05 DIAGNOSIS — F02.80 EARLY ONSET ALZHEIMER'S DEMENTIA WITHOUT BEHAVIORAL DISTURBANCE (H): Primary | ICD-10-CM

## 2024-06-05 DIAGNOSIS — G30.0 EARLY ONSET ALZHEIMER'S DEMENTIA WITHOUT BEHAVIORAL DISTURBANCE (H): Primary | ICD-10-CM

## 2024-06-05 DIAGNOSIS — J84.9 ILD (INTERSTITIAL LUNG DISEASE) (H): ICD-10-CM

## 2024-06-05 DIAGNOSIS — Z76.82 ORGAN TRANSPLANT CANDIDATE: ICD-10-CM

## 2024-06-05 PROCEDURE — 99417 PROLNG OP E/M EACH 15 MIN: CPT | Performed by: INTERNAL MEDICINE

## 2024-06-05 PROCEDURE — G0463 HOSPITAL OUTPT CLINIC VISIT: HCPCS | Performed by: INTERNAL MEDICINE

## 2024-06-05 PROCEDURE — 99215 OFFICE O/P EST HI 40 MIN: CPT | Performed by: INTERNAL MEDICINE

## 2024-06-05 ASSESSMENT — PAIN SCALES - GENERAL: PAINLEVEL: NO PAIN (0)

## 2024-06-05 NOTE — PATIENT INSTRUCTIONS
"Transplant Clinic Discharge Instructions    It was nice to see you today.     Your \"to do\" list following today's appointment:  Neurology referral - the Neurology department will call you to schedule this appointment.    Pulmonary Rehab: Please remember to stay active.  Continue exercises learned in pulmonary rehab or continue participating in pulmonary rehab, if able. We encourage you to try and be active on days that you are not in pulmonary rehab as well. Walking is a great form of exercise. We encourage you to continue light weights as well to maintain muscle mass.    Pasted below is the website for some pulmonary rehab exercise and education videos produced by the pulmonary fibrosis foundation we highly recommend as a tool for some helpful exercises in improving and sustaining your physical conditioning.     [email or web address]/understanding-pff/treatment-options/pulmonary-rehabilitation-toolkit    Weight Management:   Body mass index is 29.29 kg/m .  Goal BMI greater than 18, less than 35 for lung transplant.     Medication changes: none today    Follow Up/Next appointment: RTC in 4 months to see Dr. Galvin.    Please remember to stay up to date with your primary care requirements including:                Annual check-ups with primary care physician   Mammogram   Pap smear (as appropriate for women)    PSA (for men over 50)   Dental visits   Annual flu shots/ immunizations as needed   Colonoscopy (patients over 50).     Thank-you for allowing us to participate in your care.    Please contact your transplant coordinator, Jessica Bryant at [phone] with any questions, concerns or updates (change in medications, illness, hospital admission, change in oxygen needs, change in insurance coverage, change of phone number or address, etc).  You can also mychart message me directly or email me at [email or web address]    Thoracic Transplant Office phone [phone], fax [phone]    Office Hours 8:30 - " 5:00 pm  For after-hours urgent issues, please dial [phone] and ask the  to page the Thoracic Transplant Coordinator On-Call.      If assistance is needed with access to your [employer] account, please contact the [employer] help line at [phone]

## 2024-06-05 NOTE — NURSING NOTE
Chief Complaint   Patient presents with    RECHECK     Evaluation.      Vitals:    06/05/24 1519   BP: 139/79   BP Location: Right arm   Patient Position: Sitting   Cuff Size: Adult Regular   Pulse: 92   Temp: 98  F (36.7  C)   TempSrc: Oral   SpO2: 97%   Weight: 70.3 kg (155 lb)       BP Readings from Last 3 Encounters:   06/05/24 139/79   05/09/24 (!) 160/86   04/16/24 128/70       /79 (BP Location: Right arm, Patient Position: Sitting, Cuff Size: Adult Regular)   Pulse 92   Temp 98  F (36.7  C) (Oral)   Wt 70.3 kg (155 lb)   LMP  (LMP Unknown)   SpO2 97%   BMI 29.29 kg/m       [place]ash[place]a Pedro

## 2024-06-05 NOTE — PROGRESS NOTES
[facility] Lung Transplant Program          Date of Visit: 06/05/24   Clinic Location: [facility], [facility], [facility].    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 Keller.  Oxygen use: Continue oxygen supplementation 2 L with exertion only  Social support: Discussed importance of social support with daughter Zachariah who the patient lives with now.  Zachariah is also her .  Discussed lung transplant and its nuances in detail with the patient who asked a fair amount of well thought out questions and seemed to have good understanding of the process, if she were to be deemed a candidate for lung transplantation  Pulmonary rehabilitation: She completed pulmonary rehabilitation last year.  Given progressive lung disease and oxygen requirement, recommended that she enroll in pulmonary rehabilitation again to help with perception of dyspnea and medications deconditioned-this is currently scheduled for August 2024.   Lung Transplantation:  We discussed lung transplantation briefly and the indications for it including the presenting underlying diagnosis today. We discussed briefly the evaluation process, typical outcomes, the procedure, and the posthospital stay. We also briefly discussed the need for lifelong monitoring, lifelong anti-rejection medications and risks for rejection and infections.  We discussed with patient that lung transplantation is reserved for patients with advanced progressive and irreversible lung  disease, in whom there is significant impairment of quality of life and when the underlying condition can potentially reduce lifespan.  She is approaching the window for evaluation but will start with neurology evaluation to assess the previously mentioned early onset dementia, although on my exam and assessment and she does not seem to have neurocognitive abnormalities.  Last time she was seen by neurology was in 2017 and patient and daughter denies any worsening memory issues. We also discussed the following:     Lung transplant selection evaluation and committee process  The types of donors  The lung transplant operation and the associated risks and technical complications.  The postoperative course, the ICU stay and risk of postoperative complications which can include rejection, tracheotomy, chest drains, feeding tube placement, bowel problems, sepsis, stroke, brain injury, pneumonia, pleural effusions, and renal dysfunction. Although not usual, but patients are at risk for prolonged hospitalization.   The current 1 year and 5 year graft and patient survivals-median survival of 50%-60%  at 5 years and 30-40% at 10 years. Approximately 50% patients may develop chronic rejection (CLAD) at 5 years and up to 80% may develop CLAD at 10 years. CLAD can have unpredictable progression, gradual in many cases, and can be medically managed.   The need for life long immunosuppressive therapy and the side effects of these medications, including the possibility of adverse effects, opportunistic infections, risk of cancers, and the possibility of rejection even if the patient is taking the medication exactly as prescribed, kidney problems, osteoporosis and fractures. I highlighted that this does not mean everyone gets all the adverse effects and most of them can be medically controlled as well.   The need for compliance with medications and follow-up visits in the clinic and thereafter. We also discussed the need for   procedures including few  bronchoscopy biopsies, and reviewed the standard immunosuppression. Our center's requirement of staying  in the [place] for three months following transplantation ( after discharge) and the need to have excellent caregiver support was also discussed. All questions were answered to patient's satisfaction and expressed comprehension..    Immunization/preventive care: Up-to-date with preventive immunization-influenza, COVID x 4 shots, pneumonia vaccine, colonoscopy and mammogram.   I spent a total of  83 minutes reviewing chart (previous notes), reviewing test results, reviewing chest x rays and CT scans, talking with and examining patient, formulating plan, adjusting medications, and documentation of my findings and plan on the day of the encounter. Time excludes time spent for PFT.   Approximately 30 minutes of non-face-to-face time were spent in review of the patient's medical record on 6/1/24. This included review of previous clinic visits, hospital records, lab results, imaging studies, and procedural documentation. The findings from above are summarized in the above note.    Luis Galvin MD [facility]  Associate Professor of Medicine  Pulmonary, Allergy & Critical Care Division  [place] [facility]  [facility] Lung Transplant Program        Chief Complaint:   Krystian Boland is a 73 year old year old female who is being seen for RECHECK (Evaluation. )    LAVONNE Boland is a 73 year old  year old female who presents for lung transplant evaluation.  She is accompanied by her daughter Zachariah aden, who translates for her. Krystian is tibetan speaking only. She is followed by Dr. Keller for interstitial lung disease, that is suspected to be secondary to longstanding rheumatoid arthritis although she has minimal joint symptoms.  She was followed with Dr. Cross prior to that.  She has had longstanding interstitial lung disease secondary to rheumatoid  arthritis although symptoms progressed in 2022 initially at which time she was placed on mycophenolate mofetil.  Ultimately she required hospitalization in 2023 for respiratory failure and was noted to have progressive fibrosis following rheumatoid flare.  Recently hospitalized 4/2024 again for respiratory failure-treated with abx and steroids.  She has been on oxygen since last year.  However, daughter Zachariah reports that her breathing is improved since her hospitalization.  Currently, she wears oxygen only as needed with severe exertion.  She does not have an oxygen concentrator at home.  She wears her Inogen at pulsed dose, when she is moving with the highest setting of 5.  She is on prednisone (taper) and mycophenolate now.  It seems rituximab was discussed but has been held due to low-grade hepatitis B viremia and history of intestinal tuberculosis.  Regardless, her daughter reports improvement in her symptoms and pulmonary status since hospitalization.    ROS: 12 point ROS negative except mentioned in HPI.        Past Medical History:   Diagnosis Date    Benign essential hypertension     Early onset Alzheimer's dementia without behavioral disturbance (H)     ILD (interstitial lung disease) (H)     Obesity (BMI 30-39.9)     Osteopenia     Seropositive rheumatoid arthritis (H) 01/2023    Type 2 diabetes mellitus (H)               Past Surgical History:   Procedure Laterality Date    CATARACT EXTRACTION W/ INTRAOCULAR LENS IMPLANT Left 10/25/2021    CATARACT IOL, RT/LT Right 10/11/2021    CHOLECYSTECTOMY, OPEN  1990    HEMICOLECTOMY, RT/LT Right 1990        Social History     Tobacco Use    Smoking status: Never    Smokeless tobacco: Never   Vaping Use    Vaping status: Never Used   Substance Use Topics    Alcohol use: Never    Drug use: No                  Family History   Problem Relation Age of Onset    Dementia Mother     Cerebrovascular Disease Father     Cerebrovascular Disease Brother     Diabetes Son      Myocardial Infarction No family hx of     Coronary Artery Disease Early Onset No family hx of     Breast Cancer No family hx of     Ovarian Cancer No family hx of     Colon Cancer No family hx of     Glaucoma No family hx of     Macular Degeneration No family hx of     Liver Disease No family hx of           Any family history of ILD:        Current Outpatient Medications   Medication Sig Dispense Refill    amLODIPine (NORVASC) 5 MG tablet TAKE 1 TABLET(5 MG) BY MOUTH DAILY 90 tablet 0    aspirin 81 MG chewable tablet Take 1 tablet (81 mg) by mouth daily 90 tablet 2    benzonatate (TESSALON) 200 MG capsule Take 1 capsule (200 mg) by mouth 3 times daily as needed for cough 30 capsule 0    famotidine (PEPCID) 40 MG tablet Take 1 tablet (40 mg) by mouth every evening 90 tablet 3    guaiFENesin (MUCINEX) 600 MG 12 hr tablet Take 600 mg by mouth 2 times daily as needed for congestion or cough      guaiFENesin (ROBITUSSIN) 20 mg/mL liquid Take 10 mLs by mouth every 4 hours as needed for cough 236 mL 0    hydroxychloroquine (PLAQUENIL) 200 MG tablet Take 1 tablet (200 mg) by mouth 2 times daily Annual Plaquenil toxicity eye screening required. 180 tablet 3    hydrOXYzine (ATARAX) 10 MG tablet Take 1 tablet (10 mg) by mouth 3 times daily as needed for anxiety 30 tablet 1    hypromellose (GENTEAL SEVERE) ophthalmic gel 0.3% Place 1 drop into both eyes 4 times daily 10 g 11    ipratropium - albuterol 0.5 mg/2.5 mg/3 mL (DUONEB) 0.5-2.5 (3) MG/3ML neb solution Take 1 vial (3 mLs) by nebulization every 4 hours as needed for wheezing 90 mL 0    melatonin 3 MG tablet Take 1 tablet (3 mg) by mouth nightly as needed for sleep      mycophenolate (GENERIC EQUIVALENT) 250 MG capsule Take 4 capsules (1,000 mg) by mouth 2 times daily      OFEV 100 MG capsule TAKE ONE CAPSULE BY MOUTH TWICE A DAY 60 capsule 11    pantoprazole (PROTONIX) 40 MG EC tablet Take 1 tablet (40 mg) by mouth daily 90 tablet 3    predniSONE (DELTASONE) 10 MG  tablet Take 6 tablets (60 mg) by mouth daily for 7 days, THEN 5 tablets (50 mg) daily for 7 days, THEN 4 tablets (40 mg) daily for 7 days, THEN 3 tablets (30 mg) daily for 7 days, THEN 2.5 tablets (25 mg) daily for 7 days, THEN 2 tablets (20 mg) daily for 7 days, THEN 1.5 tablets (15 mg) daily for 7 days, THEN 1 tablet (10 mg) daily for 7 days, THEN 0.5 tablets (5 mg) daily for 7 days. 179 tablet 0    predniSONE (DELTASONE) 5 MG tablet Take 1 tablet (5 mg) by mouth daily 30 tablet 5    STATIN NOT PRESCRIBED (INTENTIONAL) Please choose reason not prescribed, below      sulfamethoxazole-trimethoprim (BACTRIM DS) 800-160 MG tablet Take 1 tablet by mouth Every Mon, Wed, Fri Morning 30 tablet 0    sulfaSALAzine ER (AZULFIDINE EN) 500 MG EC tablet take 2 tabs twice a day. 360 tablet 3    Vitamin D3 50 mcg (2000 units) tablet TAKE 1 TABLET BY MOUTH DAILY 90 tablet 0     No current facility-administered medications for this visit.                        Allergies   Allergen Reactions    Atorvastatin Muscle Pain (Myalgia)                 /79 (BP Location: Right arm, Patient Position: Sitting, Cuff Size: Adult Regular)   Pulse 92   Temp 98  F (36.7  C) (Oral)   Wt 70.3 kg (155 lb)   LMP  (LMP Unknown)   SpO2 97%   BMI 29.29 kg/m       Body mass index is 29.29 kg/m .        Physical Examination    General: Well developed, well nourished, No apparent distress  Eyes: Anicteric  Ears: Hearing grossly normal  Mouth: Oral mucosa is moist, without any lesions.   Respiratory: Good air movement. No crackles. No rhonchi. No wheezes  Cardiac: RRR, normal S1, S2. No murmurs. No JVD  Abdomen: Soft, NT/ND  Musculoskeletal: Extremities normal. No clubbing. No cyanosis. No edema.  Skin: No rash on limited exam  Neuro: Normal mentation. Normal speech.  Psych:Normal affect         RESULTS:  PFTs:     6MWT:   5/9/24: 950 feet, 2 L  Echocardiogram:    The visual ejection fraction is 55-60%.  No regional wall motion abnormalities  noted.  No significant valvular heart disease.  Note that image resolution on transthoracic echocardiogram not sufficient to  exclude endocarditis.  ______________________________________________________________________________  Left Ventricle  The left ventricle is normal in size. There is normal left ventricular wall  thickness. Diastolic Doppler findings (E/E' ratio and/or other parameters)  suggest left ventricular filling pressures are indeterminate. The visual  ejection fraction is 55-60%. No regional wall motion abnormalities noted.     Right Ventricle  The right ventricle is normal in size and function.     Atria  Normal left atrial size. Right atrial size is normal. There is no color  Doppler evidence of an atrial shunt.     Mitral Valve  The mitral valve leaflets are mildly thickened. There is trace mitral  regurgitation.     Tricuspid Valve  There is trace tricuspid regurgitation. IVC diameter and respiratory changes  fall into an intermediate range suggesting an RA pressure of 8 mmHg.     Aortic Valve  There is trivial trileaflet aortic sclerosis. No aortic regurgitation is  present.     Pulmonic Valve  There is trace pulmonic valvular regurgitation.     Vessels  Normal size aorta. Aortic root dilatation is present.     Pericardium  There is no pericardial effusion.       Chest imaging:    CT CHEST PULMONARY EMBOLISM WITH CONTRAST 4/4/2024 11:20 AM     CLINICAL HISTORY: Chest pain. Elevated D-dimer, worsening shortness of  breath, interstitial lung disease, history of latent TB.     TECHNIQUE: CT angiogram chest during arterial phase injection IV  contrast. 2D and 3D MIP reconstructions were performed by the CT  technologist. Dose reduction techniques were used.      CONTRAST: 58 mL [id number]     COMPARISON: January 25, 2023     FINDINGS:  ANGIOGRAM CHEST: Opacified pulmonary arteries are normal caliber and  negative for pulmonary emboli. There is a gradual fading of contrast  in the medial right lower  lobe compatible with decreased perfusion  and/or admixture artifact, pulmonary emboli here would be difficult to  entirely exclude. Thoracic aorta is negative for dissection. No CT  evidence of right heart strain.     LUNGS AND PLEURA: Moderate to severe peripheral and basilar fibrosis  with honeycombing and traction bronchiectasis similar to previous.  Question right lower lobe infiltrate and/or active inflammatory  change. No effusion.     MEDIASTINUM/AXILLAE: Mildly prominent lymph nodes noted which are  nonspecific similar to previous. No aneurysm.     CORONARY ARTERY CALCIFICATIONS: Mild.     UPPER ABDOMEN: No acute findings.     MUSCULOSKELETAL: No frankly destructive bony lesions.                                                                      IMPRESSION:  1.  No pulmonary embolism demonstrated.  2.  Question new right lower lobe pneumonia and/or active inflammatory  change superimposed on moderate to severe fibrosis.    I personally reviewed and interpreted the chest radiographs.    The longitudinal plan of care for post lung transplant and complications of post-transplant was addressed during this visit. Due to the added complexity in care, I will continue to support this patient in the subsequent management of this condition(s) and with the ongoing continuity of care of this condition

## 2024-06-05 NOTE — LETTER
6/5/2024      Krystian Boland  [id number]  [facility] [address]      Dear Colleague,    Thank you for referring your patient, Krystian Boland, to the [facility] TRANSPLANT [facility]INIC. Please see a copy of my visit note below.      [facility] Lung Transplant Program          Date of Visit: 06/05/24   Clinic Location: [facility], [facility], [facility] [place].    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 Keller.  Oxygen use: Continue oxygen supplementation 2 L with exertion only  Social support: Discussed importance of social support with daughter Zachariah who the patient lives with now.  Zachariah is also her .  Discussed lung transplant and its nuances in detail with the patient who asked a fair amount of well thought out questions and seemed to have good understanding of the process, if she were to be deemed a candidate for lung transplantation  Pulmonary rehabilitation: She completed pulmonary rehabilitation last year.  Given progressive lung disease and oxygen requirement, recommended that she enroll in pulmonary rehabilitation again to help with perception of dyspnea and medications deconditioned-this is currently scheduled for August 2024.   Lung Transplantation:  We discussed lung transplantation briefly and the indications for it including the presenting underlying diagnosis today. We discussed briefly the evaluation process, typical outcomes, the procedure, and the posthospital stay. We also briefly  discussed the need for lifelong monitoring, lifelong anti-rejection medications and risks for rejection and infections.  We discussed with patient that lung transplantation is reserved for patients with advanced progressive and irreversible lung disease, in whom there is significant impairment of quality of life and when the underlying condition can potentially reduce lifespan.  She is approaching the window for evaluation but will start with neurology evaluation to assess the previously mentioned early onset dementia, although on my exam and assessment and she does not seem to have neurocognitive abnormalities.  Last time she was seen by neurology was in 2017 and patient and daughter denies any worsening memory issues. We also discussed the following:     Lung transplant selection evaluation and committee process  The types of donors  The lung transplant operation and the associated risks and technical complications.  The postoperative course, the ICU stay and risk of postoperative complications which can include rejection, tracheotomy, chest drains, feeding tube placement, bowel problems, sepsis, stroke, brain injury, pneumonia, pleural effusions, and renal dysfunction. Although not usual, but patients are at risk for prolonged hospitalization.   The current 1 year and 5 year graft and patient survivals-median survival of 50%-60%  at 5 years and 30-40% at 10 years. Approximately 50% patients may develop chronic rejection (CLAD) at 5 years and up to 80% may develop CLAD at 10 years. CLAD can have unpredictable progression, gradual in many cases, and can be medically managed.   The need for life long immunosuppressive therapy and the side effects of these medications, including the possibility of adverse effects, opportunistic infections, risk of cancers, and the possibility of rejection even if the patient is taking the medication exactly as prescribed, kidney problems, osteoporosis and fractures. I highlighted  that this does not mean everyone gets all the adverse effects and most of them can be medically controlled as well.   The need for compliance with medications and follow-up visits in the clinic and thereafter. We also discussed the need for  procedures including few  bronchoscopy biopsies, and reviewed the standard immunosuppression. Our center's requirement of staying  in the [place] for three months following transplantation ( after discharge) and the need to have excellent caregiver support was also discussed. All questions were answered to patient's satisfaction and expressed comprehension..    Immunization/preventive care: Up-to-date with preventive immunization-influenza, COVID x 4 shots, pneumonia vaccine, colonoscopy and mammogram.   I spent a total of  83 minutes reviewing chart (previous notes), reviewing test results, reviewing chest x rays and CT scans, talking with and examining patient, formulating plan, adjusting medications, and documentation of my findings and plan on the day of the encounter. Time excludes time spent for PFT.   Approximately 30 minutes of non-face-to-face time were spent in review of the patient's medical record on 6/1/24. This included review of previous clinic visits, hospital records, lab results, imaging studies, and procedural documentation. The findings from above are summarized in the above note.    Luis Galvin MD [facility]  Associate Professor of Medicine  Pulmonary, Allergy & Critical Care Division  [facility]  [facility] Lung Transplant [facility]        Chief Complaint:   Krystian Boland is a 73 year old year old female who is being seen for RECHECK (Evaluation. )    LAVONNE Boland is a 73 year old  year old female who presents for lung transplant evaluation.  She is accompanied by her daughter Zachariah aden, who translates for her. Krystian is tibetan speaking only. She is followed by Dr. Keller for interstitial lung disease,  that is suspected to be secondary to longstanding rheumatoid arthritis although she has minimal joint symptoms.  She was followed with Dr. Cross prior to that.  She has had longstanding interstitial lung disease secondary to rheumatoid arthritis although symptoms progressed in 2022 initially at which time she was placed on mycophenolate mofetil.  Ultimately she required hospitalization in 2023 for respiratory failure and was noted to have progressive fibrosis following rheumatoid flare.  Recently hospitalized 4/2024 again for respiratory failure-treated with abx and steroids.  She has been on oxygen since last year.  However, daughter Zachariah reports that her breathing is improved since her hospitalization.  Currently, she wears oxygen only as needed with severe exertion.  She does not have an oxygen concentrator at home.  She wears her Inogen at pulsed dose, when she is moving with the highest setting of 5.  She is on prednisone (taper) and mycophenolate now.  It seems rituximab was discussed but has been held due to low-grade hepatitis B viremia and history of intestinal tuberculosis.  Regardless, her daughter reports improvement in her symptoms and pulmonary status since hospitalization.    ROS: 12 point ROS negative except mentioned in HPI.        Past Medical History:   Diagnosis Date     Benign essential hypertension      Early onset Alzheimer's dementia without behavioral disturbance (H)      ILD (interstitial lung disease) (H)      Obesity (BMI 30-39.9)      Osteopenia      Seropositive rheumatoid arthritis (H) 01/2023     Type 2 diabetes mellitus (H)               Past Surgical History:   Procedure Laterality Date     CATARACT EXTRACTION W/ INTRAOCULAR LENS IMPLANT Left 10/25/2021     CATARACT IOL, RT/LT Right 10/11/2021     CHOLECYSTECTOMY, OPEN  1990     HEMICOLECTOMY, RT/LT Right 1990        Social History     Tobacco Use     Smoking status: Never     Smokeless tobacco: Never   Vaping Use     Vaping  status: Never Used   Substance Use Topics     Alcohol use: Never     Drug use: No                  Family History   Problem Relation Age of Onset     Dementia Mother      Cerebrovascular Disease Father      Cerebrovascular Disease Brother      Diabetes Son      Myocardial Infarction No family hx of      Coronary Artery Disease Early Onset No family hx of      Breast Cancer No family hx of      Ovarian Cancer No family hx of      Colon Cancer No family hx of      Glaucoma No family hx of      Macular Degeneration No family hx of      Liver Disease No family hx of           Any family history of ILD:        Current Outpatient Medications   Medication Sig Dispense Refill     amLODIPine (NORVASC) 5 MG tablet TAKE 1 TABLET(5 MG) BY MOUTH DAILY 90 tablet 0     aspirin 81 MG chewable tablet Take 1 tablet (81 mg) by mouth daily 90 tablet 2     benzonatate (TESSALON) 200 MG capsule Take 1 capsule (200 mg) by mouth 3 times daily as needed for cough 30 capsule 0     famotidine (PEPCID) 40 MG tablet Take 1 tablet (40 mg) by mouth every evening 90 tablet 3     guaiFENesin (MUCINEX) 600 MG 12 hr tablet Take 600 mg by mouth 2 times daily as needed for congestion or cough       guaiFENesin (ROBITUSSIN) 20 mg/mL liquid Take 10 mLs by mouth every 4 hours as needed for cough 236 mL 0     hydroxychloroquine (PLAQUENIL) 200 MG tablet Take 1 tablet (200 mg) by mouth 2 times daily Annual Plaquenil toxicity eye screening required. 180 tablet 3     hydrOXYzine (ATARAX) 10 MG tablet Take 1 tablet (10 mg) by mouth 3 times daily as needed for anxiety 30 tablet 1     hypromellose (GENTEAL SEVERE) ophthalmic gel 0.3% Place 1 drop into both eyes 4 times daily 10 g 11     ipratropium - albuterol 0.5 mg/2.5 mg/3 mL (DUONEB) 0.5-2.5 (3) MG/3ML neb solution Take 1 vial (3 mLs) by nebulization every 4 hours as needed for wheezing 90 mL 0     melatonin 3 MG tablet Take 1 tablet (3 mg) by mouth nightly as needed for sleep       mycophenolate (GENERIC  EQUIVALENT) 250 MG capsule Take 4 capsules (1,000 mg) by mouth 2 times daily       OFEV 100 MG capsule TAKE ONE CAPSULE BY MOUTH TWICE A DAY 60 capsule 11     pantoprazole (PROTONIX) 40 MG EC tablet Take 1 tablet (40 mg) by mouth daily 90 tablet 3     predniSONE (DELTASONE) 10 MG tablet Take 6 tablets (60 mg) by mouth daily for 7 days, THEN 5 tablets (50 mg) daily for 7 days, THEN 4 tablets (40 mg) daily for 7 days, THEN 3 tablets (30 mg) daily for 7 days, THEN 2.5 tablets (25 mg) daily for 7 days, THEN 2 tablets (20 mg) daily for 7 days, THEN 1.5 tablets (15 mg) daily for 7 days, THEN 1 tablet (10 mg) daily for 7 days, THEN 0.5 tablets (5 mg) daily for 7 days. 179 tablet 0     predniSONE (DELTASONE) 5 MG tablet Take 1 tablet (5 mg) by mouth daily 30 tablet 5     STATIN NOT PRESCRIBED (INTENTIONAL) Please choose reason not prescribed, below       sulfamethoxazole-trimethoprim (BACTRIM DS) 800-160 MG tablet Take 1 tablet by mouth Every Mon, Wed, Fri Morning 30 tablet 0     sulfaSALAzine ER (AZULFIDINE EN) 500 MG EC tablet take 2 tabs twice a day. 360 tablet 3     Vitamin D3 50 mcg (2000 units) tablet TAKE 1 TABLET BY MOUTH DAILY 90 tablet 0     No current facility-administered medications for this visit.                        Allergies   Allergen Reactions     Atorvastatin Muscle Pain (Myalgia)                 /79 (BP Location: Right arm, Patient Position: Sitting, Cuff Size: Adult Regular)   Pulse 92   Temp 98  F (36.7  C) (Oral)   Wt 70.3 kg (155 lb)   LMP  (LMP Unknown)   SpO2 97%   BMI 29.29 kg/m       Body mass index is 29.29 kg/m .        Physical Examination    General: Well developed, well nourished, No apparent distress  Eyes: Anicteric  Ears: Hearing grossly normal  Mouth: Oral mucosa is moist, without any lesions.   Respiratory: Good air movement. No crackles. No rhonchi. No wheezes  Cardiac: RRR, normal S1, S2. No murmurs. No JVD  Abdomen: Soft, NT/ND  Musculoskeletal: Extremities normal. No  clubbing. No cyanosis. No edema.  Skin: No rash on limited exam  Neuro: Normal mentation. Normal speech.  Psych:Normal affect         RESULTS:  PFTs:     6MWT:   5/9/24: 950 feet, 2 L  Echocardiogram:    The visual ejection fraction is 55-60%.  No regional wall motion abnormalities noted.  No significant valvular heart disease.  Note that image resolution on transthoracic echocardiogram not sufficient to  exclude endocarditis.  ______________________________________________________________________________  Left Ventricle  The left ventricle is normal in size. There is normal left ventricular wall  thickness. Diastolic Doppler findings (E/E' ratio and/or other parameters)  suggest left ventricular filling pressures are indeterminate. The visual  ejection fraction is 55-60%. No regional wall motion abnormalities noted.     Right Ventricle  The right ventricle is normal in size and function.     Atria  Normal left atrial size. Right atrial size is normal. There is no color  Doppler evidence of an atrial shunt.     Mitral Valve  The mitral valve leaflets are mildly thickened. There is trace mitral  regurgitation.     Tricuspid Valve  There is trace tricuspid regurgitation. IVC diameter and respiratory changes  fall into an intermediate range suggesting an RA pressure of 8 mmHg.     Aortic Valve  There is trivial trileaflet aortic sclerosis. No aortic regurgitation is  present.     Pulmonic Valve  There is trace pulmonic valvular regurgitation.     Vessels  Normal size aorta. Aortic root dilatation is present.     Pericardium  There is no pericardial effusion.       Chest imaging:    CT CHEST PULMONARY EMBOLISM WITH CONTRAST 4/4/2024 11:20 AM     CLINICAL HISTORY: Chest pain. Elevated D-dimer, worsening shortness of  breath, interstitial lung disease, history of latent TB.     TECHNIQUE: CT angiogram chest during arterial phase injection IV  contrast. 2D and 3D MIP reconstructions were performed by the CT  technologist.  Dose reduction techniques were used.      CONTRAST: 58 mL [id number]     COMPARISON: January 25, 2023     FINDINGS:  ANGIOGRAM CHEST: Opacified pulmonary arteries are normal caliber and  negative for pulmonary emboli. There is a gradual fading of contrast  in the medial right lower lobe compatible with decreased perfusion  and/or admixture artifact, pulmonary emboli here would be difficult to  entirely exclude. Thoracic aorta is negative for dissection. No CT  evidence of right heart strain.     LUNGS AND PLEURA: Moderate to severe peripheral and basilar fibrosis  with honeycombing and traction bronchiectasis similar to previous.  Question right lower lobe infiltrate and/or active inflammatory  change. No effusion.     MEDIASTINUM/AXILLAE: Mildly prominent lymph nodes noted which are  nonspecific similar to previous. No aneurysm.     CORONARY ARTERY CALCIFICATIONS: Mild.     UPPER ABDOMEN: No acute findings.     MUSCULOSKELETAL: No frankly destructive bony lesions.                                                                      IMPRESSION:  1.  No pulmonary embolism demonstrated.  2.  Question new right lower lobe pneumonia and/or active inflammatory  change superimposed on moderate to severe fibrosis.    I personally reviewed and interpreted the chest radiographs.    The longitudinal plan of care for post lung transplant and complications of post-transplant was addressed during this visit. Due to the added complexity in care, I will continue to support this patient in the subsequent management of this condition(s) and with the ongoing continuity of care of this condition          Again, thank you for allowing me to participate in the care of your patient.        Sincerely,        Luis Galvin MD

## 2024-06-05 NOTE — NURSING NOTE
LUNG TRANSPLANT NEW PATIENT VISIT   Transplant Nurse Coordinator Note  Krystian Boland  1950    155 lbs 0 oz  Body mass index is 29.29 kg/m .    Patient accompanied by: Patient came to NPT appointment with Zachariah de luna, who provided translation per patient preference.     Current activity level: 10-15 min walk in driveway daily.      ADLs: independent     Pulmonary Rehab: completed in 2023; will restart a new course in August 2024  Karnofsky Score: 60%    PFT:    Latest Reference Range & Units 05/09/24 12:27   FVC-Pre L 1.63   FVC-%Pred-Pre % 68   FEV1-Pre L 1.44   FEV1-%Pred-Pre % 76   DLCOunc-Pre ml/min/mmHg 6.95   DLCOunc-%Pred-Pre % 38     6MW:    Latest Reference Range & Units 05/09/24 00:00   6 min walk (FT) 1,136 ft 950     Labs: in results tab  CT Scan: CTPE 4/4/24:  IMPRESSION:  1.  No pulmonary embolism demonstrated.  2.  Question new right lower lobe pneumonia and/or active inflammatory change superimposed on moderate to severe fibrosis.   Echo: 4/7/24  Interpretation Summary:  The visual ejection fraction is 55-60%.  No regional wall motion abnormalities noted.  No significant valvular heart disease.  Note that image resolution on transthoracic echocardiogram not sufficient to exclude endocarditis.     Current oxygen use: does not wear consistently for fear of getting addicted to oxygen; family encourages her to wear this  Rest 0  Activity 2  NOC 2       Assisted ventilation: none    Diabetic status: T2DM, diet-controlled  Prednisone: currently on 5mg daily  GERD: some symptoms with high prednisone usage - now using protonix and pepcid  Smoking: never  Drug use: never  ETOH: never  Mental Health concerns: daughter notes some anxiety, well managed with hydroxizine.      Primary Care:   Established with a PCP provider: Adrianna Singh MD  Mammogram: 6/3/24, Negative  PAP: last performed 10/10/16, NIL  Colonoscopy: 11/29/16 - internal hemorrhoids, no polyps noted, repeat 10 years.  Dental: upper  and lower dentures, last visit 4 years ago   Vaccinations:  Pneumococcal: 10/14/16, 7/1/13  Prevnar 13: 4/2/18  Hep A/B: not found  Shingrix: live zoster 7/4/16  Tdap: due - 7/4/12  COVID: Pfizer monovalent 4/21/21, 5/12/21, 11/16/21. Pfizer bivalent 12/20/22. Has not received 23-24 booster.  Flu: 9/25/23  RSV: not found    Patient status/transplant tab updated.     Misc/Potential Concerns: previous diagnosis of early-onset alzheimer's, hx intenstinal TB, hx hepatitis B (follows with Dr. Bray in GI), deconditioning, needs to update primary care.     MD Recommendations: should consult neurology before full evaluation to assess- for cognitive issues. Slightly too well at this time.  Plan: Neurology referral

## 2024-06-10 ENCOUNTER — HOSPITAL ENCOUNTER (OUTPATIENT)
Dept: CT IMAGING | Facility: CLINIC | Age: 74
Discharge: HOME | End: 2024-06-10
Attending: INTERNAL MEDICINE | Admitting: INTERNAL MEDICINE
Payer: COMMERCIAL

## 2024-06-10 DIAGNOSIS — N28.9 KIDNEY LESION, NATIVE, LEFT: ICD-10-CM

## 2024-06-10 DIAGNOSIS — B18.1 CHRONIC HEPATITIS B (H): ICD-10-CM

## 2024-06-10 LAB
CREAT BLD-MCNC: 0.9 MG/DL (ref 0.5–1)
EGFRCR SERPLBLD CKD-EPI 2021: >60 ML/MIN/1.73M2

## 2024-06-10 PROCEDURE — 250N000011 HC RX IP 250 OP 636: Performed by: INTERNAL MEDICINE

## 2024-06-10 PROCEDURE — 82565 ASSAY OF CREATININE: CPT

## 2024-06-10 PROCEDURE — 250N000009 HC RX 250: Performed by: INTERNAL MEDICINE

## 2024-06-10 PROCEDURE — 74177 CT ABD & PELVIS W/CONTRAST: CPT

## 2024-06-10 RX ORDER — IOPAMIDOL 755 MG/ML
76 INJECTION, SOLUTION INTRAVASCULAR ONCE
Status: COMPLETED | OUTPATIENT
Start: 2024-06-10 | End: 2024-06-10

## 2024-06-10 RX ADMIN — SODIUM CHLORIDE 62 ML: 9 INJECTION, SOLUTION INTRAVENOUS at 08:05

## 2024-06-10 RX ADMIN — IOPAMIDOL 76 ML: 755 INJECTION, SOLUTION INTRAVENOUS at 08:05

## 2024-06-27 ENCOUNTER — MYC MEDICAL ADVICE (OUTPATIENT)
Dept: PULMONOLOGY | Facility: CLINIC | Age: 74
End: 2024-06-27
Payer: COMMERCIAL

## 2024-07-05 DIAGNOSIS — I10 BENIGN ESSENTIAL HYPERTENSION: ICD-10-CM

## 2024-07-05 RX ORDER — AMLODIPINE BESYLATE 5 MG/1
5 TABLET ORAL DAILY
Qty: 90 TABLET | Refills: 2 | Status: SHIPPED | OUTPATIENT
Start: 2024-07-05

## 2024-07-10 DIAGNOSIS — J84.9 ILD (INTERSTITIAL LUNG DISEASE) (H): ICD-10-CM

## 2024-07-11 ENCOUNTER — PATIENT OUTREACH (OUTPATIENT)
Dept: GERIATRIC MEDICINE | Facility: CLINIC | Age: 74
End: 2024-07-11
Payer: COMMERCIAL

## 2024-07-11 RX ORDER — MYCOPHENOLATE MOFETIL 250 MG/1
1000 CAPSULE ORAL
Qty: 720 CAPSULE | Refills: 0 | Status: SHIPPED | OUTPATIENT
Start: 2024-07-11 | End: 2024-09-25

## 2024-07-11 NOTE — PROGRESS NOTES
[facility] Care Coordination Contact    Received task from care coordinator.  Completed following tasks: Submitted referrals/auths for [facility] back ground checks and Updated services in Database    Sophie Amador  Care Management Specialist  [facility]  [phone]

## 2024-07-13 ENCOUNTER — HEALTH MAINTENANCE LETTER (OUTPATIENT)
Age: 74
End: 2024-07-13

## 2024-07-16 ENCOUNTER — HOSPITAL ENCOUNTER (OUTPATIENT)
Dept: CARDIAC REHAB | Facility: CLINIC | Age: 74
Discharge: HOME | End: 2024-07-16
Attending: INTERNAL MEDICINE
Payer: COMMERCIAL

## 2024-07-16 DIAGNOSIS — M05.10 RHEUMATOID LUNG (H): ICD-10-CM

## 2024-07-16 DIAGNOSIS — J84.9 ILD (INTERSTITIAL LUNG DISEASE) (H): ICD-10-CM

## 2024-07-16 PROCEDURE — G0238 OTH RESP PROC, INDIV: HCPCS

## 2024-08-08 ENCOUNTER — HOSPITAL ENCOUNTER (OUTPATIENT)
Dept: CARDIAC REHAB | Facility: CLINIC | Age: 74
Discharge: HOME | End: 2024-08-08
Attending: INTERNAL MEDICINE
Payer: COMMERCIAL

## 2024-08-08 PROCEDURE — G0238 OTH RESP PROC, INDIV: HCPCS | Performed by: CLINICAL EXERCISE PHYSIOLOGIST

## 2024-08-12 SDOH — HEALTH STABILITY: PHYSICAL HEALTH: ON AVERAGE, HOW MANY DAYS PER WEEK DO YOU ENGAGE IN MODERATE TO STRENUOUS EXERCISE (LIKE A BRISK WALK)?: 3 DAYS

## 2024-08-12 SDOH — HEALTH STABILITY: PHYSICAL HEALTH: ON AVERAGE, HOW MANY MINUTES DO YOU ENGAGE IN EXERCISE AT THIS LEVEL?: 20 MIN

## 2024-08-12 ASSESSMENT — SOCIAL DETERMINANTS OF HEALTH (SDOH): HOW OFTEN DO YOU GET TOGETHER WITH FRIENDS OR RELATIVES?: ONCE A WEEK

## 2024-08-13 ENCOUNTER — HOSPITAL ENCOUNTER (OUTPATIENT)
Dept: CARDIAC REHAB | Facility: CLINIC | Age: 74
Discharge: HOME | End: 2024-08-13
Attending: INTERNAL MEDICINE
Payer: COMMERCIAL

## 2024-08-13 PROCEDURE — G0239 OTH RESP PROC, GROUP: HCPCS

## 2024-08-14 ENCOUNTER — OFFICE VISIT (OUTPATIENT)
Dept: INTERNAL MEDICINE | Facility: CLINIC | Age: 74
End: 2024-08-14
Payer: COMMERCIAL

## 2024-08-14 VITALS
BODY MASS INDEX: 30.48 KG/M2 | TEMPERATURE: 97.4 F | SYSTOLIC BLOOD PRESSURE: 134 MMHG | HEART RATE: 86 BPM | WEIGHT: 161.3 LBS | OXYGEN SATURATION: 95 % | DIASTOLIC BLOOD PRESSURE: 68 MMHG

## 2024-08-14 DIAGNOSIS — M85.80 OSTEOPENIA, UNSPECIFIED LOCATION: ICD-10-CM

## 2024-08-14 DIAGNOSIS — J84.9 ILD (INTERSTITIAL LUNG DISEASE) (H): ICD-10-CM

## 2024-08-14 DIAGNOSIS — E11.9 TYPE 2 DIABETES MELLITUS WITHOUT COMPLICATION, WITHOUT LONG-TERM CURRENT USE OF INSULIN (H): ICD-10-CM

## 2024-08-14 DIAGNOSIS — Z00.00 ROUTINE HISTORY AND PHYSICAL EXAMINATION OF ADULT: Primary | ICD-10-CM

## 2024-08-14 DIAGNOSIS — I10 BENIGN ESSENTIAL HYPERTENSION: ICD-10-CM

## 2024-08-14 DIAGNOSIS — M05.9 SEROPOSITIVE RHEUMATOID ARTHRITIS (H): ICD-10-CM

## 2024-08-14 LAB — HBA1C MFR BLD: 7 % (ref 0–5.6)

## 2024-08-14 PROCEDURE — G0439 PPPS, SUBSEQ VISIT: HCPCS | Performed by: INTERNAL MEDICINE

## 2024-08-14 PROCEDURE — 36415 COLL VENOUS BLD VENIPUNCTURE: CPT | Performed by: INTERNAL MEDICINE

## 2024-08-14 PROCEDURE — 99214 OFFICE O/P EST MOD 30 MIN: CPT | Mod: 25 | Performed by: INTERNAL MEDICINE

## 2024-08-14 PROCEDURE — 83036 HEMOGLOBIN GLYCOSYLATED A1C: CPT | Performed by: INTERNAL MEDICINE

## 2024-08-14 PROCEDURE — 99207 PR FOOT EXAM NO CHARGE: CPT | Mod: 25 | Performed by: INTERNAL MEDICINE

## 2024-08-14 NOTE — PROGRESS NOTES
ASSESSMENT/PLAN                                                       (Z00.00) Routine history and physical examination of adult  (primary encounter diagnosis)  Comment: PMH, PSH, FH, SH, medications, allergies, immunizations, and preventative health measures reviewed and updated as appropriate.  Plan: see below for plans.      (I10) Benign essential hypertension  Comment: well-controlled on amlodipine.    (M05.9) Seropositive rheumatoid arthritis (H)  Comment: stable on sulfasalazine and Plaquenil; followed by rheumatology.    (J84.9) ILD (interstitial lung disease) (H)  Comment: stable on Ofev and CellCept; followed by pulmonology.    (M85.80) Osteopenia, unspecified location  Comment: repeat DEXA DUE - will discuss at next visit.    (E11.9) Type 2 diabetes mellitus without complication, without long-term current use of insulin (H)  Plan: non-fasting diabetic labs today; recommendations to follow.    Adrianna Singh MD   [address] [address]  T: [phone], F: [phone]    VINCENZO Boland is a very pleasant 74 year old female who presents for a physical.    Accompanied by .    Accompanied by daughter, who translates.    ROS:  Constitutional: no unintentional weight loss or gain reported; no fevers, chills, or sweats reported  Cardiovascular: no chest pain, palpitations, or edema reported  Respiratory: see PMH below  Gastrointestinal: no nausea, vomiting, constipation, diarrhea, or abdominal pain reported  Genitourinary: no urinary frequency, urgency, dysuria, or hematuria reported  Integumentary: no rash or pruritus reported  Musculoskeletal: no back pain, muscle pain, joint pain, or joint swelling reported  Neurologic: see PMH below  Hematologic: no easy bruising or bleeding reported  Endocrine: see PMH below  Psychiatric: no anxiety or depression reported    Past Medical History:    Diagnosis Date    Benign essential hypertension     Early onset Alzheimer's dementia without behavioral disturbance (H)     ILD (interstitial lung disease) (H)     Obesity (BMI 30-39.9)     Osteopenia     Seropositive rheumatoid arthritis (H) 01/2023    Type 2 diabetes mellitus (H)      Past Surgical History:   Procedure Laterality Date    CATARACT EXTRACTION W/ INTRAOCULAR LENS IMPLANT Left 10/25/2021    CATARACT IOL, RT/LT Right 10/11/2021    CHOLECYSTECTOMY, OPEN  1990    HEMICOLECTOMY, RT/LT Right 1990     Family History   Problem Relation Age of Onset    Dementia Mother     Cerebrovascular Disease Father     Cerebrovascular Disease Brother     Myocardial Infarction No family hx of     Coronary Artery Disease Early Onset No family hx of     Breast Cancer No family hx of     Ovarian Cancer No family hx of     Colon Cancer No family hx of     Glaucoma No family hx of     Macular Degeneration No family hx of     Liver Disease No family hx of      Social History     Occupational History    Occupation: Retired - was  in restaurant   Tobacco Use    Smoking status: Never    Smokeless tobacco: Never   Vaping Use    Vaping status: Never Used   Substance and Sexual Activity    Alcohol use: Never    Drug use: No    Sexual activity: Not Currently   Social History Narrative    .     8 children.     15 grandchildren (as of 2023).    Originally from [facility]. [place]oved [place] (all kids born in [facility]). Moved to  in 2012.     Living with daughter and her family. Taking care of  (~25 years her senior).     Goes for walks occasionally.      Allergies   Allergen Reactions    Atorvastatin Muscle Pain (Myalgia)     Current Outpatient Medications   Medication Sig    amLODIPine (NORVASC) 5 MG tablet TAKE 1 TABLET BY MOUTH DAILY    aspirin 81 MG chewable tablet Take 1 tablet (81 mg) by mouth daily    benzonatate (TESSALON) 200 MG capsule Take 1 capsule (200 mg) by mouth 3 times daily as needed for cough     famotidine (PEPCID) 40 MG tablet Take 1 tablet (40 mg) by mouth every evening    guaiFENesin (MUCINEX) 600 MG 12 hr tablet Take 600 mg by mouth 2 times daily as needed for congestion or cough    guaiFENesin (ROBITUSSIN) 20 mg/mL liquid Take 10 mLs by mouth every 4 hours as needed for cough    hydroxychloroquine (PLAQUENIL) 200 MG tablet Take 1 tablet (200 mg) by mouth 2 times daily Annual Plaquenil toxicity eye screening required.    hydrOXYzine (ATARAX) 10 MG tablet Take 1 tablet (10 mg) by mouth 3 times daily as needed for anxiety    hypromellose (GENTEAL SEVERE) ophthalmic gel 0.3% Place 1 drop into both eyes 4 times daily    ipratropium - albuterol 0.5 mg/2.5 mg/3 mL (DUONEB) 0.5-2.5 (3) MG/3ML neb solution Take 1 vial (3 mLs) by nebulization every 4 hours as needed for wheezing    melatonin 3 MG tablet Take 1 tablet (3 mg) by mouth nightly as needed for sleep    mycophenolate (GENERIC EQUIVALENT) 250 MG capsule Take 4 capsules (1,000 mg) by mouth 2 times daily for 90 days    OFEV 100 MG capsule TAKE ONE CAPSULE BY MOUTH TWICE A DAY    pantoprazole (PROTONIX) 40 MG EC tablet Take 1 tablet (40 mg) by mouth daily    predniSONE (DELTASONE) 5 MG tablet Take 1 tablet (5 mg) by mouth daily    STATIN NOT PRESCRIBED (INTENTIONAL) Please choose reason not prescribed, below    sulfamethoxazole-trimethoprim (BACTRIM DS) 800-160 MG tablet Take 1 tablet by mouth Every Mon, Wed, Fri Morning    sulfaSALAzine ER (AZULFIDINE EN) 500 MG EC tablet take 2 tabs twice a day.    Vitamin D3 50 mcg (2000 units) tablet TAKE 1 TABLET BY MOUTH DAILY     Immunization History   Administered Date(s) Administered    COVID-19 Bivalent 12+ (Pfizer) 12/20/2022    COVID-19 MONOVALENT 12+ (Pfizer) 04/21/2021, 05/12/2021, 11/16/2021    Influenza (High Dose) 3 valent vaccine 11/07/2018, 02/12/2020, 10/17/2022, 09/25/2023    Influenza Vaccine 65+ (Fluzone HD) 12/16/2020, 10/04/2021, 10/17/2022, 09/25/2023    Influenza Vaccine >6 months,quad, PF  11/23/2013, 10/01/2016    Influenza Vaccine IM Ages 6-35 Months 4 Valent (PF) 10/24/2015    Pneumo Conj 13-V (2010&after) 04/02/2018    Pneumococcal 23 valent 07/01/2013, 10/14/2016    TDAP Vaccine (Adacel) 07/04/2012    Zoster vaccine, live 07/04/2016     PREVENTATIVE HEALTH                                                      BMI: obese  Blood pressure: well-controlled on current regimen   Breast CA screening: up to date   Cervical CA screening: screening no longer indicated  Colon CA screening: n/a   Lung CA screening: n/a   Dexa: DUE - did not discuss  Screening cholesterol: n/a - already being treated for this condition  Screening diabetes: n/a - already being treated for this condition  STD testing: no risk factors present  Alcohol misuse screening: alcohol use reviewed - no intervention indicated at this time  Immunizations: reviewed; up to date     OBJECTIVE                                                      /68   Pulse 86   Temp 97.4  F (36.3  C) (Temporal)   Wt 73.2 kg (161 lb 4.8 oz)   LMP  (LMP Unknown)   SpO2 95%   BMI 30.48 kg/m    Constitutional: well-appearing  Head, Ears, and Eyes: normocephalic; normal external auditory canal and pinna; tympanic membranes visualized and normal; normal lids and conjunctivae  Neck: supple, symmetric, no thyromegaly or lymphadenopathy  Respiratory: normal respiratory effort; clear to auscultation bilaterally  Cardiovascular: regular rate and rhythm; no edema  Gastrointestinal: soft, non-tender, and non-distended; no organomegaly or masses  Musculoskeletal: normal gait and station  Foot exam: feet intact - no lesions or ulcers; sensation intact to monofilament  Psych: normal judgment and insight; normal mood and affect; recent and remote memory intact    ---  (Note was completed, in part, with [employer] voice-recognition software. Documentation was reviewed, but some grammatical, spelling, and word errors may remain.)

## 2024-08-15 ENCOUNTER — HOSPITAL ENCOUNTER (OUTPATIENT)
Dept: CARDIAC REHAB | Facility: CLINIC | Age: 74
Discharge: HOME | End: 2024-08-15
Attending: INTERNAL MEDICINE
Payer: COMMERCIAL

## 2024-08-15 PROCEDURE — G0239 OTH RESP PROC, GROUP: HCPCS | Performed by: REHABILITATION PRACTITIONER

## 2024-08-20 ENCOUNTER — HOSPITAL ENCOUNTER (OUTPATIENT)
Dept: CARDIAC REHAB | Facility: CLINIC | Age: 74
Discharge: HOME | End: 2024-08-20
Attending: INTERNAL MEDICINE
Payer: COMMERCIAL

## 2024-08-20 PROCEDURE — G0239 OTH RESP PROC, GROUP: HCPCS

## 2024-08-21 ENCOUNTER — TELEPHONE (OUTPATIENT)
Dept: PULMONOLOGY | Facility: CLINIC | Age: 74
End: 2024-08-21
Payer: COMMERCIAL

## 2024-08-21 NOTE — TELEPHONE ENCOUNTER
PA Initiation    Medication: OFEV 100 MG PO CAPS  Insurance Company: [facility] - Phone [phone] Fax [phone]  Pharmacy Filling the Rx: [facility] - [place], [place] - [facility] [address]  Filling Pharmacy Phone: [phone]  Filling Pharmacy Fax:    Start Date: 8/21/2024    Key: [id number]

## 2024-08-22 ENCOUNTER — HOSPITAL ENCOUNTER (OUTPATIENT)
Dept: CARDIAC REHAB | Facility: CLINIC | Age: 74
Discharge: HOME | End: 2024-08-22
Attending: INTERNAL MEDICINE
Payer: COMMERCIAL

## 2024-08-22 PROCEDURE — G0239 OTH RESP PROC, GROUP: HCPCS

## 2024-08-22 NOTE — TELEPHONE ENCOUNTER
Prior Authorization Approval    Medication: OFEV 100 MG PO CAPS  Authorization Effective Date: 8/21/2024  Authorization Expiration Date: 8/21/2025  Approved Dose/Quantity: #60 per 30 days  Reference #: Key: [id number]   Insurance Company: [employer] - Phone [phone] Fax [phone]  Expected CoPay: $ 0  CoPay Card Available: No    Financial Assistance Needed: No  [facility] - [place], [place] - [address]  Pharmacy Notified: No, renewal  Patient Notified: No, renewal

## 2024-08-27 NOTE — LETTER
11/24/2021     RE: Krystian Boland  [address]  [place] [address]    Dear Colleague,    Thank you for referring your patient, Krystian Boland, to the [facility] L[facility] AND [facility]. Please see a copy of my visit note below.    HISTORY OF PRESENT ILLNESS:  Krystian is a 71-year-old female with interstitial lung disease secondary to rheumatoid arthritis.  She has been receiving Ofev, which was started in 09/2020 for a suggestion that her ILD was progressing.  Her last visit with me was in 06/2021.  At that time, Krystian had stopped the Ofev, she was on 150 mg b.i.d., because of nausea and weight loss, and in June of this year, we decided to try to take her off Ofev and then restart at the 100 mg dose of the Ofev, starting at once a day and then escalating up to b.i.d. with the 100 mg of Ofev.  She has been tolerating that okay without significant nausea or diarrhea.  She remains on sulfasalazine and hydroxychloroquine for her rheumatoid arthritis.  She recently saw Dr. Staley in Rheumatology who felt that her arthritis was under control on those medications.  In terms of her breathing, she has a dry cough and shortness of breath, which seems to be stable at this time.    Since her last visit with me, unfortunately, in August, she developed breakthrough COVID and her symptoms were predominantly with an increased cough.  This was treated with a steroid burst and taper, which improved the cough.  Since that time, she has gotten the COVID booster shot and also the flu shot.    REVIEW OF SYSTEMS:    CARDIAC:  She denies exertional chest pain, leg swelling, or palpitations or syncope.  GASTROINTESTINAL:  She denies significant heartburn or reflux.  MUSCULOSKELETAL:  She has not noted joint swelling, and her joints in terms of pain, swelling and stiffness have been under good control.    PHYSICAL EXAMINATION:    VITAL SIGNS:  Her blood pressure was a little bit  Additional Notes: Unable to reach pt via telehealth appt Detail Level: Detailed high at 144/87, pulse 97, respiratory rate 17, oxygen sats are 96% on room air.  HEENT:  Eyes are anicteric.  Mouth and throat without lesions.  NECK:  No thyromegaly.  LYMPH:  No adenopathy appreciated.  CHEST:  Crackles at least penitentiary up bilaterally, maybe a little bit higher, which is similar to what it has previously been.  HEART:  Shows regular rate and rhythm.  Normal S1, S2.  ABDOMEN:  Nontender.  No hepatosplenomegaly.  EXTREMITIES:  Without clubbing, cyanosis or edema.  MUSCULOSKELETAL JOINTS:  I do not appreciate redness or swelling of the joints.  SKIN:  No rash.    PFTs:  FEV1 1.64; 84% of predicted.  FVC 1.82 or 73% predicted.  Diffusion capacity 80% of predicted.  PFTs are stable compared to what they were back in June.  The patient also had a chest CT scan in 10/2021 which showed stable fibrotic changes.    LABORATORY: Liver function tests are normal.  CBC:  White blood count, hemoglobin and platelets are normal.  Basic metabolic panel:  Electrolytes are normal as well as BUN and creatinine.    ASSESSMENT AND PLAN:  Krystian is a 71-year-old female with interstitial lung disease secondary to RA.  Her RA is being treated with sulfasalazine and hydroxychloroquine and it has been stable.  In terms of her breathing, her breathing symptoms are stable.  She is on 100 mg Ofev twice a day.  Her PFTs remain stable.  Her chest CT scan is stable.  A CRP of inflammation today was less than 2.9.  Right now, I plan to continue the Ofev at 100 mg twice a day.  If there would be evidence of progression of her ILD, we could try going back up to 150 mg b.i.d., but right now, I do not see a reason for that.  Also at this time, her CRP is suppressed, there are not significant ground-glass opacities and her PFTs are stable, so addition of another medication like MMF I do not think is indicated at this time.  I plan to see the patient back in 6 months with repeat labs and PFTs and 6-minute walk.  Of note, on prior  6-minute walk, she does desat with exercise.  However, she is not on O2 and has declined it in the past.    Again, thank you for allowing me to participate in the care of your patient.      Sincerely,    Tristan Cross MD     Render Risk Assessment In Note?: no

## 2024-09-03 ENCOUNTER — DOCUMENTATION ONLY (OUTPATIENT)
Dept: GASTROENTEROLOGY | Facility: CLINIC | Age: 74
End: 2024-09-03

## 2024-09-03 ENCOUNTER — HOSPITAL ENCOUNTER (OUTPATIENT)
Dept: CARDIAC REHAB | Facility: CLINIC | Age: 74
Discharge: HOME | End: 2024-09-03
Attending: INTERNAL MEDICINE
Payer: COMMERCIAL

## 2024-09-03 PROCEDURE — G0239 OTH RESP PROC, GROUP: HCPCS

## 2024-09-03 NOTE — PROGRESS NOTES
Good day-  The laboratory policy limits us from releasing orders that are more than 2 weeks out from the patient's lab appointment on. Could you please update expected date for these tests so that we can release them for your patient?      Krystian Boland    : 1950    MRN: [id number]    Test:     HEP B VIRUS DNA QUANT REAL TIME PCR  (approximate expected date 10/01/24)    Thank you,   TAMRA Ackerman

## 2024-09-04 ENCOUNTER — TELEPHONE (OUTPATIENT)
Dept: TRANSPLANT | Facility: CLINIC | Age: 74
End: 2024-09-04

## 2024-09-04 NOTE — TELEPHONE ENCOUNTER
Patient Call: General  Route to LPN    Reason for call: Zachariah patient's daughter, stated they was given a referral to see Neurology, and called to make appointment, and was told they are not accepting new patient's right now. And looking to see if referral can be sent somewhere else.    Call back needed? Yes    Return Call Needed  Same as documented in contacts section  When to return call?: Same day: Route High Priority

## 2024-09-05 ENCOUNTER — HOSPITAL ENCOUNTER (OUTPATIENT)
Dept: CARDIAC REHAB | Facility: CLINIC | Age: 74
Discharge: HOME | End: 2024-09-05
Attending: INTERNAL MEDICINE
Payer: COMMERCIAL

## 2024-09-05 PROCEDURE — G0239 OTH RESP PROC, GROUP: HCPCS

## 2024-09-09 ENCOUNTER — LAB (OUTPATIENT)
Dept: LAB | Facility: CLINIC | Age: 74
End: 2024-09-09
Payer: COMMERCIAL

## 2024-09-09 DIAGNOSIS — J84.9 ILD (INTERSTITIAL LUNG DISEASE) (H): ICD-10-CM

## 2024-09-09 DIAGNOSIS — Z79.899 HIGH RISK MEDICATION USE: ICD-10-CM

## 2024-09-09 LAB
ALBUMIN SERPL BCG-MCNC: 4.1 G/DL (ref 3.5–5.2)
ALP SERPL-CCNC: 84 U/L (ref 40–150)
ALT SERPL W P-5'-P-CCNC: 22 U/L (ref 0–50)
AST SERPL W P-5'-P-CCNC: 26 U/L (ref 0–45)
BILIRUB DIRECT SERPL-MCNC: <0.2 MG/DL (ref 0–0.3)
BILIRUB SERPL-MCNC: 0.5 MG/DL
CREAT SERPL-MCNC: 0.74 MG/DL (ref 0.51–0.95)
CRP SERPL-MCNC: 3.35 MG/L
EGFRCR SERPLBLD CKD-EPI 2021: 85 ML/MIN/1.73M2
ERYTHROCYTE [DISTWIDTH] IN BLOOD BY AUTOMATED COUNT: 13.1 % (ref 10–15)
HCT VFR BLD AUTO: 39.9 % (ref 35–47)
HGB BLD-MCNC: 12.8 G/DL (ref 11.7–15.7)
MCH RBC QN AUTO: 29.2 PG (ref 26.5–33)
MCHC RBC AUTO-ENTMCNC: 32.1 G/DL (ref 31.5–36.5)
MCV RBC AUTO: 91 FL (ref 78–100)
PLATELET # BLD AUTO: 273 10E3/UL (ref 150–450)
PROT SERPL-MCNC: 7.3 G/DL (ref 6.4–8.3)
RBC # BLD AUTO: 4.38 10E6/UL (ref 3.8–5.2)
WBC # BLD AUTO: 7.4 10E3/UL (ref 4–11)

## 2024-09-09 PROCEDURE — 85027 COMPLETE CBC AUTOMATED: CPT

## 2024-09-09 PROCEDURE — 86140 C-REACTIVE PROTEIN: CPT

## 2024-09-09 PROCEDURE — 36415 COLL VENOUS BLD VENIPUNCTURE: CPT

## 2024-09-09 PROCEDURE — 80076 HEPATIC FUNCTION PANEL: CPT

## 2024-09-09 PROCEDURE — 82565 ASSAY OF CREATININE: CPT

## 2024-09-10 ENCOUNTER — HOSPITAL ENCOUNTER (OUTPATIENT)
Dept: CARDIAC REHAB | Facility: CLINIC | Age: 74
Discharge: HOME | End: 2024-09-10
Attending: INTERNAL MEDICINE
Payer: COMMERCIAL

## 2024-09-10 PROCEDURE — G0239 OTH RESP PROC, GROUP: HCPCS

## 2024-09-12 ENCOUNTER — HOSPITAL ENCOUNTER (OUTPATIENT)
Dept: CARDIAC REHAB | Facility: CLINIC | Age: 74
Discharge: HOME | End: 2024-09-12
Attending: INTERNAL MEDICINE
Payer: COMMERCIAL

## 2024-09-12 PROCEDURE — G0239 OTH RESP PROC, GROUP: HCPCS

## 2024-09-13 ENCOUNTER — OFFICE VISIT (OUTPATIENT)
Dept: NEUROLOGY | Facility: CLINIC | Age: 74
End: 2024-09-13
Attending: INTERNAL MEDICINE
Payer: COMMERCIAL

## 2024-09-13 VITALS
OXYGEN SATURATION: 98 % | SYSTOLIC BLOOD PRESSURE: 139 MMHG | DIASTOLIC BLOOD PRESSURE: 77 MMHG | HEART RATE: 91 BPM | TEMPERATURE: 97.7 F

## 2024-09-13 DIAGNOSIS — G30.0 EARLY ONSET ALZHEIMER'S DEMENTIA WITHOUT BEHAVIORAL DISTURBANCE (H): ICD-10-CM

## 2024-09-13 DIAGNOSIS — F02.80 EARLY ONSET ALZHEIMER'S DEMENTIA WITHOUT BEHAVIORAL DISTURBANCE (H): ICD-10-CM

## 2024-09-13 DIAGNOSIS — J84.9 ILD (INTERSTITIAL LUNG DISEASE) (H): ICD-10-CM

## 2024-09-13 DIAGNOSIS — R41.3 MEMORY PROBLEM: Primary | ICD-10-CM

## 2024-09-13 DIAGNOSIS — Z76.82 ORGAN TRANSPLANT CANDIDATE: ICD-10-CM

## 2024-09-13 NOTE — PROGRESS NOTES
Chief Complaint: memory problems    History of Present Illness:  Ms. Boland is a 74 year old right-handed female with history of Type II DM, seropositive RA, hypertension, interstitial lung disease, presenting for evaluation of memory problems. She is accompanied to today's visit by son-in-law, Kenyatta who assists in providing the history and helped to translate in Tibetan.    Ms. Boland reports that she has no problem with thinking.  Sometimes she has memory problems.  She does not misplace objects.      Her  informant was interviewed and reports that she does not repeat questions.  She sometimes forgets new information, sometimes better.  She cannot remember names when meeting someone new.      She saw Dr. Ordaz when she was new to this area.  She felt out of place.  She did not know the place, language, culture and felt depressed.  Her memory since 2017 has gotten better.  She was having panic attacks back then.  When more of her children moved here, she felt better.      Previously evaluated by Dr. Ordaz in 2017.  She was diagnosed with early onset Alzheimer's disease.  Reversible causes of cognitive impairment workup was negative.  She did not have neuropsychological testing.  She had  CPT test.        iADLs:  Finances: She does not have bills that she has to pay.  Driving:  She never drove.    Medications: Sometimes she takes medications twice.  She remembers to take her medications.  The refills are done by her daughter for many years now.    Medical appointments:  Her children manage the medical appointments.    Meal preparation: She can cook fine.  Sometimes, she forgets a pot on the stove.  She does not cook as much now, because she gets winded.    Shopping: Her daughters go shopping.  She used to go shopping with her daughter and could pick out the items from the shelf, but since the March hospitalization she stopped going because she felt winded.      ADLs:  She used to be able to dress and  showering by herself, but recently she needs help because she feels winded.      Sleep: She does not sleep well.  She sleeps for about four hours.  She does not know if she snores.      Mood: When she feels short of breath, she gets panicky and mood changes.  Otherwise she feels okay.          Patient Active Problem List   Diagnosis    Type 2 diabetes mellitus (H)    Obesity (BMI 30-39.9)    Osteopenia    Early onset Alzheimer's dementia without behavioral disturbance (H)    ILD (interstitial lung disease) (H)    Seropositive rheumatoid arthritis (H)    Benign essential hypertension       Past Medical History:   Diagnosis Date    Benign essential hypertension     Early onset Alzheimer's dementia without behavioral disturbance (H)     ILD (interstitial lung disease) (H)     Obesity (BMI 30-39.9)     Osteopenia     Seropositive rheumatoid arthritis (H) 01/2023    Type 2 diabetes mellitus (H)        Past Surgical History:   Procedure Laterality Date    CATARACT EXTRACTION W/ INTRAOCULAR LENS IMPLANT Left 10/25/2021    CATARACT IOL, RT/LT Right 10/11/2021    CHOLECYSTECTOMY, OPEN  1990    HEMICOLECTOMY, RT/LT Right 1990       Current Outpatient Medications   Medication Sig Dispense Refill    amLODIPine (NORVASC) 5 MG tablet TAKE 1 TABLET BY MOUTH DAILY 90 tablet 2    aspirin 81 MG chewable tablet Take 1 tablet (81 mg) by mouth daily 90 tablet 2    benzonatate (TESSALON) 200 MG capsule Take 1 capsule (200 mg) by mouth 3 times daily as needed for cough 30 capsule 0    famotidine (PEPCID) 40 MG tablet Take 1 tablet (40 mg) by mouth every evening 90 tablet 3    guaiFENesin (MUCINEX) 600 MG 12 hr tablet Take 600 mg by mouth 2 times daily as needed for congestion or cough      guaiFENesin (ROBITUSSIN) 20 mg/mL liquid Take 10 mLs by mouth every 4 hours as needed for cough 236 mL 0    hydroxychloroquine (PLAQUENIL) 200 MG tablet Take 1 tablet (200 mg) by mouth 2 times daily Annual Plaquenil toxicity eye screening required. 180  tablet 3    hydrOXYzine (ATARAX) 10 MG tablet Take 1 tablet (10 mg) by mouth 3 times daily as needed for anxiety 30 tablet 1    hypromellose (GENTEAL SEVERE) ophthalmic gel 0.3% Place 1 drop into both eyes 4 times daily 10 g 11    ipratropium - albuterol 0.5 mg/2.5 mg/3 mL (DUONEB) 0.5-2.5 (3) MG/3ML neb solution Take 1 vial (3 mLs) by nebulization every 4 hours as needed for wheezing 90 mL 0    melatonin 3 MG tablet Take 1 tablet (3 mg) by mouth nightly as needed for sleep      mycophenolate (GENERIC EQUIVALENT) 250 MG capsule Take 4 capsules (1,000 mg) by mouth 2 times daily for 90 days 720 capsule 0    OFEV 100 MG capsule TAKE ONE CAPSULE BY MOUTH TWICE A DAY 60 capsule 11    pantoprazole (PROTONIX) 40 MG EC tablet Take 1 tablet (40 mg) by mouth daily 90 tablet 3    predniSONE (DELTASONE) 5 MG tablet Take 1 tablet (5 mg) by mouth daily 30 tablet 5    STATIN NOT PRESCRIBED (INTENTIONAL) Please choose reason not prescribed, below      sulfamethoxazole-trimethoprim (BACTRIM DS) 800-160 MG tablet Take 1 tablet by mouth Every Mon, Wed, Fri Morning 30 tablet 0    sulfaSALAzine ER (AZULFIDINE EN) 500 MG EC tablet take 2 tabs twice a day. 360 tablet 3    Vitamin D3 50 mcg (2000 units) tablet TAKE 1 TABLET BY MOUTH DAILY 90 tablet 0        Allergies   Allergen Reactions    Atorvastatin Muscle Pain (Myalgia)       Family History   Problem Relation Age of Onset    Dementia Mother     Cerebrovascular Disease Father     Cerebrovascular Disease Brother     Myocardial Infarction No family hx of     Coronary Artery Disease Early Onset No family hx of     Breast Cancer No family hx of     Ovarian Cancer No family hx of     Colon Cancer No family hx of     Glaucoma No family hx of     Macular Degeneration No family hx of     Liver Disease No family hx of          Social History     Socioeconomic History    Marital status:      Spouse name: Gladys Mccain    Number of children: 8    Years of education: Not on file     Highest education level: 3rd grade   Occupational History    Occupation: Retired - was  in restaurant   Tobacco Use    Smoking status: Never    Smokeless tobacco: Never   Vaping Use    Vaping status: Never Used   Substance and Sexual Activity    Alcohol use: Never    Drug use: No    Sexual activity: Not Currently   Other Topics Concern    Parent/sibling w/ CABG, MI or angioplasty before 65F 55M? Not Asked   Social History Narrative    .     8 children.     15 grandchildren (as of 2023).    Originally from [facility]. [place] (all kids born in [facility]). Moved to  in 2012.     Living with daughter and her family. Taking care of  (~25 years her senior).     Goes for walks occasionally.      Social Determinants of Health     Financial Resource Strain: Low Risk  (8/12/2024)    Financial Resource Strain     Within the past 12 months, have you or your family members you live with been unable to get utilities (heat, electricity) when it was really needed?: No   Food Insecurity: Low Risk  (8/12/2024)    Food Insecurity     Within the past 12 months, did you worry that your food would run out before you got money to buy more?: No     Within the past 12 months, did the food you bought just not last and you didn t have money to get more?: No   Transportation Needs: Low Risk  (8/12/2024)    Transportation Needs     Within the past 12 months, has lack of transportation kept you from medical appointments, getting your medicines, non-medical meetings or appointments, work, or from getting things that you need?: No   Physical Activity: Insufficiently Active (8/12/2024)    Exercise Vital Sign     Days of Exercise per Week: 3 days     Minutes of Exercise per Session: 20 min   Stress: Stress Concern Present (8/12/2024)    [employer] [place] [employer] - Occupational Stress Questionnaire     Feeling of Stress : To some extent   Social Connections: Unknown (8/12/2024)    Social Connection and  Isolation Panel [NHANES]     Frequency of Communication with Friends and Family: Not on file     Frequency of Social Gatherings with Friends and Family: Once a week     Attends Gnosticist Services: Not on file     Active Member of Clubs or Organizations: Not on file     Attends Club or Organization Meetings: Not on file     Marital Status: Not on file   Interpersonal Safety: Low Risk  (8/14/2024)    Interpersonal Safety     Do you feel physically and emotionally safe where you currently live?: Yes     Within the past 12 months, have you been hit, slapped, kicked or otherwise physically hurt by someone?: No     Within the past 12 months, have you been humiliated or emotionally abused in other ways by your partner or ex-partner?: No   Housing Stability: High Risk (8/12/2024)    Housing Stability     Do you have housing? : No     Are you worried about losing your housing?: No     SHX: No formal education.  She does not drink.  She has taken some ESL classes.     FHX: No dementia    General Physical examination:  /77 (BP Location: Right arm, Patient Position: Sitting, Cuff Size: Adult Large)   Pulse 91   Temp 97.7  F (36.5  C) (Temporal)   LMP  (LMP Unknown)   SpO2 98%      General: Ms. Boland is wearing a oxygen nasal cannula, appearing winded at times, and is appropriately groomed and dressed.    Neurological examination:    Mental status: Ms. Boland  is awake, alert, and appropriate, with fluent speech, no word finding difficulties and no difficulty in answering questions.   Cranial nerves:  Visual fields are full to confrontation with no visual extinction. Extraocular movements are intact. Smooth pursuit is intact. Saccades are normal in initiation, velocity and amplitude both vertically and horizontally.  Facial strength is full bilaterally.  Sternocledomastoid and trapezius muscle strength is full bilaterally.  Motor examination: Bulk is normal throughout. Axial and upper and lower extremity tone is  normal. No pronator drift is noted. Strength is 5/5 and symmetric throughout. There is no tremor or other involuntary movement.  Coordination: Finger-nose-finger testing is normal with no dysmetria bilaterally.  Rapid finger tapping, opening and closing of fist and pronation-supination are not slowed.   Gait: She has an upright and steady stance with normal stride length and arm swing. Tandem walking is intact. No Romberg's sign is present.    MMSE:  Orientation to time:   Orientation to place:   Registration: 3/3  Recall: 3/3  Namin/2  Three step command: 3/3  Copyin/1  Total:     Neuropsychological evaluation:  N/A    Labs:  Lab Results   Component Value Date    WBC 7.4 2024    RBC 4.38 2024    HGB 12.8 2024    HCT 39.9 2024    MCV 91 2024    MCH 29.2 2024    MCHC 32.1 2024    RDW 13.1 2024     2024     (L) 2024    POTASSIUM 4.0 2024    CHLORIDE 96 (L) 2024    CO2 27 2024    ANIONGAP 8 2024     (H) 2024    BUN 15.7 2024    CR 0.74 2024    SANDI 9.2 2024    TSH 0.35 2024    B12 468 2017        Imaging:  Results for orders placed or performed during the hospital encounter of 06/10/24   CT Abdomen Pelvis w Contrast    Narrative    CT ABDOMEN/PELVIS WITH CONTRAST Cassandra 10, 2024 8:08 AM    CLINICAL HISTORY: Further evaluation of left kidney lesion seen on US.  Chronic hepatitis B (H). Kidney lesion, native, left.    TECHNIQUE: CT scan of the abdomen and pelvis was performed following  injection of IV contrast. Multiplanar reformats were obtained. Dose  reduction techniques were used.  CONTRAST: 76 mL Isovue-[id number].    COMPARISON: Ultrasound 2024.    FINDINGS:   LOWER CHEST: Severe pulmonary fibrosis, similar to previous.    HEPATOBILIARY: Cholecystectomy. No liver lesions. Smooth hepatic  contour.    PANCREAS: Normal.    SPLEEN: Normal.    ADRENAL GLANDS:  Normal.    KIDNEYS/BLADDER: There is no left perinephric lesion. Kidneys are  normal in appearance.    BOWEL: Normal.    PELVIC ORGANS: Normal.    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    MUSCULOSKELETAL: Normal.      Impression    IMPRESSION: No liver or perinephric mass. Ultrasound finding likely  artifactual.    ARAM PALMER MD         SYSTEM ID:  [id number]       MR BRAIN W/O & W CONTRAST 5/15/2017: Negative for age. No bleed, mass, or acute infarcts are seen.    CPT 05/15/2017: Average CPT score 4.8/5.6.    Impression:  Ms. Boland is a 74 year old right-handed female with history of Type II DM, seropositive RA, hypertension, interstitial lung disease, AD, pwho presents for evaluation of AD.  She has a diagnosis of early onset AD in 2017.  Prior workup included a CPT and MRI.  Her memory has improved since then.  Currently, she does some of the iADLs.  Some of the limitations for the iADLs are due to the interstitial lung disease.  Her performance on the MMSE is normal accounting for the education and language barrier.  Repeating the CPT may help to determine her currently level of functioning.  The MRI brain can provide structural information.     Recommendations:  1. CPT  2. MRI brain    Follow-up: Return in about 2 months.    I spent 81 minutes total today for this visit, which includes face-to-face with the patient, reviewing the chart (MRI images, lab reports, clinical notes, medications), ordering diagnostic tests,  counseling, and documentation.       The longitudinal plan of care for memory problems as documented were addressed during this visit. Due to the added complexity in care, I will continue to support Krystian in the subsequent management and with ongoing continuity of care.

## 2024-09-13 NOTE — LETTER
9/13/2024       RE: Krystian Boland  [address]  [facility] [address]     Dear Colleague,    Thank you for referring your patient, Krystian Boland, to the  [facility] at [facility]. Please see a copy of my visit note below.    Chief Complaint: memory problems    History of Present Illness:  Ms. Boland is a 74 year old right-handed female with history of Type II DM, seropositive RA, hypertension, interstitial lung disease, presenting for evaluation of memory problems. She is accompanied to today's visit by son-in-law, Kenyatta who assists in providing the history and helped to translate in Tibetan.    Ms. Boland reports that she has no problem with thinking.  Sometimes she has memory problems.  She does not misplace objects.      Her  informant was interviewed and reports that she does not repeat questions.  She sometimes forgets new information, sometimes better.  She cannot remember names when meeting someone new.      She saw Dr. Ordaz when she was new to this area.  She felt out of place.  She did not know the place, language, culture and felt depressed.  Her memory since 2017 has gotten better.  She was having panic attacks back then.  When more of her children moved here, she felt better.      Previously evaluated by Dr. Ordaz in 2017.  She was diagnosed with early onset Alzheimer's disease.  Reversible causes of cognitive impairment workup was negative.  She did not have neuropsychological testing.  She had  CPT test.        iADLs:  Finances: She does not have bills that she has to pay.  Driving:  She never drove.    Medications: Sometimes she takes medications twice.  She remembers to take her medications.  The refills are done by her daughter for many years now.    Medical appointments:  Her children manage the medical appointments.    Meal preparation: She can cook fine.  Sometimes, she forgets a pot on the  stove.  She does not cook as much now, because she gets winded.    Shopping: Her daughters go shopping.  She used to go shopping with her daughter and could pick out the items from the shelf, but since the March hospitalization she stopped going because she felt winded.      ADLs:  She used to be able to dress and showering by herself, but recently she needs help because she feels winded.      Sleep: She does not sleep well.  She sleeps for about four hours.  She does not know if she snores.      Mood: When she feels short of breath, she gets panicky and mood changes.  Otherwise she feels okay.          Patient Active Problem List   Diagnosis     Type 2 diabetes mellitus (H)     Obesity (BMI 30-39.9)     Osteopenia     Early onset Alzheimer's dementia without behavioral disturbance (H)     ILD (interstitial lung disease) (H)     Seropositive rheumatoid arthritis (H)     Benign essential hypertension       Past Medical History:   Diagnosis Date     Benign essential hypertension      Early onset Alzheimer's dementia without behavioral disturbance (H)      ILD (interstitial lung disease) (H)      Obesity (BMI 30-39.9)      Osteopenia      Seropositive rheumatoid arthritis (H) 01/2023     Type 2 diabetes mellitus (H)        Past Surgical History:   Procedure Laterality Date     CATARACT EXTRACTION W/ INTRAOCULAR LENS IMPLANT Left 10/25/2021     CATARACT IOL, RT/LT Right 10/11/2021     CHOLECYSTECTOMY, OPEN  1990     HEMICOLECTOMY, RT/LT Right 1990       Current Outpatient Medications   Medication Sig Dispense Refill     amLODIPine (NORVASC) 5 MG tablet TAKE 1 TABLET BY MOUTH DAILY 90 tablet 2     aspirin 81 MG chewable tablet Take 1 tablet (81 mg) by mouth daily 90 tablet 2     benzonatate (TESSALON) 200 MG capsule Take 1 capsule (200 mg) by mouth 3 times daily as needed for cough 30 capsule 0     famotidine (PEPCID) 40 MG tablet Take 1 tablet (40 mg) by mouth every evening 90 tablet 3     guaiFENesin (MUCINEX) 600 MG  12 hr tablet Take 600 mg by mouth 2 times daily as needed for congestion or cough       guaiFENesin (ROBITUSSIN) 20 mg/mL liquid Take 10 mLs by mouth every 4 hours as needed for cough 236 mL 0     hydroxychloroquine (PLAQUENIL) 200 MG tablet Take 1 tablet (200 mg) by mouth 2 times daily Annual Plaquenil toxicity eye screening required. 180 tablet 3     hydrOXYzine (ATARAX) 10 MG tablet Take 1 tablet (10 mg) by mouth 3 times daily as needed for anxiety 30 tablet 1     hypromellose (GENTEAL SEVERE) ophthalmic gel 0.3% Place 1 drop into both eyes 4 times daily 10 g 11     ipratropium - albuterol 0.5 mg/2.5 mg/3 mL (DUONEB) 0.5-2.5 (3) MG/3ML neb solution Take 1 vial (3 mLs) by nebulization every 4 hours as needed for wheezing 90 mL 0     melatonin 3 MG tablet Take 1 tablet (3 mg) by mouth nightly as needed for sleep       mycophenolate (GENERIC EQUIVALENT) 250 MG capsule Take 4 capsules (1,000 mg) by mouth 2 times daily for 90 days 720 capsule 0     OFEV 100 MG capsule TAKE ONE CAPSULE BY MOUTH TWICE A DAY 60 capsule 11     pantoprazole (PROTONIX) 40 MG EC tablet Take 1 tablet (40 mg) by mouth daily 90 tablet 3     predniSONE (DELTASONE) 5 MG tablet Take 1 tablet (5 mg) by mouth daily 30 tablet 5     STATIN NOT PRESCRIBED (INTENTIONAL) Please choose reason not prescribed, below       sulfamethoxazole-trimethoprim (BACTRIM DS) 800-160 MG tablet Take 1 tablet by mouth Every Mon, Wed, Fri Morning 30 tablet 0     sulfaSALAzine ER (AZULFIDINE EN) 500 MG EC tablet take 2 tabs twice a day. 360 tablet 3     Vitamin D3 50 mcg (2000 units) tablet TAKE 1 TABLET BY MOUTH DAILY 90 tablet 0        Allergies   Allergen Reactions     Atorvastatin Muscle Pain (Myalgia)       Family History   Problem Relation Age of Onset     Dementia Mother      Cerebrovascular Disease Father      Cerebrovascular Disease Brother      Myocardial Infarction No family hx of      Coronary Artery Disease Early Onset No family hx of      Breast Cancer No  family hx of      Ovarian Cancer No family hx of      Colon Cancer No family hx of      Glaucoma No family hx of      Macular Degeneration No family hx of      Liver Disease No family hx of          Social History     Socioeconomic History     Marital status:      Spouse name: Gladys Mccain     Number of children: 8     Years of education: Not on file     Highest education level: 3rd grade   Occupational History     Occupation: Retired - was  in restaurant   Tobacco Use     Smoking status: Never     Smokeless tobacco: Never   Vaping Use     Vaping status: Never Used   Substance and Sexual Activity     Alcohol use: Never     Drug use: No     Sexual activity: Not Currently   Other Topics Concern     Parent/sibling w/ CABG, MI or angioplasty before 65F 55M? Not Asked   Social History Narrative    .     8 children.     15 grandchildren (as of 2023).    Originally from [facility]. [place] kids born in [place] Moved to  in 2012.     Living with daughter and her family. Taking care of  (~25 years her senior).     Goes for walks occasionally.      Social Determinants of Health     Financial Resource Strain: Low Risk  (8/12/2024)    Financial Resource Strain      Within the past 12 months, have you or your family members you live with been unable to get utilities (heat, electricity) when it was really needed?: No   Food Insecurity: Low Risk  (8/12/2024)    Food Insecurity      Within the past 12 months, did you worry that your food would run out before you got money to buy more?: No      Within the past 12 months, did the food you bought just not last and you didn t have money to get more?: No   Transportation Needs: Low Risk  (8/12/2024)    Transportation Needs      Within the past 12 months, has lack of transportation kept you from medical appointments, getting your medicines, non-medical meetings or appointments, work, or from getting things that you need?: No   Physical Activity:  Insufficiently Active (8/12/2024)    Exercise Vital Sign      Days of Exercise per Week: 3 days      Minutes of Exercise per Session: 20 min   Stress: Stress Concern Present (8/12/2024)    [place] [employer] - Occupational Stress Questionnaire      Feeling of Stress : To some extent   Social Connections: Unknown (8/12/2024)    Social Connection and Isolation Panel [NHANES]      Frequency of Communication with Friends and Family: Not on file      Frequency of Social Gatherings with Friends and Family: Once a week      Attends Yazdanism Services: Not on file      Active Member of Clubs or Organizations: Not on file      Attends Club or Organization Meetings: Not on file      Marital Status: Not on file   Interpersonal Safety: Low Risk  (8/14/2024)    Interpersonal Safety      Do you feel physically and emotionally safe where you currently live?: Yes      Within the past 12 months, have you been hit, slapped, kicked or otherwise physically hurt by someone?: No      Within the past 12 months, have you been humiliated or emotionally abused in other ways by your partner or ex-partner?: No   Housing Stability: High Risk (8/12/2024)    Housing Stability      Do you have housing? : No      Are you worried about losing your housing?: No     SHX: No formal education.  She does not drink.  She has taken some ESL classes.     FHX: No dementia    General Physical examination:  /77 (BP Location: Right arm, Patient Position: Sitting, Cuff Size: Adult Large)   Pulse 91   Temp 97.7  F (36.5  C) (Temporal)   LMP  (LMP Unknown)   SpO2 98%      General: Ms. Boland is wearing a oxygen nasal cannula, appearing winded at times, and is appropriately groomed and dressed.    Neurological examination:    Mental status: Ms. Boland  is awake, alert, and appropriate, with fluent speech, no word finding difficulties and no difficulty in answering questions.   Cranial nerves:  Visual fields are full to  confrontation with no visual extinction. Extraocular movements are intact. Smooth pursuit is intact. Saccades are normal in initiation, velocity and amplitude both vertically and horizontally.  Facial strength is full bilaterally.  Sternocledomastoid and trapezius muscle strength is full bilaterally.  Motor examination: Bulk is normal throughout. Axial and upper and lower extremity tone is normal. No pronator drift is noted. Strength is 5/5 and symmetric throughout. There is no tremor or other involuntary movement.  Coordination: Finger-nose-finger testing is normal with no dysmetria bilaterally.  Rapid finger tapping, opening and closing of fist and pronation-supination are not slowed.   Gait: She has an upright and steady stance with normal stride length and arm swing. Tandem walking is intact. No Romberg's sign is present.    MMSE:  Orientation to time:   Orientation to place:   Registration: 3/3  Recall: 3/3  Namin/2  Three step command: 3/3  Copyin/1  Total:     Neuropsychological evaluation:  N/A    Labs:  Lab Results   Component Value Date    WBC 7.4 2024    RBC 4.38 2024    HGB 12.8 2024    HCT 39.9 2024    MCV 91 2024    MCH 29.2 2024    MCHC 32.1 2024    RDW 13.1 2024     2024     (L) 2024    POTASSIUM 4.0 2024    CHLORIDE 96 (L) 2024    CO2 27 2024    ANIONGAP 8 2024     (H) 2024    BUN 15.7 2024    CR 0.74 2024    SANDI 9.2 2024    TSH 0.35 2024    B12 468 2017        Imaging:  Results for orders placed or performed during the hospital encounter of 06/10/24   CT Abdomen Pelvis w Contrast    Narrative    CT ABDOMEN/PELVIS WITH CONTRAST Cassandra 10, 2024 8:08 AM    CLINICAL HISTORY: Further evaluation of left kidney lesion seen on US.  Chronic hepatitis B (H). Kidney lesion, native, left.    TECHNIQUE: CT scan of the abdomen and pelvis was performed  following  injection of IV contrast. Multiplanar reformats were obtained. Dose  reduction techniques were used.  CONTRAST: 76 mL Isovue-370.    COMPARISON: Ultrasound 5/22/2024.    FINDINGS:   LOWER CHEST: Severe pulmonary fibrosis, similar to previous.    HEPATOBILIARY: Cholecystectomy. No liver lesions. Smooth hepatic  contour.    PANCREAS: Normal.    SPLEEN: Normal.    ADRENAL GLANDS: Normal.    KIDNEYS/BLADDER: There is no left perinephric lesion. Kidneys are  normal in appearance.    BOWEL: Normal.    PELVIC ORGANS: Normal.    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    MUSCULOSKELETAL: Normal.      Impression    IMPRESSION: No liver or perinephric mass. Ultrasound finding likely  artifactual.    ARAM PALMER MD         SYSTEM ID:  [id number]       MR BRAIN W/O & W CONTRAST 5/15/2017: Negative for age. No bleed, mass, or acute infarcts are seen.    CPT 05/15/2017: Average CPT score 4.8/5.6.    Impression:  Ms. Boland is a 74 year old right-handed female with history of Type II DM, seropositive RA, hypertension, interstitial lung disease, AD, pwho presents for evaluation of AD.  She has a diagnosis of early onset AD in 2017.  Prior workup included a CPT and MRI.  Her memory has improved since then.  Currently, she does some of the iADLs.  Some of the limitations for the iADLs are due to the interstitial lung disease.  Her performance on the MMSE is normal accounting for the education and language barrier.  Repeating the CPT may help to determine her currently level of functioning.  The MRI brain can provide structural information.     Recommendations:  1. CPT  2. MRI brain    Follow-up: Return in about 2 months.    I spent 81 minutes total today for this visit, which includes face-to-face with the patient, reviewing the chart (MRI images, lab reports, clinical notes, medications), ordering diagnostic tests,  counseling, and documentation.       The  longitudinal plan of care for memory problems as documented were addressed during this visit. Due to the added complexity in care, I will continue to support Krystian in the subsequent management and with ongoing continuity of care.      Again, thank you for allowing me to participate in the care of your patient.      Sincerely,    Desi Crane MD

## 2024-09-17 ENCOUNTER — HOSPITAL ENCOUNTER (OUTPATIENT)
Dept: CARDIAC REHAB | Facility: CLINIC | Age: 74
Discharge: HOME | End: 2024-09-17
Attending: INTERNAL MEDICINE
Payer: COMMERCIAL

## 2024-09-17 PROCEDURE — G0239 OTH RESP PROC, GROUP: HCPCS

## 2024-09-19 ENCOUNTER — IMMUNIZATION (OUTPATIENT)
Dept: INTERNAL MEDICINE | Facility: CLINIC | Age: 74
End: 2024-09-19
Payer: COMMERCIAL

## 2024-09-19 ENCOUNTER — HOSPITAL ENCOUNTER (OUTPATIENT)
Dept: CARDIAC REHAB | Facility: CLINIC | Age: 74
Discharge: HOME | End: 2024-09-19
Attending: INTERNAL MEDICINE
Payer: COMMERCIAL

## 2024-09-19 DIAGNOSIS — Z23 NEED FOR PROPHYLACTIC VACCINATION AND INOCULATION AGAINST INFLUENZA: Primary | ICD-10-CM

## 2024-09-19 PROCEDURE — 99207 PR NO CHARGE NURSE ONLY: CPT

## 2024-09-19 PROCEDURE — G0008 ADMIN INFLUENZA VIRUS VAC: HCPCS

## 2024-09-19 PROCEDURE — 90662 IIV NO PRSV INCREASED AG IM: CPT

## 2024-09-19 PROCEDURE — G0239 OTH RESP PROC, GROUP: HCPCS | Performed by: REHABILITATION PRACTITIONER

## 2024-09-20 ENCOUNTER — PATIENT OUTREACH (OUTPATIENT)
Dept: GERIATRIC MEDICINE | Facility: CLINIC | Age: 74
End: 2024-09-20
Payer: COMMERCIAL

## 2024-09-20 ENCOUNTER — MYC MEDICAL ADVICE (OUTPATIENT)
Dept: PULMONOLOGY | Facility: CLINIC | Age: 74
End: 2024-09-20
Payer: COMMERCIAL

## 2024-09-20 NOTE — TELEPHONE ENCOUNTER
Patient note started with Dr. Keller's input  Sent to Julian for completion and printing for signature    Roro Gu RN

## 2024-09-20 NOTE — LETTER
9/20/2024      Krystian Boland  MRN [id number]   [id number] [facility] [address]      To whom it may concern,    Krystian Boland is a patient of mine and has been under the care of [facility] pulmonary clinic since 2019. She was last seen in our clinic on 5/9/24. Krystian, has terminal stage rheumatoid lung, requiring home oxygen, which makes her prognosis poor and she is unlikely to survive 3-5 more years. She is not able to travel by air long distance due to her interstitial lung disease and chronic hypoxic respiratory failure requiring oxygen. We are unable to estimate her O2 requirements and very unlikely that she will have sufficient O2 coverage. Long distance air travel can be life-threatening.          Sincerely,        Jase Keller MD

## 2024-09-20 NOTE — PROGRESS NOTES
[facility] Care Coordination Contact    Received an email from member's daughter, Zachariah. She requested that member's incontinent product order be resumed.  Emailed [facility] medical requesting that the products be resumed. Emailed Zachariah back and notified her that the request was made.  JAQUELIN Langley  [facility]  [phone]

## 2024-09-23 ENCOUNTER — MEDICAL CORRESPONDENCE (OUTPATIENT)
Dept: HEALTH INFORMATION MANAGEMENT | Facility: CLINIC | Age: 74
End: 2024-09-23

## 2024-09-24 ENCOUNTER — HOSPITAL ENCOUNTER (OUTPATIENT)
Dept: CARDIAC REHAB | Facility: CLINIC | Age: 74
Discharge: HOME | End: 2024-09-24
Attending: INTERNAL MEDICINE
Payer: COMMERCIAL

## 2024-09-24 PROCEDURE — G0239 OTH RESP PROC, GROUP: HCPCS

## 2024-09-25 DIAGNOSIS — J84.9 ILD (INTERSTITIAL LUNG DISEASE) (H): ICD-10-CM

## 2024-09-25 RX ORDER — MYCOPHENOLATE MOFETIL 250 MG/1
1000 CAPSULE ORAL
Qty: 720 CAPSULE | Refills: 0 | Status: SHIPPED | OUTPATIENT
Start: 2024-09-25

## 2024-09-26 ENCOUNTER — HOSPITAL ENCOUNTER (OUTPATIENT)
Dept: CARDIAC REHAB | Facility: CLINIC | Age: 74
Discharge: HOME | End: 2024-09-26
Attending: INTERNAL MEDICINE
Payer: COMMERCIAL

## 2024-09-26 DIAGNOSIS — J84.9 INTERSTITIAL LUNG DISEASE (H): ICD-10-CM

## 2024-09-26 DIAGNOSIS — J06.9 UPPER RESPIRATORY TRACT INFECTION, UNSPECIFIED TYPE: ICD-10-CM

## 2024-09-26 PROCEDURE — G0239 OTH RESP PROC, GROUP: HCPCS | Performed by: CLINICAL EXERCISE PHYSIOLOGIST

## 2024-09-27 RX ORDER — BENZONATATE 200 MG/1
CAPSULE ORAL
Qty: 90 CAPSULE | Refills: 0 | Status: SHIPPED | OUTPATIENT
Start: 2024-09-27

## 2024-10-04 ENCOUNTER — MEDICAL CORRESPONDENCE (OUTPATIENT)
Dept: HEALTH INFORMATION MANAGEMENT | Facility: CLINIC | Age: 74
End: 2024-10-04
Payer: COMMERCIAL

## 2024-10-08 ENCOUNTER — HOSPITAL ENCOUNTER (OUTPATIENT)
Dept: CARDIAC REHAB | Facility: CLINIC | Age: 74
Discharge: HOME | End: 2024-10-08
Attending: INTERNAL MEDICINE
Payer: COMMERCIAL

## 2024-10-08 PROCEDURE — G0239 OTH RESP PROC, GROUP: HCPCS

## 2024-10-10 ENCOUNTER — HOSPITAL ENCOUNTER (OUTPATIENT)
Dept: CARDIAC REHAB | Facility: CLINIC | Age: 74
Discharge: HOME | End: 2024-10-10
Attending: INTERNAL MEDICINE
Payer: COMMERCIAL

## 2024-10-10 PROCEDURE — G0239 OTH RESP PROC, GROUP: HCPCS | Performed by: REHABILITATION PRACTITIONER

## 2024-10-13 ENCOUNTER — HOSPITAL ENCOUNTER (OUTPATIENT)
Dept: MRI IMAGING | Facility: CLINIC | Age: 74
Discharge: HOME | End: 2024-10-13
Attending: PSYCHIATRY & NEUROLOGY | Admitting: PSYCHIATRY & NEUROLOGY
Payer: COMMERCIAL

## 2024-10-13 PROCEDURE — 70551 MRI BRAIN STEM W/O DYE: CPT

## 2024-10-15 ENCOUNTER — HOSPITAL ENCOUNTER (OUTPATIENT)
Dept: CARDIAC REHAB | Facility: CLINIC | Age: 74
Discharge: HOME | End: 2024-10-15
Attending: INTERNAL MEDICINE
Payer: COMMERCIAL

## 2024-10-15 PROCEDURE — G0239 OTH RESP PROC, GROUP: HCPCS

## 2024-10-16 ENCOUNTER — THERAPY VISIT (OUTPATIENT)
Dept: OCCUPATIONAL THERAPY | Facility: CLINIC | Age: 74
End: 2024-10-16
Attending: PSYCHIATRY & NEUROLOGY
Payer: COMMERCIAL

## 2024-10-16 DIAGNOSIS — F02.80 EARLY ONSET ALZHEIMER'S DEMENTIA WITHOUT BEHAVIORAL DISTURBANCE (H): Primary | ICD-10-CM

## 2024-10-16 DIAGNOSIS — R41.3 MEMORY CHANGES: ICD-10-CM

## 2024-10-16 DIAGNOSIS — G30.0 EARLY ONSET ALZHEIMER'S DEMENTIA WITHOUT BEHAVIORAL DISTURBANCE (H): Primary | ICD-10-CM

## 2024-10-16 PROCEDURE — 96125 COGNITIVE TEST BY HC PRO: CPT | Mod: GO

## 2024-10-16 PROCEDURE — 97165 OT EVAL LOW COMPLEX 30 MIN: CPT | Mod: GO

## 2024-10-16 PROCEDURE — 97535 SELF CARE MNGMENT TRAINING: CPT | Mod: GO

## 2024-10-16 NOTE — PROGRESS NOTES
Cognitive Performance Test    SUMMARY OF TEST:    The Cognitive Performance Test (CPT) is a standardized performance-based assessment to measure working memory/executive function processing capacities that underlie functional performance. Subtasks include common basic and instrumental activities of daily living (ADL/IADL) which are rated based on the manner in which patients respond to task demands of varying complexity. The total CPT score describes a level of functioning that indicates how information is processed, implications for functional activities, potential safety risks and a recommended level of supervision or assist based on cognitive function. The highest total score on this test is in the range of 5.6 to 5.8.    DATE OF TESTING: 10/16/24    Ordering Provider: Desi Crane MD    Order date: 9/13/24    Order Diagnosis:Memory problem [R41.3]  - Primary    Occupational Profile (including diagnosis and medical history): Patient is a 74 year old right-handed female with history of Type II DM, seropositive RA, hypertension, interstitial lung disease, and early onset Alzheimer's disease who was referred to outpatient occupational therapy at the request of Dr. Desi Crane for administration of The Cognitive Performance Test (CPT). She was most recently seen by neurology 9/13/24. She presented to clinic accompanied by her daughter, who assisted in providing history and translation in Tibetan. Patient was previously seen by Dr. Ordaz in 2017 and diagnosed with early onset Alzheimer's disease.  She underwent CPT testing in May 2017 resulting in a score of 4.8/5.6, Level 4.5. Her daughter attributed her memory changes in 2017 to depression symptoms and significant life changes as [place] the [place] She states her overall function and well-being has improved. She lives in a supportive multi-generational home and receives 24/7 supervision for safety. She is receiving some assistance for ADL's if she feels  winded. Her family manages her medication set up/schedule, finances, household chores, appointments, and transportation. She enjoys going to the store with family and previously enjoyed cooking, however, participation has slowly declined over the past 3 years as she has been limited by shortness of breath due to lung disease and anticipating a transplant. She is currently participating in [facility] rehab.     Previous cognitive screens completed in OT or by Physician and score:   MMSE on 9/13/24 with neurology: 19/22 Her performance on the MMSE is normal accounting for the education and language barrier.   CPT 5/2017: 4.8/5.6, Level 4.5     Functional History Interview:    Informants: Patient and her daughter     Client s perception of memory/ thinking or functional difficulties: Patient denies significant concerns with her cognitive functioning and feels safe with support she is receiving at home. Daughter reports her overall function and well-being has improved since 2017 as she has acclimated to change and culture. They feel comfortable providing current level of assistance at home.     Living situation: [facility]     Home maintenance activities: Primarily performed by family     Meal preparation and grocery shopping: She enjoys going to the store with family and previously enjoyed cooking, however, participation has slowly declined over the past 3 years as she has been limited by shortness of breath due to lung disease and anticipating a transplant    Finances and paying bills: She is not responsible for managing bills at baseline     Medication management: Family manages medication set up and schedule     Driving and transportation: She does not drive    Calendar/Appointments: She receives assistance from family for managing appointments/schedule and refers to calendar for orientation     Leisure and routine activities: TV soaps, gardening, walking     ADL/Mobility/Physical  Functioning:   She used to be able to dress and showering by herself, but recently she needs help because she feels winded.     Fall Risk Screen:   Has the patient fallen 2 or more times in the last year? No      Has the patient fallen and had an injury in the past year? No       Timed Up and Go Score: NT    Is the patient a fall risk? No      RESULTS OF TESTING:                                                                                         CPT Subtest Results    MEDBOX: 5.5/6 SHOP/GLOVES: 5/6 PHONE: 4/6   WASH:  5/5 TRAVEL: NT/6 TOAST: 5/5   DRESS: NT/5   TOTAL CPT SCORE:  24.5/28     Average CPT Score  4.9/5.6    INTERPRETATION OF TEST RESULTS:    Based on the Cognitive Performance Test, this patient scored at CPT Level 5.0.  See CPT Levels reference below.    Summary of functional cognitive status:   Results reveals mild functional decline. Start of difficulty with complex tasks - Shows start of decline in memory, keeping track of details, decision-making, planning, and organization. Can learn new information. Individuals at this level are able to complete basic daily and routine household tasks but requires assistance for more complex tasks such as complex or new medication schedules, appointments, finances, higher level household tasks, etc. Caregiver may need to: urge patient to use notes, daily checklists, calendars or there reminders; make tasks easier and limit tasks that are too hard; allow extra time; monitor, supervise or help with complex tasks.     Factors affecting performance:  Cultural and language barrier, this test cannot be norm referenced due to need for - can however provide insight into deficits and recommendations for home safety     Recommendations:    Assist for ADL/IADL:  Meal preparation, heavier household chores (cleaning, laundry), Appointments, Shopping, Finances, Driving, and Medication management  Supervision for ADL/IADL:  ADL's  Supervision in living setting:   24 hour (she has been receiving 24/7 supervision from family for a few years)       It is recommended that patient continue to receive assistance for complex tasks including medication set up and schedule, finances, meal planning, driving, shopping, appointments. Recommend using notes, daily checklists, calendars or other reminders; make tasks easier and limit tasks that are too hard; allow extra time; monitor, supervise or help with complex tasks.       TIME ADMINISTERING TEST: 35    TIME FOR INTERPRETATION AND PREPARATION OF REPORT: 25    TOTAL TIME: 60      CPT Levels Reference:    Patient's Average CPT Score:  5.0                                                                                                                                                  Individual scores range along a continuum as outlined below.  In addition to cognitive status, other factors may affect safety in a home environment.  Please refer to specific recommendations for this patient.    ___5.6-5.8  Normal functioning (absence of cognitive-functional disability).  Independent in managing personal affairs, monitors and directs own behavior.  Uses complex information to carry out daily activities with safety and accuracy.    Proficient with instrumental activities of daily living (IADL) and learning new activity.  Problems are anticipated, errors are avoided, and consequences of actions are considered.      __x_5.0   Mild cognitive-functional disability; deficits in working memory and executive thought processes. Difficulty using complex information. Problems may be observed with recent memory, judgment, reasoning and planning ahead. May be impulsive or have difficulty anticipating consequences.  Safety:  May require assistance to plan ahead; or to manage complex medication schedules, appointments or finances.  Hazardous activities may need to be monitored or limited.  ADL:  Mild functional decline.  Able to complete basic self-care and  "routine household tasks.  May have difficulty with complex daily tasks such as reading, writing, meal preparation, shopping or driving.   Learns through hands on teaching. Self-centered behavior or difficulty considering the needs of others may be seen related to trouble seeing the  whole picture\". Can appear disorganized or uninhibited.    ___4.5  Mild to moderate cognitive-functional disability. Significant deficits in working memory and executive thought processes. Judgment, reasoning and planning show obvious impairment.  Distractible with inability to shift attention/actions given competing stimuli.  Difficulty with problem solving and managing details. Complex daily tasks performed with inconsistency, difficulty, or error.     Safety:  Medications should be monitored, stove use may require supervision, and driving ability may be affected.  Impaired safety awareness with inability to anticipate potential problems.  May not recognize or respond to emergent situations. Requires frequent check-in support.   ADL:  Mild difficulty with simple everyday self-care tasks. Benefits from structured, routine activity.  Will likely need reminders to complete tasks outside of the routine. Requires assistance with planning and IADL tasks like shopping and finances. Learns concrete tasks through repetition, but performance may not generalize. Tends to be impulsive with poor insight. Self centered behavior or inability to consider the needs of others is common.    ___4.0  Moderate cognitive-functional disability; abstract to concrete thought processes. Working memory and executive function impairments are obvious. Difficulty with planning and problem solving.  Behavior is goal-directed, but unable to follow multi-step directions, is easily distracted, and may not recognize mistakes.  Inability to anticipate hazards or understand precautions.  Safety:  Recommend 24-hour supervision for safety. Supervision needed for medication " management and for hazardous activities. May not be able to follow a restricted diet. Can get lost in unfamiliar surroundings. Generally, persons functioning at level 4 should not be driving.   ADL:  Some decline in quality or frequency of ADL.  [place] enhanced by use of a routine, simple concrete directions, and caregiver set-up of needed items. Complex tasks such as money or home management typically requires assistance.  Relies heavily on vision to guide behavior; will ignore objects/hazards not in plain sight and can be distracted by irrelevant objects. Often has poor insight.  Able to carry out social conversation and may verbally  cover  for deficits leading caregivers to believe they are capable of functioning independently.       ___3.5  Moderate cognitive-functional disability; increased cues needed for task completion. Aware of concrete task steps but needs prompting or cues to initiate and complete simple tasks. Attention span is limited, simple directions may need to be repeated, and re-focus to a topic or task may be required.  Safety:  24-hour supervision required for safety and for assistance with daily tasks. Assistance required with medications, and access to medication should be limited. Meals, nutrition and dietary restrictions need to be monitored.  All hazardous activities should be restricted or supervised. Should not drive. Prone to wandering and can become lost.  ADL:  Moderate functional decline. Familiar tasks usually requires set-up of supplies and directions to complete steps. May need objects handed to them for task initiation. Function best with a set schedule in familiar surroundings with familiar people. All complex tasks must be done by others. Vocabulary is diminished and speech often unfocused.

## 2024-10-16 NOTE — PROGRESS NOTES
OCCUPATIONAL THERAPY EVALUATION  Type of Visit: Evaluation        Fall Risk Screen:  Fall screen completed by: OT  Have you fallen 2 or more times in the past year?: No  Have you fallen and had an injury in the past year?: No  Is patient a fall risk?: Department fall risk interventions implemented    Subjective      Presenting condition or subjective complaint: cognitive testing ordered by neurologist  Date of onset: 09/13/24 (order date)    Relevant medical history: Arthritis; Diabetes; Hepatitis; High blood pressure; Incontinence; Rheumatoid arthritis; Sleep disorder like apnea   Dates & types of surgery: expl laparotomy 30+ years ago    Prior diagnostic imaging/testing results: MRI     Prior therapy history for the same diagnosis, illness or injury: No      Occupational Profile (including diagnosis and medical history): Patient is a 74 year old right-handed female with history of Type II DM, seropositive RA, hypertension, interstitial lung disease, and early onset Alzheimer's disease who was referred to outpatient occupational therapy at the request of Dr. Desi Crane for administration of The Cognitive Performance Test (CPT). She was most recently seen by neurology 9/13/24. She presented to clinic accompanied by her daughter, who assisted in providing history and translation in Tibetan. Patient was previously seen by Dr. Ordaz in 2017 and diagnosed with early onset Alzheimer's disease.  She underwent CPT testing in May 2017 resulting in a score of 4.8/5.6, Level 4.5. Her daughter attributed her memory changes in 2017 to depression symptoms and significant life changes as [place] She states her overall function and well-being has improved. She lives in a supportive multi-generational home and receives 24/7 supervision for safety. She is receiving some assistance for ADL's if she feels winded. Her family manages her medication set up/schedule, finances, household chores, appointments, and  transportation. She enjoys going to the store with family and previously enjoyed cooking, however, participation has slowly declined over the past 3 years as she has been limited by shortness of breath due to lung disease and anticipating a transplant. She is currently participating in [facility] rehab.       Prior Level of Function  Transfers: Independent  Ambulation: Independent  ADL: Independent  IADL:  family assists with IADL's    Living Environment  Social support: With family members   Type of home: House; 2-story; Basement   Stairs to enter the home: Yes 1 Is there a railing: No     Ramp: No   Stairs inside the home: Yes 15 Is there a railing: Yes     Help at home: Self Cares (home health aide/personal care attendant, family, etc); Home management tasks (cooking, cleaning); Medication and/or finances; Assist for driving and community activities  Equipment owned: Bath bench     Employment: Not Applicable    Hobbies/Interests: watching TV soaps, gardening, walking,    Patient goals for therapy: walk, cook food, enjoy life without gasping for air    Pain assessment: Pain denied     Objective     Cognitive Status Examination    Client s perception of memory/ thinking or functional difficulties: Patient denies significant concerns with her cognitive functioning and feels safe with support she is receiving at home. Daughter reports her overall function and well-being has improved since 2017 as she has acclimated to change and culture. They feel comfortable providing current level of assistance at home.   Previous cognitive screens completed in OT or by Physician and score:   MMSE on 9/13/24 with neurology: 19/22 Her performance on the MMSE is normal accounting for the education and language barrier.   CPT 5/2017: 4.8/5.6, Level 4.5     CPT performed today, see separate not for details     FUNCTIONAL MOBILITY  Assistive Device(s): None    Living situation: Lives in a supportive [facility] home     Home  maintenance activities: Primarily performed by family     Meal preparation and grocery shopping: She enjoys going to the store with family and previously enjoyed cooking, however, participation has slowly declined over the past 3 years as she has been limited by shortness of breath due to lung disease and anticipating a transplant    Finances and paying bills: She is not responsible for managing bills at baseline     Medication management: Family manages medication set up and schedule     Driving and transportation: She does not drive    Calendar/Appointments: She receives assistance from family for managing appointments/schedule and refers to calendar for orientation     Leisure and routine activities: TV soaps, gardening, walking     ADL/Mobility/Physical Functioning:   She used to be able to dress and showering by herself, but recently she needs help because she feels winded.     Assessment & Plan   CLINICAL IMPRESSIONS  Medical Diagnosis: Early onset Alzheimer's dementia without behavioral disturbance (H); memory problem    Treatment Diagnosis: memory changes    Impression/Assessment: Patient is a 74 year old right-handed female with history of Type II DM, seropositive RA, hypertension, interstitial lung disease, and early onset Alzheimer's disease who was referred to outpatient occupational therapy at the request of Dr. Desi Crane for administration of The Cognitive Performance Test (CPT).  The following significant findings have been identified:  impaired memroy .  These identified deficits interfere with their ability to perform driving , medication management, financial management, and appointments/schedule  as compared to previous level of function.     Clinical Decision Making (Complexity):  Assessment of Occupational Performance: 1-3 Performance Deficits  Occupational Performance Limitations:  driving , medication management, financial management, and appointments/schedule  Clinical Decision Making  (Complexity): Low complexity    PLAN OF CARE  Treatment Interventions:  Interventions: Self-Care/Home Management    Long Term Goals   OT Goal 1  Goal Identifier: CPT  Goal Description: :  Patient and family to verbalize understanding of Cognitive Performance Test results and recommendations and identify strategies to increase patient's safety in her home settin  Goal Progress: goal met  Target Date: 10/16/24  Date Met: 10/16/24      Frequency of Treatment: 1 visit  Duration of Treatment: 1 visit     Recommended Referrals to Other Professionals:  none  Education Assessment: Learner/Method: Patient;Listening;Family     Risks and benefits of evaluation/treatment have been explained.   Patient/Family/caregiver agrees with Plan of Care.     Evaluation Time:      Signing Clinician: JENNIFER Argueta, CNS, CSRS        [facility]                                                                                   OUTPATIENT OCCUPATIONAL THERAPY      PLAN OF TREATMENT FOR OUTPATIENT REHABILITATION   Patient's Last Name, First Name, WENDI.Krystian Durham    YOB: 1950   Provider's Name   [facility]   Medical Record No.  [id number]     Onset Date: 09/13/24 (order date) Start of Care Date: 10/16/24     Medical Diagnosis:  Early onset Alzheimer's dementia without behavioral disturbance (H); memory problem      OT Treatment Diagnosis:  memory changes Plan of Treatment  Frequency/Duration:1 visit/1 visit    Certification date from 10/16/24   To 10/16/24        See note for plan of treatment details and functional goals     JENNIFER Argueta, SHEYLA, CSRS                         I CERTIFY THE NEED FOR THESE SERVICES FURNISHED UNDER        THIS PLAN OF TREATMENT AND WHILE UNDER MY CARE     (Physician attestation of this document indicates review and certification of the therapy plan).              Referring Provider:  Desi Espinosa  Assessment  See Epic Evaluation- 10/16/24

## 2024-10-17 ENCOUNTER — HOSPITAL ENCOUNTER (OUTPATIENT)
Dept: CARDIAC REHAB | Facility: CLINIC | Age: 74
Discharge: HOME | End: 2024-10-17
Attending: INTERNAL MEDICINE
Payer: COMMERCIAL

## 2024-10-17 ENCOUNTER — PATIENT OUTREACH (OUTPATIENT)
Dept: CARE COORDINATION | Facility: CLINIC | Age: 74
End: 2024-10-17

## 2024-10-17 PROCEDURE — G0239 OTH RESP PROC, GROUP: HCPCS

## 2024-10-18 ENCOUNTER — DOCUMENTATION ONLY (OUTPATIENT)
Dept: PULMONOLOGY | Facility: CLINIC | Age: 74
End: 2024-10-18
Payer: COMMERCIAL

## 2024-10-18 DIAGNOSIS — J18.9 PNEUMONIA, UNSPECIFIED ORGANISM: ICD-10-CM

## 2024-10-18 DIAGNOSIS — J84.9 ILD (INTERSTITIAL LUNG DISEASE) (H): Primary | ICD-10-CM

## 2024-10-18 DIAGNOSIS — J96.11 CHRONIC HYPOXIC RESPIRATORY FAILURE (H): ICD-10-CM

## 2024-10-22 ENCOUNTER — OFFICE VISIT (OUTPATIENT)
Dept: TRANSPLANT | Facility: CLINIC | Age: 74
End: 2024-10-22
Attending: INTERNAL MEDICINE
Payer: COMMERCIAL

## 2024-10-22 ENCOUNTER — HOSPITAL ENCOUNTER (OUTPATIENT)
Dept: CARDIAC REHAB | Facility: CLINIC | Age: 74
Discharge: HOME | End: 2024-10-22
Attending: INTERNAL MEDICINE
Payer: COMMERCIAL

## 2024-10-22 VITALS
SYSTOLIC BLOOD PRESSURE: 155 MMHG | DIASTOLIC BLOOD PRESSURE: 98 MMHG | HEART RATE: 85 BPM | BODY MASS INDEX: 31.69 KG/M2 | WEIGHT: 167.7 LBS | OXYGEN SATURATION: 97 % | TEMPERATURE: 98 F

## 2024-10-22 DIAGNOSIS — Z01.818 ENCOUNTER FOR PRE-TRANSPLANT EVALUATION FOR LUNG TRANSPLANT: Primary | ICD-10-CM

## 2024-10-22 DIAGNOSIS — J96.11 CHRONIC HYPOXEMIC RESPIRATORY FAILURE (H): ICD-10-CM

## 2024-10-22 DIAGNOSIS — J84.10 PULMONARY FIBROSIS (H): ICD-10-CM

## 2024-10-22 PROCEDURE — G0463 HOSPITAL OUTPT CLINIC VISIT: HCPCS | Performed by: INTERNAL MEDICINE

## 2024-10-22 PROCEDURE — 99215 OFFICE O/P EST HI 40 MIN: CPT | Performed by: INTERNAL MEDICINE

## 2024-10-22 PROCEDURE — G0239 OTH RESP PROC, GROUP: HCPCS

## 2024-10-22 PROCEDURE — G2211 COMPLEX E/M VISIT ADD ON: HCPCS | Performed by: INTERNAL MEDICINE

## 2024-10-22 ASSESSMENT — PAIN SCALES - GENERAL: PAINLEVEL: MILD PAIN (3)

## 2024-10-22 NOTE — NURSING NOTE
Chief Complaint   Patient presents with    RECHECK     4 mo f/u       BP (!) 155/98   Pulse 85   Temp 98  F (36.7  C) (Oral)   Wt 76.1 kg (167 lb 11.2 oz)   LMP  (LMP Unknown)   SpO2 97%   BMI 31.69 kg/m      Bennie Tejeda on 10/22/2024 at 1:27 PM

## 2024-10-22 NOTE — PROGRESS NOTES
[facility] Lung Transplant Program          Date of Visit: October 22, 2024   Clinic Location: [facility], [facility], [facility].    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  PLAN:  Medications: Continue Ofev 100 mg twice daily, mycophenolate and prednisone taper per Dr. Keller.  Oxygen use: Continue oxygen supplementation 2 L with exertion only  Social support: Discussed importance of social support with daughter Zachariah who the patient lives with now.  Zachariah is also her .  Discussed lung transplant and its nuances in detail with the patient who asked a fair amount of well thought out questions and seemed to have good understanding of the process, if she were to be deemed a candidate for lung transplantation  Pulmonary rehabilitation: She is currently enrolled in rehab and reports it being beneficial. I have encouraged her to remain active with exercises and a healthy diet to maintain her conditioning, which is critical for lung health.   Lung Transplantation:  We discussed lung transplantation briefly and the indications for it including the presenting underlying diagnosis today. We discussed briefly the evaluation process, typical outcomes, the procedure, and the posthospital stay. Detailed discussion completed during first visit. She is clinically stable and without signs of progression, with good QoL. She is active at rehabiliation. She is currently early for  transplantation and can follow up with Dr Keller in ILD clinic. I will see her back in 6 months.    Immunization/preventive care: Up-to-date with preventive immunization-influenza, COVID x 4 shots, pneumonia vaccine, colonoscopy and mammogram.     I spent a total of 45 minutes reviewing chart (previous notes), reviewing test results, reviewing chest x rays and CT scans, talking with and examining patient, formulating plan, adjusting medications, and documentation of my findings and plan on the day of the encounter. Time excludes time spent for PFT.  Luis Galvin MD [facility]  Associate Professor of Medicine  Pulmonary, Allergy & Critical Care Division  [facility]  [facility] Lung Transplant Program        Chief Complaint:   Krystian Boland is a 73 year old year old female who is being seen for RECHECK (4 mo f/u)    HPI:    October 22, 2024 : Krystian Boland presents for follow up. She feels well, atually improved. She uses oxygen 2 liters with exertion. She is active at rehabilitation. No change in oxygen needs. She completed Neurology evaluation per our request.     Krystian Boland is a 73 year old  year old female who presents for lung transplant evaluation.  She is accompanied by her daughter Zachariah aden, who translates for her. Krystian is tibetan speaking only. She is followed by Dr. Keller for interstitial lung disease, that is suspected to be secondary to longstanding rheumatoid arthritis although she has minimal joint symptoms.  She was followed with Dr. Cross prior to that.  She has had longstanding interstitial lung disease secondary to rheumatoid arthritis although symptoms progressed in 2022 initially at which time she was placed on mycophenolate mofetil.  Ultimately she required hospitalization in 2023 for respiratory failure and was noted to have progressive fibrosis following rheumatoid flare.  Recently hospitalized 4/2024 again for respiratory failure-treated  with abx and steroids.  She has been on oxygen since last year.  However, daughter Zachariah reports that her breathing is improved since her hospitalization.  Currently, she wears oxygen only as needed with severe exertion.  She does not have an oxygen concentrator at home.  She wears her Inogen at pulsed dose, when she is moving with the highest setting of 5.  She is on prednisone (taper) and mycophenolate now.  It seems rituximab was discussed but has been held due to low-grade hepatitis B viremia and history of intestinal tuberculosis.  Regardless, her daughter reports improvement in her symptoms and pulmonary status since hospitalization.    ROS: 12 point ROS negative except mentioned in HPI.        Past Medical History:   Diagnosis Date    Benign essential hypertension     Early onset Alzheimer's dementia without behavioral disturbance (H)     ILD (interstitial lung disease) (H)     Obesity (BMI 30-39.9)     Osteopenia     Seropositive rheumatoid arthritis (H) 01/2023    Type 2 diabetes mellitus (H)               Past Surgical History:   Procedure Laterality Date    CATARACT EXTRACTION W/ INTRAOCULAR LENS IMPLANT Left 10/25/2021    CATARACT IOL, RT/LT Right 10/11/2021    CHOLECYSTECTOMY, OPEN  1990    HEMICOLECTOMY, RT/LT Right 1990        Social History     Tobacco Use    Smoking status: Never    Smokeless tobacco: Never   Vaping Use    Vaping status: Never Used   Substance Use Topics    Alcohol use: Never    Drug use: No                  Family History   Problem Relation Age of Onset    Dementia Mother     Cerebrovascular Disease Father     Cerebrovascular Disease Brother     Myocardial Infarction No family hx of     Coronary Artery Disease Early Onset No family hx of     Breast Cancer No family hx of     Ovarian Cancer No family hx of     Colon Cancer No family hx of     Glaucoma No family hx of     Macular Degeneration No family hx of     Liver Disease No family hx of           Any family history of ILD:         Current Outpatient Medications   Medication Sig Dispense Refill    amLODIPine (NORVASC) 5 MG tablet TAKE 1 TABLET BY MOUTH DAILY 90 tablet 2    aspirin 81 MG chewable tablet Take 1 tablet (81 mg) by mouth daily 90 tablet 2    benzonatate (TESSALON) 200 MG capsule TAKE 1 CAPSULE(200 MG) BY MOUTH THREE TIMES DAILY AS NEEDED FOR COUGH 90 capsule 0    famotidine (PEPCID) 40 MG tablet Take 1 tablet (40 mg) by mouth every evening 90 tablet 3    hydroxychloroquine (PLAQUENIL) 200 MG tablet Take 1 tablet (200 mg) by mouth 2 times daily Annual Plaquenil toxicity eye screening required. 180 tablet 3    hydrOXYzine (ATARAX) 10 MG tablet Take 1 tablet (10 mg) by mouth 3 times daily as needed for anxiety 30 tablet 1    hypromellose (GENTEAL SEVERE) ophthalmic gel 0.3% Place 1 drop into both eyes 4 times daily 10 g 11    ipratropium - albuterol 0.5 mg/2.5 mg/3 mL (DUONEB) 0.5-2.5 (3) MG/3ML neb solution Take 1 vial (3 mLs) by nebulization every 4 hours as needed for wheezing 90 mL 0    mycophenolate (GENERIC EQUIVALENT) 250 MG capsule TAKE FOUR CAPSULES BY MOUTH TWICE A  capsule 0    OFEV 100 MG capsule TAKE ONE CAPSULE BY MOUTH TWICE A DAY 60 capsule 11    pantoprazole (PROTONIX) 40 MG EC tablet Take 1 tablet (40 mg) by mouth daily (Patient taking differently: Take 40 mg by mouth as needed.) 90 tablet 3    predniSONE (DELTASONE) 5 MG tablet Take 1 tablet (5 mg) by mouth daily 30 tablet 5    STATIN NOT PRESCRIBED (INTENTIONAL) Please choose reason not prescribed, below      sulfamethoxazole-trimethoprim (BACTRIM DS) 800-160 MG tablet Take 1 tablet by mouth Every Mon, Wed, Fri Morning 30 tablet 0    Vitamin D3 50 mcg (2000 units) tablet TAKE 1 TABLET BY MOUTH DAILY 90 tablet 0    guaiFENesin (MUCINEX) 600 MG 12 hr tablet Take 600 mg by mouth 2 times daily as needed for congestion or cough (Patient not taking: Reported on 9/13/2024)      guaiFENesin (ROBITUSSIN) 20 mg/mL liquid Take 10 mLs by mouth every 4 hours as  needed for cough (Patient not taking: Reported on 9/13/2024) 236 mL 0    melatonin 3 MG tablet Take 1 tablet (3 mg) by mouth nightly as needed for sleep (Patient not taking: Reported on 9/13/2024)      sulfaSALAzine ER (AZULFIDINE EN) 500 MG EC tablet take 2 tabs twice a day. (Patient not taking: Reported on 9/13/2024) 360 tablet 3     No current facility-administered medications for this visit.                        Allergies   Allergen Reactions    Atorvastatin Muscle Pain (Myalgia)                 BP (!) 155/98   Pulse 85   Temp 98  F (36.7  C) (Oral)   Wt 76.1 kg (167 lb 11.2 oz)   LMP  (LMP Unknown)   SpO2 97%   BMI 31.69 kg/m       Body mass index is 31.69 kg/m .        Physical Examination    General: Well developed, well nourished, No apparent distress  Eyes: Anicteric  Ears: Hearing grossly normal  Mouth: Oral mucosa is moist, without any lesions.   Respiratory: Good air movement. No crackles. No rhonchi. No wheezes  Cardiac: RRR, normal S1, S2. No murmurs. No JVD  Abdomen: Soft, NT/ND  Musculoskeletal: Extremities normal. No clubbing. No cyanosis. No edema.  Skin: No rash on limited exam  Neuro: Normal mentation. Normal speech.  Psych:Normal affect         RESULTS:  PFTs:     6MWT:   5/9/24: 950 feet, 2 L  Echocardiogram:    The visual ejection fraction is 55-60%.  No regional wall motion abnormalities noted.  No significant valvular heart disease.  Note that image resolution on transthoracic echocardiogram not sufficient to  exclude endocarditis.  ______________________________________________________________________________  Left Ventricle  The left ventricle is normal in size. There is normal left ventricular wall  thickness. Diastolic Doppler findings (E/E' ratio and/or other parameters)  suggest left ventricular filling pressures are indeterminate. The visual  ejection fraction is 55-60%. No regional wall motion abnormalities noted.     Right Ventricle  The right ventricle is normal in size and  function.     Atria  Normal left atrial size. Right atrial size is normal. There is no color  Doppler evidence of an atrial shunt.     Mitral Valve  The mitral valve leaflets are mildly thickened. There is trace mitral  regurgitation.     Tricuspid Valve  There is trace tricuspid regurgitation. IVC diameter and respiratory changes  fall into an intermediate range suggesting an RA pressure of 8 mmHg.     Aortic Valve  There is trivial trileaflet aortic sclerosis. No aortic regurgitation is  present.     Pulmonic Valve  There is trace pulmonic valvular regurgitation.     Vessels  Normal size aorta. Aortic root dilatation is present.     Pericardium  There is no pericardial effusion.       Chest imaging:    CT CHEST PULMONARY EMBOLISM WITH CONTRAST 4/4/2024 11:20 AM     CLINICAL HISTORY: Chest pain. Elevated D-dimer, worsening shortness of  breath, interstitial lung disease, history of latent TB.     TECHNIQUE: CT angiogram chest during arterial phase injection IV  contrast. 2D and 3D MIP reconstructions were performed by the CT  technologist. Dose reduction techniques were used.      CONTRAST: 58 mL [id number]     COMPARISON: January 25, 2023     FINDINGS:  ANGIOGRAM CHEST: Opacified pulmonary arteries are normal caliber and  negative for pulmonary emboli. There is a gradual fading of contrast  in the medial right lower lobe compatible with decreased perfusion  and/or admixture artifact, pulmonary emboli here would be difficult to  entirely exclude. Thoracic aorta is negative for dissection. No CT  evidence of right heart strain.     LUNGS AND PLEURA: Moderate to severe peripheral and basilar fibrosis  with honeycombing and traction bronchiectasis similar to previous.  Question right lower lobe infiltrate and/or active inflammatory  change. No effusion.     MEDIASTINUM/AXILLAE: Mildly prominent lymph nodes noted which are  nonspecific similar to previous. No aneurysm.     CORONARY ARTERY CALCIFICATIONS: Mild.      UPPER ABDOMEN: No acute findings.     MUSCULOSKELETAL: No frankly destructive bony lesions.                                                                      IMPRESSION:  1.  No pulmonary embolism demonstrated.  2.  Question new right lower lobe pneumonia and/or active inflammatory  change superimposed on moderate to severe fibrosis.    I personally reviewed and interpreted the chest radiographs.    The longitudinal plan of care for post lung transplant and complications of post-transplant was addressed during this visit. Due to the added complexity in care, I will continue to support this patient in the subsequent management of this condition(s) and with the ongoing continuity of care of this condition

## 2024-10-22 NOTE — LETTER
10/22/2024      Krystian Boland  [id number]  [facility] [address]      Dear Colleague,    Thank you for referring your patient, Krystian Boland, to the [facility]. Please see a copy of my visit note below.      [facility] Lung Transplant Program          Date of Visit: October 22, 2024   Clinic Location: [facility], [facility], [facility].    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  PLAN:  Medications: Continue Ofev 100 mg twice daily, mycophenolate and prednisone taper per Dr. Keller.  Oxygen use: Continue oxygen supplementation 2 L with exertion only  Social support: Discussed importance of social support with daughter Zachariah who the patient lives with now.  Zachariah is also her .  Discussed lung transplant and its nuances in detail with the patient who asked a fair amount of well thought out questions and seemed to have good understanding of the process, if she were to be deemed a candidate for lung transplantation  Pulmonary rehabilitation: She completed pulmonary rehabilitation last year.  Given progressive lung disease and oxygen requirement, recommended that she enroll in pulmonary rehabilitation again to help with perception of dyspnea and medications deconditioned-this is currently scheduled for August 2024.   Lung Transplantation:  We discussed lung transplantation briefly and the indications for it including the presenting underlying  diagnosis today. We discussed briefly the evaluation process, typical outcomes, the procedure, and the posthospital stay. Detailed discussion completed during first visit. She is clinically stable and without signs of progression, with good QoL. She is active at rehabiliation. She is currently early for transplantation and can follow up with Dr Keller in ILD clinic. I will see her back in 6 months.    Immunization/preventive care: Up-to-date with preventive immunization-influenza, COVID x 4 shots, pneumonia vaccine, colonoscopy and mammogram.     I spent a total of 45 minutes reviewing chart (previous notes), reviewing test results, reviewing chest x rays and CT scans, talking with and examining patient, formulating plan, adjusting medications, and documentation of my findings and plan on the day of the encounter. Time excludes time spent for PFT.  Luis Galvin MD [facility]  Associate Professor of Medicine  Pulmonary, Allergy & Critical Care Division  [facility]  [facility] Lung Transplant [facility]        Chief Complaint:   Krystian Boland is a 73 year old year old female who is being seen for RECHECK (4 mo f/u)    HPI:    October 22, 2024 : Krystian Boland presents for follow up. She feels well, atually improved. She uses oxygen 2 liters with exertion. She is active at rehabilitation. No change in oxygen needs. She completed Neurology evaluation per our request.     Krystian Boland is a 73 year old  year old female who presents for lung transplant evaluation.  She is accompanied by her daughter Zachariah aden, who translates for her. Krystian is tibetan speaking only. She is followed by Dr. Keller for interstitial lung disease, that is suspected to be secondary to longstanding rheumatoid arthritis although she has minimal joint symptoms.  She was followed with Dr. Cross prior to that.  She has had longstanding interstitial lung disease secondary to rheumatoid arthritis although  symptoms progressed in 2022 initially at which time she was placed on mycophenolate mofetil.  Ultimately she required hospitalization in 2023 for respiratory failure and was noted to have progressive fibrosis following rheumatoid flare.  Recently hospitalized 4/2024 again for respiratory failure-treated with abx and steroids.  She has been on oxygen since last year.  However, daughter Zachariah reports that her breathing is improved since her hospitalization.  Currently, she wears oxygen only as needed with severe exertion.  She does not have an oxygen concentrator at home.  She wears her Inogen at pulsed dose, when she is moving with the highest setting of 5.  She is on prednisone (taper) and mycophenolate now.  It seems rituximab was discussed but has been held due to low-grade hepatitis B viremia and history of intestinal tuberculosis.  Regardless, her daughter reports improvement in her symptoms and pulmonary status since hospitalization.    ROS: 12 point ROS negative except mentioned in HPI.        Past Medical History:   Diagnosis Date     Benign essential hypertension      Early onset Alzheimer's dementia without behavioral disturbance (H)      ILD (interstitial lung disease) (H)      Obesity (BMI 30-39.9)      Osteopenia      Seropositive rheumatoid arthritis (H) 01/2023     Type 2 diabetes mellitus (H)               Past Surgical History:   Procedure Laterality Date     CATARACT EXTRACTION W/ INTRAOCULAR LENS IMPLANT Left 10/25/2021     CATARACT IOL, RT/LT Right 10/11/2021     CHOLECYSTECTOMY, OPEN  1990     HEMICOLECTOMY, RT/LT Right 1990        Social History     Tobacco Use     Smoking status: Never     Smokeless tobacco: Never   Vaping Use     Vaping status: Never Used   Substance Use Topics     Alcohol use: Never     Drug use: No                  Family History   Problem Relation Age of Onset     Dementia Mother      Cerebrovascular Disease Father      Cerebrovascular Disease Brother      Myocardial  Infarction No family hx of      Coronary Artery Disease Early Onset No family hx of      Breast Cancer No family hx of      Ovarian Cancer No family hx of      Colon Cancer No family hx of      Glaucoma No family hx of      Macular Degeneration No family hx of      Liver Disease No family hx of           Any family history of ILD:        Current Outpatient Medications   Medication Sig Dispense Refill     amLODIPine (NORVASC) 5 MG tablet TAKE 1 TABLET BY MOUTH DAILY 90 tablet 2     aspirin 81 MG chewable tablet Take 1 tablet (81 mg) by mouth daily 90 tablet 2     benzonatate (TESSALON) 200 MG capsule TAKE 1 CAPSULE(200 MG) BY MOUTH THREE TIMES DAILY AS NEEDED FOR COUGH 90 capsule 0     famotidine (PEPCID) 40 MG tablet Take 1 tablet (40 mg) by mouth every evening 90 tablet 3     hydroxychloroquine (PLAQUENIL) 200 MG tablet Take 1 tablet (200 mg) by mouth 2 times daily Annual Plaquenil toxicity eye screening required. 180 tablet 3     hydrOXYzine (ATARAX) 10 MG tablet Take 1 tablet (10 mg) by mouth 3 times daily as needed for anxiety 30 tablet 1     hypromellose (GENTEAL SEVERE) ophthalmic gel 0.3% Place 1 drop into both eyes 4 times daily 10 g 11     ipratropium - albuterol 0.5 mg/2.5 mg/3 mL (DUONEB) 0.5-2.5 (3) MG/3ML neb solution Take 1 vial (3 mLs) by nebulization every 4 hours as needed for wheezing 90 mL 0     mycophenolate (GENERIC EQUIVALENT) 250 MG capsule TAKE FOUR CAPSULES BY MOUTH TWICE A  capsule 0     OFEV 100 MG capsule TAKE ONE CAPSULE BY MOUTH TWICE A DAY 60 capsule 11     pantoprazole (PROTONIX) 40 MG EC tablet Take 1 tablet (40 mg) by mouth daily (Patient taking differently: Take 40 mg by mouth as needed.) 90 tablet 3     predniSONE (DELTASONE) 5 MG tablet Take 1 tablet (5 mg) by mouth daily 30 tablet 5     STATIN NOT PRESCRIBED (INTENTIONAL) Please choose reason not prescribed, below       sulfamethoxazole-trimethoprim (BACTRIM DS) 800-160 MG tablet Take 1 tablet by mouth Every Mon, Wed, Fri  Morning 30 tablet 0     Vitamin D3 50 mcg (2000 units) tablet TAKE 1 TABLET BY MOUTH DAILY 90 tablet 0     guaiFENesin (MUCINEX) 600 MG 12 hr tablet Take 600 mg by mouth 2 times daily as needed for congestion or cough (Patient not taking: Reported on 9/13/2024)       guaiFENesin (ROBITUSSIN) 20 mg/mL liquid Take 10 mLs by mouth every 4 hours as needed for cough (Patient not taking: Reported on 9/13/2024) 236 mL 0     melatonin 3 MG tablet Take 1 tablet (3 mg) by mouth nightly as needed for sleep (Patient not taking: Reported on 9/13/2024)       sulfaSALAzine ER (AZULFIDINE EN) 500 MG EC tablet take 2 tabs twice a day. (Patient not taking: Reported on 9/13/2024) 360 tablet 3     No current facility-administered medications for this visit.                        Allergies   Allergen Reactions     Atorvastatin Muscle Pain (Myalgia)                 BP (!) 155/98   Pulse 85   Temp 98  F (36.7  C) (Oral)   Wt 76.1 kg (167 lb 11.2 oz)   LMP  (LMP Unknown)   SpO2 97%   BMI 31.69 kg/m       Body mass index is 31.69 kg/m .        Physical Examination    General: Well developed, well nourished, No apparent distress  Eyes: Anicteric  Ears: Hearing grossly normal  Mouth: Oral mucosa is moist, without any lesions.   Respiratory: Good air movement. No crackles. No rhonchi. No wheezes  Cardiac: RRR, normal S1, S2. No murmurs. No JVD  Abdomen: Soft, NT/ND  Musculoskeletal: Extremities normal. No clubbing. No cyanosis. No edema.  Skin: No rash on limited exam  Neuro: Normal mentation. Normal speech.  Psych:Normal affect         RESULTS:  PFTs:     6MWT:   5/9/24: 950 feet, 2 L  Echocardiogram:    The visual ejection fraction is 55-60%.  No regional wall motion abnormalities noted.  No significant valvular heart disease.  Note that image resolution on transthoracic echocardiogram not sufficient to  exclude endocarditis.  ______________________________________________________________________________  Left Ventricle  The left  ventricle is normal in size. There is normal left ventricular wall  thickness. Diastolic Doppler findings (E/E' ratio and/or other parameters)  suggest left ventricular filling pressures are indeterminate. The visual  ejection fraction is 55-60%. No regional wall motion abnormalities noted.     Right Ventricle  The right ventricle is normal in size and function.     Atria  Normal left atrial size. Right atrial size is normal. There is no color  Doppler evidence of an atrial shunt.     Mitral Valve  The mitral valve leaflets are mildly thickened. There is trace mitral  regurgitation.     Tricuspid Valve  There is trace tricuspid regurgitation. IVC diameter and respiratory changes  fall into an intermediate range suggesting an RA pressure of 8 mmHg.     Aortic Valve  There is trivial trileaflet aortic sclerosis. No aortic regurgitation is  present.     Pulmonic Valve  There is trace pulmonic valvular regurgitation.     Vessels  Normal size aorta. Aortic root dilatation is present.     Pericardium  There is no pericardial effusion.       Chest imaging:    CT CHEST PULMONARY EMBOLISM WITH CONTRAST 4/4/2024 11:20 AM     CLINICAL HISTORY: Chest pain. Elevated D-dimer, worsening shortness of  breath, interstitial lung disease, history of latent TB.     TECHNIQUE: CT angiogram chest during arterial phase injection IV  contrast. 2D and 3D MIP reconstructions were performed by the CT  technologist. Dose reduction techniques were used.      CONTRAST: 58 mL [id number]     COMPARISON: January 25, 2023     FINDINGS:  ANGIOGRAM CHEST: Opacified pulmonary arteries are normal caliber and  negative for pulmonary emboli. There is a gradual fading of contrast  in the medial right lower lobe compatible with decreased perfusion  and/or admixture artifact, pulmonary emboli here would be difficult to  entirely exclude. Thoracic aorta is negative for dissection. No CT  evidence of right heart strain.     LUNGS AND PLEURA: Moderate to severe  peripheral and basilar fibrosis  with honeycombing and traction bronchiectasis similar to previous.  Question right lower lobe infiltrate and/or active inflammatory  change. No effusion.     MEDIASTINUM/AXILLAE: Mildly prominent lymph nodes noted which are  nonspecific similar to previous. No aneurysm.     CORONARY ARTERY CALCIFICATIONS: Mild.     UPPER ABDOMEN: No acute findings.     MUSCULOSKELETAL: No frankly destructive bony lesions.                                                                      IMPRESSION:  1.  No pulmonary embolism demonstrated.  2.  Question new right lower lobe pneumonia and/or active inflammatory  change superimposed on moderate to severe fibrosis.    I personally reviewed and interpreted the chest radiographs.    The longitudinal plan of care for post lung transplant and complications of post-transplant was addressed during this visit. Due to the added complexity in care, I will continue to support this patient in the subsequent management of this condition(s) and with the ongoing continuity of care of this condition          Again, thank you for allowing me to participate in the care of your patient.        Sincerely,        Luis Galvin MD

## 2024-10-24 ENCOUNTER — HOSPITAL ENCOUNTER (OUTPATIENT)
Dept: CARDIAC REHAB | Facility: CLINIC | Age: 74
Discharge: HOME | End: 2024-10-24
Attending: INTERNAL MEDICINE
Payer: COMMERCIAL

## 2024-10-24 PROCEDURE — 94625 PHY/QHP OP PULM RHB W/O MNTR: CPT

## 2024-10-24 PROCEDURE — G0239 OTH RESP PROC, GROUP: HCPCS

## 2024-10-29 ENCOUNTER — HOSPITAL ENCOUNTER (OUTPATIENT)
Dept: CARDIAC REHAB | Facility: CLINIC | Age: 74
Discharge: HOME | End: 2024-10-29
Attending: INTERNAL MEDICINE
Payer: COMMERCIAL

## 2024-10-29 PROCEDURE — G0239 OTH RESP PROC, GROUP: HCPCS

## 2024-10-31 ENCOUNTER — PATIENT OUTREACH (OUTPATIENT)
Dept: GERIATRIC MEDICINE | Facility: CLINIC | Age: 74
End: 2024-10-31
Payer: COMMERCIAL

## 2024-10-31 ENCOUNTER — HOSPITAL ENCOUNTER (OUTPATIENT)
Dept: CARDIAC REHAB | Facility: CLINIC | Age: 74
Discharge: HOME | End: 2024-10-31
Attending: INTERNAL MEDICINE
Payer: COMMERCIAL

## 2024-10-31 PROCEDURE — G0239 OTH RESP PROC, GROUP: HCPCS | Performed by: REHABILITATION PRACTITIONER

## 2024-10-31 NOTE — PROGRESS NOTES
[facility] Care Coordination Contact      [facility] Six-Month Telephone Assessment    6 month telephone assessment completed on 10/31/2024. Completed 6 month assessment     ER visits: No  Hospitalizations: No  TCU stays: No  Significant health status changes: Daughter shared that member met with a transplant MD regarding a possible lung transplant. Daughter shared at this time member is stable and will not be pursing the transplant. Daughter shared that the MD did tell member that after the age of 77 she would not be eligible for a transplant. Daughter shared that member's SOB has been the same and has not changed much. Daughter shared that she needs to regularly remind member to wear her O2.   Falls/Injuries: No  ADL/IADL changes: No  Changes in services: No. Reviewed that the most recent CDCS report indicates that member is below in spending. Daughter shared that family is using the time appropriately and they are following the guidelines.    Caregiver Assessment follow up:  Asked daughter how she is doing with management of member's care needs. Daughter shared that she is managing. Inquired if there was anything further that she needed and she shared that there are no current needs.     Goals: See Support Plan for goal progress documentation.      Will see member in 6 months for an annual health risk assessment.   Encouraged member to call CC with any questions or concerns in the meantime.     JAQUELIN Langley  [facility]  [phone]

## 2024-11-05 ENCOUNTER — HOSPITAL ENCOUNTER (OUTPATIENT)
Dept: CARDIAC REHAB | Facility: CLINIC | Age: 74
Discharge: HOME | End: 2024-11-05
Attending: INTERNAL MEDICINE
Payer: COMMERCIAL

## 2024-11-05 PROCEDURE — G0239 OTH RESP PROC, GROUP: HCPCS

## 2024-11-07 ENCOUNTER — HOSPITAL ENCOUNTER (OUTPATIENT)
Dept: CARDIAC REHAB | Facility: CLINIC | Age: 74
Discharge: HOME | End: 2024-11-07
Attending: INTERNAL MEDICINE
Payer: COMMERCIAL

## 2024-11-07 PROCEDURE — G0239 OTH RESP PROC, GROUP: HCPCS | Performed by: REHABILITATION PRACTITIONER

## 2024-11-12 ENCOUNTER — HOSPITAL ENCOUNTER (OUTPATIENT)
Dept: CARDIAC REHAB | Facility: CLINIC | Age: 74
Discharge: HOME | End: 2024-11-12
Attending: INTERNAL MEDICINE
Payer: COMMERCIAL

## 2024-11-12 PROCEDURE — G0239 OTH RESP PROC, GROUP: HCPCS

## 2024-11-12 PROCEDURE — G0238 OTH RESP PROC, INDIV: HCPCS

## 2024-11-14 ENCOUNTER — MYC MEDICAL ADVICE (OUTPATIENT)
Dept: PULMONOLOGY | Facility: CLINIC | Age: 74
End: 2024-11-14

## 2024-11-14 ENCOUNTER — HOSPITAL ENCOUNTER (OUTPATIENT)
Dept: CARDIAC REHAB | Facility: CLINIC | Age: 74
Discharge: HOME | End: 2024-11-14
Attending: INTERNAL MEDICINE
Payer: COMMERCIAL

## 2024-11-14 ENCOUNTER — OFFICE VISIT (OUTPATIENT)
Dept: PULMONOLOGY | Facility: CLINIC | Age: 74
End: 2024-11-14
Attending: INTERNAL MEDICINE
Payer: COMMERCIAL

## 2024-11-14 ENCOUNTER — LAB (OUTPATIENT)
Dept: LAB | Facility: CLINIC | Age: 74
End: 2024-11-14
Attending: INTERNAL MEDICINE
Payer: COMMERCIAL

## 2024-11-14 VITALS
BODY MASS INDEX: 31.42 KG/M2 | DIASTOLIC BLOOD PRESSURE: 84 MMHG | HEIGHT: 61 IN | HEART RATE: 91 BPM | WEIGHT: 166.4 LBS | SYSTOLIC BLOOD PRESSURE: 142 MMHG | OXYGEN SATURATION: 98 %

## 2024-11-14 DIAGNOSIS — J84.9 ILD (INTERSTITIAL LUNG DISEASE) (H): ICD-10-CM

## 2024-11-14 DIAGNOSIS — B18.1 CHRONIC HEPATITIS B (H): ICD-10-CM

## 2024-11-14 DIAGNOSIS — Z79.899 HIGH RISK MEDICATION USE: ICD-10-CM

## 2024-11-14 LAB
ALBUMIN SERPL BCG-MCNC: 4.1 G/DL (ref 3.5–5.2)
ALP SERPL-CCNC: 86 U/L (ref 40–150)
ALT SERPL W P-5'-P-CCNC: 43 U/L (ref 0–50)
ANION GAP SERPL CALCULATED.3IONS-SCNC: 8 MMOL/L (ref 7–15)
AST SERPL W P-5'-P-CCNC: 36 U/L (ref 0–45)
BILIRUB SERPL-MCNC: 0.4 MG/DL
BUN SERPL-MCNC: 15.9 MG/DL (ref 8–23)
CALCIUM SERPL-MCNC: 9.6 MG/DL (ref 8.8–10.4)
CHLORIDE SERPL-SCNC: 99 MMOL/L (ref 98–107)
CREAT SERPL-MCNC: 0.79 MG/DL (ref 0.51–0.95)
CRP SERPL-MCNC: <3 MG/L
EGFRCR SERPLBLD CKD-EPI 2021: 79 ML/MIN/1.73M2
GLUCOSE SERPL-MCNC: 203 MG/DL (ref 70–99)
HCO3 SERPL-SCNC: 28 MMOL/L (ref 22–29)
POTASSIUM SERPL-SCNC: 4.2 MMOL/L (ref 3.4–5.3)
PROT SERPL-MCNC: 7.3 G/DL (ref 6.4–8.3)
SODIUM SERPL-SCNC: 135 MMOL/L (ref 135–145)

## 2024-11-14 PROCEDURE — 36415 COLL VENOUS BLD VENIPUNCTURE: CPT | Performed by: PATHOLOGY

## 2024-11-14 PROCEDURE — 87517 HEPATITIS B DNA QUANT: CPT | Performed by: INTERNAL MEDICINE

## 2024-11-14 PROCEDURE — 99000 SPECIMEN HANDLING OFFICE-LAB: CPT | Performed by: PATHOLOGY

## 2024-11-14 PROCEDURE — 80053 COMPREHEN METABOLIC PANEL: CPT | Performed by: PATHOLOGY

## 2024-11-14 PROCEDURE — G0463 HOSPITAL OUTPT CLINIC VISIT: HCPCS | Performed by: INTERNAL MEDICINE

## 2024-11-14 PROCEDURE — 86140 C-REACTIVE PROTEIN: CPT | Performed by: PATHOLOGY

## 2024-11-14 PROCEDURE — G0239 OTH RESP PROC, GROUP: HCPCS | Performed by: REHABILITATION PRACTITIONER

## 2024-11-14 RX ORDER — MYCOPHENOLATE MOFETIL 250 MG/1
1000 CAPSULE ORAL
Qty: 720 CAPSULE | Refills: 0 | Status: SHIPPED | OUTPATIENT
Start: 2024-11-14

## 2024-11-14 NOTE — PATIENT INSTRUCTIONS
Reduce prednisone to 2.5 mg po daily for one week, then 2.5 mg po every other day for 1 week, anticipate to be off prednisone by the end of the month.     Please call our direct ILD nurses line for questions and concerns: [phone]    For scheduling, please call: [phone]

## 2024-11-14 NOTE — PROGRESS NOTES
HISTORY OF PRESENT ILLNESS:  Krystian is a 72-year-old female with interstitial lung disease secondary to rheumatoid arthritis.  Her last visit was with me 10/26/2022. Serial PFTs monitoring was being pursued while receiving ofev, which remains currently at 100 mg BID due not tolerating the 150 dose with nausea. She is also on HCQN and sulfasalazine with fair joint symptom control. Her daughter is an RN and translating for her. As her respiratory symptoms progressed, I started her on MMF in 10/2022. In late November, she had acute respiratory failure requiring hospitalization, and CT was suggestive for rheumatoid lung flare with superimposed progressive fibrosis. She was discharged on oxygen, which she was able to discontinue a few weeks ago. She had only 3 days interruption in MMF, and now on 1000 BID. She has exertional dyspnea but seems to be better compared to 5/2022, and daughter thinks that she is starting to respond to MMF. No symptoms to suggest infection. RTX has been entertained with rheumatology.     Updates from 4/21/23  Patient returns for follow-up, we have previously discussed with rheumatology initiating rituximab for her rheumatoid lung, and performed routine infectious work-up which turned out positive for TB QuantiFERON.  She was born and raised in [facility] (incidence [place] and expresses no symptoms of active TB. CT 1/2023 also did not suggest active TB. She feels well today with no change in baseline dyspnea on exertion. Not using oxygen at home. Six min walk showed 2 LPM need with activity, she does have a concentrator and tanks.     Updates from 11/1/23  Krystian returns for follow up with her daughter who interprets for her (ICU RN). She completed pulmonary rehab. Feels 15% better in terms of activity level. Using O2 with exertion at 2 LPM. Tolerating MMF 1000 BID and Ofev 100 BID without side effects. No symptoms to suggest infection. Received flu vaccine for this season recently. Last  "saw rheumatology August with no changes in plan.    Updates from 5/9/24  Krystian returns for follow up. She was recently admitted in early 4/2024 for acute on chronic hypoxic respiratory failure, and CT scan findings suggestive of ILD flare. She was discharged within 3-4 days and did not require more than 5 LPM maximum, back to baseline O2 at home which is 2 LPM at rest. She received high dose steroids in the hospital and discharge on slow prednisone taper which is now at 10 mg. She continues to take MMF 1000 BID with good tolerance as well as ofev 100 mg po BID. She has dry cough that is rarely productive of white sputum. She has no symptoms to suggest infection. She thinks her musculoskeletal symptoms are under good control.     Updates from 11/14/24:  Krystian is here today for follow up. Her symptoms continue to be stable, able to walk from bed to bathroom with no O2 but any activity more than that would need 2 LPM via her portable concentrator. She is not using O2 at night as when the cannula drops off, the machine starts beeping and she can't sleep. She is only getting 3-4 hours of sleep per nighttime but mainly due to insomnia rather than respiratory symptoms, and does not seem to be too bothered by it. She denies new onset cough or sputum production. She continues to tolerate Ofev 100 bid and MMF 1000 bid, she is still on 5 mg of prednisone which was tapered down from 10 mg last visit without change in O2 requirements or symptoms. No active hand joint symptoms.     PHYSICAL EXAMINATION:    VITAL SIGNS:  BP (!) 142/84   Pulse 91   Ht 1.549 m (5' 1\")   Wt 75.5 kg (166 lb 6.4 oz)   LMP  (LMP Unknown)   SpO2 98%   BMI 31.44 kg/m        HEENT:  Eyes are anicteric.  Mouth and throat without lesions.  CHEST:  Crackles at least FPC up bilaterally, no wheezes.   HEART:  Shows regular rate and rhythm.  Normal S1, S2.  EXTREMITIES: BL digital clubbing.  SKIN:  No rash.    PULMONARY FUNCTION TESTS     My " interpretation: Severe intra-thoracic restrictive disease, no obstruction. Stable compared to 2022/2023. Decrease in spirometry values in the last 5-8 years can be explained by normal aging per se.       My interpretation: improvement in intra-thoracic restrictive disease, currently classified as moderate in severity.     Reviewed HRCT chest images with patient and her daughter, 1/25/23 compared to 11/28/22 and 5/2022  Advancement in honeycombing and fibrotic findings with residual dense GGO/consolidative areas that are minimal compared to November. November's scan showed extensive GGO (now resolved) suggestive of CTD ILD flare.         Reviewed CTA chest images performed 4/4/24, agreed with radiology:  FINDINGS:  ANGIOGRAM CHEST: Opacified pulmonary arteries are normal caliber and  negative for pulmonary emboli. There is a gradual fading of contrast  in the medial right lower lobe compatible with decreased perfusion  and/or admixture artifact, pulmonary emboli here would be difficult to  entirely exclude. Thoracic aorta is negative for dissection. No CT  evidence of right heart strain.     LUNGS AND PLEURA: Moderate to severe peripheral and basilar fibrosis  with honeycombing and traction bronchiectasis similar to previous.  Question right lower lobe infiltrate and/or active inflammatory  change. No effusion.     MEDIASTINUM/AXILLAE: Mildly prominent lymph nodes noted which are  nonspecific similar to previous. No aneurysm.     CORONARY ARTERY CALCIFICATIONS: Mild.     UPPER ABDOMEN: No acute findings.     MUSCULOSKELETAL: No frankly destructive bony lesions.                                                                      IMPRESSION:  1.  No pulmonary embolism demonstrated.  2.  Question new right lower lobe pneumonia and/or active inflammatory  change superimposed on moderate to severe fibrosis.       ASSESSMENT AND PLAN:   1. RA ILD, rapidly progressive with flare in 11/2022 and recurrent mild flare 4/2024  on MMF and ofev 100 bid  2. Chronic hypoxemic respiratory failure, on O2 with exertion   3. DM   4. HTN   5. Latent TB?, treated intestinal TB in [place] 1980s (combination pill)     Krystian is a 74-year-old female with interstitial lung disease secondary to RA. Her RA is being treated with sulfasalazine and hydroxychloroquine.  She saw Lung Transplant Dr Galvin 10/2024 and given stability a lung transplant evaluation is currently not being considered but will be re-evaluated in 6 months. She continues to tolerate Ofev 100 BID as well (did not tolerate 150 for nausea) and MMF 1000 bid, with no side effects and normal CBC and CMP 9/2024. PFTs show stability actually despite a  flare in 4/2024. Rituximab was previously discussed with Dr. Staley, but she has active hepatitis B and latent TB, and this was discussed with hepatology and ID and we had a plan to treat with tenofovir and latent TB treatment however the patient and her family decided not to pursue this to avoid complexity of treatment plan with immunosuppression, which I supported. Most of her lung disease is fibrotic at this stage so even if we do rituximab we will not be able to recover normal lung function.     Plan:   - Follow up with ID and GI regarding latent TB and Hep B, respectively   - Continue current dose MMF 1000 BID   - Added CMP to labs from this AM   - Reduce prednisone from 5 mg to 2.5 mg for 1 week then 2.5 mg po q48 hours for 1 week then stop   - Continue compliance with O2 whenever performing outside activities/long walks, with pulse ox monitoring   - Continue current dose ofev 100 (had nausea with 150 BID)  - Keep q3 months labs  - Return in 6 months with spirometry and six mins walk       Jase Keller MD    Total time spent on the day of the visit was 30 minutes.

## 2024-11-14 NOTE — NURSING NOTE
LVM for Amos at [facility] re: letter pt has been receiving monthly since August 2024 from [facility] stating she has reached her limit for her plan and DME medical supplies are not covered. Waiting for return call.       Has had POC for 5 years and daughter has always changed the filters and bought her own tubing. Encouraged pt daughter Zachariah to reach out to [facility] for routine mainenance and supplies.     Nuvia Segal, RN, BSN  ILD Nurse   [phone]

## 2024-11-14 NOTE — LETTER
11/14/2024      Krystian Boalnd  [id number]  [facility] [address]      Dear Colleague,    Thank you for referring your patient, Krystian Boland, to the [facility] FOR LUNG SCIENCE AND [facility] [place]. Please see a copy of my visit note below.    HISTORY OF PRESENT ILLNESS:  Krystian is a 72-year-old female with interstitial lung disease secondary to rheumatoid arthritis.  Her last visit was with me 10/26/2022. Serial PFTs monitoring was being pursued while receiving ofev, which remains currently at 100 mg BID due not tolerating the 150 dose with nausea. She is also on HCQN and sulfasalazine with fair joint symptom control. Her daughter is an RN and translating for her. As her respiratory symptoms progressed, I started her on MMF in 10/2022. In late November, she had acute respiratory failure requiring hospitalization, and CT was suggestive for rheumatoid lung flare with superimposed progressive fibrosis. She was discharged on oxygen, which she was able to discontinue a few weeks ago. She had only 3 days interruption in MMF, and now on 1000 BID. She has exertional dyspnea but seems to be better compared to 5/2022, and daughter thinks that she is starting to respond to MMF. No symptoms to suggest infection. RTX has been entertained with rheumatology.     Updates from 4/21/23  Patient returns for follow-up, we have previously discussed with rheumatology initiating rituximab for her rheumatoid lung, and performed routine infectious work-up which turned out positive for TB QuantiFERON.  She was born and raised in [facility] (incidence [place] and expresses no symptoms of active TB. CT 1/2023 also did not suggest active TB. She feels well today with no change in baseline dyspnea on exertion. Not using oxygen at home. Six min walk showed 2 LPM need with activity, she does have a concentrator and tanks.     Updates from 11/1/23  Krystian returns for follow up with her daughter who  "interprets for her (ICU RN). She completed pulmonary rehab. Feels 15% better in terms of activity level. Using O2 with exertion at 2 LPM. Tolerating MMF 1000 BID and Ofev 100 BID without side effects. No symptoms to suggest infection. Received flu vaccine for this season recently. Last saw rheumatology August with no changes in plan.    Updates from 5/9/24  Krystian returns for follow up. She was recently admitted in early 4/2024 for acute on chronic hypoxic respiratory failure, and CT scan findings suggestive of ILD flare. She was discharged within 3-4 days and did not require more than 5 LPM maximum, back to baseline O2 at home which is 2 LPM at rest. She received high dose steroids in the hospital and discharge on slow prednisone taper which is now at 10 mg. She continues to take MMF 1000 BID with good tolerance as well as ofev 100 mg po BID. She has dry cough that is rarely productive of white sputum. She has no symptoms to suggest infection. She thinks her musculoskeletal symptoms are under good control.     Updates from 11/14/24:  Krystian is here today for follow up. Her symptoms continue to be stable, able to walk from bed to bathroom with no O2 but any activity more than that would need 2 LPM via her portable concentrator. She is not using O2 at night as when the cannula drops off, the machine starts beeping and she can't sleep. She is only getting 3-4 hours of sleep per nighttime but mainly due to insomnia rather than respiratory symptoms, and does not seem to be too bothered by it. She denies new onset cough or sputum production. She continues to tolerate Ofev 100 bid and MMF 1000 bid, she is still on 5 mg of prednisone which was tapered down from 10 mg last visit without change in O2 requirements or symptoms. No active hand joint symptoms.     PHYSICAL EXAMINATION:    VITAL SIGNS:  BP (!) 142/84   Pulse 91   Ht 1.549 m (5' 1\")   Wt 75.5 kg (166 lb 6.4 oz)   LMP  (LMP Unknown)   SpO2 98%   BMI 31.44 " kg/m        HEENT:  Eyes are anicteric.  Mouth and throat without lesions.  CHEST:  Crackles at least alf up bilaterally, no wheezes.   HEART:  Shows regular rate and rhythm.  Normal S1, S2.  EXTREMITIES: BL digital clubbing.  SKIN:  No rash.    PULMONARY FUNCTION TESTS     My interpretation: Severe intra-thoracic restrictive disease, no obstruction. Stable compared to 2022/2023. Decrease in spirometry values in the last 5-8 years can be explained by normal aging per se.       My interpretation: improvement in intra-thoracic restrictive disease, currently classified as moderate in severity.     Reviewed HRCT chest images with patient and her daughter, 1/25/23 compared to 11/28/22 and 5/2022  Advancement in honeycombing and fibrotic findings with residual dense GGO/consolidative areas that are minimal compared to November. November's scan showed extensive GGO (now resolved) suggestive of CTD ILD flare.         Reviewed CTA chest images performed 4/4/24, agreed with radiology:  FINDINGS:  ANGIOGRAM CHEST: Opacified pulmonary arteries are normal caliber and  negative for pulmonary emboli. There is a gradual fading of contrast  in the medial right lower lobe compatible with decreased perfusion  and/or admixture artifact, pulmonary emboli here would be difficult to  entirely exclude. Thoracic aorta is negative for dissection. No CT  evidence of right heart strain.     LUNGS AND PLEURA: Moderate to severe peripheral and basilar fibrosis  with honeycombing and traction bronchiectasis similar to previous.  Question right lower lobe infiltrate and/or active inflammatory  change. No effusion.     MEDIASTINUM/AXILLAE: Mildly prominent lymph nodes noted which are  nonspecific similar to previous. No aneurysm.     CORONARY ARTERY CALCIFICATIONS: Mild.     UPPER ABDOMEN: No acute findings.     MUSCULOSKELETAL: No frankly destructive bony lesions.                                                                       IMPRESSION:  1.  No pulmonary embolism demonstrated.  2.  Question new right lower lobe pneumonia and/or active inflammatory  change superimposed on moderate to severe fibrosis.       ASSESSMENT AND PLAN:   1. RA ILD, rapidly progressive with flare in 11/2022 and recurrent mild flare 4/2024 on MMF and ofev 100 bid  2. Chronic hypoxemic respiratory failure, on O2 with exertion   3. DM   4. HTN   5. Latent TB?, treated intestinal TB in [place] 1980s (combination pill)     Krystian is a 74-year-old female with interstitial lung disease secondary to RA. Her RA is being treated with sulfasalazine and hydroxychloroquine.  She saw Lung Transplant Dr Galvin 10/2024 and given stability a lung transplant evaluation is currently not being considered but will be re-evaluated in 6 months. She continues to tolerate Ofev 100 BID as well (did not tolerate 150 for nausea) and MMF 1000 bid, with no side effects and normal CBC and CMP 9/2024. PFTs show stability actually despite a  flare in 4/2024. Rituximab was previously discussed with Dr. Staley, but she has active hepatitis B and latent TB, and this was discussed with hepatology and ID and we had a plan to treat with tenofovir and latent TB treatment however the patient and her family decided not to pursue this to avoid complexity of treatment plan with immunosuppression, which I supported. Most of her lung disease is fibrotic at this stage so even if we do rituximab we will not be able to recover normal lung function.     Plan:   - Follow up with ID and GI regarding latent TB and Hep B, respectively   - Continue current dose MMF 1000 BID   - Added CMP to labs from this AM   - Reduce prednisone from 5 mg to 2.5 mg for 1 week then 2.5 mg po q48 hours for 1 week then stop   - Continue compliance with O2 whenever performing outside activities/long walks, with pulse ox monitoring   - Continue current dose ofev 100 (had nausea with 150 BID)  - Keep q3 months labs  - Return in 6 months  with spirometry and six mins walk       Jase Keller MD    Total time spent on the day of the visit was 30 minutes.       Again, thank you for allowing me to participate in the care of your patient.        Sincerely,        Jase Keller MD

## 2024-11-15 ENCOUNTER — MYC MEDICAL ADVICE (OUTPATIENT)
Dept: PULMONOLOGY | Facility: CLINIC | Age: 74
End: 2024-11-15
Payer: COMMERCIAL

## 2024-11-15 ENCOUNTER — TELEPHONE (OUTPATIENT)
Dept: PULMONOLOGY | Facility: CLINIC | Age: 74
End: 2024-11-15
Payer: COMMERCIAL

## 2024-11-15 DIAGNOSIS — J84.9 ILD (INTERSTITIAL LUNG DISEASE) (H): Primary | ICD-10-CM

## 2024-11-15 DIAGNOSIS — M05.10 RHEUMATOID LUNG (H): ICD-10-CM

## 2024-11-15 LAB
EXPTIME-PRE: 3.4 SEC
FEF2575-%PRED-PRE: 245 %
FEF2575-PRE: 3.99 L/SEC
FEF2575-PRED: 1.62 L/SEC
FEFMAX-%PRED-PRE: 117 %
FEFMAX-PRE: 6.05 L/SEC
FEFMAX-PRED: 5.16 L/SEC
FEV1-%PRED-PRE: 78 %
FEV1-PRE: 1.46 L
FEV1FEV6-PRE: 97 %
FEV1FEV6-PRED: 79 %
FEV1FVC-PRE: 97 %
FEV1FVC-PRED: 79 %
FIFMAX-PRE: 5.34 L/SEC
FVC-%PRED-PRE: 63 %
FVC-PRE: 1.51 L
FVC-PRED: 2.38 L
HBV DNA SERPL NAA+PROBE-ACNC: ABNORMAL IU/ML
HBV DNA SERPL NAA+PROBE-LOG IU: 7.6 {LOG_IU}/ML

## 2024-11-15 RX ORDER — PREDNISONE 5 MG/1
TABLET ORAL
Qty: 11 TABLET | Refills: 0 | Status: SHIPPED | OUTPATIENT
Start: 2024-11-15

## 2024-11-15 NOTE — TELEPHONE ENCOUNTER
Sent pt MyC update. Standing lab orders placed for CBC w/diff and CMP. Pt reminder set.    2/6/25: pt will call to make a lab only appt at [facility] and let us know the date/time so we can watch for the results.   5/15/25: ILD follow up appt. I will have Julian add a lab appt to this appt.     Nuvia Segal RN, BSN  ILD Nurse   [phone]    ----- Message from vibha Keller sent at 11/14/2024  2:42 PM CST -----  Continue MMF and q3 months labs

## 2024-11-19 ENCOUNTER — HOSPITAL ENCOUNTER (OUTPATIENT)
Dept: CARDIAC REHAB | Facility: CLINIC | Age: 74
Discharge: HOME | End: 2024-11-19
Attending: INTERNAL MEDICINE
Payer: COMMERCIAL

## 2024-11-19 PROCEDURE — G0239 OTH RESP PROC, GROUP: HCPCS | Performed by: REHABILITATION PRACTITIONER

## 2024-11-25 ENCOUNTER — MYC MEDICAL ADVICE (OUTPATIENT)
Dept: INTERNAL MEDICINE | Facility: CLINIC | Age: 74
End: 2024-11-25

## 2024-11-25 ENCOUNTER — TELEPHONE (OUTPATIENT)
Dept: INTERNAL MEDICINE | Facility: CLINIC | Age: 74
End: 2024-11-25

## 2024-11-25 ENCOUNTER — ANCILLARY PROCEDURE (OUTPATIENT)
Dept: BONE DENSITY | Facility: CLINIC | Age: 74
End: 2024-11-25
Attending: INTERNAL MEDICINE
Payer: COMMERCIAL

## 2024-11-25 DIAGNOSIS — Z78.0 ASYMPTOMATIC MENOPAUSE: ICD-10-CM

## 2024-11-25 PROCEDURE — 77080 DXA BONE DENSITY AXIAL: CPT | Mod: TC | Performed by: PHYSICIAN ASSISTANT

## 2024-11-25 PROCEDURE — 77081 DXA BONE DENSITY APPENDICULR: CPT | Mod: TC | Performed by: PHYSICIAN ASSISTANT

## 2024-11-25 NOTE — TELEPHONE ENCOUNTER
Reason for Call:  Form, our goal is to have forms completed with 72 hours, however, some forms may require a visit or additional information.    Type of letter, form or note:  FMLA    Who is the form from?: Patient    Where did the form come from: Patient or family brought in       What clinic location was the form placed at?: Internal Medicine    Where the form was placed: Given to physician    What number is listed as a contact on the form?   [phone]       Additional comments   pt will pickup    Call taken on 11/25/2024 at 2:11 PM by Debra Sweeney

## 2024-11-26 NOTE — TELEPHONE ENCOUNTER
Completed form placed at IM  for , patients daughter Zachariah advised.  Copy made and sent to be scanned into chart.

## 2024-11-29 ENCOUNTER — TELEPHONE (OUTPATIENT)
Dept: INTERNAL MEDICINE | Facility: CLINIC | Age: 74
End: 2024-11-29
Payer: COMMERCIAL

## 2024-11-29 NOTE — TELEPHONE ENCOUNTER
Patient Contact    Attempt # 1    Was call answered?  No.  Left message on voicemail with information to call me back.               Dear ,     Your bone scan results demonstrate osteoporosis, which is significant bone thinning. This means you are at increased risk for fracture from mild trauma like a fall.       Based on your results, I would recommend the following:     - START a bone building medication (like Fosamax, Actonel, or Boniva)     - make sure you are getting enough calcium and vitamin D in your diet (best!) or with supplements (1200mg of calcium and 800 i.u. of vitamin D daily are recommended)     - perform weight lifting exercises daily - this will stimulate bone growth     - exercise regularly - goal is 30 minutes a day, 5 days a week of cardiovascularly vigorous exercise (running, biking, swimming)     - follow-up bone scan in 2 years     Please feel free to contact me with any questions or concerns.     And please let me know if you would be okay with me prescribing one of the bone building medications above.        Take care,     Adrianna Singh MD  [address] [address]  T: [phone], F: [phone]   Written by Adrianna Singh MD on 11/26/2024  5:26 PM CST

## 2024-12-02 NOTE — TELEPHONE ENCOUNTER
Per chart review, pt has viewed message sent.     Seen by patient Krystian Boland on 11/29/2024  4:50 PM     Closing encounter.   Thank you,  Shadia Olivera RN

## 2024-12-04 NOTE — PROGRESS NOTES
[facility]  Rheumatology Clinic  Harjit Staley MD  2024     Name: Krystian Boland  MRN: [id number]  Age: 74 year old  : 1950  Referring provider: Referred Self     Assessment and Plan:  # RA with positive rheumatoid factor and anti-CCP noted in 2018:  Patient reports only R 3rd finger DIP pain, and stable brief morning stiffness.  Control of inflammatory joint pain has continued despite discontinuation of prednisone in 2024. Exam shows no gross synovitis; there is advanced clubbing at all digits.    Data: In  September  and 2024, creatinine, albumin, transaminases, CBC were normal.    Imaging: Moderate to severe pulmonary fibrosis on chest CT PE protocol done .    Discussion: Overall, I think that inflammatory arthropathy remains well controlled on disease modifying therapy.  For arthropathy, I recommend continuing hydroxychloroquine and sulfasalazine combination for disease modifying effect.  We discussed the need for annual ophthalmologic monitoring while on plaquenil.  Dedicated retinal exam occurred in .    # Interstitial lung disease:    Patient relates continued chronic dry nonproductive cough and easy dyspnea on exertion, and her ability to perform activities of daily living (dressing, grooming, moving about in her home) is associated increasingly with breathlessness.  She uses 2 L of oxygen at baseline.  PFT  showed reduced FVC, consistent with restriction. Following with Dr. Keller.    Interstitial lung disease is severe, and is likely the reason for slowly progressive loss of exercise capacity and dyspnea on exertion.  Alternative of immunomodulatory therapy or transplant have been considered by Dr. Keller in pulmonary.  I agree that rituximab would represent a reasonable next step, however with the high burden of irreversible fibrotic lung disease, and with the barriers of chronic active hepatitis B and latent TB, I agree with patient and family  desire to not move forward with rituxan.    # Chronic hepatitis B: In November 2024, hepatitis B DNA was present at 52,800 units.    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  Symptomatic treatment will be continued with heat, acetaminophen, and regular exercise for the hands.    Plan:  1.  Continue hydroxychloroquine 400 mg once daily; while using hydroxychloroquine, undergo annual screening examination for rare retinal Plaquenil toxicity, appointment due in July 2024  2.  Continue sulfasalazine 1000 mg (2 tablets) twice daily.  3.  Recheck blood work today and in 4 months.  5.  Continue Ofev and other pulmonary-recommended measures for interstitial lung disease.  6.  Continue CellCept 1000 mg twice daily.     F/u 6-7 mos      Harjit Staley M.D.  Staff Rheumatologist,  Health  Pager [phone]    No orders of the defined types were placed in this encounter.    On the day of the encounter, a total of 31 minutes was spent in chart review, and in counseling and coordination of care, regarding the patient's complex medical problem of rheumatoid arthritis, interstitial lung disease, osteoarthritis, high risk medication    The longitudinal plan of care for the diagnosis(es)/condition(s) as documented were addressed during this visit. Due to the added complexity in care, I will continue to support Krystian in the subsequent management and with ongoing continuity of care.    Orders Placed This Encounter   Procedures    CBC with platelets    AST    ALT    Albumin level    Creatinine    CRP inflammation          HPI:   Krystian Boland has a history of ILD and RA and presents for follow-up. I last saw the patient in 3-2024 at which time rheumatoid arthritis was overall well controlled. I recommended continuing combination sulfasalazine and hydroxychloroquine.  Rheumatoid arthritis associated interstitial lung  disease was discussed, and continued treatment with Doxy chloroquine, sulfasalazine, Ofev and mycophenolate was recommended.    Interval history December 4, 2024    Daughter serves as   She saw Dr. Sawyer in ophthalmology in April 2024.  No evidence of hydroxychloroquine macular toxicity was found.    Patient saw Dr. Keller in pulmonology on November 1, 2023.  Impression was of rheumatoid arthritis associated ILD, with fibrosis predominant lung disease. Transplant evaluation in 2024, with decision to defer lung transplant, was noted. Recommendation was to continue immunomodulation with CellCept 1000 twice daily, and antifibrotic therapy with Ofev 100 mg daily. Continue Rx of chronic active HBV recommended.    Today, patient reports that although breathing and exercise capacity are slowly worsening and declining, joint symptoms are well-controlled.  There is no morning stiffness, no visible swelling, warmth, redness in peripheral joints.  She reports daily cough, usually nonproductive.  She is using oxygen 2 L at baseline, with an increase for when she ambulates or performs repetitive activity.    Continues SULFASALAZINE and hydroxychloroquine unchanged. MMF continues 1000 mg twice daily.    Interval history March 7, 2024      Daughter serves as   Patient saw Dr. Keller in pulmonology on November 1, 2023.  Impression was of rheumatoid arthritis associated ILD, fibrosis predominant lung disease.  Recommendation was to continue immunomodulation with CellCept 1000 twice daily, and antifibrotic therapy with Ofev 100 mg daily.    Today,     Early morning stiffness is < 30 minutes  R 3rd finger swells intermittmently.  No disruption of ADLs due to arthritis.  Still + cough and easy caba, but no worse. Cough non productive  Walks a few blocks o\n flat ground.  CLBP unchanged, does not waken her.    Continues SULFASALAZINE and hydroxychloroquine unchanged. MMF continues 1000 mg twice daily.  No  change in understanding regarding chronic HBV infection- that GI has not recommended Rx for HBV;  ID has said that Rx of latent disease should happen before rituxan, if that medication is needed.      Interval history August 9, 2023    Daughter serves as   Patient was seen by Dr. Sawyer in ophthalmology on July 19, 2023.  Impression was of long-term use of hydroxychloroquine.  No definite evidence of macular toxicity was noted, and continued use of hydroxychloroquine was recommended.    Patient saw Dr. Keller and pulmonary and critical care medicine in April 2023.  Impression was of interstitial lung disease associated with rheumatoid arthritis.  Declining pulmonary function tests were noted, and a plan to start rituximab was discussed.  However, hepatitis B with significant viremia was noted.  Recommendation was for follow-up with ID and with GI regarding TB and hepatitis B respectively.  Meanwhile continue CellCept and Ofev.    Patient was seen by infectious disease on May 15, 2023, impression was of positive QuantiFERON gold test, most likely representing latent TB.  Recommendation was to consult MTM in preparation for treatment of latent TB with isoniazid.    Today,     Early morning stiffness is < 30 minutes  R 3rd finger swells intermittmently.  No disruption of ADLs due to arthritis.  Continues SULFASALAZINE and hydroxychloroquine unchanged. MMF continues twice daily.  Daughter reports that GI has not recommended Rx for HBV.; ID has said that Rx of latent disease should happen before rituxan.  Still + cough and easy caba, but no worse. Cough non productive  Pulm rehab has helped with function in recent months.  CLBP unchanged, worse at night.      Interval history January 19, 2023    Daughter acted as     Patient saw Dr. Keller in pulmonology on October 26, 2022.  Impression was of rheumatoid arthritis associated interstitial lung disease, with progressive exertional dyspnea symptoms  that had been prednisone sensitive in July 2022.  Recommendation was to add CellCept 500 mg twice daily and advance to 1000 mg twice daily, in addition to sulfasalazine and hydroxychloroquine.    Patient was admitted to [facility] from November 28 through December 2.  Discharge diagnosis included respiratory failure, possible flare of interstitial lung disease versus pneumonia, and mediastinal lymphadenopathy.  Patient had recently started on CellCept and then noted increasing cough.  CT scan showed moderately severe pulmonary fibrosis.  Patient was treated with Zosyn and azithromycin, and leukocytosis improved.  Patient was given intravenous Solu-Medrol, switched to prednisone with discharge on 40 mg of prednisone daily tapering to 10 mg daily over 3 weeks. dyspnea and cough improved.  Prior to admission sulfasalazine, CellCept, hydroxychloroquine and Ofev were continued.    Today, she reports that breathing and cough are improved. She finished prednisone several weeks ago. Exercise tolerance is improved.  There is no joint pain now. Before hospitalization she had swelling and pain in her R wrist and R hand fingers. Early morning stiffness is variable.   Her daughter says that prosper had tried to ramp up cellcept prior to hospitalization; after hospitalization, she has increased to 1000 mg twice day. She notes mild nausea, otherwise well-tolerated.     Interval history May 26, 2022  Daughter acted as  throughout this session.  Through daughter , patient relates increasing pain in several joint areas over the past several weeks.  First, she notes right shoulder pain, worse with arm raising, and at night.  Pain is so severe that she is kept awake at night, and is unable to dress fully independently.  No antecedent injury.  No visible swelling redness warmth or fever.  Patient also notes worsening right base of thumb pain, made worse with grasping and lifting activities.  She reports  swelling, warmth, and stiffness at the right knee, alleviated with movement.  Early morning stiffness overall has increased in recent weeks to 25 to 30 minutes.    She is taking Ofev, sulfasalazine (2 g daily), and hydroxychloroquine as recommended.  No specific side effects or adverse effects noted.    Patient saw Dr. Sawyer in ophthalmology in July 2021.  Impression was of long-term use of Plaquenil, no evidence of toxicity.    Interval history 1-    She has intermittent R > L thumb and finger pain, worse in the mornings. Knee pain resolved, and other joints beside R hand, doing well.  Massage and use of the joints, as well as thylenol, help.   Morning pain in the joints lasts about 2 hours, non-progressive.   ADLs are sometimes affected early in the morning.     Energy level is overall improved, and shortness of breath is stable.    Taking ofev, sulfasalazine and hydroxychloroquine as directed. No particular adverse effects noted      Review of Systems:   Pertinent items are noted in HPI or as below, remainder of complete ROS is negative.     No recent problems with hearing or vision. No swallowing problems.   No breathing difficulty, shortness of breath, or wheezing. + Continues to have dry cough.  No chest pain or palpitations.  No heart burn, indigestion, abdominal pain, vomiting, diarrhea. + Nausea with Ofev.  No urination problems, no bloody, cloudy urine, no dysuria.  No numbing, tingling, weakness.  No headaches or confusion.  No rashes. No easy bleeding or bruising.   + Arthralgias, stiffness, swelling of multiple joints.  + Fatigue.    Active Medications:   Current Outpatient Medications   Medication Sig Dispense Refill    amLODIPine (NORVASC) 5 MG tablet TAKE 1 TABLET BY MOUTH DAILY 90 tablet 2    aspirin 81 MG chewable tablet Take 1 tablet (81 mg) by mouth daily 90 tablet 2    benzonatate (TESSALON) 200 MG capsule TAKE 1 CAPSULE(200 MG) BY MOUTH THREE TIMES DAILY AS NEEDED FOR COUGH 90  capsule 0    famotidine (PEPCID) 40 MG tablet Take 1 tablet (40 mg) by mouth every evening 90 tablet 3    guaiFENesin (MUCINEX) 600 MG 12 hr tablet Take 600 mg by mouth 2 times daily as needed for congestion or cough (Patient not taking: Reported on 11/14/2024)      guaiFENesin (ROBITUSSIN) 20 mg/mL liquid Take 10 mLs by mouth every 4 hours as needed for cough (Patient not taking: Reported on 11/14/2024) 236 mL 0    hydroxychloroquine (PLAQUENIL) 200 MG tablet Take 1 tablet (200 mg) by mouth 2 times daily Annual Plaquenil toxicity eye screening required. 180 tablet 3    hydrOXYzine (ATARAX) 10 MG tablet Take 1 tablet (10 mg) by mouth 3 times daily as needed for anxiety 30 tablet 1    hypromellose (GENTEAL SEVERE) ophthalmic gel 0.3% Place 1 drop into both eyes 4 times daily 10 g 11    ipratropium - albuterol 0.5 mg/2.5 mg/3 mL (DUONEB) 0.5-2.5 (3) MG/3ML neb solution Take 1 vial (3 mLs) by nebulization every 4 hours as needed for wheezing 90 mL 0    melatonin 3 MG tablet Take 1 tablet (3 mg) by mouth nightly as needed for sleep (Patient not taking: Reported on 11/14/2024)      mycophenolate (GENERIC EQUIVALENT) 250 MG capsule Take 4 capsules (1,000 mg) by mouth 2 times daily. 720 capsule 0    OFEV 100 MG capsule TAKE ONE CAPSULE BY MOUTH TWICE A DAY 60 capsule 11    pantoprazole (PROTONIX) 40 MG EC tablet Take 1 tablet (40 mg) by mouth daily (Patient taking differently: Take 40 mg by mouth as needed.) 90 tablet 3    predniSONE (DELTASONE) 5 MG tablet Reduce from 5 mg to 2.5 mg for 1 week then 2.5 mg by mouth every 48 hours for 1 week then stop 11 tablet 0    STATIN NOT PRESCRIBED (INTENTIONAL) Please choose reason not prescribed, below      sulfaSALAzine ER (AZULFIDINE EN) 500 MG EC tablet take 2 tabs twice a day. (Patient not taking: Reported on 11/14/2024) 360 tablet 3    Vitamin D3 50 mcg (2000 units) tablet TAKE 1 TABLET BY MOUTH DAILY 90 tablet 0     No current facility-administered medications for this visit.           Allergies:   Atorvastatin      Past Medical History:  Obesity  Osteopenia  Type 2 diabetes mellitus without complication, without long-term current use of insulin  Interstitial lung disease  Abdominal tuberculosis (intestinal, 20 years ago)     Past Surgical History:  Open cholecystectomy    Family History:   Mother - dementia  Father - cerebrovascular disease  Brother - cerebrovascular disease, hypertension  No family history of - diabetes, myocardial infarction, early onset C.A.D., breast cancer, ovarian cancer, colon cancer, rheumatoid arthritis  Family history of - high blood pressure  (mentions that medical history is unlikely to be comprehensive because of poor/infrequent medical records)      Social History:   No smoking or tobacco use  Rare alcohol use (1-2 times a year)   with 8 children, 13 grandchildren, cooks for the family     Physical Exam:   not currently breastfeeding.  Wt Readings from Last 4 Encounters:   11/14/24 75.5 kg (166 lb 6.4 oz)   10/22/24 76.1 kg (167 lb 11.2 oz)   08/14/24 73.2 kg (161 lb 4.8 oz)   06/05/24 70.3 kg (155 lb)     Constitutional: Well-developed, appearing stated age; cooperative  Eyes: Normal EOM, PERRLA, vision, conjunctiva, sclera  ENT: Normal external ears, nose, hearing, lips, teeth, gums, throat.  Neck: No mass or thyroid enlargement  Resp: Breathing unlabored  MS:  Left thumb exhibits crepitus but no tenderness at the first CMC and at several MCPs and PIPs. Fist formation slightly reduced.  Right knee effusion has resolved.  Clubbing in finger nails, prominent in right third fingernail.  No erythema or tenderness over the indicated area of discomfort at the right third finger nailbed.  Skin: No nail pitting, alopecia, rash, nodules or lesions.  Neuro: Normal cranial nerves    Laboratory:       Latest Ref Rng & Units 5/9/2024    12:22 PM 9/9/2024     9:36 AM 11/14/2024    11:55 AM   RHEUM RESULTS   Albumin 3.5 - 5.2 g/dL 3.9  4.1  4.1    ALT 0 - 50 U/L  13  22  43    AST 0 - 45 U/L 16  26  36    Creatinine 0.51 - 0.95 mg/dL 0.67  0.74  0.79    CRP Inflammation <5.00 mg/L  3.35  <3.00    GFR Estimate >60 mL/min/1.73m2 >90  85  79    Hematocrit 35.0 - 47.0 % 35.4  39.9     Hemoglobin 11.7 - 15.7 g/dL 11.6  12.8     WBC 4.0 - 11.0 10e3/uL 7.5  7.4     RBC Count 3.80 - 5.20 10e6/uL 3.83  4.38     RDW 10.0 - 15.0 % 14.6  13.1     MCHC 31.5 - 36.5 g/dL 32.8  32.1     MCV 78 - 100 fL 92  91     Platelet Count 150 - 450 10e3/uL 244  273         Rheumatoid Factor   Date Value Ref Range Status   05/23/2018 700 (H) <20 IU/mL Final     Cyclic Citrullinated Peptide Antibody, IgG   Date Value Ref Range Status   05/23/2018 >340 (H) <7 U/mL Final     Comment:     Positive     Scleroderma Antibody Scl-70 CLAUDIO IgG   Date Value Ref Range Status   05/23/2018 <0.2 0.0 - 0.9 AI Final     Comment:     Negative  Antibody index (AI) values reflect qualitative changes in antibody   concentration that cannot be directly associated with clinical condition or   disease state.       SSA (Ro) (CLAUDIO) Antibody, IgG   Date Value Ref Range Status   05/23/2018 <0.2 0.0 - 0.9 AI Final     Comment:     Negative  Antibody index (AI) values reflect qualitative changes in antibody   concentration that cannot be directly associated with clinical condition or   disease state.       SSB (La) (CLAUDIO) Antibody, IgG   Date Value Ref Range Status   05/23/2018 <0.2 0.0 - 0.9 AI Final     Comment:     Negative  Antibody index (AI) values reflect qualitative changes in antibody   concentration that cannot be directly associated with clinical condition or   disease state.       LYNDA interpretation   Date Value Ref Range Status   05/23/2018 Negative NEG^Negative Final     Comment:                                        Reference range:  <1:40  NEGATIVE  1:40 - 1:80  BORDERLINE POSITIVE  >1:80 POSITIVE         Neutrophil Cytoplasmic IgG Antibody   Date Value Ref Range Status   05/23/2018 <1:20 <1:20 Final     Comment:      (Note)  The ANCA IFA is <1:20; therefore, no further testing will   be performed.  INTERPRETIVE INFORMATION: Anti-Neutrophil Cyto Ab, IgG  Neutrophil Cytoplasmic Antibodies (C-ANCA = granular   cytoplasmic staining, P-ANCA = perinuclear staining) are   found in the serum of over 90 percent of patients with   certain necrotizing systemic vasculitides, and usually in   less than 5 percent of patients with collagen vascular   disease or arthritis.  Performed by [facility],  [address] [place] [id number] [phone]  [email or web address], Marty Wright MD, Lab. Director         IGG   Date Value Ref Range Status   05/23/2018 1,980 (H) 695 - 1,620 mg/dL Final     Scleroderma Antibody Scl-70 CLAUDIO IgG   Date Value Ref Range Status   05/23/2018 <0.2 0.0 - 0.9 AI Final     Comment:     Negative  Antibody index (AI) values reflect qualitative changes in antibody   concentration that cannot be directly associated with clinical condition or   disease state.

## 2024-12-05 ENCOUNTER — OFFICE VISIT (OUTPATIENT)
Dept: RHEUMATOLOGY | Facility: CLINIC | Age: 74
End: 2024-12-05
Payer: COMMERCIAL

## 2024-12-05 VITALS
HEART RATE: 112 BPM | WEIGHT: 168.5 LBS | DIASTOLIC BLOOD PRESSURE: 75 MMHG | BODY MASS INDEX: 31.84 KG/M2 | SYSTOLIC BLOOD PRESSURE: 136 MMHG

## 2024-12-05 DIAGNOSIS — R76.8 RHEUMATOID FACTOR POSITIVE WITH CYCLIC CITRULLINATED PEPTIDE (CCP) ANTIBODY NEGATIVE: ICD-10-CM

## 2024-12-05 RX ORDER — SULFASALAZINE 500 MG/1
TABLET, DELAYED RELEASE ORAL
Qty: 360 TABLET | Refills: 3 | Status: SHIPPED | OUTPATIENT
Start: 2024-12-05

## 2024-12-05 RX ORDER — HYDROXYCHLOROQUINE SULFATE 200 MG/1
200 TABLET, FILM COATED ORAL 2 TIMES DAILY
Qty: 180 TABLET | Refills: 2 | Status: SHIPPED | OUTPATIENT
Start: 2024-12-05

## 2024-12-05 ASSESSMENT — PAIN SCALES - GENERAL: PAINLEVEL_OUTOF10: NO PAIN (0)

## 2024-12-05 NOTE — PATIENT INSTRUCTIONS
Diagnosis:  1.  Seropositive rheumatoid arthritis: Shoulder, thumb, and knee pain remain improved with current medications hydroxychloroquine and sulfasalazine.  2.  Low back pain: occasional, stable.  3. Interstitial lung disease: Breathing is slowly worsening.  I agree with Dr. Keller's recommendation to continue CellCept and Ofev for rheumatoid arthritis associated lung disease.  I agree that rituximab therapy would be an alternative, but only if treatment of latent TB and of hepatitis B were planned.    Plan:  1.  Continue hydroxychloroquine 400 mg once daily; while using hydroxychloroquine, undergo annual screening examination for rare retinal Plaquenil toxicity, last appointment in April 2024  2.  Continue sulfasalazine 1000 mg (2 tablets) twice daily.  3.  Recheck blood work and in 4 months;  5.  Continue Ofev and Continue CellCept 1000 mg twice daily for interstitial lung disease, per Dr. Keller.

## 2024-12-05 NOTE — NURSING NOTE
Chief Complaint   Patient presents with    RECHECK     High risk medication use +1 more       Vitals:    12/05/24 0850   BP: 136/75   BP Location: Left arm   Patient Position: Sitting   Cuff Size: Adult Regular   Pulse: 112   Weight: 76.4 kg (168 lb 8 oz)       Body mass index is 31.84 kg/m .

## 2024-12-15 DIAGNOSIS — M05.10 RHEUMATOID LUNG (H): ICD-10-CM

## 2024-12-16 RX ORDER — PREDNISONE 5 MG/1
TABLET ORAL
Qty: 30 TABLET | Refills: 11 | Status: SHIPPED | OUTPATIENT
Start: 2024-12-16

## 2024-12-30 ENCOUNTER — PATIENT OUTREACH (OUTPATIENT)
Dept: GERIATRIC MEDICINE | Facility: CLINIC | Age: 74
End: 2024-12-30
Payer: COMMERCIAL

## 2024-12-30 DIAGNOSIS — Z79.899 LONG-TERM USE OF PLAQUENIL: Primary | ICD-10-CM

## 2024-12-30 NOTE — PROGRESS NOTES
[facility] Care Coordination Contact    Completed member's [facility] COLA Adjustment.   The annual rate would increase from $101,448.00 to $102,980.00.  Called member's daughter, Zachariah, and reviewed the COLA increase. Reviewed with Zachariah that she would need to review this increase with her [facility] Support planner and decide if and where they would like to apply this COLA adjustment. Reviewed that this Care Coordinator would need to get the COLA adjustment form back signed in order to proceed with any increase. Zachariah shared that she understood and would call if she has any questions.  JAQUELIN Langley  [facility]  [phone]

## 2024-12-31 ENCOUNTER — PATIENT OUTREACH (OUTPATIENT)
Dept: GERIATRIC MEDICINE | Facility: CLINIC | Age: 74
End: 2024-12-31
Payer: COMMERCIAL

## 2024-12-31 NOTE — PROGRESS NOTES
[facility] Care Coordination Contact    Received task from care coordinator.  Completed following tasks:  Mailed CDCS Addendum to member to sign and return.    Sophie Amador  Care Management Specialist  [facility]  [phone]

## 2025-01-08 ENCOUNTER — OFFICE VISIT (OUTPATIENT)
Dept: OPHTHALMOLOGY | Facility: CLINIC | Age: 75
End: 2025-01-08
Attending: OPHTHALMOLOGY
Payer: COMMERCIAL

## 2025-01-08 DIAGNOSIS — H04.129 DRY EYE: ICD-10-CM

## 2025-01-08 DIAGNOSIS — E11.3299 NONPROLIFERATIVE DIABETIC RETINOPATHY ASSOCIATED WITH TYPE 2 DIABETES MELLITUS (H): ICD-10-CM

## 2025-01-08 DIAGNOSIS — Z79.899 LONG-TERM USE OF PLAQUENIL: Primary | ICD-10-CM

## 2025-01-08 PROCEDURE — 92134 CPTRZ OPH DX IMG PST SGM RTA: CPT | Performed by: OPHTHALMOLOGY

## 2025-01-08 PROCEDURE — G0463 HOSPITAL OUTPT CLINIC VISIT: HCPCS | Performed by: OPHTHALMOLOGY

## 2025-01-08 ASSESSMENT — VISUAL ACUITY
METHOD: SNELLEN - LINEAR
OS_SC: 20/40
OD_SC: 20/30-
OS_PH_SC: 20/20-3

## 2025-01-08 ASSESSMENT — EXTERNAL EXAM - LEFT EYE: OS_EXAM: NORMAL

## 2025-01-08 ASSESSMENT — CUP TO DISC RATIO
OD_RATIO: 0.4
OS_RATIO: 0.35

## 2025-01-08 ASSESSMENT — TONOMETRY
OD_IOP_MMHG: 14
IOP_METHOD: TONOPEN
OS_IOP_MMHG: 14

## 2025-01-08 ASSESSMENT — SLIT LAMP EXAM - LIDS
COMMENTS: DCL, MILD PTOSIS
COMMENTS: DCL, MILD PTOSIS

## 2025-01-08 ASSESSMENT — EXTERNAL EXAM - RIGHT EYE: OD_EXAM: NORMAL

## 2025-01-08 NOTE — PROGRESS NOTES
CC: follow up Diabetes mellitus and plaquenil    Interval: Last visit 4/24/2024. Vision has remained stable. No flashes or floaters. No pain. No concerns.     HPI: Krystian Boland is a 74 year old with history of plaquenil use for ILD and RA.  She has been on 400 mg daily since 5-2019. Outer retinal changes were seen on her last eye exam. She is also DMII.     History of chronic cough - improving   Lab Results   Component Value Date    A1C 6.4 04/04/2024    A1C 6.7 11/01/2023    A1C 6.8 04/18/2023       Past ocular history:  S/p CE/IOL both eyes; Ocular hypertension both eyes ; Dry eyes; Myopic astigmatism; Type II diabetes mellitus     Retinal Imaging:  Optical Coherence Tomography 01/08/25   RE:  normal foveal contour. Prior inferonasal IRF resolved. Drusen. No loss of IS/OS junction. PHF attached.   LE: normal foveal contour; no intraretinal fluid or subretinal fluid. Drusen. No loss of IS/OS junction. PHF attached    Optos photos consistent with exam 04/24/24   Right eye: Clear media, optic disc is flat without atrophy or edema. C/D ratio 0.3. Macula is flat. Vessels are attenuated with small pigmented spot on inferior arcade.  Periphery with drusen deposits superior and inferior anterior to the arcades.   Left eye: Clear media, optic disc is flat without atrophy or edema. C/D ratio 0.3. Macula is flat. Periphery with drusen deposits superior and inferior anterior to the arcades.     fluorescein angiography 04/24/24   Good AV and choroidal filling; microaneurysms; mild inf midperipheral leakage right eye> left eye   No neovascularization of the disc; no neovascularization elsewhere     Autofluorescence 7/27/2022 vs 7-22-21 vs 04/24/24   right eye: spot of hypoAF along inferior arcade; small two hyperFAF spots superior to the macula  left eye: normal    visual field 10-2  07/19/23   right eye: FP 2/9, MD 1.8. not completely reliable   Left eye: FP 2/10; normal, MD 2.0    Assessment & Plan:    # Type II diabetes  mellitus with mild nonproliferative diabetic retinopathy right eye    Dx'd ~2016   HbA1c 7.0 % 8/14/2024   Discontinued Metformin    Blood pressure (<120/80) and blood glucose (HbA1c <7.0) control discussed with patient. Patient advised that failure to adequately control each may lead to vision loss. The patient expressed understanding.  Fluorescein angiography with mild nonproliferative diabetic retinopathy and midperipheral leakage right eye - observe    # History of non-center involving mild diabetic macular edema right eye   Resolved  Observe     # Long-term use of Plaquenil   H/o RF-positive RA and ILD   Started Plaquenil 5/2019; 200mg twice a day   ILD stable - f/u 9/16/2020 with Dr. Cross for CT scan and PFTs   RA stable - dx'd 5/2018, Dr. Staley 10/13/2020   OCT mac grossly normal 01/08/25    Autofluorescence: normal   Repeat in a yr  Ok to continue taking plaquenil  Follow up in a yr    # Pseudophakia each eye  With posterior capsular opacity (PCO) left eye> right eye   Consider yag in the future left eye     # drusen both eyes  Mostly extrafoveal   Few macula drusen  Observe     # Dry eye both eyes   Lid scrubs  Warm compresses  Artificial tears    RTC: 6 months follow up Diabetes mellitus and plaquenil . FA transit OD, FAF at next visit.   Will alternate FAF and OCT macula q6 months     Rosanna Cordero MD  PGY-3  Department of Ophthalmology  January 8, 2025 10:53 AM     ~~~~~~~~~~~~~~~~~~~~~~~~~~~~~~~~~~   Complete documentation of historical and exam elements from today's encounter can be found in the full encounter summary report (not reduplicated in this progress note).  I personally obtained the chief complaint(s) and history of present illness.  I confirmed and edited as necessary the review of systems, past medical/surgical history, family history, social history, and examination findings as documented by others; and I examined the patient myself.  I personally reviewed the relevant tests, images, and  reports as documented above.  I personally reviewed the ophthalmic test(s) associated with this encounter, agree with the interpretation(s) as documented by the resident/fellow, and have edited the corresponding report(s) as necessary.   I formulated and edited as necessary the assessment and plan and discussed the findings and management plan with the patient and family    Dalia Sawyer MD   of Ophthalmology.  Retina Service   Department of Ophthalmology and Visual Neurosciences   [facility]  Phone: [phone]   Fax: [phone]

## 2025-01-14 DIAGNOSIS — R10.13 DYSPEPSIA: ICD-10-CM

## 2025-01-14 RX ORDER — PANTOPRAZOLE SODIUM 40 MG/1
40 TABLET, DELAYED RELEASE ORAL DAILY
Qty: 90 TABLET | Refills: 3 | OUTPATIENT
Start: 2025-01-14

## 2025-02-06 ENCOUNTER — LAB (OUTPATIENT)
Dept: LAB | Facility: CLINIC | Age: 75
End: 2025-02-06
Payer: COMMERCIAL

## 2025-02-06 DIAGNOSIS — J84.9 ILD (INTERSTITIAL LUNG DISEASE) (H): ICD-10-CM

## 2025-02-06 DIAGNOSIS — R76.8 RHEUMATOID FACTOR POSITIVE WITH CYCLIC CITRULLINATED PEPTIDE (CCP) ANTIBODY NEGATIVE: ICD-10-CM

## 2025-02-06 DIAGNOSIS — E11.9 TYPE 2 DIABETES MELLITUS (H): Primary | ICD-10-CM

## 2025-02-06 LAB
ALBUMIN SERPL BCG-MCNC: 4 G/DL (ref 3.5–5.2)
ALP SERPL-CCNC: 80 U/L (ref 40–150)
ALT SERPL W P-5'-P-CCNC: 53 U/L (ref 0–50)
ANION GAP SERPL CALCULATED.3IONS-SCNC: 13 MMOL/L (ref 7–15)
AST SERPL W P-5'-P-CCNC: 40 U/L (ref 0–45)
BASOPHILS # BLD AUTO: 0 10E3/UL (ref 0–0.2)
BASOPHILS NFR BLD AUTO: 0 %
BILIRUB SERPL-MCNC: 0.5 MG/DL
BUN SERPL-MCNC: 9.5 MG/DL (ref 8–23)
CALCIUM SERPL-MCNC: 9.7 MG/DL (ref 8.8–10.4)
CHLORIDE SERPL-SCNC: 98 MMOL/L (ref 98–107)
CREAT SERPL-MCNC: 0.8 MG/DL (ref 0.51–0.95)
CRP SERPL-MCNC: 4.13 MG/L
EGFRCR SERPLBLD CKD-EPI 2021: 78 ML/MIN/1.73M2
EOSINOPHIL # BLD AUTO: 0.1 10E3/UL (ref 0–0.7)
EOSINOPHIL NFR BLD AUTO: 2 %
ERYTHROCYTE [DISTWIDTH] IN BLOOD BY AUTOMATED COUNT: 12 % (ref 10–15)
EST. AVERAGE GLUCOSE BLD GHB EST-MCNC: 166 MG/DL
GLUCOSE SERPL-MCNC: 145 MG/DL (ref 70–99)
HBA1C MFR BLD: 7.4 % (ref 0–5.6)
HCO3 SERPL-SCNC: 27 MMOL/L (ref 22–29)
HCT VFR BLD AUTO: 38.6 % (ref 35–47)
HGB BLD-MCNC: 13.1 G/DL (ref 11.7–15.7)
IMM GRANULOCYTES # BLD: 0 10E3/UL
IMM GRANULOCYTES NFR BLD: 0 %
LYMPHOCYTES # BLD AUTO: 1.8 10E3/UL (ref 0.8–5.3)
LYMPHOCYTES NFR BLD AUTO: 30 %
MCH RBC QN AUTO: 29.8 PG (ref 26.5–33)
MCHC RBC AUTO-ENTMCNC: 33.9 G/DL (ref 31.5–36.5)
MCV RBC AUTO: 88 FL (ref 78–100)
MONOCYTES # BLD AUTO: 0.6 10E3/UL (ref 0–1.3)
MONOCYTES NFR BLD AUTO: 10 %
NEUTROPHILS # BLD AUTO: 3.4 10E3/UL (ref 1.6–8.3)
NEUTROPHILS NFR BLD AUTO: 58 %
PLATELET # BLD AUTO: 259 10E3/UL (ref 150–450)
POTASSIUM SERPL-SCNC: 4.2 MMOL/L (ref 3.4–5.3)
PROT SERPL-MCNC: 7.5 G/DL (ref 6.4–8.3)
RBC # BLD AUTO: 4.39 10E6/UL (ref 3.8–5.2)
SODIUM SERPL-SCNC: 138 MMOL/L (ref 135–145)
WBC # BLD AUTO: 6 10E3/UL (ref 4–11)

## 2025-02-11 ENCOUNTER — TELEPHONE (OUTPATIENT)
Dept: PULMONOLOGY | Facility: CLINIC | Age: 75
End: 2025-02-11
Payer: COMMERCIAL

## 2025-02-11 NOTE — TELEPHONE ENCOUNTER
Dr. Jain covering for Dr. Keller and reviewed mycophenolate 2/7/25 CMP, CBCPD results. Sent pt MyC with his recommendations:    Slightly elevated of ALT. Not very high rise from previous.  Continue to monitor and continue Mycophenolate.      Moe Jain MD    Continue taking OFEV and mycophenolate as prescribed. Next set of labs are due in 3 months (an already scheduled) at next ILD follow up appointment on 5/15/25. Pt reminder set.     Nuvia Segal RN, BSN  ILD Nurse   [phone]      ----- Message from Nuvia MACHUCA sent at 11/15/2024 10:21 AM CST -----  Regarding: Q3mo MMF Labs  Last set of labs 11/14/24, stable. Next set of labs due 2/6/25.    2/6/25: pt will call to make a lab only appt at any [facility].  -has pt made lab appt or had labs drawn yet?  -pt daughter speaks English and checks MyC, makes appts & drives pt to appts. Send pt MyC reminder if needed.     Next lab draw due 5/15/25 with ILD follow up appt. I will have Julian add a lab appt to this appt.

## 2025-02-13 DIAGNOSIS — R12 HEARTBURN: ICD-10-CM

## 2025-02-13 RX ORDER — FAMOTIDINE 40 MG/1
40 TABLET, FILM COATED ORAL EVERY EVENING
Qty: 90 TABLET | Refills: 3 | Status: SHIPPED | OUTPATIENT
Start: 2025-02-13

## 2025-02-25 ENCOUNTER — MYC MEDICAL ADVICE (OUTPATIENT)
Dept: PULMONOLOGY | Facility: CLINIC | Age: 75
End: 2025-02-25
Payer: COMMERCIAL

## 2025-02-26 ENCOUNTER — PATIENT OUTREACH (OUTPATIENT)
Dept: GERIATRIC MEDICINE | Facility: CLINIC | Age: 75
End: 2025-02-26
Payer: COMMERCIAL

## 2025-02-26 NOTE — PROGRESS NOTES
[facility] Care Coordination Contact    Received a return call from adult daughter Zachariah to schedule annual HRA home visit. HRA has been scheduled for 3/3/25 at 3pm.  JAQUELIN Langley  [facility]  [phone]

## 2025-03-03 ENCOUNTER — PATIENT OUTREACH (OUTPATIENT)
Dept: GERIATRIC MEDICINE | Facility: CLINIC | Age: 75
End: 2025-03-03
Payer: COMMERCIAL

## 2025-03-03 ENCOUNTER — MYC REFILL (OUTPATIENT)
Dept: PULMONOLOGY | Facility: CLINIC | Age: 75
End: 2025-03-03
Payer: COMMERCIAL

## 2025-03-03 ENCOUNTER — MYC REFILL (OUTPATIENT)
Dept: INTERNAL MEDICINE | Facility: CLINIC | Age: 75
End: 2025-03-03
Payer: COMMERCIAL

## 2025-03-03 DIAGNOSIS — J84.9 ILD (INTERSTITIAL LUNG DISEASE) (H): ICD-10-CM

## 2025-03-03 DIAGNOSIS — J84.9 INTERSTITIAL LUNG DISEASE (H): ICD-10-CM

## 2025-03-03 DIAGNOSIS — E11.9 TYPE 2 DIABETES MELLITUS WITHOUT COMPLICATION, WITHOUT LONG-TERM CURRENT USE OF INSULIN (H): ICD-10-CM

## 2025-03-03 DIAGNOSIS — J06.9 UPPER RESPIRATORY TRACT INFECTION, UNSPECIFIED TYPE: ICD-10-CM

## 2025-03-03 ASSESSMENT — LIFESTYLE VARIABLES
AUDIT-C TOTAL SCORE: 0
SKIP TO QUESTIONS 9-10: 1

## 2025-03-03 ASSESSMENT — PATIENT HEALTH QUESTIONNAIRE - PHQ9: SUM OF ALL RESPONSES TO PHQ QUESTIONS 1-9: 9

## 2025-03-03 NOTE — Clinical Note
Dr. Singh. I am the [facility] Coordinator for Krystian. I was out to her home on the 3rd for her annual assessment. No current concerns at this time. Please let me know if you have questions. Thank you for your review. Vickie

## 2025-03-04 RX ORDER — ASPIRIN 81 MG/1
81 TABLET, CHEWABLE ORAL DAILY
Qty: 90 TABLET | Refills: 2 | Status: SHIPPED | OUTPATIENT
Start: 2025-03-04

## 2025-03-04 RX ORDER — MYCOPHENOLATE MOFETIL 250 MG/1
1000 CAPSULE ORAL
Qty: 720 CAPSULE | Refills: 0 | Status: SHIPPED | OUTPATIENT
Start: 2025-03-04

## 2025-03-04 RX ORDER — BENZONATATE 200 MG/1
200 CAPSULE ORAL 3 TIMES DAILY PRN
Qty: 90 CAPSULE | Refills: 0 | Status: SHIPPED | OUTPATIENT
Start: 2025-03-04

## 2025-03-05 ENCOUNTER — MYC MEDICAL ADVICE (OUTPATIENT)
Dept: PULMONOLOGY | Facility: CLINIC | Age: 75
End: 2025-03-05
Payer: COMMERCIAL

## 2025-03-05 DIAGNOSIS — J84.9 ILD (INTERSTITIAL LUNG DISEASE) (H): Primary | ICD-10-CM

## 2025-03-05 RX ORDER — PREDNISONE 5 MG/1
5 TABLET ORAL DAILY
Qty: 70 TABLET | Refills: 0 | Status: SHIPPED | OUTPATIENT
Start: 2025-03-05

## 2025-03-05 RX ORDER — PREDNISONE 20 MG/1
20 TABLET ORAL DAILY
Qty: 3 TABLET | Refills: 0 | Status: SHIPPED | OUTPATIENT
Start: 2025-03-05 | End: 2025-03-08

## 2025-03-06 DIAGNOSIS — J84.9 ILD (INTERSTITIAL LUNG DISEASE) (H): ICD-10-CM

## 2025-03-06 DIAGNOSIS — J84.89 PROGRESSIVE FIBROSING INTERSTITIAL LUNG DISEASE (H): ICD-10-CM

## 2025-03-06 RX ORDER — NINTEDANIB 100 MG/1
CAPSULE ORAL
Qty: 60 CAPSULE | Refills: 11 | Status: SHIPPED | OUTPATIENT
Start: 2025-03-06

## 2025-03-09 NOTE — PROGRESS NOTES
[facility] Care Coordination Contact    [facility] Home Visit Assessment     Home visit for Health Risk Assessment with Krystian Boland completed on March 3, 2025    Type of residence:: Private home - stairs (3 steps into the home. Full flight of steps into basement. Bedroom and bathroom are on main level.)  Current living arrangement:: I live in a private home with family (Lives with spouse in daughter's home with daughter's spouse and children.)     Assessment completed with:: Patient, Care Team Member, Children    Current Care Plan  Member currently receiving the following home care services: None  Member currently receiving the following community resources: Other (see comment) (Member is on [place] [employer] and manages all of her services and care givers.)    Health Issues: Member reports her health as poor. She continues to struggle with breathing and is on continuous O2. She shared that in the past year the coughing has gotten worse, requiring her to go on/off prednisone tampers. Reports that the following increase her shortness of breath and coughing episodes; steam from cooking/showers, exertion, talking, and anxiousness. Over the course of the assessment member had to stop talking due to continuous coughing, and relied on her daughter to respond. Daughter shared that the coughing was much improved, but when it gets back the coughing just doesn't stop. Daughter shared that over the past year member did meet with a transplant MD and after reviewing the risks decided not to go forward with a transplant. Member strives to arrange her day in a manner that prevents her from coughing episodes. Member has not been in the hospital since her last assessment.      Medication Review  Medication reconciliation completed in Epic: Yes  Medication set-up & administration: Family/informal caregiver sets up daily.  Family caregiver administers medications.  Medication Risk Assessment Medication (1 or more, place  referral to MTM): N/A: No risk factors identified  MTM Referral Placed: No: No risk factors idenified    Mental/Behavioral Health   Depression Screening:   PHQ-9 Total Score: 9  Mental health DX:: No        Falls Assessment:   Fallen 2 or more times in the past year?: No   Any fall with injury in the past year?: No    ADL/IADL Dependencies:   Dependent ADLs:: Bathing, Incontinence, Toileting, Grooming, Dressing  Dependent IADLs:: Shopping, Laundry, Cooking, Cleaning, Medication Management, Meal Preparation, Transportation, Money Management    Health [facility]: Shared information regarding [employer]. Reviewed preventative health screening and health plan supplemental benefits/incentives. Reviewed medication disposal form and transition of care member handout.    CFSS Assessment completed at visit:  Yes CFSS assessment was completed. Member chooses not to use CFSS, as she elects [facility] to meet her ADL/IADL care needs.       Elderly Waiver Eligibility: Yes-will continue on EW    Care Plan & Recommendations: Member wants to continue to live with her spouse and her daughter's family. Member wants to continue to remain on [facility] so she is able to select her own care givers and be able to better coordinate her care needs, while reducing any imposition on her family care giving. She will continue to receive incontinent products monthly. Reviewed with member and family that assisted living is always an option if it becomes too much for family to continue to care give for member.     [place] detailed [place] information.    Follow-Up Plan: Member informed of future contact, plan to f/u with member with a 6 month telephone assessment.  Contact information shared with member and family, encouraged member to call with any questions or concerns at any time.    [place] care continuum providers: Please see Snapshot and Care Management Flowsheets for Specific details of  care plan.    This CC note routed to PCP, Adrianna Singh, [facility]  [phone]

## 2025-03-10 ENCOUNTER — MYC REFILL (OUTPATIENT)
Dept: INTERNAL MEDICINE | Facility: CLINIC | Age: 75
End: 2025-03-10
Payer: COMMERCIAL

## 2025-03-10 ENCOUNTER — PATIENT OUTREACH (OUTPATIENT)
Dept: GERIATRIC MEDICINE | Facility: CLINIC | Age: 75
End: 2025-03-10
Payer: COMMERCIAL

## 2025-03-10 DIAGNOSIS — F41.9 ANXIETY: ICD-10-CM

## 2025-03-10 NOTE — PROGRESS NOTES
[facility] Care Coordination Contact    Emailed [facility] Support Planner, Tila Dexter, the new assessment auth period and annual budget amount.  JAQUELIN Langley  [facility]  [phone]

## 2025-03-10 NOTE — TELEPHONE ENCOUNTER
Clinic RN: Please investigate patient's chart or contact patient if the information cannot be found because patient should have run out of this medication on 2/2023. Confirm patient is taking this medication as prescribed. Document findings and route refill encounter to provider for approval or denial.

## 2025-03-11 ENCOUNTER — PATIENT OUTREACH (OUTPATIENT)
Dept: GERIATRIC MEDICINE | Facility: CLINIC | Age: 75
End: 2025-03-11
Payer: COMMERCIAL

## 2025-03-11 DIAGNOSIS — B18.1 CHRONIC HEPATITIS B (H): Primary | ICD-10-CM

## 2025-03-11 NOTE — LETTER
March 11, 2025       KRYSTIAN BARNES  [address]  [place] [address]      Dear Krystian,    At [facility], we re dedicated to improving your health and wellness. Enclosed is the Support Plan developed with you on 3/3/2025. Please review the Support Plan carefully.    As a reminder, during your visit we talked about:   Ways to manage your physical and mental health   Using health care to maintain and improve your health    Your preventive care needs      Remember to contact your care coordinator if you:   Are hospitalized or plan to be hospitalized    Have a fall     Have a change in your physical or mental health   Need help finding support or services    If you have questions or don t agree with your Support Plan, call me at [phone]. You can also call me if your needs change. TTY users call the [place] [employer] at 672 or 1-372.534.9115 (kotxjy-pd-jermah relay service).    Sincerely,       JAQUELIN Langley    E-mail: [email or web address]  Phone: [phone]    Care Manager  [facility]                [id number] accepted     (06/2024)                [address], [place] [address]  [phone]  fax [phone]  [facility]

## 2025-03-11 NOTE — LETTER
[facility]  [address], [address]  [place] [address]  Phone:  [phone]  Fax:  [phone]      March 11, 2025    Zachariah Boyd  [address] [place] [address]        Dear Zachariah,     Senia is a caregiver assessment as you are a person who provides assistance to CAMERON BARNES on a regular basis.    Please complete and return the assessment in the self-addressed stamped envelope provided at your earliest convenience.  If you have questions about the form, please feel free to call me at [phone].  Thank you.    Sincerely,    JAQUELIN Langley    E-mail: [email or web address]  Phone: [phone]    Care Manager  [facility]            Enclosures:   Caregiver assessment     Self-addressed stamped envelope

## 2025-03-11 NOTE — PROGRESS NOTES
[facility] Care Coordination Contact    Received after visit chart from care coordinator.  Completed following tasks: Mailed copy of support plan to member, Mailed [place] signature sheet pages 3-4, Mailed Safe Medication Disposal , Mailed Transition of Care Member Handout, Sent the following resources/forms: Caregiver Assessment , Submitted referrals/auths for [facility] and [facility] back ground checks, Uploaded consent to communicate form(s) to Epic, and Updated services in Database.   and Provider Signature - No Support Plan Shared:  Member indicates that they do not want their support plan shared with any EW providers.    Sophie Amador  Care Management Specialist  [facility]  [phone]

## 2025-03-13 RX ORDER — HYDROXYZINE HYDROCHLORIDE 10 MG/1
10 TABLET, FILM COATED ORAL 3 TIMES DAILY PRN
Qty: 30 TABLET | Refills: 1 | Status: SHIPPED | OUTPATIENT
Start: 2025-03-13

## 2025-03-24 ENCOUNTER — PATIENT OUTREACH (OUTPATIENT)
Dept: GERIATRIC MEDICINE | Facility: CLINIC | Age: 75
End: 2025-03-24
Payer: COMMERCIAL

## 2025-03-26 ENCOUNTER — LAB (OUTPATIENT)
Dept: LAB | Facility: CLINIC | Age: 75
End: 2025-03-26
Attending: INTERNAL MEDICINE
Payer: COMMERCIAL

## 2025-03-26 ENCOUNTER — ANCILLARY PROCEDURE (OUTPATIENT)
Dept: ULTRASOUND IMAGING | Facility: CLINIC | Age: 75
End: 2025-03-26
Attending: INTERNAL MEDICINE
Payer: COMMERCIAL

## 2025-03-26 ENCOUNTER — OFFICE VISIT (OUTPATIENT)
Dept: GASTROENTEROLOGY | Facility: CLINIC | Age: 75
End: 2025-03-26
Attending: INTERNAL MEDICINE
Payer: COMMERCIAL

## 2025-03-26 VITALS
WEIGHT: 164.3 LBS | SYSTOLIC BLOOD PRESSURE: 127 MMHG | BODY MASS INDEX: 31.02 KG/M2 | HEART RATE: 101 BPM | RESPIRATION RATE: 32 BRPM | TEMPERATURE: 98.7 F | DIASTOLIC BLOOD PRESSURE: 75 MMHG | HEIGHT: 61 IN | OXYGEN SATURATION: 97 %

## 2025-03-26 DIAGNOSIS — J84.9 ILD (INTERSTITIAL LUNG DISEASE) (H): ICD-10-CM

## 2025-03-26 DIAGNOSIS — R74.01 ALT (SGPT) LEVEL RAISED: ICD-10-CM

## 2025-03-26 DIAGNOSIS — M81.0 OSTEOPOROSIS WITHOUT CURRENT PATHOLOGICAL FRACTURE, UNSPECIFIED OSTEOPOROSIS TYPE: ICD-10-CM

## 2025-03-26 DIAGNOSIS — B18.1 CHRONIC HEPATITIS B (H): ICD-10-CM

## 2025-03-26 DIAGNOSIS — E11.9 TYPE 2 DIABETES MELLITUS (H): Primary | ICD-10-CM

## 2025-03-26 DIAGNOSIS — B18.1 CHRONIC HEPATITIS B (H): Primary | ICD-10-CM

## 2025-03-26 DIAGNOSIS — J84.9 ILD (INTERSTITIAL LUNG DISEASE) (H): Primary | ICD-10-CM

## 2025-03-26 LAB
AFP SERPL-MCNC: 4.9 NG/ML
ALBUMIN SERPL BCG-MCNC: 4.1 G/DL (ref 3.5–5.2)
ALP SERPL-CCNC: 79 U/L (ref 40–150)
ALT SERPL W P-5'-P-CCNC: 69 U/L (ref 0–50)
ANION GAP SERPL CALCULATED.3IONS-SCNC: 10 MMOL/L (ref 7–15)
AST SERPL W P-5'-P-CCNC: 44 U/L (ref 0–45)
BILIRUB DIRECT SERPL-MCNC: 0.27 MG/DL (ref 0–0.3)
BILIRUB SERPL-MCNC: 0.6 MG/DL
BUN SERPL-MCNC: 9.2 MG/DL (ref 8–23)
CALCIUM SERPL-MCNC: 10 MG/DL (ref 8.8–10.4)
CHLORIDE SERPL-SCNC: 98 MMOL/L (ref 98–107)
CHOLEST SERPL-MCNC: 162 MG/DL
CREAT SERPL-MCNC: 0.7 MG/DL (ref 0.51–0.95)
EGFRCR SERPLBLD CKD-EPI 2021: >90 ML/MIN/1.73M2
FASTING STATUS PATIENT QL REPORTED: YES
GLUCOSE SERPL-MCNC: 133 MG/DL (ref 70–99)
HCO3 SERPL-SCNC: 29 MMOL/L (ref 22–29)
HDLC SERPL-MCNC: 64 MG/DL
HOLD SPECIMEN: NORMAL
INR PPP: 1.17 (ref 0.85–1.15)
LDLC SERPL CALC-MCNC: 84 MG/DL
NONHDLC SERPL-MCNC: 98 MG/DL
POTASSIUM SERPL-SCNC: 3.7 MMOL/L (ref 3.4–5.3)
PROT SERPL-MCNC: 7.6 G/DL (ref 6.4–8.3)
SODIUM SERPL-SCNC: 137 MMOL/L (ref 135–145)
TRIGL SERPL-MCNC: 69 MG/DL

## 2025-03-26 PROCEDURE — 85610 PROTHROMBIN TIME: CPT | Performed by: PATHOLOGY

## 2025-03-26 PROCEDURE — 99000 SPECIMEN HANDLING OFFICE-LAB: CPT | Performed by: PATHOLOGY

## 2025-03-26 PROCEDURE — 1126F AMNT PAIN NOTED NONE PRSNT: CPT | Performed by: PHYSICIAN ASSISTANT

## 2025-03-26 PROCEDURE — 87517 HEPATITIS B DNA QUANT: CPT | Performed by: PHYSICIAN ASSISTANT

## 2025-03-26 PROCEDURE — 80053 COMPREHEN METABOLIC PANEL: CPT | Performed by: PATHOLOGY

## 2025-03-26 PROCEDURE — 99214 OFFICE O/P EST MOD 30 MIN: CPT | Performed by: PHYSICIAN ASSISTANT

## 2025-03-26 PROCEDURE — 82105 ALPHA-FETOPROTEIN SERUM: CPT | Performed by: PHYSICIAN ASSISTANT

## 2025-03-26 PROCEDURE — 36415 COLL VENOUS BLD VENIPUNCTURE: CPT | Performed by: PATHOLOGY

## 2025-03-26 PROCEDURE — G0463 HOSPITAL OUTPT CLINIC VISIT: HCPCS | Performed by: PHYSICIAN ASSISTANT

## 2025-03-26 PROCEDURE — 3074F SYST BP LT 130 MM HG: CPT | Performed by: PHYSICIAN ASSISTANT

## 2025-03-26 PROCEDURE — 82248 BILIRUBIN DIRECT: CPT | Performed by: PATHOLOGY

## 2025-03-26 PROCEDURE — 3078F DIAST BP <80 MM HG: CPT | Performed by: PHYSICIAN ASSISTANT

## 2025-03-26 PROCEDURE — 80061 LIPID PANEL: CPT | Performed by: PATHOLOGY

## 2025-03-26 ASSESSMENT — PAIN SCALES - GENERAL: PAINLEVEL_OUTOF10: NO PAIN (0)

## 2025-03-26 NOTE — PROGRESS NOTES
[facility] Care Coordination Contact    Late entry from 3/24/25: Received the [facility][note 3]S Support plan from [facility] support planner.   Reviewed the staffing hours and noted that of the staffing hours there was very few shared staffing hours with member's spouse. Member and her spouse live at the daughter's home and are always together and this Care Coordinator questioned why the staffing hours for 1:1 care was so much higher than the shared time. Also noted that member had mileage added to her plan and there was 10 miles per day, and this Care Coordinator questioned this as member and her daughter shared that member rarely leaves the home.  Called and spoke with the [facility] Support Planner, Tila, regarding this Care Coordinator's observations of the plan. Reviewed with Tila that this Care Coordinator is questioning why the support plan for member and her spouse have nearly 16 hours per day of direct care and only 6 hours per week of shared time. Reviewed that while this Care Coordinator does agree that member needs a lot of supervision due to her medical care needs, that most of this could not be done as shared time with her spouse. Also inquired about mileage when member typically only leaves the home for medical appointments. Tila shared that she would follow up with family on this and get back to this Care Coordinator. Reviewed with Tila the thought would be that there would be a lot more shared time vs direct care as member lives with family and with her spouse. Tila will get back to this Care Coordinator.  Tila called this Care Coordinator back and shared that family reports that member has her own care giver daily and that her spouse has a different care giver. So member is receiving direct care for a care giver that is different than her spouse, thus the separate hours. Stating that only a couple hours per week are the hours shared. Tila shared that family reports that every other  month or so member goes to the [facility] in [place] which is long drive as well as going to temple a couple of times per year accounts for the mileage in her plan.  Thanked Tila for the update.  Approved plan and sent to [facility] Support Planner and FMS.  JAQUELIN Langley  [facility]  [phone]

## 2025-03-26 NOTE — PROGRESS NOTES
Hepatology Clinic Note  Krystian Boland   Date of Birth 1950    REASON FOR FOLLOW UP: Hepatitis B  REFERRING PROVIDER: Dr. Bray         Assessment/plan:     75 YO F  who presents for follow-up of immune tolerant chronic hepatitis B.  Liver function on labs reveal very mild, chronic coagulopathy and mildly elevated ALT.  No hx of radiographic evidence of cirrhosis. US elastrography completed today. Otherwise last imaging was June 2024 which noted no liver lesions and smooth hepatic contour.      Hx ILD 2/2 RA. RA treated w/ sulfasalazine & hydroxychloroquine. Saw Lung Transplant provider 10/2024 and given stability, lung transplant eval was not considered (plan was to re-evaluated in 6 mo). Continues on Ofev 100 BID and MMF 1000 BID. Rituximab previously discussed w/ Rheum, but with active hep B & latent TB, discussion was had b/t hepatology & ID and plan was to treat with tenofovir and latent TB tx, however, pt and family decided not to pursue to avoid complexity of tx plan with immunosuppression.     Patient remains asymptomatic from a liver standpoint.     PLAN:  - Reviewed labs   > AFP, CBC and HBV DNA still in process  - Pending read of today's US (formal assessment of hepatic fibrosis)  - HCC screen every 6 months    > Next due September 2025; ordered  - Will rediscuss with hepatologist whether or not patient warrants antiviral treatment for hepatitis B time in the setting of continued high viremia and now slightly elevated ALT  - Discussed Hepatitis B transmission, natural course of Hepatitis B and indications for treatment. Also discussed importance of regular labs and follow-up.   - Recommended Hepatitis B testing for other members of the household and immunization if necessary as well as any new sexual partners.   - Continue to avoid alcohol  - Placed referral to endocrinology in the setting of recently diagnosed osteoporosis   > For now, continue vitamin D and calcium supplements daily  - Continue to  follow with PCP and other specialty departments routinely as recommended  - Monitor INR, CBC, BMP, hepatic panel, AFP and HBV DNA level every 6 months    Follow-up in 6 months with labs and ultrasound same day    Maricarmen Roca PA-C   [facility]  -----------------------------------------------------       HPI:     74 year old female     HBV  - dx Jan 2023  - ?transmission  - eAg (+) , eAb (-)  - no prior liver bx  - no prior antiviral therapy  - last HBV DNA 3/7/2024= 42,600,000 on 11/14/24    Patient presents to clinic for follow-up with her daughter.  Of note, her daughter served as  for today's visit.    The patient was last seen virtually by Dr. Bray April 2024.    Patient states she has never been diagnosed with any disease or condition other than hepatitis B.  Has never been on hepatitis B treatment.  No history of liver biopsy.  Denies family history of liver disease or liver cancer.    Denies use of alcohol, tobacco or illicit drugs.    She denies any recent fevers or chills.  Has had some slight nausea over the past few days but denies vomiting.  Denies yellowing of the eyes or skin.  No lower extremity edema.  Passing stool once per day.  Denies melena and bright red blood per rectum.  Weight has been stable.  She denies itching.  Patient states her appetite is poor, which is not new.  Patient states appetite became poor after starting Ofev for her lung disease.  Despite poor appetite, she is still eating normally.  She is on 2 L of oxygen at baseline and up to 4 L with activity.  Patient states it is normal for her to have cough.  Sometimes has chest discomfort with cough.  Has not had to use a nebulizer treatment in over 1 year.  Activity level is limited due to her lung disease.  Patient admits to taking calcium and vitamin D supplements routinely.  Admits she has never seen endocrinology for osteoporosis.    Patient's daughter mentions that patient had discussion  with lung transplant team and ultimately they chose not to pursue lung transplant.  Patient's daughter also mentioned that Dr. Bray told them that they would likely hold off on hepatitis B treatment as long as patient is not on biologic therapy and is not having liver related symptoms.    PMH:  has a past medical history of Benign essential hypertension, Early onset Alzheimer's dementia without behavioral disturbance (H), ILD (interstitial lung disease) (H), Obesity (BMI 30-39.9), Osteoporosis, Seropositive rheumatoid arthritis (H) (01/2023), and Type 2 diabetes mellitus (H).    SMH:  has a past surgical history that includes cholecystectomy, open (1990); cataract iol, rt/lt (Right, 10/11/2021); Cataract Extraction W/ Intraocular Lens Implant (Left, 10/25/2021); and hemicolectomy, rt/lt (Right, 1990).     Hepatitis B:   Lab Results   Component Value Date    HEPBANG Reactive (A) 04/18/2023    HBCAB Reactive (A) 04/18/2023    AUSAB 6.05 04/18/2023    HCVAB Nonreactive 01/25/2023    HDAAB Negative 11/01/2023     Lab Results   Component Value Date    HBQRESINST 42,600,000 (H) 11/14/2024    HBQRESINST 50,200,000 (H) 03/07/2024    HBQRESINST 52,800,000 (H) 11/01/2023    HBQRESINST 57,116,897 (H) 04/18/2023     Lab Results   Component Value Date    HBEABY Negative 04/18/2023    HBEAGN Positive (A) 04/18/2023          Medications:     Current Outpatient Medications   Medication Sig Dispense Refill    amLODIPine (NORVASC) 5 MG tablet TAKE 1 TABLET BY MOUTH DAILY 90 tablet 2    aspirin (ASA) 81 MG chewable tablet Take 1 tablet (81 mg) by mouth daily. 90 tablet 2    benzonatate (TESSALON) 200 MG capsule Take 1 capsule (200 mg) by mouth 3 times daily as needed for cough. TAKE 1 CAPSULE(200 MG) BY MOUTH THREE TIMES DAILY AS NEEDED FOR COUGH 90 capsule 0    famotidine (PEPCID) 40 MG tablet TAKE 1 TABLET BY MOUTH EVERY EVENING 90 tablet 3    guaiFENesin (MUCINEX) 600 MG 12 hr tablet Take 600 mg by mouth 2 times daily as needed for  congestion or cough.      guaiFENesin (ROBITUSSIN) 20 mg/mL liquid Take 10 mLs by mouth every 4 hours as needed for cough 236 mL 0    hydroxychloroquine (PLAQUENIL) 200 MG tablet Take 1 tablet (200 mg) by mouth 2 times daily. Annual Plaquenil toxicity eye screening required. 180 tablet 2    hydrOXYzine HCl (ATARAX) 10 MG tablet Take 1 tablet (10 mg) by mouth 3 times daily as needed for anxiety. 30 tablet 1    hypromellose (GENTEAL SEVERE) ophthalmic gel 0.3% Place 1 drop into both eyes 4 times daily 10 g 11    ipratropium - albuterol 0.5 mg/2.5 mg/3 mL (DUONEB) 0.5-2.5 (3) MG/3ML neb solution Take 1 vial (3 mLs) by nebulization every 4 hours as needed for wheezing 90 mL 0    melatonin 3 MG tablet Take 1 tablet (3 mg) by mouth nightly as needed for sleep      mycophenolate (GENERIC EQUIVALENT) 250 MG capsule Take 4 capsules (1,000 mg) by mouth 2 times daily. 720 capsule 0    OFEV 100 MG capsule TAKE ONE CAPSULE BY MOUTH TWICE A DAY 60 capsule 11    pantoprazole (PROTONIX) 40 MG EC tablet Take 1 tablet (40 mg) by mouth daily 90 tablet 3    predniSONE (DELTASONE) 5 MG tablet Take 1 tablet (5 mg) by mouth daily. 70 tablet 0    predniSONE (DELTASONE) 5 MG tablet TAKE 1 TABLET BY MOUTH DAILY 30 tablet 11    STATIN NOT PRESCRIBED (INTENTIONAL) Please choose reason not prescribed, below      sulfaSALAzine ER (AZULFIDINE EN) 500 MG EC tablet take 2 tabs twice a day. 360 tablet 3    Vitamin D3 50 mcg (2000 units) tablet TAKE 1 TABLET BY MOUTH DAILY 90 tablet 0     No current facility-administered medications for this visit.          Allergies:     Allergies   Allergen Reactions    Atorvastatin Muscle Pain (Myalgia)          Social History:     Social History     Socioeconomic History    Marital status:      Spouse name: Gladys Mccain    Number of children: 8    Years of education: Not on file    Highest education level: 3rd grade   Occupational History    Occupation: Retired - was  in restaurant   Tobacco Use     Smoking status: Never    Smokeless tobacco: Never   Vaping Use    Vaping status: Never Used   Substance and Sexual Activity    Alcohol use: Never    Drug use: No    Sexual activity: Not Currently   Other Topics Concern    Parent/sibling w/ CABG, MI or angioplasty before 65F 55M? Not Asked   Social History Narrative    .     8 children.     15 grandchildren (as of 2023).    Originally from [facility]. [place] (all kids born in [facility]). [place]oved to [place] in [place].     Living with daughter and her family. Taking care of  (~25 years her senior).     Goes for walks occasionally.      Social Drivers of Health     Financial Resource Strain: Low Risk  (3/3/2025)    Financial Resource Strain     Within the past 12 months, have you or your family members you live with been unable to get utilities (heat, electricity) when it was really needed?: No   Food Insecurity: Low Risk  (3/3/2025)    Food Insecurity     Within the past 12 months, did you worry that your food would run out before you got money to buy more?: No     Within the past 12 months, did the food you bought just not last and you didn t have money to get more?: No   Transportation Needs: Low Risk  (3/3/2025)    Transportation Needs     Within the past 12 months, has lack of transportation kept you from medical appointments, getting your medicines, non-medical meetings or appointments, work, or from getting things that you need?: No   Physical Activity: Inactive (3/3/2025)    Exercise Vital Sign     Days of Exercise per Week: 0 days     Minutes of Exercise per Session: 0 min   Stress: No Stress Concern Present (3/3/2025)    [employer] - Occupational Stress Questionnaire     Feeling of Stress : Only a little   Social Connections: Moderately Integrated (3/3/2025)    Social Connection and Isolation Panel [NHANES]     Frequency of Communication with Friends and Family: Twice a week     Frequency of Social Gatherings with  "Friends and Family: More than three times a week     Attends [employer] Services: 1 to 4 times per year     Active Member of Clubs or Organizations: No     Attends Club or Organization Meetings: Never     Marital Status:    Interpersonal Safety: Low Risk  (3/3/2025)    Interpersonal Safety     Do you feel physically and emotionally safe where you currently live?: Yes     Within the past 12 months, have you been hit, slapped, kicked or otherwise physically hurt by someone?: No     Within the past 12 months, have you been humiliated or emotionally abused in other ways by your partner or ex-partner?: No   Housing Stability: Low Risk  (3/3/2025)    Housing Stability     Do you have housing? : Yes     Are you worried about losing your housing?: No          Family History:     Family History   Problem Relation Age of Onset    Dementia Mother     Cerebrovascular Disease Father     Cerebrovascular Disease Brother     Myocardial Infarction No family hx of     Coronary Artery Disease Early Onset No family hx of     Breast Cancer No family hx of     Ovarian Cancer No family hx of     Colon Cancer No family hx of     Glaucoma No family hx of     Macular Degeneration No family hx of     Liver Disease No family hx of           Review of Systems:   GEN: See HPI         Physical Exam:   Vital signs:  Temp: 98.7  F (37.1  C) Temp src: Oral BP: 127/75 Pulse: 101   Resp: (!) 32 SpO2: 97 % (2 lpm via NC)     Height: 154.9 cm (5' 0.98\") Weight: 74.5 kg (164 lb 4.8 oz)  Estimated body mass index is 31.06 kg/m  as calculated from the following:    Height as of this encounter: 1.549 m (5' 0.98\").    Weight as of this encounter: 74.5 kg (164 lb 4.8 oz).  Gen: A&Ox3, NAD  HEENT: Sclera non-icteric, wearing nasal cannula   CV: RRR, no overt murmurs  Lung: decreased breath sounds bilaterally; non-labored breathing   Abd: soft, NT, ND, BS+  Ext: no edema, intact pulses.   Skin: No rash or jaundice  Neuro: grossly intact  Psych: " "appropriate mood and affects         Data:   Reviewed in person and significant for:    Lab Results   Component Value Date     02/06/2025     03/17/2021      Lab Results   Component Value Date    POTASSIUM 4.2 02/06/2025    POTASSIUM 3.4 12/02/2022    POTASSIUM 4.3 03/17/2021     Lab Results   Component Value Date    CHLORIDE 98 02/06/2025    CHLORIDE 94 12/01/2022    CHLORIDE 105 03/17/2021     Lab Results   Component Value Date    CO2 27 02/06/2025    CO2 28 12/01/2022    CO2 31 03/17/2021     Lab Results   Component Value Date    BUN 9.5 02/06/2025    BUN 10 12/01/2022    BUN 12 03/17/2021     Lab Results   Component Value Date    CR 0.80 02/06/2025    CR 0.69 06/16/2021       Lab Results   Component Value Date    WBC 6.0 02/06/2025    WBC 7.7 06/16/2021     Lab Results   Component Value Date    HGB 13.1 02/06/2025    HGB 11.5 06/16/2021     Lab Results   Component Value Date    HCT 38.6 02/06/2025    HCT 33.7 06/16/2021     Lab Results   Component Value Date    MCV 88 02/06/2025    MCV 91 06/16/2021     Lab Results   Component Value Date     02/06/2025     06/16/2021       Lab Results   Component Value Date    AST 40 02/06/2025    AST 20 06/16/2021     Lab Results   Component Value Date    ALT 53 02/06/2025    ALT 28 06/16/2021     No results found for: \"BILICONJ\"   Lab Results   Component Value Date    BILITOTAL 0.5 02/06/2025    BILITOTAL 0.2 06/16/2021     Lab Results   Component Value Date    ALBUMIN 4.0 02/06/2025    ALBUMIN 2.6 11/30/2022    ALBUMIN 3.7 06/16/2021     Lab Results   Component Value Date    PROTTOTAL 7.5 02/06/2025    PROTTOTAL 8.0 06/16/2021      Lab Results   Component Value Date    ALKPHOS 80 02/06/2025    ALKPHOS 61 06/16/2021     Lab Results   Component Value Date    INR 1.16 03/07/2024     IMAGING:    CT ABDOMEN/PELVIS WITH CONTRAST Cassandra 10, 2024      CLINICAL HISTORY: Further evaluation of left kidney lesion seen on US.  Chronic hepatitis B (H). Kidney " lesion, native, left.     TECHNIQUE: CT scan of the abdomen and pelvis was performed following  injection of IV contrast. Multiplanar reformats were obtained. Dose  reduction techniques were used.  CONTRAST: 76 mL [id number].     COMPARISON: Ultrasound 5/22/2024.     FINDINGS:   LOWER CHEST: Severe pulmonary fibrosis, similar to previous.     HEPATOBILIARY: Cholecystectomy. No liver lesions. Smooth hepatic  contour.     PANCREAS: Normal.     SPLEEN: Normal.     ADRENAL GLANDS: Normal.     KIDNEYS/BLADDER: There is no left perinephric lesion. Kidneys are  normal in appearance.     BOWEL: Normal.     PELVIC ORGANS: Normal.     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     MUSCULOSKELETAL: Normal.                                                                  IMPRESSION: No liver or perinephric mass. Ultrasound finding likely artifactual.     ARAM PALMER MD

## 2025-03-26 NOTE — NURSING NOTE
"Chief Complaint   Patient presents with    RECHECK     Hep B     Vital signs:  Temp: 98.7  F (37.1  C) Temp src: Oral BP: 127/75 Pulse: 101   Resp: (!) 32 SpO2: 97 % (2 lpm via NC)     Height: 154.9 cm (5' 0.98\") Weight: 74.5 kg (164 lb 4.8 oz)  Estimated body mass index is 31.06 kg/m  as calculated from the following:    Height as of this encounter: 1.549 m (5' 0.98\").    Weight as of this encounter: 74.5 kg (164 lb 4.8 oz).      Tanya Mcintosh, [facility]  3/26/2025 9:37 AM    "

## 2025-03-26 NOTE — LETTER
3/26/2025      Krystian Boland  [id number]  [facility] [id number]      Dear Colleague,    Thank you for referring your patient, Krystian Boland, to the [facility] [place]. Please see a copy of my visit note below.    Hepatology Clinic Note  Krystian Boland   Date of Birth 1950    REASON FOR FOLLOW UP: Hepatitis B  REFERRING PROVIDER: Dr. Bray         Assessment/plan:     75 YO F  who presents for follow-up of immune tolerant chronic hepatitis B.  Liver function on labs reveal very mild, chronic coagulopathy and mildly elevated ALT.  No hx of radiographic evidence of cirrhosis. US elastrography completed today. Otherwise last imaging was June 2024 which noted no liver lesions and smooth hepatic contour.      Hx ILD 2/2 RA. RA treated w/ sulfasalazine & hydroxychloroquine. Saw Lung Transplant provider 10/2024 and given stability, lung transplant eval was not considered (plan was to re-evaluated in 6 mo). Continues on Ofev 100 BID and MMF 1000 BID. Rituximab previously discussed w/ Rheum, but with active hep B & latent TB, discussion was had b/t hepatology & ID and plan was to treat with tenofovir and latent TB tx, however, pt and family decided not to pursue to avoid complexity of tx plan with immunosuppression.     Patient remains asymptomatic from a liver standpoint.     PLAN:  - Reviewed labs   > AFP, CBC and HBV DNA still in process  - Pending read of today's US (formal assessment of hepatic fibrosis)  - HCC screen every 6 months    > Next due September 2025; ordered  - Will rediscuss with hepatologist whether or not patient warrants antiviral treatment for hepatitis B time in the setting of continued high viremia and now slightly elevated ALT  - Discussed Hepatitis B transmission, natural course of Hepatitis B and indications for treatment. Also discussed importance of regular labs and follow-up.   - Recommended Hepatitis B testing for other members of the household  and immunization if necessary as well as any new sexual partners.   - Continue to avoid alcohol  - Placed referral to endocrinology in the setting of recently diagnosed osteoporosis   > For now, continue vitamin D and calcium supplements daily  - Continue to follow with PCP and other specialty departments routinely as recommended  - Monitor INR, CBC, BMP, hepatic panel, AFP and HBV DNA level every 6 months    Follow-up in 6 months with labs and ultrasound same day    Maricarmen Roca PA-C   [facility]  -----------------------------------------------------       HPI:     74 year old female     HBV  - dx Jan 2023  - ?transmission  - eAg (+) , eAb (-)  - no prior liver bx  - no prior antiviral therapy  - last HBV DNA 3/7/2024= 42,600,000 on 11/14/24    Patient presents to clinic for follow-up with her daughter.  Of note, her daughter served as  for today's visit.    The patient was last seen virtually by Dr. Bray April 2024.    Patient states she has never been diagnosed with any disease or condition other than hepatitis B.  Has never been on hepatitis B treatment.  No history of liver biopsy.  Denies family history of liver disease or liver cancer.    Denies use of alcohol, tobacco or illicit drugs.    She denies any recent fevers or chills.  Has had some slight nausea over the past few days but denies vomiting.  Denies yellowing of the eyes or skin.  No lower extremity edema.  Passing stool once per day.  Denies melena and bright red blood per rectum.  Weight has been stable.  She denies itching.  Patient states her appetite is poor, which is not new.  Patient states appetite became poor after starting Ofev for her lung disease.  Despite poor appetite, she is still eating normally.  She is on 2 L of oxygen at baseline and up to 4 L with activity.  Patient states it is normal for her to have cough.  Sometimes has chest discomfort with cough.  Has not had to use a nebulizer  treatment in over 1 year.  Activity level is limited due to her lung disease.  Patient admits to taking calcium and vitamin D supplements routinely.  Admits she has never seen endocrinology for osteoporosis.    Patient's daughter mentions that patient had discussion with lung transplant team and ultimately they chose not to pursue lung transplant.  Patient's daughter also mentioned that Dr. Bray told them that they would likely hold off on hepatitis B treatment as long as patient is not on biologic therapy and is not having liver related symptoms.    PMH:  has a past medical history of Benign essential hypertension, Early onset Alzheimer's dementia without behavioral disturbance (H), ILD (interstitial lung disease) (H), Obesity (BMI 30-39.9), Osteoporosis, Seropositive rheumatoid arthritis (H) (01/2023), and Type 2 diabetes mellitus (H).    SMH:  has a past surgical history that includes cholecystectomy, open (1990); cataract iol, rt/lt (Right, 10/11/2021); Cataract Extraction W/ Intraocular Lens Implant (Left, 10/25/2021); and hemicolectomy, rt/lt (Right, 1990).     Hepatitis B:   Lab Results   Component Value Date    HEPBANG Reactive (A) 04/18/2023    HBCAB Reactive (A) 04/18/2023    AUSAB 6.05 04/18/2023    HCVAB Nonreactive 01/25/2023    HDAAB Negative 11/01/2023     Lab Results   Component Value Date    HBQRESINST 42,600,000 (H) 11/14/2024    HBQRESINST 50,200,000 (H) 03/07/2024    HBQRESINST 52,800,000 (H) 11/01/2023    HBQRESINST 57,116,897 (H) 04/18/2023     Lab Results   Component Value Date    HBEABY Negative 04/18/2023    HBEAGN Positive (A) 04/18/2023          Medications:     Current Outpatient Medications   Medication Sig Dispense Refill     amLODIPine (NORVASC) 5 MG tablet TAKE 1 TABLET BY MOUTH DAILY 90 tablet 2     aspirin (ASA) 81 MG chewable tablet Take 1 tablet (81 mg) by mouth daily. 90 tablet 2     benzonatate (TESSALON) 200 MG capsule Take 1 capsule (200 mg) by mouth 3 times daily as needed for  cough. TAKE 1 CAPSULE(200 MG) BY MOUTH THREE TIMES DAILY AS NEEDED FOR COUGH 90 capsule 0     famotidine (PEPCID) 40 MG tablet TAKE 1 TABLET BY MOUTH EVERY EVENING 90 tablet 3     guaiFENesin (MUCINEX) 600 MG 12 hr tablet Take 600 mg by mouth 2 times daily as needed for congestion or cough.       guaiFENesin (ROBITUSSIN) 20 mg/mL liquid Take 10 mLs by mouth every 4 hours as needed for cough 236 mL 0     hydroxychloroquine (PLAQUENIL) 200 MG tablet Take 1 tablet (200 mg) by mouth 2 times daily. Annual Plaquenil toxicity eye screening required. 180 tablet 2     hydrOXYzine HCl (ATARAX) 10 MG tablet Take 1 tablet (10 mg) by mouth 3 times daily as needed for anxiety. 30 tablet 1     hypromellose (GENTEAL SEVERE) ophthalmic gel 0.3% Place 1 drop into both eyes 4 times daily 10 g 11     ipratropium - albuterol 0.5 mg/2.5 mg/3 mL (DUONEB) 0.5-2.5 (3) MG/3ML neb solution Take 1 vial (3 mLs) by nebulization every 4 hours as needed for wheezing 90 mL 0     melatonin 3 MG tablet Take 1 tablet (3 mg) by mouth nightly as needed for sleep       mycophenolate (GENERIC EQUIVALENT) 250 MG capsule Take 4 capsules (1,000 mg) by mouth 2 times daily. 720 capsule 0     OFEV 100 MG capsule TAKE ONE CAPSULE BY MOUTH TWICE A DAY 60 capsule 11     pantoprazole (PROTONIX) 40 MG EC tablet Take 1 tablet (40 mg) by mouth daily 90 tablet 3     predniSONE (DELTASONE) 5 MG tablet Take 1 tablet (5 mg) by mouth daily. 70 tablet 0     predniSONE (DELTASONE) 5 MG tablet TAKE 1 TABLET BY MOUTH DAILY 30 tablet 11     STATIN NOT PRESCRIBED (INTENTIONAL) Please choose reason not prescribed, below       sulfaSALAzine ER (AZULFIDINE EN) 500 MG EC tablet take 2 tabs twice a day. 360 tablet 3     Vitamin D3 50 mcg (2000 units) tablet TAKE 1 TABLET BY MOUTH DAILY 90 tablet 0     No current facility-administered medications for this visit.          Allergies:     Allergies   Allergen Reactions     Atorvastatin Muscle Pain (Myalgia)          Social History:      Social History     Socioeconomic History     Marital status:      Spouse name: Gladys Mccain     Number of children: 8     Years of education: Not on file     Highest education level: 3rd grade   Occupational History     Occupation: Retired - was  in restaurant   Tobacco Use     Smoking status: Never     Smokeless tobacco: Never   Vaping Use     Vaping status: Never Used   Substance and Sexual Activity     Alcohol use: Never     Drug use: No     Sexual activity: Not Currently   Other Topics Concern     Parent/sibling w/ CABG, MI or angioplasty before 65F 55M? Not Asked   Social History Narrative    .     8 children.     15 grandchildren (as of 2023).    Originally from [facility]. [place] (all kids born in [facility]). Moved to  in 2012.     Living with daughter and her family. Taking care of  (~25 years her senior).     Goes for walks occasionally.      Social Drivers of Health     Financial Resource Strain: Low Risk  (3/3/2025)    Financial Resource Strain      Within the past 12 months, have you or your family members you live with been unable to get utilities (heat, electricity) when it was really needed?: No   Food Insecurity: Low Risk  (3/3/2025)    Food Insecurity      Within the past 12 months, did you worry that your food would run out before you got money to buy more?: No      Within the past 12 months, did the food you bought just not last and you didn t have money to get more?: No   Transportation Needs: Low Risk  (3/3/2025)    Transportation Needs      Within the past 12 months, has lack of transportation kept you from medical appointments, getting your medicines, non-medical meetings or appointments, work, or from getting things that you need?: No   Physical Activity: Inactive (3/3/2025)    Exercise Vital Sign      Days of Exercise per Week: 0 days      Minutes of Exercise per Session: 0 min   Stress: No Stress Concern Present (3/3/2025)    [employer] [place] of  "Occupational Health - Occupational Stress Questionnaire      Feeling of Stress : Only a little   Social Connections: Moderately Integrated (3/3/2025)    Social Connection and Isolation Panel [NHANES]      Frequency of Communication with Friends and Family: Twice a week      Frequency of Social Gatherings with Friends and Family: More than three times a week      Attends Mandaeism Services: 1 to 4 times per year      Active Member of Clubs or Organizations: No      Attends Club or Organization Meetings: Never      Marital Status:    Interpersonal Safety: Low Risk  (3/3/2025)    Interpersonal Safety      Do you feel physically and emotionally safe where you currently live?: Yes      Within the past 12 months, have you been hit, slapped, kicked or otherwise physically hurt by someone?: No      Within the past 12 months, have you been humiliated or emotionally abused in other ways by your partner or ex-partner?: No   Housing Stability: Low Risk  (3/3/2025)    Housing Stability      Do you have housing? : Yes      Are you worried about losing your housing?: No          Family History:     Family History   Problem Relation Age of Onset     Dementia Mother      Cerebrovascular Disease Father      Cerebrovascular Disease Brother      Myocardial Infarction No family hx of      Coronary Artery Disease Early Onset No family hx of      Breast Cancer No family hx of      Ovarian Cancer No family hx of      Colon Cancer No family hx of      Glaucoma No family hx of      Macular Degeneration No family hx of      Liver Disease No family hx of           Review of Systems:   GEN: See HPI         Physical Exam:   Vital signs:  Temp: 98.7  F (37.1  C) Temp src: Oral BP: 127/75 Pulse: 101   Resp: (!) 32 SpO2: 97 % (2 lpm via NC)     Height: 154.9 cm (5' 0.98\") Weight: 74.5 kg (164 lb 4.8 oz)  Estimated body mass index is 31.06 kg/m  as calculated from the following:    Height as of this encounter: 1.549 m (5' 0.98\").    Weight as " "of this encounter: 74.5 kg (164 lb 4.8 oz).  Gen: A&Ox3, NAD  HEENT: Sclera non-icteric, wearing nasal cannula   CV: RRR, no overt murmurs  Lung: decreased breath sounds bilaterally; non-labored breathing   Abd: soft, NT, ND, BS+  Ext: no edema, intact pulses.   Skin: No rash or jaundice  Neuro: grossly intact  Psych: appropriate mood and affects         Data:   Reviewed in person and significant for:    Lab Results   Component Value Date     02/06/2025     03/17/2021      Lab Results   Component Value Date    POTASSIUM 4.2 02/06/2025    POTASSIUM 3.4 12/02/2022    POTASSIUM 4.3 03/17/2021     Lab Results   Component Value Date    CHLORIDE 98 02/06/2025    CHLORIDE 94 12/01/2022    CHLORIDE 105 03/17/2021     Lab Results   Component Value Date    CO2 27 02/06/2025    CO2 28 12/01/2022    CO2 31 03/17/2021     Lab Results   Component Value Date    BUN 9.5 02/06/2025    BUN 10 12/01/2022    BUN 12 03/17/2021     Lab Results   Component Value Date    CR 0.80 02/06/2025    CR 0.69 06/16/2021       Lab Results   Component Value Date    WBC 6.0 02/06/2025    WBC 7.7 06/16/2021     Lab Results   Component Value Date    HGB 13.1 02/06/2025    HGB 11.5 06/16/2021     Lab Results   Component Value Date    HCT 38.6 02/06/2025    HCT 33.7 06/16/2021     Lab Results   Component Value Date    MCV 88 02/06/2025    MCV 91 06/16/2021     Lab Results   Component Value Date     02/06/2025     06/16/2021       Lab Results   Component Value Date    AST 40 02/06/2025    AST 20 06/16/2021     Lab Results   Component Value Date    ALT 53 02/06/2025    ALT 28 06/16/2021     No results found for: \"BILICONJ\"   Lab Results   Component Value Date    BILITOTAL 0.5 02/06/2025    BILITOTAL 0.2 06/16/2021     Lab Results   Component Value Date    ALBUMIN 4.0 02/06/2025    ALBUMIN 2.6 11/30/2022    ALBUMIN 3.7 06/16/2021     Lab Results   Component Value Date    PROTTOTAL 7.5 02/06/2025    PROTTOTAL 8.0 06/16/2021      Lab " Results   Component Value Date    ALKPHOS 80 02/06/2025    ALKPHOS 61 06/16/2021     Lab Results   Component Value Date    INR 1.16 03/07/2024     IMAGING:    CT ABDOMEN/PELVIS WITH CONTRAST Cassandra 10, 2024      CLINICAL HISTORY: Further evaluation of left kidney lesion seen on US.  Chronic hepatitis B (H). Kidney lesion, native, left.     TECHNIQUE: CT scan of the abdomen and pelvis was performed following  injection of IV contrast. Multiplanar reformats were obtained. Dose  reduction techniques were used.  CONTRAST: 76 mL [id number].     COMPARISON: Ultrasound 5/22/2024.     FINDINGS:   LOWER CHEST: Severe pulmonary fibrosis, similar to previous.     HEPATOBILIARY: Cholecystectomy. No liver lesions. Smooth hepatic  contour.     PANCREAS: Normal.     SPLEEN: Normal.     ADRENAL GLANDS: Normal.     KIDNEYS/BLADDER: There is no left perinephric lesion. Kidneys are  normal in appearance.     BOWEL: Normal.     PELVIC ORGANS: Normal.     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     MUSCULOSKELETAL: Normal.                                                                  IMPRESSION: No liver or perinephric mass. Ultrasound finding likely artifactual.     ARAM PALMER MD            Again, thank you for allowing me to participate in the care of your patient.        Sincerely,        Maricarmen Roca PA-C    Electronically signed

## 2025-03-26 NOTE — Clinical Note
Cristo Bahena,        I wanted to run this pt by you. I saw her today but she is a previous Dr. Bray patient.  Ultimately she has RA and interstitial lung disease.  Rituximab has been discussed in the past but patient and her family ultimately decided against wanting to start tenofovir for hepatitis B and a medication for latent TB.  Therefore, she is not currently on any biologic therapy.  It was determined by Dr. Bray that patient has immune tolerant chronic hepatitis B.  She has never been on hepatitis B treatment.  She remains asymptomatic in terms of liver.  Recently, her ALT has jumped up quite a bit.  Wondering if we would need to reconsider placing her on tenofovir?  Please let me know your thoughts.   Thanks,   Maricarmen

## 2025-03-27 DIAGNOSIS — B18.1 CHRONIC HEPATITIS B (H): Primary | ICD-10-CM

## 2025-03-27 LAB
HBV DNA SERPL NAA+PROBE-ACNC: ABNORMAL IU/ML
HBV DNA SERPL NAA+PROBE-LOG IU: 7.6 {LOG_IU}/ML

## 2025-03-27 RX ORDER — TENOFOVIR DISOPROXIL FUMARATE 300 MG/1
300 TABLET, FILM COATED ORAL DAILY
Qty: 90 TABLET | Refills: 3 | Status: SHIPPED | OUTPATIENT
Start: 2025-03-27

## 2025-04-07 DIAGNOSIS — J84.9 INTERSTITIAL LUNG DISEASE (H): ICD-10-CM

## 2025-04-07 DIAGNOSIS — J06.9 UPPER RESPIRATORY TRACT INFECTION, UNSPECIFIED TYPE: ICD-10-CM

## 2025-04-07 RX ORDER — BENZONATATE 200 MG/1
CAPSULE ORAL
Qty: 90 CAPSULE | Refills: 0 | Status: SHIPPED | OUTPATIENT
Start: 2025-04-07

## 2025-04-22 ENCOUNTER — OFFICE VISIT (OUTPATIENT)
Dept: TRANSPLANT | Facility: CLINIC | Age: 75
End: 2025-04-22
Attending: INTERNAL MEDICINE
Payer: COMMERCIAL

## 2025-04-22 VITALS
SYSTOLIC BLOOD PRESSURE: 147 MMHG | HEART RATE: 81 BPM | DIASTOLIC BLOOD PRESSURE: 87 MMHG | OXYGEN SATURATION: 99 % | BODY MASS INDEX: 31 KG/M2 | TEMPERATURE: 98.1 F | WEIGHT: 164 LBS

## 2025-04-22 DIAGNOSIS — J84.9 ILD (INTERSTITIAL LUNG DISEASE) (H): Primary | ICD-10-CM

## 2025-04-22 DIAGNOSIS — Z01.818 ENCOUNTER FOR PRE-TRANSPLANT EVALUATION FOR LUNG TRANSPLANT: ICD-10-CM

## 2025-04-22 DIAGNOSIS — J96.11 CHRONIC HYPOXEMIC RESPIRATORY FAILURE (H): ICD-10-CM

## 2025-04-22 PROCEDURE — 3079F DIAST BP 80-89 MM HG: CPT | Performed by: INTERNAL MEDICINE

## 2025-04-22 PROCEDURE — G0463 HOSPITAL OUTPT CLINIC VISIT: HCPCS | Performed by: INTERNAL MEDICINE

## 2025-04-22 PROCEDURE — 1126F AMNT PAIN NOTED NONE PRSNT: CPT | Performed by: INTERNAL MEDICINE

## 2025-04-22 PROCEDURE — 99215 OFFICE O/P EST HI 40 MIN: CPT | Performed by: INTERNAL MEDICINE

## 2025-04-22 PROCEDURE — 3077F SYST BP >= 140 MM HG: CPT | Performed by: INTERNAL MEDICINE

## 2025-04-22 ASSESSMENT — PATIENT HEALTH QUESTIONNAIRE - PHQ9: SUM OF ALL RESPONSES TO PHQ QUESTIONS 1-9: 12

## 2025-04-22 ASSESSMENT — PAIN SCALES - GENERAL: PAINLEVEL_OUTOF10: NO PAIN (0)

## 2025-04-22 NOTE — LETTER
4/22/2025      Krystian Boland  [id number]  [facility] [id number]      Dear Colleague,    Thank you for referring your patient, Krystian Boland, to the [facility] TRANSPL[facility]. Please see a copy of my visit note below.      [facility] Lung Transplant Program      Date of Visit: April 22, 2025   Clinic Location: [facility], [facility], [facility].    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  PLAN:  Medications: Continue Ofev 100 mg twice daily, mycophenolate and prednisone taper per Dr. Keller.  Oxygen use: Continue oxygen supplementation 2 L, using at rest and with exertion  Social support: Discussed importance of social support with daughter Zachariah who the patient lives with now.  Zachariah is also her .  Discussed lung transplant and its nuances in detail with the patient who asked a fair amount of well thought out questions and seemed to have good understanding of the process, if she were to be deemed a candidate for lung transplantation.   Pulmonary rehabilitation: She is currently enrolled in rehab and reports it being beneficial. I have encouraged her to remain active with exercises and a healthy diet to maintain her conditioning, which is critical for lung health.   Lung Transplantation:  We discussed lung transplantation briefly and the indications for it including the presenting underlying diagnosis today. We discussed briefly the evaluation  process, typical outcomes, the procedure, and the posthospital stay. Detailed discussion completed during first visit. She is clinically stable but with some worsening since visit on 1/2024. We will determine her degree of pulmonary function impairment based on testing at visit with Dr Keller 5/2025. . I will see her back in 6 months, earlier if PFT with worsening.     Immunization/preventive care: Up-to-date with preventive immunization-influenza, COVID x 4 shots, pneumonia vaccine, colonoscopy and mammogram.     I spent a total of 45 minutes reviewing chart (previous notes), reviewing test results, reviewing chest x rays and CT scans, talking with and examining patient, formulating plan, adjusting medications, and documentation of my findings and plan on the day of the encounter. Time excludes time spent for PFT.  Luis Galvin MD [facility]  Associate Professor of Medicine  Pulmonary, Allergy & Critical Care Division  [facility]  [facility] Lung Transplant Program        Chief Complaint:   Krystian Boland is a 73 year old year old female who is being seen for Follow Up (6 month follow up)    HPI:    April 22, 2025:  Krystian Boland presents for follow up.  She is accompanied by her daughter today.  Since last visit, she reports slight worsening of her breathing.  Now has to use oxygen 2 L at rest and with activity.  Continues to remain active with home exercises.      Initial HPI:    Krystian Boland is a 73 year old  year old female who presents for lung transplant evaluation.  She is accompanied by her daughter Zachariah today, who translates for her. Krystian is tibetan speaking only. She is followed by Dr. Keller for interstitial lung disease, that is suspected to be secondary to longstanding rheumatoid arthritis although she has minimal joint symptoms.  She was followed with Dr. Cross prior to that.  She has had longstanding interstitial lung disease secondary to rheumatoid  arthritis although symptoms progressed in 2022 initially at which time she was placed on mycophenolate mofetil.  Ultimately she required hospitalization in 2023 for respiratory failure and was noted to have progressive fibrosis following rheumatoid flare.  Recently hospitalized 4/2024 again for respiratory failure-treated with abx and steroids.  She has been on oxygen since last year.  However, daughter Zachariah reports that her breathing is improved since her hospitalization.  Currently, she wears oxygen only as needed with severe exertion.  She does not have an oxygen concentrator at home.  She wears her Inogen at pulsed dose, when she is moving with the highest setting of 5.  She is on prednisone (taper) and mycophenolate now.  It seems rituximab was discussed but has been held due to low-grade hepatitis B viremia and history of intestinal tuberculosis.  Regardless, her daughter reports improvement in her symptoms and pulmonary status since hospitalization.    ROS: 12 point ROS negative except mentioned in HPI.        Past Medical History:   Diagnosis Date     Benign essential hypertension      Early onset Alzheimer's dementia without behavioral disturbance (H)      ILD (interstitial lung disease) (H)      Obesity (BMI 30-39.9)      Osteoporosis      Seropositive rheumatoid arthritis (H) 01/2023     Type 2 diabetes mellitus (H)               Past Surgical History:   Procedure Laterality Date     CATARACT EXTRACTION W/ INTRAOCULAR LENS IMPLANT Left 10/25/2021     CATARACT IOL, RT/LT Right 10/11/2021     CHOLECYSTECTOMY, OPEN  1990     COLONOSCOPY  2016     HEMICOLECTOMY, RT/LT Right 1990        Social History     Tobacco Use     Smoking status: Never     Smokeless tobacco: Never   Vaping Use     Vaping status: Never Used   Substance Use Topics     Alcohol use: Never     Drug use: No                  Family History   Problem Relation Age of Onset     Dementia Mother      Cerebrovascular Disease Father       Cerebrovascular Disease Brother      Myocardial Infarction No family hx of      Coronary Artery Disease Early Onset No family hx of      Breast Cancer No family hx of      Ovarian Cancer No family hx of      Colon Cancer No family hx of      Glaucoma No family hx of      Macular Degeneration No family hx of      Liver Disease No family hx of           Any family history of ILD:        Current Outpatient Medications   Medication Sig Dispense Refill     amLODIPine (NORVASC) 5 MG tablet TAKE 1 TABLET BY MOUTH DAILY 90 tablet 2     aspirin (ASA) 81 MG chewable tablet Take 1 tablet (81 mg) by mouth daily. 90 tablet 2     benzonatate (TESSALON) 200 MG capsule TAKE 1 CAPSULE(200 MG) BY MOUTH THREE TIMES DAILY AS NEEDED FOR COUGH 90 capsule 0     famotidine (PEPCID) 40 MG tablet TAKE 1 TABLET BY MOUTH EVERY EVENING 90 tablet 3     guaiFENesin (MUCINEX) 600 MG 12 hr tablet Take 600 mg by mouth 2 times daily as needed for congestion or cough.       guaiFENesin (ROBITUSSIN) 20 mg/mL liquid Take 10 mLs by mouth every 4 hours as needed for cough 236 mL 0     hydroxychloroquine (PLAQUENIL) 200 MG tablet Take 1 tablet (200 mg) by mouth 2 times daily. Annual Plaquenil toxicity eye screening required. 180 tablet 2     hydrOXYzine HCl (ATARAX) 10 MG tablet Take 1 tablet (10 mg) by mouth 3 times daily as needed for anxiety. 30 tablet 1     hypromellose (GENTEAL SEVERE) ophthalmic gel 0.3% Place 1 drop into both eyes 4 times daily 10 g 11     ipratropium - albuterol 0.5 mg/2.5 mg/3 mL (DUONEB) 0.5-2.5 (3) MG/3ML neb solution Take 1 vial (3 mLs) by nebulization every 4 hours as needed for wheezing 90 mL 0     melatonin 3 MG tablet Take 1 tablet (3 mg) by mouth nightly as needed for sleep       mycophenolate (GENERIC EQUIVALENT) 250 MG capsule Take 4 capsules (1,000 mg) by mouth 2 times daily. 720 capsule 0     OFEV 100 MG capsule TAKE ONE CAPSULE BY MOUTH TWICE A DAY 60 capsule 11     pantoprazole (PROTONIX) 40 MG EC tablet Take 1  tablet (40 mg) by mouth daily 90 tablet 3     STATIN NOT PRESCRIBED (INTENTIONAL) Please choose reason not prescribed, below       sulfaSALAzine ER (AZULFIDINE EN) 500 MG EC tablet take 2 tabs twice a day. 360 tablet 3     tenofovir (VIREAD) 300 MG tablet Take 1 tablet (300 mg) by mouth daily. 90 tablet 3     Vitamin D3 50 mcg (2000 units) tablet TAKE 1 TABLET BY MOUTH DAILY 90 tablet 0     predniSONE (DELTASONE) 5 MG tablet Take 1 tablet (5 mg) by mouth daily. (Patient not taking: Reported on 4/22/2025) 70 tablet 0     No current facility-administered medications for this visit.                        Allergies   Allergen Reactions     Atorvastatin Muscle Pain (Myalgia)                 BP (!) 147/87   Pulse 81   Temp 98.1  F (36.7  C) (Oral)   Wt 74.4 kg (164 lb)   LMP  (LMP Unknown)   SpO2 99%   BMI 31.00 kg/m       Body mass index is 31 kg/m .        Physical Examination    General: Well developed, well nourished, No apparent distress  Eyes: Anicteric  Ears: Hearing grossly normal  Mouth: Oral mucosa is moist, without any lesions.   Respiratory: Good air movement. No crackles. No rhonchi. No wheezes  Cardiac: RRR, normal S1, S2. No murmurs. No JVD  Abdomen: Soft, NT/ND  Musculoskeletal: Extremities normal. No clubbing. No cyanosis. No edema.  Skin: No rash on limited exam  Neuro: Normal mentation. Normal speech.  Psych:Normal affect         RESULTS:  PFTs:     6MWT:   5/9/24: 950 feet, 2 L  Echocardiogram:    The visual ejection fraction is 55-60%.  No regional wall motion abnormalities noted.  No significant valvular heart disease.  Note that image resolution on transthoracic echocardiogram not sufficient to  exclude endocarditis.  ______________________________________________________________________________  Left Ventricle  The left ventricle is normal in size. There is normal left ventricular wall  thickness. Diastolic Doppler findings (E/E' ratio and/or other parameters)  suggest left ventricular  filling pressures are indeterminate. The visual  ejection fraction is 55-60%. No regional wall motion abnormalities noted.     Right Ventricle  The right ventricle is normal in size and function.     Atria  Normal left atrial size. Right atrial size is normal. There is no color  Doppler evidence of an atrial shunt.     Mitral Valve  The mitral valve leaflets are mildly thickened. There is trace mitral  regurgitation.     Tricuspid Valve  There is trace tricuspid regurgitation. IVC diameter and respiratory changes  fall into an intermediate range suggesting an RA pressure of 8 mmHg.     Aortic Valve  There is trivial trileaflet aortic sclerosis. No aortic regurgitation is  present.     Pulmonic Valve  There is trace pulmonic valvular regurgitation.     Vessels  Normal size aorta. Aortic root dilatation is present.     Pericardium  There is no pericardial effusion.       Chest imaging:    CT CHEST PULMONARY EMBOLISM WITH CONTRAST 4/4/2024 11:20 AM     CLINICAL HISTORY: Chest pain. Elevated D-dimer, worsening shortness of  breath, interstitial lung disease, history of latent TB.     TECHNIQUE: CT angiogram chest during arterial phase injection IV  contrast. 2D and 3D MIP reconstructions were performed by the CT  technologist. Dose reduction techniques were used.      CONTRAST: 58 mL [id number]     COMPARISON: January 25, 2023     FINDINGS:  ANGIOGRAM CHEST: Opacified pulmonary arteries are normal caliber and  negative for pulmonary emboli. There is a gradual fading of contrast  in the medial right lower lobe compatible with decreased perfusion  and/or admixture artifact, pulmonary emboli here would be difficult to  entirely exclude. Thoracic aorta is negative for dissection. No CT  evidence of right heart strain.     LUNGS AND PLEURA: Moderate to severe peripheral and basilar fibrosis  with honeycombing and traction bronchiectasis similar to previous.  Question right lower lobe infiltrate and/or active  inflammatory  change. No effusion.     MEDIASTINUM/AXILLAE: Mildly prominent lymph nodes noted which are  nonspecific similar to previous. No aneurysm.     CORONARY ARTERY CALCIFICATIONS: Mild.     UPPER ABDOMEN: No acute findings.     MUSCULOSKELETAL: No frankly destructive bony lesions.                                                                      IMPRESSION:  1.  No pulmonary embolism demonstrated.  2.  Question new right lower lobe pneumonia and/or active inflammatory  change superimposed on moderate to severe fibrosis.    I personally reviewed and interpreted the chest radiographs.             Again, thank you for allowing me to participate in the care of your patient.        Sincerely,        Luis Galvin MD    Electronically signed

## 2025-04-22 NOTE — NURSING NOTE
Chief Complaint   Patient presents with    Follow Up     6 month follow up       BP Readings from Last 3 Encounters:   04/22/25 (!) 147/87   03/26/25 127/75   12/05/24 136/75       BP (!) 147/87   Pulse 81   Temp 98.1  F (36.7  C) (Oral)   Wt 74.4 kg (164 lb)   LMP  (LMP Unknown)   SpO2 99%   BMI 31.00 kg/m       Breana Rome

## 2025-04-22 NOTE — PROGRESS NOTES
[facility] Lung Transplant Program      Date of Visit: April 22, 2025   Clinic Location: [facility], [facility].    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  PLAN:  Medications: Continue Ofev 100 mg twice daily, mycophenolate and prednisone taper per Dr. Keller.  Oxygen use: Continue oxygen supplementation 2 L, using at rest and with exertion  Social support: Discussed importance of social support with daughter Zachariah who the patient lives with now.  Zachariah is also her .  Discussed lung transplant and its nuances in detail with the patient who asked a fair amount of well thought out questions and seemed to have good understanding of the process, if she were to be deemed a candidate for lung transplantation.   Pulmonary rehabilitation: She is currently enrolled in rehab and reports it being beneficial. I have encouraged her to remain active with exercises and a healthy diet to maintain her conditioning, which is critical for lung health.   Lung Transplantation:  We discussed lung transplantation briefly and the indications for it including the presenting underlying diagnosis today. We discussed briefly the evaluation process, typical outcomes, the procedure, and the posthospital stay. Detailed discussion completed during first visit. She is clinically stable but with some worsening since visit on 1/2024. We will determine her degree of pulmonary function  impairment based on testing at visit with Dr Keller 5/2025. . I will see her back in 6 months, earlier if PFT with worsening.     Immunization/preventive care: Up-to-date with preventive immunization-influenza, COVID x 4 shots, pneumonia vaccine, colonoscopy and mammogram.     I spent a total of 45 minutes reviewing chart (previous notes), reviewing test results, reviewing chest x rays and CT scans, talking with and examining patient, formulating plan, adjusting medications, and documentation of my findings and plan on the day of the encounter. Time excludes time spent for PFT.  Luis Galvin MD [facility]  Associate Professor of Medicine  Pulmonary, Allergy & Critical Care Division  [facility]  [facility] Lung Transplant Program        Chief Complaint:   Krystian Boland is a 73 year old year old female who is being seen for Follow Up (6 month follow up)    HPI:    April 22, 2025:  Krystian Boland presents for follow up.  She is accompanied by her daughter today.  Since last visit, she reports slight worsening of her breathing.  Now has to use oxygen 2 L at rest and with activity.  Continues to remain active with home exercises.      Initial HPI:    Krystian Boland is a 73 year old  year old female who presents for lung transplant evaluation.  She is accompanied by her daughter Zachariah today, who translates for her. Krystian is tibetan speaking only. She is followed by Dr. Keller for interstitial lung disease, that is suspected to be secondary to longstanding rheumatoid arthritis although she has minimal joint symptoms.  She was followed with Dr. Cross prior to that.  She has had longstanding interstitial lung disease secondary to rheumatoid arthritis although symptoms progressed in 2022 initially at which time she was placed on mycophenolate mofetil.  Ultimately she required hospitalization in 2023 for respiratory failure and was noted to have progressive fibrosis following  rheumatoid flare.  Recently hospitalized 4/2024 again for respiratory failure-treated with abx and steroids.  She has been on oxygen since last year.  However, daughter Zachariah reports that her breathing is improved since her hospitalization.  Currently, she wears oxygen only as needed with severe exertion.  She does not have an oxygen concentrator at home.  She wears her Inogen at pulsed dose, when she is moving with the highest setting of 5.  She is on prednisone (taper) and mycophenolate now.  It seems rituximab was discussed but has been held due to low-grade hepatitis B viremia and history of intestinal tuberculosis.  Regardless, her daughter reports improvement in her symptoms and pulmonary status since hospitalization.    ROS: 12 point ROS negative except mentioned in HPI.        Past Medical History:   Diagnosis Date    Benign essential hypertension     Early onset Alzheimer's dementia without behavioral disturbance (H)     ILD (interstitial lung disease) (H)     Obesity (BMI 30-39.9)     Osteoporosis     Seropositive rheumatoid arthritis (H) 01/2023    Type 2 diabetes mellitus (H)               Past Surgical History:   Procedure Laterality Date    CATARACT EXTRACTION W/ INTRAOCULAR LENS IMPLANT Left 10/25/2021    CATARACT IOL, RT/LT Right 10/11/2021    CHOLECYSTECTOMY, OPEN  1990    COLONOSCOPY  2016    HEMICOLECTOMY, RT/LT Right 1990        Social History     Tobacco Use    Smoking status: Never    Smokeless tobacco: Never   Vaping Use    Vaping status: Never Used   Substance Use Topics    Alcohol use: Never    Drug use: No                  Family History   Problem Relation Age of Onset    Dementia Mother     Cerebrovascular Disease Father     Cerebrovascular Disease Brother     Myocardial Infarction No family hx of     Coronary Artery Disease Early Onset No family hx of     Breast Cancer No family hx of     Ovarian Cancer No family hx of     Colon Cancer No family hx of     Glaucoma No family hx of      Macular Degeneration No family hx of     Liver Disease No family hx of           Any family history of ILD:        Current Outpatient Medications   Medication Sig Dispense Refill    amLODIPine (NORVASC) 5 MG tablet TAKE 1 TABLET BY MOUTH DAILY 90 tablet 2    aspirin (ASA) 81 MG chewable tablet Take 1 tablet (81 mg) by mouth daily. 90 tablet 2    benzonatate (TESSALON) 200 MG capsule TAKE 1 CAPSULE(200 MG) BY MOUTH THREE TIMES DAILY AS NEEDED FOR COUGH 90 capsule 0    famotidine (PEPCID) 40 MG tablet TAKE 1 TABLET BY MOUTH EVERY EVENING 90 tablet 3    guaiFENesin (MUCINEX) 600 MG 12 hr tablet Take 600 mg by mouth 2 times daily as needed for congestion or cough.      guaiFENesin (ROBITUSSIN) 20 mg/mL liquid Take 10 mLs by mouth every 4 hours as needed for cough 236 mL 0    hydroxychloroquine (PLAQUENIL) 200 MG tablet Take 1 tablet (200 mg) by mouth 2 times daily. Annual Plaquenil toxicity eye screening required. 180 tablet 2    hydrOXYzine HCl (ATARAX) 10 MG tablet Take 1 tablet (10 mg) by mouth 3 times daily as needed for anxiety. 30 tablet 1    hypromellose (GENTEAL SEVERE) ophthalmic gel 0.3% Place 1 drop into both eyes 4 times daily 10 g 11    ipratropium - albuterol 0.5 mg/2.5 mg/3 mL (DUONEB) 0.5-2.5 (3) MG/3ML neb solution Take 1 vial (3 mLs) by nebulization every 4 hours as needed for wheezing 90 mL 0    melatonin 3 MG tablet Take 1 tablet (3 mg) by mouth nightly as needed for sleep      mycophenolate (GENERIC EQUIVALENT) 250 MG capsule Take 4 capsules (1,000 mg) by mouth 2 times daily. 720 capsule 0    OFEV 100 MG capsule TAKE ONE CAPSULE BY MOUTH TWICE A DAY 60 capsule 11    pantoprazole (PROTONIX) 40 MG EC tablet Take 1 tablet (40 mg) by mouth daily 90 tablet 3    STATIN NOT PRESCRIBED (INTENTIONAL) Please choose reason not prescribed, below      sulfaSALAzine ER (AZULFIDINE EN) 500 MG EC tablet take 2 tabs twice a day. 360 tablet 3    tenofovir (VIREAD) 300 MG tablet Take 1 tablet (300 mg) by mouth daily.  90 tablet 3    Vitamin D3 50 mcg (2000 units) tablet TAKE 1 TABLET BY MOUTH DAILY 90 tablet 0    predniSONE (DELTASONE) 5 MG tablet Take 1 tablet (5 mg) by mouth daily. (Patient not taking: Reported on 4/22/2025) 70 tablet 0     No current facility-administered medications for this visit.                        Allergies   Allergen Reactions    Atorvastatin Muscle Pain (Myalgia)                 BP (!) 147/87   Pulse 81   Temp 98.1  F (36.7  C) (Oral)   Wt 74.4 kg (164 lb)   LMP  (LMP Unknown)   SpO2 99%   BMI 31.00 kg/m       Body mass index is 31 kg/m .        Physical Examination    General: Well developed, well nourished, No apparent distress  Eyes: Anicteric  Ears: Hearing grossly normal  Mouth: Oral mucosa is moist, without any lesions.   Respiratory: Good air movement. No crackles. No rhonchi. No wheezes  Cardiac: RRR, normal S1, S2. No murmurs. No JVD  Abdomen: Soft, NT/ND  Musculoskeletal: Extremities normal. No clubbing. No cyanosis. No edema.  Skin: No rash on limited exam  Neuro: Normal mentation. Normal speech.  Psych:Normal affect         RESULTS:  PFTs:     6MWT:   5/9/24: 950 feet, 2 L  Echocardiogram:    The visual ejection fraction is 55-60%.  No regional wall motion abnormalities noted.  No significant valvular heart disease.  Note that image resolution on transthoracic echocardiogram not sufficient to  exclude endocarditis.  ______________________________________________________________________________  Left Ventricle  The left ventricle is normal in size. There is normal left ventricular wall  thickness. Diastolic Doppler findings (E/E' ratio and/or other parameters)  suggest left ventricular filling pressures are indeterminate. The visual  ejection fraction is 55-60%. No regional wall motion abnormalities noted.     Right Ventricle  The right ventricle is normal in size and function.     Atria  Normal left atrial size. Right atrial size is normal. There is no color  Doppler evidence of an  atrial shunt.     Mitral Valve  The mitral valve leaflets are mildly thickened. There is trace mitral  regurgitation.     Tricuspid Valve  There is trace tricuspid regurgitation. IVC diameter and respiratory changes  fall into an intermediate range suggesting an RA pressure of 8 mmHg.     Aortic Valve  There is trivial trileaflet aortic sclerosis. No aortic regurgitation is  present.     Pulmonic Valve  There is trace pulmonic valvular regurgitation.     Vessels  Normal size aorta. Aortic root dilatation is present.     Pericardium  There is no pericardial effusion.       Chest imaging:    CT CHEST PULMONARY EMBOLISM WITH CONTRAST 4/4/2024 11:20 AM     CLINICAL HISTORY: Chest pain. Elevated D-dimer, worsening shortness of  breath, interstitial lung disease, history of latent TB.     TECHNIQUE: CT angiogram chest during arterial phase injection IV  contrast. 2D and 3D MIP reconstructions were performed by the CT  technologist. Dose reduction techniques were used.      CONTRAST: 58 mL [id number]     COMPARISON: January 25, 2023     FINDINGS:  ANGIOGRAM CHEST: Opacified pulmonary arteries are normal caliber and  negative for pulmonary emboli. There is a gradual fading of contrast  in the medial right lower lobe compatible with decreased perfusion  and/or admixture artifact, pulmonary emboli here would be difficult to  entirely exclude. Thoracic aorta is negative for dissection. No CT  evidence of right heart strain.     LUNGS AND PLEURA: Moderate to severe peripheral and basilar fibrosis  with honeycombing and traction bronchiectasis similar to previous.  Question right lower lobe infiltrate and/or active inflammatory  change. No effusion.     MEDIASTINUM/AXILLAE: Mildly prominent lymph nodes noted which are  nonspecific similar to previous. No aneurysm.     CORONARY ARTERY CALCIFICATIONS: Mild.     UPPER ABDOMEN: No acute findings.     MUSCULOSKELETAL: No frankly destructive bony lesions.                                                                       IMPRESSION:  1.  No pulmonary embolism demonstrated.  2.  Question new right lower lobe pneumonia and/or active inflammatory  change superimposed on moderate to severe fibrosis.    I personally reviewed and interpreted the chest radiographs.

## 2025-05-05 DIAGNOSIS — J84.9 INTERSTITIAL LUNG DISEASE (H): ICD-10-CM

## 2025-05-05 DIAGNOSIS — J06.9 UPPER RESPIRATORY TRACT INFECTION, UNSPECIFIED TYPE: ICD-10-CM

## 2025-05-05 RX ORDER — BENZONATATE 200 MG/1
CAPSULE ORAL
Qty: 90 CAPSULE | Refills: 0 | Status: SHIPPED | OUTPATIENT
Start: 2025-05-05

## 2025-05-07 DIAGNOSIS — F41.9 ANXIETY: ICD-10-CM

## 2025-05-07 RX ORDER — HYDROXYZINE HYDROCHLORIDE 10 MG/1
TABLET, FILM COATED ORAL
Qty: 30 TABLET | Refills: 1 | Status: SHIPPED | OUTPATIENT
Start: 2025-05-07

## 2025-05-08 DIAGNOSIS — J84.9 ILD (INTERSTITIAL LUNG DISEASE) (H): ICD-10-CM

## 2025-05-08 RX ORDER — MYCOPHENOLATE MOFETIL 250 MG/1
1000 CAPSULE ORAL
Qty: 720 CAPSULE | Refills: 0 | Status: SHIPPED | OUTPATIENT
Start: 2025-05-08

## 2025-05-12 DIAGNOSIS — J84.9 ILD (INTERSTITIAL LUNG DISEASE) (H): ICD-10-CM

## 2025-05-13 RX ORDER — PREDNISONE 5 MG/1
5 TABLET ORAL DAILY
Qty: 30 TABLET | Refills: 0 | Status: SHIPPED | OUTPATIENT
Start: 2025-05-13

## 2025-05-15 ENCOUNTER — LAB (OUTPATIENT)
Dept: LAB | Facility: CLINIC | Age: 75
End: 2025-05-15
Payer: COMMERCIAL

## 2025-05-15 ENCOUNTER — OFFICE VISIT (OUTPATIENT)
Dept: PULMONOLOGY | Facility: CLINIC | Age: 75
End: 2025-05-15
Attending: INTERNAL MEDICINE
Payer: COMMERCIAL

## 2025-05-15 VITALS — OXYGEN SATURATION: 99 % | DIASTOLIC BLOOD PRESSURE: 83 MMHG | SYSTOLIC BLOOD PRESSURE: 147 MMHG | HEART RATE: 81 BPM

## 2025-05-15 DIAGNOSIS — M05.10 RHEUMATOID LUNG (H): Primary | ICD-10-CM

## 2025-05-15 DIAGNOSIS — R06.09 DOE (DYSPNEA ON EXERTION): ICD-10-CM

## 2025-05-15 DIAGNOSIS — J84.9 ILD (INTERSTITIAL LUNG DISEASE) (H): ICD-10-CM

## 2025-05-15 DIAGNOSIS — R05.3 CHRONIC COUGH: ICD-10-CM

## 2025-05-15 DIAGNOSIS — R76.8 RHEUMATOID FACTOR POSITIVE WITH CYCLIC CITRULLINATED PEPTIDE (CCP) ANTIBODY NEGATIVE: ICD-10-CM

## 2025-05-15 LAB
6 MIN WALK (FT): 810 FT
6 MIN WALK (M): 247 M
ALBUMIN SERPL BCG-MCNC: 4 G/DL (ref 3.5–5.2)
ALP SERPL-CCNC: 79 U/L (ref 40–150)
ALT SERPL W P-5'-P-CCNC: 71 U/L (ref 0–50)
ANION GAP SERPL CALCULATED.3IONS-SCNC: 9 MMOL/L (ref 7–15)
AST SERPL W P-5'-P-CCNC: 42 U/L (ref 0–45)
BILIRUB SERPL-MCNC: 0.3 MG/DL
BUN SERPL-MCNC: 13.1 MG/DL (ref 8–23)
CALCIUM SERPL-MCNC: 9.2 MG/DL (ref 8.8–10.4)
CHLORIDE SERPL-SCNC: 97 MMOL/L (ref 98–107)
CREAT SERPL-MCNC: 0.64 MG/DL (ref 0.51–0.95)
CRP SERPL-MCNC: <3 MG/L
EGFRCR SERPLBLD CKD-EPI 2021: >90 ML/MIN/1.73M2
ERYTHROCYTE [DISTWIDTH] IN BLOOD BY AUTOMATED COUNT: 12.9 % (ref 10–15)
EXPTIME-PRE: 3.5 SEC
FEF2575-%PRED-PRE: 193 %
FEF2575-PRE: 3.11 L/SEC
FEF2575-PRED: 1.61 L/SEC
FEFMAX-%PRED-PRE: 86 %
FEFMAX-PRE: 4.43 L/SEC
FEFMAX-PRED: 5.12 L/SEC
FEV1-%PRED-PRE: 72 %
FEV1-PRE: 1.34 L
FEV1FEV6-PRE: 97 %
FEV1FEV6-PRED: 79 %
FEV1FVC-PRE: 97 %
FEV1FVC-PRED: 79 %
FIFMAX-PRE: 4.65 L/SEC
FVC-%PRED-PRE: 58 %
FVC-PRE: 1.39 L
FVC-PRED: 2.36 L
GLUCOSE SERPL-MCNC: 197 MG/DL (ref 70–99)
HCO3 SERPL-SCNC: 27 MMOL/L (ref 22–29)
HCT VFR BLD AUTO: 34.1 % (ref 35–47)
HGB BLD-MCNC: 11.6 G/DL (ref 11.7–15.7)
MCH RBC QN AUTO: 31.5 PG (ref 26.5–33)
MCHC RBC AUTO-ENTMCNC: 34 G/DL (ref 31.5–36.5)
MCV RBC AUTO: 93 FL (ref 78–100)
PLATELET # BLD AUTO: 235 10E3/UL (ref 150–450)
POTASSIUM SERPL-SCNC: 4.2 MMOL/L (ref 3.4–5.3)
PROT SERPL-MCNC: 7.3 G/DL (ref 6.4–8.3)
RBC # BLD AUTO: 3.68 10E6/UL (ref 3.8–5.2)
SODIUM SERPL-SCNC: 133 MMOL/L (ref 135–145)
WBC # BLD AUTO: 6.9 10E3/UL (ref 4–11)

## 2025-05-15 PROCEDURE — G0463 HOSPITAL OUTPT CLINIC VISIT: HCPCS | Performed by: INTERNAL MEDICINE

## 2025-05-15 RX ORDER — MYCOPHENOLATE MOFETIL 250 MG/1
1000 CAPSULE ORAL
Qty: 720 CAPSULE | Refills: 3 | Status: SHIPPED | OUTPATIENT
Start: 2025-05-15

## 2025-05-15 RX ORDER — ALBUTEROL SULFATE 90 UG/1
2 INHALANT RESPIRATORY (INHALATION) EVERY 4 HOURS PRN
Qty: 18 G | Refills: 2 | Status: SHIPPED | OUTPATIENT
Start: 2025-05-15

## 2025-05-15 ASSESSMENT — PAIN SCALES - GENERAL: PAINLEVEL_OUTOF10: NO PAIN (0)

## 2025-05-15 NOTE — PATIENT INSTRUCTIONS
Continue ofev and mycophenolate  Continue monthly labs   Reduce prednisone to 5 mg every other day   CT scan will be done in 1-2 weeks then we will reconnect with Dr Galvin     Please call our direct ILD nurses line for questions and concerns: [phone]    For scheduling, please call: [phone]

## 2025-05-15 NOTE — PROGRESS NOTES
HISTORY OF PRESENT ILLNESS:  Krystian is a 72-year-old female with interstitial lung disease secondary to rheumatoid arthritis.  Her last visit was with me 10/26/2022. Serial PFTs monitoring was being pursued while receiving ofev, which remains currently at 100 mg BID due not tolerating the 150 dose with nausea. She is also on HCQN and sulfasalazine with fair joint symptom control. Her daughter is an RN and translating for her. As her respiratory symptoms progressed, I started her on MMF in 10/2022. In late November, she had acute respiratory failure requiring hospitalization, and CT was suggestive for rheumatoid lung flare with superimposed progressive fibrosis. She was discharged on oxygen, which she was able to discontinue a few weeks ago. She had only 3 days interruption in MMF, and now on 1000 BID. She has exertional dyspnea but seems to be better compared to 5/2022, and daughter thinks that she is starting to respond to MMF. No symptoms to suggest infection. RTX has been entertained with rheumatology.     Updates from 4/21/23  Patient returns for follow-up, we have previously discussed with rheumatology initiating rituximab for her rheumatoid lung, and performed routine infectious work-up which turned out positive for TB QuantiFERON.  She was born and raised in [facility] (incidence 100[place]100 [place] and expresses no symptoms of active TB. CT 1/2023 also did not suggest active TB. She feels well today with no change in baseline dyspnea on exertion. Not using oxygen at home. Six min walk showed 2 LPM need with activity, she does have a concentrator and tanks.     Updates from 11/1/23  Krystian returns for follow up with her daughter who interprets for her (ICU RN). She completed pulmonary rehab. Feels 15% better in terms of activity level. Using O2 with exertion at 2 LPM. Tolerating MMF 1000 BID and Ofev 100 BID without side effects. No symptoms to suggest infection. Received flu vaccine for this season recently. Last  saw rheumatology August with no changes in plan.    Updates from 5/9/24  Krystian returns for follow up. She was recently admitted in early 4/2024 for acute on chronic hypoxic respiratory failure, and CT scan findings suggestive of ILD flare. She was discharged within 3-4 days and did not require more than 5 LPM maximum, back to baseline O2 at home which is 2 LPM at rest. She received high dose steroids in the hospital and discharge on slow prednisone taper which is now at 10 mg. She continues to take MMF 1000 BID with good tolerance as well as ofev 100 mg po BID. She has dry cough that is rarely productive of white sputum. She has no symptoms to suggest infection. She thinks her musculoskeletal symptoms are under good control.     Updates from 11/14/24:  Krystian is here today for follow up. Her symptoms continue to be stable, able to walk from bed to bathroom with no O2 but any activity more than that would need 2 LPM via her portable concentrator. She is not using O2 at night as when the cannula drops off, the machine starts beeping and she can't sleep. She is only getting 3-4 hours of sleep per nighttime but mainly due to insomnia rather than respiratory symptoms, and does not seem to be too bothered by it. She denies new onset cough or sputum production. She continues to tolerate Ofev 100 bid and MMF 1000 bid, she is still on 5 mg of prednisone which was tapered down from 10 mg last visit without change in O2 requirements or symptoms. No active hand joint symptoms.     Updates from today 5/15/25:  Krystian returns for follow up. She has progression of her exertional dyspnea and feels she is needing more oxygen over the last few months. She denies symptoms to suggest respiratory infection. She is receiving hepatitis B treatment, and tolerating both ofev 100 bid and MMF 1000 bid albeit her ALT is slowly increasing since 2/2025. She has seen lung transplant clinic and felt reluctant to undergo an evaluation, and  thought if her symptoms are manageable then it would be better to avoid the evaluation and potential listing. She would also need her latent TB to be treated. She elected previously to avoid rituximab which was entertained, based on her concerns with hepatitis B and latent TB.     PHYSICAL EXAMINATION:    VITAL SIGNS:  BP (!) 147/83 (BP Location: Right arm, Patient Position: Sitting, Cuff Size: Adult Regular)   Pulse 81   LMP  (LMP Unknown)   SpO2 99%       HEENT:  Eyes are anicteric.  Mouth and throat without lesions.  CHEST:  Biphasic pan-inspiratory rales at least group home up bilaterally, no wheezes.   HEART:  Shows regular rate and rhythm.  Normal S1, S2.  EXTREMITIES: BL digital clubbing.  SKIN:  No rash.    PULMONARY FUNCTION TESTS     5/15/25:        My interpretation: improvement in intra-thoracic restrictive disease, currently classified as moderate in severity.     Reviewed HRCT chest images with patient and her daughter, 1/25/23 compared to 11/28/22 and 5/2022  Advancement in honeycombing and fibrotic findings with residual dense GGO/consolidative areas that are minimal compared to November. November's scan showed extensive GGO (now resolved) suggestive of CTD ILD flare.         Reviewed CTA chest images performed 4/4/24, agreed with radiology:  FINDINGS:  ANGIOGRAM CHEST: Opacified pulmonary arteries are normal caliber and  negative for pulmonary emboli. There is a gradual fading of contrast  in the medial right lower lobe compatible with decreased perfusion  and/or admixture artifact, pulmonary emboli here would be difficult to  entirely exclude. Thoracic aorta is negative for dissection. No CT  evidence of right heart strain.     LUNGS AND PLEURA: Moderate to severe peripheral and basilar fibrosis  with honeycombing and traction bronchiectasis similar to previous.  Question right lower lobe infiltrate and/or active inflammatory  change. No effusion.     MEDIASTINUM/AXILLAE: Mildly prominent lymph  nodes noted which are  nonspecific similar to previous. No aneurysm.     CORONARY ARTERY CALCIFICATIONS: Mild.     UPPER ABDOMEN: No acute findings.     MUSCULOSKELETAL: No frankly destructive bony lesions.                                                                      IMPRESSION:  1.  No pulmonary embolism demonstrated.  2.  Question new right lower lobe pneumonia and/or active inflammatory  change superimposed on moderate to severe fibrosis.       ASSESSMENT AND PLAN:   1. RA ILD, rapidly progressive with flare in 11/2022 and recurrent mild flare 4/2024 on MMF and ofev 100 bid  2. Chronic hypoxemic respiratory failure, on O2 with exertion   3. DM   4. HTN   5. Latent TB?, treated intestinal TB in [place] 1980s (combination pill)   7. Hepatitis B on tenofovir, no cirrhosis by U/S elastography 3/2025    Krystian is a 74-year-old female with interstitial lung disease secondary to RA. Her RA is being treated with prednisone 5 mg, sulfasalazine and hydroxychloroquine.  She saw Lung Transplant Dr Galvin 10/2024 and given stability a lung transplant evaluation is currently not being considered and upon follow up visit 4/2025 she elected to hold off the evaluation as her symptoms are still manageable, albeit progressive. She is feeling more short of breath today and using more O2 and wonders if she needs to re-explore lung transplant evaluation or rituximab. For rituximab, I explained to her that at this juncture I don't think it will help much with her symptoms given extensive fibrosis as evident by today's severe restriction on PFTs and known CT scan findings from last year but we will obtain an updated HRCT chest as well. In regards to treating latent TB, I am more worried about hepatotoxicity than before now that her ALT is progressively increasing and she is receiving both ofev (low dose) and MMF.     Plan:   - Follow up with ID and GI regarding latent TB and Hep B, respectively   - Continue current dose MMF 1000  BID   - Reduce prednisone from 5 mg daily to 5 mg every other day to help limit weight gain   - Continue compliance with O2 whenever performing outside activities/long walks, with pulse ox monitoring   - Continue current dose ofev 100 (had nausea with 150 BID)  - Change lab monitoring from q3 months to q1 month given recurring ALT elevations   - Hold ofev and MMF if ALT > 150  - HRCT chest next week   - Follow up with lung transplant clinic   - Return in 6 months with spirometry and six mins walk       Jase Keller MD    Total time spent on the day of the visit was 45 minutes.

## 2025-05-15 NOTE — LETTER
5/15/2025      Krystian Boland  [id number]  [facility] [address]      Dear Colleague,    Thank you for referring your patient, Krystian Boland, to the [facility] FOR LUNG SCIENCE AND [facility] [place]. Please see a copy of my visit note below.    HISTORY OF PRESENT ILLNESS:  Krystian is a 72-year-old female with interstitial lung disease secondary to rheumatoid arthritis.  Her last visit was with me 10/26/2022. Serial PFTs monitoring was being pursued while receiving ofev, which remains currently at 100 mg BID due not tolerating the 150 dose with nausea. She is also on HCQN and sulfasalazine with fair joint symptom control. Her daughter is an RN and translating for her. As her respiratory symptoms progressed, I started her on MMF in 10/2022. In late November, she had acute respiratory failure requiring hospitalization, and CT was suggestive for rheumatoid lung flare with superimposed progressive fibrosis. She was discharged on oxygen, which she was able to discontinue a few weeks ago. She had only 3 days interruption in MMF, and now on 1000 BID. She has exertional dyspnea but seems to be better compared to 5/2022, and daughter thinks that she is starting to respond to MMF. No symptoms to suggest infection. RTX has been entertained with rheumatology.     Updates from 4/21/23  Patient returns for follow-up, we have previously discussed with rheumatology initiating rituximab for her rheumatoid lung, and performed routine infectious work-up which turned out positive for TB QuantiFERON.  She was born and raised in [facility] (incidence 100[place] and expresses no symptoms of active TB. CT 1/2023 also did not suggest active TB. She feels well today with no change in baseline dyspnea on exertion. Not using oxygen at home. Six min walk showed 2 LPM need with activity, she does have a concentrator and tanks.     Updates from 11/1/23  Krystian returns for follow up with her daughter who  interprets for her (ICU RN). She completed pulmonary rehab. Feels 15% better in terms of activity level. Using O2 with exertion at 2 LPM. Tolerating MMF 1000 BID and Ofev 100 BID without side effects. No symptoms to suggest infection. Received flu vaccine for this season recently. Last saw rheumatology August with no changes in plan.    Updates from 5/9/24  Krystian returns for follow up. She was recently admitted in early 4/2024 for acute on chronic hypoxic respiratory failure, and CT scan findings suggestive of ILD flare. She was discharged within 3-4 days and did not require more than 5 LPM maximum, back to baseline O2 at home which is 2 LPM at rest. She received high dose steroids in the hospital and discharge on slow prednisone taper which is now at 10 mg. She continues to take MMF 1000 BID with good tolerance as well as ofev 100 mg po BID. She has dry cough that is rarely productive of white sputum. She has no symptoms to suggest infection. She thinks her musculoskeletal symptoms are under good control.     Updates from 11/14/24:  Krystian is here today for follow up. Her symptoms continue to be stable, able to walk from bed to bathroom with no O2 but any activity more than that would need 2 LPM via her portable concentrator. She is not using O2 at night as when the cannula drops off, the machine starts beeping and she can't sleep. She is only getting 3-4 hours of sleep per nighttime but mainly due to insomnia rather than respiratory symptoms, and does not seem to be too bothered by it. She denies new onset cough or sputum production. She continues to tolerate Ofev 100 bid and MMF 1000 bid, she is still on 5 mg of prednisone which was tapered down from 10 mg last visit without change in O2 requirements or symptoms. No active hand joint symptoms.     Updates from today 5/15/25:  Krystian returns for follow up. She has progression of her exertional dyspnea and feels she is needing more oxygen over the last few  months. She denies symptoms to suggest respiratory infection. She is receiving hepatitis B treatment, and tolerating both ofev 100 bid and MMF 1000 bid albeit her ALT is slowly increasing since 2/2025. She has seen lung transplant clinic and felt reluctant to undergo an evaluation, and thought if her symptoms are manageable then it would be better to avoid the evaluation and potential listing. She would also need her latent TB to be treated. She elected previously to avoid rituximab which was entertained, based on her concerns with hepatitis B and latent TB.     PHYSICAL EXAMINATION:    VITAL SIGNS:  BP (!) 147/83 (BP Location: Right arm, Patient Position: Sitting, Cuff Size: Adult Regular)   Pulse 81   LMP  (LMP Unknown)   SpO2 99%       HEENT:  Eyes are anicteric.  Mouth and throat without lesions.  CHEST:  Biphasic pan-inspiratory rales at least residential up bilaterally, no wheezes.   HEART:  Shows regular rate and rhythm.  Normal S1, S2.  EXTREMITIES: BL digital clubbing.  SKIN:  No rash.    PULMONARY FUNCTION TESTS     5/15/25:        My interpretation: improvement in intra-thoracic restrictive disease, currently classified as moderate in severity.     Reviewed HRCT chest images with patient and her daughter, 1/25/23 compared to 11/28/22 and 5/2022  Advancement in honeycombing and fibrotic findings with residual dense GGO/consolidative areas that are minimal compared to November. November's scan showed extensive GGO (now resolved) suggestive of CTD ILD flare.         Reviewed CTA chest images performed 4/4/24, agreed with radiology:  FINDINGS:  ANGIOGRAM CHEST: Opacified pulmonary arteries are normal caliber and  negative for pulmonary emboli. There is a gradual fading of contrast  in the medial right lower lobe compatible with decreased perfusion  and/or admixture artifact, pulmonary emboli here would be difficult to  entirely exclude. Thoracic aorta is negative for dissection. No CT  evidence of right heart  strain.     LUNGS AND PLEURA: Moderate to severe peripheral and basilar fibrosis  with honeycombing and traction bronchiectasis similar to previous.  Question right lower lobe infiltrate and/or active inflammatory  change. No effusion.     MEDIASTINUM/AXILLAE: Mildly prominent lymph nodes noted which are  nonspecific similar to previous. No aneurysm.     CORONARY ARTERY CALCIFICATIONS: Mild.     UPPER ABDOMEN: No acute findings.     MUSCULOSKELETAL: No frankly destructive bony lesions.                                                                      IMPRESSION:  1.  No pulmonary embolism demonstrated.  2.  Question new right lower lobe pneumonia and/or active inflammatory  change superimposed on moderate to severe fibrosis.       ASSESSMENT AND PLAN:   1. RA ILD, rapidly progressive with flare in 11/2022 and recurrent mild flare 4/2024 on MMF and ofev 100 bid  2. Chronic hypoxemic respiratory failure, on O2 with exertion   3. DM   4. HTN   5. Latent TB?, treated intestinal TB in [place] 1980s (combination pill)   7. Hepatitis B on tenofovir, no cirrhosis by U/S elastography 3/2025    rKystian is a 74-year-old female with interstitial lung disease secondary to RA. Her RA is being treated with prednisone 5 mg, sulfasalazine and hydroxychloroquine.  She saw Lung Transplant Dr Galvin 10/2024 and given stability a lung transplant evaluation is currently not being considered and upon follow up visit 4/2025 she elected to hold off the evaluation as her symptoms are still manageable, albeit progressive. She is feeling more short of breath today and using more O2 and wonders if she needs to re-explore lung transplant evaluation or rituximab. For rituximab, I explained to her that at this juncture I don't think it will help much with her symptoms given extensive fibrosis as evident by today's severe restriction on PFTs and known CT scan findings from last year but we will obtain an updated HRCT chest as well. In regards to  treating latent TB, I am more worried about hepatotoxicity than before now that her ALT is progressively increasing and she is receiving both ofev (low dose) and MMF.     Plan:   - Follow up with ID and GI regarding latent TB and Hep B, respectively   - Continue current dose MMF 1000 BID   - Reduce prednisone from 5 mg daily to 5 mg every other day to help limit weight gain   - Continue compliance with O2 whenever performing outside activities/long walks, with pulse ox monitoring   - Continue current dose ofev 100 (had nausea with 150 BID)  - Change lab monitoring from q3 months to q1 month given recurring ALT elevations   - Hold ofev and MMF if ALT > 150  - HRCT chest next week   - Follow up with lung transplant clinic   - Return in 6 months with spirometry and six mins walk       Jase Keller MD    Total time spent on the day of the visit was 45 minutes.     Again, thank you for allowing me to participate in the care of your patient.        Sincerely,        Jase Keller MD    Electronically signed

## 2025-05-23 ENCOUNTER — HOSPITAL ENCOUNTER (OUTPATIENT)
Dept: CT IMAGING | Facility: CLINIC | Age: 75
Discharge: HOME | End: 2025-05-23
Attending: INTERNAL MEDICINE | Admitting: INTERNAL MEDICINE
Payer: COMMERCIAL

## 2025-05-23 DIAGNOSIS — M05.10 RHEUMATOID LUNG (H): ICD-10-CM

## 2025-05-23 DIAGNOSIS — R06.09 DOE (DYSPNEA ON EXERTION): ICD-10-CM

## 2025-05-23 PROCEDURE — 71250 CT THORAX DX C-: CPT

## 2025-05-27 ENCOUNTER — RESULTS FOLLOW-UP (OUTPATIENT)
Dept: PULMONOLOGY | Facility: CLINIC | Age: 75
End: 2025-05-27

## 2025-05-28 DIAGNOSIS — F41.9 ANXIETY: ICD-10-CM

## 2025-05-28 RX ORDER — HYDROXYZINE HYDROCHLORIDE 10 MG/1
TABLET, FILM COATED ORAL
Qty: 30 TABLET | Refills: 1 | Status: SHIPPED | OUTPATIENT
Start: 2025-05-28

## 2025-06-03 DIAGNOSIS — J06.9 UPPER RESPIRATORY TRACT INFECTION, UNSPECIFIED TYPE: ICD-10-CM

## 2025-06-03 DIAGNOSIS — J84.9 INTERSTITIAL LUNG DISEASE (H): ICD-10-CM

## 2025-06-03 RX ORDER — BENZONATATE 200 MG/1
CAPSULE ORAL
Qty: 90 CAPSULE | Refills: 0 | Status: SHIPPED | OUTPATIENT
Start: 2025-06-03

## 2025-06-16 ENCOUNTER — LAB (OUTPATIENT)
Dept: LAB | Facility: CLINIC | Age: 75
End: 2025-06-16
Payer: COMMERCIAL

## 2025-06-16 ENCOUNTER — RESULTS FOLLOW-UP (OUTPATIENT)
Dept: PULMONOLOGY | Facility: CLINIC | Age: 75
End: 2025-06-16

## 2025-06-16 ENCOUNTER — TELEPHONE (OUTPATIENT)
Dept: PULMONOLOGY | Facility: CLINIC | Age: 75
End: 2025-06-16

## 2025-06-16 DIAGNOSIS — F41.9 ANXIETY: ICD-10-CM

## 2025-06-16 DIAGNOSIS — J84.9 ILD (INTERSTITIAL LUNG DISEASE) (H): ICD-10-CM

## 2025-06-16 LAB
ALBUMIN SERPL BCG-MCNC: 4.1 G/DL (ref 3.5–5.2)
ALP SERPL-CCNC: 85 U/L (ref 40–150)
ALT SERPL W P-5'-P-CCNC: 67 U/L (ref 0–50)
ANION GAP SERPL CALCULATED.3IONS-SCNC: 11 MMOL/L (ref 7–15)
AST SERPL W P-5'-P-CCNC: 52 U/L (ref 0–45)
BILIRUB SERPL-MCNC: 0.4 MG/DL
BUN SERPL-MCNC: 9.9 MG/DL (ref 8–23)
CALCIUM SERPL-MCNC: 10 MG/DL (ref 8.8–10.4)
CHLORIDE SERPL-SCNC: 97 MMOL/L (ref 98–107)
CREAT SERPL-MCNC: 0.83 MG/DL (ref 0.51–0.95)
EGFRCR SERPLBLD CKD-EPI 2021: 74 ML/MIN/1.73M2
GLUCOSE SERPL-MCNC: 138 MG/DL (ref 70–99)
HCO3 SERPL-SCNC: 26 MMOL/L (ref 22–29)
POTASSIUM SERPL-SCNC: 4.5 MMOL/L (ref 3.4–5.3)
PROT SERPL-MCNC: 7.3 G/DL (ref 6.4–8.3)
SODIUM SERPL-SCNC: 134 MMOL/L (ref 135–145)

## 2025-06-16 PROCEDURE — 36415 COLL VENOUS BLD VENIPUNCTURE: CPT

## 2025-06-16 PROCEDURE — 80053 COMPREHEN METABOLIC PANEL: CPT

## 2025-06-16 RX ORDER — HYDROXYZINE HYDROCHLORIDE 10 MG/1
TABLET, FILM COATED ORAL
Qty: 30 TABLET | Refills: 1 | Status: SHIPPED | OUTPATIENT
Start: 2025-06-16

## 2025-06-16 NOTE — TELEPHONE ENCOUNTER
"Sent pt MyC message with Dr. Keller recommendations for 6/16/25 MMF and OFEV standing lab results:   Result Note  \"Acceptable rise in LFTs, continue both MMF and Ofev at current doses and repeat LFTs only in one month.\"    Next set of labs due in 1 mo around 7/14/25. Pt reminder set.    Nuvia Segal, RN, BSN  ILD Nurse   [phone]    ----- Message from Roro DUPREE sent at 5/19/2025 10:07 AM CDT -----  Regarding: MMF and OFEV lab monotioring  Last set of labs 5/15/25 slightly elevated ALT but stable.     Per Dr. Keller repeat labs, 6/15/25  -Ensure pt has labs drawn (scheduled for 6/16/25)  -Set next reminder     In between ILD appts, pt will call to make a lab only appt at any [facility].   -has pt made lab appt or had labs drawn yet?   -pt daughter speaks English and checks MyC, makes appts & drives pt to appts. Send pt MyC reminder if needed.  " Z Plasty Text: The lesion was extirpated to the level of the fat with a #15 scalpel blade.  Given the location of the defect, shape of the defect and the proximity to free margins a Z-plasty was deemed most appropriate for repair.  Using a sterile surgical marker, the appropriate transposition arms of the Z-plasty were drawn incorporating the defect and placing the expected incisions within the relaxed skin tension lines where possible.    The area thus outlined was incised deep to adipose tissue with a #15 scalpel blade.  The skin margins were undermined to an appropriate distance in all directions utilizing iris scissors.  The opposing transposition arms were then transposed into place in opposite direction and anchored with interrupted buried subcutaneous sutures.

## 2025-07-02 ENCOUNTER — TELEPHONE (OUTPATIENT)
Dept: GASTROENTEROLOGY | Facility: CLINIC | Age: 75
End: 2025-07-02
Payer: COMMERCIAL

## 2025-07-02 NOTE — TELEPHONE ENCOUNTER
EP called, spoke with daughter 7/2 to resched 9/24 follow up with Maric[facility]en; provider not avail. Also resched Lab + US at [place] for 1 week prior. Resched visit as video per daughter's request.

## 2025-07-03 DIAGNOSIS — J84.9 INTERSTITIAL LUNG DISEASE (H): ICD-10-CM

## 2025-07-03 DIAGNOSIS — J06.9 UPPER RESPIRATORY TRACT INFECTION, UNSPECIFIED TYPE: ICD-10-CM

## 2025-07-03 RX ORDER — BENZONATATE 200 MG/1
CAPSULE ORAL
Qty: 90 CAPSULE | Refills: 0 | Status: SHIPPED | OUTPATIENT
Start: 2025-07-03

## 2025-07-04 DIAGNOSIS — I10 BENIGN ESSENTIAL HYPERTENSION: ICD-10-CM

## 2025-07-07 NOTE — TELEPHONE ENCOUNTER
Clinic RN: Please investigate patient's chart or contact patient if the information cannot be found because patient should have run out of this medication on 3/5/25. Confirm patient is taking this medication as prescribed. Document findings and route refill encounter to provider for approval or denial.    Yuliya Mendoza RN on 7/7/2025 at 11:08 AM

## 2025-07-07 NOTE — TELEPHONE ENCOUNTER
Marcie  not available via [employer] despite 2 attempts and long hold times.     Jeaneth Fay RN

## 2025-07-08 RX ORDER — AMLODIPINE BESYLATE 5 MG/1
5 TABLET ORAL DAILY
Qty: 90 TABLET | Refills: 2 | Status: SHIPPED | OUTPATIENT
Start: 2025-07-08

## 2025-07-08 NOTE — TELEPHONE ENCOUNTER
Dr. Singh - please advise.    Triage spoke to pt's daughter - daughter confirmed pt has amlodipine on-hand and family requested refill because she had no refills left on file. Triage also confirmed that pt appears to be due for annual physical and offered to assist with scheduling - daughter declined scheduling at this time so that she can schedule both her parents at same time.    Triage explained that once pt is scheduled, daughter can request another refill to bridge pt to planned OV - daughter verbalized understanding.    Nataly Rivera RN

## 2025-07-08 NOTE — PROGRESS NOTES
[facility]  Rheumatology Clinic  Harjit Staley MD  07/10/2025     Name: Krystian Boland  MRN: [id number]  Age: 75 year old  : 1950  Referring provider: Referred Self     Assessment and Plan:  # RA with positive rheumatoid factor and anti-CCP noted in 2018:  Patient reports complete resolution of morning joint stiffness and joint pain. Exam shows no gross synovitis; there is advanced clubbing at all digits.    Data:2025, comprehensive metabolic panel was normal; AST slightly elevated 52, ALT slightly elevated at 71, stable.  May 2025 CBC showed hemoglobin 11.6;     Imaging: CT of the chest high-resolution on May 23, 2025 showed progression of fibrosing lung disease in a typical UIP pattern with cystic replacement of the parenchyma in the bases.  No findings to suggest active inflammatory component.    Discussion: Overall, I think that inflammatory arthropathy remains well controlled on disease modifying therapy. For arthropathy, I recommend continuing hydroxychloroquine and sulfasalazine combination for disease modifying effect.  We discussed the recommendation for annual ophthalmologic monitoring while on plaquenil. Dedicated retinal exam occurred in .    # Interstitial lung disease:   Patient relates continued chronic nonproductive cough and dyspnea on exertion, and her ability to perform activities of daily living (dressing, grooming, moving about in her home) continues to be associated with gradulally worsening breathlessness. She uses 2 L of oxygen at baseline.  PFT  showed reduced FVC, consistent with restriction, although slightly improved from previous. CT of the chest findings in May 2025 noted above.      Interstitial lung disease is severe, and is likely the reason for slowly progressive loss of exercise capacity and dyspnea on exertion.  Alternatives of immunomodulatory therapy or transplant have been considered by Dr. Keller in pulmonary.  Given the high burden of  irreversible fibrotic lung disease, and with the barriers of chronic active hepatitis B and latent TB, I agree with the decision to not move forward with Rituxan. At this point, continuing daily supplemental O2, ofev, MMF, and follow-up with Transplant Medicine is the best management.    # Osteoporosis: DEXA scan November 2024 with T-score less than -2.5. Currently managed with only vitamin D and calcium supplementation. Given history of prolonged steroid use, reasonable to start alendronate weekly. Agree with follow-up with Endocrinology in 12/2025.    # Chronic hepatitis B: In November 2024, hepatitis B DNA was present at 52,800 units. On tenofovir    # Latent TB: not treated    # Osteoarthritis: asymptomatic     Diagnosis:  1.  Seropositive rheumatoid arthritis: Shoulder, thumb, and knee pain remain improved with current medications hydroxychloroquine and sulfasalazine.  2.  Osteoporosis: DEXA scan November 2024 showed T-score less than 2.5, indicating increased risk for fracture.  3. Interstitial lung disease: Breathing is slowly worsening.  I agree with Dr. Keller's recommendation to continue CellCept and Ofev for rheumatoid arthritis associated lung disease.    Plan:  1.  Continue hydroxychloroquine 400 mg once daily; while using hydroxychloroquine, undergo annual screening examination for rare retinal Plaquenil toxicity, last appointment in April 2024  2.  Continue sulfasalazine 1000 mg (2 tablets) twice daily.  3.  Recheck blood work for liver testing in 3 months; continue mycophenolate twice daily  4. Continue alendronate 35 mg weekly for osteoporosis, followup with Endocrinology  5.  Continue Ofev and Continue CellCept 1000 mg twice daily for interstitial lung disease, per Dr. Keller.     F/u 7 mos    I, Isabelle Gómez, MS3, am acting as scribe for Dr. Harjit Staley MD.    I was present with the medical student who took the history and acted as a scribe. I have verified the history, reviewed imaging  and laboratory data, and personally performed the physical exam. I formulated the assessment and plan.    Harjit Staley M.D.  Staff Rheumatologist,  Health  Pager [phone]      No orders of the defined types were placed in this encounter.    On the day of the encounter, a total of 34 minutes was spent in chart review, and in counseling and coordination of care, regarding the patient's complex medical problem of rheumatoid arthritis, interstitial lung disease, osteoarthritis, high risk medication    The longitudinal plan of care for the diagnosis(es)/condition(s) as documented were addressed during this visit. Due to the added complexity in care, I will continue to support Krystian in the subsequent management and with ongoing continuity of care.        HPI:   Krystian Boland has a history of ILD and RA and presents for follow-up. I last saw the patient in 3-2024 at which time rheumatoid arthritis was overall well controlled. I recommended continuing combination sulfasalazine and hydroxychloroquine.  Rheumatoid arthritis associated interstitial lung disease was discussed, and continued treatment with hydroxychloroquine, sulfasalazine, Ofev and mycophenolate was recommended.    Interval history July 10, 2025    Patient is seen in the company of her son in [place], who provides translation today.    Patient was seen by Dr. Keller in pulmonary on May 15, 2025.  Impression was of rheumatoid arthritis associated ILD with progression of exertional dyspnea and increasing oxygen requirements.  PFTs showed improvement in intrathoracic restrictive disease, moderate in severity.  Given the CT findings of advanced fibrotic disease without inflammatory component, recommendation was to continue MMF and low-dose Ofev.  ID consultation for treatment of latent TB and hepatitis B was recommended. Currently undergoing hepatitis B treatment with tenofovir. Return to follow-up with lung transplant clinic was recommended.    Patient  was seen by Dr. Sawyer in ophthalmology on January 8, 2025.  Impression was of long-term use of Plaquenil; no evidence of macular toxicity.  Follow-up in 1 year.    Patient reports shortness of breath has continued to worsen since last visit. When she is sitting it is not bothersome, however with activity her symptoms become more severe and she coughs. Joint symptoms are well controlled, and she has had no arthritis flares. No morning stiffness, new joint deformities, swelling, redness, warmth, or pain. No rash or fevers. Has intermittent dry eyes which she manages with OTC eyedrops. She continues to use 2 L oxygen at all times, and sometimes requires increases with activity. Has chest pain with activity as well.    Continues taking sulfasalazine, hydroxychloroquine, and MMF. Tolerates them well. Continues Ofev; previously-noted nausea has improved.    Interval history December 4, 2024    Daughter serves as   She saw Dr. Sawyer in ophthalmology in April 2024.  No evidence of hydroxychloroquine macular toxicity was found.    Patient saw Dr. Keller in pulmonology on November 1, 2023.  Impression was of rheumatoid arthritis associated ILD, with fibrosis predominant lung disease. Transplant evaluation in 2024, with decision to defer lung transplant, was noted. Recommendation was to continue immunomodulation with CellCept 1000 twice daily, and antifibrotic therapy with Ofev 100 mg daily. Continue Rx of chronic active HBV recommended.    Today, patient reports that although breathing and exercise capacity are slowly worsening and declining, joint symptoms are well-controlled.  There is no morning stiffness, no visible swelling, warmth, redness in peripheral joints.   She reports daily cough, usually nonproductive.  She is using oxygen 2 L at baseline, with an increase for when she ambulates or performs repetitive activity.    Continues SULFASALAZINE and hydroxychloroquine unchanged. MMF continues 1000 mg  twice daily.    Interval history March 7, 2024      Daughter serves as   Patient saw Dr. Keller in pulmonology on November 1, 2023.  Impression was of rheumatoid arthritis associated ILD, fibrosis predominant lung disease.  Recommendation was to continue immunomodulation with CellCept 1000 twice daily, and antifibrotic therapy with Ofev 100 mg daily.    Today,     Early morning stiffness is < 30 minutes  R 3rd finger swells intermittmently.  No disruption of ADLs due to arthritis.  Still + cough and easy caba, but no worse. Cough non productive  Walks a few blocks o\n flat ground.  CLBP unchanged, does not waken her.    Continues SULFASALAZINE and hydroxychloroquine unchanged. MMF continues 1000 mg twice daily.  No change in understanding regarding chronic HBV infection- that GI has not recommended Rx for HBV;  ID has said that Rx of latent disease should happen before rituxan, if that medication is needed.      Interval history August 9, 2023    Daughter serves as   Patient was seen by Dr. Sawyer in ophthalmology on July 19, 2023.  Impression was of long-term use of hydroxychloroquine.  No definite evidence of macular toxicity was noted, and continued use of hydroxychloroquine was recommended.    Patient saw Dr. Keller and pulmonary and critical care medicine in April 2023.  Impression was of interstitial lung disease associated with rheumatoid arthritis.  Declining pulmonary function tests were noted, and a plan to start rituximab was discussed.  However, hepatitis B with significant viremia was noted.  Recommendation was for follow-up with ID and with GI regarding TB and hepatitis B respectively.  Meanwhile continue CellCept and Ofev.    Patient was seen by infectious disease on May 15, 2023, impression was of positive QuantiFERON gold test, most likely representing latent TB.  Recommendation was to consult MTM in preparation for treatment of latent TB with isoniazid.    Today,     Early  morning stiffness is < 30 minutes  R 3rd finger swells intermittmently.  No disruption of ADLs due to arthritis.  Continues SULFASALAZINE and hydroxychloroquine unchanged. MMF continues twice daily.  Daughter reports that GI has not recommended Rx for HBV.; ID has said that Rx of latent disease should happen before rituxan.  Still + cough and easy caba, but no worse. Cough non productive  Pulm rehab has helped with function in recent months.  CLBP unchanged, worse at night.      Interval history January 19, 2023    Daughter acted as     Patient saw Dr. Keller in pulmonology on October 26, 2022.  Impression was of rheumatoid arthritis associated interstitial lung disease, with progressive exertional dyspnea symptoms that had been prednisone sensitive in July 2022.  Recommendation was to add CellCept 500 mg twice daily and advance to 1000 mg twice daily, in addition to sulfasalazine and hydroxychloroquine.    Patient was admitted to [facility] from November 28 through December 2.  Discharge diagnosis included respiratory failure, possible flare of interstitial lung disease versus pneumonia, and mediastinal lymphadenopathy.  Patient had recently started on CellCept and then noted increasing cough.  CT scan showed moderately severe pulmonary fibrosis.  Patient was treated with Zosyn and azithromycin, and leukocytosis improved.  Patient was given intravenous Solu-Medrol, switched to prednisone with discharge on 40 mg of prednisone daily tapering to 10 mg daily over 3 weeks. dyspnea and cough improved.  Prior to admission sulfasalazine, CellCept, hydroxychloroquine and Ofev were continued.    Today, she reports that breathing and cough are improved. She finished prednisone several weeks ago. Exercise tolerance is improved.  There is no joint pain now. Before hospitalization she had swelling and pain in her R wrist and R hand fingers. Early morning stiffness is variable.   Her daughter says that prosper  had tried to ramp up cellcept prior to hospitalization; after hospitalization, she has increased to 1000 mg twice day. She notes mild nausea, otherwise well-tolerated.     Interval history May 26, 2022  Daughter acted as  throughout this session.  Through daughter , patient relates increasing pain in several joint areas over the past several weeks.  First, she notes right shoulder pain, worse with arm raising, and at night.  Pain is so severe that she is kept awake at night, and is unable to dress fully independently.  No antecedent injury.  No visible swelling redness warmth or fever.  Patient also notes worsening right base of thumb pain, made worse with grasping and lifting activities.  She reports swelling, warmth, and stiffness at the right knee, alleviated with movement.  Early morning stiffness overall has increased in recent weeks to 25 to 30 minutes.    She is taking Ofev, sulfasalazine (2 g daily), and hydroxychloroquine as recommended.  No specific side effects or adverse effects noted.    Patient saw Dr. Sawyer in ophthalmology in July 2021.  Impression was of long-term use of Plaquenil, no evidence of toxicity.    Interval history 1-    She has intermittent R > L thumb and finger pain, worse in the mornings. Knee pain resolved, and other joints beside R hand, doing well.  Massage and use of the joints, as well as thylenol, help.   Morning pain in the joints lasts about 2 hours, non-progressive.   ADLs are sometimes affected early in the morning.     Energy level is overall improved, and shortness of breath is stable.    Taking ofev, sulfasalazine and hydroxychloroquine as directed. No particular adverse effects noted      Review of Systems:   Complete 14-point ROS is negative, except as noted in HPI.    Active Medications:   Current Outpatient Medications   Medication Sig Dispense Refill    albuterol (PROAIR HFA/PROVENTIL HFA/VENTOLIN HFA) 108 (90 Base) MCG/ACT inhaler  Inhale 2 puffs into the lungs every 4 hours as needed for shortness of breath or cough. 18 g 2    alendronate (FOSAMAX) 35 MG tablet Take 1 tablet (35 mg) by mouth every 7 days. 12 tablet 1    amLODIPine (NORVASC) 5 MG tablet TAKE 1 TABLET BY MOUTH DAILY 90 tablet 2    aspirin (ASA) 81 MG chewable tablet Take 1 tablet (81 mg) by mouth daily. 90 tablet 2    benzonatate (TESSALON) 200 MG capsule TAKE 1 CAPSULE(200 MG) BY MOUTH THREE TIMES DAILY AS NEEDED FOR COUGH 90 capsule 0    famotidine (PEPCID) 40 MG tablet TAKE 1 TABLET BY MOUTH EVERY EVENING 90 tablet 3    guaiFENesin (MUCINEX) 600 MG 12 hr tablet Take 600 mg by mouth 2 times daily as needed for congestion or cough.      guaiFENesin (ROBITUSSIN) 20 mg/mL liquid Take 10 mLs by mouth every 4 hours as needed for cough 236 mL 0    hydroxychloroquine (PLAQUENIL) 200 MG tablet Take 1 tablet (200 mg) by mouth 2 times daily. Annual Plaquenil toxicity eye screening required. 180 tablet 2    hydrOXYzine HCl (ATARAX) 10 MG tablet TAKE 1 TABLET(10 MG) BY MOUTH THREE TIMES DAILY AS NEEDED FOR ANXIETY 30 tablet 1    hypromellose (GENTEAL SEVERE) ophthalmic gel 0.3% Place 1 drop into both eyes 4 times daily. 10 g 11    ipratropium - albuterol 0.5 mg/2.5 mg/3 mL (DUONEB) 0.5-2.5 (3) MG/3ML neb solution Take 1 vial (3 mLs) by nebulization every 4 hours as needed for wheezing 90 mL 0    melatonin 3 MG tablet Take 1 tablet (3 mg) by mouth nightly as needed for sleep      mycophenolate (GENERIC EQUIVALENT) 250 MG capsule Take 4 capsules (1,000 mg) by mouth 2 times daily. 720 capsule 3    OFEV 100 MG capsule TAKE ONE CAPSULE BY MOUTH TWICE A DAY 60 capsule 11    pantoprazole (PROTONIX) 40 MG EC tablet Take 1 tablet (40 mg) by mouth daily 90 tablet 3    predniSONE (DELTASONE) 5 MG tablet TAKE 1 TABLET(5 MG) BY MOUTH DAILY 30 tablet 0    STATIN NOT PRESCRIBED (INTENTIONAL) Please choose reason not prescribed, below      sulfaSALAzine ER (AZULFIDINE EN) 500 MG EC tablet take 2 tabs  twice a day. 360 tablet 3    tenofovir (VIREAD) 300 MG tablet Take 1 tablet (300 mg) by mouth daily. 90 tablet 3    Vitamin D3 50 mcg (2000 units) tablet TAKE 1 TABLET BY MOUTH DAILY 90 tablet 0     No current facility-administered medications for this visit.     Allergies:   Atorvastatin      Past Medical History:  Obesity  Osteopenia  Type 2 diabetes mellitus without complication, without long-term current use of insulin  Interstitial lung disease  Abdominal tuberculosis (intestinal, 20 years ago)     Past Surgical History:  Open cholecystectomy    Family History:   Mother - dementia  Father - cerebrovascular disease  Brother - cerebrovascular disease, hypertension  No family history of - diabetes, myocardial infarction, early onset C.A.D., breast cancer, ovarian cancer, colon cancer, rheumatoid arthritis  Family history of - high blood pressure  (mentions that medical history is unlikely to be comprehensive because of poor/infrequent medical records)      Social History:   No smoking or tobacco use  Rare alcohol use (1-2 times a year)   with 8 children, 13 grandchildren, cooks for the family     Physical Exam:   Blood pressure 134/80, pulse 93, weight 74.2 kg (163 lb 8 oz), SpO2 97%, not currently breastfeeding.  Wt Readings from Last 4 Encounters:   07/10/25 74.2 kg (163 lb 8 oz)   04/22/25 74.4 kg (164 lb)   03/26/25 74.5 kg (164 lb 4.8 oz)   12/05/24 76.4 kg (168 lb 8 oz)     Constitutional: Well-developed, appearing stated age; cooperative. On oxygen  Eyes: Normal vision, conjunctiva, sclera  ENT: Normal external ears, nose, hearing, lips  Resp: Breathing unlabored. Coarse breath sounds bilaterally  MS:  Clubbing in fingernails. No joint tenderness or active synovitis. Full ROM  Skin: No nail pitting, alopecia, or nodules. Scattered scabs and excoriations on shins.  Neuro: Nonfocal.    Laboratory:       Latest Ref Rng & Units 3/26/2025     8:49 AM 5/15/2025     2:03 PM 6/16/2025     8:14 AM   RHEUM  RESULTS   Albumin 3.5 - 5.2 g/dL 4.1  4.0  4.1    ALT 0 - 50 U/L 69  71  67    AST 0 - 45 U/L 44  42  52    Creatinine 0.51 - 0.95 mg/dL 0.70  0.64  0.83    CRP Inflammation <5.00 mg/L  <3.00     GFR Estimate >60 mL/min/1.73m2 >90  >90  74    Hematocrit 35.0 - 47.0 %  34.1     Hemoglobin 11.7 - 15.7 g/dL  11.6     WBC 4.0 - 11.0 10e3/uL  6.9     RBC Count 3.80 - 5.20 10e6/uL  3.68     RDW 10.0 - 15.0 %  12.9     MCHC 31.5 - 36.5 g/dL  34.0     MCV 78 - 100 fL  93     Platelet Count 150 - 450 10e3/uL  235         Rheumatoid Factor   Date Value Ref Range Status   05/23/2018 700 (H) <20 IU/mL Final     Cyclic Citrullinated Peptide Antibody, IgG   Date Value Ref Range Status   05/23/2018 >340 (H) <7 U/mL Final     Comment:     Positive     Scleroderma Antibody Scl-70 CLAUDIO IgG   Date Value Ref Range Status   05/23/2018 <0.2 0.0 - 0.9 AI Final     Comment:     Negative  Antibody index (AI) values reflect qualitative changes in antibody   concentration that cannot be directly associated with clinical condition or   disease state.       SSA (Ro) (CLAUDIO) Antibody, IgG   Date Value Ref Range Status   05/23/2018 <0.2 0.0 - 0.9 AI Final     Comment:     Negative  Antibody index (AI) values reflect qualitative changes in antibody   concentration that cannot be directly associated with clinical condition or   disease state.       SSB (La) (CLAUDIO) Antibody, IgG   Date Value Ref Range Status   05/23/2018 <0.2 0.0 - 0.9 AI Final     Comment:     Negative  Antibody index (AI) values reflect qualitative changes in antibody   concentration that cannot be directly associated with clinical condition or   disease state.       LYNDA interpretation   Date Value Ref Range Status   05/23/2018 Negative NEG^Negative Final     Comment:                                        Reference range:  <1:40  NEGATIVE  1:40 - 1:80  BORDERLINE POSITIVE  >1:80 POSITIVE         Neutrophil Cytoplasmic IgG Antibody   Date Value Ref Range Status   05/23/2018 <1:20  <1:20 Final     Comment:     (Note)  The ANCA IFA is <1:20; therefore, no further testing will   be performed.  INTERPRETIVE INFORMATION: Anti-Neutrophil Cyto Ab, IgG  Neutrophil Cytoplasmic Antibodies (C-ANCA = granular   cytoplasmic staining, P-ANCA = perinuclear staining) are   found in the serum of over 90 percent of patients with   certain necrotizing systemic vasculitides, and usually in   less than 5 percent of patients with collagen vascular   disease or arthritis.  Performed by [employer],  [address] [place] [address],[place] [address] [phone]  [email or web address], Marty Wright MD, Lab. Director         IGG   Date Value Ref Range Status   05/23/2018 1,980 (H) 695 - 1,620 mg/dL Final     Scleroderma Antibody Scl-70 CLAUDIO IgG   Date Value Ref Range Status   05/23/2018 <0.2 0.0 - 0.9 AI Final     Comment:     Negative  Antibody index (AI) values reflect qualitative changes in antibody   concentration that cannot be directly associated with clinical condition or   disease state.

## 2025-07-10 ENCOUNTER — OFFICE VISIT (OUTPATIENT)
Dept: RHEUMATOLOGY | Facility: CLINIC | Age: 75
End: 2025-07-10
Payer: COMMERCIAL

## 2025-07-10 VITALS
BODY MASS INDEX: 30.91 KG/M2 | HEART RATE: 93 BPM | DIASTOLIC BLOOD PRESSURE: 80 MMHG | WEIGHT: 163.5 LBS | SYSTOLIC BLOOD PRESSURE: 134 MMHG | OXYGEN SATURATION: 97 %

## 2025-07-10 DIAGNOSIS — J84.9 ILD (INTERSTITIAL LUNG DISEASE) (H): ICD-10-CM

## 2025-07-10 DIAGNOSIS — R76.8 RHEUMATOID FACTOR POSITIVE WITH CYCLIC CITRULLINATED PEPTIDE (CCP) ANTIBODY NEGATIVE: ICD-10-CM

## 2025-07-10 RX ORDER — ALENDRONATE SODIUM 35 MG/1
35 TABLET ORAL
Qty: 12 TABLET | Refills: 1 | Status: SHIPPED | OUTPATIENT
Start: 2025-07-10

## 2025-07-10 RX ORDER — MYCOPHENOLATE MOFETIL 250 MG/1
1000 CAPSULE ORAL
Qty: 720 CAPSULE | Refills: 3 | Status: SHIPPED | OUTPATIENT
Start: 2025-07-10

## 2025-07-10 RX ORDER — HYDROXYCHLOROQUINE SULFATE 200 MG/1
200 TABLET, FILM COATED ORAL 2 TIMES DAILY
Qty: 180 TABLET | Refills: 2 | Status: SHIPPED | OUTPATIENT
Start: 2025-07-10

## 2025-07-10 RX ORDER — SULFASALAZINE 500 MG/1
TABLET, DELAYED RELEASE ORAL
Qty: 360 TABLET | Refills: 3 | Status: SHIPPED | OUTPATIENT
Start: 2025-07-10

## 2025-07-10 ASSESSMENT — PAIN SCALES - GENERAL: PAINLEVEL_OUTOF10: MODERATE PAIN (4)

## 2025-07-10 NOTE — PATIENT INSTRUCTIONS
Diagnosis:  1.  Seropositive rheumatoid arthritis: Shoulder, thumb, and knee pain remain improved with current medications hydroxychloroquine and sulfasalazine.  2.  Osteoporosis: DEXA scan November 2024 showed T-score less than 2.5, indicating increased risk for fracture.  3. Interstitial lung disease: Breathing is slowly worsening.  I agree with Dr. Keller's recommendation to continue CellCept and Ofev for rheumatoid arthritis associated lung disease.    Plan:  1.  Continue hydroxychloroquine 400 mg once daily; while using hydroxychloroquine, undergo annual screening examination for rare retinal Plaquenil toxicity, last appointment in April 2024  2.  Continue sulfasalazine 1000 mg (2 tablets) twice daily.  3.  Recheck blood work for liver testing in 3 months; continue mycophenolate twice daily  4. Start alendronate 35 mg weekly for osteoporosis, followup with Endocrinology  5.  Continue Ofev and Continue CellCept 1000 mg twice daily for interstitial lung disease, per Dr. Keller.

## 2025-07-16 ENCOUNTER — TELEPHONE (OUTPATIENT)
Dept: PHARMACY | Facility: OTHER | Age: 75
End: 2025-07-16
Payer: COMMERCIAL

## 2025-07-16 NOTE — TELEPHONE ENCOUNTER
MTM Recruitment: [facility] insurance     Referral outreach attempt #1 on July 16, 2025      Outcome: [employer] message sent and LM    BRIAN Rangel

## 2025-07-23 ENCOUNTER — TRANSFERRED RECORDS (OUTPATIENT)
Dept: HEALTH INFORMATION MANAGEMENT | Facility: CLINIC | Age: 75
End: 2025-07-23
Payer: COMMERCIAL

## 2025-07-28 DIAGNOSIS — E11.3299 NONPROLIFERATIVE DIABETIC RETINOPATHY ASSOCIATED WITH TYPE 2 DIABETES MELLITUS (H): Primary | ICD-10-CM

## 2025-07-28 DIAGNOSIS — Z79.899 LONG-TERM USE OF PLAQUENIL: ICD-10-CM

## 2025-07-31 ENCOUNTER — TELEPHONE (OUTPATIENT)
Dept: PHARMACY | Facility: OTHER | Age: 75
End: 2025-07-31
Payer: COMMERCIAL

## 2025-07-31 NOTE — TELEPHONE ENCOUNTER
BRIAN Recruitment: [facility] insurance     Referral outreach attempt #2 on July 31, 2025      Outcome: MyChart message sent  left message    BRIAN Rangel

## 2025-08-06 ENCOUNTER — OFFICE VISIT (OUTPATIENT)
Dept: OPHTHALMOLOGY | Facility: CLINIC | Age: 75
End: 2025-08-06
Attending: OPHTHALMOLOGY
Payer: COMMERCIAL

## 2025-08-06 ENCOUNTER — TELEPHONE (OUTPATIENT)
Dept: PULMONOLOGY | Facility: CLINIC | Age: 75
End: 2025-08-06

## 2025-08-06 DIAGNOSIS — Z79.899 LONG-TERM USE OF PLAQUENIL: ICD-10-CM

## 2025-08-06 DIAGNOSIS — E11.3299 NONPROLIFERATIVE DIABETIC RETINOPATHY ASSOCIATED WITH TYPE 2 DIABETES MELLITUS (H): ICD-10-CM

## 2025-08-06 PROCEDURE — 92250 FUNDUS PHOTOGRAPHY W/I&R: CPT | Performed by: OPHTHALMOLOGY

## 2025-08-06 PROCEDURE — G0463 HOSPITAL OUTPT CLINIC VISIT: HCPCS | Performed by: OPHTHALMOLOGY

## 2025-08-06 PROCEDURE — 92235 FLUORESCEIN ANGRPH MLTIFRAME: CPT | Performed by: OPHTHALMOLOGY

## 2025-08-06 ASSESSMENT — CUP TO DISC RATIO
OD_RATIO: 0.4
OS_RATIO: 0.35

## 2025-08-06 ASSESSMENT — EXTERNAL EXAM - RIGHT EYE: OD_EXAM: NORMAL

## 2025-08-06 ASSESSMENT — TONOMETRY
OD_IOP_MMHG: 13
OS_IOP_MMHG: 14
IOP_METHOD: TONOPEN

## 2025-08-06 ASSESSMENT — VISUAL ACUITY
OS_PH_SC: 20/20
OD_SC: 20/30
OD_PH_SC+: -2
OD_SC+: +2
METHOD: SNELLEN - LINEAR
OS_PH_SC+: -1
OD_PH_SC: 20/20
OS_SC+: -2
OS_SC: 20/30

## 2025-08-06 ASSESSMENT — SLIT LAMP EXAM - LIDS
COMMENTS: DCL, MILD PTOSIS
COMMENTS: DCL, MILD PTOSIS

## 2025-08-06 ASSESSMENT — EXTERNAL EXAM - LEFT EYE: OS_EXAM: NORMAL

## 2025-08-07 DIAGNOSIS — J84.9 INTERSTITIAL LUNG DISEASE (H): ICD-10-CM

## 2025-08-07 DIAGNOSIS — J06.9 UPPER RESPIRATORY TRACT INFECTION, UNSPECIFIED TYPE: ICD-10-CM

## 2025-08-07 RX ORDER — BENZONATATE 200 MG/1
CAPSULE ORAL
Qty: 90 CAPSULE | Refills: 3 | Status: SHIPPED | OUTPATIENT
Start: 2025-08-07

## 2025-08-09 ENCOUNTER — HEALTH MAINTENANCE LETTER (OUTPATIENT)
Age: 75
End: 2025-08-09

## 2025-08-14 ENCOUNTER — ALLIED HEALTH/NURSE VISIT (OUTPATIENT)
Dept: OPHTHALMOLOGY | Facility: CLINIC | Age: 75
End: 2025-08-14
Attending: OPHTHALMOLOGY
Payer: COMMERCIAL

## 2025-08-14 DIAGNOSIS — E11.3299 NONPROLIFERATIVE DIABETIC RETINOPATHY ASSOCIATED WITH TYPE 2 DIABETES MELLITUS (H): ICD-10-CM

## 2025-08-14 DIAGNOSIS — H04.129 DRY EYE: ICD-10-CM

## 2025-08-14 DIAGNOSIS — Z79.899 LONG-TERM USE OF PLAQUENIL: Primary | ICD-10-CM

## 2025-08-14 PROCEDURE — 92274 MULTIFOCAL ERG W/I&R: CPT

## 2025-08-14 ASSESSMENT — VISUAL ACUITY
OD_SC: 20/25
OS_SC: 20/25
METHOD: SNELLEN - LINEAR

## 2025-08-15 ENCOUNTER — MYC MEDICAL ADVICE (OUTPATIENT)
Dept: RHEUMATOLOGY | Facility: CLINIC | Age: 75
End: 2025-08-15
Payer: COMMERCIAL

## 2025-08-16 ENCOUNTER — LAB (OUTPATIENT)
Dept: LAB | Facility: CLINIC | Age: 75
End: 2025-08-16
Payer: COMMERCIAL

## 2025-08-16 DIAGNOSIS — R76.8 RHEUMATOID FACTOR POSITIVE WITH CYCLIC CITRULLINATED PEPTIDE (CCP) ANTIBODY NEGATIVE: ICD-10-CM

## 2025-08-16 DIAGNOSIS — J84.9 ILD (INTERSTITIAL LUNG DISEASE) (H): ICD-10-CM

## 2025-08-16 LAB
ALBUMIN SERPL BCG-MCNC: 4.1 G/DL (ref 3.5–5.2)
ALP SERPL-CCNC: 104 U/L (ref 40–150)
ALT SERPL W P-5'-P-CCNC: 53 U/L (ref 0–50)
ANION GAP SERPL CALCULATED.3IONS-SCNC: 10 MMOL/L (ref 7–15)
AST SERPL W P-5'-P-CCNC: 51 U/L (ref 0–45)
BASOPHILS # BLD AUTO: 0.04 10E3/UL (ref 0–0.2)
BASOPHILS NFR BLD AUTO: 0.6 %
BILIRUB SERPL-MCNC: 0.5 MG/DL
BUN SERPL-MCNC: 11.2 MG/DL (ref 8–23)
CALCIUM SERPL-MCNC: 9.6 MG/DL (ref 8.8–10.4)
CHLORIDE SERPL-SCNC: 97 MMOL/L (ref 98–107)
CREAT SERPL-MCNC: 0.81 MG/DL (ref 0.51–0.95)
CRP SERPL-MCNC: <3 MG/L
EGFRCR SERPLBLD CKD-EPI 2021: 76 ML/MIN/1.73M2
EOSINOPHIL # BLD AUTO: 0.29 10E3/UL (ref 0–0.7)
EOSINOPHIL NFR BLD AUTO: 4.2 %
ERYTHROCYTE [DISTWIDTH] IN BLOOD BY AUTOMATED COUNT: 12.4 % (ref 10–15)
GLUCOSE SERPL-MCNC: 159 MG/DL (ref 70–99)
HCO3 SERPL-SCNC: 27 MMOL/L (ref 22–29)
HCT VFR BLD AUTO: 34.9 % (ref 35–47)
HGB BLD-MCNC: 12.2 G/DL (ref 11.7–15.7)
IMM GRANULOCYTES # BLD: <0.04 10E3/UL
IMM GRANULOCYTES NFR BLD: 0.1 %
LYMPHOCYTES # BLD AUTO: 1.13 10E3/UL (ref 0.8–5.3)
LYMPHOCYTES NFR BLD AUTO: 16.5 %
MCH RBC QN AUTO: 31.4 PG (ref 26.5–33)
MCHC RBC AUTO-ENTMCNC: 35 G/DL (ref 31.5–36.5)
MCV RBC AUTO: 89.7 FL (ref 78–100)
MONOCYTES # BLD AUTO: 0.63 10E3/UL (ref 0–1.3)
MONOCYTES NFR BLD AUTO: 9.2 %
NEUTROPHILS # BLD AUTO: 4.76 10E3/UL (ref 1.6–8.3)
NEUTROPHILS NFR BLD AUTO: 69.4 %
PLATELET # BLD AUTO: 243 10E3/UL (ref 150–450)
POTASSIUM SERPL-SCNC: 4.4 MMOL/L (ref 3.4–5.3)
PROT SERPL-MCNC: 7.4 G/DL (ref 6.4–8.3)
RBC # BLD AUTO: 3.89 10E6/UL (ref 3.8–5.2)
SODIUM SERPL-SCNC: 134 MMOL/L (ref 135–145)
WBC # BLD AUTO: 6.86 10E3/UL (ref 4–11)

## 2025-08-16 PROCEDURE — 86140 C-REACTIVE PROTEIN: CPT

## 2025-08-16 PROCEDURE — 85025 COMPLETE CBC W/AUTO DIFF WBC: CPT

## 2025-08-16 PROCEDURE — 36415 COLL VENOUS BLD VENIPUNCTURE: CPT

## 2025-08-16 PROCEDURE — 80053 COMPREHEN METABOLIC PANEL: CPT

## 2025-08-19 DIAGNOSIS — E55.9 VITAMIN D DEFICIENCY: ICD-10-CM

## 2025-08-20 RX ORDER — CHOLECALCIFEROL (VITAMIN D3) 50 MCG
1 TABLET ORAL DAILY
Qty: 90 TABLET | Refills: 3 | Status: SHIPPED | OUTPATIENT
Start: 2025-08-20

## 2025-08-21 DIAGNOSIS — J84.9 ILD (INTERSTITIAL LUNG DISEASE) (H): ICD-10-CM

## 2025-08-21 RX ORDER — PREDNISONE 5 MG/1
5 TABLET ORAL EVERY OTHER DAY
Qty: 30 TABLET | Refills: 0 | Status: SHIPPED | OUTPATIENT
Start: 2025-08-21